# Patient Record
Sex: FEMALE | Race: WHITE | NOT HISPANIC OR LATINO | Employment: OTHER | URBAN - METROPOLITAN AREA
[De-identification: names, ages, dates, MRNs, and addresses within clinical notes are randomized per-mention and may not be internally consistent; named-entity substitution may affect disease eponyms.]

---

## 2017-01-03 ENCOUNTER — TELEPHONE (OUTPATIENT)
Dept: NEUROLOGY | Facility: CLINIC | Age: 72
End: 2017-01-03

## 2017-01-03 NOTE — TELEPHONE ENCOUNTER
----- Message from Ca Mejia sent at 12/30/2016  8:06 AM EST -----  Pt called again about her rx.  She says that another doctor has never prescribed that for her, and that it's always come from Dr. Whitman.    The order from Dr. Cesar is because she is in the hospital.  It's a hospital order not her home rx.    She is upset and wants Dr. Whitman to call her in the hospital [phone].

## 2017-01-19 DIAGNOSIS — G40.909 SEIZURE DISORDER (HCC): ICD-10-CM

## 2017-01-19 RX ORDER — PRAMIPEXOLE DIHYDROCHLORIDE 0.5 MG/1
TABLET ORAL
Qty: 270 TABLET | Refills: 1 | Status: ON HOLD | OUTPATIENT
Start: 2017-01-19 | End: 2017-02-20 | Stop reason: SDUPTHER

## 2017-01-19 RX ORDER — PRAVASTATIN SODIUM 40 MG
TABLET ORAL
Qty: 90 TABLET | Refills: 1 | Status: SHIPPED | OUTPATIENT
Start: 2017-01-19 | End: 2017-02-15

## 2017-01-19 NOTE — TELEPHONE ENCOUNTER
pramipexole (MIRAPEX) 0.5 MG tablet [Pharmacy Med Name: PRAMIPEXOLE 0.5MG TABLETS]  TAKE 3 TABLETS BY MOUTH EVERY DAY   Normal, Disp-270 tablet, R-1     Pharmacy: [facility] [id number]

## 2017-02-09 ENCOUNTER — OFFICE VISIT (OUTPATIENT)
Dept: INFECTIOUS DISEASES | Facility: CLINIC | Age: 72
End: 2017-02-09

## 2017-02-09 VITALS
HEIGHT: 70 IN | RESPIRATION RATE: 14 BRPM | TEMPERATURE: 97.8 F | DIASTOLIC BLOOD PRESSURE: 76 MMHG | SYSTOLIC BLOOD PRESSURE: 138 MMHG

## 2017-02-09 DIAGNOSIS — Z79.2 LONG TERM CURRENT USE OF ANTIBIOTICS: ICD-10-CM

## 2017-02-09 DIAGNOSIS — T84.50XA: Primary | ICD-10-CM

## 2017-02-09 DIAGNOSIS — D70.2 OTHER DRUG-INDUCED NEUTROPENIA (HCC): ICD-10-CM

## 2017-02-09 PROCEDURE — 99214 OFFICE O/P EST MOD 30 MIN: CPT | Performed by: INTERNAL MEDICINE

## 2017-02-09 RX ORDER — FERROUS GLUCONATE 324(37.5)
TABLET ORAL
COMMUNITY
End: 2017-02-15

## 2017-02-09 RX ORDER — POTASSIUM CHLORIDE 750 MG/1
10 CAPSULE, EXTENDED RELEASE ORAL 2 TIMES DAILY
COMMUNITY
End: 2017-02-15

## 2017-02-09 RX ORDER — BACLOFEN 20 MG/1
20 TABLET ORAL 3 TIMES DAILY
COMMUNITY
End: 2017-02-15

## 2017-02-09 NOTE — PROGRESS NOTES
cc: Follow-up (PJI  Left knee)      Per prior notes: This is a very nice 71-year-old lady who initially had left TKA back in 2013. Starting in 11/2016, she underwent surgery for loosening of a pin x2 and then for a liner exchange. Most recently, she had an operation on 12/13/2016 by Dr. Claudio for failed locking pin of the left total knee with reinsertion of a new locking pin. Cultures were obtained at that time and did grow coagulase negative staphylococci. She was placed on Keflex and then developed rash and then was placed on clindamycin. She had persistent drainage and had redo incision and debridement with liner exchange on 12/24 by Dr. Cesar.  Cultures from that operation grew Enterococcus faecalis.  Unfortunately she failed to convalesce and underwent we will hardware with placement of an anabolic spacer on 12/29/16 by Dr. Cesar.  Cultures from that operation were negative.  She was discharged on 6 weeks of vancomycin.    Since that time, she says she has done well.  In terms of her prosthetic joint infection she denies any drainage.  She still has an anabolic spacer and is not moving around very well.  She does have some tenderness around her brace.  In regards her long-term use of antibiotics, she is tolerating the vancomycin well without side effects.  She denies any missed doses.  She denies any problem is with her pack.  In regards to her leukopenia, her white count has been running a touch on the low side although she denies any symptoms of neutropenic sepsis such as fever, chills, night sweats.     PAST MEDICAL/SURGICAL HISTORY:  1. Osteoarthritis, status post left total hip arthroplasty and total knee arthroplasty.  2. Chronic venous insufficiency/lymphedema.  3. Hyperlipidemia.   4. Multinodular goiter, status post partial thyroidectomy.   5. Osteoporosis.   6. Restless leg syndrome.   7. Sleep apnea.  8. Seizure disorder.   9. Appendectomy.  10. Hysterectomy.  11. Bladder repair.    Review of  "Systems: Lower extremity edema.  All other reviewed and negative except as per HPI    Blood pressure 138/76, temperature 97.8 °F (36.6 °C), resp. rate 14, height 70\" (177.8 cm).  GENERAL: Awake and alert, in no acute distress.   Left leg and knee immobilizer.  Visualized portions of the knee show an incision that appears well healed.  There is no significant joint effusion or cellulitic features.      DIAGNOSTICS:    Antibiotic monitoring labs reviewed.  Creatinine is been stable between 0.6 and 0.8.    White count is been running a touch low most recently 3.4 (P 53, L 34 with ANC of 1800).  Hematocrit 31.  Platelets 226    CRP is essentially normal.  In successive weeks It decreased from 90.2 down to 47 down to 15 down to 9 then to 14 and back down to 7 most recently on 2/6    ESR is normal at 9    Vancomycin trough has been variable and she has required some vancomycin dose adjustments.  Most recent vancomycin trough was 17.    12/13/16 wound cx: CoNS  12/24/16 wound cx enterococcus faecalis (s-amp, gent, vanc)  12/29/16 wound cx Neg    Assessment and Plan  1. 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 Cesar  2.  Long-term use of antibiotics, requiring intensive drug monitoring to avoid toxicity  3.  Mild leukopenia suspected secondary to #2    Doing well.  She has normalized her inflammatory markers.  I need to see no biochemical evidence of infection and she doesn't have any signs or symptoms of infection today in clinic.  We will go ahead and discontinue vancomycin and pull her PICC line today in clinic especially on account of her mild leukopenia.  I discussed with her that I generally recommend waiting between 2-8 weeks off of antibiotics to ensure no recrudescence of infection prior to placing new TKA.  We discussed signs and symptoms of recrudescent infection and when to seek medical attention.  She is due to see Dr. Cesar " tomorrow.  I have not scheduled her a follow-up appointment but would be happy to see her if there are infectious concerns.

## 2017-02-10 ENCOUNTER — TELEPHONE (OUTPATIENT)
Dept: ENDOCRINOLOGY | Age: 72
End: 2017-02-10

## 2017-02-10 NOTE — TELEPHONE ENCOUNTER
----- Message from Whitney Rob sent at 2/10/2017  1:41 PM EST -----  Contact: neal rich [phone]  Pt in rehab and going home on Sunday  Evaluation for nursing and pt care is needed  She needs a verbal ok from you    Thank you

## 2017-02-15 ENCOUNTER — APPOINTMENT (OUTPATIENT)
Dept: PREADMISSION TESTING | Facility: HOSPITAL | Age: 72
End: 2017-02-15

## 2017-02-15 ENCOUNTER — HOSPITAL ENCOUNTER (OUTPATIENT)
Dept: GENERAL RADIOLOGY | Facility: HOSPITAL | Age: 72
Discharge: HOME | End: 2017-02-15
Admitting: ORTHOPAEDIC SURGERY

## 2017-02-15 ENCOUNTER — HOSPITAL ENCOUNTER (OUTPATIENT)
Dept: GENERAL RADIOLOGY | Facility: HOSPITAL | Age: 72
Discharge: HOME | End: 2017-02-15

## 2017-02-15 VITALS
SYSTOLIC BLOOD PRESSURE: 148 MMHG | RESPIRATION RATE: 16 BRPM | TEMPERATURE: 97.1 F | BODY MASS INDEX: 32.94 KG/M2 | DIASTOLIC BLOOD PRESSURE: 88 MMHG | OXYGEN SATURATION: 97 % | HEIGHT: 69 IN | HEART RATE: 103 BPM | WEIGHT: 222.4 LBS

## 2017-02-15 LAB
ALBUMIN SERPL-MCNC: 4.1 G/DL (ref 3.5–5.2)
ALBUMIN/GLOB SERPL: 1.2 G/DL
ALP SERPL-CCNC: 145 U/L (ref 39–117)
ALT SERPL W P-5'-P-CCNC: 13 U/L (ref 1–33)
ANION GAP SERPL CALCULATED.3IONS-SCNC: 15.7 MMOL/L
APTT PPP: 31 SECONDS (ref 22.7–35.4)
AST SERPL-CCNC: 17 U/L (ref 1–32)
BACTERIA UR QL AUTO: ABNORMAL /HPF
BASOPHILS # BLD AUTO: 0.06 10*3/MM3 (ref 0–0.2)
BASOPHILS NFR BLD AUTO: 1.4 % (ref 0–1.5)
BILIRUB SERPL-MCNC: 0.4 MG/DL (ref 0.1–1.2)
BILIRUB UR QL STRIP: NEGATIVE
BUN BLD-MCNC: 7 MG/DL (ref 8–23)
BUN/CREAT SERPL: 7.5 (ref 7–25)
CALCIUM SPEC-SCNC: 10 MG/DL (ref 8.6–10.5)
CHLORIDE SERPL-SCNC: 97 MMOL/L (ref 98–107)
CLARITY UR: CLEAR
CO2 SERPL-SCNC: 26.3 MMOL/L (ref 22–29)
COLOR UR: YELLOW
CREAT BLD-MCNC: 0.93 MG/DL (ref 0.57–1)
CRP SERPL-MCNC: 0.99 MG/DL (ref 0–0.5)
DEPRECATED RDW RBC AUTO: 45.4 FL (ref 37–54)
EOSINOPHIL # BLD AUTO: 0 10*3/MM3 (ref 0–0.7)
EOSINOPHIL NFR BLD AUTO: 0 % (ref 0.3–6.2)
ERYTHROCYTE [DISTWIDTH] IN BLOOD BY AUTOMATED COUNT: 14.1 % (ref 11.7–13)
ERYTHROCYTE [SEDIMENTATION RATE] IN BLOOD: 16 MM/HR (ref 0–30)
GFR SERPL CREATININE-BSD FRML MDRD: 60 ML/MIN/1.73
GLOBULIN UR ELPH-MCNC: 3.3 GM/DL
GLUCOSE BLD-MCNC: 110 MG/DL (ref 65–99)
GLUCOSE UR STRIP-MCNC: NEGATIVE MG/DL
HCT VFR BLD AUTO: 36 % (ref 35.6–45.5)
HGB BLD-MCNC: 11.5 G/DL (ref 11.9–15.5)
HGB UR QL STRIP.AUTO: NEGATIVE
HYALINE CASTS UR QL AUTO: ABNORMAL /LPF
IMM GRANULOCYTES # BLD: 0 10*3/MM3 (ref 0–0.03)
IMM GRANULOCYTES NFR BLD: 0 % (ref 0–0.5)
INR PPP: 1.05 (ref 0.9–1.1)
KETONES UR QL STRIP: NEGATIVE
LEUKOCYTE ESTERASE UR QL STRIP.AUTO: ABNORMAL
LYMPHOCYTES # BLD AUTO: 1.39 10*3/MM3 (ref 0.9–4.8)
LYMPHOCYTES NFR BLD AUTO: 31.7 % (ref 19.6–45.3)
MCH RBC QN AUTO: 28.3 PG (ref 26.9–32)
MCHC RBC AUTO-ENTMCNC: 31.9 G/DL (ref 32.4–36.3)
MCV RBC AUTO: 88.7 FL (ref 80.5–98.2)
MONOCYTES # BLD AUTO: 0.53 10*3/MM3 (ref 0.2–1.2)
MONOCYTES NFR BLD AUTO: 12.1 % (ref 5–12)
NEUTROPHILS # BLD AUTO: 2.4 10*3/MM3 (ref 1.9–8.1)
NEUTROPHILS NFR BLD AUTO: 54.8 % (ref 42.7–76)
NITRITE UR QL STRIP: NEGATIVE
PH UR STRIP.AUTO: 7.5 [PH] (ref 5–8)
PLATELET # BLD AUTO: 352 10*3/MM3 (ref 140–500)
PMV BLD AUTO: 10.1 FL (ref 6–12)
POTASSIUM BLD-SCNC: 4.2 MMOL/L (ref 3.5–5.2)
PROT SERPL-MCNC: 7.4 G/DL (ref 6–8.5)
PROT UR QL STRIP: NEGATIVE
PROTHROMBIN TIME: 13.3 SECONDS (ref 11.7–14.2)
RBC # BLD AUTO: 4.06 10*6/MM3 (ref 3.9–5.2)
RBC # UR: ABNORMAL /HPF
REF LAB TEST METHOD: ABNORMAL
SODIUM BLD-SCNC: 139 MMOL/L (ref 136–145)
SP GR UR STRIP: 1.01 (ref 1–1.03)
SQUAMOUS #/AREA URNS HPF: ABNORMAL /HPF
UROBILINOGEN UR QL STRIP: ABNORMAL
WBC NRBC COR # BLD: 4.38 10*3/MM3 (ref 4.5–10.7)
WBC UR QL AUTO: ABNORMAL /HPF

## 2017-02-15 PROCEDURE — 85025 COMPLETE CBC W/AUTO DIFF WBC: CPT | Performed by: ORTHOPAEDIC SURGERY

## 2017-02-15 PROCEDURE — 71020 HC CHEST PA AND LATERAL: CPT

## 2017-02-15 PROCEDURE — 85652 RBC SED RATE AUTOMATED: CPT | Performed by: ORTHOPAEDIC SURGERY

## 2017-02-15 PROCEDURE — 87086 URINE CULTURE/COLONY COUNT: CPT | Performed by: ORTHOPAEDIC SURGERY

## 2017-02-15 PROCEDURE — 81001 URINALYSIS AUTO W/SCOPE: CPT | Performed by: ORTHOPAEDIC SURGERY

## 2017-02-15 PROCEDURE — 87186 SC STD MICRODIL/AGAR DIL: CPT | Performed by: ORTHOPAEDIC SURGERY

## 2017-02-15 PROCEDURE — 36415 COLL VENOUS BLD VENIPUNCTURE: CPT

## 2017-02-15 PROCEDURE — 86140 C-REACTIVE PROTEIN: CPT | Performed by: ORTHOPAEDIC SURGERY

## 2017-02-15 PROCEDURE — 80053 COMPREHEN METABOLIC PANEL: CPT | Performed by: ORTHOPAEDIC SURGERY

## 2017-02-15 PROCEDURE — 85610 PROTHROMBIN TIME: CPT | Performed by: ORTHOPAEDIC SURGERY

## 2017-02-15 PROCEDURE — 73560 X-RAY EXAM OF KNEE 1 OR 2: CPT

## 2017-02-15 PROCEDURE — 85730 THROMBOPLASTIN TIME PARTIAL: CPT | Performed by: ORTHOPAEDIC SURGERY

## 2017-02-15 RX ORDER — PRAVASTATIN SODIUM 40 MG
40 TABLET ORAL DAILY
COMMUNITY
End: 2019-04-15 | Stop reason: SDUPTHER

## 2017-02-15 RX ORDER — CHLORHEXIDINE GLUCONATE 500 MG/1
1 CLOTH TOPICAL TAKE AS DIRECTED
Status: ON HOLD | COMMUNITY
End: 2017-02-20

## 2017-02-15 NOTE — DISCHARGE INSTRUCTIONS
Take the following medications the morning of surgery with a small sip of water.    PHENOBARBITAL AND SYNTHROID      ARRIVE AT 11:00    General Instructions:  • Do not eat or drink after midnight: includes water, mints, or gum. You may brush your teeth.  • Do not smoke, chew tobacco, or drink alcohol.  • Bring medications in original bottles, any inhalers and if applicable your C-PAP/ BI-PAP machine.  • Bring any papers given to you in the doctor’s office.  • Wear clean comfortable clothes and socks.  • Do not wear contact lenses or make-up.  Bring a case for your glasses if applicable.   • Bring crutches or walker if applicable.  • Leave all other valuables and jewelry at home.    If you were given a blood bank ID arm band remember to bring it with you the day of surgery.    Preventing a Surgical Site Infection:  Shower on the morning of surgery using a fresh bar of anti-bacterial soap (such as Dial) and clean washcloth.  Dry with a clean towel and dress in clean clothing.  For 2 to 3 days before surgery, avoid shaving with a razor near where you will have surgery because the razor can irritate skin and make it easier to develop an infection  Ask your surgeon if you will be receiving antibiotics prior to surgery  Make sure you, your family, and all healthcare providers clean their hands with soap and water or an alcohol based hand  before caring for you or your wound  If at all possible, quit smoking as many days before surgery as you can.    Day of surgery:  Upon arrival, a Pre-op nurse and Anesthesiologist will review your health history, obtain vital signs, and answer questions you may have.  The only belongings needed at this time will be your home medications and if applicable your C-PAP/BI-PAP machine.  If you are staying overnight your family can leave the rest of your belongings in the car and bring them to your room later.  A Pre-op nurse will start an IV and you may receive medication in  preparation for surgery, including something to help you relax.  Your family will be able to see you in the Pre-op area.  While you are in surgery your family should notify the waiting room  if they leave the waiting room area and provide a contact phone number.    Please be aware that surgery does come with discomfort.  We want to make every effort to control your discomfort so please discuss any uncontrolled symptoms with your nurse.   Your doctor will most likely have prescribed pain medications.      If you are going home after surgery you will receive individualized written care instructions before being discharged.  A responsible adult must drive you to and from the hospital on the day of your surgery and stay with you for 24 hours.    If you are staying overnight following surgery, you will be transported to your hospital room following the recovery period.  [facility] has all private rooms.    If you have any questions please call Pre-Admission Testing at [phone].  Deductibles and co-payments are collected on the day of service. Please be prepared to pay the required co-pay, deductible or deposit on the day of service as defined by your plan.    2% CHLORAHEXIDINE GLUCONATE* CLOTH  Preparing or “prepping” skin before surgery can reduce the risk of infection at the surgical site. To make the process easier, [facility] has chosen disposable cloths moistened with a rinse-free, 2% Chlorhexidine Gluconate (CHG) antiseptic solution. The steps below outline the prepping process and should be carefully followed.        Use the prep cloth on the area that is circled in the diagram             Directions Night before Surgery  1) Shower using a fresh bar of anti-bacterial soap (such as Dial) and clean washcloth.  Use a clean towel to completely dry your skin.  2) Do not use any lotions, oils or creams on your skin.  3) Open the package and remove 1 cloth, wipe your skin for 30  seconds in a circular motion.  Allow to dry for 3 minutes.  4) Repeat #3 with second cloth.  5) Do not touch your eyes, ears, or mouth with the prep cloth.  6) Allow the wet prep solution to air dry.  7) Discard the prep cloth and wash your hands with soap and water.   8) Dress in clean bed clothes and sleep on fresh clean bed sheets.   9) You may experience some temporary itching after the prep.    Directions Day of Surgery  1) Repeat steps 1,2,3,4,5,6,7, and 9.   2) Dress in clean clothes before coming to the hospital.    BACTROBAN NASAL OINTMENT  There are many germs normally in your nose. Bactroban is an ointment that will help reduce these germs. Please follow these instructions for Bactroban use:    ____Two days before surgery in the evening Date________    ____The day before surgery in the morning  Date________    ____The day before surgery in the evening              Date________    ____The day of surgery in the morning    Date________    **Squirt ½ package of Bactroban Ointment onto a cotton applicator and apply to inside of 1st nostril.  Squirt the remaining Bactroban and apply to the inside of the other nostril.    PERIDEX- ORAL:  Use only if your surgeon has ordered  Use the night before and morning of surgery - Swish, gargle, and spit - do not swallow.

## 2017-02-18 LAB
BACTERIA SPEC AEROBE CULT: ABNORMAL
BACTERIA SPEC AEROBE CULT: ABNORMAL

## 2017-02-20 ENCOUNTER — APPOINTMENT (OUTPATIENT)
Dept: GENERAL RADIOLOGY | Facility: HOSPITAL | Age: 72
End: 2017-02-20
Attending: ORTHOPAEDIC SURGERY

## 2017-02-20 ENCOUNTER — ANESTHESIA EVENT (OUTPATIENT)
Dept: PERIOP | Facility: HOSPITAL | Age: 72
End: 2017-02-20

## 2017-02-20 ENCOUNTER — HOSPITAL ENCOUNTER (INPATIENT)
Facility: HOSPITAL | Age: 72
LOS: 3 days | Discharge: HOME HEALTH | End: 2017-02-23
Attending: ORTHOPAEDIC SURGERY | Admitting: ORTHOPAEDIC SURGERY

## 2017-02-20 ENCOUNTER — ANESTHESIA (OUTPATIENT)
Dept: PERIOP | Facility: HOSPITAL | Age: 72
End: 2017-02-20

## 2017-02-20 DIAGNOSIS — Z89.529 ACQUIRED ABSENCE OF KNEE JOINT FOLLOWING REMOVAL OF JOINT PROSTHESIS WITH PRESENCE OF ANTIBIOTIC-IMPREGNATED CEMENT SPACER: ICD-10-CM

## 2017-02-20 DIAGNOSIS — M17.12 PRIMARY OSTEOARTHRITIS OF LEFT KNEE: ICD-10-CM

## 2017-02-20 DIAGNOSIS — R26.2 DIFFICULTY WALKING: Primary | ICD-10-CM

## 2017-02-20 LAB
BACTERIA UR QL AUTO: ABNORMAL /HPF
BILIRUB UR QL STRIP: NEGATIVE
CLARITY UR: CLEAR
COLOR UR: YELLOW
GLUCOSE UR STRIP-MCNC: NEGATIVE MG/DL
HBV SURFACE AG SERPL QL IA: NORMAL
HCV AB SER DONR QL: NORMAL
HGB UR QL STRIP.AUTO: NEGATIVE
HIV1 P24 AG SER QL: NORMAL
HIV1+2 AB SER QL: NORMAL
HYALINE CASTS UR QL AUTO: ABNORMAL /LPF
KETONES UR QL STRIP: NEGATIVE
LEUKOCYTE ESTERASE UR QL STRIP.AUTO: NEGATIVE
NITRITE UR QL STRIP: NEGATIVE
PH UR STRIP.AUTO: 6.5 [PH] (ref 5–8)
PROT UR QL STRIP: NEGATIVE
RBC # UR: ABNORMAL /HPF
REF LAB TEST METHOD: ABNORMAL
SP GR UR STRIP: 1.01 (ref 1–1.03)
SQUAMOUS #/AREA URNS HPF: ABNORMAL /HPF
UROBILINOGEN UR QL STRIP: NORMAL
WBC UR QL AUTO: ABNORMAL /HPF

## 2017-02-20 PROCEDURE — 87070 CULTURE OTHR SPECIMN AEROBIC: CPT | Performed by: ORTHOPAEDIC SURGERY

## 2017-02-20 PROCEDURE — C1776 JOINT DEVICE (IMPLANTABLE): HCPCS | Performed by: ORTHOPAEDIC SURGERY

## 2017-02-20 PROCEDURE — 25010000002 MIDAZOLAM PER 1 MG: Performed by: ANESTHESIOLOGY

## 2017-02-20 PROCEDURE — 25010000002 FENTANYL CITRATE (PF) 100 MCG/2ML SOLUTION: Performed by: NURSE ANESTHETIST, CERTIFIED REGISTERED

## 2017-02-20 PROCEDURE — 0SUD09Z SUPPLEMENT LEFT KNEE JOINT WITH LINER, OPEN APPROACH: ICD-10-PCS | Performed by: ORTHOPAEDIC SURGERY

## 2017-02-20 PROCEDURE — 25010000002 PROPOFOL 10 MG/ML EMULSION: Performed by: NURSE ANESTHETIST, CERTIFIED REGISTERED

## 2017-02-20 PROCEDURE — 87899 AGENT NOS ASSAY W/OPTIC: CPT | Performed by: ORTHOPAEDIC SURGERY

## 2017-02-20 PROCEDURE — 25010000002 KETOROLAC TROMETHAMINE PER 15 MG: Performed by: ANESTHESIOLOGY

## 2017-02-20 PROCEDURE — 87340 HEPATITIS B SURFACE AG IA: CPT | Performed by: ORTHOPAEDIC SURGERY

## 2017-02-20 PROCEDURE — C1713 ANCHOR/SCREW BN/BN,TIS/BN: HCPCS | Performed by: ORTHOPAEDIC SURGERY

## 2017-02-20 PROCEDURE — 25010000002 VANCOMYCIN: Performed by: ORTHOPAEDIC SURGERY

## 2017-02-20 PROCEDURE — 0SPD08Z REMOVAL OF SPACER FROM LEFT KNEE JOINT, OPEN APPROACH: ICD-10-PCS | Performed by: ORTHOPAEDIC SURGERY

## 2017-02-20 PROCEDURE — 88331 PATH CONSLTJ SURG 1 BLK 1SPC: CPT | Performed by: ORTHOPAEDIC SURGERY

## 2017-02-20 PROCEDURE — 25010000002 MEPERIDINE 25 MG/0.5ML SOLUTION

## 2017-02-20 PROCEDURE — 87205 SMEAR GRAM STAIN: CPT | Performed by: ORTHOPAEDIC SURGERY

## 2017-02-20 PROCEDURE — 86803 HEPATITIS C AB TEST: CPT | Performed by: ORTHOPAEDIC SURGERY

## 2017-02-20 PROCEDURE — 73560 X-RAY EXAM OF KNEE 1 OR 2: CPT

## 2017-02-20 PROCEDURE — 87075 CULTR BACTERIA EXCEPT BLOOD: CPT | Performed by: ORTHOPAEDIC SURGERY

## 2017-02-20 PROCEDURE — 0SRD0J9 REPLACEMENT OF LEFT KNEE JOINT WITH SYNTHETIC SUBSTITUTE, CEMENTED, OPEN APPROACH: ICD-10-PCS | Performed by: ORTHOPAEDIC SURGERY

## 2017-02-20 PROCEDURE — 25010000002 DEXAMETHASONE PER 1 MG: Performed by: NURSE ANESTHETIST, CERTIFIED REGISTERED

## 2017-02-20 PROCEDURE — 81001 URINALYSIS AUTO W/SCOPE: CPT | Performed by: ORTHOPAEDIC SURGERY

## 2017-02-20 PROCEDURE — 25010000002 MEPERIDINE 25 MG/0.5ML SOLUTION: Performed by: ANESTHESIOLOGY

## 2017-02-20 PROCEDURE — 25010000002 HYDROMORPHONE PER 4 MG: Performed by: NURSE ANESTHETIST, CERTIFIED REGISTERED

## 2017-02-20 PROCEDURE — 25010000002 NEOSTIGMINE 10 MG/10ML SOLUTION: Performed by: ANESTHESIOLOGY

## 2017-02-20 PROCEDURE — 25010000002 ONDANSETRON PER 1 MG: Performed by: ANESTHESIOLOGY

## 2017-02-20 PROCEDURE — 25010000002 KETOROLAC TROMETHAMINE PER 15 MG: Performed by: ORTHOPAEDIC SURGERY

## 2017-02-20 PROCEDURE — G0432 EIA HIV-1/HIV-2 SCREEN: HCPCS | Performed by: ORTHOPAEDIC SURGERY

## 2017-02-20 PROCEDURE — 88305 TISSUE EXAM BY PATHOLOGIST: CPT | Performed by: ORTHOPAEDIC SURGERY

## 2017-02-20 DEVICE — IMPLANTABLE DEVICE: Type: IMPLANTABLE DEVICE | Site: KNEE | Status: FUNCTIONAL

## 2017-02-20 DEVICE — IMPLANTABLE DEVICE
Type: IMPLANTABLE DEVICE | Site: KNEE | Status: FUNCTIONAL
Brand: BIOMET 360 KNEE SYSTEM

## 2017-02-20 DEVICE — IMPLANTABLE DEVICE
Type: IMPLANTABLE DEVICE | Site: KNEE | Status: FUNCTIONAL
Brand: VANGUARD 360 REVISION SYSTEM

## 2017-02-20 DEVICE — IMPLANTABLE DEVICE
Type: IMPLANTABLE DEVICE | Site: KNEE | Status: FUNCTIONAL
Brand: VANGUARD® KNEE SYSTEM

## 2017-02-20 RX ORDER — SODIUM CHLORIDE, SODIUM LACTATE, POTASSIUM CHLORIDE, CALCIUM CHLORIDE 600; 310; 30; 20 MG/100ML; MG/100ML; MG/100ML; MG/100ML
100 INJECTION, SOLUTION INTRAVENOUS CONTINUOUS
Status: DISCONTINUED | OUTPATIENT
Start: 2017-02-20 | End: 2017-02-23 | Stop reason: HOSPADM

## 2017-02-20 RX ORDER — FENTANYL CITRATE 50 UG/ML
50 INJECTION, SOLUTION INTRAMUSCULAR; INTRAVENOUS
Status: DISCONTINUED | OUTPATIENT
Start: 2017-02-20 | End: 2017-02-20 | Stop reason: HOSPADM

## 2017-02-20 RX ORDER — LEVOTHYROXINE SODIUM 0.05 MG/1
50 TABLET ORAL
Status: DISCONTINUED | OUTPATIENT
Start: 2017-02-21 | End: 2017-02-23 | Stop reason: HOSPADM

## 2017-02-20 RX ORDER — PROMETHAZINE HYDROCHLORIDE 25 MG/1
25 SUPPOSITORY RECTAL ONCE AS NEEDED
Status: DISCONTINUED | OUTPATIENT
Start: 2017-02-20 | End: 2017-02-20 | Stop reason: HOSPADM

## 2017-02-20 RX ORDER — KETOROLAC TROMETHAMINE 30 MG/ML
INJECTION, SOLUTION INTRAMUSCULAR; INTRAVENOUS AS NEEDED
Status: DISCONTINUED | OUTPATIENT
Start: 2017-02-20 | End: 2017-02-20 | Stop reason: SURG

## 2017-02-20 RX ORDER — OXYCODONE AND ACETAMINOPHEN 7.5; 325 MG/1; MG/1
1 TABLET ORAL ONCE AS NEEDED
Status: DISCONTINUED | OUTPATIENT
Start: 2017-02-20 | End: 2017-02-20 | Stop reason: HOSPADM

## 2017-02-20 RX ORDER — LIDOCAINE HYDROCHLORIDE 20 MG/ML
INJECTION, SOLUTION INFILTRATION; PERINEURAL AS NEEDED
Status: DISCONTINUED | OUTPATIENT
Start: 2017-02-20 | End: 2017-02-20 | Stop reason: SURG

## 2017-02-20 RX ORDER — ROCURONIUM BROMIDE 10 MG/ML
INJECTION, SOLUTION INTRAVENOUS AS NEEDED
Status: DISCONTINUED | OUTPATIENT
Start: 2017-02-20 | End: 2017-02-20 | Stop reason: SURG

## 2017-02-20 RX ORDER — PROMETHAZINE HYDROCHLORIDE 25 MG/ML
12.5 INJECTION, SOLUTION INTRAMUSCULAR; INTRAVENOUS ONCE AS NEEDED
Status: DISCONTINUED | OUTPATIENT
Start: 2017-02-20 | End: 2017-02-20 | Stop reason: HOSPADM

## 2017-02-20 RX ORDER — ONDANSETRON 2 MG/ML
4 INJECTION INTRAMUSCULAR; INTRAVENOUS EVERY 6 HOURS PRN
Status: DISCONTINUED | OUTPATIENT
Start: 2017-02-20 | End: 2017-02-23 | Stop reason: HOSPADM

## 2017-02-20 RX ORDER — ACETAMINOPHEN 325 MG/1
650 TABLET ORAL EVERY 4 HOURS PRN
Status: DISCONTINUED | OUTPATIENT
Start: 2017-02-20 | End: 2017-02-23 | Stop reason: HOSPADM

## 2017-02-20 RX ORDER — SODIUM CHLORIDE 0.9 % (FLUSH) 0.9 %
1-10 SYRINGE (ML) INJECTION AS NEEDED
Status: DISCONTINUED | OUTPATIENT
Start: 2017-02-20 | End: 2017-02-23 | Stop reason: HOSPADM

## 2017-02-20 RX ORDER — FLUMAZENIL 0.1 MG/ML
0.2 INJECTION INTRAVENOUS AS NEEDED
Status: DISCONTINUED | OUTPATIENT
Start: 2017-02-20 | End: 2017-02-20 | Stop reason: HOSPADM

## 2017-02-20 RX ORDER — PROMETHAZINE HYDROCHLORIDE 25 MG/1
25 TABLET ORAL ONCE AS NEEDED
Status: DISCONTINUED | OUTPATIENT
Start: 2017-02-20 | End: 2017-02-20 | Stop reason: HOSPADM

## 2017-02-20 RX ORDER — FENTANYL CITRATE 50 UG/ML
INJECTION, SOLUTION INTRAMUSCULAR; INTRAVENOUS AS NEEDED
Status: DISCONTINUED | OUTPATIENT
Start: 2017-02-20 | End: 2017-02-20 | Stop reason: SURG

## 2017-02-20 RX ORDER — SODIUM CHLORIDE 0.9 % (FLUSH) 0.9 %
1-10 SYRINGE (ML) INJECTION AS NEEDED
Status: DISCONTINUED | OUTPATIENT
Start: 2017-02-20 | End: 2017-02-20 | Stop reason: HOSPADM

## 2017-02-20 RX ORDER — DEXAMETHASONE SODIUM PHOSPHATE 10 MG/ML
INJECTION INTRAMUSCULAR; INTRAVENOUS AS NEEDED
Status: DISCONTINUED | OUTPATIENT
Start: 2017-02-20 | End: 2017-02-20 | Stop reason: SURG

## 2017-02-20 RX ORDER — ASPIRIN 325 MG
325 TABLET, DELAYED RELEASE (ENTERIC COATED) ORAL DAILY
Status: DISCONTINUED | OUTPATIENT
Start: 2017-02-20 | End: 2017-02-23 | Stop reason: HOSPADM

## 2017-02-20 RX ORDER — FAMOTIDINE 10 MG/ML
20 INJECTION, SOLUTION INTRAVENOUS ONCE
Status: COMPLETED | OUTPATIENT
Start: 2017-02-20 | End: 2017-02-20

## 2017-02-20 RX ORDER — HYDROMORPHONE HYDROCHLORIDE 1 MG/ML
0.25 INJECTION, SOLUTION INTRAMUSCULAR; INTRAVENOUS; SUBCUTANEOUS
Status: DISCONTINUED | OUTPATIENT
Start: 2017-02-20 | End: 2017-02-20 | Stop reason: HOSPADM

## 2017-02-20 RX ORDER — NALOXONE HCL 0.4 MG/ML
0.2 VIAL (ML) INJECTION AS NEEDED
Status: DISCONTINUED | OUTPATIENT
Start: 2017-02-20 | End: 2017-02-20 | Stop reason: HOSPADM

## 2017-02-20 RX ORDER — SODIUM CHLORIDE, SODIUM LACTATE, POTASSIUM CHLORIDE, CALCIUM CHLORIDE 600; 310; 30; 20 MG/100ML; MG/100ML; MG/100ML; MG/100ML
9 INJECTION, SOLUTION INTRAVENOUS CONTINUOUS
Status: DISCONTINUED | OUTPATIENT
Start: 2017-02-20 | End: 2017-02-22 | Stop reason: SDUPTHER

## 2017-02-20 RX ORDER — MIDAZOLAM HYDROCHLORIDE 1 MG/ML
1 INJECTION INTRAMUSCULAR; INTRAVENOUS
Status: DISCONTINUED | OUTPATIENT
Start: 2017-02-20 | End: 2017-02-20 | Stop reason: HOSPADM

## 2017-02-20 RX ORDER — TRANEXAMIC ACID 100 MG/ML
INJECTION, SOLUTION INTRAVENOUS AS NEEDED
Status: DISCONTINUED | OUTPATIENT
Start: 2017-02-20 | End: 2017-02-20 | Stop reason: SURG

## 2017-02-20 RX ORDER — OXYCODONE HYDROCHLORIDE AND ACETAMINOPHEN 5; 325 MG/1; MG/1
1 TABLET ORAL EVERY 4 HOURS PRN
Status: DISCONTINUED | OUTPATIENT
Start: 2017-02-20 | End: 2017-02-23 | Stop reason: HOSPADM

## 2017-02-20 RX ORDER — PROMETHAZINE HYDROCHLORIDE 25 MG/1
12.5 TABLET ORAL ONCE AS NEEDED
Status: DISCONTINUED | OUTPATIENT
Start: 2017-02-20 | End: 2017-02-20 | Stop reason: HOSPADM

## 2017-02-20 RX ORDER — BISACODYL 10 MG
10 SUPPOSITORY, RECTAL RECTAL DAILY PRN
Status: DISCONTINUED | OUTPATIENT
Start: 2017-02-20 | End: 2017-02-23 | Stop reason: HOSPADM

## 2017-02-20 RX ORDER — PROPOFOL 10 MG/ML
VIAL (ML) INTRAVENOUS AS NEEDED
Status: DISCONTINUED | OUTPATIENT
Start: 2017-02-20 | End: 2017-02-20 | Stop reason: SURG

## 2017-02-20 RX ORDER — HYDROMORPHONE HYDROCHLORIDE 1 MG/ML
0.5 INJECTION, SOLUTION INTRAMUSCULAR; INTRAVENOUS; SUBCUTANEOUS
Status: DISCONTINUED | OUTPATIENT
Start: 2017-02-20 | End: 2017-02-20 | Stop reason: HOSPADM

## 2017-02-20 RX ORDER — DIPHENHYDRAMINE HYDROCHLORIDE 50 MG/ML
12.5 INJECTION INTRAMUSCULAR; INTRAVENOUS
Status: DISCONTINUED | OUTPATIENT
Start: 2017-02-20 | End: 2017-02-20 | Stop reason: HOSPADM

## 2017-02-20 RX ORDER — NEOSTIGMINE METHYLSULFATE 1 MG/ML
INJECTION, SOLUTION INTRAVENOUS AS NEEDED
Status: DISCONTINUED | OUTPATIENT
Start: 2017-02-20 | End: 2017-02-20 | Stop reason: SURG

## 2017-02-20 RX ORDER — ONDANSETRON 2 MG/ML
4 INJECTION INTRAMUSCULAR; INTRAVENOUS ONCE AS NEEDED
Status: DISCONTINUED | OUTPATIENT
Start: 2017-02-20 | End: 2017-02-20 | Stop reason: HOSPADM

## 2017-02-20 RX ORDER — HYDRALAZINE HYDROCHLORIDE 20 MG/ML
5 INJECTION INTRAMUSCULAR; INTRAVENOUS
Status: DISCONTINUED | OUTPATIENT
Start: 2017-02-20 | End: 2017-02-20 | Stop reason: HOSPADM

## 2017-02-20 RX ORDER — PRAMIPEXOLE DIHYDROCHLORIDE 0.5 MG/1
0.5 TABLET ORAL 3 TIMES DAILY
Status: DISCONTINUED | OUTPATIENT
Start: 2017-02-20 | End: 2017-02-23 | Stop reason: HOSPADM

## 2017-02-20 RX ORDER — OXYCODONE HYDROCHLORIDE AND ACETAMINOPHEN 5; 325 MG/1; MG/1
2 TABLET ORAL EVERY 4 HOURS PRN
Status: DISCONTINUED | OUTPATIENT
Start: 2017-02-20 | End: 2017-02-23 | Stop reason: HOSPADM

## 2017-02-20 RX ORDER — HYDROCODONE BITARTRATE AND ACETAMINOPHEN 7.5; 325 MG/1; MG/1
1 TABLET ORAL ONCE AS NEEDED
Status: DISCONTINUED | OUTPATIENT
Start: 2017-02-20 | End: 2017-02-20 | Stop reason: HOSPADM

## 2017-02-20 RX ORDER — KETOROLAC TROMETHAMINE 15 MG/ML
15 INJECTION, SOLUTION INTRAMUSCULAR; INTRAVENOUS EVERY 6 HOURS
Status: COMPLETED | OUTPATIENT
Start: 2017-02-20 | End: 2017-02-21

## 2017-02-20 RX ORDER — DOCUSATE SODIUM 100 MG/1
100 CAPSULE, LIQUID FILLED ORAL 2 TIMES DAILY PRN
Status: DISCONTINUED | OUTPATIENT
Start: 2017-02-20 | End: 2017-02-23 | Stop reason: HOSPADM

## 2017-02-20 RX ORDER — PHENOBARBITAL 64.8 MG/1
64.8 TABLET ORAL DAILY
Status: DISCONTINUED | OUTPATIENT
Start: 2017-02-21 | End: 2017-02-23 | Stop reason: HOSPADM

## 2017-02-20 RX ORDER — ACETAMINOPHEN 325 MG/1
325 TABLET ORAL EVERY 4 HOURS PRN
Status: DISCONTINUED | OUTPATIENT
Start: 2017-02-20 | End: 2017-02-23 | Stop reason: HOSPADM

## 2017-02-20 RX ORDER — GLYCOPYRROLATE 0.2 MG/ML
INJECTION INTRAMUSCULAR; INTRAVENOUS AS NEEDED
Status: DISCONTINUED | OUTPATIENT
Start: 2017-02-20 | End: 2017-02-20 | Stop reason: SURG

## 2017-02-20 RX ORDER — MIDAZOLAM HYDROCHLORIDE 1 MG/ML
2 INJECTION INTRAMUSCULAR; INTRAVENOUS
Status: DISCONTINUED | OUTPATIENT
Start: 2017-02-20 | End: 2017-02-20 | Stop reason: HOSPADM

## 2017-02-20 RX ORDER — ONDANSETRON 2 MG/ML
INJECTION INTRAMUSCULAR; INTRAVENOUS AS NEEDED
Status: DISCONTINUED | OUTPATIENT
Start: 2017-02-20 | End: 2017-02-20 | Stop reason: SURG

## 2017-02-20 RX ORDER — NALOXONE HCL 0.4 MG/ML
0.1 VIAL (ML) INJECTION
Status: DISCONTINUED | OUTPATIENT
Start: 2017-02-20 | End: 2017-02-23 | Stop reason: HOSPADM

## 2017-02-20 RX ORDER — DIPHENHYDRAMINE HCL 25 MG
25 CAPSULE ORAL EVERY 6 HOURS PRN
Status: DISCONTINUED | OUTPATIENT
Start: 2017-02-20 | End: 2017-02-23 | Stop reason: HOSPADM

## 2017-02-20 RX ORDER — FERROUS SULFATE 325(65) MG
325 TABLET ORAL
Status: DISCONTINUED | OUTPATIENT
Start: 2017-02-21 | End: 2017-02-23 | Stop reason: HOSPADM

## 2017-02-20 RX ORDER — PHENOBARBITAL 64.8 MG/1
129.6 TABLET ORAL NIGHTLY
Status: DISCONTINUED | OUTPATIENT
Start: 2017-02-20 | End: 2017-02-23 | Stop reason: HOSPADM

## 2017-02-20 RX ORDER — ONDANSETRON 4 MG/1
4 TABLET, FILM COATED ORAL EVERY 6 HOURS PRN
Status: DISCONTINUED | OUTPATIENT
Start: 2017-02-20 | End: 2017-02-23 | Stop reason: HOSPADM

## 2017-02-20 RX ORDER — ACETAMINOPHEN 500 MG
1000 TABLET ORAL ONCE
Status: COMPLETED | OUTPATIENT
Start: 2017-02-20 | End: 2017-02-20

## 2017-02-20 RX ORDER — LABETALOL HYDROCHLORIDE 5 MG/ML
5 INJECTION, SOLUTION INTRAVENOUS
Status: DISCONTINUED | OUTPATIENT
Start: 2017-02-20 | End: 2017-02-20 | Stop reason: HOSPADM

## 2017-02-20 RX ORDER — ONDANSETRON 4 MG/1
4 TABLET, ORALLY DISINTEGRATING ORAL EVERY 6 HOURS PRN
Status: DISCONTINUED | OUTPATIENT
Start: 2017-02-20 | End: 2017-02-23 | Stop reason: HOSPADM

## 2017-02-20 RX ADMIN — VANCOMYCIN HYDROCHLORIDE 1500 MG: 1 INJECTION, POWDER, LYOPHILIZED, FOR SOLUTION INTRAVENOUS at 13:39

## 2017-02-20 RX ADMIN — PHENOBARBITAL 129.6 MG: 64.8 TABLET ORAL at 21:27

## 2017-02-20 RX ADMIN — DEXAMETHASONE SODIUM PHOSPHATE 10 MG: 10 INJECTION INTRAMUSCULAR; INTRAVENOUS at 14:52

## 2017-02-20 RX ADMIN — MIDAZOLAM 2 MG: 1 INJECTION INTRAMUSCULAR; INTRAVENOUS at 14:26

## 2017-02-20 RX ADMIN — SODIUM CHLORIDE, POTASSIUM CHLORIDE, SODIUM LACTATE AND CALCIUM CHLORIDE 100 ML/HR: 600; 310; 30; 20 INJECTION, SOLUTION INTRAVENOUS at 20:03

## 2017-02-20 RX ADMIN — SODIUM CHLORIDE, POTASSIUM CHLORIDE, SODIUM LACTATE AND CALCIUM CHLORIDE 9 ML/HR: 600; 310; 30; 20 INJECTION, SOLUTION INTRAVENOUS at 14:27

## 2017-02-20 RX ADMIN — FENTANYL CITRATE 100 MCG: 50 INJECTION, SOLUTION INTRAMUSCULAR; INTRAVENOUS at 15:35

## 2017-02-20 RX ADMIN — HYDROMORPHONE HYDROCHLORIDE 0.5 MG: 1 INJECTION, SOLUTION INTRAMUSCULAR; INTRAVENOUS; SUBCUTANEOUS at 17:59

## 2017-02-20 RX ADMIN — FENTANYL CITRATE 25 MCG: 50 INJECTION, SOLUTION INTRAMUSCULAR; INTRAVENOUS at 16:45

## 2017-02-20 RX ADMIN — ACETAMINOPHEN 1000 MG: 500 TABLET ORAL at 13:04

## 2017-02-20 RX ADMIN — FENTANYL CITRATE 25 MCG: 50 INJECTION, SOLUTION INTRAMUSCULAR; INTRAVENOUS at 17:00

## 2017-02-20 RX ADMIN — ROCURONIUM BROMIDE 10 MG: 10 INJECTION INTRAVENOUS at 15:35

## 2017-02-20 RX ADMIN — ROCURONIUM BROMIDE 40 MG: 10 INJECTION INTRAVENOUS at 14:45

## 2017-02-20 RX ADMIN — MEPERIDINE HYDROCHLORIDE 12.5 MG: 25 INJECTION, SOLUTION INTRAMUSCULAR; INTRAVENOUS; SUBCUTANEOUS at 18:06

## 2017-02-20 RX ADMIN — FENTANYL CITRATE 50 MCG: 50 INJECTION INTRAMUSCULAR; INTRAVENOUS at 17:40

## 2017-02-20 RX ADMIN — KETOROLAC TROMETHAMINE 15 MG: 30 INJECTION, SOLUTION INTRAMUSCULAR at 20:02

## 2017-02-20 RX ADMIN — FENTANYL CITRATE 100 MCG: 50 INJECTION, SOLUTION INTRAMUSCULAR; INTRAVENOUS at 14:42

## 2017-02-20 RX ADMIN — MEPERIDINE HYDROCHLORIDE 12.5 MG: 25 INJECTION, SOLUTION INTRAMUSCULAR; INTRAVENOUS; SUBCUTANEOUS at 18:01

## 2017-02-20 RX ADMIN — FENTANYL CITRATE 50 MCG: 50 INJECTION INTRAMUSCULAR; INTRAVENOUS at 17:27

## 2017-02-20 RX ADMIN — PRAMIPEXOLE DIHYDROCHLORIDE 0.5 MG: 0.5 TABLET ORAL at 21:21

## 2017-02-20 RX ADMIN — HYDROMORPHONE HYDROCHLORIDE 0.5 MG: 1 INJECTION, SOLUTION INTRAMUSCULAR; INTRAVENOUS; SUBCUTANEOUS at 17:47

## 2017-02-20 RX ADMIN — FENTANYL CITRATE 50 MCG: 50 INJECTION INTRAMUSCULAR; INTRAVENOUS at 17:57

## 2017-02-20 RX ADMIN — FAMOTIDINE 20 MG: 10 INJECTION, SOLUTION INTRAVENOUS at 14:26

## 2017-02-20 RX ADMIN — HYDROMORPHONE HYDROCHLORIDE 0.5 MG: 1 INJECTION, SOLUTION INTRAMUSCULAR; INTRAVENOUS; SUBCUTANEOUS at 18:23

## 2017-02-20 RX ADMIN — NEOSTIGMINE METHYLSULFATE 3 MG: 1 INJECTION INTRAVENOUS at 16:42

## 2017-02-20 RX ADMIN — TRANEXAMIC ACID 1000 MG: 100 INJECTION, SOLUTION INTRAVENOUS at 16:10

## 2017-02-20 RX ADMIN — HYDROMORPHONE HYDROCHLORIDE 0.5 MG: 1 INJECTION, SOLUTION INTRAMUSCULAR; INTRAVENOUS; SUBCUTANEOUS at 17:30

## 2017-02-20 RX ADMIN — GLYCOPYRROLATE 0.6 MG: 0.2 INJECTION INTRAMUSCULAR; INTRAVENOUS at 16:42

## 2017-02-20 RX ADMIN — KETOROLAC TROMETHAMINE 30 MG: 30 INJECTION, SOLUTION INTRAMUSCULAR; INTRAVENOUS at 15:30

## 2017-02-20 RX ADMIN — LIDOCAINE HYDROCHLORIDE 100 MG: 20 INJECTION, SOLUTION INFILTRATION; PERINEURAL at 14:45

## 2017-02-20 RX ADMIN — PROPOFOL 200 MG: 10 INJECTION, EMULSION INTRAVENOUS at 14:45

## 2017-02-20 RX ADMIN — ASPIRIN 325 MG: 325 TABLET, DELAYED RELEASE ORAL at 20:02

## 2017-02-20 RX ADMIN — FENTANYL CITRATE 50 MCG: 50 INJECTION INTRAMUSCULAR; INTRAVENOUS at 18:15

## 2017-02-20 RX ADMIN — ONDANSETRON 4 MG: 2 INJECTION INTRAMUSCULAR; INTRAVENOUS at 15:30

## 2017-02-20 RX ADMIN — PROPOFOL 20 MG: 10 INJECTION, EMULSION INTRAVENOUS at 15:35

## 2017-02-20 NOTE — ANESTHESIA PROCEDURE NOTES
Airway  Urgency: elective    Date/Time: 2/20/2017 2:50 PM  Airway not difficult    General Information and Staff    Patient location during procedure: OR  Anesthesiologist: MOON DECKER  CRNA: PATIENCE RIVERA    Indications and Patient Condition  Indications for airway management: airway protection    Preoxygenated: yes  Mask difficulty assessment: 2 - vent by mask + OA or adjuvant +/- NMBA    Final Airway Details  Final airway type: endotracheal airway      Successful airway: ETT  Cuffed: yes   Successful intubation technique: direct laryngoscopy  Facilitating devices/methods: intubating stylet  Endotracheal tube insertion site: oral  Blade: Mejia  Blade size: #2  ETT size: 7.0 mm  Cormack-Lehane Classification: grade I - full view of glottis  Placement verified by: chest auscultation and capnometry   Measured from: teeth  ETT to teeth (cm): 21  Number of attempts at approach: 1    Additional Comments  Atraumatic. No dental damage noted, lips/teeth same as pre-op.

## 2017-02-20 NOTE — ANESTHESIA PREPROCEDURE EVALUATION
Anesthesia Evaluation     Patient summary reviewed and Nursing notes reviewed   NPO Status: > 8 hours   Airway   Mallampati: II  Dental          Pulmonary - normal exam   (+) sleep apnea,   Cardiovascular - normal exam    ECG reviewed    (+) valvular problems/murmurs MVP, PVD,       Neuro/Psych  (+) psychiatric history,    GI/Hepatic/Renal/Endo    (+)  GERD, hypothyroidism,     Musculoskeletal     Abdominal    Substance History      OB/GYN          Other                                    Anesthesia Plan    ASA 3     general     intravenous induction   Anesthetic plan and risks discussed with patient.

## 2017-02-20 NOTE — ANESTHESIA POSTPROCEDURE EVALUATION
Patient: Mariela Ruiz    Procedure Summary     Date Anesthesia Start Anesthesia Stop Room / Location    02/20/17 1435 1719 [facility] OR 22 / [facility]       Procedure Diagnosis Surgeon Provider    LT KNEE REMOVAL ANTIBIOTIC SPACER AND KNEE REVISION (Left Knee) No diagnosis on file. MD Stephen Kidd MD          Anesthesia Type: general  Last vitals  /84 (02/20/17 1735)    Temp      Pulse 87 (02/20/17 1735)   Resp 16 (02/20/17 1735)    SpO2 97 % (02/20/17 1735)      Post Anesthesia Care and Evaluation    Patient location during evaluation: PACU  Patient participation: complete - patient participated  Level of consciousness: awake  Pain score: 2  Pain management: adequate  Airway patency: patent  Anesthetic complications: No anesthetic complications  PONV Status: controlled  Cardiovascular status: acceptable  Respiratory status: acceptable  Hydration status: acceptable

## 2017-02-21 LAB
ANION GAP SERPL CALCULATED.3IONS-SCNC: 11.3 MMOL/L
BUN BLD-MCNC: 12 MG/DL (ref 8–23)
BUN/CREAT SERPL: 13.3 (ref 7–25)
CALCIUM SPEC-SCNC: 9 MG/DL (ref 8.6–10.5)
CHLORIDE SERPL-SCNC: 98 MMOL/L (ref 98–107)
CO2 SERPL-SCNC: 27.7 MMOL/L (ref 22–29)
CREAT BLD-MCNC: 0.9 MG/DL (ref 0.57–1)
GFR SERPL CREATININE-BSD FRML MDRD: 62 ML/MIN/1.73
GLUCOSE BLD-MCNC: 109 MG/DL (ref 65–99)
HCT VFR BLD AUTO: 29.8 % (ref 35.6–45.5)
HGB BLD-MCNC: 9.6 G/DL (ref 11.9–15.5)
POTASSIUM BLD-SCNC: 4.7 MMOL/L (ref 3.5–5.2)
SODIUM BLD-SCNC: 137 MMOL/L (ref 136–145)

## 2017-02-21 PROCEDURE — 80048 BASIC METABOLIC PNL TOTAL CA: CPT | Performed by: ORTHOPAEDIC SURGERY

## 2017-02-21 PROCEDURE — 25010000002 HYDROMORPHONE PER 4 MG: Performed by: ORTHOPAEDIC SURGERY

## 2017-02-21 PROCEDURE — 97150 GROUP THERAPEUTIC PROCEDURES: CPT

## 2017-02-21 PROCEDURE — 25010000002 KETOROLAC TROMETHAMINE PER 15 MG: Performed by: ORTHOPAEDIC SURGERY

## 2017-02-21 PROCEDURE — 85014 HEMATOCRIT: CPT | Performed by: ORTHOPAEDIC SURGERY

## 2017-02-21 PROCEDURE — 25010000002 FONDAPARINUX PER 0.5 MG: Performed by: ORTHOPAEDIC SURGERY

## 2017-02-21 PROCEDURE — 25010000002 VANCOMYCIN: Performed by: ORTHOPAEDIC SURGERY

## 2017-02-21 PROCEDURE — 94799 UNLISTED PULMONARY SVC/PX: CPT

## 2017-02-21 PROCEDURE — 85018 HEMOGLOBIN: CPT | Performed by: ORTHOPAEDIC SURGERY

## 2017-02-21 PROCEDURE — 97110 THERAPEUTIC EXERCISES: CPT

## 2017-02-21 PROCEDURE — 97161 PT EVAL LOW COMPLEX 20 MIN: CPT

## 2017-02-21 RX ORDER — FONDAPARINUX SODIUM 2.5 MG/.5ML
2.5 INJECTION SUBCUTANEOUS ONCE
Status: COMPLETED | OUTPATIENT
Start: 2017-02-21 | End: 2017-02-21

## 2017-02-21 RX ORDER — SODIUM CHLORIDE 9 MG/ML
250 INJECTION, SOLUTION INTRAVENOUS ONCE
Status: COMPLETED | OUTPATIENT
Start: 2017-02-21 | End: 2017-02-21

## 2017-02-21 RX ADMIN — KETOROLAC TROMETHAMINE 15 MG: 30 INJECTION, SOLUTION INTRAMUSCULAR at 16:05

## 2017-02-21 RX ADMIN — PRAMIPEXOLE DIHYDROCHLORIDE 0.5 MG: 0.5 TABLET ORAL at 20:24

## 2017-02-21 RX ADMIN — DOCUSATE SODIUM 100 MG: 100 CAPSULE, LIQUID FILLED ORAL at 08:05

## 2017-02-21 RX ADMIN — FERROUS SULFATE TAB 325 MG (65 MG ELEMENTAL FE) 325 MG: 325 (65 FE) TAB at 08:04

## 2017-02-21 RX ADMIN — OXYCODONE HYDROCHLORIDE AND ACETAMINOPHEN 1 TABLET: 5; 325 TABLET ORAL at 10:06

## 2017-02-21 RX ADMIN — LEVOTHYROXINE SODIUM 50 MCG: 50 TABLET ORAL at 06:00

## 2017-02-21 RX ADMIN — PRAMIPEXOLE DIHYDROCHLORIDE 0.5 MG: 0.5 TABLET ORAL at 16:05

## 2017-02-21 RX ADMIN — OXYCODONE HYDROCHLORIDE AND ACETAMINOPHEN 2 TABLET: 5; 325 TABLET ORAL at 23:51

## 2017-02-21 RX ADMIN — KETOROLAC TROMETHAMINE 15 MG: 30 INJECTION, SOLUTION INTRAMUSCULAR at 08:05

## 2017-02-21 RX ADMIN — VANCOMYCIN HYDROCHLORIDE 1500 MG: 1 INJECTION, POWDER, LYOPHILIZED, FOR SOLUTION INTRAVENOUS at 10:07

## 2017-02-21 RX ADMIN — VANCOMYCIN HYDROCHLORIDE 1500 MG: 1 INJECTION, POWDER, LYOPHILIZED, FOR SOLUTION INTRAVENOUS at 01:29

## 2017-02-21 RX ADMIN — PRAMIPEXOLE DIHYDROCHLORIDE 0.5 MG: 0.5 TABLET ORAL at 08:04

## 2017-02-21 RX ADMIN — OXYCODONE HYDROCHLORIDE AND ACETAMINOPHEN 2 TABLET: 5; 325 TABLET ORAL at 18:27

## 2017-02-21 RX ADMIN — HYDROMORPHONE HYDROCHLORIDE 1 MG: 1 INJECTION, SOLUTION INTRAMUSCULAR; INTRAVENOUS; SUBCUTANEOUS at 16:10

## 2017-02-21 RX ADMIN — ASPIRIN 325 MG: 325 TABLET, DELAYED RELEASE ORAL at 08:04

## 2017-02-21 RX ADMIN — KETOROLAC TROMETHAMINE 15 MG: 30 INJECTION, SOLUTION INTRAMUSCULAR at 01:28

## 2017-02-21 RX ADMIN — SODIUM CHLORIDE 250 ML/HR: 9 INJECTION, SOLUTION INTRAVENOUS at 18:29

## 2017-02-21 RX ADMIN — PHENOBARBITAL 64.8 MG: 64.8 TABLET ORAL at 08:04

## 2017-02-21 RX ADMIN — FONDAPARINUX SODIUM 2.5 MG: 2.5 INJECTION, SOLUTION SUBCUTANEOUS at 10:06

## 2017-02-21 RX ADMIN — PHENOBARBITAL 129.6 MG: 64.8 TABLET ORAL at 20:24

## 2017-02-21 RX ADMIN — VANCOMYCIN HYDROCHLORIDE 1500 MG: 1 INJECTION, POWDER, LYOPHILIZED, FOR SOLUTION INTRAVENOUS at 20:24

## 2017-02-21 RX ADMIN — OXYCODONE HYDROCHLORIDE AND ACETAMINOPHEN 2 TABLET: 5; 325 TABLET ORAL at 14:30

## 2017-02-21 RX ADMIN — OXYCODONE HYDROCHLORIDE AND ACETAMINOPHEN 1 TABLET: 5; 325 TABLET ORAL at 08:05

## 2017-02-22 LAB
ABO GROUP BLD: NORMAL
ANION GAP SERPL CALCULATED.3IONS-SCNC: 10.6 MMOL/L
ANTI-C: NORMAL
ANTI-D: NORMAL
ANTI-E: NORMAL
BASOPHILS # BLD AUTO: 0.02 10*3/MM3 (ref 0–0.2)
BASOPHILS NFR BLD AUTO: 0.4 % (ref 0–1.5)
BLD GP AB SCN SERPL QL: POSITIVE
BUN BLD-MCNC: 16 MG/DL (ref 8–23)
BUN/CREAT SERPL: 15 (ref 7–25)
CALCIUM SPEC-SCNC: 8.4 MG/DL (ref 8.6–10.5)
CHLORIDE SERPL-SCNC: 102 MMOL/L (ref 98–107)
CO2 SERPL-SCNC: 24.4 MMOL/L (ref 22–29)
CREAT BLD-MCNC: 1.07 MG/DL (ref 0.57–1)
CYTO UR: NORMAL
DEPRECATED RDW RBC AUTO: 47.7 FL (ref 37–54)
EOSINOPHIL # BLD AUTO: 0 10*3/MM3 (ref 0–0.7)
EOSINOPHIL NFR BLD AUTO: 0 % (ref 0.3–6.2)
ERYTHROCYTE [DISTWIDTH] IN BLOOD BY AUTOMATED COUNT: 14.5 % (ref 11.7–13)
GFR SERPL CREATININE-BSD FRML MDRD: 51 ML/MIN/1.73
GLUCOSE BLD-MCNC: 118 MG/DL (ref 65–99)
HCT VFR BLD AUTO: 25.1 % (ref 35.6–45.5)
HGB BLD-MCNC: 7.9 G/DL (ref 11.9–15.5)
IMM GRANULOCYTES # BLD: 0 10*3/MM3 (ref 0–0.03)
IMM GRANULOCYTES NFR BLD: 0 % (ref 0–0.5)
LAB AP CASE REPORT: NORMAL
LYMPHOCYTES # BLD AUTO: 0.94 10*3/MM3 (ref 0.9–4.8)
LYMPHOCYTES NFR BLD AUTO: 17.2 % (ref 19.6–45.3)
Lab: NORMAL
Lab: NORMAL
MCH RBC QN AUTO: 28.1 PG (ref 26.9–32)
MCHC RBC AUTO-ENTMCNC: 31.5 G/DL (ref 32.4–36.3)
MCV RBC AUTO: 89.3 FL (ref 80.5–98.2)
MONOCYTES # BLD AUTO: 0.73 10*3/MM3 (ref 0.2–1.2)
MONOCYTES NFR BLD AUTO: 13.3 % (ref 5–12)
NEUTROPHILS # BLD AUTO: 3.79 10*3/MM3 (ref 1.9–8.1)
NEUTROPHILS NFR BLD AUTO: 69.1 % (ref 42.7–76)
PATH REPORT.FINAL DX SPEC: NORMAL
PATH REPORT.GROSS SPEC: NORMAL
PLATELET # BLD AUTO: 206 10*3/MM3 (ref 140–500)
PMV BLD AUTO: 10.8 FL (ref 6–12)
POTASSIUM BLD-SCNC: 4.4 MMOL/L (ref 3.5–5.2)
RBC # BLD AUTO: 2.81 10*6/MM3 (ref 3.9–5.2)
RH BLD: NEGATIVE
SODIUM BLD-SCNC: 137 MMOL/L (ref 136–145)
WBC NRBC COR # BLD: 5.48 10*3/MM3 (ref 4.5–10.7)

## 2017-02-22 PROCEDURE — 97110 THERAPEUTIC EXERCISES: CPT

## 2017-02-22 PROCEDURE — 86901 BLOOD TYPING SEROLOGIC RH(D): CPT

## 2017-02-22 PROCEDURE — 86870 RBC ANTIBODY IDENTIFICATION: CPT

## 2017-02-22 PROCEDURE — 85025 COMPLETE CBC W/AUTO DIFF WBC: CPT | Performed by: ORTHOPAEDIC SURGERY

## 2017-02-22 PROCEDURE — 86920 COMPATIBILITY TEST SPIN: CPT

## 2017-02-22 PROCEDURE — 86902 BLOOD TYPE ANTIGEN DONOR EA: CPT

## 2017-02-22 PROCEDURE — 86900 BLOOD TYPING SEROLOGIC ABO: CPT

## 2017-02-22 PROCEDURE — 86922 COMPATIBILITY TEST ANTIGLOB: CPT

## 2017-02-22 PROCEDURE — 80048 BASIC METABOLIC PNL TOTAL CA: CPT | Performed by: ORTHOPAEDIC SURGERY

## 2017-02-22 PROCEDURE — 86850 RBC ANTIBODY SCREEN: CPT

## 2017-02-22 PROCEDURE — 30233N1 TRANSFUSION OF NONAUTOLOGOUS RED BLOOD CELLS INTO PERIPHERAL VEIN, PERCUTANEOUS APPROACH: ICD-10-PCS | Performed by: ORTHOPAEDIC SURGERY

## 2017-02-22 PROCEDURE — 97150 GROUP THERAPEUTIC PROCEDURES: CPT

## 2017-02-22 PROCEDURE — P9016 RBC LEUKOCYTES REDUCED: HCPCS

## 2017-02-22 PROCEDURE — 36430 TRANSFUSION BLD/BLD COMPNT: CPT

## 2017-02-22 PROCEDURE — 25010000002 VANCOMYCIN: Performed by: ORTHOPAEDIC SURGERY

## 2017-02-22 RX ADMIN — OXYCODONE HYDROCHLORIDE AND ACETAMINOPHEN 2 TABLET: 5; 325 TABLET ORAL at 08:35

## 2017-02-22 RX ADMIN — OXYCODONE HYDROCHLORIDE AND ACETAMINOPHEN 2 TABLET: 5; 325 TABLET ORAL at 17:42

## 2017-02-22 RX ADMIN — FERROUS SULFATE TAB 325 MG (65 MG ELEMENTAL FE) 325 MG: 325 (65 FE) TAB at 08:35

## 2017-02-22 RX ADMIN — PRAMIPEXOLE DIHYDROCHLORIDE 0.5 MG: 0.5 TABLET ORAL at 18:04

## 2017-02-22 RX ADMIN — ASPIRIN 325 MG: 325 TABLET, DELAYED RELEASE ORAL at 08:35

## 2017-02-22 RX ADMIN — OXYCODONE HYDROCHLORIDE AND ACETAMINOPHEN 2 TABLET: 5; 325 TABLET ORAL at 12:59

## 2017-02-22 RX ADMIN — PHENOBARBITAL 129.6 MG: 64.8 TABLET ORAL at 21:48

## 2017-02-22 RX ADMIN — DOCUSATE SODIUM 100 MG: 100 CAPSULE, LIQUID FILLED ORAL at 08:35

## 2017-02-22 RX ADMIN — PHENOBARBITAL 64.8 MG: 64.8 TABLET ORAL at 08:35

## 2017-02-22 RX ADMIN — PRAMIPEXOLE DIHYDROCHLORIDE 0.5 MG: 0.5 TABLET ORAL at 08:35

## 2017-02-22 RX ADMIN — VANCOMYCIN HYDROCHLORIDE 1500 MG: 1 INJECTION, POWDER, LYOPHILIZED, FOR SOLUTION INTRAVENOUS at 20:11

## 2017-02-22 RX ADMIN — LEVOTHYROXINE SODIUM 50 MCG: 50 TABLET ORAL at 05:34

## 2017-02-22 RX ADMIN — PRAMIPEXOLE DIHYDROCHLORIDE 0.5 MG: 0.5 TABLET ORAL at 20:08

## 2017-02-22 RX ADMIN — OXYCODONE HYDROCHLORIDE AND ACETAMINOPHEN 2 TABLET: 5; 325 TABLET ORAL at 21:49

## 2017-02-22 RX ADMIN — OXYCODONE HYDROCHLORIDE AND ACETAMINOPHEN 2 TABLET: 5; 325 TABLET ORAL at 04:02

## 2017-02-22 RX ADMIN — DOCUSATE SODIUM 100 MG: 100 CAPSULE, LIQUID FILLED ORAL at 20:08

## 2017-02-22 RX ADMIN — VANCOMYCIN HYDROCHLORIDE 1500 MG: 1 INJECTION, POWDER, LYOPHILIZED, FOR SOLUTION INTRAVENOUS at 10:49

## 2017-02-23 VITALS
SYSTOLIC BLOOD PRESSURE: 129 MMHG | OXYGEN SATURATION: 95 % | DIASTOLIC BLOOD PRESSURE: 67 MMHG | WEIGHT: 222 LBS | TEMPERATURE: 97 F | HEIGHT: 69 IN | RESPIRATION RATE: 16 BRPM | HEART RATE: 95 BPM | BODY MASS INDEX: 32.88 KG/M2

## 2017-02-23 LAB
BACTERIA SPEC AEROBE CULT: NORMAL
BASOPHILS # BLD AUTO: 0.03 10*3/MM3 (ref 0–0.2)
BASOPHILS NFR BLD AUTO: 0.7 % (ref 0–1.5)
DEPRECATED RDW RBC AUTO: 48.3 FL (ref 37–54)
EOSINOPHIL # BLD AUTO: 0 10*3/MM3 (ref 0–0.7)
EOSINOPHIL NFR BLD AUTO: 0 % (ref 0.3–6.2)
ERYTHROCYTE [DISTWIDTH] IN BLOOD BY AUTOMATED COUNT: 15 % (ref 11.7–13)
GRAM STN SPEC: NORMAL
GRAM STN SPEC: NORMAL
HCT VFR BLD AUTO: 26 % (ref 35.6–45.5)
HGB BLD-MCNC: 8.5 G/DL (ref 11.9–15.5)
IMM GRANULOCYTES # BLD: 0 10*3/MM3 (ref 0–0.03)
IMM GRANULOCYTES NFR BLD: 0 % (ref 0–0.5)
LYMPHOCYTES # BLD AUTO: 0.76 10*3/MM3 (ref 0.9–4.8)
LYMPHOCYTES NFR BLD AUTO: 17.7 % (ref 19.6–45.3)
MCH RBC QN AUTO: 28.4 PG (ref 26.9–32)
MCHC RBC AUTO-ENTMCNC: 32.7 G/DL (ref 32.4–36.3)
MCV RBC AUTO: 87 FL (ref 80.5–98.2)
MONOCYTES # BLD AUTO: 0.64 10*3/MM3 (ref 0.2–1.2)
MONOCYTES NFR BLD AUTO: 14.9 % (ref 5–12)
NEUTROPHILS # BLD AUTO: 2.87 10*3/MM3 (ref 1.9–8.1)
NEUTROPHILS NFR BLD AUTO: 66.7 % (ref 42.7–76)
PLATELET # BLD AUTO: 192 10*3/MM3 (ref 140–500)
PMV BLD AUTO: 10.8 FL (ref 6–12)
RBC # BLD AUTO: 2.99 10*6/MM3 (ref 3.9–5.2)
VANCOMYCIN TROUGH SERPL-MCNC: 31 MCG/ML (ref 5–20)
WBC NRBC COR # BLD: 4.3 10*3/MM3 (ref 4.5–10.7)

## 2017-02-23 PROCEDURE — 97150 GROUP THERAPEUTIC PROCEDURES: CPT

## 2017-02-23 PROCEDURE — 97110 THERAPEUTIC EXERCISES: CPT

## 2017-02-23 PROCEDURE — 80202 ASSAY OF VANCOMYCIN: CPT | Performed by: ORTHOPAEDIC SURGERY

## 2017-02-23 PROCEDURE — 85025 COMPLETE CBC W/AUTO DIFF WBC: CPT | Performed by: PHYSICIAN ASSISTANT

## 2017-02-23 RX ADMIN — ASPIRIN 325 MG: 325 TABLET, DELAYED RELEASE ORAL at 10:14

## 2017-02-23 RX ADMIN — OXYCODONE HYDROCHLORIDE AND ACETAMINOPHEN 2 TABLET: 5; 325 TABLET ORAL at 06:11

## 2017-02-23 RX ADMIN — PRAMIPEXOLE DIHYDROCHLORIDE 0.5 MG: 0.5 TABLET ORAL at 10:14

## 2017-02-23 RX ADMIN — FERROUS SULFATE TAB 325 MG (65 MG ELEMENTAL FE) 325 MG: 325 (65 FE) TAB at 10:14

## 2017-02-23 RX ADMIN — OXYCODONE HYDROCHLORIDE AND ACETAMINOPHEN 2 TABLET: 5; 325 TABLET ORAL at 12:03

## 2017-02-23 RX ADMIN — OXYCODONE HYDROCHLORIDE AND ACETAMINOPHEN 2 TABLET: 5; 325 TABLET ORAL at 02:24

## 2017-02-23 RX ADMIN — LEVOTHYROXINE SODIUM 50 MCG: 50 TABLET ORAL at 06:10

## 2017-02-24 LAB
ABO + RH BLD: NORMAL
ABO + RH BLD: NORMAL
BH BB BLOOD EXPIRATION DATE: NORMAL
BH BB BLOOD EXPIRATION DATE: NORMAL
BH BB BLOOD TYPE BARCODE: 600
BH BB BLOOD TYPE BARCODE: 600
BH BB DISPENSE STATUS: NORMAL
BH BB DISPENSE STATUS: NORMAL
BH BB PRODUCT CODE: NORMAL
BH BB PRODUCT CODE: NORMAL
BH BB UNIT NUMBER: NORMAL
BH BB UNIT NUMBER: NORMAL
CROSSMATCH INTERPRETATION: NORMAL
CROSSMATCH INTERPRETATION: NORMAL
UNIT  ABO: NORMAL
UNIT  ABO: NORMAL
UNIT  RH: NORMAL
UNIT  RH: NORMAL

## 2017-02-25 LAB — BACTERIA SPEC ANAEROBE CULT: NORMAL

## 2017-04-05 RX ORDER — PHENOBARBITAL 64.8 MG/1
TABLET ORAL
Qty: 270 TABLET | Refills: 0 | Status: SHIPPED | OUTPATIENT
Start: 2017-04-05 | End: 2017-07-12 | Stop reason: SDUPTHER

## 2017-04-11 ENCOUNTER — TELEPHONE (OUTPATIENT)
Dept: ENDOCRINOLOGY | Age: 72
End: 2017-04-11

## 2017-04-11 NOTE — TELEPHONE ENCOUNTER
----- Message from Pam Sutton sent at 4/11/2017  8:35 AM EDT -----  Contact: PATIENT  MARSHA IS VERY DISPLEASED THAT SHE WAS UNABLE TO GET AN APPOINTMENT UNTIL AUGUST. I EXPLAINED THAT HE WAS BOOKED UP AND I WOULD PUT THEM ON A WAIT LIST BUT SHE SAYS THAT HER  WAS UNABLE TO DRIVE BECAUSE HIS RETINAS KEEP DETACHING AND SHE HAS BEEN IN THE HOSPITAL. SHE IS INSISTING ON BEING WORKED IN SOONER. I TOLD HER I WOULD LET YOU KNOW. MARSHA AND HER  JULIO C ARE SCHEDULED FOR 8/22/17.

## 2017-04-18 ENCOUNTER — LAB (OUTPATIENT)
Dept: LAB | Facility: HOSPITAL | Age: 72
End: 2017-04-18
Attending: ORTHOPAEDIC SURGERY

## 2017-04-18 ENCOUNTER — TRANSCRIBE ORDERS (OUTPATIENT)
Dept: ADMINISTRATIVE | Facility: HOSPITAL | Age: 72
End: 2017-04-18

## 2017-04-18 DIAGNOSIS — M00.9 INFECTION OF RIGHT KNEE (HCC): Primary | ICD-10-CM

## 2017-04-18 DIAGNOSIS — M00.9 INFECTION OF RIGHT KNEE (HCC): ICD-10-CM

## 2017-04-18 LAB
BASOPHILS # BLD AUTO: 0.02 10*3/MM3 (ref 0–0.2)
BASOPHILS NFR BLD AUTO: 0.4 % (ref 0–1.5)
CRP SERPL-MCNC: 0.22 MG/DL (ref 0–0.5)
DEPRECATED RDW RBC AUTO: 48.4 FL (ref 37–54)
EOSINOPHIL # BLD AUTO: 0 10*3/MM3 (ref 0–0.7)
EOSINOPHIL NFR BLD AUTO: 0 % (ref 0.3–6.2)
ERYTHROCYTE [DISTWIDTH] IN BLOOD BY AUTOMATED COUNT: 15 % (ref 11.7–13)
ERYTHROCYTE [SEDIMENTATION RATE] IN BLOOD: 4 MM/HR (ref 0–30)
HCT VFR BLD AUTO: 37.1 % (ref 35.6–45.5)
HGB BLD-MCNC: 12.2 G/DL (ref 11.9–15.5)
IMM GRANULOCYTES # BLD: 0 10*3/MM3 (ref 0–0.03)
IMM GRANULOCYTES NFR BLD: 0 % (ref 0–0.5)
LYMPHOCYTES # BLD AUTO: 1.5 10*3/MM3 (ref 0.9–4.8)
LYMPHOCYTES NFR BLD AUTO: 31.4 % (ref 19.6–45.3)
MCH RBC QN AUTO: 29 PG (ref 26.9–32)
MCHC RBC AUTO-ENTMCNC: 32.9 G/DL (ref 32.4–36.3)
MCV RBC AUTO: 88.1 FL (ref 80.5–98.2)
MONOCYTES # BLD AUTO: 0.41 10*3/MM3 (ref 0.2–1.2)
MONOCYTES NFR BLD AUTO: 8.6 % (ref 5–12)
NEUTROPHILS # BLD AUTO: 2.85 10*3/MM3 (ref 1.9–8.1)
NEUTROPHILS NFR BLD AUTO: 59.6 % (ref 42.7–76)
PLATELET # BLD AUTO: 265 10*3/MM3 (ref 140–500)
PMV BLD AUTO: 10.7 FL (ref 6–12)
RBC # BLD AUTO: 4.21 10*6/MM3 (ref 3.9–5.2)
WBC NRBC COR # BLD: 4.78 10*3/MM3 (ref 4.5–10.7)

## 2017-04-18 PROCEDURE — 36415 COLL VENOUS BLD VENIPUNCTURE: CPT

## 2017-04-18 PROCEDURE — 86140 C-REACTIVE PROTEIN: CPT

## 2017-04-18 PROCEDURE — 85652 RBC SED RATE AUTOMATED: CPT

## 2017-04-18 PROCEDURE — 85025 COMPLETE CBC W/AUTO DIFF WBC: CPT

## 2017-05-11 ENCOUNTER — OFFICE VISIT (OUTPATIENT)
Dept: ENDOCRINOLOGY | Age: 72
End: 2017-05-11

## 2017-05-11 VITALS
HEART RATE: 108 BPM | HEIGHT: 69 IN | DIASTOLIC BLOOD PRESSURE: 84 MMHG | OXYGEN SATURATION: 97 % | WEIGHT: 201.6 LBS | SYSTOLIC BLOOD PRESSURE: 126 MMHG | BODY MASS INDEX: 29.86 KG/M2

## 2017-05-11 DIAGNOSIS — G47.30 SLEEP APNEA, UNSPECIFIED TYPE: ICD-10-CM

## 2017-05-11 DIAGNOSIS — G40.909 SEIZURE DISORDER (HCC): ICD-10-CM

## 2017-05-11 DIAGNOSIS — M81.0 OSTEOPOROSIS: ICD-10-CM

## 2017-05-11 DIAGNOSIS — E89.0 HISTORY OF PARTIAL THYROIDECTOMY: ICD-10-CM

## 2017-05-11 DIAGNOSIS — E89.0 POSTSURGICAL HYPOTHYROIDISM: Primary | ICD-10-CM

## 2017-05-11 DIAGNOSIS — I87.2 CHRONIC VENOUS INSUFFICIENCY: ICD-10-CM

## 2017-05-11 DIAGNOSIS — E78.5 HYPERLIPIDEMIA, UNSPECIFIED HYPERLIPIDEMIA TYPE: ICD-10-CM

## 2017-05-11 DIAGNOSIS — M17.0 PRIMARY OSTEOARTHRITIS OF BOTH KNEES: ICD-10-CM

## 2017-05-11 DIAGNOSIS — R82.90 ABNORMAL FINDING IN URINE: ICD-10-CM

## 2017-05-11 DIAGNOSIS — E55.9 VITAMIN D INSUFFICIENCY: ICD-10-CM

## 2017-05-11 DIAGNOSIS — Z87.440 HISTORY OF UTI: ICD-10-CM

## 2017-05-11 PROCEDURE — 99214 OFFICE O/P EST MOD 30 MIN: CPT | Performed by: INTERNAL MEDICINE

## 2017-05-11 RX ORDER — DOXYCYCLINE HYCLATE 100 MG
100 TABLET ORAL 2 TIMES DAILY
COMMUNITY
Start: 2017-03-13 | End: 2017-05-11

## 2017-05-11 RX ORDER — CIPROFLOXACIN 500 MG/1
500 TABLET, FILM COATED ORAL 2 TIMES DAILY
Qty: 20 TABLET | Refills: 0 | Status: SHIPPED | OUTPATIENT
Start: 2017-05-11 | End: 2017-05-21

## 2017-05-11 RX ORDER — OXYCODONE HYDROCHLORIDE AND ACETAMINOPHEN 5; 325 MG/1; MG/1
TABLET ORAL
Refills: 0 | COMMUNITY
Start: 2017-03-02 | End: 2017-05-11

## 2017-05-11 RX ORDER — FUROSEMIDE 20 MG/1
TABLET ORAL
Qty: 30 TABLET | Refills: 3 | Status: SHIPPED | OUTPATIENT
Start: 2017-05-11 | End: 2018-04-05

## 2017-05-12 LAB — 25(OH)D3+25(OH)D2 SERPL-MCNC: 41.8 NG/ML (ref 30–100)

## 2017-05-14 LAB
ALBUMIN SERPL-MCNC: 3.8 G/DL (ref 3.5–5.2)
ALBUMIN/GLOB SERPL: 1.3 G/DL
ALP SERPL-CCNC: 147 U/L (ref 39–117)
ALT SERPL-CCNC: 14 U/L (ref 1–33)
APPEARANCE UR: (no result)
AST SERPL-CCNC: 20 U/L (ref 1–32)
BACTERIA #/AREA URNS HPF: ABNORMAL /HPF
BACTERIA UR CULT: ABNORMAL
BACTERIA UR CULT: ABNORMAL
BASOPHILS # BLD AUTO: 0.03 10*3/MM3 (ref 0–0.2)
BASOPHILS NFR BLD AUTO: 0.6 % (ref 0–1.5)
BILIRUB SERPL-MCNC: 0.3 MG/DL (ref 0.1–1.2)
BILIRUB UR QL STRIP: NEGATIVE
BUN SERPL-MCNC: 12 MG/DL (ref 8–23)
BUN/CREAT SERPL: 11.8 (ref 7–25)
CALCIUM SERPL-MCNC: 9.6 MG/DL (ref 8.6–10.5)
CHLORIDE SERPL-SCNC: 99 MMOL/L (ref 98–107)
CHOLEST SERPL-MCNC: 200 MG/DL (ref 0–200)
CO2 SERPL-SCNC: 28.9 MMOL/L (ref 22–29)
COLOR UR: (no result)
CREAT SERPL-MCNC: 1.02 MG/DL (ref 0.57–1)
CRYSTALS URNS MICRO: ABNORMAL
EOSINOPHIL # BLD AUTO: 0 10*3/MM3 (ref 0–0.7)
EOSINOPHIL NFR BLD AUTO: 0 % (ref 0.3–6.2)
EPI CELLS #/AREA URNS HPF: ABNORMAL /HPF
ERYTHROCYTE [DISTWIDTH] IN BLOOD BY AUTOMATED COUNT: 14.8 % (ref 11.7–13)
GLOBULIN SER CALC-MCNC: 3 GM/DL
GLUCOSE SERPL-MCNC: 99 MG/DL (ref 65–99)
GLUCOSE UR QL: NEGATIVE
HCT VFR BLD AUTO: 35.9 % (ref 35.6–45.5)
HDLC SERPL-MCNC: 89 MG/DL (ref 40–60)
HGB BLD-MCNC: 11.7 G/DL (ref 11.9–15.5)
HGB UR QL STRIP: (no result)
IMM GRANULOCYTES # BLD: 0 10*3/MM3 (ref 0–0.03)
IMM GRANULOCYTES NFR BLD: 0 % (ref 0–0.5)
KETONES UR QL STRIP: NEGATIVE
LDLC SERPL CALC-MCNC: 94 MG/DL (ref 0–100)
LEUKOCYTE ESTERASE UR QL STRIP: (no result)
LYMPHOCYTES # BLD AUTO: 1.83 10*3/MM3 (ref 0.9–4.8)
LYMPHOCYTES NFR BLD AUTO: 38.9 % (ref 19.6–45.3)
MCH RBC QN AUTO: 29.5 PG (ref 26.9–32)
MCHC RBC AUTO-ENTMCNC: 32.6 G/DL (ref 32.4–36.3)
MCV RBC AUTO: 90.4 FL (ref 80.5–98.2)
MONOCYTES # BLD AUTO: 0.47 10*3/MM3 (ref 0.2–1.2)
MONOCYTES NFR BLD AUTO: 10 % (ref 5–12)
NEUTROPHILS # BLD AUTO: 2.38 10*3/MM3 (ref 1.9–8.1)
NEUTROPHILS NFR BLD AUTO: 50.5 % (ref 42.7–76)
NITRITE UR QL STRIP: POSITIVE
OTHER ANTIBIOTIC SUSC ISLT: ABNORMAL
PH UR STRIP: 5.5 [PH] (ref 5–8)
PLATELET # BLD AUTO: 322 10*3/MM3 (ref 140–500)
POTASSIUM SERPL-SCNC: 4 MMOL/L (ref 3.5–5.2)
PROT SERPL-MCNC: 6.8 G/DL (ref 6–8.5)
PROT UR QL STRIP: (no result)
RBC # BLD AUTO: 3.97 10*6/MM3 (ref 3.9–5.2)
RBC #/AREA URNS HPF: ABNORMAL /HPF
SODIUM SERPL-SCNC: 138 MMOL/L (ref 136–145)
SP GR UR: 1.02 (ref 1–1.03)
T4 FREE SERPL-MCNC: 1.03 NG/DL (ref 0.93–1.7)
TRIGL SERPL-MCNC: 85 MG/DL (ref 0–150)
TSH SERPL DL<=0.005 MIU/L-ACNC: 1.02 MIU/ML (ref 0.27–4.2)
UROBILINOGEN UR STRIP-MCNC: (no result) MG/DL
VLDLC SERPL CALC-MCNC: 17 MG/DL (ref 5–40)
WBC # BLD AUTO: 4.71 10*3/MM3 (ref 4.5–10.7)
WBC #/AREA URNS HPF: ABNORMAL /HPF

## 2017-05-15 RX ORDER — MELATONIN
1000 DAILY
Start: 2017-05-15 | End: 2017-06-02 | Stop reason: SDDI

## 2017-05-15 RX ORDER — LEVOFLOXACIN 500 MG/1
500 TABLET, FILM COATED ORAL DAILY
Qty: 10 TABLET | Refills: 0 | Status: SHIPPED | OUTPATIENT
Start: 2017-05-15 | End: 2017-06-02

## 2017-05-16 ENCOUNTER — TELEPHONE (OUTPATIENT)
Dept: INFECTIOUS DISEASES | Facility: CLINIC | Age: 72
End: 2017-05-16

## 2017-06-02 ENCOUNTER — OFFICE VISIT (OUTPATIENT)
Dept: INFECTIOUS DISEASES | Facility: CLINIC | Age: 72
End: 2017-06-02

## 2017-06-02 VITALS
WEIGHT: 195.4 LBS | DIASTOLIC BLOOD PRESSURE: 82 MMHG | TEMPERATURE: 97.5 F | SYSTOLIC BLOOD PRESSURE: 134 MMHG | HEART RATE: 104 BPM | RESPIRATION RATE: 14 BRPM | BODY MASS INDEX: 28.94 KG/M2 | HEIGHT: 69 IN

## 2017-06-02 DIAGNOSIS — Z78.9 ANTIBIOTIC DRUG INTOLERANCE: ICD-10-CM

## 2017-06-02 DIAGNOSIS — T84.50XA: Primary | ICD-10-CM

## 2017-06-02 PROCEDURE — 99213 OFFICE O/P EST LOW 20 MIN: CPT | Performed by: INTERNAL MEDICINE

## 2017-06-02 NOTE — PROGRESS NOTES
cc: f/u PJI    Per prior notes: This is a very nice 71-year-old lady who initially had left TKA back in 2013. Starting in 11/2016, she underwent surgery for loosening of a pin x2 and then for a liner exchange. Most recently, she had an operation on 12/13/2016 by Dr. Claudio for failed locking pin of the left total knee with reinsertion of a new locking pin. Cultures were obtained at that time and did grow coagulase negative staphylococci. She was placed on Keflex and then developed rash and then was placed on clindamycin. She had persistent drainage and had redo incision and debridement with liner exchange on 12/24 by Dr. Cesar. Cultures from that operation grew Enterococcus faecalis. Unfortunately she failed to convalesce and underwent we will hardware with placement of an anabolic spacer on 12/29/16 by Dr. Cesar. Cultures from that operation were negative. She was discharged on 6 weeks of vancomycin.    I last saw her in February 2017, and discontinue vancomycin at that time.  Per review the past medical records, looks like she underwent revision left total knee arthroplasty by Dr. Cesar on 2/20/17.  I reviewed his operative note.  Frozen section showed no inflammatory cells and there is no gross intraoperative evidence of infection.  Operative cultures were negative.  She was maintained on parenteral antibiotics immediately after surgery but was discharged on oral doxycycline.  Since that time, she says she developed intense nausea and GI upset with doxycycline and discontinued it.  She says that her knee pain is been stable.  She talked to Dr. Cesar about this and he checked inflammatory markers which were normal as below.  She said it was okay from his end to monitor her off antibiotics.  Again other than some intermittent tightness she has no local signs of infection such as erythema, drainage, swelling.  She has no systemic signs of infection such as fever, chills, night sweats.    Review of Systems:  "Constipation.  Lower extremity edema in the ankles.  All other reviewed and negative except as per HPI    Blood pressure 134/82, pulse 104, temperature 97.5 °F (36.4 °C), resp. rate 14, height 69\" (175.3 cm), weight 195 lb 6.4 oz (88.6 kg).  GENERAL: Awake and alert, in no acute distress.   Left knee joint without cellulitic features.  Minimal to any effusion.  Surgical incision well-healed.  PSYCHIATRIC: Appropriate mood, affect, insight, and judgment.       DIAGNOSTICS:    4/18/17 ESR 4; CRP 0.22    Assessment and Plan  1. 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 Cesar followed by 6 weeks of vancomycin and reimplantation on 2/20/17, no evidence of infection  2.  Intolerance to antibiotic drug (doxycycline)    Doing very well after surgery.  She did not tolerate her doxycycline very well; however I agree with Dr. Cesar that we will just monitor off antibiotics.  Her inflammatory markers are normal and there is no intraoperative evidence of infection on histology, gross inspection or culture.  I discussed with her signs and symptoms of recrudescent infection and when to seek medical attention.  I have not scheduled her follow-up appointment, but would be happy to see her any time she or her physicians need.  "

## 2017-07-08 DIAGNOSIS — G40.909 SEIZURE DISORDER (HCC): ICD-10-CM

## 2017-07-10 RX ORDER — PRAMIPEXOLE DIHYDROCHLORIDE 0.5 MG/1
TABLET ORAL
Qty: 270 TABLET | Refills: 0 | Status: SHIPPED | OUTPATIENT
Start: 2017-07-10 | End: 2017-07-12 | Stop reason: SDUPTHER

## 2017-07-12 DIAGNOSIS — G40.909 SEIZURE DISORDER (HCC): ICD-10-CM

## 2017-07-12 RX ORDER — PRAMIPEXOLE DIHYDROCHLORIDE 0.5 MG/1
TABLET ORAL
Qty: 270 TABLET | Refills: 3 | Status: SHIPPED | OUTPATIENT
Start: 2017-07-12 | End: 2017-10-04 | Stop reason: SDUPTHER

## 2017-07-12 RX ORDER — PHENOBARBITAL 64.8 MG/1
TABLET ORAL
Qty: 270 TABLET | Refills: 3 | Status: SHIPPED | OUTPATIENT
Start: 2017-07-12 | End: 2017-11-07 | Stop reason: SDUPTHER

## 2017-07-12 NOTE — TELEPHONE ENCOUNTER
----- Message from Yennifer Rosenberg sent at 7/10/2017 10:57 AM EDT -----  Contact: [phone]  Patient need refill on medication and also requesting it to be refill for 3x she don't want to have to keep calling to get it filled.    pramipexole (MIRAPEX) 0.5 MG tablet  PHENobarbital (LUMINAL) 64.8 MG tablet

## 2017-07-24 RX ORDER — PRAVASTATIN SODIUM 40 MG
TABLET ORAL
Qty: 90 TABLET | Refills: 0 | Status: SHIPPED | OUTPATIENT
Start: 2017-07-24 | End: 2017-08-24 | Stop reason: SDUPTHER

## 2017-08-24 ENCOUNTER — OFFICE VISIT (OUTPATIENT)
Dept: ENDOCRINOLOGY | Age: 72
End: 2017-08-24

## 2017-08-24 VITALS
HEART RATE: 78 BPM | SYSTOLIC BLOOD PRESSURE: 124 MMHG | BODY MASS INDEX: 28.68 KG/M2 | HEIGHT: 69 IN | DIASTOLIC BLOOD PRESSURE: 82 MMHG | WEIGHT: 193.6 LBS | OXYGEN SATURATION: 98 %

## 2017-08-24 DIAGNOSIS — E55.9 VITAMIN D INSUFFICIENCY: ICD-10-CM

## 2017-08-24 DIAGNOSIS — E89.0 POSTSURGICAL HYPOTHYROIDISM: Primary | ICD-10-CM

## 2017-08-24 DIAGNOSIS — G47.30 SLEEP APNEA, UNSPECIFIED TYPE: ICD-10-CM

## 2017-08-24 DIAGNOSIS — I87.2 CHRONIC VENOUS INSUFFICIENCY: ICD-10-CM

## 2017-08-24 DIAGNOSIS — J30.2 SEASONAL ALLERGIC RHINITIS, UNSPECIFIED ALLERGIC RHINITIS TRIGGER: ICD-10-CM

## 2017-08-24 DIAGNOSIS — E89.0 HISTORY OF PARTIAL THYROIDECTOMY: ICD-10-CM

## 2017-08-24 DIAGNOSIS — E04.2 MULTINODULAR GOITER: ICD-10-CM

## 2017-08-24 DIAGNOSIS — E78.5 HYPERLIPIDEMIA, UNSPECIFIED HYPERLIPIDEMIA TYPE: ICD-10-CM

## 2017-08-24 PROCEDURE — 99214 OFFICE O/P EST MOD 30 MIN: CPT | Performed by: INTERNAL MEDICINE

## 2017-08-24 RX ORDER — FEXOFENADINE HCL 180 MG/1
180 TABLET ORAL DAILY
Qty: 30 TABLET | Refills: 2 | Status: SHIPPED | OUTPATIENT
Start: 2017-08-24 | End: 2017-10-18

## 2017-08-24 RX ORDER — BIOTIN 10000 MCG
CAPSULE ORAL
COMMUNITY
End: 2018-04-05

## 2017-08-24 RX ORDER — MOMETASONE FUROATE 50 UG/1
2 SPRAY, METERED NASAL DAILY
Qty: 1 EACH | Refills: 2 | Status: SHIPPED | OUTPATIENT
Start: 2017-08-24 | End: 2017-08-28 | Stop reason: CLARIF

## 2017-08-24 NOTE — PROGRESS NOTES
Subjective   Mariela Ruiz is a 72 y.o. female.     HPI Comments: F/u for hypothyroidism , hyperlipidemia, vitamin d def, sleep apnea     Hyperthyroidism   Associated symptoms include joint swelling ( legs ).   Hyperlipidemia     Sleep Apnea   Associated symptoms include joint swelling ( legs ).      Patient is a 72-year-old female came in for followup. She has hypothyroidism and has been taking Synthroid 50 mcg once a day.  By Dr. Brandt in August 2017 and had a thyroid ultrasound which showed no right lobe mass measuring 4-6 cm with hypervascular flow.  She had a fine needle biopsy done and report is not available.      She has hyperlipidemia and has been on Pravachol 40 mg once a day. She denies any myalgia. Her last meal was 6 AM.      She has history of seizure disorder and has been on phenobarbital. She has not had any seizures. She follows with Dr. Whitman. She has restless leg syndrome which has been controlled with Mirapex 3 mg at bedtime. She denies increased sedation.      She has vitamin D insufficiency. She had osteoporosis on bone density done at her gynecologist's office last 11/15. She is on vitamin D 25551 units once a day 3-4 times a week.   She has stopped taking calcium tabs.      She has lymphedema on left lower ext which improved with compression hoses. She has not seen Dr. Tracy for a Dupuytren's contracture in her left hand.     She had a repeat left knee replacement in 2017 which was complicated by infection.  The prosthesis was removed and a spacer was placed.  She was seen by Dr. Zaidi in December 2016.  She had a new knee replacement in February 2017.       She is complaining of increased nasal discharge for 3 days without associated fever, itchy eyes, or wheezing.  She has been taking Benadryl as needed     She is sleep apnea and is unable to tolerate CPAP.    The following portions of the patient's history were reviewed and updated as appropriate: allergies, current medications,  "past family history, past medical history, past social history, past surgical history and problem list.    Review of Systems   Constitutional: Negative.    HENT: Negative.    Eyes: Positive for visual disturbance.   Respiratory: Negative.    Cardiovascular: Positive for leg swelling.   Gastrointestinal: Positive for constipation.   Endocrine: Negative.    Genitourinary: Negative.    Musculoskeletal: Positive for back pain and joint swelling ( legs ).   Skin: Negative.    Allergic/Immunologic: Negative.    Neurological: Negative.    Hematological: Negative.    Psychiatric/Behavioral: Negative.        Objective      Vitals:    08/24/17 1333   BP: 124/82   BP Location: Right arm   Patient Position: Sitting   Cuff Size: Large Adult   Pulse: 78   SpO2: 98%   Weight: 193 lb 9.6 oz (87.8 kg)   Height: 69\" (175.3 cm)     Physical Exam   Constitutional: She is oriented to person, place, and time. She appears well-developed and well-nourished. No distress.   HENT:   Head: Normocephalic.   Nose: Nose normal.   Mouth/Throat: No oropharyngeal exudate.   Eyes: Conjunctivae and EOM are normal. Right eye exhibits no discharge. Left eye exhibits no discharge. No scleral icterus.   Neck: Neck supple. No JVD present. No tracheal deviation present. No thyromegaly present.   Cardiovascular: Normal rate, regular rhythm, normal heart sounds and intact distal pulses.  Exam reveals no gallop and no friction rub.    No murmur heard.  Pulmonary/Chest: Effort normal and breath sounds normal. No respiratory distress. She has no wheezes. She has no rales.   Abdominal: Soft. Bowel sounds are normal. She exhibits no distension and no mass. There is no tenderness.   Musculoskeletal: Normal range of motion. She exhibits no edema, tenderness or deformity.   Lymphadenopathy:     She has no cervical adenopathy.   Neurological: She is alert and oriented to person, place, and time. She has normal reflexes. She displays normal reflexes. No cranial nerve " deficit. Coordination normal.   Skin: No rash noted. No erythema. No pallor.   Psychiatric: She has a normal mood and affect. Her behavior is normal.     Office Visit on 05/11/2017   Component Date Value Ref Range Status   • Total Cholesterol 05/11/2017 200  0 - 200 mg/dL Final   • Triglycerides 05/11/2017 85  0 - 150 mg/dL Final   • HDL Cholesterol 05/11/2017 89* 40 - 60 mg/dL Final   • VLDL Cholesterol 05/11/2017 17  5 - 40 mg/dL Final   • LDL Cholesterol  05/11/2017 94  0 - 100 mg/dL Final   • Glucose 05/11/2017 99  65 - 99 mg/dL Final   • BUN 05/11/2017 12  8 - 23 mg/dL Final   • Creatinine 05/11/2017 1.02* 0.57 - 1.00 mg/dL Final   • eGFR Non African Am 05/11/2017 54* >60 mL/min/1.73 Final   • eGFR African Am 05/11/2017 65  >60 mL/min/1.73 Final   • BUN/Creatinine Ratio 05/11/2017 11.8  7.0 - 25.0 Final   • Sodium 05/11/2017 138  136 - 145 mmol/L Final   • Potassium 05/11/2017 4.0  3.5 - 5.2 mmol/L Final   • Chloride 05/11/2017 99  98 - 107 mmol/L Final   • Total CO2 05/11/2017 28.9  22.0 - 29.0 mmol/L Final   • Calcium 05/11/2017 9.6  8.6 - 10.5 mg/dL Final   • Total Protein 05/11/2017 6.8  6.0 - 8.5 g/dL Final   • Albumin 05/11/2017 3.80  3.50 - 5.20 g/dL Final   • Globulin 05/11/2017 3.0  gm/dL Final   • A/G Ratio 05/11/2017 1.3  g/dL Final   • Total Bilirubin 05/11/2017 0.3  0.1 - 1.2 mg/dL Final   • Alkaline Phosphatase 05/11/2017 147* 39 - 117 U/L Final   • AST (SGOT) 05/11/2017 20  1 - 32 U/L Final   • ALT (SGPT) 05/11/2017 14  1 - 33 U/L Final   • TSH 05/11/2017 1.020  0.270 - 4.200 mIU/mL Final   • Free T4 05/11/2017 1.03  0.93 - 1.70 ng/dL Final   • WBC 05/11/2017 4.71  4.50 - 10.70 10*3/mm3 Final   • RBC 05/11/2017 3.97  3.90 - 5.20 10*6/mm3 Final   • Hemoglobin 05/11/2017 11.7* 11.9 - 15.5 g/dL Final   • Hematocrit 05/11/2017 35.9  35.6 - 45.5 % Final   • MCV 05/11/2017 90.4  80.5 - 98.2 fL Final   • MCH 05/11/2017 29.5  26.9 - 32.0 pg Final   • MCHC 05/11/2017 32.6  32.4 - 36.3 g/dL Final   • RDW  05/11/2017 14.8* 11.7 - 13.0 % Final   • Platelets 05/11/2017 322  140 - 500 10*3/mm3 Final   • Neutrophil Rel % 05/11/2017 50.5  42.7 - 76.0 % Final   • Lymphocyte Rel % 05/11/2017 38.9  19.6 - 45.3 % Final   • Monocyte Rel % 05/11/2017 10.0  5.0 - 12.0 % Final   • Eosinophil Rel % 05/11/2017 0.0* 0.3 - 6.2 % Final   • Basophil Rel % 05/11/2017 0.6  0.0 - 1.5 % Final   • Neutrophils Absolute 05/11/2017 2.38  1.90 - 8.10 10*3/mm3 Final   • Lymphocytes Absolute 05/11/2017 1.83  0.90 - 4.80 10*3/mm3 Final   • Monocytes Absolute 05/11/2017 0.47  0.20 - 1.20 10*3/mm3 Final   • Eosinophils Absolute 05/11/2017 0.00  0.00 - 0.70 10*3/mm3 Final   • Basophils Absolute 05/11/2017 0.03  0.00 - 0.20 10*3/mm3 Final   • Immature Granulocyte Rel % 05/11/2017 0.0  0.0 - 0.5 % Final   • Immature Grans Absolute 05/11/2017 0.00  0.00 - 0.03 10*3/mm3 Final   • 25 Hydroxy, Vitamin D 05/11/2017 41.8  30.0 - 100.0 ng/mL Final    Comment: Vitamin D deficiency has been defined by the [place] of  Medicine and an Endocrine Society practice guideline as a  level of serum 25-OH vitamin D less than 20 ng/mL (1,2).  The Endocrine Society went on to further define vitamin D  insufficiency as a level between 21 and 29 ng/mL (2).  1. IOM ([place] of Medicine). 2010. Dietary reference     intakes for calcium and D. [place]: The     National Academies Press.  2. Elise MF, [place], Pradeep ROWE, et al.     Evaluation, treatment, and prevention of vitamin D     deficiency: an Endocrine Society clinical practice     guideline. JCEM. 2011 Jul; 96(7):1911-30.     • Specific Gravity, UA 05/11/2017 1.025  1.005 - 1.030 Final   • pH, UA 05/11/2017 5.5  5.0 - 8.0 Final   • Color,  05/11/2017 See below:*  Final    Comment: Dark Yellow  REFERENCE RANGE: Yellow, Straw     • Appearance,  05/11/2017 Cloudy* Clear Final   • Leukocytes,  05/11/2017 See below:* Negative Final    Moderate (2+)   • Protein 05/11/2017 Trace* Negative Final   •  Glucose, UA 05/11/2017 Negative  Negative Final   • Ketones 05/11/2017 Negative  Negative Final   • Blood, UA 05/11/2017 Trace* Negative Final   • Bilirubin, UA 05/11/2017 Negative  Negative Final   • Urobilinogen, UA 05/11/2017 Comment   Final    Comment: 0.2 E.U./dL  REFERENCE RANGE: 0.2 - 1.0 E.U./dL     • Nitrite, UA 05/11/2017 Positive* Negative Final   • WBC, UA 05/11/2017 13-20* /hpf Final    REFERENCE RANGE: None Seen, 0-2   • RBC, UA 05/11/2017 0-2  /hpf Final    REFERENCE RANGE: None Seen, 0-2   • Epithelial Cells (non renal) 05/11/2017 3-6* /hpf Final    REFERENCE RANGE: None Seen, 0-2   • Crystal Type 05/11/2017 Comment   Final    CALCIUM OXALATE CRYSTALS     Moderate/2+      /HPF       None Seen  N   • Bacteria, UA 05/11/2017 1+* None Seen /hpf Final   • Urine Culture 05/11/2017 Final report*  Final   • Result 1 05/11/2017 Klebsiella oxytoca*  Final    Greater than 100,000 colony forming units per mL   • Susceptibility Testing 05/11/2017 Comment   Final    Comment:       ** S = Susceptible; I = Intermediate; R = Resistant **                     P = Positive; N = Negative              MICS are expressed in micrograms per mL     Antibiotic                 RSLT#1    RSLT#2    RSLT#3    RSLT#4  Amoxicillin/Clavulanic Acid    S  Ampicillin                     R  Cefazolin                      R  Cefepime                       S  Ceftriaxone                    S  Cefuroxime                     S  Cephalothin                    I  Ciprofloxacin                  S  Gentamicin                     S  Imipenem                       S  Levofloxacin                   S  Nitrofurantoin                 S  Piperacillin                   S  Tetracycline                   S  Tobramycin                     S  Trimethoprim/Sulfa             S       Assessment/Plan   Mariela was seen today for hyperthyroidism, hyperlipidemia, vitamin d deficiency and sleep apnea.    Diagnoses and all orders for this visit:    Postsurgical  hypothyroidism  -     TSH  -     T4, Free    Hyperlipidemia, unspecified hyperlipidemia type  -     Comprehensive Metabolic Panel  -     Lipid Panel    Chronic venous insufficiency    Vitamin D insufficiency  -     Vitamin D 25 Hydroxy    History of partial thyroidectomy    Multinodular goiter    Sleep apnea, unspecified type  -     TSH  -     T4, Free    Seasonal allergic rhinitis, unspecified allergic rhinitis trigger  -     mometasone (NASONEX) 50 MCG/ACT nasal spray; 2 sprays into each nostril Daily.  -     fexofenadine (ALLEGRA ALLERGY) 180 MG tablet; Take 1 tablet by mouth Daily.      Continue Synthroid 50 µg per day.  Check thyroid function tests.  Continue Pravachol 40 mg once a day.  Check lipid profile.  Check vitamin D levels.  May use Tums 500 mg twice a day  Continue phenobarbital per Dr. Whitman.  Start Nasonex 2 sprays in each nostril once a day and Allegra 180 mg once a day.  Call if not better.  Flu vaccine this fall.    RTC 4 mos.

## 2017-08-25 LAB
25(OH)D3+25(OH)D2 SERPL-MCNC: 46.3 NG/ML (ref 30–100)
ALBUMIN SERPL-MCNC: 4.4 G/DL (ref 3.5–5.2)
ALBUMIN/GLOB SERPL: 1.5 G/DL
ALP SERPL-CCNC: 139 U/L (ref 39–117)
ALT SERPL-CCNC: 14 U/L (ref 1–33)
AST SERPL-CCNC: 18 U/L (ref 1–32)
BILIRUB SERPL-MCNC: 0.2 MG/DL (ref 0.1–1.2)
BUN SERPL-MCNC: 9 MG/DL (ref 8–23)
BUN/CREAT SERPL: 10.1 (ref 7–25)
CALCIUM SERPL-MCNC: 9.6 MG/DL (ref 8.6–10.5)
CHLORIDE SERPL-SCNC: 99 MMOL/L (ref 98–107)
CHOLEST SERPL-MCNC: 184 MG/DL (ref 0–200)
CO2 SERPL-SCNC: 27.5 MMOL/L (ref 22–29)
CREAT SERPL-MCNC: 0.89 MG/DL (ref 0.57–1)
GLOBULIN SER CALC-MCNC: 3 GM/DL
GLUCOSE SERPL-MCNC: 92 MG/DL (ref 65–99)
HDLC SERPL-MCNC: 87 MG/DL (ref 40–60)
LDLC SERPL CALC-MCNC: 86 MG/DL (ref 0–100)
POTASSIUM SERPL-SCNC: 4.3 MMOL/L (ref 3.5–5.2)
PROT SERPL-MCNC: 7.4 G/DL (ref 6–8.5)
SODIUM SERPL-SCNC: 140 MMOL/L (ref 136–145)
T4 FREE SERPL-MCNC: 0.98 NG/DL (ref 0.93–1.7)
TRIGL SERPL-MCNC: 56 MG/DL (ref 0–150)
TSH SERPL DL<=0.005 MIU/L-ACNC: 1.09 MIU/ML (ref 0.27–4.2)
VLDLC SERPL CALC-MCNC: 11.2 MG/DL (ref 5–40)

## 2017-08-28 RX ORDER — FLUTICASONE PROPIONATE 50 MCG
2 SPRAY, SUSPENSION (ML) NASAL DAILY
Qty: 1 BOTTLE | Refills: 1 | Status: SHIPPED | OUTPATIENT
Start: 2017-08-28 | End: 2017-09-27

## 2017-09-01 RX ORDER — LEVOTHYROXINE SODIUM 50 MCG
50 TABLET ORAL
Qty: 90 TABLET | Refills: 1 | Status: SHIPPED | OUTPATIENT
Start: 2017-09-01 | End: 2018-04-05 | Stop reason: SDUPTHER

## 2017-10-03 DIAGNOSIS — G40.909 SEIZURE DISORDER (HCC): ICD-10-CM

## 2017-10-03 NOTE — TELEPHONE ENCOUNTER
----- Message from Yennifer Rosenberg sent at 10/3/2017  1:44 PM EDT -----  Contact: [phone]  Patient need a refilled:  pramipexole (MIRAPEX) 0.5 MG tablet- 3 times a day/ 270 tablets     Send to [phone] [facility]

## 2017-10-04 RX ORDER — PRAMIPEXOLE DIHYDROCHLORIDE 0.5 MG/1
TABLET ORAL
Qty: 270 TABLET | Refills: 0 | Status: SHIPPED | OUTPATIENT
Start: 2017-10-04 | End: 2017-10-04 | Stop reason: SDUPTHER

## 2017-10-04 RX ORDER — PRAMIPEXOLE DIHYDROCHLORIDE 0.5 MG/1
TABLET ORAL
Qty: 270 TABLET | Refills: 3 | Status: SHIPPED | OUTPATIENT
Start: 2017-10-04 | End: 2018-01-03 | Stop reason: SDUPTHER

## 2017-10-18 ENCOUNTER — OFFICE VISIT (OUTPATIENT)
Dept: ENDOCRINOLOGY | Age: 72
End: 2017-10-18

## 2017-10-18 VITALS
BODY MASS INDEX: 29.27 KG/M2 | WEIGHT: 197.6 LBS | DIASTOLIC BLOOD PRESSURE: 86 MMHG | OXYGEN SATURATION: 99 % | HEART RATE: 88 BPM | HEIGHT: 69 IN | SYSTOLIC BLOOD PRESSURE: 144 MMHG

## 2017-10-18 DIAGNOSIS — G47.30 SLEEP APNEA, UNSPECIFIED TYPE: ICD-10-CM

## 2017-10-18 DIAGNOSIS — L28.2 PRURITIC RASH: ICD-10-CM

## 2017-10-18 DIAGNOSIS — R82.998 OTHER ABNORMAL FINDINGS IN URINE: ICD-10-CM

## 2017-10-18 DIAGNOSIS — E89.0 HISTORY OF PARTIAL THYROIDECTOMY: ICD-10-CM

## 2017-10-18 DIAGNOSIS — M81.0 OSTEOPOROSIS, UNSPECIFIED OSTEOPOROSIS TYPE, UNSPECIFIED PATHOLOGICAL FRACTURE PRESENCE: ICD-10-CM

## 2017-10-18 DIAGNOSIS — E89.0 POSTSURGICAL HYPOTHYROIDISM: ICD-10-CM

## 2017-10-18 DIAGNOSIS — E78.5 HYPERLIPIDEMIA, UNSPECIFIED HYPERLIPIDEMIA TYPE: ICD-10-CM

## 2017-10-18 DIAGNOSIS — E55.9 VITAMIN D INSUFFICIENCY: ICD-10-CM

## 2017-10-18 DIAGNOSIS — E04.2 MULTINODULAR GOITER: Primary | ICD-10-CM

## 2017-10-18 PROCEDURE — 99214 OFFICE O/P EST MOD 30 MIN: CPT | Performed by: INTERNAL MEDICINE

## 2017-10-18 RX ORDER — HYDROXYZINE HYDROCHLORIDE 10 MG/1
10 TABLET, FILM COATED ORAL EVERY 6 HOURS PRN
Qty: 30 TABLET | Refills: 1 | Status: SHIPPED | OUTPATIENT
Start: 2017-10-18 | End: 2018-01-02

## 2017-10-18 RX ORDER — BETAMETHASONE DIPROPIONATE 0.5 MG/G
CREAM TOPICAL 2 TIMES DAILY
Qty: 45 G | Refills: 0 | Status: SHIPPED | OUTPATIENT
Start: 2017-10-18 | End: 2018-01-02

## 2017-10-18 RX ORDER — DOXYCYCLINE HYCLATE 100 MG
TABLET ORAL
COMMUNITY
Start: 2017-10-16 | End: 2017-10-18

## 2017-10-18 NOTE — PROGRESS NOTES
Subjective   Mariela Ruiz is a 72 y.o. female.     HPI Comments: Work in for rash    Hypothyroidism   Associated symptoms include joint swelling.   Hyperlipidemia   Exacerbating diseases include hypothyroidism.   Sleep Apnea   Associated symptoms include joint swelling.      Patient is a 72-year-old female who came in for a pruritic rash which started several weeks ago.  Rash is erythematous, non-migratory.  There is no associated fever.  She has not used any new detergents or skin lotions.  She has not used any medication except for 2 doses of doxycycline and 1 dose of Diflucan.  She has not been using Allegra regularly.  She has no pets at home.    She complains of increased odor in her urine but no dysuria or vaginal discharge.    She has hypothyroidism and has been taking Synthroid 50 µg per day.  She had a fine needle biopsy right thyroid nodule done in by Dr. Brandt in August 2017 which was read as follicular lesion of undetermined significance.  She has not followed up with Dr. Brandt since.  She has no previous history of head or neck radiation therapy.    She has hyperlipidemia and has been on pravastatin 40 mg once a day.  She has occasional leg cramps.      She has osteoporosis on bone density done in her gynecologist in November 2015.  She has vitamin D insufficiency and is on vitamin D 10,000 units twice a week.    She has sleep apnea but is unable to tolerate a CPAP.    The following portions of the patient's history were reviewed and updated as appropriate: allergies, current medications, past family history, past medical history, past social history, past surgical history and problem list.    Review of Systems   Constitutional: Negative.    HENT: Negative.    Eyes: Negative.    Respiratory: Negative.    Cardiovascular: Positive for leg swelling.   Gastrointestinal: Negative.    Endocrine: Negative.    Genitourinary: Positive for difficulty urinating ( strong odor ) and urgency.   Musculoskeletal:  "Positive for joint swelling.   Skin: Negative.    Allergic/Immunologic: Negative.    Neurological: Negative.    Hematological: Negative.    Psychiatric/Behavioral: The patient is nervous/anxious.        Objective      Vitals:    10/18/17 1221   BP: 144/86   BP Location: Right arm   Patient Position: Sitting   Cuff Size: Large Adult   Pulse: 88   SpO2: 99%   Weight: 197 lb 9.6 oz (89.6 kg)   Height: 69\" (175.3 cm)     Physical Exam   Constitutional: She is oriented to person, place, and time. She appears well-developed and well-nourished. No distress.   HENT:   Head: Normocephalic.   Nose: Nose normal.   Mouth/Throat: No oropharyngeal exudate.   Eyes: Conjunctivae and EOM are normal. Right eye exhibits no discharge. Left eye exhibits no discharge. No scleral icterus.   Neck: Neck supple. No JVD present. No tracheal deviation present. No thyromegaly present.   Cardiovascular: Normal rate, regular rhythm and normal heart sounds.  Exam reveals no gallop and no friction rub.    No murmur heard.  Pulmonary/Chest: Effort normal and breath sounds normal. No respiratory distress. She has no wheezes. She has no rales. She exhibits no tenderness.   Abdominal: Soft. Bowel sounds are normal. She exhibits no distension and no mass. There is no tenderness.   Musculoskeletal: Normal range of motion. She exhibits edema (++leg edema). She exhibits no tenderness or deformity.   Lymphadenopathy:     She has no cervical adenopathy.   Neurological: She is alert and oriented to person, place, and time. She has normal reflexes. Coordination normal.   Skin: Rash (erythematous rash on trunk and face, non-blanching) noted. No erythema. No pallor.   Psychiatric: She has a normal mood and affect. Her behavior is normal.     Office Visit on 08/24/2017   Component Date Value Ref Range Status   • Glucose 08/24/2017 92  65 - 99 mg/dL Final   • BUN 08/24/2017 9  8 - 23 mg/dL Final   • Creatinine 08/24/2017 0.89  0.57 - 1.00 mg/dL Final   • eGFR Non "  Am 08/24/2017 63  >60 mL/min/1.73 Final   • eGFR African Am 08/24/2017 76  >60 mL/min/1.73 Final   • BUN/Creatinine Ratio 08/24/2017 10.1  7.0 - 25.0 Final   • Sodium 08/24/2017 140  136 - 145 mmol/L Final   • Potassium 08/24/2017 4.3  3.5 - 5.2 mmol/L Final   • Chloride 08/24/2017 99  98 - 107 mmol/L Final   • Total CO2 08/24/2017 27.5  22.0 - 29.0 mmol/L Final   • Calcium 08/24/2017 9.6  8.6 - 10.5 mg/dL Final   • Total Protein 08/24/2017 7.4  6.0 - 8.5 g/dL Final   • Albumin 08/24/2017 4.40  3.50 - 5.20 g/dL Final   • Globulin 08/24/2017 3.0  gm/dL Final   • A/G Ratio 08/24/2017 1.5  g/dL Final   • Total Bilirubin 08/24/2017 0.2  0.1 - 1.2 mg/dL Final   • Alkaline Phosphatase 08/24/2017 139* 39 - 117 U/L Final   • AST (SGOT) 08/24/2017 18  1 - 32 U/L Final   • ALT (SGPT) 08/24/2017 14  1 - 33 U/L Final   • TSH 08/24/2017 1.090  0.270 - 4.200 mIU/mL Final   • Free T4 08/24/2017 0.98  0.93 - 1.70 ng/dL Final   • Total Cholesterol 08/24/2017 184  0 - 200 mg/dL Final   • Triglycerides 08/24/2017 56  0 - 150 mg/dL Final   • HDL Cholesterol 08/24/2017 87* 40 - 60 mg/dL Final   • VLDL Cholesterol 08/24/2017 11.2  5 - 40 mg/dL Final   • LDL Cholesterol  08/24/2017 86  0 - 100 mg/dL Final   • 25 Hydroxy, Vitamin D 08/24/2017 46.3  30.0 - 100.0 ng/mL Final    Comment: Reference Range for Total Vitamin D 25(OH)  Deficiency    <20.0 ng/mL  Insufficiency 21-29 ng/mL  Sufficiency    ng/mL  Toxicity      >100 ng/ml          Assessment/Plan   Mariela was seen today for hypothyroidism, hyperlipidemia, sleep apnea and vitamin d deficiency.    Diagnoses and all orders for this visit:    Multinodular goiter  -     TSH  -     T4, Free    History of partial thyroidectomy    Postsurgical hypothyroidism  -     Lipid Panel    Hyperlipidemia, unspecified hyperlipidemia type  -     Comprehensive Metabolic Panel  -     Aldolase  -     CK    Vitamin D insufficiency  -     Vitamin D 25 Hydroxy    Osteoporosis, unspecified  osteoporosis type, unspecified pathological fracture presence    Sleep apnea, unspecified type    Pruritic rash  -     hydrOXYzine (ATARAX) 10 MG tablet; Take 1 tablet by mouth Every 6 (Six) Hours As Needed for Itching.  -     betamethasone dipropionate (DIPROLENE) 0.05 % cream; Apply  topically 2 (Two) Times a Day.  -     CBC & Differential  -     Urinalysis With / Microscopic If Indicated - Urine, Clean Catch  -     Urine Culture - Urine, Urine, Clean Catch    Other abnormal findings in urine   -     Urine Culture - Urine, Urine, Clean Catch      [id number] mg every 6 are as as needed for itching  Diprolene 0.05% cream twice a day to rash.  Dermatology consultation if not improved  Continue Synthroid 50 µg per day.  Patient advised to follow-up with Dr. Brandt with regards to abnormal fine needle biopsy.  Hold pravastatin and see if leg cramps improves  Check vitamin D levels.  Suggest follow-up bone density at her gynecologist.  Check urinalysis and urine culture.    Send copy of my notes and labs to Dr. Brandt.    RTC 4 mos.

## 2017-10-19 LAB
25(OH)D3+25(OH)D2 SERPL-MCNC: 40 NG/ML (ref 30–100)
ALBUMIN SERPL-MCNC: 4.5 G/DL (ref 3.5–5.2)
ALBUMIN/GLOB SERPL: 1.7 G/DL
ALDOLASE SERPL-CCNC: 3.5 U/L (ref 3.3–10.3)
ALP SERPL-CCNC: 145 U/L (ref 39–117)
ALT SERPL-CCNC: 14 U/L (ref 1–33)
APPEARANCE UR: CLEAR
AST SERPL-CCNC: 21 U/L (ref 1–32)
BASOPHILS # BLD AUTO: 0.04 10*3/MM3 (ref 0–0.2)
BASOPHILS NFR BLD AUTO: 1 % (ref 0–1.5)
BILIRUB SERPL-MCNC: 0.2 MG/DL (ref 0.1–1.2)
BILIRUB UR QL STRIP: NEGATIVE
BUN SERPL-MCNC: 14 MG/DL (ref 8–23)
BUN/CREAT SERPL: 15.7 (ref 7–25)
CALCIUM SERPL-MCNC: 9.8 MG/DL (ref 8.6–10.5)
CHLORIDE SERPL-SCNC: 100 MMOL/L (ref 98–107)
CHOLEST SERPL-MCNC: 180 MG/DL (ref 0–200)
CK SERPL-CCNC: 55 U/L (ref 20–180)
CO2 SERPL-SCNC: 26.6 MMOL/L (ref 22–29)
COLOR UR: YELLOW
CREAT SERPL-MCNC: 0.89 MG/DL (ref 0.57–1)
EOSINOPHIL # BLD AUTO: 0 10*3/MM3 (ref 0–0.7)
EOSINOPHIL NFR BLD AUTO: 0 % (ref 0.3–6.2)
ERYTHROCYTE [DISTWIDTH] IN BLOOD BY AUTOMATED COUNT: 13.7 % (ref 11.7–13)
GFR SERPLBLD CREATININE-BSD FMLA CKD-EPI: 63 ML/MIN/1.73
GFR SERPLBLD CREATININE-BSD FMLA CKD-EPI: 76 ML/MIN/1.73
GLOBULIN SER CALC-MCNC: 2.7 GM/DL
GLUCOSE SERPL-MCNC: 100 MG/DL (ref 65–99)
GLUCOSE UR QL: NEGATIVE
HCT VFR BLD AUTO: 37.3 % (ref 35.6–45.5)
HDLC SERPL-MCNC: 96 MG/DL (ref 40–60)
HGB BLD-MCNC: 11.8 G/DL (ref 11.9–15.5)
HGB UR QL STRIP: NEGATIVE
IMM GRANULOCYTES # BLD: 0 10*3/MM3 (ref 0–0.03)
IMM GRANULOCYTES NFR BLD: 0 % (ref 0–0.5)
KETONES UR QL STRIP: NEGATIVE
LDLC SERPL CALC-MCNC: 74 MG/DL (ref 0–100)
LEUKOCYTE ESTERASE UR QL STRIP: NEGATIVE
LYMPHOCYTES # BLD AUTO: 1.32 10*3/MM3 (ref 0.9–4.8)
LYMPHOCYTES NFR BLD AUTO: 33 % (ref 19.6–45.3)
MCH RBC QN AUTO: 29.6 PG (ref 26.9–32)
MCHC RBC AUTO-ENTMCNC: 31.6 G/DL (ref 32.4–36.3)
MCV RBC AUTO: 93.7 FL (ref 80.5–98.2)
MONOCYTES # BLD AUTO: 0.43 10*3/MM3 (ref 0.2–1.2)
MONOCYTES NFR BLD AUTO: 10.8 % (ref 5–12)
NEUTROPHILS # BLD AUTO: 2.21 10*3/MM3 (ref 1.9–8.1)
NEUTROPHILS NFR BLD AUTO: 55.2 % (ref 42.7–76)
NITRITE UR QL STRIP: NEGATIVE
PH UR STRIP: 7 [PH] (ref 5–8)
PLATELET # BLD AUTO: 239 10*3/MM3 (ref 140–500)
POTASSIUM SERPL-SCNC: 4.6 MMOL/L (ref 3.5–5.2)
PROT SERPL-MCNC: 7.2 G/DL (ref 6–8.5)
PROT UR QL STRIP: NEGATIVE
RBC # BLD AUTO: 3.98 10*6/MM3 (ref 3.9–5.2)
SODIUM SERPL-SCNC: 140 MMOL/L (ref 136–145)
SP GR UR: 1.01 (ref 1–1.03)
T4 FREE SERPL-MCNC: 1.06 NG/DL (ref 0.93–1.7)
TRIGL SERPL-MCNC: 52 MG/DL (ref 0–150)
TSH SERPL DL<=0.005 MIU/L-ACNC: 1.27 MIU/ML (ref 0.27–4.2)
UROBILINOGEN UR STRIP-MCNC: NORMAL MG/DL
VLDLC SERPL CALC-MCNC: 10.4 MG/DL (ref 5–40)
WBC # BLD AUTO: 4 10*3/MM3 (ref 4.5–10.7)

## 2017-11-04 ENCOUNTER — HOSPITAL ENCOUNTER (EMERGENCY)
Facility: HOSPITAL | Age: 72
Discharge: HOME | End: 2017-11-04
Attending: EMERGENCY MEDICINE | Admitting: EMERGENCY MEDICINE

## 2017-11-04 ENCOUNTER — APPOINTMENT (OUTPATIENT)
Dept: GENERAL RADIOLOGY | Facility: HOSPITAL | Age: 72
End: 2017-11-04

## 2017-11-04 ENCOUNTER — APPOINTMENT (OUTPATIENT)
Dept: CT IMAGING | Facility: HOSPITAL | Age: 72
End: 2017-11-04

## 2017-11-04 VITALS
OXYGEN SATURATION: 96 % | HEIGHT: 69 IN | RESPIRATION RATE: 18 BRPM | TEMPERATURE: 97.8 F | DIASTOLIC BLOOD PRESSURE: 73 MMHG | SYSTOLIC BLOOD PRESSURE: 124 MMHG | WEIGHT: 192 LBS | BODY MASS INDEX: 28.44 KG/M2 | HEART RATE: 72 BPM

## 2017-11-04 DIAGNOSIS — M25.562 ACUTE PAIN OF LEFT KNEE: ICD-10-CM

## 2017-11-04 DIAGNOSIS — M25.552 PAIN OF LEFT HIP JOINT: ICD-10-CM

## 2017-11-04 DIAGNOSIS — S80.01XA CONTUSION OF RIGHT KNEE, INITIAL ENCOUNTER: Primary | ICD-10-CM

## 2017-11-04 PROCEDURE — 72192 CT PELVIS W/O DYE: CPT

## 2017-11-04 PROCEDURE — 73560 X-RAY EXAM OF KNEE 1 OR 2: CPT

## 2017-11-04 PROCEDURE — 73502 X-RAY EXAM HIP UNI 2-3 VIEWS: CPT

## 2017-11-04 PROCEDURE — 99284 EMERGENCY DEPT VISIT MOD MDM: CPT

## 2017-11-04 RX ORDER — OXYCODONE HYDROCHLORIDE AND ACETAMINOPHEN 5; 325 MG/1; MG/1
1 TABLET ORAL ONCE
Status: COMPLETED | OUTPATIENT
Start: 2017-11-04 | End: 2017-11-04

## 2017-11-04 RX ORDER — ONDANSETRON 4 MG/1
4 TABLET, ORALLY DISINTEGRATING ORAL ONCE
Status: COMPLETED | OUTPATIENT
Start: 2017-11-04 | End: 2017-11-04

## 2017-11-04 RX ORDER — HYDROCODONE BITARTRATE AND ACETAMINOPHEN 5; 325 MG/1; MG/1
1 TABLET ORAL EVERY 6 HOURS PRN
Qty: 10 TABLET | Refills: 0 | Status: SHIPPED | OUTPATIENT
Start: 2017-11-04 | End: 2018-01-02

## 2017-11-04 RX ADMIN — ONDANSETRON 4 MG: 4 TABLET, ORALLY DISINTEGRATING ORAL at 19:45

## 2017-11-04 RX ADMIN — OXYCODONE HYDROCHLORIDE AND ACETAMINOPHEN 1 TABLET: 5; 325 TABLET ORAL at 19:46

## 2017-11-04 NOTE — ED NOTES
Pt assisted to bathroom. Stood with assistance, states increased pain when bears weight     Patti Romero RN  11/04/17 0199

## 2017-11-04 NOTE — ED PROVIDER NOTES
" EMERGENCY DEPARTMENT ENCOUNTER    CHIEF COMPLAINT  Chief Complaint: L hip pain  History given by: Pt  History limited by: Nothing  Room Number: 37/37  PMD: Josh Luther MD      HPI:  Pt is a 72 y.o. female who presents complaining of L hip pain secondary to \"stepping wrong\" and falling onset PTA. Pt reports she has been feeling \"off-balance\" recently. Pt denies LOC or head injury secondary to her fall. She reports she experienced a fall about two weeks ago and experienced R knee pain and swelling. Pt denies hx of DVTs. She reports chronic R knee and R leg swelling secondary to her previous 6 L knee surgeries.    Duration:  PTA  Onset: sudden  Timing: constant  Location: L hip  Radiation: none  Quality: \"pain\"  Intensity/Severity: moderate  Progression: unchanged  Associated Symptoms: bilateral knee pain and swelling  Aggravating Factors: movement  Alleviating Factors: none  Previous Episodes: none  Treatment before arrival: Pt received no treatment PTA.    PAST MEDICAL HISTORY  Active Ambulatory Problems     Diagnosis Date Noted   • Seizure disorder    • Hyperlipidemia    • Vitamin D insufficiency    • Osteoporosis    • Sleep apnea    • Chronic venous insufficiency    • Postsurgical hypothyroidism    • Chronic fatigue syndrome 11/07/2016   • Gastroesophageal reflux disease 11/07/2016   • Dupuytren's contracture 11/07/2016   • History of biliary T-tube placement 11/07/2016   • History of partial thyroidectomy 10/04/2012   • Mitral valve prolapse 11/07/2016   • Multinodular goiter 11/07/2016   • Right fascicular block 11/07/2016   • Restless legs syndrome 11/07/2016   • History of cardiac catheterization 11/07/2016   • Urge incontinence of urine 11/07/2016   • Left knee pain 11/15/2016   • Ligamentous laxity of knee 12/02/2016   • DJD (degenerative joint disease) of knee 12/24/2016   • Pruritic rash 10/18/2017     Resolved Ambulatory Problems     Diagnosis Date Noted   • No Resolved Ambulatory Problems     Past " Medical History:   Diagnosis Date   • Chronic venous insufficiency    • Dupuytren's contracture    • History of staph infection    • History of transfusion    • Hyperlipidemia    • Nontoxic multinodular goiter    • Osteoporosis    • Postsurgical hypothyroidism    • Restless leg syndrome    • Seizure disorder    • Sleep apnea    • Vitamin D insufficiency        PAST SURGICAL HISTORY  Past Surgical History:   Procedure Laterality Date   • APPENDECTOMY     • CARDIAC CATHETERIZATION      NORMAL    • KNEE MINI REVISION Left 11/15/2016    Procedure: LEFT KNEE POLY CHANGE WITH HI POST STABILIZER;  Surgeon: Pablito Claudio MD;  Location: [facility] OR;  Service:    • KNEE MINI REVISION Left 12/13/2016    Procedure: LT KNEE REMOVAL/REPLACEMENT LOCKING PIN ;  Surgeon: Pablito Claudio MD;  Location: [facility] OR;  Service:    • MAMMO FINE NEEDLE ASPIRATION UNILATERAL      RIGHT THYROID NODULE, 2009 BENIGN    • CT REVISE KNEE JOINT REPLACE,1 PART Left 2/20/2017    Procedure: LT KNEE REMOVAL ANTIBIOTIC SPACER AND KNEE REVISION;  Surgeon: Christiano Cesar MD;  Location: [facility] OR;  Service: Orthopedics   • CT TOTAL KNEE ARTHROPLASTY Left 12/24/2016    Procedure: LEFT KNEE WASHOUT WITH POLY CHANGE;  Surgeon: Christiano Cesar MD;  Location: [facility] OR;  Service: Orthopedics   • REPLACEMENT TOTAL KNEE Left    • THYROIDECTOMY, PARTIAL      1979 LEFT LOBECTOMY AND ISTHMECTOMY    • TOTAL ABDOMINAL HYSTERECTOMY WITH SALPINGO OOPHORECTOMY     • TOTAL HIP ARTHROPLASTY Left    • TOTAL KNEE  PROSTHESIS REMOVAL W/ SPACER INSERTION Left 12/29/2016    Procedure: LT KNEE IMPLANT REMOVAL WITH INSERTION OF SPACER ;  Surgeon: Christiano Cesar MD;  Location: [facility];  Service:        FAMILY HISTORY  Family History   Problem Relation Age of Onset   • Heart disease Father    • Diabetes Sister    • Hypertension Sister    • Hyperlipidemia Sister    • Obesity Sister        SOCIAL HISTORY  Social History     Social History   •  Marital status:      Spouse name: N/A   • Number of children: N/A   • Years of education: N/A     Occupational History   • Not on file.     Social History Main Topics   • Smoking status: Never Smoker   • Smokeless tobacco: Never Used   • Alcohol use No   • Drug use: No   • Sexual activity: Defer     Other Topics Concern   • Not on file     Social History Narrative       ALLERGIES  Clindamycin/lincomycin; Sulfa antibiotics; Duricef [cefadroxil]; and Keflex [cephalexin]    REVIEW OF SYSTEMS  Review of Systems   Constitutional: Negative for fever.   HENT: Negative for sore throat.    Eyes: Negative.    Respiratory: Negative for cough and shortness of breath.    Cardiovascular: Negative for chest pain.   Gastrointestinal: Negative for abdominal pain, diarrhea and vomiting.   Genitourinary: Negative for dysuria.   Musculoskeletal: Positive for arthralgias (L hip, bilteral knees). Negative for neck pain.   Skin: Negative for rash.   Allergic/Immunologic: Negative.    Neurological: Negative for weakness, numbness and headaches.   Hematological: Negative.    Psychiatric/Behavioral: Negative.    All other systems reviewed and are negative.      PHYSICAL EXAM  ED Triage Vitals   Temp Heart Rate Resp BP SpO2   11/04/17 1422 11/04/17 1422 11/04/17 1422 11/04/17 1422 11/04/17 1422   97.4 °F (36.3 °C) 106 16 146/74 98 %      Temp src Heart Rate Source Patient Position BP Location FiO2 (%)   11/04/17 1422 11/04/17 1422 -- 11/04/17 1422 --   Tympanic Monitor  Right arm        Physical Exam   Constitutional: She is oriented to person, place, and time and well-developed, well-nourished, and in no distress. No distress.   HENT:   Head: Normocephalic and atraumatic.   Eyes: EOM are normal. Pupils are equal, round, and reactive to light.   Neck: Normal range of motion. Neck supple.   Cardiovascular: Normal rate, regular rhythm and normal heart sounds.    Pulmonary/Chest: Effort normal and breath sounds normal. No respiratory  distress.   Abdominal: Soft. There is no tenderness. There is no rebound and no guarding.   Musculoskeletal: She exhibits edema (2+ edema of lower L extremity which is baseline since lymphadema knee surgery).        Left hip: She exhibits decreased range of motion (secondary to pain) and tenderness (no shortening or external rotation; tenderness over greater trochanter).        Right knee: She exhibits swelling (Bursal sac with fluid inside bursa) and ecchymosis. Tenderness found. Medial joint line tenderness noted.   Neurological: She is alert and oriented to person, place, and time. She has normal sensation and normal strength.   Skin: Skin is warm and dry. No rash noted.   Psychiatric: Mood and affect normal.   Nursing note and vitals reviewed.      LAB RESULTS  Lab Results (last 24 hours)     ** No results found for the last 24 hours. **          I ordered the above labs and reviewed the results    RADIOLOGY  CT Pelvis Without Contrast   Preliminary Result   No acute findings of the left hip.           XR Hip With or Without Pelvis 2 - 3 View Left   Final Result    Narrative:       PELVIS AND LEFT HIP X-RAYS AND BILATERAL KNEE X-RAYS     CLINICAL HISTORY: Left hip pain after fall. Bilateral knee pain.     Possible left hip:     An AP view the pelvis and AP and frog-leg lateral views of the left hip  demonstrate an intact left hip arthroplasty. There is no evidence of  loosening. There is no evidence of fracture or subluxation. The right  hip joint is unremarkable.     Bilateral knee x-rays.     AP and lateral views of both knees were obtained. There is a left total  knee arthroplasty that appears in satisfactory position. There is no  evidence of loosening. There is moderate narrowing of all 3 compartments  of the knee joint is most prominent within the medial compartment where  there is marginal bony spurring. Mild osteopenia is also noted. There is  no evidence of fracture or subluxation.     This report was  finalized on 11/4/2017 7:02 PM by Dr. Cristopher Cowan MD.         XR Knee 1 or 2 View Bilateral   Final Result           I ordered the above noted radiological studies. Interpreted by radiologist. Reviewed by me in PACS.       PROCEDURES  Procedures      PROGRESS AND CONSULTS  ED Course     1746 Ordered XR Bilateral Knees and XR L Pelvis for further evaluation    1822 Ordered Percocet for treatment of pain    1913 Ordered CT Pelvis for further evaluation    1914 Rechecked pt who is resting and in no acute distress. Informed pt of negative imaging results. Pt reports pain with ambulation and inability to weight bear on her L leg. Will order CT Pelvis for further evaluation. Pt understands and agrees with the plan. All questions answered.    2011 Discussed pt with Dr. Nieto who agrees with treatment plan    2016 Will discharge. Pt understands and agrees with the plan. All questions answered.    MEDICAL DECISION MAKING  Results were reviewed/discussed with the patient and they were also made aware of online access. Pt also made aware that some labs, such as cultures, will not be resulted during ER visit and follow up with PMD is necessary.     MDM  Number of Diagnoses or Management Options     Amount and/or Complexity of Data Reviewed  Tests in the radiology section of CPT®: ordered and reviewed (XR Bilateral Knees and CT Pelvis shows NAD.)  Decide to obtain previous medical records or to obtain history from someone other than the patient: yes  Discuss the patient with other providers: yes (Dr. Nieto)  Independent visualization of images, tracings, or specimens: yes           DIAGNOSIS  Final diagnoses:   None       DISPOSITION  DISCHARGE    Patient discharged in stable condition.    Reviewed implications of results, diagnosis, meds, responsibility to follow up, warning signs and symptoms of possible worsening, potential complications and reasons to return to ER, including new or worsening symptoms.    Patient/Family  voiced understanding of above instructions.    Discussed plan for discharge, as there is no emergent indication for admission.  Pt/family is agreeable and understands need for follow up and repeat testing.  Pt is aware that discharge does not mean that nothing is wrong but it indicates no emergency is present that requires admission and they must continue care with follow-up as given below or physician of their choice.     FOLLOW-UP  No follow-up provider specified.       Medication List      Notice     No changes were made to your prescriptions during this visit.            Latest Documented Vital Signs:  As of 8:10 PM  BP- 132/69 HR- 98 Temp- 97.4 °F (36.3 °C) (Tympanic) O2 sat- 96%    --  Documentation assistance provided by francisco javier Torres for Kody ALBERTO.  Information recorded by the scribe was done at my direction and has been verified and validated by me.     Monty Torres  11/04/17 2018       REMY Irving  11/04/17 2018

## 2017-11-04 NOTE — ED NOTES
Patient in yard raking leaves and fell. Patient having left hip pain at that time. She crawled to her house and is now having right knee pain.      Suzan Solis RN  11/04/17 0129

## 2017-11-05 NOTE — ED PROVIDER NOTES
Pt presents to the ED complaining of L hip and bilateral knee pain s/p fall at 1400 today. Pt states that she has not been able to ambulate due to her left hip pain. Pt's CT Pelvis, L hip XR and bilateral knee XRs show nothing acute. On exam, the pt has a hematoma on the right anterior knee that appears old, lymphedema to the LLE without shortening or rotation of her LLE. There is a large healed scar over left knee and there is soft tissue tenderness over the lateral left hip. I agree with the plan to discharge the pt home with instructions to use a walker to ambulate.    I supervised care provided by the midlevel provider.    We have discussed this patient's history, physical exam, and treatment plan.   I have reviewed the note and personally saw and examined the patient and agree with the plan of care.    Documentation assistance provided by francisco javier Mohr for Dr. Nieto.  Information recorded by the scribe was done at my direction and has been verified and validated by me.       Yvette Mohr  11/04/17 2015       Justo Nieto MD  11/04/17 2133

## 2017-11-07 ENCOUNTER — OFFICE VISIT (OUTPATIENT)
Dept: NEUROLOGY | Facility: CLINIC | Age: 72
End: 2017-11-07

## 2017-11-07 VITALS
WEIGHT: 192.8 LBS | BODY MASS INDEX: 27.6 KG/M2 | SYSTOLIC BLOOD PRESSURE: 128 MMHG | DIASTOLIC BLOOD PRESSURE: 68 MMHG | HEIGHT: 70 IN

## 2017-11-07 DIAGNOSIS — G40.909 SEIZURE DISORDER (HCC): ICD-10-CM

## 2017-11-07 DIAGNOSIS — T50.905D ADVERSE DRUG EFFECT, SUBSEQUENT ENCOUNTER: ICD-10-CM

## 2017-11-07 DIAGNOSIS — M81.0 OSTEOPOROSIS, UNSPECIFIED OSTEOPOROSIS TYPE, UNSPECIFIED PATHOLOGICAL FRACTURE PRESENCE: Primary | ICD-10-CM

## 2017-11-07 DIAGNOSIS — G25.81 RESTLESS LEGS SYNDROME: ICD-10-CM

## 2017-11-07 PROCEDURE — 99214 OFFICE O/P EST MOD 30 MIN: CPT | Performed by: PSYCHIATRY & NEUROLOGY

## 2017-11-07 RX ORDER — PHENOBARBITAL 64.8 MG/1
TABLET ORAL
Qty: 270 TABLET | Refills: 3 | Status: SHIPPED | OUTPATIENT
Start: 2017-11-07 | End: 2017-12-28 | Stop reason: SDUPTHER

## 2017-11-07 NOTE — PROGRESS NOTES
Subjective:     Patient ID: Mariela Ruiz is a 72 y.o. female.    History of Present Illness  The following portions of the patient's history were reviewed and updated as appropriate: allergies, current medications, past family history, past medical history, past social history, past surgical history and problem list.    Onset of seizures was at age 40. The event evidence were described as head turning to the left followed by a grunting sound left facial contortion hands clenched and slumping. Occasionally there will be focal shaking of the left arm. She has been stable on phenobarbital for many years and I followed her here since 1987. This is the main modifying factor for her medication. She takes phenobarbital 64.8 mg a dose of 3 per day. Her EEGs have confirmed left temporal lobe spike and wave activity and the brain MRI was normal in 1999.  She does not recall if there were any auras    AE:  She also has new dx. Of osteoporosis on a DEXA scan and is stable on vitamin D. This is followed by her PCP. Fosomax was not covered, so now on caltritrate. No fracture.s    Med hx: My office note from 2001 reveals a chronic use of phenobarbital at 180 per day and did not want to try Topamax after initial samples at that time.  Later notes show that she originally tried Depakene, then Dr. Prasad switched to Dilantin.  Seizures recurred and is another physician began phenobarbital.    Surgery x 6 in last year on L knee incl sta[ph infection. Still w bilateral  swelling R>L,     She also has restless leg syndrome and is stable on Mirapex 0.5 taken 3 times daily.     Driving: she is the only the , so afraid of stopping PBarb.   Review of Systems   Constitutional: Negative for chills and fever.   HENT: Negative for sinus pain, sneezing and sore throat.    Respiratory: Negative for cough, shortness of breath and wheezing.    Cardiovascular: Positive for leg swelling. Negative for chest pain and palpitations.    Gastrointestinal: Negative for abdominal pain, diarrhea, nausea and vomiting.   Genitourinary: Negative for difficulty urinating and hematuria.   Musculoskeletal: Positive for gait problem and joint swelling. Negative for back pain, neck pain and neck stiffness.   Neurological: Negative for dizziness, tremors, seizures, syncope, speech difficulty, weakness, light-headedness, numbness and headaches.   Psychiatric/Behavioral: Negative for behavioral problems, confusion, decreased concentration, dysphoric mood, hallucinations, self-injury and sleep disturbance. The patient is not nervous/anxious and is not hyperactive.         Objective:    Neurologic Exam     Mental Status   Oriented to person, place, and time.   Attention: normal. Concentration: normal.   Speech: speech is normal   Level of consciousness: alert  Knowledge: good. Able to perform simple calculations.   Able to name object. Able to read. Able to repeat. Able to write. Normal comprehension.     Cranial Nerves   Cranial nerves II through XII intact.     CN III, IV, VI   Pupils are equal, round, and reactive to light.    Motor Exam   Muscle bulk: normal  Overall muscle tone: normal    Sensory Exam   Light touch normal.   Vibration normal.     Gait, Coordination, and Reflexes     Coordination   Finger to nose coordination: normal    Tremor   Resting tremor: absent  Intention tremor: absent  Action tremor: absent    Reflexes   Right brachioradialis: 1+  Left brachioradialis: 1+  Right biceps: 2+  Left biceps: 2+       Abnormal gait d/t ortho problems (knees)       Physical Exam   Constitutional: She is oriented to person, place, and time. She appears well-developed and well-nourished.   HENT:   Head: Normocephalic.   Eyes: Pupils are equal, round, and reactive to light.   Fundi ok   Neck: Normal range of motion.   Cardiovascular: Normal rate.    Pulmonary/Chest: Effort normal.   Musculoskeletal:   Modest peripheral edema bilaterally a little worse on the  left the ankle and foot but not severe   Neurological: She is oriented to person, place, and time. She has a normal Finger-Nose-Finger Test.   Reflex Scores:       Bicep reflexes are 2+ on the right side and 2+ on the left side.       Brachioradialis reflexes are 1+ on the right side and 1+ on the left side.  Psychiatric: She has a normal mood and affect. Her speech is normal and behavior is normal. Judgment and thought content normal.   Nursing note and vitals reviewed.      Assessment/Plan:     There are no diagnoses linked to this encounter.         Review:   D/t osteoporosis will do the following       Begin high dos vit D at 5000 IU/d       Wait a month then check blood level of pbarb + vit D.       Next year- DEXA scan to help determine if Keppra should be addedmilad reduction of pbarb.        Risk of osteoporosis fractures, pretty. All reviewed.

## 2017-11-07 NOTE — PATIENT INSTRUCTIONS
Levetiracetam tablets  What is this medicine?  LEVETIRACETAM (eleazar lau RA se power) is an antiepileptic drug. It is used with other medicines to treat certain types of seizures.  This medicine may be used for other purposes; ask your health care provider or pharmacist if you have questions.  COMMON BRAND NAME(S): Sayra Barrett  What should I tell my health care provider before I take this medicine?  They need to know if you have any of these conditions:  -kidney disease  -suicidal thoughts, plans, or attempt; a previous suicide attempt by you or a family member  -an unusual or allergic reaction to levetiracetam, other medicines, foods, dyes, or preservatives  -pregnant or trying to get pregnant  -breast-feeding  How should I use this medicine?  Take this medicine by mouth with a glass of water. Follow the directions on the prescription label. Swallow the tablets whole. Do not crush or chew this medicine. You may take this medicine with or without food. Take your doses at regular intervals. Do not take your medicine more often than directed. Do not stop taking this medicine or any of your seizure medicines unless instructed by your doctor or health care professional. Stopping your medicine suddenly can increase your seizures or their severity.  A special MedGuide will be given to you by the pharmacist with each prescription and refill. Be sure to read this information carefully each time.  Contact your pediatrician or health care professional regarding the use of this medication in children. While this drug may be prescribed for children as young as 4 years of age for selected conditions, precautions do apply.  Overdosage: If you think you have taken too much of this medicine contact a poison control center or emergency room at once.  NOTE: This medicine is only for you. Do not share this medicine with others.  What if I miss a dose?  If you miss a dose, take it as soon as you can. If it is almost time for your  next dose, take only that dose. Do not take double or extra doses.  What may interact with this medicine?  This medicine may interact with the following medications:  -carbamazepine  -colesevelam  -probenecid  -sevelamer  This list may not describe all possible interactions. Give your health care provider a list of all the medicines, herbs, non-prescription drugs, or dietary supplements you use. Also tell them if you smoke, drink alcohol, or use illegal drugs. Some items may interact with your medicine.  What should I watch for while using this medicine?  Visit your doctor or health care professional for a regular check on your progress. Wear a medical identification bracelet or chain to say you have epilepsy, and carry a card that lists all your medications.  It is important to take this medicine exactly as instructed by your health care professional. When first starting treatment, your dose may need to be adjusted. It may take weeks or months before your dose is stable. You should contact your doctor or health care professional if your seizures get worse or if you have any new types of seizures.  You may get drowsy or dizzy. Do not drive, use machinery, or do anything that needs mental alertness until you know how this medicine affects you. Do not stand or sit up quickly, especially if you are an older patient. This reduces the risk of dizzy or fainting spells. Alcohol may interfere with the effect of this medicine. Avoid alcoholic drinks.  The use of this medicine may increase the chance of suicidal thoughts or actions. Pay special attention to how you are responding while on this medicine. Any worsening of mood, or thoughts of suicide or dying should be reported to your health care professional right away.  Women who become pregnant while using this medicine may enroll in the [place] Antiepileptic Drug Pregnancy Registry by calling 1-454.959.6986. This registry collects information about the safety of  antiepileptic drug use during pregnancy.  What side effects may I notice from receiving this medicine?  Side effects that you should report to your doctor or health care professional as soon as possible:  -allergic reactions like skin rash, itching or hives, swelling of the face, lips, or tongue  -breathing problems  -dark urine  -general ill feeling or flu-like symptoms  -problems with balance, talking, walking  -unusually weak or tired  -worsening of mood, thoughts or actions of suicide or dying  -yellowing of the eyes or skin  Side effects that usually do not require medical attention (report to your doctor or health care professional if they continue or are bothersome):  -diarrhea  -dizzy, drowsy  -headache  -loss of appetite  This list may not describe all possible side effects. Call your doctor for medical advice about side effects. You may report side effects to FDA at 1-127-FDA-0900.  Where should I keep my medicine?  Keep out of reach of children.  Store at room temperature between 15 and 30 degrees C (59 and 86 degrees F). Throw away any unused medicine after the expiration date.  NOTE: This sheet is a summary. It may not cover all possible information. If you have questions about this medicine, talk to your doctor, pharmacist, or health care provider.     © 2017, [employer]. (2017-01-19 09:43:54)

## 2017-11-16 ENCOUNTER — TRANSCRIBE ORDERS (OUTPATIENT)
Dept: ADMINISTRATIVE | Facility: HOSPITAL | Age: 72
End: 2017-11-16

## 2017-11-16 DIAGNOSIS — M25.452 HIP JOINT EFFUSION, LEFT: Primary | ICD-10-CM

## 2017-11-16 DIAGNOSIS — T56.91XA METALLOSIS, ACCIDENTAL OR UNINTENTIONAL, INITIAL ENCOUNTER: ICD-10-CM

## 2017-11-20 ENCOUNTER — TRANSCRIBE ORDERS (OUTPATIENT)
Dept: ADMINISTRATIVE | Facility: HOSPITAL | Age: 72
End: 2017-11-20

## 2017-11-20 ENCOUNTER — HOSPITAL ENCOUNTER (OUTPATIENT)
Dept: CT IMAGING | Facility: HOSPITAL | Age: 72
Discharge: HOME | End: 2017-11-20
Attending: ORTHOPAEDIC SURGERY | Admitting: ORTHOPAEDIC SURGERY

## 2017-11-20 ENCOUNTER — LAB (OUTPATIENT)
Dept: LAB | Facility: HOSPITAL | Age: 72
End: 2017-11-20
Attending: ORTHOPAEDIC SURGERY

## 2017-11-20 DIAGNOSIS — S40.851A: ICD-10-CM

## 2017-11-20 DIAGNOSIS — M25.452 HIP JOINT EFFUSION, LEFT: ICD-10-CM

## 2017-11-20 DIAGNOSIS — T56.91XA METALLOSIS, ACCIDENTAL OR UNINTENTIONAL, INITIAL ENCOUNTER: ICD-10-CM

## 2017-11-20 DIAGNOSIS — S40.851A: Primary | ICD-10-CM

## 2017-11-20 PROCEDURE — 82495 ASSAY OF CHROMIUM: CPT

## 2017-11-20 PROCEDURE — 82175 ASSAY OF ARSENIC: CPT

## 2017-11-20 PROCEDURE — 73700 CT LOWER EXTREMITY W/O DYE: CPT

## 2017-11-20 PROCEDURE — 83825 ASSAY OF MERCURY: CPT

## 2017-11-20 PROCEDURE — 83018 HEAVY METAL QUAN EACH NES: CPT

## 2017-11-20 PROCEDURE — 83655 ASSAY OF LEAD: CPT

## 2017-11-20 PROCEDURE — 36415 COLL VENOUS BLD VENIPUNCTURE: CPT

## 2017-11-21 LAB — COBALT SERPL-MCNC: 7.3 UG/L (ref 0–0.9)

## 2017-11-22 LAB
ARSENIC BLD-MCNC: 9 UG/L (ref 2–23)
CR SERPL-MCNC: 4.3 UG/L (ref 0.1–2.1)
LEAD BLD-MCNC: 1 UG/DL (ref 0–19)
MERCURY BLD-MCNC: NORMAL UG/L (ref 0–14.9)

## 2017-12-06 ENCOUNTER — APPOINTMENT (OUTPATIENT)
Dept: GENERAL RADIOLOGY | Facility: HOSPITAL | Age: 72
End: 2017-12-06

## 2017-12-06 ENCOUNTER — HOSPITAL ENCOUNTER (EMERGENCY)
Facility: HOSPITAL | Age: 72
Discharge: HOME | End: 2017-12-06
Attending: EMERGENCY MEDICINE | Admitting: EMERGENCY MEDICINE

## 2017-12-06 VITALS
OXYGEN SATURATION: 99 % | BODY MASS INDEX: 27.63 KG/M2 | TEMPERATURE: 98.9 F | SYSTOLIC BLOOD PRESSURE: 142 MMHG | HEIGHT: 70 IN | WEIGHT: 193 LBS | DIASTOLIC BLOOD PRESSURE: 66 MMHG | HEART RATE: 95 BPM | RESPIRATION RATE: 18 BRPM

## 2017-12-06 DIAGNOSIS — S61.412A LACERATION OF LEFT PALM WITHOUT COMPLICATION, INITIAL ENCOUNTER: Primary | ICD-10-CM
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
## 2017-12-06 PROCEDURE — 25010000002 TDAP 5-2.5-18.5 LF-MCG/0.5 SUSPENSION: Performed by: NURSE PRACTITIONER

## 2017-12-06 PROCEDURE — 90471 IMMUNIZATION ADMIN: CPT | Performed by: NURSE PRACTITIONER

## 2017-12-06 PROCEDURE — 99283 EMERGENCY DEPT VISIT LOW MDM: CPT

## 2017-12-06 PROCEDURE — 73110 X-RAY EXAM OF WRIST: CPT

## 2017-12-06 PROCEDURE — 90715 TDAP VACCINE 7 YRS/> IM: CPT | Performed by: NURSE PRACTITIONER

## 2017-12-06 RX ORDER — LIDOCAINE HYDROCHLORIDE AND EPINEPHRINE 10; 10 MG/ML; UG/ML
10 INJECTION, SOLUTION INFILTRATION; PERINEURAL ONCE
Status: COMPLETED | OUTPATIENT
Start: 2017-12-06 | End: 2017-12-06

## 2017-12-06 RX ADMIN — TETANUS TOXOID, REDUCED DIPHTHERIA TOXOID AND ACELLULAR PERTUSSIS VACCINE, ADSORBED 0.5 ML: 5; 2.5; 8; 8; 2.5 SUSPENSION INTRAMUSCULAR at 19:04

## 2017-12-06 RX ADMIN — LIDOCAINE HYDROCHLORIDE AND EPINEPHRINE 10 ML: 10; 10 INJECTION, SOLUTION INFILTRATION; PERINEURAL at 18:29

## 2017-12-06 NOTE — ED NOTES
"Pt reports fall with a bottle of wine in her hand. When pt fell, bottle of wine burst on the floor and pt fell into the glass. Pt has a .5 cm laceration to her hand. When unwrapped, bleeding is noted to be controlled. Pt denies any pain except the \"surface pain\" from her laceration. Pt also smells of wine, but reports she never drinks alcohol- pt is noted to have wet clothing.     Laura Zaidi RN  12/06/17 6897    "

## 2017-12-06 NOTE — ED PROVIDER NOTES
EMERGENCY DEPARTMENT ENCOUNTER    Room Number:  35/35  Date seen:  12/6/2017  Time seen: 5:39 PM  PCP: Josh Luther MD    HPI:  Chief complaint: laceration  Context:Mariela Ruiz is a 72 y.o. female who presents to the ED with c/o a laceration to the palmar surface of her L hand sustained when she fell while holding a wine bottle earlier today. Pt states there was a large amount of bleeding when the injury initially occurred and she came to the ED to make sure there was no glass left in the wound. Pt denies any other injuries from the fall and has no other complaints at this time. Pt states her tetanus is not up to date.       Timing: constant  Location: palmar aspect of L hand  Intensity/Severity: mild  Associated Symptoms: none  Previous Episodes: none  Treatment before arrival: pressure dressing    MEDICAL RECORD REVIEW    ALLERGIES  Clindamycin/lincomycin; Sulfa antibiotics; Duricef [cefadroxil]; and Keflex [cephalexin]    PAST MEDICAL HISTORY  Active Ambulatory Problems     Diagnosis Date Noted   • Seizure disorder    • Hyperlipidemia    • Vitamin D insufficiency    • Osteoporosis    • Sleep apnea    • Chronic venous insufficiency    • Postsurgical hypothyroidism    • Chronic fatigue syndrome 11/07/2016   • Gastroesophageal reflux disease 11/07/2016   • Dupuytren's contracture 11/07/2016   • History of biliary T-tube placement 11/07/2016   • History of partial thyroidectomy 10/04/2012   • Mitral valve prolapse 11/07/2016   • Multinodular goiter 11/07/2016   • Right fascicular block 11/07/2016   • Restless legs syndrome 11/07/2016   • History of cardiac catheterization 11/07/2016   • Urge incontinence of urine 11/07/2016   • Left knee pain 11/15/2016   • Ligamentous laxity of knee 12/02/2016   • DJD (degenerative joint disease) of knee 12/24/2016   • Pruritic rash 10/18/2017   • Adverse drug effect, subsequent encounter 11/07/2017     Resolved Ambulatory Problems     Diagnosis Date Noted   • No Resolved  Ambulatory Problems     Past Medical History:   Diagnosis Date   • Chronic venous insufficiency    • Dupuytren's contracture    • History of staph infection    • History of transfusion    • Hyperlipidemia    • Nontoxic multinodular goiter    • Osteoporosis    • Postsurgical hypothyroidism    • Restless leg syndrome    • Seizure disorder    • Sleep apnea    • Vitamin D insufficiency        PAST SURGICAL HISTORY  Past Surgical History:   Procedure Laterality Date   • APPENDECTOMY     • CARDIAC CATHETERIZATION      NORMAL    • KNEE MINI REVISION Left 11/15/2016    Procedure: LEFT KNEE POLY CHANGE WITH HI POST STABILIZER;  Surgeon: Pablito Claudio MD;  Location: [facility] OR;  Service:    • KNEE MINI REVISION Left 12/13/2016    Procedure: LT KNEE REMOVAL/REPLACEMENT LOCKING PIN ;  Surgeon: Pablito Claudio MD;  Location: [facility] OR;  Service:    • MAMMO FINE NEEDLE ASPIRATION UNILATERAL      RIGHT THYROID NODULE, 2009 BENIGN    • ND REVISE KNEE JOINT REPLACE,1 PART Left 2/20/2017    Procedure: LT KNEE REMOVAL ANTIBIOTIC SPACER AND KNEE REVISION;  Surgeon: Christiano Cesar MD;  Location: [facility] OR;  Service: Orthopedics   • ND TOTAL KNEE ARTHROPLASTY Left 12/24/2016    Procedure: LEFT KNEE WASHOUT WITH POLY CHANGE;  Surgeon: Christiano Cesar MD;  Location: [facility] OR;  Service: Orthopedics   • REPLACEMENT TOTAL KNEE Left    • THYROIDECTOMY, PARTIAL      1979 LEFT LOBECTOMY AND ISTHMECTOMY    • TOTAL ABDOMINAL HYSTERECTOMY WITH SALPINGO OOPHORECTOMY     • TOTAL HIP ARTHROPLASTY Left    • TOTAL KNEE  PROSTHESIS REMOVAL W/ SPACER INSERTION Left 12/29/2016    Procedure: LT KNEE IMPLANT REMOVAL WITH INSERTION OF SPACER ;  Surgeon: Christiano Cesar MD;  Location: [facility] OR;  Service:        FAMILY HISTORY  Family History   Problem Relation Age of Onset   • Heart disease Father    • Diabetes Sister    • Hypertension Sister    • Hyperlipidemia Sister    • Obesity Sister        SOCIAL HISTORY  Social History      Social History   • Marital status:      Spouse name: N/A   • Number of children: N/A   • Years of education: N/A     Occupational History   • Not on file.     Social History Main Topics   • Smoking status: Never Smoker   • Smokeless tobacco: Never Used   • Alcohol use No   • Drug use: No   • Sexual activity: Defer     Other Topics Concern   • Not on file     Social History Narrative       REVIEW OF SYSTEMS  Review of Systems   Constitutional: Negative for activity change, appetite change, diaphoresis and fever.   HENT: Negative for trouble swallowing.    Eyes: Negative for visual disturbance.   Respiratory: Negative for cough, chest tightness, shortness of breath and wheezing.    Cardiovascular: Negative for chest pain, palpitations and leg swelling.   Gastrointestinal: Negative for abdominal pain, diarrhea, nausea and vomiting.   Genitourinary: Negative for dysuria.   Musculoskeletal: Negative for back pain.   Skin: Positive for wound (laceration to palmar aspect of L hand). Negative for rash.   Neurological: Negative for dizziness, speech difficulty and light-headedness.       PHYSICAL EXAM  ED Triage Vitals   Temp Heart Rate Resp BP SpO2   12/06/17 1415 12/06/17 1415 12/06/17 1415 12/06/17 1435 12/06/17 1415   98.9 °F (37.2 °C) 100 18 149/91 99 %      Temp src Heart Rate Source Patient Position BP Location FiO2 (%)   12/06/17 1415 -- 12/06/17 1435 12/06/17 1435 --   Tympanic  Sitting Right arm      Physical Exam   Constitutional: She is oriented to person, place, and time and well-developed, well-nourished, and in no distress. No distress.   HENT:   Head: Normocephalic and atraumatic.   Mouth/Throat: Uvula is midline and mucous membranes are normal.   Neck: Normal range of motion. Neck supple.   Cardiovascular: S1 normal, S2 normal and normal heart sounds.  Exam reveals no gallop and no friction rub.    No murmur heard.  Pulmonary/Chest: Effort normal and breath sounds normal. She has no decreased  breath sounds. She has no wheezes. She has no rhonchi. She has no rales.   Abdominal: Soft. Normal appearance. There is no rebound and no guarding.   Musculoskeletal: Normal range of motion.   Neurological: She is alert and oriented to person, place, and time.   Skin: Skin is warm, dry and intact.   Linear superficial laceration to the palmar aspect of the L  to ulna, without active bleeding   Psychiatric: Affect and judgment normal.   Nursing note and vitals reviewed.      RADIOLOGY  XR Wrist 3+ View Left   Preliminary Result   1. No fractures or foreign bodies.   2. Degenerative arthritis of the 1st carpal metacarpal joint.              I ordered the above noted radiological studies and reviewed the images on the PACS system.      MEDICATIONS GIVEN IN ER  Medications   lidocaine-EPINEPHrine (XYLOCAINE W/EPI) 1 %-1:752850 injection 10 mL (10 mL Injection Given by Other 12/6/17 1829)   Tdap (BOOSTRIX) injection 0.5 mL (0.5 mL Intramuscular Given 12/6/17 1904)     PROCEDURES  Laceration Repair  Date/Time: 12/6/2017 6:45 PM  Performed by: TATIANA BAIG  Authorized by: JUSTINO MIR     Consent:     Consent obtained:  Verbal    Consent given by:  Patient    Alternatives discussed:  No treatment  Anesthesia (see MAR for exact dosages):     Anesthesia method:  Local infiltration    Local anesthetic:  Lidocaine 1% WITH epi (1.5 cc)  Laceration details:     Location:  Hand    Hand location:  L palm    Length (cm):  1  Repair type:     Repair type:  Simple  Pre-procedure details:     Preparation:  Imaging obtained to evaluate for foreign bodies  Exploration:     Hemostasis achieved with:  Direct pressure    Wound exploration: entire depth of wound probed and visualized      Wound extent: no foreign bodies/material noted    Treatment:     Wound cleansed with: alcohol prep pad.  Skin repair:     Repair method:  Steri-Strips and tissue adhesive  Approximation:     Approximation:  Close    Vermilion border:  "well-aligned    Post-procedure details:     Dressing:  Sterile dressing    Patient tolerance of procedure:  Tolerated well, no immediate complications          COURSE & MEDICAL DECISION MAKING  Pertinent Labs and Imaging studies that were ordered and reviewed are noted above.  Results were reviewed/discussed with the patient and they were also made aware of online assess.  Pt also made aware that some labs, such as cultures, will not be resulted during ER visit and follow up with PMD is necessary.     PROGRESS AND CONSULTS    Progress Notes:    ED Course     18:52  Laceration repaired without difficulty. Advised Pt to keep laceration clean and dry and avoid standing water. Advised Pt to hold pressure for 5 minutes if bleeding resumes. Pt will be discharged. All questions and concerns addressed at this time.     19:07  Reviewed pt's history and workup with Dr. Bullock.  After a bedside evaluation; Dr Bullock agrees with the plan of care    The patient's history, physical exam, and lab findings were discussed with the physician, who also performed a face to face history and physical exam.  I discussed all results and noted any abnormalities with patient.  Discussed absoute need to recheck abnormalities with their family physician.  I answered any of the patient's questions.  Discussed plan for discharge, as there is no emergent indication for admission.  Pt is agreeable and understands need for follow up and repeat testing.  Pt is aware that discharge does not mean that nothing is wrong but it indicates no emergency is present and they must continue care with their family physician.  Pt is discharged with instructions to follow up with primary care doctor to have their blood pressure rechecked.       Disposition vitals:  /66 (BP Location: Left arm, Patient Position: Sitting)  Pulse 95  Temp 98.9 °F (37.2 °C) (Tympanic)   Resp 18  Ht 177.8 cm (70\")  Wt 87.5 kg (193 lb)  SpO2 99%  BMI 27.69 " kg/m2      DIAGNOSIS  Final diagnoses:   Laceration of left palm without complication, initial encounter   Fall, initial encounter       FOLLOW UP   Josh Luther MD  [address]  [address]  [place] [id number]  [phone]    Schedule an appointment as soon as possible for a visit in 3 days        RX     Medication List      Changed          furosemide 20 MG tablet   Commonly known as:  LASIX   One tablet once a day as needed for leg edema   What changed:    - when to take this  - reasons to take this  - additional instructions             Documentation assistance provided by francisco javier Wen for [facility] Wagner.  Information recorded by the francisco javier was done at my direction and has been verified and validated by me.  Electronically signed by Kay Wen on 12/6/2017 at time 7:10 PM             Kay Wen  12/06/17 1936       REMY Morales  12/06/17 1945

## 2017-12-06 NOTE — ED NOTES
Pt states that around 1300 she tripped over a rug and a wine bottle broke in her hand and cut her upper left wrist/lower hand.   Patient states that she called EMS and they came and wrapped her hand for her.           Alisha Elizondo RN  12/06/17 0322

## 2017-12-07 NOTE — DISCHARGE INSTRUCTIONS
If bleeding recurs, apply direct pressure to area of superficial laceration for 5 minutes without letting up.    If Steri strips will not stay on, can apply very thin film of over the counter antibiotic ointment and bandaid    Return to ER for redness, swelling, red streaks or foul drainage coming from wound    Do not get wet for 24 hours    Return Precautions    Although you are being discharged from the ED today, I encourage you to return for worsening symptoms.  Things can, and do, change such that treatment at home with medication may not be adequate.      Specifically, return for any of the following:    Chest pain, shortness of breath, pain or nausea and vomiting not controlled by medications provided.    Please make a follow up with your Primary Care Provider for a blood pressure recheck.

## 2017-12-07 NOTE — ED PROVIDER NOTES
Patient presents to the ED with a laceration on her left palm sustained after she fell and landed on a broken wine bottle earlier tonight. On exam, there is a 1cm laceration on the left hand that is properly repaired and dressed; left hand is NV intact.     XR shows no foreign bodies. Patient is stable for d/c.   I supervised care provided by the midlevel provider.    We have discussed this patient's history, physical exam, and treatment plan.   I have reviewed the note and personally saw and examined the patient and agree with the plan of care.    Documentation assistance provided by francisco javier Marcial for .  Information recorded by the francisco javier was done at my direction and has been verified and validated by me.         Angelique Marcial  12/06/17 1937       Ralph Bullock MD  12/06/17 2027

## 2017-12-08 RX ORDER — PRAVASTATIN SODIUM 40 MG
TABLET ORAL
Qty: 90 TABLET | Refills: 0 | Status: SHIPPED | OUTPATIENT
Start: 2017-12-08 | End: 2018-01-02 | Stop reason: SDUPTHER

## 2017-12-28 RX ORDER — PHENOBARBITAL 64.8 MG/1
TABLET ORAL
Qty: 270 TABLET | Refills: 3 | Status: SHIPPED | OUTPATIENT
Start: 2017-12-28 | End: 2018-06-21 | Stop reason: SDUPTHER

## 2017-12-31 DIAGNOSIS — G40.909 SEIZURE DISORDER (HCC): ICD-10-CM

## 2018-01-02 ENCOUNTER — OFFICE VISIT (OUTPATIENT)
Dept: ENDOCRINOLOGY | Age: 73
End: 2018-01-02

## 2018-01-02 VITALS
SYSTOLIC BLOOD PRESSURE: 142 MMHG | HEART RATE: 84 BPM | OXYGEN SATURATION: 98 % | HEIGHT: 70 IN | DIASTOLIC BLOOD PRESSURE: 80 MMHG

## 2018-01-02 DIAGNOSIS — Z96.659 HISTORY OF KNEE REPLACEMENT, UNSPECIFIED LATERALITY: ICD-10-CM

## 2018-01-02 DIAGNOSIS — M81.0 OSTEOPOROSIS, UNSPECIFIED OSTEOPOROSIS TYPE, UNSPECIFIED PATHOLOGICAL FRACTURE PRESENCE: ICD-10-CM

## 2018-01-02 DIAGNOSIS — E89.0 POSTSURGICAL HYPOTHYROIDISM: Primary | ICD-10-CM

## 2018-01-02 DIAGNOSIS — E55.9 VITAMIN D INSUFFICIENCY: ICD-10-CM

## 2018-01-02 DIAGNOSIS — E04.2 MULTINODULAR GOITER: ICD-10-CM

## 2018-01-02 DIAGNOSIS — E89.0 HISTORY OF PARTIAL THYROIDECTOMY: ICD-10-CM

## 2018-01-02 DIAGNOSIS — E78.5 HYPERLIPIDEMIA, UNSPECIFIED HYPERLIPIDEMIA TYPE: ICD-10-CM

## 2018-01-02 PROBLEM — L28.2 PRURITIC RASH: Status: RESOLVED | Noted: 2017-10-18 | Resolved: 2018-01-02

## 2018-01-02 PROCEDURE — 99214 OFFICE O/P EST MOD 30 MIN: CPT | Performed by: INTERNAL MEDICINE

## 2018-01-02 RX ORDER — RISEDRONATE SODIUM 35 MG/1
35 TABLET, DELAYED RELEASE ORAL WEEKLY
Qty: 12 TABLET | Refills: 3 | Status: SHIPPED | OUTPATIENT
Start: 2018-01-02 | End: 2018-01-04 | Stop reason: SDUPTHER

## 2018-01-02 NOTE — PROGRESS NOTES
Subjective   Mariela Ruiz is a 72 y.o. female.     HPI Comments: F/u for hypothyroidism,hyperlipidemia ,sleep apnea, vitamin d def     Hypothyroidism   Associated symptoms include joint swelling ( legs ).   Hyperlipidemia   Exacerbating diseases include hypothyroidism.   Sleep Apnea   Associated symptoms include joint swelling ( legs ).         She has hypothyroidism and has been taking Synthroid 50 µg per day.   She had a previous left thyroid lobectomy in the 1970s for benign lesion She had a fine needle biopsy right thyroid nodule done in by Dr. Brandt in August 2017 which was read as follicular lesion of undetermined significance.  She had ultrasound-guided needle biopsy done at [place] on November 16, 2017 and was told that the biopsy is benign.    She has no previous history of head or neck radiation therapy.     She has hyperlipidemia and has been on pravastatin 40 mg once a day.  She denies leg cramps.       She has osteoporosis on bone density done in her gynecologist in November 2015.   she had a follow-up bone density done at [place] in November 2017 which showed osteoporosis with a 9.7% decrease on the right hip.  T score of the right hip was -2.8.  She was never on biphosphonate.  She has vitamin D insufficiency and is on vitamin D 10,000 units twice a week.  She denies heartburn.     She had a previous left hip replacement and the joint is worn.  Serum cobalt and chromium are elevated.  Dr. Claudio has retired and she has not seen a new orthopedic surgeon..    She has sleep apnea but is unable to tolerate a CPAP.      The following portions of the patient's history were reviewed and updated as appropriate: allergies, current medications, past family history, past medical history, past social history, past surgical history and problem list.    Review of Systems   Constitutional: Negative.    HENT: Negative.    Eyes: Negative.    Respiratory: Negative.    Cardiovascular: Positive for leg swelling.  "  Gastrointestinal: Negative.    Endocrine: Negative.    Genitourinary: Positive for frequency and urgency.   Musculoskeletal: Positive for joint swelling ( legs ).   Skin: Negative.    Allergic/Immunologic: Negative.    Neurological: Negative.    Hematological: Negative.    Psychiatric/Behavioral: Negative.        Objective      Vitals:    01/02/18 1021   BP: 142/80   BP Location: Right arm   Patient Position: Sitting   Cuff Size: Large Adult   Pulse: 84   SpO2: 98%   Height: 177.8 cm (70\")     Physical Exam   Constitutional: She is oriented to person, place, and time. She appears well-developed and well-nourished. No distress.   HENT:   Head: Normocephalic.   Nose: Nose normal.   Mouth/Throat: No oropharyngeal exudate.   Eyes: Conjunctivae and EOM are normal. Right eye exhibits no discharge. Left eye exhibits no discharge. No scleral icterus.   Neck: Neck supple. No JVD present. No tracheal deviation present. No thyromegaly present.   Cardiovascular: Normal rate, regular rhythm, normal heart sounds and intact distal pulses.  Exam reveals no gallop and no friction rub.    No murmur heard.  Pulmonary/Chest: Effort normal and breath sounds normal. No respiratory distress. She has no wheezes. She has no rales.   Abdominal: Soft. Bowel sounds are normal. She exhibits no distension and no mass. There is no tenderness.   Musculoskeletal: Normal range of motion. She exhibits edema (chronic left leg edema.  no calf tenderness). She exhibits no tenderness.   Lymphadenopathy:     She has no cervical adenopathy.   Neurological: She is alert and oriented to person, place, and time. She displays normal reflexes.   Skin: Skin is warm and dry. No erythema.   Psychiatric: She has a normal mood and affect. Her behavior is normal.     Lab on 11/20/2017   Component Date Value Ref Range Status   • Cobalt 11/20/2017 7.3* 0.0 - 0.9 ug/L Final                           Occupational Exposure: DENIA 1.0                                  " Detection Limit = 1.0   • Chromium, Plasma 11/20/2017 4.3* 0.1 - 2.1 ug/L Final                                    Detection Limit = 0.1   • Lead 11/20/2017 1  0 - 19 ug/dL Final                              Environmental Exposure:                             WHO Recommendation    <20                            Occupational Exposure:                             OSHA Lead Std          40                             DENIA                    30                                  Detection Limit =  1   • Arsenic 11/20/2017 9  2 - 23 ug/L Final                                    Detection Limit = 1   • Mercury 11/20/2017 None Detected  0.0 - 14.9 ug/L Final                            Environmental Exposure:  <15.0                          Occupational Exposure:                           DENIA - Inorganic Mercury: 15.0                                  Detection Limit =  1.0     Assessment/Plan   Mariela was seen today for hypothyroidism, hyperlipidemia, sleep apnea and vitamin d deficiency.    Diagnoses and all orders for this visit:    Postsurgical hypothyroidism  -     TSH  -     T4, Free    Multinodular goiter    Hyperlipidemia, unspecified hyperlipidemia type  -     Comprehensive Metabolic Panel  -     Lipid Panel    History of partial thyroidectomy    Osteoporosis, unspecified osteoporosis type, unspecified pathological fracture presence  -     Comprehensive Metabolic Panel  -     Vitamin D 25 Hydroxy  -     Risedronate Sodium 35 MG tablet delayed-release; Take 35 mg by mouth 1 (One) Time Per Week.    Vitamin D insufficiency  -     Comprehensive Metabolic Panel  -     Vitamin D 25 Hydroxy    History of knee replacement, unspecified laterality  -     Ambulatory Referral to Physical Therapy Evaluate and treat      Continue Synthroid 50 µg per day.  Check thyroid function tests.  Try to obtain thyroid biopsy results from [facility].  Continue pravastatin 40 mg once a day.  Check lipid profile.  Continue vitamin D 10,000 units twice  a week.  Check vitamin D levels.  Risedronate 35 mg once a week.  Side effects and precautions discussed.  Printed information on osteoporosis given to patient.  Patient advised to see a new orthopedic surgeon for evaluation of elevated chromium and cobalt due to prosthetic hardware.  Appointment for physical therapy.    Send copy of my notes and labs to Dr. Brandt.    RTC 4 mos.

## 2018-01-03 DIAGNOSIS — G40.909 SEIZURE DISORDER (HCC): ICD-10-CM

## 2018-01-03 LAB
25(OH)D3+25(OH)D2 SERPL-MCNC: 35.9 NG/ML (ref 30–100)
ALBUMIN SERPL-MCNC: 4.5 G/DL (ref 3.5–5.2)
ALBUMIN/GLOB SERPL: 1.5 G/DL
ALP SERPL-CCNC: 154 U/L (ref 39–117)
ALT SERPL-CCNC: 20 U/L (ref 1–33)
AST SERPL-CCNC: 23 U/L (ref 1–32)
BILIRUB SERPL-MCNC: 0.2 MG/DL (ref 0.1–1.2)
BUN SERPL-MCNC: 13 MG/DL (ref 8–23)
BUN/CREAT SERPL: 15.1 (ref 7–25)
CALCIUM SERPL-MCNC: 9.9 MG/DL (ref 8.6–10.5)
CHLORIDE SERPL-SCNC: 98 MMOL/L (ref 98–107)
CHOLEST SERPL-MCNC: 181 MG/DL (ref 0–200)
CO2 SERPL-SCNC: 29.1 MMOL/L (ref 22–29)
CREAT SERPL-MCNC: 0.86 MG/DL (ref 0.57–1)
GLOBULIN SER CALC-MCNC: 3.1 GM/DL
GLUCOSE SERPL-MCNC: 91 MG/DL (ref 65–99)
HDLC SERPL-MCNC: 95 MG/DL (ref 40–60)
INTERPRETATION: NORMAL
LDLC SERPL CALC-MCNC: 72 MG/DL (ref 0–100)
POTASSIUM SERPL-SCNC: 4.1 MMOL/L (ref 3.5–5.2)
PROT SERPL-MCNC: 7.6 G/DL (ref 6–8.5)
SODIUM SERPL-SCNC: 139 MMOL/L (ref 136–145)
T4 FREE SERPL-MCNC: 1.12 NG/DL (ref 0.93–1.7)
TRIGL SERPL-MCNC: 70 MG/DL (ref 0–150)
TSH SERPL DL<=0.005 MIU/L-ACNC: 0.76 MIU/ML (ref 0.27–4.2)
VLDLC SERPL CALC-MCNC: 14 MG/DL (ref 5–40)

## 2018-01-03 RX ORDER — PRAMIPEXOLE DIHYDROCHLORIDE 0.5 MG/1
TABLET ORAL
Qty: 270 TABLET | Refills: 3 | Status: SHIPPED | OUTPATIENT
Start: 2018-01-03 | End: 2018-04-05 | Stop reason: SDUPTHER

## 2018-01-04 DIAGNOSIS — M81.0 OSTEOPOROSIS, UNSPECIFIED OSTEOPOROSIS TYPE, UNSPECIFIED PATHOLOGICAL FRACTURE PRESENCE: ICD-10-CM

## 2018-01-04 RX ORDER — PRAMIPEXOLE DIHYDROCHLORIDE 0.5 MG/1
TABLET ORAL
Qty: 270 TABLET | Refills: 0 | Status: SHIPPED | OUTPATIENT
Start: 2018-01-04 | End: 2018-10-09 | Stop reason: SDUPTHER

## 2018-01-04 RX ORDER — RISEDRONATE SODIUM 35 MG/1
35 TABLET, DELAYED RELEASE ORAL WEEKLY
Qty: 12 TABLET | Refills: 3 | Status: SHIPPED | OUTPATIENT
Start: 2018-01-04 | End: 2018-04-05

## 2018-01-25 ENCOUNTER — HOSPITAL ENCOUNTER (OUTPATIENT)
Dept: PHYSICAL THERAPY | Facility: HOSPITAL | Age: 73
Setting detail: THERAPIES SERIES
Discharge: HOME | End: 2018-01-25
Attending: INTERNAL MEDICINE

## 2018-01-25 DIAGNOSIS — M25.562 LEFT KNEE PAIN, UNSPECIFIED CHRONICITY: Primary | ICD-10-CM

## 2018-01-25 PROCEDURE — 97161 PT EVAL LOW COMPLEX 20 MIN: CPT | Performed by: PHYSICAL THERAPIST

## 2018-01-25 PROCEDURE — G8979 MOBILITY GOAL STATUS: HCPCS | Performed by: PHYSICAL THERAPIST

## 2018-01-25 PROCEDURE — 97110 THERAPEUTIC EXERCISES: CPT | Performed by: PHYSICAL THERAPIST

## 2018-01-25 PROCEDURE — G8978 MOBILITY CURRENT STATUS: HCPCS | Performed by: PHYSICAL THERAPIST

## 2018-01-25 NOTE — THERAPY EVALUATION
Outpatient Physical Therapy Ortho Initial Evaluation  [facility]     Patient Name: Mariela Ruiz  : 1945  MRN: [id number]  Today's Date: 2018      Visit Date: 2018    Patient Active Problem List   Diagnosis   • Seizure disorder   • Hyperlipidemia   • Vitamin D insufficiency   • Osteoporosis   • Sleep apnea   • Chronic venous insufficiency   • Postsurgical hypothyroidism   • Chronic fatigue syndrome   • Gastroesophageal reflux disease   • Dupuytren's contracture   • History of biliary T-tube placement   • History of partial thyroidectomy   • Mitral valve prolapse   • Multinodular goiter   • Right fascicular block   • Restless legs syndrome   • History of cardiac catheterization   • Urge incontinence of urine   • Left knee pain   • Ligamentous laxity of knee   • DJD (degenerative joint disease) of knee   • Adverse drug effect, subsequent encounter        Past Medical History:   Diagnosis Date   • Chronic venous insufficiency    • Dupuytren's contracture    • History of staph infection     S/P KNEE DEC 2016   • History of transfusion    • Hyperlipidemia    • Nontoxic multinodular goiter    • Osteoporosis     Dr. Cabrera   • Postsurgical hypothyroidism    • Restless leg syndrome    • Seizure disorder     Dr. Whitman,   LAST SEIZURE   • Sleep apnea     DOES NOT HAVE MACHINE   • Vitamin D insufficiency         Past Surgical History:   Procedure Laterality Date   • APPENDECTOMY     • CARDIAC CATHETERIZATION      NORMAL    • KNEE MINI REVISION Left 11/15/2016    Procedure: LEFT KNEE POLY CHANGE WITH HI POST STABILIZER;  Surgeon: Pablito Claudio MD;  Location: [facility];  Service:    • KNEE MINI REVISION Left 2016    Procedure: LT KNEE REMOVAL/REPLACEMENT LOCKING PIN ;  Surgeon: Pablito Claudio MD;  Location: [facility];  Service:    • MAMMO FINE NEEDLE ASPIRATION UNILATERAL      RIGHT THYROID NODULE, 2009 BENIGN    • NC REVISE KNEE JOINT REPLACE,1 PART Left 2017     Procedure: LT KNEE REMOVAL ANTIBIOTIC SPACER AND KNEE REVISION;  Surgeon: Christiano Cesar MD;  Location: [facility];  Service: Orthopedics   • VA TOTAL KNEE ARTHROPLASTY Left 12/24/2016    Procedure: LEFT KNEE WASHOUT WITH POLY CHANGE;  Surgeon: Christiano Cesar MD;  Location: [facility] OR;  Service: Orthopedics   • REPLACEMENT TOTAL KNEE Left    • THYROIDECTOMY, PARTIAL      1979 LEFT LOBECTOMY AND ISTHMECTOMY    • TOTAL ABDOMINAL HYSTERECTOMY WITH SALPINGO OOPHORECTOMY     • TOTAL HIP ARTHROPLASTY Left    • TOTAL KNEE  PROSTHESIS REMOVAL W/ SPACER INSERTION Left 12/29/2016    Procedure: LT KNEE IMPLANT REMOVAL WITH INSERTION OF SPACER ;  Surgeon: Christiano Cesar MD;  Location: [facility];  Service:        Visit Dx:     ICD-10-CM ICD-9-CM   1. Left knee pain, unspecified chronicity M25.562 719.46             Patient History       01/25/18 1200          History    Chief Complaint Difficulty Walking;Difficulty with daily activities;Joint stiffness;Joint swelling;Muscle tenderness;Muscle weakness;Pain  -LC      Type of Pain Hip pain;Knee pain   LEFT  -      Date Current Problem(s) Began 11/04/17  -      Brief Description of Current Complaint Pt has long history of multiple LLE surgeries.She repots up to 6 surgeries in LLE including hip and knee replacements and revisions. Her MD's notes indicates that she suffered fall on 11/4/17 and reported to MD due to prolonged pain and soreness in L hip and B knees. MD notes indicates L TKA revision in Feb 2017 due to septic knee. She had L MAXIMO in Feb 2006. Pt is unsure when all her falls were, but reports poor L toe clearance with ambulation and LLE weak. She has had 3 falls in past year due to weakness and tripping. She indicates that she doesn't have increased pain just general weakness in LLE. She reports an injury from lymphedema brace on R knee that caused constant feeling of bruising. unsure of when this happened or how long it has been affecting her. In  general patient has gross weakness of bilateral lower extremities and poor dynamic balance. Pt presents today with SC with wide base. She reports she uses due to fear of falling and decreased balance during ambulation.  -LC      Previous treatment for THIS PROBLEM Surgery  -LC      Onset Date- PT 1/25/17  -LC      Surgery Date: 02/15/17  -LC      Patient/Caregiver Goals Relieve pain;Return to prior level of function;Improve mobility;Improve strength;Know what to do to help the symptoms  -LC      Patient's Rating of General Health Fair  -LC      Occupation/sports/leisure activities walking, exercise  -LC      How has patient tried to help current problem? rehab  -LC      Pain     Pain Location Hip;Knee   B knee, L hip  -LC      Pain at Present 3  -LC      Pain at Best 3  -LC      Pain at Worst 6  -LC      Pain Frequency Intermittent  -LC      Pain Description Aching;Sore  -LC      Difficulties with ADL's? walking, standing, climbing stairs all due to weakness and fatigue with BLE use.  -LC      Fall Risk Assessment    Any falls in the past year: Yes  -LC      Number of falls reported in the last 12 months 3  -LC      Factors that contributed to the fall: Tripped;Lost balance  -LC      Does patient have a fear of falling Yes (comment)  -      Services    Prior Rehab/Home Health Experiences Yes  -LC      When was the prior experience with Rehab/Home Health reports multiple over past years  -      Where was the prior experience with Rehab/Home Health home  -      Are you currently receiving Home Health services No  -LC      Daily Activities    Primary Language English  -      Pt Participated in POC and Goals Yes  -LC      Safety    Are you being hurt, hit, or frightened by anyone at home or in your life? No  -LC      Are you being neglected by a caregiver No  -LC        User Key  (r) = Recorded By, (t) = Taken By, (c) = Cosigned By    Initials Name Provider Type    QUINTIN Alexis, PT DPT Physical Therapist                 PT Ortho       01/25/18 1300    Left Knee    Extension/Flexion ROM Details 0 to 120  -LC    Right Knee    Extension/Flexion ROM Details 0 to 115  -LC    Left Hip    Hip Flexion Gross Movement (3-/5) fair minus  -LC    Hip ABduction Gross Movement (3-/5) fair minus  -LC    Hip ADduction Gross Movement (3-/5) fair minus  -LC    Right Hip    Hip Flexion Gross Movement (3/5) fair  -LC    Hip ABduction Gross Movement (3/5) fair  -LC    Hip ADduction Gross Movement (3/5) fair  -LC    Left Knee    Knee Extension Gross Movement (3-/5) fair minus  -LC    Knee Flexion Gross Movement (3-/5) fair minus  -LC    Right Knee    Knee Extension Gross Movement (3/5) fair  -LC    Knee Flexion Gross Movement (3/5) fair  -LC    Balance Skills Training    Standing-Level of Assistance Close supervision  -    Static Standing Balance Support assistive device  -    Standing-Balance Activities Weight Shift A-P;Weight Shift R-L  -    Standing Balance # of Minutes 2  -    Gait Balance-Level of Assistance Close supervision  -    Gait Balance Support assistive device  -      User Key  (r) = Recorded By, (t) = Taken By, (c) = Cosigned By    Initials Name Provider Type    QUINTIN Alexis, PT DPT Physical Therapist                      Therapy Education  Given: HEP, Symptoms/condition management, Pain management  Program: New  How Provided: Verbal, Demonstration, Written  Provided to: Patient  Level of Understanding: Teach back education performed, Verbalized, Demonstrated           PT OP Goals       01/25/18 1300       PT Short Term Goals    STG 1 Pt will be independent with HEP to strengthen BLE and improve dynamice standing and walking balance to reduce risk of falls during ADL's.  -     STG 1 Progress New  -     STG 2 Pt will demonstrate BLE MMT grossly 4/5  -     STG 2 Progress New  -     STG 3 Pt will perform tandem or SLS for 5 seconds with no UE support  -     STG 3 Progress New  -     Long Term Goals     "LTG 1 Pt will ambulate with no A.D.  -     LTG 1 Progress New  -     LTG 2 Pt will be able to clear 2\" object on ground with reciprocal gait.  -     LTG 2 Progress New  -LC     LTG 3 Pt will report 10% improvement on KOS.  -LC     LTG 3 Progress New  -LC     Time Calculation    PT Goal Re-Cert Due Date 02/26/18  -       User Key  (r) = Recorded By, (t) = Taken By, (c) = Cosigned By    Initials Name Provider Type    QUINTIN Alexis, PT DPT Physical Therapist                PT Assessment/Plan       01/25/18 1320       PT Assessment    Functional Limitations Impaired gait;Limitations in functional capacity and performance;Performance in leisure activities;Limitations in community activities  -     Impairments Balance;Gait;Muscle strength;Range of motion;Pain  -     Assessment Comments Pt is a 72 y.o. female who presents to PT with B knee and L hip weakness and intermittent pain. Pt has long history of multiple LLE surgeries.She repots up to 6 surgeries in LLE including hip and knee replacements and revisions. Her MD's notes indicates that she suffered fall on 11/4/17 and reported to MD due to prolonged pain and soreness in L hip and B knees. MD notes indicates L TKA revision in Feb 2017 due to septic knee. She had L MAXIMO in Feb 2006. Pt is unsure when all her falls were, but reports poor L toe clearance with ambulation and LLE weak. She has had 3 falls in past year due to weakness and tripping. She indicates that she doesn't have increased pain just general weakness in LLE. She reports an injury from lymphedema brace on R knee that caused constant feeling of bruising. unsure of when this happened or how long it has been affecting her. In general patient has gross weakness of bilateral lower extremities and poor dynamic balance. Pt presents today with SC with wide base. She reports she uses due to fear of falling and decreased balance during ambulation. Pt has LLE MMT grossly 3-/5 and RLE MMT grossly 3/5. " She has R knee AROM 0-115 and L knee AROM 0 - 120. She requires close supervision in standing and walking due to decreased L toe clearance during swingthrough phase. Pt was educated on general BLE strengthening therex and issued written copy of HEP. Pt requires skilled intervention to improve dynamic balance and reduce her risk of falls.   -     Please refer to paper survey for additional self-reported information Yes  -LC     Rehab Potential Good  -     Patient/caregiver participated in establishment of treatment plan and goals Yes  -     Patient would benefit from skilled therapy intervention Yes  -LC     PT Plan    PT Frequency 2x/week  -     Predicted Duration of Therapy Intervention (days/wks) 4 weeks  -     Planned CPT's? PT EVAL LOW COMPLEXITY: 71793;PT RE-EVAL: 36868;PT NEUROMUSC RE-EDUCATION EA 15 MIN: 64682;PT MANUAL THERAPY EA 15 MIN: 39636;PT THER ACT EA 15 MIN: 79835;PT THER PROC EA 15 MIN: 18779;PT GAIT TRAINING EA 15 MIN: 23182  -     Physical Therapy Interventions (Optional Details) balance training;home exercise program;patient/family education;modalities;manual therapy techniques;strengthening;stretching;ROM (Range of Motion)  -     PT Plan Comments Pt requires skilled therapy to improve BLE strength, dynamic standing and gait balance, functional acitivty tolerance to reduce risk of falls and allow her to ambulate safely with no A.D.  -       User Key  (r) = Recorded By, (t) = Taken By, (c) = Cosigned By    Initials Name Provider Type    QUINTIN Alexis, PT DPT Physical Therapist                  Exercises       01/25/18 1300          Exercise 1    Exercise Name 1 SLR  -LC      Sets 1 2  -LC      Reps 1 8  -LC      Exercise 2    Exercise Name 2 bridge supine  -LC      Sets 2 2  -LC      Reps 2 8  -LC      Exercise 3    Exercise Name 3 supine hip ADD  -LC      Sets 3 2  -LC      Reps 3 8  -LC      Exercise 4    Exercise Name 4 supine hip ABD  -LC      Sets 4 2  -LC      Reps 4 8   -LC      Exercise 5    Exercise Name 5 seated LAQ  -LC      Sets 5 2  -LC      Reps 5 8  -LC        User Key  (r) = Recorded By, (t) = Taken By, (c) = Cosigned By    Initials Name Provider Type    LC Torrey Alexis, PT DPT Physical Therapist                        Outcome Measure Options: Knee Outcome Score- ADL  Knee Outcome Score  Knee Outcome Score Comments: 40%      Time Calculation:   Start Time: 1245  Stop Time: 1325  Time Calculation (min): 40 min  Total Timed Code Minutes- PT: 40 minute(s)     Therapy Charges for Today     Code Description Service Date Service Provider Modifiers Qty    25059168924 HC PT MOBILITY CURRENT 1/25/2018 Torrey Alexis, PT DPT GP, CL 1    49385931623 HC PT MOBILITY PROJECTED 1/25/2018 Torrey Alexis, PT DPT GP, CK 1    90623473609 HC PT EVAL LOW COMPLEXITY 1 1/25/2018 Torrey Alexis, PT DPT GP 1    00597099460 HC PT THER PROC EA 15 MIN 1/25/2018 Torrey Alexis, PT DPT GP 2          PT G-Codes  PT Professional Judgement Used?: Yes  Outcome Measure Options: Knee Outcome Score- ADL  Score: 40%  Functional Limitation: Mobility: Walking and moving around  Mobility: Walking and Moving Around Current Status (): At least 60 percent but less than 80 percent impaired, limited or restricted  Mobility: Walking and Moving Around Goal Status (): At least 40 percent but less than 60 percent impaired, limited or restricted         Torrey Alexis PT DPT  1/25/2018

## 2018-01-30 ENCOUNTER — HOSPITAL ENCOUNTER (OUTPATIENT)
Dept: PHYSICAL THERAPY | Facility: HOSPITAL | Age: 73
Setting detail: THERAPIES SERIES
Discharge: HOME | End: 2018-01-30

## 2018-01-30 DIAGNOSIS — M25.562 LEFT KNEE PAIN, UNSPECIFIED CHRONICITY: Primary | ICD-10-CM

## 2018-01-30 PROCEDURE — 97140 MANUAL THERAPY 1/> REGIONS: CPT | Performed by: PHYSICAL THERAPIST

## 2018-01-30 PROCEDURE — 97110 THERAPEUTIC EXERCISES: CPT | Performed by: PHYSICAL THERAPIST

## 2018-01-30 NOTE — THERAPY TREATMENT NOTE
Outpatient Physical Therapy Ortho Treatment Note  [facility]     Patient Name: Mariela Ruiz  : 1945  MRN: [id number]  Today's Date: 2018      Visit Date: 2018    Visit Dx:    ICD-10-CM ICD-9-CM   1. Left knee pain, unspecified chronicity M25.562 719.46       Patient Active Problem List   Diagnosis   • Seizure disorder   • Hyperlipidemia   • Vitamin D insufficiency   • Osteoporosis   • Sleep apnea   • Chronic venous insufficiency   • Postsurgical hypothyroidism   • Chronic fatigue syndrome   • Gastroesophageal reflux disease   • Dupuytren's contracture   • History of biliary T-tube placement   • History of partial thyroidectomy   • Mitral valve prolapse   • Multinodular goiter   • Right fascicular block   • Restless legs syndrome   • History of cardiac catheterization   • Urge incontinence of urine   • Left knee pain   • Ligamentous laxity of knee   • DJD (degenerative joint disease) of knee   • Adverse drug effect, subsequent encounter        Past Medical History:   Diagnosis Date   • Chronic venous insufficiency    • Dupuytren's contracture    • History of staph infection     S/P KNEE DEC 2016   • History of transfusion    • Hyperlipidemia    • Nontoxic multinodular goiter    • Osteoporosis     Dr. Cabrera   • Postsurgical hypothyroidism    • Restless leg syndrome    • Seizure disorder     Dr. Whitman, HX  LAST SEIZURE   • Sleep apnea     DOES NOT HAVE MACHINE   • Vitamin D insufficiency         Past Surgical History:   Procedure Laterality Date   • APPENDECTOMY     • CARDIAC CATHETERIZATION      NORMAL    • KNEE MINI REVISION Left 11/15/2016    Procedure: LEFT KNEE POLY CHANGE WITH HI POST STABILIZER;  Surgeon: Pablito Claudio MD;  Location: [facility];  Service:    • KNEE MINI REVISION Left 2016    Procedure: LT KNEE REMOVAL/REPLACEMENT LOCKING PIN ;  Surgeon: Pablito Claudio MD;  Location: [facility];  Service:    • MAMMO FINE NEEDLE ASPIRATION UNILATERAL      RIGHT  THYROID NODULE, 2009 BENIGN    • PA REVISE KNEE JOINT REPLACE,1 PART Left 2/20/2017    Procedure: LT KNEE REMOVAL ANTIBIOTIC SPACER AND KNEE REVISION;  Surgeon: Christiano Cesar MD;  Location: [facility];  Service: Orthopedics   • PA TOTAL KNEE ARTHROPLASTY Left 12/24/2016    Procedure: LEFT KNEE WASHOUT WITH POLY CHANGE;  Surgeon: Christiano Csear MD;  Location: [facility] OR;  Service: Orthopedics   • REPLACEMENT TOTAL KNEE Left    • THYROIDECTOMY, PARTIAL      1979 LEFT LOBECTOMY AND ISTHMECTOMY    • TOTAL ABDOMINAL HYSTERECTOMY WITH SALPINGO OOPHORECTOMY     • TOTAL HIP ARTHROPLASTY Left    • TOTAL KNEE  PROSTHESIS REMOVAL W/ SPACER INSERTION Left 12/29/2016    Procedure: LT KNEE IMPLANT REMOVAL WITH INSERTION OF SPACER ;  Surgeon: Chrsitiano Cesar MD;  Location: [facility];  Service:              PT Ortho       01/30/18 1000    Subjective Comments    Subjective Comments Pt reports that her exercises are going well. She enjoys the stretches. She reports that her knees feel stiff, but attributes this to increased muscle soreness from new exercises. She also indicates that her lymphedema in her LLE is increased as well.  -LC    Left Hip    Hip Flexion Gross Movement (3-/5) fair minus  -LC    Hip ABduction Gross Movement (3/5) fair  -LC    Hip ADduction Gross Movement (3/5) fair  -LC    Right Hip    Hip Flexion Gross Movement (3/5) fair  -LC    Hip ABduction Gross Movement (3/5) fair  -LC    Hip ADduction Gross Movement (3/5) fair  -LC    Left Knee    Knee Extension Gross Movement (3/5) fair  -LC    Knee Flexion Gross Movement (3/5) fair  -LC    Right Knee    Knee Extension Gross Movement (3/5) fair  -LC    Knee Flexion Gross Movement (3/5) fair  -LC      User Key  (r) = Recorded By, (t) = Taken By, (c) = Cosigned By    Initials Name Provider Type    QUINTIN Alexis, PT DPT Physical Therapist                            PT Assessment/Plan       01/30/18 1122       PT Assessment    Functional Limitations  "Impaired gait;Limitations in functional capacity and performance;Performance in leisure activities;Limitations in community activities  -LC     Impairments Balance;Gait;Muscle strength;Range of motion;Pain  -LC     Assessment Comments Pt reports decreased pain in L knee. She has general stiffness and soreness from new exercises. She tolerated all therex well today. Continues to have L hip flex MMT grossly 2+/5 and required assist to complete SLR. Pt will continue to be challenged to increase BLE  strength.  -LC     PT Plan    PT Plan Comments Pt requires continued skilled intervention to include therex, manual, strengthening, stretching, and ROM.  -LC       User Key  (r) = Recorded By, (t) = Taken By, (c) = Cosigned By    Initials Name Provider Type    QUINTIN Alexis, PT DPT Physical Therapist                    Exercises       01/30/18 1000          Subjective Comments    Subjective Comments Pt reports that her exercises are going well. She enjoys the stretches. She reports that her knees feel stiff, but attributes this to increased muscle soreness from new exercises. She also indicates that her lymphedema in her LLE is increased as well.  -LC      Exercise 1    Exercise Name 1 nu step  -LC      Time (Minutes) 1 5  -LC      Exercise 2    Exercise Name 2 step up on foam 6\"  -LC      Sets 2 3  -LC      Reps 2 8  -LC      Exercise 3    Exercise Name 3 standing march 2#s  -LC      Sets 3 3  -LC      Reps 3 10  -LC      Exercise 4    Exercise Name 4 standing hip ABD 2#s  -LC      Sets 4 3  -LC      Reps 4 8  -LC      Exercise 5    Exercise Name 5 seated LAQ 2#s  -LC      Sets 5 3  -LC      Reps 5 8  -LC      Exercise 6    Exercise Name 6 seated HS curl GTB  -LC      Sets 6 3  -LC      Reps 6 8  -LC      Exercise 7    Exercise Name 7 bridge on ball  -LC      Sets 7 3  -LC      Reps 7 8  -LC      Exercise 8    Exercise Name 8 HS curl on ball  -LC      Sets 8 3  -LC      Reps 8 8  -LC      Exercise 9    Exercise Name 9 " hip add supine  -LC      Sets 9 3  -LC      Reps 9 8  -LC      Exercise 10    Exercise Name 10 SLR 1#s  -LC      Sets 10 3  -LC      Reps 10 5  -LC        User Key  (r) = Recorded By, (t) = Taken By, (c) = Cosigned By    Initials Name Provider Type    QUINTIN Alexis, PT DPT Physical Therapist                        Manual Rx (last 36 hours)      Manual Treatments       01/30/18 1100          Manual Rx 1    Manual Rx 1 Location R knee  -LC      Manual Rx 1 Type hamstring, calf stretch, hamstring release  -LC      Manual Rx 1 Duration 15 min  -LC        User Key  (r) = Recorded By, (t) = Taken By, (c) = Cosigned By    Initials Name Provider Type    QUINTIN Alexis, PT DPT Physical Therapist              Therapy Education  Given: HEP, Symptoms/condition management, Pain management  Program: Reinforced  How Provided: Verbal, Demonstration  Provided to: Patient  Level of Understanding: Teach back education performed, Verbalized, Demonstrated              Time Calculation:   Start Time: 1021  Stop Time: 1100  Time Calculation (min): 39 min  Total Timed Code Minutes- PT: 39 minute(s)    Therapy Charges for Today     Code Description Service Date Service Provider Modifiers Qty    84227462198  PT THER PROC EA 15 MIN 1/30/2018 Torrey Alexis PT DPT GP 2    66189626392 HC PT MANUAL THERAPY EA 15 MIN 1/30/2018 Torrey Alexis PT DPT GP 1                    Torrey Alexis PT DPT  1/30/2018

## 2018-02-02 ENCOUNTER — APPOINTMENT (OUTPATIENT)
Dept: PHYSICAL THERAPY | Facility: HOSPITAL | Age: 73
End: 2018-02-02

## 2018-02-06 ENCOUNTER — HOSPITAL ENCOUNTER (OUTPATIENT)
Dept: PHYSICAL THERAPY | Facility: HOSPITAL | Age: 73
Setting detail: THERAPIES SERIES
Discharge: HOME | End: 2018-02-06

## 2018-02-06 DIAGNOSIS — M25.562 LEFT KNEE PAIN, UNSPECIFIED CHRONICITY: Primary | ICD-10-CM

## 2018-02-06 PROCEDURE — 97110 THERAPEUTIC EXERCISES: CPT

## 2018-02-06 NOTE — THERAPY TREATMENT NOTE
Outpatient Physical Therapy Ortho Treatment Note  [facility]     Patient Name: Mariela Ruiz  : 1945  MRN: [id number]  Today's Date: 2018      Visit Date: 2018    Visit Dx:    ICD-10-CM ICD-9-CM   1. Left knee pain, unspecified chronicity M25.562 719.46       Patient Active Problem List   Diagnosis   • Seizure disorder   • Hyperlipidemia   • Vitamin D insufficiency   • Osteoporosis   • Sleep apnea   • Chronic venous insufficiency   • Postsurgical hypothyroidism   • Chronic fatigue syndrome   • Gastroesophageal reflux disease   • Dupuytren's contracture   • History of biliary T-tube placement   • History of partial thyroidectomy   • Mitral valve prolapse   • Multinodular goiter   • Right fascicular block   • Restless legs syndrome   • History of cardiac catheterization   • Urge incontinence of urine   • Left knee pain   • Ligamentous laxity of knee   • DJD (degenerative joint disease) of knee   • Adverse drug effect, subsequent encounter        Past Medical History:   Diagnosis Date   • Chronic venous insufficiency    • Dupuytren's contracture    • History of staph infection     S/P KNEE DEC 2016   • History of transfusion    • Hyperlipidemia    • Nontoxic multinodular goiter    • Osteoporosis     Dr. Cabrera   • Postsurgical hypothyroidism    • Restless leg syndrome    • Seizure disorder     Dr. Whitman, HX  LAST SEIZURE   • Sleep apnea     DOES NOT HAVE MACHINE   • Vitamin D insufficiency         Past Surgical History:   Procedure Laterality Date   • APPENDECTOMY     • CARDIAC CATHETERIZATION      NORMAL    • KNEE MINI REVISION Left 11/15/2016    Procedure: LEFT KNEE POLY CHANGE WITH HI POST STABILIZER;  Surgeon: Pablito Claudio MD;  Location: [facility];  Service:    • KNEE MINI REVISION Left 2016    Procedure: LT KNEE REMOVAL/REPLACEMENT LOCKING PIN ;  Surgeon: Pablito Claudio MD;  Location: [facility];  Service:    • MAMMO FINE NEEDLE ASPIRATION UNILATERAL      RIGHT  "THYROID NODULE, 2009 BENIGN    • VT REVISE KNEE JOINT REPLACE,1 PART Left 2/20/2017    Procedure: LT KNEE REMOVAL ANTIBIOTIC SPACER AND KNEE REVISION;  Surgeon: Christiano Cesar MD;  Location: [facility];  Service: Orthopedics   • VT TOTAL KNEE ARTHROPLASTY Left 12/24/2016    Procedure: LEFT KNEE WASHOUT WITH POLY CHANGE;  Surgeon: Christiano Cesar MD;  Location: [facility] OR;  Service: Orthopedics   • REPLACEMENT TOTAL KNEE Left    • THYROIDECTOMY, PARTIAL      1979 LEFT LOBECTOMY AND ISTHMECTOMY    • TOTAL ABDOMINAL HYSTERECTOMY WITH SALPINGO OOPHORECTOMY     • TOTAL HIP ARTHROPLASTY Left    • TOTAL KNEE  PROSTHESIS REMOVAL W/ SPACER INSERTION Left 12/29/2016    Procedure: LT KNEE IMPLANT REMOVAL WITH INSERTION OF SPACER ;  Surgeon: Christiano Cesar MD;  Location: [facility];  Service:              PT Ortho       02/06/18 1000    Subjective Comments    Subjective Comments Pt speaks of her multiple left knee surgeries and infection with spacer in for period of time.  -SI    Subjective Pain    Able to rate subjective pain? yes  -SI    Pre-Treatment Pain Level 1  -SI    Post-Treatment Pain Level 1  -SI    Subjective Pain Comment no pain more of a soreness  -SI    Gait Assessment/Treatment    Gait, Comment mildly altered gait with or without cane  -SI      User Key  (r) = Recorded By, (t) = Taken By, (c) = Cosigned By    Initials Name Provider Type    YANCI Corona PTA Physical Therapy Assistant                            PT Assessment/Plan       02/06/18 1104       PT Assessment    Assessment Comments ROM is good.  Needs Core and LE strengthening.  Pt also caring for ill  at home.  \"Tired\"  -SI       User Key  (r) = Recorded By, (t) = Taken By, (c) = Cosigned By    Initials Name Provider Type    YANCI Corona PTA Physical Therapy Assistant                    Exercises       02/06/18 1000          Subjective Comments    Subjective Comments Pt speaks of her multiple left knee surgeries and " infection with spacer in for period of time.  -SI      Subjective Pain    Able to rate subjective pain? yes  -SI      Pre-Treatment Pain Level 1  -SI      Post-Treatment Pain Level 1  -SI      Subjective Pain Comment no pain more of a soreness  -SI      Exercise 1    Exercise Name 1 nu step  -SI      Sets 1 2  -SI      Reps 1 8  -SI      Time (Minutes) 1 5  -SI      Exercise 3    Exercise Name 3 standing march 2#s  -SI      Sets 3 3  -SI      Reps 3 10  -SI      Additional Comments 2lbs  -SI      Exercise 4    Exercise Name 4 standing hip ABD 2#s  -SI      Sets 4 3  -SI      Reps 4 8  -SI      Exercise 5    Exercise Name 5 seated LAQ 2#s  -SI      Sets 5 3  -SI      Reps 5 8  -SI      Exercise 6    Exercise Name 6 seated HS curl GTB  -SI      Sets 6 3  -SI      Reps 6 8  -SI      Additional Comments green  -SI      Exercise 7    Exercise Name 7 hip bridge  -SI      Sets 7 1  -SI      Reps 7 10  -SI      Exercise 9    Exercise Name 9 hip add supine  -SI      Sets 9 1  -SI      Reps 9 10  -SI      Additional Comments with hip bridge  -SI      Exercise 10    Exercise Name 10 SLR  -SI      Sets 10 1  -SI      Reps 10 10  -SI        User Key  (r) = Recorded By, (t) = Taken By, (c) = Cosigned By    Initials Name Provider Type    YANCI Corona PTA Physical Therapy Assistant                             Therapy Education  Given: HEP, Symptoms/condition management  Program: Reinforced  How Provided: Verbal, Demonstration, Written  Provided to: Patient  Level of Understanding: Teach back education performed              Time Calculation:   Start Time: 1020  Stop Time: 1104  Time Calculation (min): 44 min    Therapy Charges for Today     Code Description Service Date Service Provider Modifiers Qty    43718780074 HC PT THER PROC EA 15 MIN 2/6/2018 Daniella Corona PTA GP 3                    Daniella Corona PTA  2/6/2018

## 2018-02-08 ENCOUNTER — HOSPITAL ENCOUNTER (OUTPATIENT)
Dept: PHYSICAL THERAPY | Facility: HOSPITAL | Age: 73
Setting detail: THERAPIES SERIES
Discharge: HOME | End: 2018-02-08

## 2018-02-08 DIAGNOSIS — M25.562 LEFT KNEE PAIN, UNSPECIFIED CHRONICITY: Primary | ICD-10-CM

## 2018-02-08 PROCEDURE — 97110 THERAPEUTIC EXERCISES: CPT

## 2018-02-08 NOTE — THERAPY TREATMENT NOTE
Outpatient Physical Therapy Ortho Treatment Note  [facility]     Patient Name: Mariela Ruiz  : 1945  MRN: [id number]  Today's Date: 2018      Visit Date: 2018    Visit Dx:    ICD-10-CM ICD-9-CM   1. Left knee pain, unspecified chronicity M25.562 719.46       Patient Active Problem List   Diagnosis   • Seizure disorder   • Hyperlipidemia   • Vitamin D insufficiency   • Osteoporosis   • Sleep apnea   • Chronic venous insufficiency   • Postsurgical hypothyroidism   • Chronic fatigue syndrome   • Gastroesophageal reflux disease   • Dupuytren's contracture   • History of biliary T-tube placement   • History of partial thyroidectomy   • Mitral valve prolapse   • Multinodular goiter   • Right fascicular block   • Restless legs syndrome   • History of cardiac catheterization   • Urge incontinence of urine   • Left knee pain   • Ligamentous laxity of knee   • DJD (degenerative joint disease) of knee   • Adverse drug effect, subsequent encounter        Past Medical History:   Diagnosis Date   • Chronic venous insufficiency    • Dupuytren's contracture    • History of staph infection     S/P KNEE DEC 2016   • History of transfusion    • Hyperlipidemia    • Nontoxic multinodular goiter    • Osteoporosis     Dr. Cabrera   • Postsurgical hypothyroidism    • Restless leg syndrome    • Seizure disorder     Dr. Whitman, HX  LAST SEIZURE   • Sleep apnea     DOES NOT HAVE MACHINE   • Vitamin D insufficiency         Past Surgical History:   Procedure Laterality Date   • APPENDECTOMY     • CARDIAC CATHETERIZATION      NORMAL    • KNEE MINI REVISION Left 11/15/2016    Procedure: LEFT KNEE POLY CHANGE WITH HI POST STABILIZER;  Surgeon: Pablito Claudio MD;  Location: [facility];  Service:    • KNEE MINI REVISION Left 2016    Procedure: LT KNEE REMOVAL/REPLACEMENT LOCKING PIN ;  Surgeon: Pablito Claudio MD;  Location: [facility];  Service:    • MAMMO FINE NEEDLE ASPIRATION UNILATERAL      RIGHT  "THYROID NODULE, 2009 BENIGN    • ME REVISE KNEE JOINT REPLACE,1 PART Left 2/20/2017    Procedure: LT KNEE REMOVAL ANTIBIOTIC SPACER AND KNEE REVISION;  Surgeon: Christiano Cesar MD;  Location: [facility];  Service: Orthopedics   • ME TOTAL KNEE ARTHROPLASTY Left 12/24/2016    Procedure: LEFT KNEE WASHOUT WITH POLY CHANGE;  Surgeon: Christiano Cesar MD;  Location: [facility];  Service: Orthopedics   • REPLACEMENT TOTAL KNEE Left    • THYROIDECTOMY, PARTIAL      1979 LEFT LOBECTOMY AND ISTHMECTOMY    • TOTAL ABDOMINAL HYSTERECTOMY WITH SALPINGO OOPHORECTOMY     • TOTAL HIP ARTHROPLASTY Left    • TOTAL KNEE  PROSTHESIS REMOVAL W/ SPACER INSERTION Left 12/29/2016    Procedure: LT KNEE IMPLANT REMOVAL WITH INSERTION OF SPACER ;  Surgeon: Christiano Cesar MD;  Location: [facility];  Service:              PT Ortho       02/08/18 1300    Subjective Comments    Subjective Comments Pt reports no pain.    Admits to more weakness than pain    -SI    Subjective Pain    Able to rate subjective pain? yes  -SI    Pre-Treatment Pain Level 0  -SI    Post-Treatment Pain Level 0  -SI    Subjective Pain Comment \"stiffness vs pain\"  -SI    Left Knee    Extension/Flexion ROM Details 0 to 120  -SI    Left Hip    Hip Flexion Gross Movement (3+/5) fair plus  -SI    Hip ABduction Gross Movement (3+/5) fair plus  -SI    Hip ADduction Gross Movement (3+/5) fair plus  -SI    Right Hip    Hip Flexion Gross Movement (3+/5) fair plus  -SI    Hip ABduction Gross Movement (3+/5) fair plus  -SI    Hip ADduction Gross Movement (3+/5) fair plus  -SI    Left Knee    Knee Extension Gross Movement (3+/5) fair plus  -SI    Knee Flexion Gross Movement (3+/5) fair plus  -SI    Right Knee    Knee Extension Gross Movement (3+/5) fair plus  -SI    Knee Flexion Gross Movement (3+/5) fair plus  -SI      02/06/18 1000    Subjective Comments    Subjective Comments Pt speaks of her multiple left knee surgeries and infection with spacer in for period of " "time.  -SI    Subjective Pain    Able to rate subjective pain? yes  -SI    Pre-Treatment Pain Level 1  -SI    Post-Treatment Pain Level 1  -SI    Subjective Pain Comment no pain more of a soreness  -SI    Gait Assessment/Treatment    Gait, Comment mildly altered gait with or without cane  -SI      User Key  (r) = Recorded By, (t) = Taken By, (c) = Cosigned By    Initials Name Provider Type    YANCI Joyila ZAIRA Corona PTA Physical Therapy Assistant                            PT Assessment/Plan       02/08/18 1330       PT Assessment    Assessment Comments Sounds like she is doing HEP about 5 days out of 7.  She wants only 1 x week for sessions and if ind will DC after 1-2 more sessions  -SI       User Key  (r) = Recorded By, (t) = Taken By, (c) = Cosigned By    Initials Name Provider Type    YANCI MENESES HERMILO Corona Physical Therapy Assistant                    Exercises       02/08/18 1300          Subjective Comments    Subjective Comments Pt reports no pain.    Admits to more weakness than pain    -SI      Subjective Pain    Able to rate subjective pain? yes  -SI      Pre-Treatment Pain Level 0  -SI      Post-Treatment Pain Level 0  -SI      Subjective Pain Comment \"stiffness vs pain\"  -SI      Exercise 1    Exercise Name 1 nu step  -SI      Sets 1 2  -SI      Reps 1 8  -SI      Time (Minutes) 1 5  -SI      Exercise 2    Exercise Name 2 step up on foam 6\"  -SI      Exercise 3    Exercise Name 3 standing march 2#s  -SI      Sets 3 1  -SI      Additional Comments 2lbs and laternate  -SI      Exercise 4    Exercise Name 4 standing hip ABD 2#s  -SI      Sets 4 3  -SI      Reps 4 8  -SI      Exercise 5    Exercise Name 5 seated LAQ 2#s  -SI      Sets 5 3  -SI      Reps 5 8  -SI      Exercise 6    Exercise Name 6 seated HS curl GTB  -SI      Sets 6 3  -SI      Reps 6 8  -SI      Exercise 7    Exercise Name 7 hip bridge  -SI      Sets 7 1  -SI      Reps 7 10  -SI      Exercise 8    Exercise Name 8 HS curl on ball  -SI      Sets " 8 3  -SI      Reps 8 8  -SI      Exercise 9    Exercise Name 9 hip add supine  -SI      Sets 9 1  -SI      Reps 9 10  -SI      Exercise 10    Exercise Name 10 SLR  -SI      Sets 10 1  -SI      Reps 10 10  -SI        User Key  (r) = Recorded By, (t) = Taken By, (c) = Cosigned By    Initials Name Provider Type    YANCI Corona PTA Physical Therapy Assistant                               PT OP Goals       02/08/18 1300       PT Short Term Goals    STG 1 Pt will be independent with HEP to strengthen BLE and improve dynamice standing and walking balance to reduce risk of falls during ADL's.  -SI     STG 1 Progress Ongoing  -SI     STG 2 Pt will demonstrate BLE MMT grossly 4/5  -SI     STG 2 Progress Progressing  -SI     Long Term Goals    LTG 1 Pt will ambulate with no A.D.  -SI     LTG 1 Progress Progressing  -SI       User Key  (r) = Recorded By, (t) = Taken By, (c) = Cosigned By    Initials Name Provider Type    YANCI Corona PTA Physical Therapy Assistant          Therapy Education  Given: HEP, Symptoms/condition management  Program: Progressed  How Provided: Verbal, Demonstration, Written  Provided to: Patient  Level of Understanding: Teach back education performed              Time Calculation:   Start Time: 1015  Stop Time: 1100  Time Calculation (min): 45 min    Therapy Charges for Today     Code Description Service Date Service Provider Modifiers Qty    03738560732 HC PT THER PROC EA 15 MIN 2/8/2018 Daniella Corona PTA GP 3                    Daniella Corona PTA  2/8/2018

## 2018-02-13 ENCOUNTER — HOSPITAL ENCOUNTER (OUTPATIENT)
Dept: PHYSICAL THERAPY | Facility: HOSPITAL | Age: 73
Setting detail: THERAPIES SERIES
Discharge: HOME | End: 2018-02-13

## 2018-02-13 DIAGNOSIS — M25.562 LEFT KNEE PAIN, UNSPECIFIED CHRONICITY: Primary | ICD-10-CM

## 2018-02-13 PROCEDURE — 97110 THERAPEUTIC EXERCISES: CPT

## 2018-02-13 NOTE — THERAPY TREATMENT NOTE
Outpatient Physical Therapy Ortho Treatment Note  [facility]     Patient Name: Mariela Ruiz  : 1945  MRN: [id number]  Today's Date: 2018      Visit Date: 2018    Visit Dx:    ICD-10-CM ICD-9-CM   1. Left knee pain, unspecified chronicity M25.562 719.46       Patient Active Problem List   Diagnosis   • Seizure disorder   • Hyperlipidemia   • Vitamin D insufficiency   • Osteoporosis   • Sleep apnea   • Chronic venous insufficiency   • Postsurgical hypothyroidism   • Chronic fatigue syndrome   • Gastroesophageal reflux disease   • Dupuytren's contracture   • History of biliary T-tube placement   • History of partial thyroidectomy   • Mitral valve prolapse   • Multinodular goiter   • Right fascicular block   • Restless legs syndrome   • History of cardiac catheterization   • Urge incontinence of urine   • Left knee pain   • Ligamentous laxity of knee   • DJD (degenerative joint disease) of knee   • Adverse drug effect, subsequent encounter        Past Medical History:   Diagnosis Date   • Chronic venous insufficiency    • Dupuytren's contracture    • History of staph infection     S/P KNEE DEC 2016   • History of transfusion    • Hyperlipidemia    • Nontoxic multinodular goiter    • Osteoporosis     Dr. Cabrera   • Postsurgical hypothyroidism    • Restless leg syndrome    • Seizure disorder     Dr. Whitman, HX  LAST SEIZURE   • Sleep apnea     DOES NOT HAVE MACHINE   • Vitamin D insufficiency         Past Surgical History:   Procedure Laterality Date   • APPENDECTOMY     • CARDIAC CATHETERIZATION      NORMAL    • KNEE MINI REVISION Left 11/15/2016    Procedure: LEFT KNEE POLY CHANGE WITH HI POST STABILIZER;  Surgeon: Pablito Claudio MD;  Location: [facility];  Service:    • KNEE MINI REVISION Left 2016    Procedure: LT KNEE REMOVAL/REPLACEMENT LOCKING PIN ;  Surgeon: Pablito Claudio MD;  Location: [facility];  Service:    • MAMMO FINE NEEDLE ASPIRATION UNILATERAL      RIGHT  THYROID NODULE, 2009 BENIGN    • MN REVISE KNEE JOINT REPLACE,1 PART Left 2/20/2017    Procedure: LT KNEE REMOVAL ANTIBIOTIC SPACER AND KNEE REVISION;  Surgeon: Christiano Cesar MD;  Location: [facility] OR;  Service: Orthopedics   • MN TOTAL KNEE ARTHROPLASTY Left 12/24/2016    Procedure: LEFT KNEE WASHOUT WITH POLY CHANGE;  Surgeon: Christiano Cesar MD;  Location: [facility] OR;  Service: Orthopedics   • REPLACEMENT TOTAL KNEE Left    • THYROIDECTOMY, PARTIAL      1979 LEFT LOBECTOMY AND ISTHMECTOMY    • TOTAL ABDOMINAL HYSTERECTOMY WITH SALPINGO OOPHORECTOMY     • TOTAL HIP ARTHROPLASTY Left    • TOTAL KNEE  PROSTHESIS REMOVAL W/ SPACER INSERTION Left 12/29/2016    Procedure: LT KNEE IMPLANT REMOVAL WITH INSERTION OF SPACER ;  Surgeon: Christiano Cesar MD;  Location: [facility];  Service:              PT Ortho       02/13/18 1300    Subjective Comments    Subjective Comments pt c/o left groin pain with SLR ex.  -SI    Subjective Pain    Able to rate subjective pain? yes  -SI    Pre-Treatment Pain Level 0  -SI    Post-Treatment Pain Level 0  -SI    Balance Skills Training    SLS 3-5 seconds on either LE  -SI    Transfers    Transfers, Sit-Stand-Sit, Assist Device --   no assist device from high chair no use of arms x 3  -SI      User Key  (r) = Recorded By, (t) = Taken By, (c) = Cosigned By    Initials Name Provider Type    YANCI Corona PTA Physical Therapy Assistant                            PT Assessment/Plan       02/13/18 1358       PT Assessment    Assessment Comments Pt reports compliance with HEP.  She needs strengthening.  pain minimal and ROM good.  -SI       User Key  (r) = Recorded By, (t) = Taken By, (c) = Cosigned By    Initials Name Provider Type    YANCI Corona PTA Physical Therapy Assistant                    Exercises       02/13/18 1300          Subjective Comments    Subjective Comments pt c/o left groin pain with SLR ex.  -SI      Subjective Pain    Able to rate subjective  pain? yes  -SI      Pre-Treatment Pain Level 0  -SI      Post-Treatment Pain Level 0  -SI      Exercise 1    Exercise Name 1 nu step  -SI      Sets 1 2  -SI      Reps 1 8  -SI      Time (Minutes) 1 5  -SI      Exercise 2    Exercise Name 2 Hip abd on side   -SI      Sets 2 2  -SI      Reps 2 10  -SI      Exercise 3    Exercise Name 3 standing march 2#s  -SI      Sets 3 1  -SI      Reps 3 10  -SI      Exercise 5    Exercise Name 5 SAQ/ LAQ 2#s  -SI      Sets 5 2  -SI      Reps 5 10  -SI      Exercise 6    Exercise Name 6 seated HS curl GTB  -SI      Sets 6 3  -SI      Reps 6 8  -SI      Additional Comments green  -SI      Exercise 7    Exercise Name 7 hip bridge  -SI      Sets 7 1  -SI      Reps 7 10  -SI        User Key  (r) = Recorded By, (t) = Taken By, (c) = Cosigned By    Initials Name Provider Type    YANCI Corona PTA Physical Therapy Assistant                             Therapy Education  Given: Symptoms/condition management, HEP  Program: Reinforced  How Provided: Verbal, Demonstration, Written  Provided to: Patient  Level of Understanding: Teach back education performed              Time Calculation:   Start Time: 1015  Stop Time: 1100  Time Calculation (min): 45 min    Therapy Charges for Today     Code Description Service Date Service Provider Modifiers Qty    62512506993 HC PT THER PROC EA 15 MIN 2/13/2018 Daniella Corona PTA GP 3                    Daniella Corona PTA  2/13/2018

## 2018-02-15 ENCOUNTER — HOSPITAL ENCOUNTER (OUTPATIENT)
Dept: PHYSICAL THERAPY | Facility: HOSPITAL | Age: 73
Setting detail: THERAPIES SERIES
Discharge: HOME | End: 2018-02-15

## 2018-02-15 DIAGNOSIS — M25.562 LEFT KNEE PAIN, UNSPECIFIED CHRONICITY: Primary | ICD-10-CM

## 2018-02-15 PROCEDURE — 97110 THERAPEUTIC EXERCISES: CPT

## 2018-02-15 NOTE — THERAPY TREATMENT NOTE
Outpatient Physical Therapy Ortho Treatment Note  [facility]     Patient Name: Mariela Ruiz  : 1945  MRN: [id number]  Today's Date: 2/15/2018      Visit Date: 02/15/2018    Visit Dx:    ICD-10-CM ICD-9-CM   1. Left knee pain, unspecified chronicity M25.562 719.46       Patient Active Problem List   Diagnosis   • Seizure disorder   • Hyperlipidemia   • Vitamin D insufficiency   • Osteoporosis   • Sleep apnea   • Chronic venous insufficiency   • Postsurgical hypothyroidism   • Chronic fatigue syndrome   • Gastroesophageal reflux disease   • Dupuytren's contracture   • History of biliary T-tube placement   • History of partial thyroidectomy   • Mitral valve prolapse   • Multinodular goiter   • Right fascicular block   • Restless legs syndrome   • History of cardiac catheterization   • Urge incontinence of urine   • Left knee pain   • Ligamentous laxity of knee   • DJD (degenerative joint disease) of knee   • Adverse drug effect, subsequent encounter        Past Medical History:   Diagnosis Date   • Chronic venous insufficiency    • Dupuytren's contracture    • History of staph infection     S/P KNEE DEC 2016   • History of transfusion    • Hyperlipidemia    • Nontoxic multinodular goiter    • Osteoporosis     Dr. Cabrera   • Postsurgical hypothyroidism    • Restless leg syndrome    • Seizure disorder     Dr. Whitman, HX  LAST SEIZURE   • Sleep apnea     DOES NOT HAVE MACHINE   • Vitamin D insufficiency         Past Surgical History:   Procedure Laterality Date   • APPENDECTOMY     • CARDIAC CATHETERIZATION      NORMAL    • KNEE MINI REVISION Left 11/15/2016    Procedure: LEFT KNEE POLY CHANGE WITH HI POST STABILIZER;  Surgeon: Pablito Claudio MD;  Location: [facility];  Service:    • KNEE MINI REVISION Left 2016    Procedure: LT KNEE REMOVAL/REPLACEMENT LOCKING PIN ;  Surgeon: Pablito Claudio MD;  Location: [facility];  Service:    • MAMMO FINE NEEDLE ASPIRATION UNILATERAL      RIGHT  THYROID NODULE, 2009 BENIGN    • [place] REVISE KNEE JOINT REPLACE,1 PART Left 2/20/2017    Procedure: LT KNEE REMOVAL ANTIBIOTIC SPACER AND KNEE REVISION;  Surgeon: Christiano Cesar MD;  Location: [facility] OR;  Service: Orthopedics   • [place] TOTAL KNEE ARTHROPLASTY Left 12/24/2016    Procedure: LEFT KNEE WASHOUT WITH POLY CHANGE;  Surgeon: Christiano Cesar MD;  Location: [facility] OR;  Service: Orthopedics   • REPLACEMENT TOTAL KNEE Left    • THYROIDECTOMY, PARTIAL      1979 LEFT LOBECTOMY AND ISTHMECTOMY    • TOTAL ABDOMINAL HYSTERECTOMY WITH SALPINGO OOPHORECTOMY     • TOTAL HIP ARTHROPLASTY Left    • TOTAL KNEE  PROSTHESIS REMOVAL W/ SPACER INSERTION Left 12/29/2016    Procedure: LT KNEE IMPLANT REMOVAL WITH INSERTION OF SPACER ;  Surgeon: Christiano Cesar MD;  Location: [facility];  Service:              PT Ortho       02/15/18 1000    Subjective Comments    Subjective Comments Knees are sore today  -SI    Subjective Pain    Able to rate subjective pain? yes  -SI    Pre-Treatment Pain Level 5  -SI    Post-Treatment Pain Level 5  -SI    Balance Skills Training    SLS 10 to 12 seconds  -SI    Gait Assessment/Treatment    Gait, Comment mildly altered  -SI    Stairs Assessment/Treatment    Number of Stairs 12  -SI    Stairs, Handrail Location left side (ascending)  -SI    Stairs, Comment able to do a few reciprocally but an effort due to weakness vs lack of ROM or pain.  -SI      02/13/18 1300    Subjective Comments    Subjective Comments pt c/o left groin pain with SLR ex.  -SI    Subjective Pain    Able to rate subjective pain? yes  -SI    Pre-Treatment Pain Level 0  -SI    Post-Treatment Pain Level 0  -SI    Balance Skills Training    SLS 3-5 seconds on either LE  -SI    Transfers    Transfers, Sit-Stand-Sit, Assist Device --   no assist device from high chair no use of arms x 3  -SI      User Key  (r) = Recorded By, (t) = Taken By, (c) = Cosigned By    Initials Name Provider Type    YANCI Corona, PTA  Physical Therapy Assistant                            PT Assessment/Plan       02/15/18 1050       PT Assessment    Assessment Comments To get ex weights in mail soon.  A little sire today but overall doing great.  Strength will take time.  -SI       User Key  (r) = Recorded By, (t) = Taken By, (c) = Cosigned By    Initials Name Provider Type    YANCI Corona PTA Physical Therapy Assistant                    Exercises       02/15/18 1000          Subjective Comments    Subjective Comments Knees are sore today  -SI      Subjective Pain    Able to rate subjective pain? yes  -SI      Pre-Treatment Pain Level 5  -SI      Post-Treatment Pain Level 5  -SI      Exercise 1    Exercise Name 1 nu step  -SI      Sets 1 2  -SI      Reps 1 8  -SI      Time (Minutes) 1 5  -SI      Exercise 2    Exercise Name 2 Hip abd on side   -SI      Sets 2 2  -SI      Reps 2 10  -SI      Additional Comments active an effort  -SI      Exercise 3    Exercise Name 3 standing march 2#s  -SI      Sets 3 1  -SI      Reps 3 10  -SI      Additional Comments 2lbs  -SI      Exercise 5    Exercise Name 5 SAQ/ LAQ 2#s  -SI      Sets 5 2  -SI      Reps 5 10  -SI      Additional Comments 2 lbs  -SI      Exercise 6    Exercise Name 6 seated HS curl GTB  -SI      Sets 6 3  -SI      Reps 6 8  -SI      Exercise 7    Exercise Name 7 hip bridge  -SI      Sets 7 1  -SI      Reps 7 10  -SI      Exercise 9    Exercise Name 9 hip add supine  -SI      Sets 9 1  -SI      Reps 9 10  -SI        User Key  (r) = Recorded By, (t) = Taken By, (c) = Cosigned By    Initials Name Provider Type    YANCI Corona PTA Physical Therapy Assistant                             Therapy Education  Given: HEP, Symptoms/condition management  Program: Progressed  How Provided: Verbal, Demonstration, Written  Provided to: Patient  Level of Understanding: Teach back education performed              Time Calculation:   Start Time: 1015  Stop Time: 1100  Time Calculation (min): 45  min    Therapy Charges for Today     Code Description Service Date Service Provider Modifiers Qty    36154355199 HC PT THER PROC EA 15 MIN 2/15/2018 Daniella Corona, PTA GP 3                    Daniella Corona, HERMILO  2/15/2018

## 2018-02-20 ENCOUNTER — HOSPITAL ENCOUNTER (OUTPATIENT)
Dept: PHYSICAL THERAPY | Facility: HOSPITAL | Age: 73
Setting detail: THERAPIES SERIES
Discharge: HOME | End: 2018-02-20

## 2018-02-20 DIAGNOSIS — M25.562 LEFT KNEE PAIN, UNSPECIFIED CHRONICITY: Primary | ICD-10-CM

## 2018-02-20 PROCEDURE — 97110 THERAPEUTIC EXERCISES: CPT

## 2018-02-20 NOTE — THERAPY TREATMENT NOTE
Outpatient Physical Therapy Ortho Treatment Note  [facility]     Patient Name: Mariela Ruiz  : 1945  MRN: [id number]  Today's Date: 2018      Visit Date: 2018    Visit Dx:    ICD-10-CM ICD-9-CM   1. Left knee pain, unspecified chronicity M25.562 719.46       Patient Active Problem List   Diagnosis   • Seizure disorder   • Hyperlipidemia   • Vitamin D insufficiency   • Osteoporosis   • Sleep apnea   • Chronic venous insufficiency   • Postsurgical hypothyroidism   • Chronic fatigue syndrome   • Gastroesophageal reflux disease   • Dupuytren's contracture   • History of biliary T-tube placement   • History of partial thyroidectomy   • Mitral valve prolapse   • Multinodular goiter   • Right fascicular block   • Restless legs syndrome   • History of cardiac catheterization   • Urge incontinence of urine   • Left knee pain   • Ligamentous laxity of knee   • DJD (degenerative joint disease) of knee   • Adverse drug effect, subsequent encounter        Past Medical History:   Diagnosis Date   • Chronic venous insufficiency    • Dupuytren's contracture    • History of staph infection     S/P KNEE DEC 2016   • History of transfusion    • Hyperlipidemia    • Nontoxic multinodular goiter    • Osteoporosis     Dr. Cabrera   • Postsurgical hypothyroidism    • Restless leg syndrome    • Seizure disorder     Dr. Whitman, HX  LAST SEIZURE   • Sleep apnea     DOES NOT HAVE MACHINE   • Vitamin D insufficiency         Past Surgical History:   Procedure Laterality Date   • APPENDECTOMY     • CARDIAC CATHETERIZATION      NORMAL    • KNEE MINI REVISION Left 11/15/2016    Procedure: LEFT KNEE POLY CHANGE WITH HI POST STABILIZER;  Surgeon: Pablito Claudio MD;  Location: [facility];  Service:    • KNEE MINI REVISION Left 2016    Procedure: LT KNEE REMOVAL/REPLACEMENT LOCKING PIN ;  Surgeon: Pablito Claudio MD;  Location: [facility];  Service:    • MAMMO FINE NEEDLE ASPIRATION UNILATERAL      RIGHT  THYROID NODULE, 2009 BENIGN    • [place] REVISE KNEE JOINT REPLACE,1 PART Left 2/20/2017    Procedure: LT KNEE REMOVAL ANTIBIOTIC SPACER AND KNEE REVISION;  Surgeon: Christiano Cesar MD;  Location: [facility];  Service: Orthopedics   • [place] TOTAL KNEE ARTHROPLASTY Left 12/24/2016    Procedure: LEFT KNEE WASHOUT WITH POLY CHANGE;  Surgeon: Christiano Cesar MD;  Location: [facility] OR;  Service: Orthopedics   • REPLACEMENT TOTAL KNEE Left    • THYROIDECTOMY, PARTIAL      1979 LEFT LOBECTOMY AND ISTHMECTOMY    • TOTAL ABDOMINAL HYSTERECTOMY WITH SALPINGO OOPHORECTOMY     • TOTAL HIP ARTHROPLASTY Left    • TOTAL KNEE  PROSTHESIS REMOVAL W/ SPACER INSERTION Left 12/29/2016    Procedure: LT KNEE IMPLANT REMOVAL WITH INSERTION OF SPACER ;  Surgeon: Christiano Cesar MD;  Location: [facility];  Service:              PT Ortho       02/20/18 1700    Subjective Comments    Subjective Comments Tired most days and feels she gets out of breath easily..  -SI    Subjective Pain    Able to rate subjective pain? yes  -SI    Pre-Treatment Pain Level 1  -SI    Post-Treatment Pain Level 1  -SI    Subjective Pain Comment min to no c/o pain in PT  -SI      User Key  (r) = Recorded By, (t) = Taken By, (c) = Cosigned By    Initials Name Provider Type    YANCI Corona PTA Physical Therapy Assistant                            PT Assessment/Plan       02/20/18 1517       PT Assessment    Assessment Comments Pt doing well with given ex.  She is going to MD soon about her breathing?? Pne mor PT session and plan to DC on HEP.  -SI       User Key  (r) = Recorded By, (t) = Taken By, (c) = Cosigned By    Initials Name Provider Type    YANCI Corona PTA Physical Therapy Assistant                    Exercises       02/20/18 1700          Subjective Comments    Subjective Comments Tired most days and feels she gets out of breath easily..  -SI      Subjective Pain    Able to rate subjective pain? yes  -SI      Pre-Treatment Pain Level 1  -SI       Post-Treatment Pain Level 1  -SI      Subjective Pain Comment min to no c/o pain in PT  -SI      Exercise 1    Exercise Name 1 nu step  -SI      Sets 1 2  -SI      Reps 1 8  -SI      Time (Minutes) 1 5  -SI      Exercise 2    Exercise Name 2 Hip abd on side   -SI      Sets 2 2  -SI      Reps 2 10  -SI      Exercise 3    Exercise Name 3 standing march 2#s  -SI      Sets 3 1  -SI      Reps 3 10  -SI      Exercise 5    Exercise Name 5 SAQ/ LAQ 2#s  -SI      Sets 5 2  -SI      Reps 5 10  -SI      Additional Comments 2.5 lbs  -SI      Exercise 6    Exercise Name 6 seated HS curl GTB  -SI      Sets 6 3  -SI      Reps 6 8  -SI      Additional Comments green  -SI      Exercise 7    Exercise Name 7 hip bridge  -SI      Sets 7 1  -SI      Reps 7 10  -SI      Exercise 8    Exercise Name 8 HS curl on ball  -SI      Sets 8 3  -SI      Reps 8 8  -SI        User Key  (r) = Recorded By, (t) = Taken By, (c) = Cosigned By    Initials Name Provider Type    YANCI Croona PTA Physical Therapy Assistant                             Therapy Education  Given: HEP, Symptoms/condition management  Program: Progressed  How Provided: Verbal, Demonstration, Written  Provided to: Patient  Level of Understanding: Teach back education performed              Time Calculation:   Start Time: 1015  Stop Time: 1100  Time Calculation (min): 45 min    Therapy Charges for Today     Code Description Service Date Service Provider Modifiers Qty    22815338593  PT THER PROC EA 15 MIN 2/20/2018 Daniella Corona PTA GP 3                    Daniella Corona PTA  2/20/2018

## 2018-02-22 ENCOUNTER — HOSPITAL ENCOUNTER (OUTPATIENT)
Dept: PHYSICAL THERAPY | Facility: HOSPITAL | Age: 73
Setting detail: THERAPIES SERIES
Discharge: HOME | End: 2018-02-22

## 2018-02-22 DIAGNOSIS — M25.562 LEFT KNEE PAIN, UNSPECIFIED CHRONICITY: Primary | ICD-10-CM

## 2018-02-22 PROCEDURE — 97110 THERAPEUTIC EXERCISES: CPT

## 2018-02-22 NOTE — THERAPY DISCHARGE NOTE
Outpatient Physical Therapy Ortho Treatment Note/Discharge Summary  [facility]     Patient Name: Mariela Ruiz  : 1945  MRN: [id number]  Today's Date: 2018      Visit Date: 2018    Visit Dx:    ICD-10-CM ICD-9-CM   1. Left knee pain, unspecified chronicity M25.562 719.46       Patient Active Problem List   Diagnosis   • Seizure disorder   • Hyperlipidemia   • Vitamin D insufficiency   • Osteoporosis   • Sleep apnea   • Chronic venous insufficiency   • Postsurgical hypothyroidism   • Chronic fatigue syndrome   • Gastroesophageal reflux disease   • Dupuytren's contracture   • History of biliary T-tube placement   • History of partial thyroidectomy   • Mitral valve prolapse   • Multinodular goiter   • Right fascicular block   • Restless legs syndrome   • History of cardiac catheterization   • Urge incontinence of urine   • Left knee pain   • Ligamentous laxity of knee   • DJD (degenerative joint disease) of knee   • Adverse drug effect, subsequent encounter        Past Medical History:   Diagnosis Date   • Chronic venous insufficiency    • Dupuytren's contracture    • History of staph infection     S/P KNEE DEC 2016   • History of transfusion    • Hyperlipidemia    • Nontoxic multinodular goiter    • Osteoporosis     Dr. Cabrera   • Postsurgical hypothyroidism    • Restless leg syndrome    • Seizure disorder     Dr. Whitman, HX  LAST SEIZURE   • Sleep apnea     DOES NOT HAVE MACHINE   • Vitamin D insufficiency         Past Surgical History:   Procedure Laterality Date   • APPENDECTOMY     • CARDIAC CATHETERIZATION      NORMAL    • KNEE MINI REVISION Left 11/15/2016    Procedure: LEFT KNEE POLY CHANGE WITH HI POST STABILIZER;  Surgeon: Pablito Claudio MD;  Location: [facility] OR;  Service:    • KNEE MINI REVISION Left 2016    Procedure: LT KNEE REMOVAL/REPLACEMENT LOCKING PIN ;  Surgeon: Pablito Claudio MD;  Location: [facility] OR;  Service:    • MAMMO FINE NEEDLE ASPIRATION  UNILATERAL      RIGHT THYROID NODULE, 2009 BENIGN    • [place] REVISE KNEE JOINT REPLACE,1 PART Left 2/20/2017    Procedure: LT KNEE REMOVAL ANTIBIOTIC SPACER AND KNEE REVISION;  Surgeon: Christiano Cesar MD;  Location: [facility];  Service: Orthopedics   • [place] TOTAL KNEE ARTHROPLASTY Left 12/24/2016    Procedure: LEFT KNEE WASHOUT WITH POLY CHANGE;  Surgeon: Christiano Cesar MD;  Location: [facility];  Service: Orthopedics   • REPLACEMENT TOTAL KNEE Left    • THYROIDECTOMY, PARTIAL      1979 LEFT LOBECTOMY AND ISTHMECTOMY    • TOTAL ABDOMINAL HYSTERECTOMY WITH SALPINGO OOPHORECTOMY     • TOTAL HIP ARTHROPLASTY Left    • TOTAL KNEE  PROSTHESIS REMOVAL W/ SPACER INSERTION Left 12/29/2016    Procedure: LT KNEE IMPLANT REMOVAL WITH INSERTION OF SPACER ;  Surgeon: Christiano Cesar MD;  Location: [facility];  Service:              PT Ortho       02/22/18 1000    Subjective Comments    Subjective Comments No knee pain, stiffness some days more than others.  Pt speaks of her hx of lymphadema also  -SI    Subjective Pain    Able to rate subjective pain? yes  -SI    Pre-Treatment Pain Level 0  -SI    Post-Treatment Pain Level 0  -SI    Left Knee    Extension/Flexion ROM Details 0 to 120  -SI    Right Knee    Extension/Flexion ROM Details 0 to 115  -SI    Left Hip    Hip Flexion Gross Movement (4+/5) good plus  -SI    Hip ABduction Gross Movement (4+/5) good plus  -SI    Hip ADduction Gross Movement (4+/5) good plus  -SI    Right Hip    Hip Flexion Gross Movement (4+/5) good plus  -SI    Hip ABduction Gross Movement (4+/5) good plus  -SI    Hip ADduction Gross Movement (4+/5) good plus  -SI    Left Knee    Knee Extension Gross Movement (4+/5) good plus  -SI    Knee Flexion Gross Movement (4+/5) good plus  -SI    Right Knee    Knee Extension Gross Movement (4+/5) good plus  -SI    Knee Flexion Gross Movement (4+/5) good plus  -SI    Balance Skills Training    SLS 12 seconds  -SI    Gait Assessment/Treatment    Gait,  Comment normal level surfaces  -SI    Stairs Assessment/Treatment    Number of Stairs 12  -SI    Stairs, Handrail Location left side (ascending)  -SI    Stairs, Comment able to do reciprocally but she feels safer descending one at a time  -SI      02/20/18 1700    Subjective Comments    Subjective Comments Tired most days and feels she gets out of breath easily..  -SI    Subjective Pain    Able to rate subjective pain? yes  -SI    Pre-Treatment Pain Level 1  -SI    Post-Treatment Pain Level 1  -SI    Subjective Pain Comment min to no c/o pain in PT  -SI      User Key  (r) = Recorded By, (t) = Taken By, (c) = Cosigned By    Initials Name Provider Type    YANCI Corona PTA Physical Therapy Assistant                            PT Assessment/Plan       02/22/18 1331       PT Assessment    Assessment Comments Pt with improved strength and no longer using cane.  She feels ind with HEP and ready for DC.  -SI       User Key  (r) = Recorded By, (t) = Taken By, (c) = Cosigned By    Initials Name Provider Type    YANCI Corona PTA Physical Therapy Assistant                Modalities       02/22/18 1300          Ice    Patient reports will apply ice at home to involved area Yes  -SI        User Key  (r) = Recorded By, (t) = Taken By, (c) = Cosigned By    Initials Name Provider Type    YANCI Corona PTA Physical Therapy Assistant                Exercises       02/22/18 1000          Subjective Comments    Subjective Comments No knee pain, stiffness some days more than others.  Pt speaks of her hx of lymphadema also  -SI      Subjective Pain    Able to rate subjective pain? yes  -SI      Pre-Treatment Pain Level 0  -SI      Post-Treatment Pain Level 0  -SI      Exercise 1    Exercise Name 1 nu step  -SI      Sets 1 2  -SI      Reps 1 8  -SI      Time (Minutes) 1 5  -SI      Exercise 2    Exercise Name 2 Hip abd on side   -SI      Sets 2 2  -SI      Reps 2 10  -SI      Additional Comments active  -SI      Exercise 3  "   Exercise Name 3 standing march 21/2#s  -SI      Sets 3 1  -SI      Reps 3 10  -SI      Exercise 5    Exercise Name 5 SAQ/ LAQ 21/2#s  -SI      Sets 5 2  -SI      Reps 5 10  -SI      Exercise 6    Exercise Name 6 seated HS curl GTB  -SI      Sets 6 3  -SI      Reps 6 8  -SI      Additional Comments green  -SI      Exercise 7    Exercise Name 7 hip bridge  -SI      Sets 7 1  -SI      Reps 7 10  -SI      Exercise 9    Exercise Name 9 hip add supine  -SI      Sets 9 1  -SI      Reps 9 10  -SI        User Key  (r) = Recorded By, (t) = Taken By, (c) = Cosigned By    Initials Name Provider Type    YANCI Corona PTA Physical Therapy Assistant                               PT OP Goals       02/22/18 1300       PT Short Term Goals    STG 1 Pt will be independent with HEP to strengthen BLE and improve dynamice standing and walking balance to reduce risk of falls during ADL's.  -SI     STG 1 Progress Met  -SI     STG 2 Pt will demonstrate BLE MMT grossly 4/5  -SI     STG 2 Progress Met  -SI     STG 3 Pt will perform tandem or SLS for 5 seconds with no UE support  -SI     STG 3 Progress Met  -SI     Long Term Goals    LTG 1 Pt will ambulate with no A.D.  -SI     LTG 1 Progress Met  -SI     LTG 2 Pt will be able to clear 2\" object on ground with reciprocal gait.  -SI     LTG 3 Pt will report 10% improvement on KOS.  -SI     LTG 3 Progress Met  -SI       User Key  (r) = Recorded By, (t) = Taken By, (c) = Cosigned By    Initials Name Provider Type    YANCI Corona PTA Physical Therapy Assistant          Therapy Education  Given: HEP, Symptoms/condition management, Edema management  Program: Reinforced  How Provided: Verbal, Demonstration, Written  Provided to: Patient  Level of Understanding: Teach back education performed    Outcome Measure Options: Knee Outcome Score- ADL  Knee Outcome Score  Knee Outcome Score Comments: 75%      Time Calculation:   Start Time: 1015  Stop Time: 1100  Time Calculation (min): 45 " min    Therapy Charges for Today     Code Description Service Date Service Provider Modifiers Qty    11739553245 HC PT THER PROC EA 15 MIN 2/22/2018 Daniella Corona PTA GP 3          PT G-Codes  Outcome Measure Options: Knee Outcome Score- ADL     OP PT Discharge Summary  Reason for Discharge: All goals achieved  Outcomes Achieved: Able to achieve all goals within established timeline  Discharge Destination: Home with home program  Discharge Instructions: HEP 3-5 days a week.  Elevate LLE daily      Daniella Corona PTA  2/22/2018

## 2018-03-09 RX ORDER — PRAVASTATIN SODIUM 40 MG
TABLET ORAL
Qty: 90 TABLET | Refills: 1 | Status: SHIPPED | OUTPATIENT
Start: 2018-03-09 | End: 2018-04-05 | Stop reason: SDUPTHER

## 2018-04-05 ENCOUNTER — OFFICE VISIT (OUTPATIENT)
Dept: ENDOCRINOLOGY | Age: 73
End: 2018-04-05

## 2018-04-05 VITALS
BODY MASS INDEX: 28.06 KG/M2 | WEIGHT: 196 LBS | HEIGHT: 70 IN | HEART RATE: 75 BPM | OXYGEN SATURATION: 97 % | SYSTOLIC BLOOD PRESSURE: 138 MMHG | DIASTOLIC BLOOD PRESSURE: 76 MMHG

## 2018-04-05 DIAGNOSIS — E04.2 MULTINODULAR GOITER: Primary | ICD-10-CM

## 2018-04-05 DIAGNOSIS — E89.0 POSTSURGICAL HYPOTHYROIDISM: ICD-10-CM

## 2018-04-05 DIAGNOSIS — E89.0 HISTORY OF PARTIAL THYROIDECTOMY: ICD-10-CM

## 2018-04-05 DIAGNOSIS — R79.0 ABNORMAL BLOOD LEVEL OF CHROMIUM: ICD-10-CM

## 2018-04-05 DIAGNOSIS — E78.5 HYPERLIPIDEMIA, UNSPECIFIED HYPERLIPIDEMIA TYPE: ICD-10-CM

## 2018-04-05 DIAGNOSIS — M81.0 OSTEOPOROSIS, UNSPECIFIED OSTEOPOROSIS TYPE, UNSPECIFIED PATHOLOGICAL FRACTURE PRESENCE: ICD-10-CM

## 2018-04-05 DIAGNOSIS — R79.0 ABNORMAL BLOOD LEVEL OF COBALT: ICD-10-CM

## 2018-04-05 DIAGNOSIS — E55.9 VITAMIN D INSUFFICIENCY: ICD-10-CM

## 2018-04-05 DIAGNOSIS — M72.0 DUPUYTREN CONTRACTURE: ICD-10-CM

## 2018-04-05 PROCEDURE — 99214 OFFICE O/P EST MOD 30 MIN: CPT | Performed by: INTERNAL MEDICINE

## 2018-04-05 RX ORDER — SILICONE ADHESIVE 1.5" X 3"
1 SHEET (EA) TOPICAL
COMMUNITY
End: 2018-09-13

## 2018-04-05 RX ORDER — LEVOTHYROXINE SODIUM 50 MCG
50 TABLET ORAL
Qty: 90 TABLET | Refills: 1 | Status: SHIPPED | OUTPATIENT
Start: 2018-04-05 | End: 2018-11-07 | Stop reason: SDUPTHER

## 2018-04-05 NOTE — PROGRESS NOTES
Subjective   Mariela Ruiz is a 73 y.o. female.     F/u for hypothyroidism, hyperlipidemia, sleep apnea , vitamin d def       Hypothyroidism   Associated symptoms include abdominal pain and joint swelling (legs ).   Hyperlipidemia   Exacerbating diseases include hypothyroidism.   Sleep Apnea   Associated symptoms include abdominal pain and joint swelling (legs ).      She has hypothyroidism and has been taking Synthroid 50 µg per day.   She had a previous left thyroid lobectomy in the 1970s for benign lesion She had a fine needle biopsy right thyroid nodule done in by Dr. Brandt in August 2017 which was read as follicular lesion of undetermined significance.  She had ultrasound-guided needle biopsy done at [place] on November 16, 2017 and pathology was read as benign follicular cells.  She denies any changes in her thyroid.    She has no previous history of head or neck radiation therapy.     She has hyperlipidemia and has been on pravastatin 40 mg once a day.  She denies leg cramps.  She has gained 3 pounds since December 2017.     She has osteoporosis on bone density done in her gynecologist in November 2015.   she had a follow-up bone density done at [place] in November 2017 which showed osteoporosis with a 9.7% decrease on the right hip.  T score of the right hip was -2.8.  She was never on biphosphonate.  She has vitamin D insufficiency and has not been taking vit D.  She did not get Actonel filled because of high cost.  She denies heartburn.     She had a previous left hip replacement and the joint is worn.  Serum cobalt and chromium are elevated.  Dr. Claudio has retired and she is now seeing Dr. Mota.  She has Dupuytren's contracture but has not mentioned to Dr. Mota about the elevated serum cobalt and chromium.     She has gone to physical therapy and has stiffness on knees.     She has sleep apnea but is unable to tolerate a CPAP.    The following portions of the patient's history were reviewed and  "updated as appropriate: allergies, current medications, past family history, past medical history, past social history, past surgical history and problem list.    Review of Systems   Constitutional: Negative.    HENT: Negative.    Eyes: Negative.    Respiratory: Negative.    Cardiovascular: Positive for leg swelling.   Gastrointestinal: Positive for abdominal distention and abdominal pain.   Endocrine: Negative.    Genitourinary: Positive for frequency.   Musculoskeletal: Positive for joint swelling (legs ).   Skin: Negative.    Neurological: Negative.    Hematological: Negative.    Psychiatric/Behavioral: Positive for sleep disturbance ( sleep apnea unable to tolerate CPAP).       Objective      Vitals:    04/05/18 0906   BP: 138/76   BP Location: Right arm   Patient Position: Sitting   Cuff Size: Large Adult   Pulse: 75   SpO2: 97%   Weight: 88.9 kg (196 lb)   Height: 177.8 cm (70\")     Physical Exam   Constitutional: She is oriented to person, place, and time. She appears well-developed and well-nourished. No distress.   HENT:   Head: Normocephalic.   Nose: Nose normal.   Mouth/Throat: No oropharyngeal exudate.   Eyes: Conjunctivae and EOM are normal. Right eye exhibits no discharge. Left eye exhibits no discharge. No scleral icterus.   Neck: Neck supple. No JVD present. No tracheal deviation present. No thyromegaly present.   Cardiovascular: Normal rate, regular rhythm, normal heart sounds and intact distal pulses.  Exam reveals no gallop and no friction rub.    No murmur heard.  Pulmonary/Chest: Effort normal and breath sounds normal. No respiratory distress. She has no wheezes. She has no rales. She exhibits no tenderness.   Abdominal: Soft. Bowel sounds are normal. She exhibits no distension. There is no tenderness. There is no guarding.   Musculoskeletal: She exhibits no edema, tenderness or deformity.   Dupuytren contracture on left hand   Lymphadenopathy:     She has no cervical adenopathy.   Neurological: " She is alert and oriented to person, place, and time. She has normal reflexes.   Skin: No erythema. No pallor.   Psychiatric: She has a normal mood and affect. Her behavior is normal.     Office Visit on 01/02/2018   Component Date Value Ref Range Status   • Glucose 01/03/2018 91  65 - 99 mg/dL Final   • BUN 01/03/2018 13  8 - 23 mg/dL Final   • Creatinine 01/03/2018 0.86  0.57 - 1.00 mg/dL Final   • eGFR Non  Am 01/03/2018 65  >60 mL/min/1.73 Final   • eGFR African Am 01/03/2018 79  >60 mL/min/1.73 Final   • BUN/Creatinine Ratio 01/03/2018 15.1  7.0 - 25.0 Final   • Sodium 01/03/2018 139  136 - 145 mmol/L Final   • Potassium 01/03/2018 4.1  3.5 - 5.2 mmol/L Final   • Chloride 01/03/2018 98  98 - 107 mmol/L Final   • Total CO2 01/03/2018 29.1* 22.0 - 29.0 mmol/L Final   • Calcium 01/03/2018 9.9  8.6 - 10.5 mg/dL Final   • Total Protein 01/03/2018 7.6  6.0 - 8.5 g/dL Final   • Albumin 01/03/2018 4.50  3.50 - 5.20 g/dL Final   • Globulin 01/03/2018 3.1  gm/dL Final   • A/G Ratio 01/03/2018 1.5  g/dL Final   • Total Bilirubin 01/03/2018 0.2  0.1 - 1.2 mg/dL Final   • Alkaline Phosphatase 01/03/2018 154* 39 - 117 U/L Final   • AST (SGOT) 01/03/2018 23  1 - 32 U/L Final   • ALT (SGPT) 01/03/2018 20  1 - 33 U/L Final   • Total Cholesterol 01/03/2018 181  0 - 200 mg/dL Final   • Triglycerides 01/03/2018 70  0 - 150 mg/dL Final   • HDL Cholesterol 01/03/2018 95* 40 - 60 mg/dL Final   • VLDL Cholesterol 01/03/2018 14  5 - 40 mg/dL Final   • LDL Cholesterol  01/03/2018 72  0 - 100 mg/dL Final   • TSH 01/03/2018 0.760  0.270 - 4.200 mIU/mL Final   • Free T4 01/03/2018 1.12  0.93 - 1.70 ng/dL Final   • 25 Hydroxy, Vitamin D 01/03/2018 35.9  30.0 - 100.0 ng/mL Final    Comment: Reference Range for Total Vitamin D 25(OH)  Deficiency    <20.0 ng/mL  Insufficiency 21-29 ng/mL  Sufficiency    ng/mL  Toxicity      >100 ng/ml        • Interpretation 01/03/2018 Note   Final     Assessment/Plan   Mariela was seen today for  hypothyroidism, hyperlipidemia, sleep apnea and vitamin d deficiency.    Diagnoses and all orders for this visit:    Multinodular goiter  -     TSH  -     T4, Free    History of partial thyroidectomy  -     TSH  -     T4, Free    Postsurgical hypothyroidism  -     TSH  -     T4, Free  -     SYNTHROID 50 MCG tablet; Take 1 tablet by mouth Every Morning Before Breakfast.    Hyperlipidemia, unspecified hyperlipidemia type  -     Comprehensive Metabolic Panel  -     Lipid Panel    Vitamin D insufficiency  -     Comprehensive Metabolic Panel    Osteoporosis, unspecified osteoporosis type, unspecified pathological fracture presence  -     Comprehensive Metabolic Panel  -     Vitamin D 25 Hydroxy    Abnormal blood level of chromium  -     Chromium Level    Abnormal blood level of cobalt  -     Cobalt      Continue Synthroid 50 µg per day.  Check vitamin D levels.  Discussed about using Fosamax for osteoporosis.  Patient has elevated serum cobalt been chromium most likely due to her hip prosthesis.  She has been advised to see another orthopedic surgeon for evaluation.  Continue pravastatin 40 mg once a day.    RTC 4 mos

## 2018-04-10 LAB
25(OH)D3+25(OH)D2 SERPL-MCNC: 33.8 NG/ML (ref 30–100)
ALBUMIN SERPL-MCNC: 4.9 G/DL (ref 3.5–5.2)
ALBUMIN/GLOB SERPL: 1.8 G/DL
ALP SERPL-CCNC: 141 U/L (ref 39–117)
ALT SERPL-CCNC: 15 U/L (ref 1–33)
AST SERPL-CCNC: 20 U/L (ref 1–32)
BILIRUB SERPL-MCNC: 0.3 MG/DL (ref 0.1–1.2)
BUN SERPL-MCNC: 12 MG/DL (ref 8–23)
BUN/CREAT SERPL: 13.5 (ref 7–25)
CALCIUM SERPL-MCNC: 10.2 MG/DL (ref 8.6–10.5)
CHLORIDE SERPL-SCNC: 98 MMOL/L (ref 98–107)
CHOLEST SERPL-MCNC: 203 MG/DL (ref 0–200)
CO2 SERPL-SCNC: 28.2 MMOL/L (ref 22–29)
COBALT SERPL-MCNC: 8.2 UG/L (ref 0–0.9)
CR SERPL-MCNC: 6.8 UG/L (ref 0.1–2.1)
CREAT SERPL-MCNC: 0.89 MG/DL (ref 0.57–1)
GFR SERPLBLD CREATININE-BSD FMLA CKD-EPI: 63 ML/MIN/1.73
GFR SERPLBLD CREATININE-BSD FMLA CKD-EPI: 76 ML/MIN/1.73
GLOBULIN SER CALC-MCNC: 2.8 GM/DL
GLUCOSE SERPL-MCNC: 98 MG/DL (ref 65–99)
HDLC SERPL-MCNC: 98 MG/DL (ref 40–60)
INTERPRETATION: NORMAL
LDLC SERPL CALC-MCNC: 92 MG/DL (ref 0–100)
POTASSIUM SERPL-SCNC: 5 MMOL/L (ref 3.5–5.2)
PROT SERPL-MCNC: 7.7 G/DL (ref 6–8.5)
SODIUM SERPL-SCNC: 139 MMOL/L (ref 136–145)
T4 FREE SERPL-MCNC: 1.13 NG/DL (ref 0.93–1.7)
TRIGL SERPL-MCNC: 65 MG/DL (ref 0–150)
TSH SERPL DL<=0.005 MIU/L-ACNC: 1.16 MIU/ML (ref 0.27–4.2)
VLDLC SERPL CALC-MCNC: 13 MG/DL (ref 5–40)

## 2018-04-16 ENCOUNTER — TELEPHONE (OUTPATIENT)
Dept: ENDOCRINOLOGY | Age: 73
End: 2018-04-16

## 2018-04-16 RX ORDER — MELATONIN
1000 DAILY
Start: 2018-04-16

## 2018-04-16 NOTE — TELEPHONE ENCOUNTER
----- Message from Juanita Brice sent at 4/16/2018  1:08 PM EDT -----  Contact: DOMINGO / LOGAN MONTALVO REQUEST TO REFIL / ISUESS ON  MEDICATION:     MEDICATION: SYNTHROID 50 MCG tablet     MESSAGE:  MRS FORD FROM [facility] HAS CALLED IN REGARDS TO GETTING THE ABOVE MEDICATION IN GENERIC BRAND . BEFORE THEY ARE STATING THAT WE HAVE PUT THAT IS IT OKAY FOR THE PATIENT TO GET THE GENERIC BRAND NAME , HOWEVER, IT WAS NOT ON THE SCRIPT   THIS TIME.   PHARMACY IS ASKING FOR A CALL BACK      PHONE NUMBER:  [phone]

## 2018-05-31 ENCOUNTER — OFFICE VISIT (OUTPATIENT)
Dept: ORTHOPEDIC SURGERY | Facility: CLINIC | Age: 73
End: 2018-05-31

## 2018-05-31 VITALS — HEIGHT: 70 IN | BODY MASS INDEX: 28.2 KG/M2 | TEMPERATURE: 98.7 F | WEIGHT: 197 LBS

## 2018-05-31 DIAGNOSIS — M17.11 PRIMARY OSTEOARTHRITIS OF RIGHT KNEE: ICD-10-CM

## 2018-05-31 DIAGNOSIS — G89.29 BILATERAL CHRONIC KNEE PAIN: Primary | ICD-10-CM

## 2018-05-31 DIAGNOSIS — M25.561 BILATERAL CHRONIC KNEE PAIN: Primary | ICD-10-CM

## 2018-05-31 DIAGNOSIS — M25.562 BILATERAL CHRONIC KNEE PAIN: Primary | ICD-10-CM

## 2018-05-31 DIAGNOSIS — Z96.641 HISTORY OF TOTAL RIGHT HIP REPLACEMENT: ICD-10-CM

## 2018-05-31 PROCEDURE — 73562 X-RAY EXAM OF KNEE 3: CPT | Performed by: ORTHOPAEDIC SURGERY

## 2018-05-31 PROCEDURE — 99204 OFFICE O/P NEW MOD 45 MIN: CPT | Performed by: ORTHOPAEDIC SURGERY

## 2018-05-31 NOTE — PROGRESS NOTES
Patient: Mariela Ruiz  YOB: 1945 73 y.o. female  Medical Record Number: [id number]    Chief Complaints:   Chief Complaint   Patient presents with   • Left Knee - Pain, Establish Care   • Right Knee - Pain, Establish Care   • Left Hip - Establish Care       History of Present Illness:Mariela Ruiz is a 73 y.o. female who presents with Multiple issues.  She had a left knee replacement done in 2013 by Dr. Harris.  She ended up having multiple revisions for infection including a spacer and later reimplantation by Dr. Cesar which was done about a year and half ago.  She has some tightness within the knee but overall she is doing fairly well especially given the circumstances.  She has severe medial right knee pain which she describes as a stabbing aching pain it has worsened over the last year.  She notes the knee does not straighten.  It limits her basic activities.  Additionally, she had a right hip replacement performed by Dr. Harris.  It is a [place] ASR acetabular component.  She has had recent cobalt and chromium levels.  She has no symptoms within the hip.    Allergies:   Allergies   Allergen Reactions   • Clindamycin/Lincomycin Itching   • Sulfa Antibiotics      RASH   • Duricef [Cefadroxil] Hives and Rash   • Keflex [Cephalexin] Rash       Medications:   Current Outpatient Prescriptions   Medication Sig Dispense Refill   • cholecalciferol (VITAMIN D3) 1000 units tablet Take 1 tablet by mouth Daily.     • Coenzyme Q10 (CO Q-10) 200 MG capsule Take 100 mg by mouth Daily. HOLD PRIOR TO SURGERY     • Magnesium Oxide (MAG-200 PO) Take 1 tablet/day by mouth.     • PHENobarbital (LUMINAL) 64.8 MG tablet Take 3 Tablets by mouth every day 270 tablet 3   • pramipexole (MIRAPEX) 0.5 MG tablet TAKE 3 TABLETS BY MOUTH EVERY  tablet 0   • pravastatin (PRAVACHOL) 40 MG tablet Take 40 mg by mouth Daily.     • S-Adenosylmethionine (STEFFEN-E) 400 MG tablet Take 1 tablet/day by mouth.     • SYNTHROID 50 MCG  "tablet Take 1 tablet by mouth Every Morning Before Breakfast. 90 tablet 1     No current facility-administered medications for this visit.          The following portions of the patient's history were reviewed and updated as appropriate: allergies, current medications, past family history, past medical history, past social history, past surgical history and problem list.    Review of Systems:   A 14 point review of systems was performed. All systems negative except pertinent positives/negative listed in HPI above    Physical Exam:   Vitals:    05/31/18 0829   Temp: 98.7 °F (37.1 °C)   Weight: 89.4 kg (197 lb)   Height: 177.8 cm (70\")   PainSc:   5       General: A and O x 3, ASA, NAD    SCLERA:    Normal    DENTITION:   Normal  Knee:  right    ALIGNMENT:     Varus  ,   Patella  tracks  midline    GAIT:    Antalgic    SKIN:    No abnormality    RANGE OF MOTION:   7  -  100   DEG    STRENGTH:   4  / 5    LIGAMENTS:    No varus / valgus instability.   Negative  Lachman.    MENISCUS:     Negative   Surjit       DISTAL PULSES:    Paplable    DISTAL SENSATION :   Intact    LYMPHATICS:     No   lymphadenopathy    OTHER:          - Positive   effusion      - Crepitance with ROM    Knee:  left    ALIGNMENT:     Neutral  ,   Patella  tracks  Midline without crepitance    GAIT:    Nonantalgic    SKIN:    Well healed midline incision, no erythema or fluctuance    RANGE OF MOTION:   0  -  120   DEG    STRENGTH:   5  / 5    LIGAMENTS:    No varus / valgus instability.   No  Flexion   instability.       DISTAL PULSES:    Paplable    DISTAL SENSATION :   Intact    LYMPHATICS:     No   lymphadenopathy    OTHER:     No   Effusion      Calf soft / nontender ,   Negative Lani's sign    Hip:  right    LEG ALIGNMENT:     Neutral   ,    equal leg lengths    GAIT:     Nonantalgic    SKIN:     No abnormality    RANGE OF MOTION:      Full without joint irritability    STRENGTH:     5 / 5    hip flexion and abduction    DISTAL PULSES:    " Paplable    DISTAL SENSATION :   Intact    LYMPHATICS:     No   lymphadenopathy    OTHER:          - Negative Stinchfeld test      - Negative log roll      - No Tenderness to palpation trochanteric bursa      - Neg FADIR      - Neg KYM      - No SI tenderness                   Radiology:  Xrays 3views both knees (ap,lateral, sunrise) were ordered and reviewed for evaluation of knee pain demonstrating advanced right knee  varus osteoarthritis with bone on bone articulation, subchondral cysts, and periarticular osteophytes and a well positioned revision left  knee replacement without evidence of wear, loosening or osteolysis.  There are no previous films for comparison    X-rays of the right hip taken in November visualized on Epic show a [place] stem and ASR acetabular component.  There is no evidence of loosening they appear appropriately aligned    Labs: Recent cobalt and chromium levels (4/18) are still in single digits.  Chromium levels 6.8, cobalt level 8.2      Assessment/Plan: Multiply revised left knee.  Overall functioning well no treatment recommended    Right knee advanced end-stage osteoarthritis.  She will go on to require knee replacement.  I've recommended 10 pound weight loss, we'll send her to physical therapy.  I will see her back in 2 months we'll likely discuss knee replacement at that time.  She has had previous cortisone injections by Ellen at Dr. Cesar's office without any significant relief.    Right hip [place] ASR with elevated cobalt and chromium levels.  She is asymptomatic and there still single digit so we'll continue to follow.  I have given her a prescription for cobalt and chromium levels to be performed in November and she will call for results.      Ed Chun MD  5/31/2018

## 2018-06-01 ENCOUNTER — TELEPHONE (OUTPATIENT)
Dept: ORTHOPEDIC SURGERY | Facility: CLINIC | Age: 73
End: 2018-06-01

## 2018-06-04 ENCOUNTER — HOSPITAL ENCOUNTER (OUTPATIENT)
Dept: PHYSICAL THERAPY | Facility: HOSPITAL | Age: 73
Discharge: HOME | End: 2018-06-04
Attending: ORTHOPAEDIC SURGERY | Admitting: ORTHOPAEDIC SURGERY

## 2018-06-04 DIAGNOSIS — M25.661 KNEE STIFFNESS, RIGHT: ICD-10-CM

## 2018-06-04 DIAGNOSIS — M62.81 MUSCLE WEAKNESS OF LOWER EXTREMITY: ICD-10-CM

## 2018-06-04 DIAGNOSIS — M17.11 OSTEOARTHRITIS OF RIGHT KNEE, UNSPECIFIED OSTEOARTHRITIS TYPE: Primary | ICD-10-CM

## 2018-06-04 PROCEDURE — G8978 MOBILITY CURRENT STATUS: HCPCS

## 2018-06-04 PROCEDURE — G8979 MOBILITY GOAL STATUS: HCPCS

## 2018-06-04 PROCEDURE — 97140 MANUAL THERAPY 1/> REGIONS: CPT

## 2018-06-04 PROCEDURE — 97161 PT EVAL LOW COMPLEX 20 MIN: CPT

## 2018-06-04 NOTE — THERAPY TREATMENT NOTE
Outpatient Physical Therapy Ortho Initial Evaluation  [facility]     Patient Name: Mariela Ruiz  : 1945  MRN: [id number]  Today's Date: 2018      Visit Date: 2018    Patient Active Problem List   Diagnosis   • Seizure disorder   • Hyperlipidemia   • Vitamin D insufficiency   • Osteoporosis   • Sleep apnea   • Chronic venous insufficiency   • Postsurgical hypothyroidism   • Chronic fatigue syndrome   • Gastroesophageal reflux disease   • Dupuytren's contracture   • History of biliary T-tube placement   • History of partial thyroidectomy   • Mitral valve prolapse   • Multinodular goiter   • Right fascicular block   • Restless legs syndrome   • History of cardiac catheterization   • Urge incontinence of urine   • Left knee pain   • Ligamentous laxity of knee   • DJD (degenerative joint disease) of knee   • Adverse drug effect, subsequent encounter   • Abnormal blood level of chromium   • Abnormal blood level of cobalt        Past Medical History:   Diagnosis Date   • Chronic venous insufficiency    • Dupuytren's contracture    • History of staph infection     S/P KNEE DEC 2016   • History of transfusion    • Hyperlipidemia    • Nontoxic multinodular goiter    • Osteoporosis     Dr. Cabrera   • Postsurgical hypothyroidism    • Restless leg syndrome    • Seizure disorder     Dr. Whitman, HX  LAST SEIZURE   • Sleep apnea     DOES NOT HAVE MACHINE   • Vitamin D insufficiency         Past Surgical History:   Procedure Laterality Date   • APPENDECTOMY     • CARDIAC CATHETERIZATION      NORMAL    • KNEE MINI REVISION Left 11/15/2016    Procedure: LEFT KNEE POLY CHANGE WITH HI POST STABILIZER;  Surgeon: Pablito Claudio MD;  Location: [facility];  Service:    • KNEE MINI REVISION Left 2016    Procedure: LT KNEE REMOVAL/REPLACEMENT LOCKING PIN ;  Surgeon: Pablito Claudio MD;  Location: [facility];  Service:    • MAMMO FINE NEEDLE ASPIRATION UNILATERAL      RIGHT THYROID NODULE,   BENIGN    • MD REVISE KNEE JOINT REPLACE,1 PART Left 2/20/2017    Procedure: LT KNEE REMOVAL ANTIBIOTIC SPACER AND KNEE REVISION;  Surgeon: Christiano Cesar MD;  Location: [facility] OR;  Service: Orthopedics   • MD TOTAL KNEE ARTHROPLASTY Left 12/24/2016    Procedure: LEFT KNEE WASHOUT WITH POLY CHANGE;  Surgeon: Christiano Cesar MD;  Location: [facility] OR;  Service: Orthopedics   • REPLACEMENT TOTAL KNEE Left    • THYROIDECTOMY, PARTIAL      1979 LEFT LOBECTOMY AND ISTHMECTOMY    • TOTAL ABDOMINAL HYSTERECTOMY WITH SALPINGO OOPHORECTOMY     • TOTAL HIP ARTHROPLASTY Left    • TOTAL KNEE  PROSTHESIS REMOVAL W/ SPACER INSERTION Left 12/29/2016    Procedure: LT KNEE IMPLANT REMOVAL WITH INSERTION OF SPACER ;  Surgeon: Christiano Cesar MD;  Location: [facility] OR;  Service:        Visit Dx:     ICD-10-CM ICD-9-CM   1. Osteoarthritis of right knee, unspecified osteoarthritis type M17.11 715.96   2. Knee stiffness, right M25.661 719.56   3. Muscle weakness of lower extremity M62.81 728.87             Patient History     Row Name 06/04/18 1300 06/04/18 0900          History    Chief Complaint Pain;Swelling;Tightness  -LP  --     Type of Pain Knee pain  -LP  --     Date Current Problem(s) Began 05/04/18  -LP  --     Brief Description of Current Complaint Patient has extensive history of L knee(TKA with revisions in 2017)  She was seen in Feb for the L knee.  She is now havin gincreasing pain in R. knee due to O-A also.  No specific injury.  Pt also has a history of possible lymphadema issues in LE's.  She also states that her toes have been numb since 2017.  -LP  --        Pain     Pain at Present  -- 0  -LP     Pain at Best  -- 0  -LP     Pain at Worst  -- 5  -LP     What Performance Factors Make the Current Problem(s) WORSE?  -- gardening, walking to long, steps   doesnt have to do steps very much  -LP     What Performance Factors Make the Current Problem(s) BETTER?  -- nothing  -LP        Fall Risk Assessment     Any falls in the past year:  -- No   has stumbled the last few days  -LP       User Key  (r) = Recorded By, (t) = Taken By, (c) = Cosigned By    Initials Name Provider Type    LP Deja Adams PT Physical Therapist                PT Ortho     Row Name 06/04/18 1300       Subjective Pain    Able to rate subjective pain? yes  -LP    Pre-Treatment Pain Level 0  -LP    Post-Treatment Pain Level 0  -LP    Subjective Pain Comment sometimes pain is a 5, but right now it is not hurting.  -LP       Posture/Observations    Posture/Observations Comments forward head and shoulders. pt leans forward.  Valgus noted in bilateral L'E's.   swelling noted in both LE's L>R   Lymphadema??  -LP       DTR- Lower Quarter Clearing    Patellar tendon (L2-4) Bilateral:;2- Normal response  -LP    Achilles tendon (S1-2) Bilateral:;1- Minimal response  -LP       Myotomal Screen- Lower Quarter Clearing    Hip flexion (L2) Bilateral:;3+ (Fair +)  -LP    Knee extension (L3) Bilateral:;3+ (Fair +)  -LP    Ankle DF (L4) Bilateral:;3 (Fair)  -LP    Great toe extension (L5) Bilateral:;3 (Fair)  -LP    Ankle PF (S1) Bilateral:;3+ (Fair +)  -LP    Knee flexion (S2) Bilateral:;3+ (Fair +)  -LP       Right Lower Ext    Rt Hip ABduction AROM 42  -LP    Rt Hip Flexion AROM 101  -LP    Rt Knee Extension/Flexion AROM -  -LP       Left Lower Ext    Lt Hip ABduction AROM 45  -LP    Lt Hip Flexion AROM 107  -LP    Lt Knee Extension/Flexion AROM -4-120  -LP       Sensation    Additional Comments slightly decreased at feet.  -LP       Lower Extremity Flexibility    Hamstrings Bilateral:;Mildly limited  -LP       Balance Skills Training    SLS R=5-7 sec(wobbly) L=7-10 sec (stable)  -LP       Gait/Stairs Assessment/Training    Stairs, Safety Issues --   [place]  -LP    Comment (Gait/Stairs) Decreased weight shift to R.  No loss of balance noted today, but she states she has been stumbling a bit.  Avoids heel strike on the R.  -LP       User Key  (r) = Recorded By, (t) = Taken By, (c) = Cosigned By    Initials Name Provider Type    LP Deja Adams, FARHANA Physical Therapist                      Therapy Education  Given: HEP, Symptoms/condition management, Posture/body mechanics, Fall prevention and home safety  Program: New  How Provided: Verbal, Demonstration, Written  Provided to: Patient  Level of Understanding: Teach back education performed           PT OP Goals     Row Name 06/04/18 1300          PT Short Term Goals    STG Date to Achieve 06/18/18  -LP     STG 1 Pt will be independent with HEP.   -LP     STG 1 Progress New  -LP     STG 2 Increase R knee ext to -10, and flexion to 120.  -LP     STG 2 Progress New  -LP     STG 3 Pt able to complete higher level balance activities: tandem walk, backward walk.  -LP     STG 3 Progress New  -LP     STG 4 Increase SLS stance time R=10 sec or more.  -LP     STG 4 Progress New  -LP        Long Term Goals    LTG Date to Achieve 07/09/18  -LP     LTG 1 Decreased pain in R knee to 3/10 or less after trip to store.  -LP     LTG 1 Progress New  -LP     LTG 2 Improved Knee survey score by 3 points.  -LP     LTG 2 Progress New  -LP     LTG 3 increase R knee ext to -6.  -LP     LTG 3 Progress New  -LP        Time Calculation    PT Goal Re-Cert Due Date 09/04/18  -LP       User Key  (r) = Recorded By, (t) = Taken By, (c) = Cosigned By    Initials Name Provider Type    CHARLIE Adams PT Physical Therapist                PT Assessment/Plan     Row Name 06/04/18 1350          PT Assessment    Functional Limitations Impaired gait;Performance in leisure activities;Limitations in community activities;Performance in work activities;Limitation in home management  -LP     Impairments Gait;Endurance;Impaired muscle length;Impaired muscle endurance;Impaired muscle power;Joint mobility;Pain;Muscle strength;Range of motion;Poor body mechanics  -LP     Assessment Comments Mrs. Ruiz comes today with c/o  increasing pain in R knee for the last few months.  She has had a total knee replacment on the L, with a revision due to staff infection.  Pt also has noted swelling in both LE's.  R knee flexion/ext is limited (-).  Muscle weakness noted for both LE's; hips and knees,and she describes overall lowered endurance with household and community activities.  -LP     Please refer to paper survey for additional self-reported information Yes  -LP     Rehab Potential Excellent  -LP     Patient/caregiver participated in establishment of treatment plan and goals Yes  -LP     Patient would benefit from skilled therapy intervention Yes  -LP        PT Plan    PT Frequency 2x/week  -LP     Predicted Duration of Therapy Intervention (OT Eval) 4-6 weeks  -LP     Planned CPT's? PT EVAL LOW COMPLEXITY: 33319;PT MANUAL THERAPY EA 15 MIN: 24980;PT THER PROC EA 15 MIN: 51177;PT NEUROMUSC RE-EDUCATION EA 15 MIN: 14248;PT HOT OR COLD PACK TREAT MCARE;PT ELECTRICAL STIM UNATTEND:   -LP     Physical Therapy Interventions (Optional Details) balance training;modalities;manual therapy techniques;home exercise program;joint mobilization;ROM (Range of Motion);stair training;strengthening;stretching  -LP     PT Plan Comments progress strengthening and ROM exercises for right knee.  -LP       User Key  (r) = Recorded By, (t) = Taken By, (c) = Cosigned By    Initials Name Provider Type    LP Deja Adams, PT Physical Therapist                  Exercises     Row Name 06/04/18 1300             Subjective Pain    Able to rate subjective pain? yes  -LP      Pre-Treatment Pain Level 0  -LP      Post-Treatment Pain Level 0  -LP      Subjective Pain Comment sometimes pain is a 5, but right now it is not hurting.  -LP         Exercise 1    Exercise Name 1 QS  -LP         Exercise 2    Exercise Name 2 SAQ  -LP         Exercise 3    Exercise Name 3 SLR  -LP         Exercise 4    Exercise Name 4 hamstr stretch 90/90  -LP        User Key  (r) =  Recorded By, (t) = Taken By, (c) = Cosigned By    Initials Name Provider Type    LP Deja Adams, PT Physical Therapist                        Outcome Measure Options: Knee Outcome Score- ADL  5 Times Sit to Stand  5 Times Sit to Stand (seconds): 15.3 seconds (uses hands)  5 Times Sit to Stand Comments: can sit to stand off plinth with no hands ( bed raised higher than avg chair height  Knee Outcome Score  Knee Outcome Score Comments: 54%      Time Calculation:   Start Time: 0900  Stop Time: 0945  Time Calculation (min): 45 min     Therapy Charges for Today     Code Description Service Date Service Provider Modifiers Qty    00229982719 HC PT MOBILITY CURRENT 6/4/2018 Deja Adams, PT GP, CK 1    03169618743 HC PT MOBILITY PROJECTED 6/4/2018 Deja Adams, PT GP, CJ 1    44567856029 HC PT EVAL LOW COMPLEXITY 2 6/4/2018 Deja Adams, PT GP 1    47487560913 HC PT MANUAL THERAPY EA 15 MIN 6/4/2018 Deja Adams, PT GP 1          PT G-Codes  PT Professional Judgement Used?: Yes  Outcome Measure Options: Knee Outcome Score- ADL  Score: 54%  Functional Limitation: Mobility: Walking and moving around  Mobility: Walking and Moving Around Current Status (): At least 40 percent but less than 60 percent impaired, limited or restricted  Mobility: Walking and Moving Around Goal Status (): At least 20 percent but less than 40 percent impaired, limited or restricted         Deja Adams PT  6/4/2018

## 2018-06-08 ENCOUNTER — HOSPITAL ENCOUNTER (OUTPATIENT)
Dept: PHYSICAL THERAPY | Facility: HOSPITAL | Age: 73
Setting detail: THERAPIES SERIES
Discharge: HOME | End: 2018-06-08

## 2018-06-08 DIAGNOSIS — M17.11 OSTEOARTHRITIS OF RIGHT KNEE, UNSPECIFIED OSTEOARTHRITIS TYPE: Primary | ICD-10-CM

## 2018-06-08 DIAGNOSIS — M25.661 KNEE STIFFNESS, RIGHT: ICD-10-CM

## 2018-06-08 DIAGNOSIS — M62.81 MUSCLE WEAKNESS OF LOWER EXTREMITY: ICD-10-CM

## 2018-06-08 DIAGNOSIS — M25.562 LEFT KNEE PAIN, UNSPECIFIED CHRONICITY: ICD-10-CM

## 2018-06-08 PROCEDURE — 97110 THERAPEUTIC EXERCISES: CPT | Performed by: PHYSICAL THERAPIST

## 2018-06-08 PROCEDURE — 97116 GAIT TRAINING THERAPY: CPT | Performed by: PHYSICAL THERAPIST

## 2018-06-08 NOTE — THERAPY TREATMENT NOTE
Outpatient Physical Therapy Ortho Treatment Note  [facility]     Patient Name: Mariela Ruiz  : 1945  MRN: [id number]  Today's Date: 2018      Visit Date: 2018    Visit Dx:    ICD-10-CM ICD-9-CM   1. Osteoarthritis of right knee, unspecified osteoarthritis type M17.11 715.96   2. Knee stiffness, right M25.661 719.56   3. Muscle weakness of lower extremity M62.81 728.87   4. Left knee pain, unspecified chronicity M25.562 719.46       Patient Active Problem List   Diagnosis   • Seizure disorder   • Hyperlipidemia   • Vitamin D insufficiency   • Osteoporosis   • Sleep apnea   • Chronic venous insufficiency   • Postsurgical hypothyroidism   • Chronic fatigue syndrome   • Gastroesophageal reflux disease   • Dupuytren's contracture   • History of biliary T-tube placement   • History of partial thyroidectomy   • Mitral valve prolapse   • Multinodular goiter   • Right fascicular block   • Restless legs syndrome   • History of cardiac catheterization   • Urge incontinence of urine   • Left knee pain   • Ligamentous laxity of knee   • DJD (degenerative joint disease) of knee   • Adverse drug effect, subsequent encounter   • Abnormal blood level of chromium   • Abnormal blood level of cobalt        Past Medical History:   Diagnosis Date   • Chronic venous insufficiency    • Dupuytren's contracture    • History of staph infection     S/P KNEE DEC 2016   • History of transfusion    • Hyperlipidemia    • Nontoxic multinodular goiter    • Osteoporosis     Dr. Cabrera   • Postsurgical hypothyroidism    • Restless leg syndrome    • Seizure disorder     Dr. Whitman, HX  LAST SEIZURE   • Sleep apnea     DOES NOT HAVE MACHINE   • Vitamin D insufficiency         Past Surgical History:   Procedure Laterality Date   • APPENDECTOMY     • CARDIAC CATHETERIZATION      NORMAL    • KNEE MINI REVISION Left 11/15/2016    Procedure: LEFT KNEE POLY CHANGE WITH HI POST STABILIZER;  Surgeon: Pablito Claudio MD;   Location: [facility] OR;  Service:    • KNEE MINI REVISION Left 12/13/2016    Procedure: LT KNEE REMOVAL/REPLACEMENT LOCKING PIN ;  Surgeon: Pablito Claudio MD;  Location: [facility] OR;  Service:    • MAMMO FINE NEEDLE ASPIRATION UNILATERAL      RIGHT THYROID NODULE, 2009 BENIGN    • NC REVISE KNEE JOINT REPLACE,1 PART Left 2/20/2017    Procedure: LT KNEE REMOVAL ANTIBIOTIC SPACER AND KNEE REVISION;  Surgeon: Christiano Cesar MD;  Location: [facility] OR;  Service: Orthopedics   • NC TOTAL KNEE ARTHROPLASTY Left 12/24/2016    Procedure: LEFT KNEE WASHOUT WITH POLY CHANGE;  Surgeon: Christiano Cesar MD;  Location: [facility] OR;  Service: Orthopedics   • REPLACEMENT TOTAL KNEE Left    • THYROIDECTOMY, PARTIAL      1979 LEFT LOBECTOMY AND ISTHMECTOMY    • TOTAL ABDOMINAL HYSTERECTOMY WITH SALPINGO OOPHORECTOMY     • TOTAL HIP ARTHROPLASTY Left    • TOTAL KNEE  PROSTHESIS REMOVAL W/ SPACER INSERTION Left 12/29/2016    Procedure: LT KNEE IMPLANT REMOVAL WITH INSERTION OF SPACER ;  Surgeon: Christiano Cesar MD;  Location: [facility];  Service:                              PT Assessment/Plan     Row Name 06/08/18 0902          PT Assessment    Assessment Comments Worked a little on gait training today (10 min). Mrs. Ruiz prefers to not use SC however she presents with significant R Le antagia during gait. Recommend use of SC to maintain more level pelvis in order to avoid future problems with LBP and hip pain. Also worked on equal step length and heel strike at initial contact. SLs performed with intermittent UE support and tandem walking performed with min A from PT.   -        PT Plan    PT Plan Comments Progress strengthening and ROM exercises for right knee and work on balance/gait  -       User Key  (r) = Recorded By, (t) = Taken By, (c) = Cosigned By    Initials Name Provider Type    VIKI Middleton PT Physical Therapist                    Exercises     Row Name 06/08/18 0900             Subjective  "Comments    Subjective Comments I just can't imagine going through another knee surgery  -KH         Subjective Pain    Able to rate subjective pain? yes  -KH      Pre-Treatment Pain Level 1  -KH      Post-Treatment Pain Level 1  -KH      Subjective Pain Comment just sore right now  -KH         Exercise 1    Exercise Name 1 QS  -KH      Sets 1 2  -KH      Reps 1 10  -KH         Exercise 2    Exercise Name 2 SAQ/LAQ  -KH      Sets 2 2  -KH      Reps 2 10  -KH      Additional Comments 3#  -KH         Exercise 3    Exercise Name 3 SLR  -KH      Sets 3 2  -KH      Reps 3 10  -KH         Exercise 4    Exercise Name 4 hamstr stretch 90/90  -KH      Reps 4 2  -KH      Time 4 30 sec  -KH         Exercise 5    Exercise Name 5 NuStep L4  -KH      Time 5 5 min  -KH         Exercise 6    Exercise Name 6 hamstring curls  -KH      Sets 6 2  -KH      Reps 6 10  -KH      Additional Comments GTB  -KH         Exercise 7    Exercise Name 7 Toe taps alternating 4\" step  -KH      Reps 7 20  -KH         Exercise 8    Exercise Name 8 tandem walk with min A  -KH      Reps 8 4  -KH      Additional Comments 20 feet  -KH         Exercise 9    Exercise Name 9 SLS with intermittent UE support  -KH      Reps 9 2  -KH      Time 9 30 sec  -KH         Exercise 10    Exercise Name 10 Gait training  -KH      Time 10 10 min  -KH      Additional Comments empahsis on using cane for R Le WB, walking with even step and a step through pattern, achieving right foot heel strike at initial contact, and maintaining even pelvis  -KH        User Key  (r) = Recorded By, (t) = Taken By, (c) = Cosigned By    Initials Name Provider Type     Valery Middleton PT Physical Therapist                  Time Calculation:   Start Time: 0857  Stop Time: 0940  Time Calculation (min): 43 min  Total Timed Code Minutes- PT: 43 minute(s)    Therapy Charges for Today     Code Description Service Date Service Provider Modifiers Qty    88181519484  PT THER PROC EA 15 MIN 6/8/2018 " Valery Middleton, PT GP 2    49616359343 HC GAIT TRAINING EA 15 MIN 6/8/2018 Valery Middleton, PT GP 1                    Valery Middleton, PT  6/8/2018

## 2018-06-12 ENCOUNTER — HOSPITAL ENCOUNTER (OUTPATIENT)
Dept: PHYSICAL THERAPY | Facility: HOSPITAL | Age: 73
Setting detail: THERAPIES SERIES
Discharge: HOME | End: 2018-06-12

## 2018-06-12 DIAGNOSIS — M25.661 KNEE STIFFNESS, RIGHT: ICD-10-CM

## 2018-06-12 DIAGNOSIS — M17.11 OSTEOARTHRITIS OF RIGHT KNEE, UNSPECIFIED OSTEOARTHRITIS TYPE: Primary | ICD-10-CM

## 2018-06-12 DIAGNOSIS — M62.81 MUSCLE WEAKNESS OF LOWER EXTREMITY: ICD-10-CM

## 2018-06-12 PROCEDURE — 97110 THERAPEUTIC EXERCISES: CPT

## 2018-06-12 NOTE — THERAPY TREATMENT NOTE
Outpatient Physical Therapy Ortho Treatment Note  [facility]     Patient Name: Mariela Ruiz  : 1945  MRN: [id number]  Today's Date: 2018      Visit Date: 2018    Visit Dx:    ICD-10-CM ICD-9-CM   1. Osteoarthritis of right knee, unspecified osteoarthritis type M17.11 715.96   2. Knee stiffness, right M25.661 719.56   3. Muscle weakness of lower extremity M62.81 728.87       Patient Active Problem List   Diagnosis   • Seizure disorder   • Hyperlipidemia   • Vitamin D insufficiency   • Osteoporosis   • Sleep apnea   • Chronic venous insufficiency   • Postsurgical hypothyroidism   • Chronic fatigue syndrome   • Gastroesophageal reflux disease   • Dupuytren's contracture   • History of biliary T-tube placement   • History of partial thyroidectomy   • Mitral valve prolapse   • Multinodular goiter   • Right fascicular block   • Restless legs syndrome   • History of cardiac catheterization   • Urge incontinence of urine   • Left knee pain   • Ligamentous laxity of knee   • DJD (degenerative joint disease) of knee   • Adverse drug effect, subsequent encounter   • Abnormal blood level of chromium   • Abnormal blood level of cobalt        Past Medical History:   Diagnosis Date   • Chronic venous insufficiency    • Dupuytren's contracture    • History of staph infection     S/P KNEE DEC 2016   • History of transfusion    • Hyperlipidemia    • Nontoxic multinodular goiter    • Osteoporosis     Dr. Cabrera   • Postsurgical hypothyroidism    • Restless leg syndrome    • Seizure disorder     Dr. Whitman, HX  LAST SEIZURE   • Sleep apnea     DOES NOT HAVE MACHINE   • Vitamin D insufficiency         Past Surgical History:   Procedure Laterality Date   • APPENDECTOMY     • CARDIAC CATHETERIZATION      NORMAL    • KNEE MINI REVISION Left 11/15/2016    Procedure: LEFT KNEE POLY CHANGE WITH HI POST STABILIZER;  Surgeon: Pablito Claudio MD;  Location: [facility];  Service:    • KNEE MINI REVISION Left  12/13/2016    Procedure: LT KNEE REMOVAL/REPLACEMENT LOCKING PIN ;  Surgeon: Pablito Claudio MD;  Location: [facility] OR;  Service:    • MAMMO FINE NEEDLE ASPIRATION UNILATERAL      RIGHT THYROID NODULE, 2009 BENIGN    • ID REVISE KNEE JOINT REPLACE,1 PART Left 2/20/2017    Procedure: LT KNEE REMOVAL ANTIBIOTIC SPACER AND KNEE REVISION;  Surgeon: Christiano Cesar MD;  Location: [facility] OR;  Service: Orthopedics   • ID TOTAL KNEE ARTHROPLASTY Left 12/24/2016    Procedure: LEFT KNEE WASHOUT WITH POLY CHANGE;  Surgeon: Christiano Cesar MD;  Location: [facility] OR;  Service: Orthopedics   • REPLACEMENT TOTAL KNEE Left    • THYROIDECTOMY, PARTIAL      1979 LEFT LOBECTOMY AND ISTHMECTOMY    • TOTAL ABDOMINAL HYSTERECTOMY WITH SALPINGO OOPHORECTOMY     • TOTAL HIP ARTHROPLASTY Left    • TOTAL KNEE  PROSTHESIS REMOVAL W/ SPACER INSERTION Left 12/29/2016    Procedure: LT KNEE IMPLANT REMOVAL WITH INSERTION OF SPACER ;  Surgeon: Christiano Cesar MD;  Location: [facility];  Service:              PT Ortho     Row Name 06/12/18 0800       Subjective Comments    Subjective Comments Sore after last session and didn't do ex couple of days.  -SI       Subjective Pain    Able to rate subjective pain? yes  -SI    Pre-Treatment Pain Level 0  -SI    Post-Treatment Pain Level 0  -SI    Subjective Pain Comment Little sore whild doing ex but OK today  -SI      User Key  (r) = Recorded By, (t) = Taken By, (c) = Cosigned By    Initials Name Provider Type    YANCI Corona PTA Physical Therapy Assistant                            PT Assessment/Plan     Row Name 06/12/18 0840          PT Assessment    Assessment Comments Ex modified and progressed.  To elevate BID with edema BLE's, ice right knee as needed.  Will change HS stretch next session  -SI       User Key  (r) = Recorded By, (t) = Taken By, (c) = Cosigned By    Initials Name Provider Type    YANCI Corona PTA Physical Therapy Assistant                Modalities   "   Row Name 06/12/18 0800             Ice    Patient reports will apply ice at home to involved area Yes  -SI        User Key  (r) = Recorded By, (t) = Taken By, (c) = Cosigned By    Initials Name Provider Type    YANCI Corona PTA Physical Therapy Assistant                Exercises     Row Name 06/12/18 0800             Subjective Comments    Subjective Comments Sore after last session and didn't do ex couple of days.  -SI         Subjective Pain    Able to rate subjective pain? yes  -SI      Pre-Treatment Pain Level 0  -SI      Post-Treatment Pain Level 0  -SI      Subjective Pain Comment Little sore whild doing ex but OK today  -SI         Exercise 1    Exercise Name 1 QS  -SI      Sets 1 2  -SI      Reps 1 10  -SI         Exercise 2    Exercise Name 2 SAQ/LAQ  -SI      Sets 2 2  -SI      Reps 2 10  -SI      Additional Comments 2lbs  -SI         Exercise 3    Exercise Name 3 SLR  -SI      Sets 3 1  -SI      Reps 3 10  -SI      Additional Comments right leg only (MAXIMO on L)  -SI         Exercise 4    Exercise Name 4 hamstr stretch 90/90  -SI      Reps 4 2  -SI      Time 4 30 sec  -SI         Exercise 5    Exercise Name 5 NuStep L4  -SI      Time 5 5 min  -SI         Exercise 6    Exercise Name 6 hamstring curls  -SI      Sets 6 2  -SI      Reps 6 10  -SI      Additional Comments -----------------------  -SI         Exercise 7    Exercise Name 7 Toe taps alternating 4\" step  -SI      Reps 7 20  -SI      Additional Comments ---------------------  -SI         Exercise 8    Exercise Name 8 tandem walk with min A  -SI      Reps 8 4  -SI      Additional Comments -----------------------  -SI         Exercise 9    Exercise Name 9 SLS with intermittent UE support  -SI      Reps 9 2  -SI      Time 9 30 sec  -SI      Additional Comments -----------------------  -SI         Exercise 10    Exercise Name 10 Gait training  -SI      Time 10 10 min  -SI      Additional Comments ---------------------  -SI         Exercise 11    " Exercise Name 11 Gastroc stretch with towel.  -SI      Reps 11 3  -SI      Time 11 20  -SI         Exercise 12    Exercise Name 12 hip abd side  -SI      Reps 12 2  -SI      Time 12 10  -SI      Additional Comments 2 sets on R, work up to 2 sets on L.  -SI         Exercise 13    Exercise Name 13 sit, heel prop for ext.  -SI      Time 13 3-5 min on right  -SI        User Key  (r) = Recorded By, (t) = Taken By, (c) = Cosigned By    Initials Name Provider Type    SI Daniella Corona PTA Physical Therapy Assistant                             Therapy Education  Given: HEP, Symptoms/condition management  Program: Modified  How Provided: Verbal, Demonstration, Written  Provided to: Patient  Level of Understanding: Teach back education performed              Time Calculation:   Start Time: 0800  Stop Time: 0841  Time Calculation (min): 41 min  Total Timed Code Minutes- PT: 41 minute(s)  Therapy Suggested Charges     Code   Minutes Charges    None           Therapy Charges for Today     Code Description Service Date Service Provider Modifiers Qty    94811167851 HC PT THER PROC EA 15 MIN 6/12/2018 Daniella Corona PTA GP 3                    Daniella Corona PTA  6/12/2018

## 2018-06-14 ENCOUNTER — HOSPITAL ENCOUNTER (OUTPATIENT)
Dept: PHYSICAL THERAPY | Facility: HOSPITAL | Age: 73
Setting detail: THERAPIES SERIES
Discharge: HOME | End: 2018-06-14

## 2018-06-14 DIAGNOSIS — M25.562 LEFT KNEE PAIN, UNSPECIFIED CHRONICITY: ICD-10-CM

## 2018-06-14 DIAGNOSIS — M25.661 KNEE STIFFNESS, RIGHT: ICD-10-CM

## 2018-06-14 DIAGNOSIS — M17.11 OSTEOARTHRITIS OF RIGHT KNEE, UNSPECIFIED OSTEOARTHRITIS TYPE: Primary | ICD-10-CM

## 2018-06-14 DIAGNOSIS — M62.81 MUSCLE WEAKNESS OF LOWER EXTREMITY: ICD-10-CM

## 2018-06-14 PROCEDURE — 97110 THERAPEUTIC EXERCISES: CPT

## 2018-06-14 NOTE — THERAPY TREATMENT NOTE
Outpatient Physical Therapy Ortho Treatment Note  [facility]     Patient Name: Mariela Ruiz  : 1945  MRN: [id number]  Today's Date: 2018      Visit Date: 2018    Visit Dx:    ICD-10-CM ICD-9-CM   1. Osteoarthritis of right knee, unspecified osteoarthritis type M17.11 715.96   2. Knee stiffness, right M25.661 719.56   3. Muscle weakness of lower extremity M62.81 728.87   4. Left knee pain, unspecified chronicity M25.562 719.46       Patient Active Problem List   Diagnosis   • Seizure disorder   • Hyperlipidemia   • Vitamin D insufficiency   • Osteoporosis   • Sleep apnea   • Chronic venous insufficiency   • Postsurgical hypothyroidism   • Chronic fatigue syndrome   • Gastroesophageal reflux disease   • Dupuytren's contracture   • History of biliary T-tube placement   • History of partial thyroidectomy   • Mitral valve prolapse   • Multinodular goiter   • Right fascicular block   • Restless legs syndrome   • History of cardiac catheterization   • Urge incontinence of urine   • Left knee pain   • Ligamentous laxity of knee   • DJD (degenerative joint disease) of knee   • Adverse drug effect, subsequent encounter   • Abnormal blood level of chromium   • Abnormal blood level of cobalt        Past Medical History:   Diagnosis Date   • Chronic venous insufficiency    • Dupuytren's contracture    • History of staph infection     S/P KNEE DEC 2016   • History of transfusion    • Hyperlipidemia    • Nontoxic multinodular goiter    • Osteoporosis     Dr. Cabrera   • Postsurgical hypothyroidism    • Restless leg syndrome    • Seizure disorder     Dr. Whitman, HX  LAST SEIZURE   • Sleep apnea     DOES NOT HAVE MACHINE   • Vitamin D insufficiency         Past Surgical History:   Procedure Laterality Date   • APPENDECTOMY     • CARDIAC CATHETERIZATION      NORMAL    • KNEE MINI REVISION Left 11/15/2016    Procedure: LEFT KNEE POLY CHANGE WITH HI POST STABILIZER;  Surgeon: Pablito Claudio MD;   "Location: [facility];  Service:    • KNEE MINI REVISION Left 12/13/2016    Procedure: LT KNEE REMOVAL/REPLACEMENT LOCKING PIN ;  Surgeon: Pablito Claudio MD;  Location: [facility];  Service:    • MAMMO FINE NEEDLE ASPIRATION UNILATERAL      RIGHT THYROID NODULE, 2009 BENIGN    • [place] REVISE KNEE JOINT REPLACE,1 PART Left 2/20/2017    Procedure: LT KNEE REMOVAL ANTIBIOTIC SPACER AND KNEE REVISION;  Surgeon: Christiano Cesar MD;  Location: [facility] OR;  Service: Orthopedics   • AZ TOTAL KNEE ARTHROPLASTY Left 12/24/2016    Procedure: LEFT KNEE WASHOUT WITH POLY CHANGE;  Surgeon: Christiano Cesar MD;  Location: [facility] OR;  Service: Orthopedics   • REPLACEMENT TOTAL KNEE Left    • THYROIDECTOMY, PARTIAL      1979 LEFT LOBECTOMY AND ISTHMECTOMY    • TOTAL ABDOMINAL HYSTERECTOMY WITH SALPINGO OOPHORECTOMY     • TOTAL HIP ARTHROPLASTY Left    • TOTAL KNEE  PROSTHESIS REMOVAL W/ SPACER INSERTION Left 12/29/2016    Procedure: LT KNEE IMPLANT REMOVAL WITH INSERTION OF SPACER ;  Surgeon: Christiano Cesar MD;  Location: [facility];  Service:              PT Ortho     Row Name 06/14/18 0900       Subjective Comments    Subjective Comments Right greater tahn left knee pain.  Increase swelling in LLE that have receivec Lymphadema PT for in past and have compression sleeve...  -SI       Subjective Pain    Able to rate subjective pain? yes  -SI    Pre-Treatment Pain Level 0  -SI    Post-Treatment Pain Level 0  -SI    Subjective Pain Comment \"fine today\"  -SI       Posture/Observations    Observations Edema   chronic left greater than right  -SI       Right Lower Ext    Rt Knee Extension/Flexion AROM -11 to 120  -SI       Left Lower Ext    Lt Knee Extension/Flexion AROM 0 to 126  -SI    Row Name 06/12/18 0800       Subjective Comments    Subjective Comments Sore after last session and didn't do ex couple of days.  -SI       Subjective Pain    Able to rate subjective pain? yes  -SI    Pre-Treatment Pain Level 0  -SI " "   Post-Treatment Pain Level 0  -SI    Subjective Pain Comment Little sore whild doing ex but OK today  -SI      User Key  (r) = Recorded By, (t) = Taken By, (c) = Cosigned By    Initials Name Provider Type    YANCI Daniella MENESES HERMILO Corona Physical Therapy Assistant                            PT Assessment/Plan     Row Name 06/14/18 0924          PT Assessment    Assessment Comments pt feels increase edema LLE, and pain in left knee since starting ex.  She is slow to get them correct and not ready for increase.  Left hip abd is quite weak...Right knee ext measurement shows improvement  -SI       User Key  (r) = Recorded By, (t) = Taken By, (c) = Cosigned By    Initials Name Provider Type    YANCI Corona PTA Physical Therapy Assistant                    Exercises     Row Name 06/14/18 0900             Subjective Comments    Subjective Comments Right greater tahn left knee pain.  Increase swelling in LLE that have receivec Lymphadema PT for in past and have compression sleeve...  -SI         Subjective Pain    Able to rate subjective pain? yes  -SI      Pre-Treatment Pain Level 0  -SI      Post-Treatment Pain Level 0  -SI      Subjective Pain Comment \"fine today\"  -SI         Exercise 1    Exercise Name 1 QS  -SI      Sets 1 2  -SI      Reps 1 10  -SI         Exercise 2    Exercise Name 2 SAQ/LAQ  -SI      Sets 2 2  -SI      Reps 2 10  -SI      Additional Comments 2lbs  -SI         Exercise 3    Exercise Name 3 SLR  -SI      Sets 3 2  -SI      Reps 3 10  -SI      Additional Comments right only  -SI         Exercise 4    Exercise Name 4 hamstr stretch 90/90  -SI      Reps 4 2  -SI      Time 4 30 sec  -SI         Exercise 5    Exercise Name 5 NuStep L4  -SI      Time 5 5 min  -SI         Exercise 6    Exercise Name 6 hamstring curls  -SI      Sets 6 2  -SI      Reps 6 10  -SI      Additional Comments --------------  -SI         Exercise 7    Exercise Name 7 hip abd side,   -SI      Sets 7 2  -SI      Reps 7 10  -SI      " Additional Comments 1 set 10 on left   -SI        User Key  (r) = Recorded By, (t) = Taken By, (c) = Cosigned By    Initials Name Provider Type    SI Daniella Corona PTA Physical Therapy Assistant                             Therapy Education  Given: HEP, Symptoms/condition management  Program: Reinforced  How Provided: Verbal, Demonstration, Written  Provided to: Patient  Level of Understanding: Teach back education performed              Time Calculation:   Start Time: 0845  Stop Time: 0926  Time Calculation (min): 41 min  Total Timed Code Minutes- PT: 41 minute(s)  Therapy Suggested Charges     Code   Minutes Charges    None           Therapy Charges for Today     Code Description Service Date Service Provider Modifiers Qty    81980396784 HC PT THER PROC EA 15 MIN 6/14/2018 Daniella Corona PTA GP 3                    Daniella Corona PTA  6/14/2018

## 2018-06-19 ENCOUNTER — HOSPITAL ENCOUNTER (OUTPATIENT)
Dept: PHYSICAL THERAPY | Facility: HOSPITAL | Age: 73
Setting detail: THERAPIES SERIES
Discharge: HOME | End: 2018-06-19

## 2018-06-19 DIAGNOSIS — M62.81 MUSCLE WEAKNESS OF LOWER EXTREMITY: ICD-10-CM

## 2018-06-19 DIAGNOSIS — M25.661 KNEE STIFFNESS, RIGHT: ICD-10-CM

## 2018-06-19 DIAGNOSIS — M17.11 OSTEOARTHRITIS OF RIGHT KNEE, UNSPECIFIED OSTEOARTHRITIS TYPE: Primary | ICD-10-CM

## 2018-06-19 PROCEDURE — 97110 THERAPEUTIC EXERCISES: CPT

## 2018-06-19 NOTE — THERAPY TREATMENT NOTE
Outpatient Physical Therapy Ortho Treatment Note  [facility]     Patient Name: Mariela Ruiz  : 1945  MRN: [id number]  Today's Date: 2018      Visit Date: 2018    Visit Dx:    ICD-10-CM ICD-9-CM   1. Osteoarthritis of right knee, unspecified osteoarthritis type M17.11 715.96   2. Knee stiffness, right M25.661 719.56   3. Muscle weakness of lower extremity M62.81 728.87       Patient Active Problem List   Diagnosis   • Seizure disorder   • Hyperlipidemia   • Vitamin D insufficiency   • Osteoporosis   • Sleep apnea   • Chronic venous insufficiency   • Postsurgical hypothyroidism   • Chronic fatigue syndrome   • Gastroesophageal reflux disease   • Dupuytren's contracture   • History of biliary T-tube placement   • History of partial thyroidectomy   • Mitral valve prolapse   • Multinodular goiter   • Right fascicular block   • Restless legs syndrome   • History of cardiac catheterization   • Urge incontinence of urine   • Left knee pain   • Ligamentous laxity of knee   • DJD (degenerative joint disease) of knee   • Adverse drug effect, subsequent encounter   • Abnormal blood level of chromium   • Abnormal blood level of cobalt        Past Medical History:   Diagnosis Date   • Chronic venous insufficiency    • Dupuytren's contracture    • History of staph infection     S/P KNEE DEC 2016   • History of transfusion    • Hyperlipidemia    • Nontoxic multinodular goiter    • Osteoporosis     Dr. Cabrera   • Postsurgical hypothyroidism    • Restless leg syndrome    • Seizure disorder     Dr. Whitman, HX  LAST SEIZURE   • Sleep apnea     DOES NOT HAVE MACHINE   • Vitamin D insufficiency         Past Surgical History:   Procedure Laterality Date   • APPENDECTOMY     • CARDIAC CATHETERIZATION      NORMAL    • KNEE MINI REVISION Left 11/15/2016    Procedure: LEFT KNEE POLY CHANGE WITH HI POST STABILIZER;  Surgeon: Pablito Claudio MD;  Location: [facility];  Service:    • KNEE MINI REVISION Left  "12/13/2016    Procedure: LT KNEE REMOVAL/REPLACEMENT LOCKING PIN ;  Surgeon: Pablito Claudio MD;  Location: [facility] OR;  Service:    • MAMMO FINE NEEDLE ASPIRATION UNILATERAL      RIGHT THYROID NODULE, 2009 BENIGN    • [place] REVISE KNEE JOINT REPLACE,1 PART Left 2/20/2017    Procedure: LT KNEE REMOVAL ANTIBIOTIC SPACER AND KNEE REVISION;  Surgeon: Christiano Cesar MD;  Location: [facility] OR;  Service: Orthopedics   • RI TOTAL KNEE ARTHROPLASTY Left 12/24/2016    Procedure: LEFT KNEE WASHOUT WITH POLY CHANGE;  Surgeon: Christiano Cesar MD;  Location: [facility] OR;  Service: Orthopedics   • REPLACEMENT TOTAL KNEE Left    • THYROIDECTOMY, PARTIAL      1979 LEFT LOBECTOMY AND ISTHMECTOMY    • TOTAL ABDOMINAL HYSTERECTOMY WITH SALPINGO OOPHORECTOMY     • TOTAL HIP ARTHROPLASTY Left    • TOTAL KNEE  PROSTHESIS REMOVAL W/ SPACER INSERTION Left 12/29/2016    Procedure: LT KNEE IMPLANT REMOVAL WITH INSERTION OF SPACER ;  Surgeon: Christiano Cesar MD;  Location: [facility];  Service:              PT Ortho     Row Name 06/19/18 0800       Subjective Comments    Subjective Comments Pt states wearing lymphadema \"sleeve\" on LLE and elevating as instructed.  No left knee or leg pain.  Fair compliance with HEP.  -SI       Subjective Pain    Able to rate subjective pain? yes  -SI    Subjective Pain Comment Pain right inferior medial right knee, 6 this AM.  -SI       Posture/Observations    Observations Edema   chronic left greater than right  -SI       Right Lower Ext    Rt Knee Extension/Flexion AROM -10  -SI       MMT (Manual Muscle Testing)    Additional Documentation --   left hip abd 3/5, right 3+/5  -SI       Gait/Stairs Assessment/Training    Handrail Location (Stairs) left side (ascending)  -SI    Number of Steps (Stairs) 8  -SI    Ascending Technique (Stairs) step-to-step  -SI    Descending Technique (Stairs) step-to-step  -SI    Comment (Gait/Stairs) Pt uses RLE as dompinant one on stairs.  -SI      User Key  " "(r) = Recorded By, (t) = Taken By, (c) = Cosigned By    Initials Name Provider Type    YANCI Joyila ZAIRA Corona PTA Physical Therapy Assistant                            PT Assessment/Plan     Row Name 06/19/18 0832          PT Assessment    Assessment Comments Improved compliance with managing her chronic edema, fair compliance with HEP.  She is learning the ex and once ind with DC on HEP  -SI       User Key  (r) = Recorded By, (t) = Taken By, (c) = Cosigned By    Initials Name Provider Type    YANCI Corona PTA Physical Therapy Assistant                    Exercises     Row Name 06/19/18 0800             Subjective Comments    Subjective Comments Pt states wearing lymphadema \"sleeve\" on LLE and elevating as instructed.  No left knee or leg pain.  Fair compliance with HEP.  -SI         Subjective Pain    Able to rate subjective pain? yes  -SI      Subjective Pain Comment Pain right inferior medial right knee, 6 this AM.  -SI         Exercise 1    Exercise Name 1 QS  -SI      Sets 1 2  -SI      Reps 1 10  -SI         Exercise 2    Exercise Name 2 SAQ/LAQ  -SI      Sets 2 2  -SI      Reps 2 10  -SI      Additional Comments 2lbs  -SI         Exercise 3    Exercise Name 3 SLR  -SI      Sets 3 2  -SI      Reps 3 10  -SI      Additional Comments right only  -SI         Exercise 4    Exercise Name 4 hamstr stretch 90/90  -SI      Reps 4 2  -SI      Time 4 30 sec  -SI         Exercise 5    Exercise Name 5 NuStep L4  -SI      Time 5 5 min  -SI         Exercise 6    Exercise Name 6 hamstring curls  -SI      Sets 6 2  -SI      Reps 6 10  -SI         Exercise 7    Exercise Name 7 hip abd side,   -SI      Sets 7 2  -SI      Reps 7 10  -SI      Additional Comments 2x10 on both today  -SI        User Key  (r) = Recorded By, (t) = Taken By, (c) = Cosigned By    Initials Name Provider Type    YANCI Daniellareed Corona PTA Physical Therapy Assistant                               PT OP Goals     Row Name 06/19/18 0800          PT Short Term " Goals    STG 1 Pt will be independent with HEP.   -SI     STG 1 Progress Progressing  -SI     STG 2 Increase R knee ext to -10, and flexion to 120.  -SI     STG 2 Progress Partially Met  -SI        Long Term Goals    LTG 1 Decreased pain in R knee to 3/10 or less after trip to store.  -SI       User Key  (r) = Recorded By, (t) = Taken By, (c) = Cosigned By    Initials Name Provider Type    YANCI Corona PTA Physical Therapy Assistant          Therapy Education  Given: HEP, Symptoms/condition management, Edema management  Program: Reinforced  How Provided: Verbal, Demonstration, Written  Provided to: Patient  Level of Understanding: Teach back education performed              Time Calculation:   Start Time: 0800  Stop Time: 0845  Time Calculation (min): 45 min  Therapy Suggested Charges     Code   Minutes Charges    None           Therapy Charges for Today     Code Description Service Date Service Provider Modifiers Qty    54123618232 HC PT THER PROC EA 15 MIN 6/19/2018 Daniella Corona PTA GP 3                    Daniella Corona PTA  6/19/2018

## 2018-06-21 ENCOUNTER — HOSPITAL ENCOUNTER (OUTPATIENT)
Dept: PHYSICAL THERAPY | Facility: HOSPITAL | Age: 73
Setting detail: THERAPIES SERIES
Discharge: HOME | End: 2018-06-21

## 2018-06-21 DIAGNOSIS — M17.11 OSTEOARTHRITIS OF RIGHT KNEE, UNSPECIFIED OSTEOARTHRITIS TYPE: Primary | ICD-10-CM

## 2018-06-21 DIAGNOSIS — M25.661 KNEE STIFFNESS, RIGHT: ICD-10-CM

## 2018-06-21 DIAGNOSIS — M62.81 MUSCLE WEAKNESS OF LOWER EXTREMITY: ICD-10-CM

## 2018-06-21 DIAGNOSIS — M25.562 LEFT KNEE PAIN, UNSPECIFIED CHRONICITY: ICD-10-CM

## 2018-06-21 PROCEDURE — 97110 THERAPEUTIC EXERCISES: CPT

## 2018-06-21 NOTE — TELEPHONE ENCOUNTER
----- Message from Funmilayo Liu sent at 6/21/2018  3:47 PM EDT -----  Contact: [phone]  Patient would like a refill on Phenobarbital, sent to [facility] 90 day supply

## 2018-06-21 NOTE — THERAPY TREATMENT NOTE
Outpatient Physical Therapy Ortho Treatment Note  [facility]     Patient Name: Mariela Ruiz  : 1945  MRN: [id number]  Today's Date: 2018      Visit Date: 2018    Visit Dx:    ICD-10-CM ICD-9-CM   1. Osteoarthritis of right knee, unspecified osteoarthritis type M17.11 715.96   2. Knee stiffness, right M25.661 719.56   3. Muscle weakness of lower extremity M62.81 728.87   4. Left knee pain, unspecified chronicity M25.562 719.46       Patient Active Problem List   Diagnosis   • Seizure disorder   • Hyperlipidemia   • Vitamin D insufficiency   • Osteoporosis   • Sleep apnea   • Chronic venous insufficiency   • Postsurgical hypothyroidism   • Chronic fatigue syndrome   • Gastroesophageal reflux disease   • Dupuytren's contracture   • History of biliary T-tube placement   • History of partial thyroidectomy   • Mitral valve prolapse   • Multinodular goiter   • Right fascicular block   • Restless legs syndrome   • History of cardiac catheterization   • Urge incontinence of urine   • Left knee pain   • Ligamentous laxity of knee   • DJD (degenerative joint disease) of knee   • Adverse drug effect, subsequent encounter   • Abnormal blood level of chromium   • Abnormal blood level of cobalt        Past Medical History:   Diagnosis Date   • Chronic venous insufficiency    • Dupuytren's contracture    • History of staph infection     S/P KNEE DEC 2016   • History of transfusion    • Hyperlipidemia    • Nontoxic multinodular goiter    • Osteoporosis     Dr. Cabrera   • Postsurgical hypothyroidism    • Restless leg syndrome    • Seizure disorder     Dr. Whitman, HX  LAST SEIZURE   • Sleep apnea     DOES NOT HAVE MACHINE   • Vitamin D insufficiency         Past Surgical History:   Procedure Laterality Date   • APPENDECTOMY     • CARDIAC CATHETERIZATION      NORMAL    • KNEE MINI REVISION Left 11/15/2016    Procedure: LEFT KNEE POLY CHANGE WITH HI POST STABILIZER;  Surgeon: Pablito Claudio MD;   "Location: [facility] OR;  Service:    • KNEE MINI REVISION Left 12/13/2016    Procedure: LT KNEE REMOVAL/REPLACEMENT LOCKING PIN ;  Surgeon: Pablito Claudio MD;  Location: [facility] OR;  Service:    • MAMMO FINE NEEDLE ASPIRATION UNILATERAL      RIGHT THYROID NODULE, 2009 BENIGN    • OK REVISE KNEE JOINT REPLACE,1 PART Left 2/20/2017    Procedure: LT KNEE REMOVAL ANTIBIOTIC SPACER AND KNEE REVISION;  Surgeon: Christiano Cesar MD;  Location: [facility] OR;  Service: Orthopedics   • OK TOTAL KNEE ARTHROPLASTY Left 12/24/2016    Procedure: LEFT KNEE WASHOUT WITH POLY CHANGE;  Surgeon: Christiano Cesar MD;  Location: [facility] OR;  Service: Orthopedics   • REPLACEMENT TOTAL KNEE Left    • THYROIDECTOMY, PARTIAL      1979 LEFT LOBECTOMY AND ISTHMECTOMY    • TOTAL ABDOMINAL HYSTERECTOMY WITH SALPINGO OOPHORECTOMY     • TOTAL HIP ARTHROPLASTY Left    • TOTAL KNEE  PROSTHESIS REMOVAL W/ SPACER INSERTION Left 12/29/2016    Procedure: LT KNEE IMPLANT REMOVAL WITH INSERTION OF SPACER ;  Surgeon: Christiano Cesar MD;  Location: [facility];  Service:              PT Ortho     Row Name 06/21/18 0800       Subjective Comments    Subjective Comments Increase right medial knee pain because kinga used her garbage can for yard waste and overfilled and they wouldn't take it all and she had to empty..  -SI       Subjective Pain    Able to rate subjective pain? yes  -SI    Pre-Treatment Pain Level 8  -SI    Post-Treatment Pain Level 8  -SI    Subjective Pain Comment right medial knee  -SI       Posture/Observations    Observations Edema   better in LLE with regular elevating and lymph support  -SI       Gait/Stairs Assessment/Training    Comment (Gait/Stairs) mild antalgia today  -SI    Row Name 06/19/18 0800       Subjective Comments    Subjective Comments Pt states wearing lymphadema \"sleeve\" on LLE and elevating as instructed.  No left knee or leg pain.  Fair compliance with HEP.  -SI       Subjective Pain    Able to rate " subjective pain? yes  -SI    Subjective Pain Comment Pain right inferior medial right knee, 6 this AM.  -SI       Posture/Observations    Observations Edema   chronic left greater than right  -SI       Right Lower Ext    Rt Knee Extension/Flexion AROM -10  -SI       MMT (Manual Muscle Testing)    Additional Documentation --   left hip abd 3/5, right 3+/5  -SI       Gait/Stairs Assessment/Training    Handrail Location (Stairs) left side (ascending)  -SI    Number of Steps (Stairs) 8  -SI    Ascending Technique (Stairs) step-to-step  -SI    Descending Technique (Stairs) step-to-step  -SI    Comment (Gait/Stairs) Pt uses RLE as dompinant one on stairs.  -SI      User Key  (r) = Recorded By, (t) = Taken By, (c) = Cosigned By    Initials Name Provider Type    YANCI Corona PTA Physical Therapy Assistant                            PT Assessment/Plan     Row Name 06/21/18 0826          PT Assessment    Assessment Comments pt with increase right medial knee pain today after struggling with yard wast (tree limbs) yesterday attempting to help her neighbor......Modified ex in PT and ice today....  -SI       User Key  (r) = Recorded By, (t) = Taken By, (c) = Cosigned By    Initials Name Provider Type    YANCI Corona PTA Physical Therapy Assistant                Modalities     Row Name 06/21/18 0800             Ice    Ice Applied Yes  -SI      Location right medial knee while elevated  -SI      Rx Minutes 15 mins  -SI      Ice S/P Rx Yes  -SI        User Key  (r) = Recorded By, (t) = Taken By, (c) = Cosigned By    Initials Name Provider Type    YANCI Corona PTA Physical Therapy Assistant                Exercises     Row Name 06/21/18 0800             Subjective Comments    Subjective Comments Increase right medial knee pain because kinga used her garbage can for yard waste and overfilled and they wouldn't take it all and she had to empty..  -SI         Subjective Pain    Able to rate subjective pain? yes  -SI       Pre-Treatment Pain Level 8  -SI      Post-Treatment Pain Level 8  -SI      Subjective Pain Comment right medial knee  -SI         Exercise 1    Exercise Name 1 QS  -SI      Sets 1 2  -SI      Reps 1 10  -SI         Exercise 2    Exercise Name 2 SAQ/LAQ  -SI      Sets 2 2  -SI      Reps 2 10  -SI      Additional Comments 2lbs, SAQ only today  -SI         Exercise 3    Exercise Name 3 SLR  -SI      Sets 3 2  -SI      Reps 3 10  -SI      Additional Comments right only  -SI         Exercise 4    Exercise Name 4 hamstr stretch 90/90  -SI      Reps 4 2  -SI      Time 4 30 sec  -SI         Exercise 5    Exercise Name 5 NuStep L4  -SI      Time 5 5 min  -SI         Exercise 6    Exercise Name 6 hamstring curls  -SI      Sets 6 2  -SI      Reps 6 10  -SI      Additional Comments ---------------------  -SI         Exercise 7    Exercise Name 7 hip abd side,   -SI      Sets 7 2  -SI      Reps 7 10  -SI      Additional Comments 2 x 10 right, 1x 10 left  -SI        User Key  (r) = Recorded By, (t) = Taken By, (c) = Cosigned By    Initials Name Provider Type    YANCI Corona PTA Physical Therapy Assistant                             Therapy Education  Given: HEP, Symptoms/condition management, Pain management  Program: Reinforced  How Provided: Verbal, Demonstration, Written  Provided to: Patient  Level of Understanding: Teach back education performed              Time Calculation:   Start Time: 0800  Stop Time: 0845  Time Calculation (min): 45 min  Total Timed Code Minutes- PT: 30 minute(s)  Therapy Suggested Charges     Code   Minutes Charges    None           Therapy Charges for Today     Code Description Service Date Service Provider Modifiers Qty    05630976274 HC PT THER PROC EA 15 MIN 6/21/2018 Daniella Corona PTA GP 2    60929231132 HC PT HOT OR COLD PACK TREAT MCARE 6/21/2018 Daniella Corona PTA GP 1                    Daniella Corona PTA  6/21/2018

## 2018-06-22 RX ORDER — PHENOBARBITAL 64.8 MG/1
TABLET ORAL
Qty: 270 TABLET | Refills: 0 | Status: SHIPPED | OUTPATIENT
Start: 2018-06-22 | End: 2018-09-24 | Stop reason: SDUPTHER

## 2018-06-26 ENCOUNTER — HOSPITAL ENCOUNTER (OUTPATIENT)
Dept: PHYSICAL THERAPY | Facility: HOSPITAL | Age: 73
Setting detail: THERAPIES SERIES
Discharge: HOME | End: 2018-06-26

## 2018-06-26 DIAGNOSIS — M17.11 OSTEOARTHRITIS OF RIGHT KNEE, UNSPECIFIED OSTEOARTHRITIS TYPE: Primary | ICD-10-CM

## 2018-06-26 DIAGNOSIS — M25.562 LEFT KNEE PAIN, UNSPECIFIED CHRONICITY: ICD-10-CM

## 2018-06-26 DIAGNOSIS — M25.661 KNEE STIFFNESS, RIGHT: ICD-10-CM

## 2018-06-26 DIAGNOSIS — M62.81 MUSCLE WEAKNESS OF LOWER EXTREMITY: ICD-10-CM

## 2018-06-26 PROCEDURE — 97110 THERAPEUTIC EXERCISES: CPT

## 2018-06-26 NOTE — THERAPY TREATMENT NOTE
Outpatient Physical Therapy Ortho Treatment Note  [facility]     Patient Name: Mariela Ruiz  : 1945  MRN: [id number]  Today's Date: 2018      Visit Date: 2018    Visit Dx:    ICD-10-CM ICD-9-CM   1. Osteoarthritis of right knee, unspecified osteoarthritis type M17.11 715.96   2. Knee stiffness, right M25.661 719.56   3. Muscle weakness of lower extremity M62.81 728.87   4. Left knee pain, unspecified chronicity M25.562 719.46       Patient Active Problem List   Diagnosis   • Seizure disorder   • Hyperlipidemia   • Vitamin D insufficiency   • Osteoporosis   • Sleep apnea   • Chronic venous insufficiency   • Postsurgical hypothyroidism   • Chronic fatigue syndrome   • Gastroesophageal reflux disease   • Dupuytren's contracture   • History of biliary T-tube placement   • History of partial thyroidectomy   • Mitral valve prolapse   • Multinodular goiter   • Right fascicular block   • Restless legs syndrome   • History of cardiac catheterization   • Urge incontinence of urine   • Left knee pain   • Ligamentous laxity of knee   • DJD (degenerative joint disease) of knee   • Adverse drug effect, subsequent encounter   • Abnormal blood level of chromium   • Abnormal blood level of cobalt        Past Medical History:   Diagnosis Date   • Chronic venous insufficiency    • Dupuytren's contracture    • History of staph infection     S/P KNEE DEC 2016   • History of transfusion    • Hyperlipidemia    • Nontoxic multinodular goiter    • Osteoporosis     Dr. Cabrera   • Postsurgical hypothyroidism    • Restless leg syndrome    • Seizure disorder     Dr. Whitman, HX  LAST SEIZURE   • Sleep apnea     DOES NOT HAVE MACHINE   • Vitamin D insufficiency         Past Surgical History:   Procedure Laterality Date   • APPENDECTOMY     • CARDIAC CATHETERIZATION      NORMAL    • KNEE MINI REVISION Left 11/15/2016    Procedure: LEFT KNEE POLY CHANGE WITH HI POST STABILIZER;  Surgeon: Pablito Claudio MD;   Location: [facility] OR;  Service:    • KNEE MINI REVISION Left 12/13/2016    Procedure: LT KNEE REMOVAL/REPLACEMENT LOCKING PIN ;  Surgeon: Pablito Claudio MD;  Location: [facility] OR;  Service:    • MAMMO FINE NEEDLE ASPIRATION UNILATERAL      RIGHT THYROID NODULE, 2009 BENIGN    • SC REVISE KNEE JOINT REPLACE,1 PART Left 2/20/2017    Procedure: LT KNEE REMOVAL ANTIBIOTIC SPACER AND KNEE REVISION;  Surgeon: Christiano Cesar MD;  Location: [facility] OR;  Service: Orthopedics   • SC TOTAL KNEE ARTHROPLASTY Left 12/24/2016    Procedure: LEFT KNEE WASHOUT WITH POLY CHANGE;  Surgeon: Christiano Cesar MD;  Location: [facility] OR;  Service: Orthopedics   • REPLACEMENT TOTAL KNEE Left    • THYROIDECTOMY, PARTIAL      1979 LEFT LOBECTOMY AND ISTHMECTOMY    • TOTAL ABDOMINAL HYSTERECTOMY WITH SALPINGO OOPHORECTOMY     • TOTAL HIP ARTHROPLASTY Left    • TOTAL KNEE  PROSTHESIS REMOVAL W/ SPACER INSERTION Left 12/29/2016    Procedure: LT KNEE IMPLANT REMOVAL WITH INSERTION OF SPACER ;  Surgeon: Christiano Cesar MD;  Location: [facility];  Service:              PT Ortho     Row Name 06/26/18 0800       Right Lower Ext    Rt Knee Extension/Flexion AROM -6 to 118  -SI       Left Lower Ext    Lt Knee Extension/Flexion AROM 0 to 126  -SI       MMT (Manual Muscle Testing)    Additional Documentation --   left hip abd 3+/5, right 4-/5, both knees 4/5  -SI    Row Name 06/26/18 0700       Subjective Comments    Subjective Comments pt continues to have increase right medial knee pain since dealing with neighbor's yard waste a week ago.  She also feels overwhelmed with all has to do since her  has such back pain....  -SI       Subjective Pain    Able to rate subjective pain? yes  -SI    Pre-Treatment Pain Level 4  -SI    Post-Treatment Pain Level 4  -SI      User Key  (r) = Recorded By, (t) = Taken By, (c) = Cosigned By    Initials Name Provider Type    YANCI Corona PTA Physical Therapy Assistant                             PT Assessment/Plan     Row Name 06/26/18 0825          PT Assessment    Assessment Comments Right medial knee pain at joint line, increases with activity.  Pt with chronic edema left greater than right legs.  Left hip weaker than right (hx MAXIMO).  Knee strength same left and right despite mumerous knee surgeries on left knee.  -SI       User Key  (r) = Recorded By, (t) = Taken By, (c) = Cosigned By    Initials Name Provider Type    YANCI Corona PTA Physical Therapy Assistant                Modalities     Row Name 06/26/18 0700             Ice    Ice Applied Yes  -SI      Location right medial knee while elevated  -SI      Rx Minutes 10 mins  -SI      Ice S/P Rx Yes  -SI      Patient reports will apply ice at home to involved area Yes  -SI        User Key  (r) = Recorded By, (t) = Taken By, (c) = Cosigned By    Initials Name Provider Type    YANCI MENESES HERMILO Corona Physical Therapy Assistant                Exercises     Row Name 06/26/18 0800 06/26/18 0700          Subjective Comments    Subjective Comments  -- pt continues to have increase right medial knee pain since dealing with neighbor's yard waste a week ago.  She also feels overwhelmed with all has to do since her  has such back pain....  -SI        Subjective Pain    Able to rate subjective pain?  -- yes  -SI     Pre-Treatment Pain Level  -- 4  -SI     Post-Treatment Pain Level  -- 4  -SI        Total Minutes    36579 - PT Therapeutic Exercise Minutes  -- 40  -SI        Exercise 1    Exercise Name 1 QS  -SI  --     Sets 1 2  -SI  --     Reps 1 10  -SI  --        Exercise 2    Exercise Name 2 SAQ/LAQ  -SI  --     Sets 2 2  -SI  --     Reps 2 10  -SI  --     Additional Comments 2lbs  -SI  --        Exercise 3    Exercise Name 3 SLR  -SI  --     Sets 3 2  -SI  --     Reps 3 10  -SI  --     Additional Comments right only  -SI  --        Exercise 4    Exercise Name 4 hamstr stretch 90/90  -SI  --     Reps 4 2  -SI  --     Time 4  30 sec  -SI  --        Exercise 5    Exercise Name 5 NuStep L4  -SI  --     Time 5 5 min  -SI  --        Exercise 6    Exercise Name 6 hamstring curls  -SI  --     Sets 6 2  -SI  --     Reps 6 10  -SI  --     Additional Comments green  -SI  --        Exercise 7    Exercise Name 7 hip abd side,   -SI  --     Sets 7 2  -SI  --     Reps 7 10  -SI  --     Additional Comments 2x10 right, 1x10, 1x5 left  -SI  --       User Key  (r) = Recorded By, (t) = Taken By, (c) = Cosigned By    Initials Name Provider Type    YANCI Corona PTA Physical Therapy Assistant                               PT OP Goals     Row Name 06/26/18 0800          PT Short Term Goals    STG 1 Pt will be independent with HEP.   -SI     STG 1 Progress Partially Met   as long as she has her written instructions  -SI     STG 2 Increase R knee ext to -10, and flexion to 120.  -SI     STG 2 Progress Met  -SI     STG 3 Pt able to complete higher level balance activities: tandem walk, backward walk.  -SI     STG 3 Progress Partially Met   able but right medial knee pain increase  -SI        Long Term Goals    LTG 3 increase R knee ext to -6.  -SI     LTG 3 Progress Met  -SI       User Key  (r) = Recorded By, (t) = Taken By, (c) = Cosigned By    Initials Name Provider Type    YANCI Corona PTA Physical Therapy Assistant          Therapy Education  Given: HEP, Symptoms/condition management, Edema management, Pain management  Program: Reinforced  How Provided: Verbal, Demonstration, Written  Provided to: Patient  Level of Understanding: Teach back education performed              Time Calculation:   Start Time: 0740  Stop Time: 0830  Time Calculation (min): 50 min  Therapy Suggested Charges     Code   Minutes Charges    70599 (CPT®) Hc Pt Neuromusc Re Education Ea 15 Min      48756 (CPT®) Hc Pt Ther Proc Ea 15 Min 40 3    03234 (CPT®) Hc Gait Training Ea 15 Min      83744 (CPT®) Hc Pt Therapeutic Act Ea 15 Min      91290 (CPT®) Hc Pt Manual Therapy Ea  15 Min      67011 (CPT®) Hc Pt Ther Massage- Per 15 Min      62491 (CPT®) Hc Pt Iontophoresis Ea 15 Min      95325 (CPT®) Hc Pt Elec Stim Ea-Per 15 Min      39945 (CPT®) Hc Pt Ultrasound Ea 15 Min      74679 (CPT®) Hc Pt Self Care/Mgmt/Train Ea 15 Min      Total  40 3        Therapy Charges for Today     Code Description Service Date Service Provider Modifiers Qty    61004679927 HC PT THER PROC EA 15 MIN 6/26/2018 Daniella Corona, PTA GP 3                    Daniella Corona, PTA  6/26/2018

## 2018-06-28 ENCOUNTER — HOSPITAL ENCOUNTER (OUTPATIENT)
Dept: PHYSICAL THERAPY | Facility: HOSPITAL | Age: 73
Setting detail: THERAPIES SERIES
Discharge: HOME | End: 2018-06-28

## 2018-06-28 DIAGNOSIS — M25.562 LEFT KNEE PAIN, UNSPECIFIED CHRONICITY: ICD-10-CM

## 2018-06-28 DIAGNOSIS — M17.11 OSTEOARTHRITIS OF RIGHT KNEE, UNSPECIFIED OSTEOARTHRITIS TYPE: Primary | ICD-10-CM

## 2018-06-28 DIAGNOSIS — M25.661 KNEE STIFFNESS, RIGHT: ICD-10-CM

## 2018-06-28 DIAGNOSIS — M62.81 MUSCLE WEAKNESS OF LOWER EXTREMITY: ICD-10-CM

## 2018-06-28 PROCEDURE — G8979 MOBILITY GOAL STATUS: HCPCS | Performed by: PHYSICAL THERAPIST

## 2018-06-28 PROCEDURE — 97110 THERAPEUTIC EXERCISES: CPT

## 2018-06-28 PROCEDURE — G8980 MOBILITY D/C STATUS: HCPCS | Performed by: PHYSICAL THERAPIST

## 2018-06-28 NOTE — THERAPY DISCHARGE NOTE
Outpatient Physical Therapy Ortho Treatment Note/Discharge Summary  [facility]     Patient Name: Mariela Ruiz  : 1945  MRN: [id number]  Today's Date: 2018      Visit Date: 2018    Visit Dx:    ICD-10-CM ICD-9-CM   1. Osteoarthritis of right knee, unspecified osteoarthritis type M17.11 715.96   2. Knee stiffness, right M25.661 719.56   3. Muscle weakness of lower extremity M62.81 728.87   4. Left knee pain, unspecified chronicity M25.562 719.46       Patient Active Problem List   Diagnosis   • Seizure disorder   • Hyperlipidemia   • Vitamin D insufficiency   • Osteoporosis   • Sleep apnea   • Chronic venous insufficiency   • Postsurgical hypothyroidism   • Chronic fatigue syndrome   • Gastroesophageal reflux disease   • Dupuytren's contracture   • History of biliary T-tube placement   • History of partial thyroidectomy   • Mitral valve prolapse   • Multinodular goiter   • Right fascicular block   • Restless legs syndrome   • History of cardiac catheterization   • Urge incontinence of urine   • Left knee pain   • Ligamentous laxity of knee   • DJD (degenerative joint disease) of knee   • Adverse drug effect, subsequent encounter   • Abnormal blood level of chromium   • Abnormal blood level of cobalt        Past Medical History:   Diagnosis Date   • Chronic venous insufficiency    • Dupuytren's contracture    • History of staph infection     S/P KNEE DEC 2016   • History of transfusion    • Hyperlipidemia    • Nontoxic multinodular goiter    • Osteoporosis     Dr. Cabrera   • Postsurgical hypothyroidism    • Restless leg syndrome    • Seizure disorder     Dr. Whitman, HX  LAST SEIZURE   • Sleep apnea     DOES NOT HAVE MACHINE   • Vitamin D insufficiency         Past Surgical History:   Procedure Laterality Date   • APPENDECTOMY     • CARDIAC CATHETERIZATION      NORMAL    • KNEE MINI REVISION Left 11/15/2016    Procedure: LEFT KNEE POLY CHANGE WITH HI POST STABILIZER;  Surgeon: Pablito DELA CRUZ  MD Shanon;  Location: [facility];  Service:    • KNEE MINI REVISION Left 12/13/2016    Procedure: LT KNEE REMOVAL/REPLACEMENT LOCKING PIN ;  Surgeon: Pablito Claudio MD;  Location: [facility];  Service:    • MAMMO FINE NEEDLE ASPIRATION UNILATERAL      RIGHT THYROID NODULE, 2009 BENIGN    • HI REVISE KNEE JOINT REPLACE,1 PART Left 2/20/2017    Procedure: LT KNEE REMOVAL ANTIBIOTIC SPACER AND KNEE REVISION;  Surgeon: Christiano Cesar MD;  Location: [facility] OR;  Service: Orthopedics   • HI TOTAL KNEE ARTHROPLASTY Left 12/24/2016    Procedure: LEFT KNEE WASHOUT WITH POLY CHANGE;  Surgeon: Christiano Cesar MD;  Location: [facility] OR;  Service: Orthopedics   • REPLACEMENT TOTAL KNEE Left    • THYROIDECTOMY, PARTIAL      1979 LEFT LOBECTOMY AND ISTHMECTOMY    • TOTAL ABDOMINAL HYSTERECTOMY WITH SALPINGO OOPHORECTOMY     • TOTAL HIP ARTHROPLASTY Left    • TOTAL KNEE  PROSTHESIS REMOVAL W/ SPACER INSERTION Left 12/29/2016    Procedure: LT KNEE IMPLANT REMOVAL WITH INSERTION OF SPACER ;  Surgeon: Christiano Cesar MD;  Location: [facility];  Service:              PT Ortho     Row Name 06/28/18 0800       Subjective Comments    Subjective Comments Pt states the flare up of medial right knee pain has resolved.  No pain at time of PT this AM.  She does state that she feels more knowledgeable on keeping chronic edema under control and use of ice right knee for pain..  -SI       Subjective Pain    Able to rate subjective pain? yes  -SI    Pre-Treatment Pain Level 0  -SI    Post-Treatment Pain Level 0  -SI    Subjective Pain Comment When has pain is at medial right knee  -SI       Posture/Observations    Observations Edema   Chronic left leg greater than right  -SI       Right Lower Ext    Rt Knee Extension/Flexion AROM -6 to 118  -SI       Left Lower Ext    Lt Knee Extension/Flexion AROM 0 to 126  -SI       MMT (Manual Muscle Testing)    Additional Documentation --   left hip abd 3+/5, right4-/4, left knee  4+/5, right knee 4/5  -SI       Gait/Stairs Assessment/Training    Comment (Gait/Stairs) meldly altered gait level surfaces  -SI    Row Name 06/26/18 0800       Right Lower Ext    Rt Knee Extension/Flexion AROM -6 to 118  -SI       Left Lower Ext    Lt Knee Extension/Flexion AROM 0 to 126  -SI       MMT (Manual Muscle Testing)    Additional Documentation --   left hip abd 3+/5, right 4-/5, both knees 4/5  -SI    Row Name 06/26/18 0700       Subjective Comments    Subjective Comments pt continues to have increase right medial knee pain since dealing with neighbor's yard waste a week ago.  She also feels overwhelmed with all has to do since her  has such back pain....  -SI       Subjective Pain    Able to rate subjective pain? yes  -SI    Pre-Treatment Pain Level 4  -SI    Post-Treatment Pain Level 4  -SI      User Key  (r) = Recorded By, (t) = Taken By, (c) = Cosigned By    Initials Name Provider Type    YANCI Corona PTA Physical Therapy Assistant                            PT Assessment/Plan     Row Name 06/28/18 0826          PT Assessment    Assessment Comments Right medial knee pain much better now.  Pt ind with HEP and ready to be DC'ed.  She realizes she needs to elevate legs regularly with chronic edema..  -SI       User Key  (r) = Recorded By, (t) = Taken By, (c) = Cosigned By    Initials Name Provider Type    YANCI Corona PTA Physical Therapy Assistant                Modalities     Row Name 06/28/18 0800             Ice    Patient reports will apply ice at home to involved area Yes  -SI        User Key  (r) = Recorded By, (t) = Taken By, (c) = Cosigned By    Initials Name Provider Type    YANCI Corona PTA Physical Therapy Assistant                Exercises     Row Name 06/28/18 0800             Subjective Comments    Subjective Comments Pt states the flare up of medial right knee pain has resolved.  No pain at time of PT this AM.  She does state that she feels more knowledgeable on  keeping chronic edema under control and use of ice right knee for pain..  -SI         Subjective Pain    Able to rate subjective pain? yes  -SI      Pre-Treatment Pain Level 0  -SI      Post-Treatment Pain Level 0  -SI      Subjective Pain Comment When has pain is at medial right knee  -SI         Total Minutes    19308 - PT Therapeutic Exercise Minutes 45  -SI         Exercise 1    Exercise Name 1 QS  -SI      Sets 1 2  -SI      Reps 1 10  -SI         Exercise 2    Exercise Name 2 SAQ/LAQ  -SI      Sets 2 2  -SI      Reps 2 10  -SI      Additional Comments 2lbs  -SI         Exercise 3    Exercise Name 3 SLR  -SI      Sets 3 2  -SI      Reps 3 10  -SI      Additional Comments right only  -SI         Exercise 4    Exercise Name 4 hamstr stretch 90/90  -SI      Reps 4 2  -SI      Time 4 30 sec  -SI         Exercise 5    Exercise Name 5 NuStep L4  -SI      Time 5 5 min  -SI         Exercise 6    Exercise Name 6 hamstring curls  -SI      Sets 6 2  -SI      Reps 6 10  -SI         Exercise 7    Exercise Name 7 hip abd side,   -SI      Sets 7 2  -SI      Reps 7 10  -SI      Additional Comments 2 x 10 on both  -SI        User Key  (r) = Recorded By, (t) = Taken By, (c) = Cosigned By    Initials Name Provider Type    YANCI Corona, PTA Physical Therapy Assistant                               PT OP Goals     Row Name 06/28/18 0800          PT Short Term Goals    STG 1 Pt will be independent with HEP.   -SI     STG 1 Progress Met  -SI     STG 2 Increase R knee ext to -10, and flexion to 120.  -SI     STG 2 Progress Met  -SI     STG 3 Pt able to complete higher level balance activities: tandem walk, backward walk.  -SI     STG 3 Progress Met      -SI     STG 4 Progress Not Met   7 seconds on either LE  -SI        Long Term Goals    LTG 1 Decreased pain in R knee to 3/10 or less after trip to store.  -SI     LTG 1 Progress Met  -SI     LTG 2 Improved Knee survey score by 3 points.  -SI     LTG 2 Progress Met  -SI     LTG 3  increase R knee ext to -6.  -SI     LTG 3 Progress Met  -SI       User Key  (r) = Recorded By, (t) = Taken By, (c) = Cosigned By    Initials Name Provider Type    YANCI Corona PTA Physical Therapy Assistant          Therapy Education  Given: HEP, Symptoms/condition management, Pain management, Edema management  Program: Reinforced  How Provided: Verbal, Demonstration, Written  Provided to: Patient  Level of Understanding: Teach back education performed (demonstrates ex on HEP ind as long as has written copy)    Outcome Measure Options: Knee Outcome Score- ADL  Knee Outcome Score  Knee Outcome Score Comments: 75%      Time Calculation:   Start Time: 0730  Stop Time: 0825  Time Calculation (min): 55 min  Total Timed Code Minutes- PT: 45 minute(s)  Therapy Suggested Charges     Code   Minutes Charges    91020 (CPT®) Hc Pt Neuromusc Re Education Ea 15 Min      65824 (CPT®) Hc Pt Ther Proc Ea 15 Min 45 3    31308 (CPT®) Hc Gait Training Ea 15 Min      49684 (CPT®) Hc Pt Therapeutic Act Ea 15 Min      12396 (CPT®) Hc Pt Manual Therapy Ea 15 Min      48002 (CPT®) Hc Pt Ther Massage- Per 15 Min      23203 (CPT®) Hc Pt Iontophoresis Ea 15 Min      40819 (CPT®) Hc Pt Elec Stim Ea-Per 15 Min      68709 (CPT®) Hc Pt Ultrasound Ea 15 Min      97807 (CPT®) Hc Pt Self Care/Mgmt/Train Ea 15 Min      Total  45 3        Therapy Charges for Today     Code Description Service Date Service Provider Modifiers Qty    09500694639 HC PT THER PROC EA 15 MIN 6/28/2018 Daniella Corona PTA GP 3          PT G-Codes  Outcome Measure Options: Knee Outcome Score- ADL     OP PT Discharge Summary  Date of Discharge: 06/28/18  Reason for Discharge: Independent  Discharge Instructions/Additional Comments: HEP daily to 3 times a week BLE's      Daniella Corona PTA  6/28/2018

## 2018-08-02 ENCOUNTER — OFFICE VISIT (OUTPATIENT)
Dept: ORTHOPEDIC SURGERY | Facility: CLINIC | Age: 73
End: 2018-08-02

## 2018-08-02 VITALS — HEIGHT: 70 IN | BODY MASS INDEX: 27.92 KG/M2 | WEIGHT: 195 LBS | TEMPERATURE: 98.9 F

## 2018-08-02 DIAGNOSIS — M17.11 PRIMARY OSTEOARTHRITIS OF RIGHT KNEE: Primary | ICD-10-CM

## 2018-08-02 PROCEDURE — 20610 DRAIN/INJ JOINT/BURSA W/O US: CPT | Performed by: NURSE PRACTITIONER

## 2018-08-02 RX ORDER — LIDOCAINE HYDROCHLORIDE 10 MG/ML
2 INJECTION, SOLUTION EPIDURAL; INFILTRATION; INTRACAUDAL; PERINEURAL
Status: COMPLETED | OUTPATIENT
Start: 2018-08-02 | End: 2018-08-02

## 2018-08-02 RX ADMIN — LIDOCAINE HYDROCHLORIDE 2 ML: 10 INJECTION, SOLUTION EPIDURAL; INFILTRATION; INTRACAUDAL; PERINEURAL at 09:52

## 2018-09-10 RX ORDER — PRAVASTATIN SODIUM 40 MG
TABLET ORAL
Qty: 90 TABLET | Refills: 0 | Status: SHIPPED | OUTPATIENT
Start: 2018-09-10 | End: 2018-09-13 | Stop reason: SDUPTHER

## 2018-09-13 ENCOUNTER — OFFICE VISIT (OUTPATIENT)
Dept: ENDOCRINOLOGY | Age: 73
End: 2018-09-13

## 2018-09-13 VITALS
HEART RATE: 82 BPM | BODY MASS INDEX: 27.66 KG/M2 | OXYGEN SATURATION: 98 % | WEIGHT: 193.2 LBS | SYSTOLIC BLOOD PRESSURE: 128 MMHG | HEIGHT: 70 IN | DIASTOLIC BLOOD PRESSURE: 70 MMHG

## 2018-09-13 DIAGNOSIS — E04.2 MULTINODULAR GOITER: Primary | ICD-10-CM

## 2018-09-13 DIAGNOSIS — E78.5 HYPERLIPIDEMIA, UNSPECIFIED HYPERLIPIDEMIA TYPE: ICD-10-CM

## 2018-09-13 DIAGNOSIS — R79.9 ABNORMAL FINDING OF BLOOD CHEMISTRY: ICD-10-CM

## 2018-09-13 DIAGNOSIS — E89.0 POSTSURGICAL HYPOTHYROIDISM: ICD-10-CM

## 2018-09-13 DIAGNOSIS — Z83.3 FAMILY HISTORY OF DIABETES MELLITUS: ICD-10-CM

## 2018-09-13 DIAGNOSIS — M81.0 OSTEOPOROSIS, UNSPECIFIED OSTEOPOROSIS TYPE, UNSPECIFIED PATHOLOGICAL FRACTURE PRESENCE: ICD-10-CM

## 2018-09-13 DIAGNOSIS — R79.0 ABNORMAL BLOOD LEVEL OF CHROMIUM: ICD-10-CM

## 2018-09-13 DIAGNOSIS — R79.0 ABNORMAL BLOOD LEVEL OF COBALT: ICD-10-CM

## 2018-09-13 DIAGNOSIS — E55.9 VITAMIN D INSUFFICIENCY: ICD-10-CM

## 2018-09-13 PROCEDURE — 99214 OFFICE O/P EST MOD 30 MIN: CPT | Performed by: INTERNAL MEDICINE

## 2018-09-13 RX ORDER — CETIRIZINE HYDROCHLORIDE 10 MG/1
10 TABLET ORAL
COMMUNITY
Start: 2018-07-09 | End: 2018-09-13

## 2018-09-13 NOTE — PROGRESS NOTES
Subjective   Mariela Ruiz is a 73 y.o. female.     F/u for hypothyroidism, hyperlipidemia ,sleep apnea, vitamin d def       Hypothyroidism   Associated symptoms include abdominal pain, fatigue and numbness (toes ).   Hyperlipidemia   Exacerbating diseases include hypothyroidism.   Sleep Apnea   Associated symptoms include abdominal pain, fatigue and numbness (toes ).      She has hypothyroidism and has been taking Synthroid 50 µg per day.   She had a previous left thyroid lobectomy in the 1970s for benign lesion She had a fine needle biopsy right thyroid nodule done in by Dr. Brandt in August 2017 which was read as follicular lesion of undetermined significance.  She had ultrasound-guided needle biopsy done at [facility] on November 16, 2017 and pathology was read as benign follicular cells.  She denies any changes in her thyroid.    She has no previous history of head or neck radiation therapy.  She denies pressure symptoms     She has hyperlipidemia and has been on pravastatin 40 mg once a day.  She denies leg cramps.  She has lost 3 pounds since 4/18..     She has osteoporosis on bone density done in her gynecologist in November 2015.   she had a follow-up bone density done at [facility] in November 2017 which showed osteoporosis with a 9.7% decrease on the right hip.  T score of the right hip was -2.8.  She was never on biphosphonate.  She has vitamin D insufficiency and has been taking vit D regularly.  She did not get Actonel filled because of high cost.  She does not want to use Fosamax.  She denies heartburn.     She had a previous left hip replacement and the joint is worn.  Serum cobalt and chromium are elevated.  Dr. lCaudio has retired and she is now seeing Dr. Ed Chun.  She has Dupuytren's contracture and was seen by Dr. Mota who advised surgery.     She has sleep apnea but is unable to tolerate a CPAP.  She complains of chronic fatigue.    Her sister, nieces and nephews have diabetes    The following  "portions of the patient's history were reviewed and updated as appropriate: allergies, current medications, past family history, past medical history, past social history, past surgical history and problem list.    Review of Systems   Constitutional: Positive for fatigue.   HENT: Negative.    Eyes: Negative.    Respiratory: Negative.    Cardiovascular: Positive for leg swelling.   Gastrointestinal: Positive for abdominal pain.   Endocrine: Negative.    Genitourinary: Positive for urgency.   Musculoskeletal: Negative.    Skin: Negative.    Allergic/Immunologic: Negative.    Neurological: Positive for numbness (toes ).   Hematological: Negative.    Psychiatric/Behavioral: Positive for sleep disturbance (sleep apnea unable to tolerate CPAP machine ).       Objective      Vitals:    09/13/18 0952   BP: 128/70   BP Location: Right arm   Patient Position: Sitting   Cuff Size: Large Adult   Pulse: 82   SpO2: 98%   Weight: 87.6 kg (193 lb 3.2 oz)   Height: 177.8 cm (70\")     Physical Exam   Constitutional: She is oriented to person, place, and time. She appears well-developed. No distress.   HENT:   Head: Normocephalic.   Nose: Nose normal.   Mouth/Throat: No oropharyngeal exudate.   Eyes: Conjunctivae and EOM are normal. Right eye exhibits no discharge. Left eye exhibits no discharge. No scleral icterus.   Neck: Neck supple. No JVD present. No tracheal deviation present. No thyromegaly present.   Cardiovascular: Normal rate, regular rhythm, normal heart sounds and intact distal pulses.  Exam reveals no gallop and no friction rub.    No murmur heard.  Pulmonary/Chest: Effort normal and breath sounds normal. No respiratory distress. She has no wheezes. She has no rales. She exhibits no tenderness.   Abdominal: Soft. Bowel sounds are normal. She exhibits no distension and no mass. There is no tenderness. There is no rebound and no guarding.   Musculoskeletal: Normal range of motion. She exhibits edema (left leg edema.  No " calf tenderness).   Lymphadenopathy:     She has no cervical adenopathy.   Neurological: She is alert and oriented to person, place, and time. She displays normal reflexes.   Skin: Skin is warm and dry. She is not diaphoretic. No erythema. No pallor.   Psychiatric: She has a normal mood and affect. Her behavior is normal.     Office Visit on 04/05/2018   Component Date Value Ref Range Status   • Cobalt 04/05/2018 8.2* 0.0 - 0.9 ug/L Final    Comment:                        Occupational Exposure: DENIA 1.0                                  Detection Limit = 1.0     • Chromium, Plasma 04/05/2018 6.8* 0.1 - 2.1 ug/L Final    Comment: **Verified by repeat analysis**                                  Detection Limit = 0.1     • Glucose 04/05/2018 98  65 - 99 mg/dL Final   • BUN 04/05/2018 12  8 - 23 mg/dL Final   • Creatinine 04/05/2018 0.89  0.57 - 1.00 mg/dL Final   • eGFR Non  Am 04/05/2018 63  >60 mL/min/1.73 Final    Comment: The MDRD GFR formula is only valid for adults with stable  renal function between ages 18 and 70.     • eGFR  Am 04/05/2018 76  >60 mL/min/1.73 Final   • BUN/Creatinine Ratio 04/05/2018 13.5  7.0 - 25.0 Final   • Sodium 04/05/2018 139  136 - 145 mmol/L Final   • Potassium 04/05/2018 5.0  3.5 - 5.2 mmol/L Final   • Chloride 04/05/2018 98  98 - 107 mmol/L Final   • Total CO2 04/05/2018 28.2  22.0 - 29.0 mmol/L Final   • Calcium 04/05/2018 10.2  8.6 - 10.5 mg/dL Final   • Total Protein 04/05/2018 7.7  6.0 - 8.5 g/dL Final   • Albumin 04/05/2018 4.90  3.50 - 5.20 g/dL Final   • Globulin 04/05/2018 2.8  gm/dL Final   • A/G Ratio 04/05/2018 1.8  g/dL Final   • Total Bilirubin 04/05/2018 0.3  0.1 - 1.2 mg/dL Final   • Alkaline Phosphatase 04/05/2018 141* 39 - 117 U/L Final   • AST (SGOT) 04/05/2018 20  1 - 32 U/L Final   • ALT (SGPT) 04/05/2018 15  1 - 33 U/L Final   • Total Cholesterol 04/05/2018 203* 0 - 200 mg/dL Final   • Triglycerides 04/05/2018 65  0 - 150 mg/dL Final   • HDL  Cholesterol 04/05/2018 98* 40 - 60 mg/dL Final   • VLDL Cholesterol 04/05/2018 13  5 - 40 mg/dL Final   • LDL Cholesterol  04/05/2018 92  0 - 100 mg/dL Final   • 25 Hydroxy, Vitamin D 04/05/2018 33.8  30.0 - 100.0 ng/ml Final    Comment: Reference Range for Total Vitamin D 25(OH)  Deficiency    <20.0 ng/mL  Insufficiency 21-29 ng/mL  Sufficiency    ng/mL  Toxicity      >100 ng/ml        • TSH 04/05/2018 1.160  0.270 - 4.200 mIU/mL Final   • Free T4 04/05/2018 1.13  0.93 - 1.70 ng/dL Final   • Interpretation 04/05/2018 Note   Final    Supplemental report is available.     Assessment/Plan   Mariela was seen today for hypothyroidism, hyperlipidemia, sleep apnea and vitamin d deficiency.    Diagnoses and all orders for this visit:    Multinodular goiter  -     TSH  -     T4, Free    Postsurgical hypothyroidism  -     TSH  -     T4, Free    Hyperlipidemia, unspecified hyperlipidemia type  -     Comprehensive Metabolic Panel  -     Lipid Panel  -     Hemoglobin A1c    Osteoporosis, unspecified osteoporosis type, unspecified pathological fracture presence  -     Comprehensive Metabolic Panel  -     Vitamin D 25 Hydroxy    Vitamin D insufficiency  -     Comprehensive Metabolic Panel  -     Vitamin D 25 Hydroxy    Abnormal blood level of chromium  -     Chromium Level  -     CBC & Differential  -     Comprehensive Metabolic Panel  -     Urinalysis With Microscopic If Indicated (No Culture) - Urine, Clean Catch    Abnormal blood level of cobalt  -     Cobalt  -     CBC & Differential  -     Comprehensive Metabolic Panel  -     Urinalysis With Microscopic If Indicated (No Culture) - Urine, Clean Catch    Abnormal finding of blood chemistry   -     Hemoglobin A1c    Family history of diabetes mellitus      Continue Synthroid 50 µg per day.  Check thyroid function tests.  Continue pravastatin 40 mg once a day.  Check lipid profile.  Continue vitamin D supplements.  Check vitamin D levels.  Discussed about Fosamax, Reclast,  and Prolia.  She will think about it.  Check chromium and cobalt levels.  Patient was given phone number for Dr. Steven Arnold for opinion with regards to elevated chromium and cobalt levels.  Advised to see Dr. Latrell Chun with regards to elevated chromium and cobalt levels.  Patient may need to have the hip prosthesis removed.  Patient is aware that I can not advise her with regards to low term effect of elevated chromium and cobalt.  Advised to see a sleep specialist.  She will think about it.    Send copy of my note to Dr. Ed Chun and Dr. Mota    RT 4 mos

## 2018-09-17 LAB
25(OH)D3+25(OH)D2 SERPL-MCNC: 34.8 NG/ML (ref 30–100)
ALBUMIN SERPL-MCNC: 4.4 G/DL (ref 3.5–5.2)
ALBUMIN/GLOB SERPL: 1.5 G/DL
ALP SERPL-CCNC: 140 U/L (ref 39–117)
ALT SERPL-CCNC: 12 U/L (ref 1–33)
APPEARANCE UR: CLEAR
AST SERPL-CCNC: 16 U/L (ref 1–32)
BASOPHILS # BLD AUTO: 0.04 10*3/MM3 (ref 0–0.2)
BASOPHILS NFR BLD AUTO: 1 % (ref 0–1.5)
BILIRUB SERPL-MCNC: 0.3 MG/DL (ref 0.1–1.2)
BILIRUB UR QL STRIP: NEGATIVE
BUN SERPL-MCNC: 11 MG/DL (ref 8–23)
BUN/CREAT SERPL: 13.9 (ref 7–25)
CALCIUM SERPL-MCNC: 9.7 MG/DL (ref 8.6–10.5)
CHLORIDE SERPL-SCNC: 103 MMOL/L (ref 98–107)
CHOLEST SERPL-MCNC: 163 MG/DL (ref 0–200)
CO2 SERPL-SCNC: 28.3 MMOL/L (ref 22–29)
COBALT SERPL-MCNC: 11.9 UG/L (ref 0–0.9)
COLOR UR: YELLOW
CR SERPL-MCNC: 8.9 UG/L (ref 0.1–2.1)
CREAT SERPL-MCNC: 0.79 MG/DL (ref 0.57–1)
EOSINOPHIL # BLD AUTO: 0 10*3/MM3 (ref 0–0.7)
EOSINOPHIL NFR BLD AUTO: 0 % (ref 0.3–6.2)
ERYTHROCYTE [DISTWIDTH] IN BLOOD BY AUTOMATED COUNT: 13.2 % (ref 11.7–13)
GLOBULIN SER CALC-MCNC: 2.9 GM/DL
GLUCOSE SERPL-MCNC: 89 MG/DL (ref 65–99)
GLUCOSE UR QL: NEGATIVE
HBA1C MFR BLD: 5.26 % (ref 4.8–5.6)
HCT VFR BLD AUTO: 38 % (ref 35.6–45.5)
HDLC SERPL-MCNC: 87 MG/DL (ref 40–60)
HGB BLD-MCNC: 12.5 G/DL (ref 11.9–15.5)
HGB UR QL STRIP: NEGATIVE
IMM GRANULOCYTES # BLD: 0.01 10*3/MM3 (ref 0–0.03)
IMM GRANULOCYTES NFR BLD: 0.2 % (ref 0–0.5)
INTERPRETATION: NORMAL
KETONES UR QL STRIP: NEGATIVE
LDLC SERPL CALC-MCNC: 64 MG/DL (ref 0–100)
LEUKOCYTE ESTERASE UR QL STRIP: NEGATIVE
LYMPHOCYTES # BLD AUTO: 1.6 10*3/MM3 (ref 0.9–4.8)
LYMPHOCYTES NFR BLD AUTO: 39 % (ref 19.6–45.3)
Lab: NORMAL
MCH RBC QN AUTO: 30.3 PG (ref 26.9–32)
MCHC RBC AUTO-ENTMCNC: 32.9 G/DL (ref 32.4–36.3)
MCV RBC AUTO: 92 FL (ref 80.5–98.2)
MONOCYTES # BLD AUTO: 0.35 10*3/MM3 (ref 0.2–1.2)
MONOCYTES NFR BLD AUTO: 8.5 % (ref 5–12)
NEUTROPHILS # BLD AUTO: 2.11 10*3/MM3 (ref 1.9–8.1)
NEUTROPHILS NFR BLD AUTO: 51.5 % (ref 42.7–76)
NITRITE UR QL STRIP: NEGATIVE
PH UR STRIP: 6 [PH] (ref 5–8)
PLATELET # BLD AUTO: 215 10*3/MM3 (ref 140–500)
POTASSIUM SERPL-SCNC: 4.4 MMOL/L (ref 3.5–5.2)
PROT SERPL-MCNC: 7.3 G/DL (ref 6–8.5)
PROT UR QL STRIP: NEGATIVE
RBC # BLD AUTO: 4.13 10*6/MM3 (ref 3.9–5.2)
SODIUM SERPL-SCNC: 142 MMOL/L (ref 136–145)
SP GR UR: 1.02 (ref 1–1.03)
T4 FREE SERPL-MCNC: 1.02 NG/DL (ref 0.93–1.7)
TRIGL SERPL-MCNC: 61 MG/DL (ref 0–150)
TSH SERPL DL<=0.005 MIU/L-ACNC: 1.35 MIU/ML (ref 0.27–4.2)
UROBILINOGEN UR STRIP-MCNC: NORMAL MG/DL
VLDLC SERPL CALC-MCNC: 12.2 MG/DL (ref 5–40)
WBC # BLD AUTO: 4.1 10*3/MM3 (ref 4.5–10.7)

## 2018-09-24 RX ORDER — PHENOBARBITAL 64.8 MG/1
194.4 TABLET ORAL DAILY
Qty: 270 TABLET | Refills: 3 | Status: SHIPPED | OUTPATIENT
Start: 2018-09-24 | End: 2018-09-24 | Stop reason: SDUPTHER

## 2018-09-24 RX ORDER — PHENOBARBITAL 64.8 MG/1
194.4 TABLET ORAL DAILY
Qty: 270 TABLET | Refills: 3 | Status: SHIPPED | OUTPATIENT
Start: 2018-09-24 | End: 2019-06-04 | Stop reason: SDUPTHER

## 2018-09-24 NOTE — TELEPHONE ENCOUNTER
----- Message from Funmilayo Liu sent at 9/24/2018  1:03 PM EDT -----  Contact: [phone]  Patients Phenobarbital was sent to [facility], her correct pharmacy  is in the her chart. It should be [facility].

## 2018-10-09 ENCOUNTER — OFFICE VISIT (OUTPATIENT)
Dept: NEUROLOGY | Facility: CLINIC | Age: 73
End: 2018-10-09

## 2018-10-09 VITALS
DIASTOLIC BLOOD PRESSURE: 80 MMHG | WEIGHT: 194.4 LBS | SYSTOLIC BLOOD PRESSURE: 150 MMHG | HEIGHT: 70 IN | BODY MASS INDEX: 27.83 KG/M2

## 2018-10-09 DIAGNOSIS — M72.0 DUPUYTREN'S CONTRACTURE: ICD-10-CM

## 2018-10-09 DIAGNOSIS — G25.81 RESTLESS LEGS SYNDROME: ICD-10-CM

## 2018-10-09 DIAGNOSIS — G40.909 SEIZURE DISORDER (HCC): Primary | ICD-10-CM

## 2018-10-09 DIAGNOSIS — M81.8 OTHER OSTEOPOROSIS WITHOUT CURRENT PATHOLOGICAL FRACTURE: ICD-10-CM

## 2018-10-09 PROCEDURE — 99214 OFFICE O/P EST MOD 30 MIN: CPT | Performed by: PSYCHIATRY & NEUROLOGY

## 2018-10-09 RX ORDER — PRAMIPEXOLE DIHYDROCHLORIDE 0.5 MG/1
TABLET ORAL
Qty: 360 TABLET | Refills: 3 | Status: SHIPPED | OUTPATIENT
Start: 2018-10-09 | End: 2019-04-16 | Stop reason: SDUPTHER

## 2018-10-09 RX ORDER — HEPATITIS A VACCINE 1440 [IU]/ML
INJECTION, SUSPENSION INTRAMUSCULAR
COMMUNITY
Start: 2018-09-28 | End: 2019-03-07

## 2018-10-09 NOTE — PROGRESS NOTES
Subjective:     Patient ID: Mariela Ruiz is a 73 y.o. female.    History of Present Illness  The following portions of the patient's history were reviewed and updated as appropriate: allergies, current medications, past family history, past medical history, past social history, past surgical history and problem list.    Onset of seizures was at age 40. The event evidence were described as head turning to the left followed by a grunting sound left facial contortion hands clenched and slumping. Occasionally there will be focal shaking of the left arm. She has been stable on phenobarbital for many years and I followed her here since 1987. This is the main modifying factor for her medication. She takes phenobarbital 64.8 mg a dose of 3 per day. Her EEGs have confirmed left temporal lobe spike and wave activity and the brain MRI was normal in 1999.  She does not recall if there were any auras     AE:  She also has new dx. Of osteoporosis on a DEXA scan and is stable on vitamin D. This is followed by her PCP. Fosomax was not covered, so now on caltritrate. No fracture.s     Med hx: My office note from 2001 reveals a chronic use of phenobarbital at 180 per day and did not want to try Topamax after initial samples at that time.  Later notes show that she originally tried Depakene, then Dr. Prasad switched to Dilantin.  Seizures recurred and is another physician began phenobarbital.    She also has restless leg syndrome and is stable on Mirapex 0.5 taken 3 times daily. No SSRI's She wakes a lot, but sleeps easily, so will try a higher dose of 4x/d.      Driving: she is the only the , so afraid of stopping PBarb.     Osteoporosis: no fx except the hip.   Discussed Reclast.    The hip she has on 'recall' b/c of chromium in her bloodstream.  SHe has been frightened about another surgery, so has not made any decisions about re-op.  Review of Systems   Constitutional: Negative for activity change, appetite change and  fatigue.   HENT: Negative for ear pain, facial swelling and tinnitus.    Eyes: Negative for photophobia, pain and visual disturbance.   Respiratory: Negative for choking, chest tightness and shortness of breath.    Cardiovascular: Positive for leg swelling. Negative for chest pain and palpitations.   Gastrointestinal: Positive for constipation. Negative for abdominal pain and nausea.   Endocrine: Negative for polydipsia, polyphagia and polyuria.   Genitourinary: Negative for difficulty urinating, frequency and urgency.   Musculoskeletal: Negative for back pain, gait problem and neck pain.   Skin: Negative for color change, rash and wound.   Allergic/Immunologic: Negative for environmental allergies, food allergies and immunocompromised state.   Neurological: Negative for dizziness, tremors, seizures, syncope, facial asymmetry, speech difficulty, weakness, light-headedness, numbness and headaches.   Hematological: Negative for adenopathy. Does not bruise/bleed easily.   Psychiatric/Behavioral: Negative for agitation, behavioral problems, confusion, decreased concentration, dysphoric mood, hallucinations, self-injury, sleep disturbance and suicidal ideas. The patient is not nervous/anxious and is not hyperactive.         Objective:    Neurologic Exam   Mental Status   Oriented to person, place, and time.   Attention: normal. Concentration: normal.   Speech: speech is normal   Level of consciousness: alert  Knowledge: good. Able to perform simple calculations.   Able to name object. Able to read. Able to repeat. Able to write. Normal comprehension.      Cranial Nerves   Cranial nerves II through XII intact.      CN III, IV, VI   Pupils are equal, round, and reactive to light.     Motor Exam   Muscle bulk: normal  Overall muscle tone: normal     Sensory Exam   Light touch normal.   Vibration normal.      Gait, Coordination, and Reflexes      Coordination   Finger to nose coordination: normal     Tremor   Resting tremor:  absent  Intention tremor: absent  Action tremor: absent     Reflexes   Right brachioradialis: 1+  Left brachioradialis: 1+  Right biceps: 2+  Left biceps: 2+       Abnormal gait d/t ortho problems (knees)   Physical Exam   Constitutional: She appears well-developed and well-nourished.   Neck: Normal range of motion. Neck supple.   Cardiovascular: Normal rate.    Pulmonary/Chest: Effort normal.   Musculoskeletal:   Left knee- healed  Infection. No pain    dupytrems-left sabrina   Nursing note and vitals reviewed.      Assessment/Plan:       Problems Addressed this Visit        Unprioritized    Seizure disorder (CMS/HCC) - Primary    Relevant Medications    pramipexole (MIRAPEX) 0.5 MG tablet    Osteoporosis    Dupuytren's contracture    Restless legs syndrome           EPilepsy- excellevt control    Dupytrems- stable  osteopoeosis- as discussed   RLS- INCREASE mirapex to 4 tabs/d of mirapex 0.5

## 2018-10-22 ENCOUNTER — TELEPHONE (OUTPATIENT)
Dept: ENDOCRINOLOGY | Age: 73
End: 2018-10-22

## 2018-10-22 NOTE — TELEPHONE ENCOUNTER
----- Message from Marquis Hameed sent at 10/22/2018  8:14 AM EDT -----  Contact: Patient  Patient has a red all around the pimple like rash all over her waist (really bad), above the breast, shoulders, back, thigh, chin. Patient was going to come in early this morning to have you look at her but would like a call back. Patient has taking some Benadryl but it isn't working and the patient is itching really bad. Patient is wanting to know if the dr can prescribe something for her.    Best [phone]

## 2018-11-07 ENCOUNTER — TELEPHONE (OUTPATIENT)
Dept: ENDOCRINOLOGY | Age: 73
End: 2018-11-07

## 2018-11-07 DIAGNOSIS — E89.0 POSTSURGICAL HYPOTHYROIDISM: ICD-10-CM

## 2018-11-07 RX ORDER — LEVOTHYROXINE SODIUM 50 MCG
50 TABLET ORAL
Qty: 90 TABLET | Refills: 1 | Status: SHIPPED | OUTPATIENT
Start: 2018-11-07 | End: 2019-04-23 | Stop reason: SDUPTHER

## 2018-11-07 NOTE — TELEPHONE ENCOUNTER
----- Message from Marquis Vieira sent at 11/7/2018 11:41 AM EST -----  Contact: patient   Patient has ran out of medication SYNTHROID 50 MCG tablet. Patient gets a 90 day supply with 3 refills and please send to     [facility] [address], [place] - [address] [facility] - [phone]  - [phone] (Phone)  [phone] (Fax)    Patient uses the Brand name

## 2018-11-27 ENCOUNTER — TELEPHONE (OUTPATIENT)
Dept: ORTHOPEDIC SURGERY | Facility: CLINIC | Age: 73
End: 2018-11-27

## 2018-11-27 NOTE — TELEPHONE ENCOUNTER
Per Dr. Reis's notes, patient most likely is going to need a knee replacement.  Would recommend that she schedule an appointment with him to discuss

## 2018-12-07 RX ORDER — PRAVASTATIN SODIUM 40 MG
TABLET ORAL
Qty: 90 TABLET | Refills: 0 | Status: SHIPPED | OUTPATIENT
Start: 2018-12-07 | End: 2019-03-07 | Stop reason: SDUPTHER

## 2019-01-10 DIAGNOSIS — Z83.3 FAMILY HISTORY OF DIABETES MELLITUS: ICD-10-CM

## 2019-01-10 DIAGNOSIS — R79.0 ABNORMAL BLOOD LEVEL OF COBALT: ICD-10-CM

## 2019-01-10 DIAGNOSIS — R79.9 ABNORMAL BLOOD CHEMISTRY: ICD-10-CM

## 2019-01-10 DIAGNOSIS — E04.2 MULTINODULAR GOITER: ICD-10-CM

## 2019-01-10 DIAGNOSIS — E89.0 POSTSURGICAL HYPOTHYROIDISM: ICD-10-CM

## 2019-01-10 DIAGNOSIS — R79.0 ABNORMAL BLOOD LEVEL OF CHROMIUM: ICD-10-CM

## 2019-01-10 DIAGNOSIS — E55.9 VITAMIN D INSUFFICIENCY: ICD-10-CM

## 2019-01-10 DIAGNOSIS — E78.49 OTHER HYPERLIPIDEMIA: Primary | ICD-10-CM

## 2019-01-18 ENCOUNTER — RESULTS ENCOUNTER (OUTPATIENT)
Dept: ENDOCRINOLOGY | Age: 74
End: 2019-01-18

## 2019-01-18 DIAGNOSIS — E04.2 MULTINODULAR GOITER: ICD-10-CM

## 2019-01-18 DIAGNOSIS — R79.0 ABNORMAL BLOOD LEVEL OF CHROMIUM: ICD-10-CM

## 2019-01-18 DIAGNOSIS — R79.0 ABNORMAL BLOOD LEVEL OF COBALT: ICD-10-CM

## 2019-01-18 DIAGNOSIS — E55.9 VITAMIN D INSUFFICIENCY: ICD-10-CM

## 2019-01-18 DIAGNOSIS — R79.9 ABNORMAL BLOOD CHEMISTRY: ICD-10-CM

## 2019-01-18 DIAGNOSIS — E78.49 OTHER HYPERLIPIDEMIA: ICD-10-CM

## 2019-01-18 DIAGNOSIS — Z83.3 FAMILY HISTORY OF DIABETES MELLITUS: ICD-10-CM

## 2019-01-18 DIAGNOSIS — E89.0 POSTSURGICAL HYPOTHYROIDISM: ICD-10-CM

## 2019-01-21 LAB
25(OH)D3+25(OH)D2 SERPL-MCNC: 35.2 NG/ML (ref 30–100)
ALBUMIN SERPL-MCNC: 4.3 G/DL (ref 3.5–5.2)
ALBUMIN/CREAT UR: <4 MG/G CREAT (ref 0–30)
ALBUMIN/GLOB SERPL: 1.4 G/DL
ALP SERPL-CCNC: 154 U/L (ref 39–117)
ALT SERPL-CCNC: 17 U/L (ref 1–33)
AST SERPL-CCNC: 20 U/L (ref 1–32)
BASOPHILS # BLD AUTO: 0.04 10*3/MM3 (ref 0–0.2)
BASOPHILS NFR BLD AUTO: 0.8 % (ref 0–1.5)
BILIRUB SERPL-MCNC: 0.3 MG/DL (ref 0.1–1.2)
BUN SERPL-MCNC: 14 MG/DL (ref 8–23)
BUN/CREAT SERPL: 16.3 (ref 7–25)
CALCIUM SERPL-MCNC: 10.2 MG/DL (ref 8.6–10.5)
CHLORIDE SERPL-SCNC: 101 MMOL/L (ref 98–107)
CHOLEST SERPL-MCNC: 181 MG/DL (ref 0–200)
CO2 SERPL-SCNC: 27.2 MMOL/L (ref 22–29)
COBALT SERPL-MCNC: 13.9 UG/L (ref 0–0.9)
CR SERPL-MCNC: 9.1 UG/L (ref 0.1–2.1)
CREAT SERPL-MCNC: 0.86 MG/DL (ref 0.57–1)
CREAT UR-MCNC: 74.5 MG/DL
EOSINOPHIL # BLD AUTO: 0 10*3/MM3 (ref 0–0.7)
EOSINOPHIL NFR BLD AUTO: 0 % (ref 0.3–6.2)
ERYTHROCYTE [DISTWIDTH] IN BLOOD BY AUTOMATED COUNT: 13.1 % (ref 11.7–13)
GLOBULIN SER CALC-MCNC: 3.1 GM/DL
GLUCOSE SERPL-MCNC: 103 MG/DL (ref 65–99)
HBA1C MFR BLD: 5 % (ref 4.8–5.6)
HCT VFR BLD AUTO: 41.9 % (ref 35.6–45.5)
HDLC SERPL-MCNC: 95 MG/DL (ref 40–60)
HGB BLD-MCNC: 13.1 G/DL (ref 11.9–15.5)
IMM GRANULOCYTES # BLD AUTO: 0 10*3/MM3 (ref 0–0.03)
IMM GRANULOCYTES NFR BLD AUTO: 0 % (ref 0–0.5)
INTERPRETATION: NORMAL
LDLC SERPL CALC-MCNC: 75 MG/DL (ref 0–100)
LYMPHOCYTES # BLD AUTO: 1.76 10*3/MM3 (ref 0.9–4.8)
LYMPHOCYTES NFR BLD AUTO: 34.6 % (ref 19.6–45.3)
Lab: NORMAL
MCH RBC QN AUTO: 29.9 PG (ref 26.9–32)
MCHC RBC AUTO-ENTMCNC: 31.3 G/DL (ref 32.4–36.3)
MCV RBC AUTO: 95.7 FL (ref 80.5–98.2)
MICROALBUMIN UR-MCNC: <3 UG/ML
MONOCYTES # BLD AUTO: 0.46 10*3/MM3 (ref 0.2–1.2)
MONOCYTES NFR BLD AUTO: 9 % (ref 5–12)
NEUTROPHILS # BLD AUTO: 2.83 10*3/MM3 (ref 1.9–8.1)
NEUTROPHILS NFR BLD AUTO: 55.6 % (ref 42.7–76)
PLATELET # BLD AUTO: 233 10*3/MM3 (ref 140–500)
POTASSIUM SERPL-SCNC: 4.6 MMOL/L (ref 3.5–5.2)
PROT SERPL-MCNC: 7.4 G/DL (ref 6–8.5)
RBC # BLD AUTO: 4.38 10*6/MM3 (ref 3.9–5.2)
SODIUM SERPL-SCNC: 140 MMOL/L (ref 136–145)
T4 FREE SERPL-MCNC: 1.12 NG/DL (ref 0.93–1.7)
TRIGL SERPL-MCNC: 55 MG/DL (ref 0–150)
TSH SERPL DL<=0.005 MIU/L-ACNC: 1.53 MIU/ML (ref 0.27–4.2)
VLDLC SERPL CALC-MCNC: 11 MG/DL (ref 5–40)
WBC # BLD AUTO: 5.09 10*3/MM3 (ref 4.5–10.7)

## 2019-02-14 ENCOUNTER — TELEPHONE (OUTPATIENT)
Dept: INFECTIOUS DISEASES | Facility: CLINIC | Age: 74
End: 2019-02-14

## 2019-02-14 NOTE — TELEPHONE ENCOUNTER
I do not have any expertise in that area and recommend she talk to her orthopedist since it is well outside my scope of practice

## 2019-02-14 NOTE — TELEPHONE ENCOUNTER
"Pt states that she is continually having elevated cobalt and chromium levels.  Pt is wondering if Dr. Zaidi could recommend some way for her \"detox\" these levels or recommend someone who could help with this issue as she is afraid that these elevated levels will interfere with her joint replacement.  "

## 2019-02-18 NOTE — TELEPHONE ENCOUNTER
I spoke w/patient and informed her that Dr. Zaidi recommends that she speak with orthopedist in regards to this situation. Pt voiced understanding.

## 2019-03-07 ENCOUNTER — CONSULT (OUTPATIENT)
Dept: ORTHOPEDIC SURGERY | Facility: CLINIC | Age: 74
End: 2019-03-07

## 2019-03-07 VITALS — HEIGHT: 70 IN | TEMPERATURE: 98.1 F | WEIGHT: 200 LBS | BODY MASS INDEX: 28.63 KG/M2

## 2019-03-07 DIAGNOSIS — M70.22 OLECRANON BURSITIS OF LEFT ELBOW: ICD-10-CM

## 2019-03-07 DIAGNOSIS — M25.522 LEFT ELBOW PAIN: Primary | ICD-10-CM

## 2019-03-07 PROCEDURE — 99213 OFFICE O/P EST LOW 20 MIN: CPT | Performed by: ORTHOPAEDIC SURGERY

## 2019-03-07 PROCEDURE — 73070 X-RAY EXAM OF ELBOW: CPT | Performed by: ORTHOPAEDIC SURGERY

## 2019-03-07 RX ORDER — TURMERIC 400 MG
1 CAPSULE ORAL DAILY
COMMUNITY
End: 2019-12-03

## 2019-03-07 RX ORDER — PRAVASTATIN SODIUM 40 MG
TABLET ORAL
Qty: 90 TABLET | Refills: 1 | Status: SHIPPED | OUTPATIENT
Start: 2019-03-07 | End: 2019-03-07

## 2019-03-07 NOTE — PROGRESS NOTES
New left Elbow      Patient: Mariela Ruiz        YOB: 1945        Chief Complaints:left Elbow pain  Chief Complaint   Patient presents with   • Left Elbow - Pain, Establish Care         History of Present Illness:  This is a  74 y.o. female who is right-hand-dominant presents with left elbow swelling this been ongoing for a couple months no history injury change in activity she is noticed some swelling.  She does not have a remote history many years ago for left elbow fracture.  Her current symptoms are mild intermittent worse with pressure she is retired past medical history smart for thyroid disease seizures and osteoporosis  Chief Complaint   Patient presents with   • Left Elbow - Pain, Establish Care             Allergies:   Allergies   Allergen Reactions   • Clindamycin/Lincomycin Itching   • Sulfa Antibiotics      RASH   • Duricef [Cefadroxil] Hives and Rash   • Keflex [Cephalexin] Rash       Medications:   Home Medications:  Current Outpatient Medications on File Prior to Visit   Medication Sig   • cholecalciferol (VITAMIN D3) 1000 units tablet Take 1 tablet by mouth Daily.   • Coenzyme Q10 (CO Q-10) 200 MG capsule Take 100 mg by mouth Daily. HOLD PRIOR TO SURGERY   • Magnesium Oxide (MAG-200 PO) Take 1 tablet/day by mouth.   • PHENobarbital (LUMINAL) 64.8 MG tablet Take 3 tablets by mouth Daily.   • pramipexole (MIRAPEX) 0.5 MG tablet Take 4 tablets daily   • pravastatin (PRAVACHOL) 40 MG tablet Take 40 mg by mouth Daily.   • SYNTHROID 50 MCG tablet Take 1 tablet by mouth Every Morning Before Breakfast.   • [DISCONTINUED] HAVRIX 1440 EL U/ML vaccine    • [DISCONTINUED] pravastatin (PRAVACHOL) 40 MG tablet TAKE 1 TABLET BY MOUTH EVERY DAY     No current facility-administered medications on file prior to visit.      Current Medications:  Scheduled Meds:  Continuous Infusions:  No current facility-administered medications for this visit.   PRN Meds:.    Past Medical History:   Diagnosis Date    • Chronic venous insufficiency    • Dupuytren's contracture    • History of staph infection     S/P KNEE DEC 2016   • History of transfusion    • Hyperlipidemia    • Nontoxic multinodular goiter    • Osteoporosis     Dr. Cabrera   • Postsurgical hypothyroidism    • Restless leg syndrome    • Seizure disorder (CMS/HCC)     Dr. Whitman, HX 1980 LAST SEIZURE   • Sleep apnea     DOES NOT HAVE MACHINE   • Vitamin D insufficiency         Past Surgical History:   Procedure Laterality Date   • APPENDECTOMY     • CARDIAC CATHETERIZATION      NORMAL    • KNEE MINI REVISION Left 11/15/2016    Procedure: LEFT KNEE POLY CHANGE WITH HI POST STABILIZER;  Surgeon: Pablito Claudio MD;  Location: [facility];  Service:    • KNEE MINI REVISION Left 12/13/2016    Procedure: LT KNEE REMOVAL/REPLACEMENT LOCKING PIN ;  Surgeon: Pablito Claudio MD;  Location: [facility];  Service:    • MAMMO FINE NEEDLE ASPIRATION UNILATERAL      RIGHT THYROID NODULE, 2009 BENIGN    • [place] REVISE KNEE JOINT REPLACE,1 PART Left 2/20/2017    Procedure: LT KNEE REMOVAL ANTIBIOTIC SPACER AND KNEE REVISION;  Surgeon: Christiano Cesar MD;  Location: [facility] OR;  Service: Orthopedics   • [place] TOTAL KNEE ARTHROPLASTY Left 12/24/2016    Procedure: LEFT KNEE WASHOUT WITH POLY CHANGE;  Surgeon: Christiano Cesar MD;  Location: [facility] OR;  Service: Orthopedics   • REPLACEMENT TOTAL KNEE Left    • THYROIDECTOMY, PARTIAL      1979 LEFT LOBECTOMY AND ISTHMECTOMY    • TOTAL ABDOMINAL HYSTERECTOMY WITH SALPINGO OOPHORECTOMY     • TOTAL HIP ARTHROPLASTY Left    • TOTAL KNEE  PROSTHESIS REMOVAL W/ SPACER INSERTION Left 12/29/2016    Procedure: LT KNEE IMPLANT REMOVAL WITH INSERTION OF SPACER ;  Surgeon: Christiano Cesar MD;  Location: [facility];  Service:         Social History     Occupational History   • Not on file   Tobacco Use   • Smoking status: Never Smoker   • Smokeless tobacco: Never Used   Substance and Sexual Activity   • Alcohol use: No   • Drug  "use: No   • Sexual activity: Defer    Social History     Social History Narrative   • Not on file        Family History   Problem Relation Age of Onset   • Heart disease Father    • Diabetes Sister    • Hypertension Sister    • Hyperlipidemia Sister    • Obesity Sister              Review of Systems: 14 point review of systems are remarkable for the pertinent part positives listed in the chart by the patient the remainder are negative  Review of Systems      Physical Exam: 74 y.o. female  General Appearance:    Alert, cooperative, in no acute distress                 Vitals:    03/07/19 0907   Temp: 98.1 °F (36.7 °C)   Weight: 90.7 kg (200 lb)   Height: 177.8 cm (70\")      Patient is alert and read ×3 no acute distress appears her above-listed at height weight and age.  Affect is normal respiratory rate is normal unlabored. Heart rate regular rate rhythm, sclera, dentition and hearing are normal for the purpose of this exam.        Ortho Exam       This exam of her left elbow she has mild loss of extension full flexion full pronation supination she is a little bit of swelling in her olecranon bursa no redness very mild palpable tenderness she is neurologically intact    Radiology:   AP, Lateral of the left elbow were ordered/reviewed to evaluate pain.  Have no compared to films she does have evidence of prior injury with degenerative changes at the radiocapitellar joint and osteophytes      Assessment/Plan:  Left elbow swelling which is olecranon bursitis it is not infected I assured her this would go away if she got her pressure off of it I do not think there is enough fluid in there to drain I did give her some samples of some Pennsaid cream.  Her insurance would not cover this hopefully just 3-4 days of this and decrease in the pressure will resolve this if it does not or if the swelling gets worse she will get back in here                                  "

## 2019-04-15 RX ORDER — PRAVASTATIN SODIUM 40 MG
40 TABLET ORAL DAILY
Qty: 90 TABLET | Refills: 0 | Status: SHIPPED | OUTPATIENT
Start: 2019-04-15 | End: 2019-04-22 | Stop reason: SDUPTHER

## 2019-04-16 DIAGNOSIS — G40.909 SEIZURE DISORDER (HCC): ICD-10-CM

## 2019-04-16 RX ORDER — PRAMIPEXOLE DIHYDROCHLORIDE 0.5 MG/1
TABLET ORAL
Qty: 360 TABLET | Refills: 3 | Status: SHIPPED | OUTPATIENT
Start: 2019-04-16 | End: 2019-08-16

## 2019-04-22 ENCOUNTER — APPOINTMENT (OUTPATIENT)
Dept: CT IMAGING | Facility: HOSPITAL | Age: 74
End: 2019-04-22

## 2019-04-22 ENCOUNTER — APPOINTMENT (OUTPATIENT)
Dept: GENERAL RADIOLOGY | Facility: HOSPITAL | Age: 74
End: 2019-04-22

## 2019-04-22 ENCOUNTER — HOSPITAL ENCOUNTER (EMERGENCY)
Facility: HOSPITAL | Age: 74
Discharge: HOME | End: 2019-04-22
Attending: EMERGENCY MEDICINE | Admitting: EMERGENCY MEDICINE

## 2019-04-22 VITALS
HEART RATE: 87 BPM | OXYGEN SATURATION: 98 % | SYSTOLIC BLOOD PRESSURE: 149 MMHG | TEMPERATURE: 97.6 F | WEIGHT: 200 LBS | DIASTOLIC BLOOD PRESSURE: 87 MMHG | RESPIRATION RATE: 18 BRPM | BODY MASS INDEX: 30.31 KG/M2 | HEIGHT: 68 IN

## 2019-04-22 DIAGNOSIS — S09.90XA INJURY OF HEAD, INITIAL ENCOUNTER: ICD-10-CM

## 2019-04-22 DIAGNOSIS — S00.83XA CONTUSION OF FACE, INITIAL ENCOUNTER: ICD-10-CM

## 2019-04-22 DIAGNOSIS — S02.2XXA CLOSED FRACTURE OF NASAL BONE, INITIAL ENCOUNTER: ICD-10-CM
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
## 2019-04-22 PROCEDURE — 70450 CT HEAD/BRAIN W/O DYE: CPT

## 2019-04-22 PROCEDURE — 73562 X-RAY EXAM OF KNEE 3: CPT

## 2019-04-22 PROCEDURE — 99283 EMERGENCY DEPT VISIT LOW MDM: CPT

## 2019-04-22 PROCEDURE — 70486 CT MAXILLOFACIAL W/O DYE: CPT

## 2019-04-22 RX ORDER — IBUPROFEN 600 MG/1
600 TABLET ORAL EVERY 6 HOURS PRN
Qty: 24 TABLET | Refills: 0 | Status: SHIPPED | OUTPATIENT
Start: 2019-04-22 | End: 2019-09-19 | Stop reason: HOSPADM

## 2019-04-22 RX ORDER — PRAVASTATIN SODIUM 40 MG
40 TABLET ORAL NIGHTLY
Qty: 90 TABLET | Refills: 0 | Status: SHIPPED | OUTPATIENT
Start: 2019-04-22 | End: 2019-09-03 | Stop reason: SDUPTHER

## 2019-04-23 DIAGNOSIS — E89.0 POSTSURGICAL HYPOTHYROIDISM: ICD-10-CM

## 2019-04-23 RX ORDER — LEVOTHYROXINE SODIUM 50 MCG
50 TABLET ORAL
Qty: 90 TABLET | Refills: 1 | Status: SHIPPED | OUTPATIENT
Start: 2019-04-23 | End: 2019-04-29 | Stop reason: SDUPTHER

## 2019-04-23 NOTE — PROGRESS NOTES
Subjective   Mariela Ruiz is a 74 y.o. female.     Hospital f/u after a fall on 4/22pt hit face and nose on concrete /was  seen in the ER        She has hypothyroidism and has been taking Synthroid 50 µg per day.   She had a previous left thyroid lobectomy in the 1970s for benign lesion She had a fine needle biopsy right thyroid nodule done in by Dr. Brandt in August 2017 which was read as follicular lesion of undetermined significance.  She had ultrasound-guided needle biopsy done at [facility] on November 16, 2017 and pathology was read as benign follicular cells.  She denies any changes in her thyroid.    She has no previous history of head or neck radiation therapy.  She denies pressure symptoms     She has hyperlipidemia and has been on pravastatin 40 mg once a day.  She denies leg cramps.  She has lost 3 pounds since 4/18.     She has osteoporosis on bone density done in her gynecologist in November 2015.   She had a follow-up bone density done at [facility] in November 2017 which showed osteoporosis with a 9.7% decrease on the right hip.  T score of the right hip was -2.8.  She was never on biphosphonate.  She has vitamin D insufficiency and has been taking vit D regularly.  She did not get Actonel filled because of high cost.  She does not want to use Fosamax.  She denies heartburn.     She had a previous left hip replacement and the joint is worn.  Serum cobalt and chromium are elevated.  Dr. Claudio has retired and she is now seeing Dr. Ed Chun.  She has Dupuytren's contracture and was seen by Dr. Mota who advised surgery.    Patient is aware that I do not treat cobalt and chromium toxicity.  She is aware that cobalt and chromium toxicity can cause neuropathy.  She is reluctant to have her left hip replaced because of infection after a left knee replacement.     She has sleep apnea but is unable to tolerate a CPAP.  She complains of chronic fatigue.    She lost her balance and fell while she was turning.   "She landed on her face without loss of consciousness.  She was seen in the emergency room CT scan of the head showed right nasal bone fracture.  There was no skull fracture or intracranial hemorrhage.  There is minimal small vessel disease of the white matter.      The following portions of the patient's history were reviewed and updated as appropriate: allergies, current medications, past family history, past medical history, past social history, past surgical history and problem list.    Review of Systems   Eyes: Negative.    Respiratory: Negative.    Cardiovascular: Positive for leg swelling.   Gastrointestinal: Negative.    Endocrine: Negative.    Genitourinary: Positive for frequency.   Musculoskeletal: Negative.    Skin: Negative.    Allergic/Immunologic: Negative.    Neurological: Positive for weakness and headaches (fell on 4/22 on concrete ). Negative for numbness.   Hematological: Negative.  Does not bruise/bleed easily.       Objective      Vitals:    04/24/19 1454   BP: 132/80   BP Location: Right arm   Patient Position: Sitting   Cuff Size: Large Adult   Pulse: 86   SpO2: 97%   Weight: 91.4 kg (201 lb 6.4 oz)   Height: 171.5 cm (67.52\")     Physical Exam   Constitutional: She is oriented to person, place, and time. She appears well-developed and well-nourished. No distress.   HENT:   Head: Normocephalic.   Nose: Nose normal.   Mouth/Throat: No oropharyngeal exudate.   Eyes: Conjunctivae and EOM are normal. Right eye exhibits no discharge. Left eye exhibits no discharge. No scleral icterus.   Multiple facial contusions mostly on the right periorbital area.  Pupils are equal and briskly reactive to light.   Neck: Neck supple. No JVD present. No tracheal deviation present. No thyromegaly present.   Cardiovascular: Normal rate, regular rhythm and normal heart sounds. Exam reveals no gallop and no friction rub.   No murmur heard.  Pulmonary/Chest: Effort normal and breath sounds normal. No stridor. She has " no wheezes. She has no rales.   Abdominal: Soft. Bowel sounds are normal. She exhibits no distension and no mass. There is no tenderness. There is no guarding.   Musculoskeletal: Normal range of motion. She exhibits no edema, tenderness or deformity.   Lymphadenopathy:     She has no cervical adenopathy.   Neurological: She is alert and oriented to person, place, and time.   Intact light touch in lower extremities   Skin: Skin is warm and dry. No erythema. No pallor.   Psychiatric: She has a normal mood and affect. Her behavior is normal.     Results Encounter on 01/18/2019   Component Date Value Ref Range Status   • Glucose 01/18/2019 103* 65 - 99 mg/dL Final   • BUN 01/18/2019 14  8 - 23 mg/dL Final   • Creatinine 01/18/2019 0.86  0.57 - 1.00 mg/dL Final   • eGFR Non  Am 01/18/2019 65  >60 mL/min/1.73 Final    Comment: The MDRD GFR formula is only valid for adults with stable  renal function between ages 18 and 70.     • eGFR  Am 01/18/2019 79  >60 mL/min/1.73 Final   • BUN/Creatinine Ratio 01/18/2019 16.3  7.0 - 25.0 Final   • Sodium 01/18/2019 140  136 - 145 mmol/L Final   • Potassium 01/18/2019 4.6  3.5 - 5.2 mmol/L Final   • Chloride 01/18/2019 101  98 - 107 mmol/L Final   • Total CO2 01/18/2019 27.2  22.0 - 29.0 mmol/L Final   • Calcium 01/18/2019 10.2  8.6 - 10.5 mg/dL Final   • Total Protein 01/18/2019 7.4  6.0 - 8.5 g/dL Final   • Albumin 01/18/2019 4.30  3.50 - 5.20 g/dL Final   • Globulin 01/18/2019 3.1  gm/dL Final   • A/G Ratio 01/18/2019 1.4  g/dL Final   • Total Bilirubin 01/18/2019 0.3  0.1 - 1.2 mg/dL Final   • Alkaline Phosphatase 01/18/2019 154* 39 - 117 U/L Final   • AST (SGOT) 01/18/2019 20  1 - 32 U/L Final   • ALT (SGPT) 01/18/2019 17  1 - 33 U/L Final   • Total Cholesterol 01/18/2019 181  0 - 200 mg/dL Final   • Triglycerides 01/18/2019 55  0 - 150 mg/dL Final   • HDL Cholesterol 01/18/2019 95* 40 - 60 mg/dL Final   • VLDL Cholesterol 01/18/2019 11  5 - 40 mg/dL Final   • LDL  Cholesterol  01/18/2019 75  0 - 100 mg/dL Final   • Hemoglobin A1C 01/18/2019 5.00  4.80 - 5.60 % Final    Comment: Hemoglobin A1C Ranges:  Increased Risk for Diabetes  5.7% to 6.4%  Diabetes                     >= 6.5%  Diabetic Goal                < 7.0%     • Free T4 01/18/2019 1.12  0.93 - 1.70 ng/dL Final   • TSH 01/18/2019 1.530  0.270 - 4.200 mIU/mL Final   • [place] 01/18/2019 13.9* 0.0 - 0.9 ug/L Final    Comment:                        Occupational Exposure: DENIA 1.0                                  Detection Limit = 1.0     • Chromium, Plasma 01/18/2019 9.1* 0.1 - 2.1 ug/L Final    Comment: **Verified by repeat analysis**                                  Detection Limit = 0.1     • WBC 01/18/2019 5.09  4.50 - 10.70 10*3/mm3 Final   • RBC 01/18/2019 4.38  3.90 - 5.20 10*6/mm3 Final   • Hemoglobin 01/18/2019 13.1  11.9 - 15.5 g/dL Final   • Hematocrit 01/18/2019 41.9  35.6 - 45.5 % Final   • MCV 01/18/2019 95.7  80.5 - 98.2 fL Final   • MCH 01/18/2019 29.9  26.9 - 32.0 pg Final   • MCHC 01/18/2019 31.3* 32.4 - 36.3 g/dL Final   • RDW 01/18/2019 13.1* 11.7 - 13.0 % Final   • Platelets 01/18/2019 233  140 - 500 10*3/mm3 Final   • Neutrophil Rel % 01/18/2019 55.6  42.7 - 76.0 % Final   • Lymphocyte Rel % 01/18/2019 34.6  19.6 - 45.3 % Final   • Monocyte Rel % 01/18/2019 9.0  5.0 - 12.0 % Final   • Eosinophil Rel % 01/18/2019 0.0* 0.3 - 6.2 % Final   • Basophil Rel % 01/18/2019 0.8  0.0 - 1.5 % Final   • Neutrophils Absolute 01/18/2019 2.83  1.90 - 8.10 10*3/mm3 Final   • Lymphocytes Absolute 01/18/2019 1.76  0.90 - 4.80 10*3/mm3 Final   • Monocytes Absolute 01/18/2019 0.46  0.20 - 1.20 10*3/mm3 Final   • Eosinophils Absolute 01/18/2019 0.00  0.00 - 0.70 10*3/mm3 Final   • Basophils Absolute 01/18/2019 0.04  0.00 - 0.20 10*3/mm3 Final   • Immature Granulocyte Rel % 01/18/2019 0.0  0.0 - 0.5 % Final   • Immature Grans Absolute 01/18/2019 0.00  0.00 - 0.03 10*3/mm3 Final   • 25 Hydroxy, Vitamin D 01/18/2019 35.2   30.0 - 100.0 ng/ml Final    Comment: Reference Range for Total Vitamin D 25(OH)  Deficiency <20.0 ng/mL  Insufficiency 21-29 ng/mL  Sufficiency  ng/mL  Toxicity >100 ng/ml     • Creatinine, Urine 01/18/2019 74.5  Not Estab. mg/dL Final   • Microalbumin, Urine 01/18/2019 <3.0  Not Estab. ug/mL Final   • Microalbumin/Creatinine Ratio 01/18/2019 <4.0  0.0 - 30.0 mg/g creat Final    Comment:                      Normal:                0.0 -  30.0                       Albuminuria:          31.0 - 300.0                       Clinical albuminuria:       >300.0     • Interpretation 01/18/2019 Note   Final    Supplemental report is available.   • PDF Image 01/18/2019 Not applicable   Final     Assessment/Plan   Diagnoses and all orders for this visit:    History of partial thyroidectomy  -     Comprehensive Metabolic Panel  -     TSH    Postsurgical hypothyroidism  -     TSH    Other hyperlipidemia  -     Comprehensive Metabolic Panel  -     Lipid Panel    Vitamin D insufficiency  -     Comprehensive Metabolic Panel  -     Vitamin D 25 Hydroxy    Osteoporosis, unspecified osteoporosis type, unspecified pathological fracture presence  -     Comprehensive Metabolic Panel  -     Vitamin D 25 Hydroxy    Abnormal blood level of chromium  -     Chromium Level    Contusion of face, initial encounter    Closed fracture of nasal bone, initial encounter    Abnormal blood level of cobalt  -     Cobalt      Continue Synthroid 50 mcg/day.  Continue pravastatin 40 mg/day.  Continue vitamin D 1000 units/day.  Discussed about using Prolia or Reclast.  Advised to follow-up with Dr. Ed Chun and Dr. Whitman with regards to elevated chromium and cobalt.    Send copy of my note to Dr. Ed Chun and Dr. Whitman    RTC 4 mos.

## 2019-04-24 ENCOUNTER — OFFICE VISIT (OUTPATIENT)
Dept: ENDOCRINOLOGY | Age: 74
End: 2019-04-24

## 2019-04-24 VITALS
OXYGEN SATURATION: 97 % | HEIGHT: 68 IN | DIASTOLIC BLOOD PRESSURE: 80 MMHG | WEIGHT: 201.4 LBS | SYSTOLIC BLOOD PRESSURE: 132 MMHG | HEART RATE: 86 BPM | BODY MASS INDEX: 30.52 KG/M2

## 2019-04-24 DIAGNOSIS — E89.0 HISTORY OF PARTIAL THYROIDECTOMY: Primary | ICD-10-CM

## 2019-04-24 DIAGNOSIS — R79.0 ABNORMAL BLOOD LEVEL OF COBALT: ICD-10-CM

## 2019-04-24 DIAGNOSIS — R79.0 ABNORMAL BLOOD LEVEL OF CHROMIUM: ICD-10-CM

## 2019-04-24 DIAGNOSIS — E55.9 VITAMIN D INSUFFICIENCY: ICD-10-CM

## 2019-04-24 DIAGNOSIS — S02.2XXA CLOSED FRACTURE OF NASAL BONE, INITIAL ENCOUNTER: ICD-10-CM

## 2019-04-24 DIAGNOSIS — S00.83XA CONTUSION OF FACE, INITIAL ENCOUNTER: ICD-10-CM

## 2019-04-24 DIAGNOSIS — M81.0 OSTEOPOROSIS, UNSPECIFIED OSTEOPOROSIS TYPE, UNSPECIFIED PATHOLOGICAL FRACTURE PRESENCE: ICD-10-CM

## 2019-04-24 DIAGNOSIS — E78.49 OTHER HYPERLIPIDEMIA: ICD-10-CM

## 2019-04-24 DIAGNOSIS — E89.0 POSTSURGICAL HYPOTHYROIDISM: ICD-10-CM

## 2019-04-24 PROCEDURE — 99214 OFFICE O/P EST MOD 30 MIN: CPT | Performed by: INTERNAL MEDICINE

## 2019-04-24 RX ORDER — GINSENG 100 MG
CAPSULE ORAL 2 TIMES DAILY
COMMUNITY
End: 2019-08-16

## 2019-04-26 LAB
25(OH)D3+25(OH)D2 SERPL-MCNC: 34.9 NG/ML (ref 30–100)
ALBUMIN SERPL-MCNC: 4.6 G/DL (ref 3.5–5.2)
ALBUMIN/GLOB SERPL: 1.7 G/DL
ALP SERPL-CCNC: 135 U/L (ref 39–117)
ALT SERPL-CCNC: 14 U/L (ref 1–33)
AST SERPL-CCNC: 16 U/L (ref 1–32)
BILIRUB SERPL-MCNC: 0.3 MG/DL (ref 0.2–1.2)
BUN SERPL-MCNC: 12 MG/DL (ref 8–23)
BUN/CREAT SERPL: 14.5 (ref 7–25)
CALCIUM SERPL-MCNC: 10 MG/DL (ref 8.6–10.5)
CHLORIDE SERPL-SCNC: 101 MMOL/L (ref 98–107)
CHOLEST SERPL-MCNC: 167 MG/DL (ref 0–200)
CO2 SERPL-SCNC: 28.5 MMOL/L (ref 22–29)
COBALT SERPL-MCNC: 13.6 UG/L (ref 0–0.9)
CR SERPL-MCNC: 7.9 UG/L (ref 0.1–2.1)
CREAT SERPL-MCNC: 0.83 MG/DL (ref 0.57–1)
GLOBULIN SER CALC-MCNC: 2.7 GM/DL
GLUCOSE SERPL-MCNC: 88 MG/DL (ref 65–99)
HDLC SERPL-MCNC: 85 MG/DL (ref 40–60)
INTERPRETATION: NORMAL
LDLC SERPL CALC-MCNC: 73 MG/DL (ref 0–100)
POTASSIUM SERPL-SCNC: 4.5 MMOL/L (ref 3.5–5.2)
PROT SERPL-MCNC: 7.3 G/DL (ref 6–8.5)
SODIUM SERPL-SCNC: 140 MMOL/L (ref 136–145)
TRIGL SERPL-MCNC: 47 MG/DL (ref 0–150)
TSH SERPL DL<=0.005 MIU/L-ACNC: 1.29 MIU/ML (ref 0.27–4.2)
VLDLC SERPL CALC-MCNC: 9.4 MG/DL

## 2019-04-29 DIAGNOSIS — E89.0 POSTSURGICAL HYPOTHYROIDISM: ICD-10-CM

## 2019-04-29 RX ORDER — LEVOTHYROXINE SODIUM 50 MCG
50 TABLET ORAL
Qty: 90 TABLET | Refills: 1 | Status: SHIPPED | OUTPATIENT
Start: 2019-04-29 | End: 2020-04-13

## 2019-06-04 RX ORDER — PHENOBARBITAL 64.8 MG/1
194.4 TABLET ORAL DAILY
Qty: 270 TABLET | Refills: 1 | Status: SHIPPED | OUTPATIENT
Start: 2019-06-04 | End: 2019-08-16

## 2019-07-25 ENCOUNTER — HOSPITAL ENCOUNTER (EMERGENCY)
Facility: HOSPITAL | Age: 74
Discharge: HOME | End: 2019-07-25
Attending: EMERGENCY MEDICINE | Admitting: EMERGENCY MEDICINE

## 2019-07-25 VITALS
TEMPERATURE: 97.7 F | RESPIRATION RATE: 18 BRPM | DIASTOLIC BLOOD PRESSURE: 71 MMHG | SYSTOLIC BLOOD PRESSURE: 128 MMHG | BODY MASS INDEX: 29.55 KG/M2 | OXYGEN SATURATION: 98 % | HEART RATE: 80 BPM | HEIGHT: 68 IN | WEIGHT: 195 LBS

## 2019-07-25 DIAGNOSIS — S61.209A AVULSION OF SKIN OF FINGER, INITIAL ENCOUNTER: Primary | ICD-10-CM

## 2019-07-25 PROCEDURE — 99283 EMERGENCY DEPT VISIT LOW MDM: CPT

## 2019-08-16 ENCOUNTER — APPOINTMENT (OUTPATIENT)
Dept: GENERAL RADIOLOGY | Facility: HOSPITAL | Age: 74
End: 2019-08-16

## 2019-08-16 ENCOUNTER — HOSPITAL ENCOUNTER (INPATIENT)
Facility: HOSPITAL | Age: 74
LOS: 1 days | Discharge: HOME | End: 2019-08-17
Attending: EMERGENCY MEDICINE | Admitting: HOSPITALIST

## 2019-08-16 DIAGNOSIS — T63.461A WASP STING, ACCIDENTAL OR UNINTENTIONAL, INITIAL ENCOUNTER: Primary | ICD-10-CM

## 2019-08-16 DIAGNOSIS — L03.113 CELLULITIS OF RIGHT UPPER EXTREMITY: ICD-10-CM

## 2019-08-16 DIAGNOSIS — A41.9 SEPSIS, DUE TO UNSPECIFIED ORGANISM: ICD-10-CM

## 2019-08-16 LAB
ANION GAP SERPL CALCULATED.3IONS-SCNC: 13.6 MMOL/L (ref 5–15)
BASOPHILS # BLD AUTO: 0.01 10*3/MM3 (ref 0–0.2)
BASOPHILS NFR BLD AUTO: 0.1 % (ref 0–1.5)
BUN BLD-MCNC: 14 MG/DL (ref 8–23)
BUN/CREAT SERPL: 13.9 (ref 7–25)
CALCIUM SPEC-SCNC: 9.2 MG/DL (ref 8.6–10.5)
CHLORIDE SERPL-SCNC: 103 MMOL/L (ref 98–107)
CK SERPL-CCNC: 45 U/L (ref 20–180)
CO2 SERPL-SCNC: 24.4 MMOL/L (ref 22–29)
CREAT BLD-MCNC: 1.01 MG/DL (ref 0.57–1)
D-LACTATE SERPL-SCNC: 1 MMOL/L (ref 0.5–2)
DEPRECATED RDW RBC AUTO: 43.2 FL (ref 37–54)
EOSINOPHIL # BLD AUTO: 0 10*3/MM3 (ref 0–0.4)
EOSINOPHIL NFR BLD AUTO: 0 % (ref 0.3–6.2)
ERYTHROCYTE [DISTWIDTH] IN BLOOD BY AUTOMATED COUNT: 12.7 % (ref 12.3–15.4)
GFR SERPL CREATININE-BSD FRML MDRD: 54 ML/MIN/1.73
GLUCOSE BLD-MCNC: 104 MG/DL (ref 65–99)
HCT VFR BLD AUTO: 40.6 % (ref 34–46.6)
HGB BLD-MCNC: 12.9 G/DL (ref 12–15.9)
HOLD SPECIMEN: NORMAL
HOLD SPECIMEN: NORMAL
IMM GRANULOCYTES # BLD AUTO: 0.03 10*3/MM3 (ref 0–0.05)
IMM GRANULOCYTES NFR BLD AUTO: 0.4 % (ref 0–0.5)
INR PPP: 1.02 (ref 0.9–1.1)
LYMPHOCYTES # BLD AUTO: 0.93 10*3/MM3 (ref 0.7–3.1)
LYMPHOCYTES NFR BLD AUTO: 13.3 % (ref 19.6–45.3)
MCH RBC QN AUTO: 29.3 PG (ref 26.6–33)
MCHC RBC AUTO-ENTMCNC: 31.8 G/DL (ref 31.5–35.7)
MCV RBC AUTO: 92.1 FL (ref 79–97)
MONOCYTES # BLD AUTO: 0.46 10*3/MM3 (ref 0.1–0.9)
MONOCYTES NFR BLD AUTO: 6.6 % (ref 5–12)
NEUTROPHILS # BLD AUTO: 5.57 10*3/MM3 (ref 1.7–7)
NEUTROPHILS NFR BLD AUTO: 79.6 % (ref 42.7–76)
NRBC BLD AUTO-RTO: 0 /100 WBC (ref 0–0.2)
PLATELET # BLD AUTO: 215 10*3/MM3 (ref 140–450)
PMV BLD AUTO: 11 FL (ref 6–12)
POTASSIUM BLD-SCNC: 4.2 MMOL/L (ref 3.5–5.2)
PROTHROMBIN TIME: 13.1 SECONDS (ref 11.7–14.2)
RBC # BLD AUTO: 4.41 10*6/MM3 (ref 3.77–5.28)
SODIUM BLD-SCNC: 141 MMOL/L (ref 136–145)
WBC NRBC COR # BLD: 7 10*3/MM3 (ref 3.4–10.8)
WHOLE BLOOD HOLD SPECIMEN: NORMAL
WHOLE BLOOD HOLD SPECIMEN: NORMAL

## 2019-08-16 PROCEDURE — 85610 PROTHROMBIN TIME: CPT | Performed by: EMERGENCY MEDICINE

## 2019-08-16 PROCEDURE — 99284 EMERGENCY DEPT VISIT MOD MDM: CPT

## 2019-08-16 PROCEDURE — 87040 BLOOD CULTURE FOR BACTERIA: CPT | Performed by: EMERGENCY MEDICINE

## 2019-08-16 PROCEDURE — 80048 BASIC METABOLIC PNL TOTAL CA: CPT | Performed by: EMERGENCY MEDICINE

## 2019-08-16 PROCEDURE — 25010000002 CEFTRIAXONE PER 250 MG: Performed by: EMERGENCY MEDICINE

## 2019-08-16 PROCEDURE — 83605 ASSAY OF LACTIC ACID: CPT | Performed by: EMERGENCY MEDICINE

## 2019-08-16 PROCEDURE — 36415 COLL VENOUS BLD VENIPUNCTURE: CPT

## 2019-08-16 PROCEDURE — 82550 ASSAY OF CK (CPK): CPT | Performed by: EMERGENCY MEDICINE

## 2019-08-16 PROCEDURE — 73090 X-RAY EXAM OF FOREARM: CPT

## 2019-08-16 PROCEDURE — 85025 COMPLETE CBC W/AUTO DIFF WBC: CPT | Performed by: EMERGENCY MEDICINE

## 2019-08-16 PROCEDURE — 25010000002 ENOXAPARIN PER 10 MG: Performed by: HOSPITALIST

## 2019-08-16 PROCEDURE — 25010000002 METHYLPREDNISOLONE PER 125 MG: Performed by: EMERGENCY MEDICINE

## 2019-08-16 PROCEDURE — 25010000002 DIPHENHYDRAMINE PER 50 MG: Performed by: EMERGENCY MEDICINE

## 2019-08-16 RX ORDER — ACETAMINOPHEN 650 MG/1
650 SUPPOSITORY RECTAL EVERY 4 HOURS PRN
Status: DISCONTINUED | OUTPATIENT
Start: 2019-08-16 | End: 2019-08-16

## 2019-08-16 RX ORDER — PRAMIPEXOLE DIHYDROCHLORIDE 1 MG/1
2 TABLET ORAL NIGHTLY
Status: DISCONTINUED | OUTPATIENT
Start: 2019-08-16 | End: 2019-08-17 | Stop reason: HOSPADM

## 2019-08-16 RX ORDER — LEVOTHYROXINE SODIUM 0.05 MG/1
50 TABLET ORAL
Status: DISCONTINUED | OUTPATIENT
Start: 2019-08-17 | End: 2019-08-17 | Stop reason: HOSPADM

## 2019-08-16 RX ORDER — CETIRIZINE HYDROCHLORIDE 10 MG/1
10 TABLET ORAL DAILY
COMMUNITY
End: 2019-09-03

## 2019-08-16 RX ORDER — PHENOBARBITAL 64.8 MG/1
64.8 TABLET ORAL EVERY MORNING
Status: DISCONTINUED | OUTPATIENT
Start: 2019-08-17 | End: 2019-08-17 | Stop reason: HOSPADM

## 2019-08-16 RX ORDER — METHYLPREDNISOLONE SODIUM SUCCINATE 125 MG/2ML
125 INJECTION, POWDER, LYOPHILIZED, FOR SOLUTION INTRAMUSCULAR; INTRAVENOUS ONCE
Status: COMPLETED | OUTPATIENT
Start: 2019-08-16 | End: 2019-08-16

## 2019-08-16 RX ORDER — SODIUM CHLORIDE 0.9 % (FLUSH) 0.9 %
10 SYRINGE (ML) INJECTION AS NEEDED
Status: DISCONTINUED | OUTPATIENT
Start: 2019-08-16 | End: 2019-08-16

## 2019-08-16 RX ORDER — ACETAMINOPHEN 160 MG/5ML
650 SOLUTION ORAL EVERY 4 HOURS PRN
Status: DISCONTINUED | OUTPATIENT
Start: 2019-08-16 | End: 2019-08-16

## 2019-08-16 RX ORDER — CETIRIZINE HYDROCHLORIDE 10 MG/1
10 TABLET ORAL DAILY
Status: DISCONTINUED | OUTPATIENT
Start: 2019-08-17 | End: 2019-08-17 | Stop reason: HOSPADM

## 2019-08-16 RX ORDER — PRAVASTATIN SODIUM 40 MG
40 TABLET ORAL NIGHTLY
Status: DISCONTINUED | OUTPATIENT
Start: 2019-08-16 | End: 2019-08-17 | Stop reason: HOSPADM

## 2019-08-16 RX ORDER — PRAMIPEXOLE DIHYDROCHLORIDE 0.5 MG/1
2 TABLET ORAL NIGHTLY
COMMUNITY
End: 2019-09-25 | Stop reason: SDUPTHER

## 2019-08-16 RX ORDER — PHENOBARBITAL 64.8 MG/1
129.6 TABLET ORAL NIGHTLY
COMMUNITY
End: 2019-10-28

## 2019-08-16 RX ORDER — ACETAMINOPHEN 325 MG/1
650 TABLET ORAL EVERY 4 HOURS PRN
Status: DISCONTINUED | OUTPATIENT
Start: 2019-08-16 | End: 2019-08-17 | Stop reason: HOSPADM

## 2019-08-16 RX ORDER — CHOLECALCIFEROL (VITAMIN D3) 125 MCG
5 CAPSULE ORAL NIGHTLY PRN
Status: DISCONTINUED | OUTPATIENT
Start: 2019-08-16 | End: 2019-08-17 | Stop reason: HOSPADM

## 2019-08-16 RX ORDER — CEFTRIAXONE SODIUM 1 G/50ML
1 INJECTION, SOLUTION INTRAVENOUS ONCE
Status: COMPLETED | OUTPATIENT
Start: 2019-08-16 | End: 2019-08-16

## 2019-08-16 RX ORDER — PHENOBARBITAL 64.8 MG/1
129.6 TABLET ORAL NIGHTLY
Status: DISCONTINUED | OUTPATIENT
Start: 2019-08-16 | End: 2019-08-17 | Stop reason: HOSPADM

## 2019-08-16 RX ORDER — PHENOBARBITAL 64.8 MG/1
64.8 TABLET ORAL EVERY MORNING
COMMUNITY
End: 2019-10-28

## 2019-08-16 RX ORDER — DIPHENHYDRAMINE HYDROCHLORIDE 50 MG/ML
25 INJECTION INTRAMUSCULAR; INTRAVENOUS ONCE
Status: COMPLETED | OUTPATIENT
Start: 2019-08-16 | End: 2019-08-16

## 2019-08-16 RX ORDER — METHYLPREDNISOLONE 4 MG/1
TABLET ORAL SEE ADMIN INSTRUCTIONS
COMMUNITY
End: 2019-09-03

## 2019-08-16 RX ORDER — UBIDECARENONE 100 MG
100 CAPSULE ORAL DAILY
COMMUNITY

## 2019-08-16 RX ADMIN — DIPHENHYDRAMINE HYDROCHLORIDE 25 MG: 50 INJECTION, SOLUTION INTRAMUSCULAR; INTRAVENOUS at 19:33

## 2019-08-16 RX ADMIN — METHYLPREDNISOLONE SODIUM SUCCINATE 125 MG: 125 INJECTION, POWDER, FOR SOLUTION INTRAMUSCULAR; INTRAVENOUS at 19:36

## 2019-08-16 RX ADMIN — PRAMIPEXOLE DIHYDROCHLORIDE 2 MG: 1 TABLET ORAL at 23:35

## 2019-08-16 RX ADMIN — SODIUM CHLORIDE, POTASSIUM CHLORIDE, SODIUM LACTATE AND CALCIUM CHLORIDE 1000 ML: 600; 310; 30; 20 INJECTION, SOLUTION INTRAVENOUS at 20:46

## 2019-08-16 RX ADMIN — PRAVASTATIN SODIUM 40 MG: 40 TABLET ORAL at 23:35

## 2019-08-16 RX ADMIN — ENOXAPARIN SODIUM 40 MG: 40 INJECTION SUBCUTANEOUS at 23:35

## 2019-08-16 RX ADMIN — CEFTRIAXONE SODIUM 1 G: 1 INJECTION, SOLUTION INTRAVENOUS at 20:00

## 2019-08-16 RX ADMIN — PHENOBARBITAL 129.6 MG: 64.8 TABLET ORAL at 23:35

## 2019-08-16 RX ADMIN — SODIUM CHLORIDE, PRESERVATIVE FREE 10 ML: 5 INJECTION INTRAVENOUS at 20:31

## 2019-08-16 NOTE — ED PROVIDER NOTES
EMERGENCY DEPARTMENT ENCOUNTER    Room Number:  05/05  Date seen:  8/16/2019  Time seen: 6:39 PM  PCP: Duglas Rock MD  Historian: self      HPI:  Chief Complaint: R forearm edema  Context: Mariela Ruiz is a 74 y.o. female who presents to the ED c/o worsening pruritic R forearm edema that started yesterday at 1800. Pt also c/o fever (Tmax 101). Pt denies R arm pain and trouble swallowing. Pt states she was stung by a wasp yesterday afternoon on her R hand. This morning, she noticed her R forearm was pruritic and swollen. After seeing her orthopedist for prior sx's, the area was marked and she was advised to come to the ED if swelling is noticed to go beyond border. She has tried putting hydrocortisone cream on the area with no relief. The swelling has since progressed past her elbow so she decided to come to the ED for further evaluation. No other complaints were made at this time. Pt is R hand dominant.     Pain Location: R forearm  Radiation: none stated  Quality: pruritic, edema  Intensity/Severity: moderate  Duration: yesterday at 1800  Onset quality: sudden  Timing: constant  Progression: worsening  Aggravating Factors: none stated  Alleviating Factors: none stated  Previous Episodes: none stated  Treatment before arrival: She has tried putting hydrocortisone cream on the area with no relief.  Associated Symptoms: none stated    PAST MEDICAL HISTORY  Active Ambulatory Problems     Diagnosis Date Noted   • Seizure disorder (CMS/HCC)    • Hyperlipidemia    • Vitamin D insufficiency    • Osteoporosis    • Sleep apnea    • Chronic venous insufficiency    • Postsurgical hypothyroidism    • Chronic fatigue syndrome 11/07/2016   • Gastroesophageal reflux disease 11/07/2016   • Dupuytren's contracture 11/07/2016   • History of biliary T-tube placement 11/07/2016   • History of partial thyroidectomy 10/04/2012   • Mitral valve prolapse 11/07/2016   • Multinodular goiter 11/07/2016   • Right fascicular block  11/07/2016   • Restless legs syndrome 11/07/2016   • History of cardiac catheterization 11/07/2016   • Urge incontinence of urine 11/07/2016   • Left knee pain 11/15/2016   • Ligamentous laxity of knee 12/02/2016   • DJD (degenerative joint disease) of knee 12/24/2016   • Adverse drug effect, subsequent encounter 11/07/2017   • Abnormal blood level of chromium 04/05/2018   • Abnormal blood level of cobalt 04/05/2018   • Family history of diabetes mellitus 09/13/2018     Resolved Ambulatory Problems     Diagnosis Date Noted   • Pruritic rash 10/18/2017     Past Medical History:   Diagnosis Date   • Chronic venous insufficiency    • Dupuytren's contracture    • History of staph infection    • History of transfusion    • Hyperlipidemia    • Nontoxic multinodular goiter    • Osteoporosis    • Postsurgical hypothyroidism    • Restless leg syndrome    • Seizure disorder (CMS/HCC)    • Sleep apnea    • Vitamin D insufficiency          PAST SURGICAL HISTORY  Past Surgical History:   Procedure Laterality Date   • APPENDECTOMY     • CARDIAC CATHETERIZATION      NORMAL    • KNEE MINI REVISION Left 11/15/2016    Procedure: LEFT KNEE POLY CHANGE WITH HI POST STABILIZER;  Surgeon: Pablito Claudio MD;  Location: [facility];  Service:    • KNEE MINI REVISION Left 12/13/2016    Procedure: LT KNEE REMOVAL/REPLACEMENT LOCKING PIN ;  Surgeon: Pablito Claudio MD;  Location: [facility] OR;  Service:    • MAMMO FINE NEEDLE ASPIRATION UNILATERAL      RIGHT THYROID NODULE, 2009 BENIGN    • MS REVISE KNEE JOINT REPLACE,1 PART Left 2/20/2017    Procedure: LT KNEE REMOVAL ANTIBIOTIC SPACER AND KNEE REVISION;  Surgeon: Christiano Cesar MD;  Location: [facility] OR;  Service: Orthopedics   • MS TOTAL KNEE ARTHROPLASTY Left 12/24/2016    Procedure: LEFT KNEE WASHOUT WITH POLY CHANGE;  Surgeon: Christiano Cesar MD;  Location: [facility] OR;  Service: Orthopedics   • REPLACEMENT TOTAL KNEE Left    • THYROIDECTOMY, PARTIAL      1979 LEFT  LOBECTOMY AND ISTHMECTOMY    • TOTAL ABDOMINAL HYSTERECTOMY WITH SALPINGO OOPHORECTOMY     • TOTAL HIP ARTHROPLASTY Left    • TOTAL KNEE  PROSTHESIS REMOVAL W/ SPACER INSERTION Left 12/29/2016    Procedure: LT KNEE IMPLANT REMOVAL WITH INSERTION OF SPACER ;  Surgeon: Christiano Cesar MD;  Location: [facility] OR;  Service:          FAMILY HISTORY  Family History   Problem Relation Age of Onset   • Heart disease Father    • Diabetes Sister    • Hypertension Sister    • Hyperlipidemia Sister    • Obesity Sister          SOCIAL HISTORY  Social History     Socioeconomic History   • Marital status:      Spouse name: Not on file   • Number of children: Not on file   • Years of education: Not on file   • Highest education level: Not on file   Tobacco Use   • Smoking status: Never Smoker   • Smokeless tobacco: Never Used   Substance and Sexual Activity   • Alcohol use: No   • Drug use: No   • Sexual activity: Defer         ALLERGIES  Clindamycin/lincomycin; Sulfa antibiotics; Duricef [cefadroxil]; and Keflex [cephalexin]        REVIEW OF SYSTEMS  Review of Systems   Constitutional: Positive for fever (Tmax 101).   HENT: Negative for sore throat and trouble swallowing.    Respiratory: Negative for shortness of breath.    Cardiovascular: Negative for chest pain.   Gastrointestinal: Negative for abdominal pain.   Endocrine: Negative for polyuria.   Genitourinary: Negative for dysuria.   Musculoskeletal: Negative for neck pain.        (+) pruritic R arm edema   Skin: Negative for rash.   Neurological: Negative for headaches.   All other systems reviewed and are negative.           PHYSICAL EXAM  ED Triage Vitals   Temp Heart Rate Resp BP SpO2   08/16/19 1814 08/16/19 1814 08/16/19 1814 08/16/19 1837 08/16/19 1814   100.3 °F (37.9 °C) 116 20 141/94 97 %      Temp src Heart Rate Source Patient Position BP Location FiO2 (%)   -- -- -- 08/16/19 1837 --      Left arm          GENERAL: not distressed  HENT: nares  patent  EYES: no scleral icterus  CV: regular rhythm, regular rate, 2+ radial pulses of the R arm  RESPIRATORY: normal effort  ABDOMEN: soft, nontender  MUSCULOSKELETAL: marked R forearm edema with surrounding erythema, soft R forearm compartment, good capillary refill distally in the R fingers, no crepitus.   NEURO: alert, moves all extremities, follows commands  SKIN: mildly warm R forearm, dry    Vital signs and nursing notes reviewed.        LAB RESULTS  Recent Results (from the past 24 hour(s))   Light Blue Top    Collection Time: 08/16/19  6:40 PM   Result Value Ref Range    Extra Tube hold for add-on    Green Top (Gel)    Collection Time: 08/16/19  6:40 PM   Result Value Ref Range    Extra Tube Hold for add-ons.    Lavender Top    Collection Time: 08/16/19  6:40 PM   Result Value Ref Range    Extra Tube hold for add-on    Gold Top - SST    Collection Time: 08/16/19  6:40 PM   Result Value Ref Range    Extra Tube Hold for add-ons.    Basic Metabolic Panel    Collection Time: 08/16/19  6:40 PM   Result Value Ref Range    Glucose 104 (H) 65 - 99 mg/dL    BUN 14 8 - 23 mg/dL    Creatinine 1.01 (H) 0.57 - 1.00 mg/dL    Sodium 141 136 - 145 mmol/L    Potassium 4.2 3.5 - 5.2 mmol/L    Chloride 103 98 - 107 mmol/L    CO2 24.4 22.0 - 29.0 mmol/L    Calcium 9.2 8.6 - 10.5 mg/dL    eGFR Non African Amer 54 (L) >60 mL/min/1.73    BUN/Creatinine Ratio 13.9 7.0 - 25.0    Anion Gap 13.6 5.0 - 15.0 mmol/L   CK    Collection Time: 08/16/19  6:40 PM   Result Value Ref Range    Creatine Kinase 45 20 - 180 U/L   Protime-INR    Collection Time: 08/16/19  6:40 PM   Result Value Ref Range    Protime 13.1 11.7 - 14.2 Seconds    INR 1.02 0.90 - 1.10   CBC Auto Differential    Collection Time: 08/16/19  6:40 PM   Result Value Ref Range    WBC 7.00 3.40 - 10.80 10*3/mm3    RBC 4.41 3.77 - 5.28 10*6/mm3    Hemoglobin 12.9 12.0 - 15.9 g/dL    Hematocrit 40.6 34.0 - 46.6 %    MCV 92.1 79.0 - 97.0 fL    MCH 29.3 26.6 - 33.0 pg    MCHC 31.8  31.5 - 35.7 g/dL    RDW 12.7 12.3 - 15.4 %    RDW-SD 43.2 37.0 - 54.0 fl    MPV 11.0 6.0 - 12.0 fL    Platelets 215 140 - 450 10*3/mm3    Neutrophil % 79.6 (H) 42.7 - 76.0 %    Lymphocyte % 13.3 (L) 19.6 - 45.3 %    Monocyte % 6.6 5.0 - 12.0 %    Eosinophil % 0.0 (L) 0.3 - 6.2 %    Basophil % 0.1 0.0 - 1.5 %    Immature Grans % 0.4 0.0 - 0.5 %    Neutrophils, Absolute 5.57 1.70 - 7.00 10*3/mm3    Lymphocytes, Absolute 0.93 0.70 - 3.10 10*3/mm3    Monocytes, Absolute 0.46 0.10 - 0.90 10*3/mm3    Eosinophils, Absolute 0.00 0.00 - 0.40 10*3/mm3    Basophils, Absolute 0.01 0.00 - 0.20 10*3/mm3    Immature Grans, Absolute 0.03 0.00 - 0.05 10*3/mm3    nRBC 0.0 0.0 - 0.2 /100 WBC   Lactic Acid, Plasma    Collection Time: 08/16/19  7:30 PM   Result Value Ref Range    Lactate 1.0 0.5 - 2.0 mmol/L       Ordered the above labs and reviewed the results.        RADIOLOGY  XR Forearm 2 View Right   Final Result       Soft tissue swelling. No acute fracture or erosion is identified.       This report was finalized on 8/16/2019 7:56 PM by Dr. Steven Zelaya M.D.                 MEDICATIONS GIVEN IN ER  Medications   sodium chloride 0.9 % flush 10 mL (not administered)   sodium chloride 0.9 % flush 10 mL (10 mL Intravenous Given 8/16/19 2031)   lactated ringers bolus 1,000 mL (1,000 mL Intravenous New Bag 8/16/19 2046)   cefTRIAXone (ROCEPHIN) IVPB 1 g (0 g Intravenous Stopped 8/16/19 2030)   methylPREDNISolone sodium succinate (SOLU-Medrol) injection 125 mg (125 mg Intravenous Given 8/16/19 1936)   diphenhydrAMINE (BENADRYL) injection 25 mg (25 mg Intravenous Given 8/16/19 1933)           PROGRESS AND CONSULTS  ED Course as of Aug 16 2107   Fri Aug 16, 2019   1919 Presents after a wasp sting yesterday.  Appears to be consistent with a large local reaction.  However, she does have a temperature of 100.3.  This makes a concern about the possibility of cellulitis.  Furthermore, the redness and swelling have progressed since 10  "AM.  I have marked the skin to document its change.  I believe the patient needs admission for IV antibiotics and close monitoring to make sure it is not worsened.  [TD]   1920 According to guidelines, there is no indication for repeat tetanus booster as this is considered a \"clean\" sting.  [TD]      ED Course User Index  [TD] Nehemiah Lazo II, MD     1913 Discussed w/pt that due to the marked swelling to the R forearm along with fever, plan is to admit for further observation. Pt understands and agrees with the plan. All questions were answered.     1916 Blood culture ordered. INR ordered CK ordered. Lactic Acid ordered.    1917 Rocephin ordered.     1918 XR Forearm ordered.     1921 Benadryl ordered. Solu-Medrol ordered.     2006 Call made to [facility]    2016 Discussed pt w/Dr. Shoemaker ([facility]) who agrees to admit pt to telemetry.     MEDICAL DECISION MAKING      MDM  Number of Diagnoses or Management Options  Cellulitis of right upper extremity:   Large local reaction to wasp sting, accidental or unintentional, initial encounter:   Sepsis, due to unspecified organism (CMS/formerly [facility]):   Diagnosis management comments: Patient's reaction appears because of a large local reaction due to hymenoptera sting.  However, given her associated temperature of 100.3, I believe that antibiotics are currently clinically indicated.  Her swelling has progressively gotten worse and therefore I am going to admit her for IV antibiotics and for close observation.       Amount and/or Complexity of Data Reviewed  Clinical lab tests: ordered and reviewed (No leukocytosis)  Tests in the radiology section of CPT®: ordered and reviewed (XR Forearm: Soft tissue swelling. No acute fracture or erosion is identified.)  Decide to obtain previous medical records or to obtain history from someone other than the patient: yes  Discuss the patient with other providers: yes (Dr. Shoemaker ([facility]))  Independent visualization of images, tracings, or specimens: yes       "       DIAGNOSIS  Final diagnoses:   Large local reaction to wasp sting, accidental or unintentional, initial encounter   Cellulitis of right upper extremity   Sepsis, due to unspecified organism (CMS/HCC)         DISPOSITION  ADMISSION    Discussed treatment plan and reason for admission with pt/family and admitting physician.  Pt/family voiced understanding of the plan for admission for further testing/treatment as needed.       Latest Documented Vital Signs:  As of 9:07 PM  BP- 143/71 HR- 87 Temp- 98.6 °F (37 °C) (Tympanic) O2 sat- 97%        --  Documentation assistance provided by francisco javier Guzman for Dr. Clifton MD.  Information recorded by the scrmodesta was done at my direction and has been verified and validated by me.       Thomas Moses  08/16/19 [id number]       Nehemiah Lazo II, MD  08/16/19 [id number]

## 2019-08-16 NOTE — ED NOTES
Patient reports being stung by a wasp yesterday at 6pm and has right arm itching swelling and redness. Was already given a steroid pill     Maggy Fam  08/16/19 1814

## 2019-08-16 NOTE — ED NOTES
Pt reports she was stung by a wasp yesterday. Pt complains of pain and swelling     Candace Pichardo RN  08/16/19 3721

## 2019-08-17 VITALS
HEART RATE: 74 BPM | OXYGEN SATURATION: 97 % | RESPIRATION RATE: 16 BRPM | DIASTOLIC BLOOD PRESSURE: 62 MMHG | TEMPERATURE: 97.5 F | HEIGHT: 69 IN | SYSTOLIC BLOOD PRESSURE: 114 MMHG | BODY MASS INDEX: 28.58 KG/M2 | WEIGHT: 193 LBS

## 2019-08-17 PROBLEM — L03.113 CELLULITIS OF RIGHT UPPER EXTREMITY: Status: ACTIVE | Noted: 2019-08-17

## 2019-08-17 LAB
ANION GAP SERPL CALCULATED.3IONS-SCNC: 11.8 MMOL/L (ref 5–15)
BUN BLD-MCNC: 13 MG/DL (ref 8–23)
BUN/CREAT SERPL: 18.1 (ref 7–25)
CALCIUM SPEC-SCNC: 9 MG/DL (ref 8.6–10.5)
CHLORIDE SERPL-SCNC: 102 MMOL/L (ref 98–107)
CO2 SERPL-SCNC: 23.2 MMOL/L (ref 22–29)
CREAT BLD-MCNC: 0.72 MG/DL (ref 0.57–1)
CRP SERPL-MCNC: 3.4 MG/DL (ref 0–0.5)
DEPRECATED RDW RBC AUTO: 42.6 FL (ref 37–54)
ERYTHROCYTE [DISTWIDTH] IN BLOOD BY AUTOMATED COUNT: 12.3 % (ref 12.3–15.4)
ERYTHROCYTE [SEDIMENTATION RATE] IN BLOOD: 8 MM/HR (ref 0–30)
GFR SERPL CREATININE-BSD FRML MDRD: 80 ML/MIN/1.73
GLUCOSE BLD-MCNC: 128 MG/DL (ref 65–99)
HCT VFR BLD AUTO: 36.6 % (ref 34–46.6)
HGB BLD-MCNC: 11.9 G/DL (ref 12–15.9)
MCH RBC QN AUTO: 30.5 PG (ref 26.6–33)
MCHC RBC AUTO-ENTMCNC: 32.5 G/DL (ref 31.5–35.7)
MCV RBC AUTO: 93.8 FL (ref 79–97)
PLATELET # BLD AUTO: 183 10*3/MM3 (ref 140–450)
PMV BLD AUTO: 11.1 FL (ref 6–12)
POTASSIUM BLD-SCNC: 4.1 MMOL/L (ref 3.5–5.2)
PROCALCITONIN SERPL-MCNC: 0.1 NG/ML (ref 0.1–0.25)
RBC # BLD AUTO: 3.9 10*6/MM3 (ref 3.77–5.28)
SODIUM BLD-SCNC: 137 MMOL/L (ref 136–145)
WBC NRBC COR # BLD: 6.81 10*3/MM3 (ref 3.4–10.8)

## 2019-08-17 PROCEDURE — 86140 C-REACTIVE PROTEIN: CPT | Performed by: HOSPITALIST

## 2019-08-17 PROCEDURE — 84145 PROCALCITONIN (PCT): CPT | Performed by: HOSPITALIST

## 2019-08-17 PROCEDURE — 85652 RBC SED RATE AUTOMATED: CPT | Performed by: HOSPITALIST

## 2019-08-17 PROCEDURE — 63710000001 DIPHENHYDRAMINE PER 50 MG: Performed by: HOSPITALIST

## 2019-08-17 PROCEDURE — 85027 COMPLETE CBC AUTOMATED: CPT | Performed by: HOSPITALIST

## 2019-08-17 PROCEDURE — 80048 BASIC METABOLIC PNL TOTAL CA: CPT | Performed by: HOSPITALIST

## 2019-08-17 RX ORDER — CEPHALEXIN 500 MG/1
500 CAPSULE ORAL EVERY 6 HOURS SCHEDULED
Qty: 24 CAPSULE | Refills: 0 | Status: SHIPPED | OUTPATIENT
Start: 2019-08-17 | End: 2019-08-17 | Stop reason: HOSPADM

## 2019-08-17 RX ORDER — CLOBETASOL PROPIONATE 0.5 MG/G
CREAM TOPICAL EVERY 12 HOURS SCHEDULED
Start: 2019-08-17 | End: 2021-03-02

## 2019-08-17 RX ORDER — CLOBETASOL PROPIONATE 0.5 MG/G
CREAM TOPICAL EVERY 12 HOURS SCHEDULED
Status: DISCONTINUED | OUTPATIENT
Start: 2019-08-17 | End: 2019-08-17 | Stop reason: HOSPADM

## 2019-08-17 RX ORDER — AMOXICILLIN AND CLAVULANATE POTASSIUM 875; 125 MG/1; MG/1
1 TABLET, FILM COATED ORAL EVERY 12 HOURS SCHEDULED
Status: DISCONTINUED | OUTPATIENT
Start: 2019-08-17 | End: 2019-08-17 | Stop reason: HOSPADM

## 2019-08-17 RX ORDER — DIPHENHYDRAMINE HCL 25 MG
50 CAPSULE ORAL EVERY 6 HOURS PRN
Status: DISCONTINUED | OUTPATIENT
Start: 2019-08-17 | End: 2019-08-17 | Stop reason: HOSPADM

## 2019-08-17 RX ORDER — CEPHALEXIN 500 MG/1
500 CAPSULE ORAL EVERY 6 HOURS SCHEDULED
Status: DISCONTINUED | OUTPATIENT
Start: 2019-08-17 | End: 2019-08-17

## 2019-08-17 RX ORDER — AMOXICILLIN AND CLAVULANATE POTASSIUM 875; 125 MG/1; MG/1
1 TABLET, FILM COATED ORAL EVERY 12 HOURS SCHEDULED
Qty: 12 TABLET | Refills: 0 | Status: SHIPPED | OUTPATIENT
Start: 2019-08-17 | End: 2019-08-23

## 2019-08-17 RX ORDER — CEFTRIAXONE SODIUM 1 G/50ML
1 INJECTION, SOLUTION INTRAVENOUS EVERY 24 HOURS
Status: DISCONTINUED | OUTPATIENT
Start: 2019-08-17 | End: 2019-08-17

## 2019-08-17 RX ADMIN — DIPHENHYDRAMINE HYDROCHLORIDE 50 MG: 25 CAPSULE ORAL at 08:33

## 2019-08-17 RX ADMIN — LEVOTHYROXINE SODIUM 50 MCG: 50 TABLET ORAL at 06:32

## 2019-08-17 RX ADMIN — CETIRIZINE HYDROCHLORIDE 10 MG: 10 TABLET, FILM COATED ORAL at 08:23

## 2019-08-17 RX ADMIN — PHENOBARBITAL 64.8 MG: 64.8 TABLET ORAL at 06:32

## 2019-08-17 RX ADMIN — CLOBETASOL PROPIONATE: 0.5 CREAM TOPICAL at 06:32

## 2019-08-17 NOTE — PLAN OF CARE
Problem: Patient Care Overview  Goal: Plan of Care Review  Outcome: Ongoing (interventions implemented as appropriate)   08/17/19 7241   Coping/Psychosocial   Plan of Care Reviewed With patient   Plan of Care Review   Progress improving   OTHER   Outcome Summary VSS, no complaints of pain at this time. Pending am labs, pt has slept all shift. RUE elevated, will continue to monitor.     Goal: Individualization and Mutuality  Outcome: Ongoing (interventions implemented as appropriate)    Goal: Discharge Needs Assessment  Outcome: Outcome(s) achieved Date Met: 08/17/19    Goal: Interprofessional Rounds/Family Conf  Outcome: Ongoing (interventions implemented as appropriate)      Problem: Pain, Chronic (Adult)  Goal: Identify Related Risk Factors and Signs and Symptoms  Outcome: Outcome(s) achieved Date Met: 08/17/19    Goal: Acceptable Pain/Comfort Level and Functional Ability  Outcome: Ongoing (interventions implemented as appropriate)      Problem: Skin Injury Risk (Adult)  Goal: Identify Related Risk Factors and Signs and Symptoms  Outcome: Ongoing (interventions implemented as appropriate)      Problem: Fall Risk (Adult)  Goal: Identify Related Risk Factors and Signs and Symptoms  Outcome: Outcome(s) achieved Date Met: 08/17/19    Goal: Absence of Fall  Outcome: Outcome(s) achieved Date Met: 08/17/19      Problem: Infection, Risk/Actual (Adult)  Goal: Identify Related Risk Factors and Signs and Symptoms  Outcome: Outcome(s) achieved Date Met: 08/17/19    Goal: Infection Prevention/Resolution  Outcome: Outcome(s) achieved Date Met: 08/17/19

## 2019-08-17 NOTE — PROGRESS NOTES
Clinical Pharmacy Services: Medication History    Mariela Ruiz is a 74 y.o. female presenting to [facility] for   Chief Complaint   Patient presents with   • Insect Bite       She  has a past medical history of Chronic venous insufficiency, Dupuytren's contracture, History of staph infection, History of transfusion, Hyperlipidemia, Nontoxic multinodular goiter, Osteoporosis, Postsurgical hypothyroidism, Restless leg syndrome, Seizure disorder (CMS/HCC), Sleep apnea, and Vitamin D insufficiency.    Allergies as of 08/16/2019 - Reviewed 08/16/2019   Allergen Reaction Noted   • Clindamycin/lincomycin Itching 12/29/2016   • Sulfa antibiotics  03/18/2016   • Duricef [cefadroxil] Hives and Rash 03/18/2016   • Keflex [cephalexin] Rash 12/29/2016       Medication information was obtained from: Patient  Pharmacy and Phone Number: [facility] [phone]    Prior to Admission Medications     Prescriptions Last Dose Informant Patient Reported? Taking?    cetirizine (zyrTEC) 10 MG tablet 8/16/2019 Self Yes Yes    Take 10 mg by mouth Daily.    cholecalciferol (VITAMIN D3) 1000 units tablet 8/16/2019 Self No Yes    Take 1 tablet by mouth Daily.    coenzyme Q10 100 MG capsule 8/16/2019 Self Yes Yes    Take 100 mg by mouth Daily.    ibuprofen (ADVIL,MOTRIN) 600 MG tablet Past Week Self No Yes    Take 1 tablet by mouth Every 6 (Six) Hours As Needed for Mild Pain  or Moderate Pain  (take with food).    methylPREDNISolone (MEDROL, AARON,) 4 MG tablet 8/16/2019 Self Yes Yes    Take  by mouth See Admin Instructions. Take as directed on package instructions.     PHENobarbital (LUMINAL) 64.8 MG tablet 8/16/2019 Self Yes Yes    Take 64.8 mg by mouth Every Morning.    PHENobarbital (LUMINAL) 64.8 MG tablet 8/15/2019 Self Yes Yes    Take 129.6 mg by mouth Every Night.    pramipexole (MIRAPEX) 0.5 MG tablet 8/15/2019 Self Yes Yes    Take 2 mg by mouth Every Night.    pravastatin (PRAVACHOL) 40 MG tablet 8/15/2019 Self No Yes     Take 1 tablet by mouth Every Night.    SYNTHROID 50 MCG tablet 8/16/2019 Self No Yes    Take 1 tablet by mouth Every Morning Before Breakfast.    Turmeric 400 MG capsule 8/16/2019 Self Yes Yes    Take 1 capsule by mouth Daily.            Medication notes: Magnesium removed per patient, no longer taking.    This medication list is complete to the best of my knowledge as of 8/16/2019    Please call if questions.    Joyce Orozco, Medication History Technician  8/16/2019 8:18 PM

## 2019-08-17 NOTE — DISCHARGE SUMMARY
[facility]    Date of Discharge:  8/17/2019    PCP: Duglas Rock MD    Discharge Diagnosis:   Active Hospital Problems    Diagnosis  POA   • **Wasp sting [T63.461A]  Yes   • Cellulitis of right upper extremity [L03.113]  Yes   • Mitral valve prolapse [I34.1]  Yes   • Chronic venous insufficiency [I87.2]  Yes      Resolved Hospital Problems   No resolved problems to display.     Hospital Course  Please see history and physical for details. Patient is a 74 y.o. female presented with right upper extremity swelling and aggressive redness and discomfort after a wasp sting Thursday evening.  Other than pruritus in her arm she feels fine and she was admitted and started on ceftriaxone.  Today her erythema is improved and she is requesting to go home.  We will send her on a short course of antibiotics p.o.  Keflex is listed as an allergy and the patient initially denied and so plans were made to try keflex in the hospital and send her home on keflex if she tolerated so a prescription was sent to her pharmacy. She then recalled having hives and I called to cancel it. Will attempt augmentin here prior to discharge to make sure she tolerates it and send her on a short course.    I discussed the patient's findings and my recommendations with patient and nursing staff.    Condition on Discharge: Improved.     Temp:  [97.3 °F (36.3 °C)-100.3 °F (37.9 °C)] 97.5 °F (36.4 °C)  Heart Rate:  [] 74  Resp:  [16-20] 16  BP: (114-143)/(61-94) 114/62  Body mass index is 28.5 kg/m².    Physical Exam   Constitutional: She is oriented to person, place, and time. No distress.   Cardiovascular: Normal rate and regular rhythm.   Pulmonary/Chest: Effort normal and breath sounds normal. No respiratory distress.   Abdominal: Soft. Bowel sounds are normal. There is no tenderness. There is no rebound and no guarding.   Musculoskeletal: She exhibits edema (RUE moderate).    Neurological: She is alert and oriented to person, place, and time.   Skin: Skin is warm and dry. There is erythema (mild with warmth RUE).   Psychiatric: She has a normal mood and affect. Her behavior is normal.        Discharge Medications      New Medications      Instructions Start Date   amoxicillin-clavulanate 875-125 MG per tablet  Commonly known as:  AUGMENTIN   1 tablet, Oral, Every 12 Hours Scheduled      clobetasol 0.05 % cream  Commonly known as:  TEMOVATE   Topical, Every 12 Hours Scheduled, take home and use         Continue These Medications      Instructions Start Date   cetirizine 10 MG tablet  Commonly known as:  zyrTEC   10 mg, Oral, Daily      cholecalciferol 1000 units tablet  Commonly known as:  VITAMIN D3   1,000 Units, Oral, Daily      coenzyme Q10 100 MG capsule   100 mg, Oral, Daily      ibuprofen 600 MG tablet  Commonly known as:  ADVIL,MOTRIN   600 mg, Oral, Every 6 Hours PRN      methylPREDNISolone 4 MG tablet  Commonly known as:  MEDROL (AARON)   Oral, See Admin Instructions, Take as directed on package instructions.      PHENobarbital 64.8 MG tablet  Commonly known as:  LUMINAL   64.8 mg, Oral, Every Morning      PHENobarbital 64.8 MG tablet  Commonly known as:  LUMINAL   129.6 mg, Oral, Nightly      pramipexole 0.5 MG tablet  Commonly known as:  MIRAPEX   2 mg, Oral, Nightly      pravastatin 40 MG tablet  Commonly known as:  PRAVACHOL   40 mg, Oral, Nightly      SYNTHROID 50 MCG tablet  Generic drug:  levothyroxine   50 mcg, Oral, Every Morning Before Breakfast      Turmeric 400 MG capsule   1 capsule, Oral, Daily            Diet Instructions     Diet: Regular      Discharge Diet:  Regular         Activity Instructions     Activity as Tolerated           Additional Instructions for the Follow-ups that You Need to Schedule     Call MD for problems / concerns.   As directed        Follow-up Information     Duglas Rock MD Follow up in 2 day(s).    Specialty:   Endocrinology  Contact information:  [address] [place] [id number] [phone]                 Test Results Pending at Discharge   Order Current Status    Blood Culture - Blood, Arm, Left In process    Blood Culture - Blood, Arm, Right In process         Augustin Abdalla MD  08/17/19  1:04 PM    Discharge time spent greater than 30 minutes.

## 2019-08-17 NOTE — H&P
Patient Name:  Mariela Ruiz  YOB: 1945  MRN:  [id number]  Admit Date:  8/16/2019  Patient Care Team:  Duglas Rock MD as PCP - General (Endocrinology)  Duglas Rock MD as PCP - Claims Attributed  Rob Whitman MD as Consulting Physician (Neurology)  Anuj Brandt MD as Consulting Physician (General Surgery)  Josh Luther MD as Consulting Physician (Cardiology)  Ed Chun MD as Surgeon (Orthopedic Surgery)      Subjective   History Present Illness     Chief Complaint   Patient presents with   • Insect Bite       Ms. Ruiz is a 74 y.o. non-smoker with a history of GARRY, seizures and HLD that presents to [facility] complaining of discomfort, redness and swelling at site of a wasp sting Thursday evening.  Symptoms seem to get worse.  She had an appointment with orthopedic doctor earlier today who noted the significant erythema of her arm.  They marked it with a marker and instructed her if erythema continue to progress that she should present to the emergency department.  She denies any pain in that right arm.  She does report increased swelling and severe pruritus.  She denies any systemic symptoms such as nausea, vomiting, shortness of breath or urticaria.  Other than a itchy swollen arm she feels fine.         History of Present Illness  Review of Systems   Constitutional: Negative.    HENT: Negative.    Eyes: Negative.    Respiratory: Negative.    Cardiovascular: Negative.    Gastrointestinal: Negative.    Endocrine: Negative.    Genitourinary: Negative.    Musculoskeletal: Negative.    Skin: Negative.    Neurological: Negative.    Hematological: Negative.    Psychiatric/Behavioral: Negative.         Personal History     Past Medical History:   Diagnosis Date   • Chronic venous insufficiency    • Dupuytren's contracture    • History of staph infection     S/P KNEE DEC 2016   • History of transfusion    • Hyperlipidemia    • Nontoxic multinodular  goiter    • Osteoporosis     Dr. Cabrera   • Postsurgical hypothyroidism    • Restless leg syndrome    • Seizure disorder (CMS/HCC)     Dr. Whitman, HX 1980 LAST SEIZURE   • Sleep apnea     DOES NOT HAVE MACHINE   • Vitamin D insufficiency      Past Surgical History:   Procedure Laterality Date   • APPENDECTOMY     • CARDIAC CATHETERIZATION      NORMAL    • KNEE MINI REVISION Left 11/15/2016    Procedure: LEFT KNEE POLY CHANGE WITH HI POST STABILIZER;  Surgeon: Pablito Claudio MD;  Location: [facility] OR;  Service:    • KNEE MINI REVISION Left 12/13/2016    Procedure: LT KNEE REMOVAL/REPLACEMENT LOCKING PIN ;  Surgeon: Pablito Claudio MD;  Location: [facility] OR;  Service:    • MAMMO FINE NEEDLE ASPIRATION UNILATERAL      RIGHT THYROID NODULE, 2009 BENIGN    • ID REVISE KNEE JOINT REPLACE,1 PART Left 2/20/2017    Procedure: LT KNEE REMOVAL ANTIBIOTIC SPACER AND KNEE REVISION;  Surgeon: Christiano Cesar MD;  Location: [facility] OR;  Service: Orthopedics   • ID TOTAL KNEE ARTHROPLASTY Left 12/24/2016    Procedure: LEFT KNEE WASHOUT WITH POLY CHANGE;  Surgeon: Christiano Cesar MD;  Location: [facility] OR;  Service: Orthopedics   • REPLACEMENT TOTAL KNEE Left    • THYROIDECTOMY, PARTIAL      1979 LEFT LOBECTOMY AND ISTHMECTOMY    • TOTAL ABDOMINAL HYSTERECTOMY WITH SALPINGO OOPHORECTOMY     • TOTAL HIP ARTHROPLASTY Left    • TOTAL KNEE  PROSTHESIS REMOVAL W/ SPACER INSERTION Left 12/29/2016    Procedure: LT KNEE IMPLANT REMOVAL WITH INSERTION OF SPACER ;  Surgeon: Christiano Cesar MD;  Location: [facility] OR;  Service:      Family History   Problem Relation Age of Onset   • Heart disease Father    • Diabetes Sister    • Hypertension Sister    • Hyperlipidemia Sister    • Obesity Sister      Social History     Tobacco Use   • Smoking status: Never Smoker   • Smokeless tobacco: Never Used   Substance Use Topics   • Alcohol use: No   • Drug use: No     Medications Prior to Admission   Medication Sig Dispense  Refill Last Dose   • cetirizine (zyrTEC) 10 MG tablet Take 10 mg by mouth Daily.   8/16/2019 at Unknown time   • cholecalciferol (VITAMIN D3) 1000 units tablet Take 1 tablet by mouth Daily.   8/16/2019 at Unknown time   • coenzyme Q10 100 MG capsule Take 100 mg by mouth Daily.   8/16/2019 at Unknown time   • ibuprofen (ADVIL,MOTRIN) 600 MG tablet Take 1 tablet by mouth Every 6 (Six) Hours As Needed for Mild Pain  or Moderate Pain  (take with food). 24 tablet 0 Past Week at Unknown time   • methylPREDNISolone (MEDROL, AARON,) 4 MG tablet Take  by mouth See Admin Instructions. Take as directed on package instructions.    8/16/2019 at am   • PHENobarbital (LUMINAL) 64.8 MG tablet Take 64.8 mg by mouth Every Morning.   8/16/2019 at Unknown time   • PHENobarbital (LUMINAL) 64.8 MG tablet Take 129.6 mg by mouth Every Night.   8/15/2019 at Unknown time   • pramipexole (MIRAPEX) 0.5 MG tablet Take 2 mg by mouth Every Night.   8/15/2019 at Unknown time   • pravastatin (PRAVACHOL) 40 MG tablet Take 1 tablet by mouth Every Night. 90 tablet 0 8/15/2019 at Unknown time   • SYNTHROID 50 MCG tablet Take 1 tablet by mouth Every Morning Before Breakfast. 90 tablet 1 8/16/2019 at Unknown time   • Turmeric 400 MG capsule Take 1 capsule by mouth Daily.   8/16/2019 at Unknown time     Allergies:    Allergies   Allergen Reactions   • Clindamycin/Lincomycin Itching   • Sulfa Antibiotics      RASH   • Duricef [Cefadroxil] Hives and Rash   • Keflex [Cephalexin] Rash       Objective    Objective     Vital Signs  Temp:  [97.3 °F (36.3 °C)-100.3 °F (37.9 °C)] 97.5 °F (36.4 °C)  Heart Rate:  [] 90  Resp:  [16-20] 16  BP: (132-143)/(61-94) 132/67  SpO2:  [96 %-97 %] 97 %  on   ;   Device (Oxygen Therapy): room air  Body mass index is 28.5 kg/m².    Physical Exam   Constitutional: She is oriented to person, place, and time. She appears well-developed and well-nourished.  Non-toxic appearance. She does not have a sickly appearance. She does  not appear ill. No distress.   HENT:   Head: Normocephalic and atraumatic.   Eyes: Conjunctivae and EOM are normal. No scleral icterus.   Neck: Normal range of motion. No JVD present.   Cardiovascular: Normal rate and regular rhythm.   Pulmonary/Chest: Effort normal and breath sounds normal.   Abdominal: Soft. Bowel sounds are normal. She exhibits no distension. There is no tenderness.   Musculoskeletal: Normal range of motion. She exhibits edema.   Right hand swollen and mildly erythematous extending up to just above elbow.  No areas of fluctuance or drainage.  Minimally warm to touch.  Nontender.  No wound.   Neurological: She is alert and oriented to person, place, and time.   Skin: Skin is warm and dry.   Psychiatric: She has a normal mood and affect. Her behavior is normal.   Nursing note and vitals reviewed.      Results Review:  I reviewed the patient's new clinical results.  I reviewed the patient's new imaging results and agree with the interpretation.  I reviewed the patient's other test results and agree with the interpretation  I personally viewed and interpreted the patient's EKG/Telemetry data  Discussed with ED provider.    Lab Results (last 24 hours)     Procedure Component Value Units Date/Time    CBC & Differential [[id number]] Collected:  08/16/19 1840    Specimen:  Blood Updated:  08/16/19 1952    Narrative:       The following orders were created for panel order CBC & Differential.  Procedure                               Abnormality         Status                     ---------                               -----------         ------                     CBC Auto Differential[[id number]]        Abnormal            Final result                 Please view results for these tests on the individual orders.    Basic Metabolic Panel [[id number]]  (Abnormal) Collected:  08/16/19 1840    Specimen:  Blood Updated:  08/16/19 1942     Glucose 104 mg/dL      BUN 14 mg/dL      Creatinine 1.01 mg/dL      Sodium  141 mmol/L      Potassium 4.2 mmol/L      Chloride 103 mmol/L      CO2 24.4 mmol/L      Calcium 9.2 mg/dL      eGFR Non African Amer 54 mL/min/1.73      BUN/Creatinine Ratio 13.9     Anion Gap 13.6 mmol/L     Narrative:       GFR Normal >60  Chronic Kidney Disease <60  Kidney Failure <15    CK [[id number]]  (Normal) Collected:  08/16/19 1840    Specimen:  Blood Updated:  08/16/19 1942     Creatine Kinase 45 U/L     Protime-INR [[id number]]  (Normal) Collected:  08/16/19 1840    Specimen:  Blood Updated:  08/16/19 1952     Protime 13.1 Seconds      INR 1.02    CBC Auto Differential [[id number]]  (Abnormal) Collected:  08/16/19 1840    Specimen:  Blood Updated:  08/16/19 1952     WBC 7.00 10*3/mm3      RBC 4.41 10*6/mm3      Hemoglobin 12.9 g/dL      Hematocrit 40.6 %      MCV 92.1 fL      MCH 29.3 pg      MCHC 31.8 g/dL      RDW 12.7 %      RDW-SD 43.2 fl      MPV 11.0 fL      Platelets 215 10*3/mm3      Neutrophil % 79.6 %      Lymphocyte % 13.3 %      Monocyte % 6.6 %      Eosinophil % 0.0 %      Basophil % 0.1 %      Immature Grans % 0.4 %      Neutrophils, Absolute 5.57 10*3/mm3      Lymphocytes, Absolute 0.93 10*3/mm3      Monocytes, Absolute 0.46 10*3/mm3      Eosinophils, Absolute 0.00 10*3/mm3      Basophils, Absolute 0.01 10*3/mm3      Immature Grans, Absolute 0.03 10*3/mm3      nRBC 0.0 /100 WBC     Blood Culture - Blood, Arm, Right [[id number]] Collected:  08/16/19 1930    Specimen:  Blood from Arm, Right Updated:  08/16/19 1933    Lactic Acid, Plasma [[id number]]  (Normal) Collected:  08/16/19 1930    Specimen:  Blood from Arm, Right Updated:  08/16/19 1951     Lactate 1.0 mmol/L     Blood Culture - Blood, Arm, Left [[id number]] Collected:  08/16/19 2000    Specimen:  Blood from Arm, Left Updated:  08/16/19 2002          Imaging Results (last 24 hours)     Procedure Component Value Units Date/Time    XR Forearm 2 View Right [[id number]] Collected:  08/16/19 1955     Updated:  08/16/19 1959    Narrative:       XR  FOREARM 2 VW RIGHT-     INDICATIONS: Swelling of the right forearm after insect bite     TECHNIQUE: 2 views of the right forearm     COMPARISON: None available     FINDINGS:     No acute fracture or erosion is identified. The bones are diffusely  osteopenic. Degenerative changes are seen laterally at the wrist.  Diffuse soft tissue swelling of the forearm is apparent, could be  evidence of inflammation, infection, injury. No radiopaque foreign  bodies are noted.       Impression:          Soft tissue swelling. No acute fracture or erosion is identified.     This report was finalized on 8/16/2019 7:56 PM by Dr. Steven Zelaya M.D.                  No orders to display        Assessment/Plan     Active Hospital Problems    Diagnosis POA   • **Wasp sting [T63.461A] Yes   • Cellulitis of right upper extremity [L03.113] Yes   • Mitral valve prolapse [I34.1] Yes   • Chronic venous insufficiency [I87.2] Yes       74 y.o. female admitted with Wasp sting.    · Suspect she is only having a large local reaction to the wasp sting.  Difficult to say for sure whether she has a secondary bacterial infection but given the progressive erythema and low-grade fever upon arrival will continue antibiotics initiated in ED.  She is unsure of her previous reaction to Keflex.  · For the wasp sting will continue Zyrtec and Benadryl.  Will keep her arm elevated and apply cold compresses.  Will prescribe clobetasol cream as well.  She was prescribed Medrol Dosepak prior to admission which she can continue.  · Recheck WBC in a.m. along with PCT, ESR and CRP.  If all normal consider a very short course of oral antibiotic.  · Lovenox 40 mg SC daily.  · Full code.  · Discussed with patient.    Patient was seen prior to midnight despite note being completed later due to patient care related issues.      Juan Pablo Shoemaker MD  [facility]  08/17/19  12:51 AM

## 2019-08-21 LAB
BACTERIA SPEC AEROBE CULT: NORMAL
BACTERIA SPEC AEROBE CULT: NORMAL

## 2019-09-03 ENCOUNTER — OFFICE VISIT (OUTPATIENT)
Dept: ENDOCRINOLOGY | Age: 74
End: 2019-09-03

## 2019-09-03 VITALS
DIASTOLIC BLOOD PRESSURE: 62 MMHG | WEIGHT: 191 LBS | BODY MASS INDEX: 28.29 KG/M2 | OXYGEN SATURATION: 99 % | HEART RATE: 84 BPM | HEIGHT: 69 IN | SYSTOLIC BLOOD PRESSURE: 134 MMHG

## 2019-09-03 DIAGNOSIS — T63.461A WASP STING, ACCIDENTAL OR UNINTENTIONAL, INITIAL ENCOUNTER: ICD-10-CM

## 2019-09-03 DIAGNOSIS — E55.9 VITAMIN D INSUFFICIENCY: ICD-10-CM

## 2019-09-03 DIAGNOSIS — R79.0 ABNORMAL BLOOD LEVEL OF COBALT: ICD-10-CM

## 2019-09-03 DIAGNOSIS — M72.0 DUPUYTREN'S CONTRACTURE: ICD-10-CM

## 2019-09-03 DIAGNOSIS — E89.0 POSTSURGICAL HYPOTHYROIDISM: Primary | ICD-10-CM

## 2019-09-03 DIAGNOSIS — R79.0 ABNORMAL BLOOD LEVEL OF CHROMIUM: ICD-10-CM

## 2019-09-03 DIAGNOSIS — G40.909 SEIZURE DISORDER (HCC): ICD-10-CM

## 2019-09-03 DIAGNOSIS — G47.33 OBSTRUCTIVE SLEEP APNEA SYNDROME: ICD-10-CM

## 2019-09-03 DIAGNOSIS — E89.0 HISTORY OF PARTIAL THYROIDECTOMY: ICD-10-CM

## 2019-09-03 DIAGNOSIS — E78.5 HYPERLIPIDEMIA, UNSPECIFIED HYPERLIPIDEMIA TYPE: ICD-10-CM

## 2019-09-03 PROCEDURE — 99214 OFFICE O/P EST MOD 30 MIN: CPT | Performed by: INTERNAL MEDICINE

## 2019-09-03 RX ORDER — PRAVASTATIN SODIUM 40 MG
TABLET ORAL
Qty: 90 TABLET | Refills: 0 | Status: SHIPPED | OUTPATIENT
Start: 2019-09-03 | End: 2019-09-13 | Stop reason: SDUPTHER

## 2019-09-03 RX ORDER — PRAVASTATIN SODIUM 40 MG
40 TABLET ORAL NIGHTLY
Qty: 90 TABLET | Refills: 2 | Status: SHIPPED | OUTPATIENT
Start: 2019-09-03 | End: 2019-12-02 | Stop reason: SDUPTHER

## 2019-09-03 RX ORDER — CETIRIZINE HYDROCHLORIDE 10 MG/1
10 TABLET ORAL DAILY
Qty: 30 TABLET | Refills: 0 | Status: SHIPPED | OUTPATIENT
Start: 2019-09-03 | End: 2020-09-02

## 2019-09-04 LAB
25(OH)D3+25(OH)D2 SERPL-MCNC: 35.1 NG/ML (ref 30–100)
ALBUMIN SERPL-MCNC: 4.8 G/DL (ref 3.5–5.2)
ALBUMIN/GLOB SERPL: 1.9 G/DL
ALP SERPL-CCNC: 129 U/L (ref 39–117)
ALT SERPL-CCNC: 12 U/L (ref 1–33)
AST SERPL-CCNC: 17 U/L (ref 1–32)
BILIRUB SERPL-MCNC: 0.3 MG/DL (ref 0.2–1.2)
BUN SERPL-MCNC: 12 MG/DL (ref 8–23)
BUN/CREAT SERPL: 15.8 (ref 7–25)
CALCIUM SERPL-MCNC: 10.1 MG/DL (ref 8.6–10.5)
CHLORIDE SERPL-SCNC: 100 MMOL/L (ref 98–107)
CHOLEST SERPL-MCNC: 196 MG/DL (ref 0–200)
CO2 SERPL-SCNC: 28.8 MMOL/L (ref 22–29)
COBALT SERPL-MCNC: 16 UG/L (ref 0–0.9)
CR SERPL-MCNC: 9.7 UG/L (ref 0.1–2.1)
CREAT SERPL-MCNC: 0.76 MG/DL (ref 0.57–1)
GLOBULIN SER CALC-MCNC: 2.5 GM/DL
GLUCOSE SERPL-MCNC: 103 MG/DL (ref 65–99)
HDLC SERPL-MCNC: 97 MG/DL (ref 40–60)
INTERPRETATION: NORMAL
LDLC SERPL CALC-MCNC: 88 MG/DL (ref 0–100)
POTASSIUM SERPL-SCNC: 4.9 MMOL/L (ref 3.5–5.2)
PROT SERPL-MCNC: 7.3 G/DL (ref 6–8.5)
SODIUM SERPL-SCNC: 139 MMOL/L (ref 136–145)
TRIGL SERPL-MCNC: 55 MG/DL (ref 0–150)
TSH SERPL DL<=0.005 MIU/L-ACNC: 1.47 UIU/ML (ref 0.27–4.2)
VLDLC SERPL CALC-MCNC: 11 MG/DL

## 2019-09-13 ENCOUNTER — APPOINTMENT (OUTPATIENT)
Dept: GENERAL RADIOLOGY | Facility: HOSPITAL | Age: 74
End: 2019-09-13

## 2019-09-13 ENCOUNTER — HOSPITAL ENCOUNTER (INPATIENT)
Facility: HOSPITAL | Age: 74
LOS: 6 days | Discharge: NURSING, REHAB OR LONG-TERM CARE | End: 2019-09-19
Attending: EMERGENCY MEDICINE | Admitting: HOSPITALIST

## 2019-09-13 DIAGNOSIS — S82.831A CLOSED FRACTURE OF DISTAL END OF RIGHT FIBULA, UNSPECIFIED FRACTURE MORPHOLOGY, INITIAL ENCOUNTER: ICD-10-CM

## 2019-09-13 DIAGNOSIS — S72.001A CLOSED FRACTURE OF NECK OF RIGHT FEMUR, INITIAL ENCOUNTER (HCC): Primary | ICD-10-CM

## 2019-09-13 LAB
ABO GROUP BLD: NORMAL
ALBUMIN SERPL-MCNC: 4.3 G/DL (ref 3.5–5.2)
ALBUMIN/GLOB SERPL: 1.5 G/DL
ALP SERPL-CCNC: 110 U/L (ref 39–117)
ALT SERPL W P-5'-P-CCNC: 14 U/L (ref 1–33)
ANION GAP SERPL CALCULATED.3IONS-SCNC: 11.3 MMOL/L (ref 5–15)
ANTI-C: NORMAL
ANTI-D: NORMAL
ANTI-E: NORMAL
AST SERPL-CCNC: 22 U/L (ref 1–32)
BASOPHILS # BLD AUTO: 0.03 10*3/MM3 (ref 0–0.2)
BASOPHILS NFR BLD AUTO: 0.3 % (ref 0–1.5)
BILIRUB SERPL-MCNC: 0.3 MG/DL (ref 0.2–1.2)
BLD GP AB SCN SERPL QL: POSITIVE
BUN BLD-MCNC: 8 MG/DL (ref 8–23)
BUN/CREAT SERPL: 11.9 (ref 7–25)
CALCIUM SPEC-SCNC: 9.5 MG/DL (ref 8.6–10.5)
CHLORIDE SERPL-SCNC: 102 MMOL/L (ref 98–107)
CO2 SERPL-SCNC: 23.7 MMOL/L (ref 22–29)
CREAT BLD-MCNC: 0.67 MG/DL (ref 0.57–1)
DEPRECATED RDW RBC AUTO: 43.9 FL (ref 37–54)
EOSINOPHIL # BLD AUTO: 0 10*3/MM3 (ref 0–0.4)
EOSINOPHIL NFR BLD AUTO: 0 % (ref 0.3–6.2)
ERYTHROCYTE [DISTWIDTH] IN BLOOD BY AUTOMATED COUNT: 12.8 % (ref 12.3–15.4)
GFR SERPL CREATININE-BSD FRML MDRD: 87 ML/MIN/1.73
GLOBULIN UR ELPH-MCNC: 2.9 GM/DL
GLUCOSE BLD-MCNC: 124 MG/DL (ref 65–99)
HCT VFR BLD AUTO: 40.2 % (ref 34–46.6)
HGB BLD-MCNC: 12.9 G/DL (ref 12–15.9)
IMM GRANULOCYTES # BLD AUTO: 0.04 10*3/MM3 (ref 0–0.05)
IMM GRANULOCYTES NFR BLD AUTO: 0.4 % (ref 0–0.5)
LYMPHOCYTES # BLD AUTO: 2 10*3/MM3 (ref 0.7–3.1)
LYMPHOCYTES NFR BLD AUTO: 20.7 % (ref 19.6–45.3)
MCH RBC QN AUTO: 29.9 PG (ref 26.6–33)
MCHC RBC AUTO-ENTMCNC: 32.1 G/DL (ref 31.5–35.7)
MCV RBC AUTO: 93.1 FL (ref 79–97)
MONOCYTES # BLD AUTO: 0.63 10*3/MM3 (ref 0.1–0.9)
MONOCYTES NFR BLD AUTO: 6.5 % (ref 5–12)
NEUTROPHILS # BLD AUTO: 6.97 10*3/MM3 (ref 1.7–7)
NEUTROPHILS NFR BLD AUTO: 72.1 % (ref 42.7–76)
NRBC BLD AUTO-RTO: 0 /100 WBC (ref 0–0.2)
PLATELET # BLD AUTO: 181 10*3/MM3 (ref 140–450)
PMV BLD AUTO: 11 FL (ref 6–12)
POTASSIUM BLD-SCNC: 4.1 MMOL/L (ref 3.5–5.2)
PROT SERPL-MCNC: 7.2 G/DL (ref 6–8.5)
RBC # BLD AUTO: 4.32 10*6/MM3 (ref 3.77–5.28)
RH BLD: NEGATIVE
SODIUM BLD-SCNC: 137 MMOL/L (ref 136–145)
T&S EXPIRATION DATE: NORMAL
WBC NRBC COR # BLD: 9.67 10*3/MM3 (ref 3.4–10.8)

## 2019-09-13 PROCEDURE — 25010000002 ONDANSETRON PER 1 MG: Performed by: EMERGENCY MEDICINE

## 2019-09-13 PROCEDURE — 86901 BLOOD TYPING SEROLOGIC RH(D): CPT | Performed by: EMERGENCY MEDICINE

## 2019-09-13 PROCEDURE — 86850 RBC ANTIBODY SCREEN: CPT | Performed by: EMERGENCY MEDICINE

## 2019-09-13 PROCEDURE — 73552 X-RAY EXAM OF FEMUR 2/>: CPT

## 2019-09-13 PROCEDURE — 73610 X-RAY EXAM OF ANKLE: CPT

## 2019-09-13 PROCEDURE — 86870 RBC ANTIBODY IDENTIFICATION: CPT | Performed by: EMERGENCY MEDICINE

## 2019-09-13 PROCEDURE — 85025 COMPLETE CBC W/AUTO DIFF WBC: CPT | Performed by: EMERGENCY MEDICINE

## 2019-09-13 PROCEDURE — 86922 COMPATIBILITY TEST ANTIGLOB: CPT

## 2019-09-13 PROCEDURE — 71045 X-RAY EXAM CHEST 1 VIEW: CPT

## 2019-09-13 PROCEDURE — 86920 COMPATIBILITY TEST SPIN: CPT

## 2019-09-13 PROCEDURE — 93005 ELECTROCARDIOGRAM TRACING: CPT | Performed by: EMERGENCY MEDICINE

## 2019-09-13 PROCEDURE — 93010 ELECTROCARDIOGRAM REPORT: CPT | Performed by: INTERNAL MEDICINE

## 2019-09-13 PROCEDURE — 73502 X-RAY EXAM HIP UNI 2-3 VIEWS: CPT

## 2019-09-13 PROCEDURE — 80053 COMPREHEN METABOLIC PANEL: CPT | Performed by: EMERGENCY MEDICINE

## 2019-09-13 PROCEDURE — 99285 EMERGENCY DEPT VISIT HI MDM: CPT

## 2019-09-13 PROCEDURE — 86900 BLOOD TYPING SEROLOGIC ABO: CPT | Performed by: EMERGENCY MEDICINE

## 2019-09-13 PROCEDURE — 25010000002 HYDROMORPHONE PER 4 MG: Performed by: EMERGENCY MEDICINE

## 2019-09-13 PROCEDURE — 86902 BLOOD TYPE ANTIGEN DONOR EA: CPT

## 2019-09-13 PROCEDURE — 25010000002 HYDROMORPHONE 1 MG/ML SOLUTION: Performed by: INTERNAL MEDICINE

## 2019-09-13 PROCEDURE — 73590 X-RAY EXAM OF LOWER LEG: CPT

## 2019-09-13 PROCEDURE — 25010000002 HYDROMORPHONE 1 MG/ML SOLUTION: Performed by: EMERGENCY MEDICINE

## 2019-09-13 RX ORDER — ACETAMINOPHEN 650 MG/1
650 SUPPOSITORY RECTAL EVERY 4 HOURS PRN
Status: DISCONTINUED | OUTPATIENT
Start: 2019-09-13 | End: 2019-09-19 | Stop reason: HOSPADM

## 2019-09-13 RX ORDER — HYDROMORPHONE HYDROCHLORIDE 1 MG/ML
0.5 INJECTION, SOLUTION INTRAMUSCULAR; INTRAVENOUS; SUBCUTANEOUS ONCE
Status: COMPLETED | OUTPATIENT
Start: 2019-09-13 | End: 2019-09-13

## 2019-09-13 RX ORDER — LEVOTHYROXINE SODIUM 0.05 MG/1
50 TABLET ORAL
Status: DISCONTINUED | OUTPATIENT
Start: 2019-09-14 | End: 2019-09-19 | Stop reason: HOSPADM

## 2019-09-13 RX ORDER — SENNA AND DOCUSATE SODIUM 50; 8.6 MG/1; MG/1
2 TABLET, FILM COATED ORAL 2 TIMES DAILY PRN
Status: DISCONTINUED | OUTPATIENT
Start: 2019-09-13 | End: 2019-09-19 | Stop reason: HOSPADM

## 2019-09-13 RX ORDER — PHENOBARBITAL 64.8 MG/1
64.8 TABLET ORAL EVERY MORNING
Status: DISCONTINUED | OUTPATIENT
Start: 2019-09-14 | End: 2019-09-19 | Stop reason: HOSPADM

## 2019-09-13 RX ORDER — ONDANSETRON 2 MG/ML
4 INJECTION INTRAMUSCULAR; INTRAVENOUS ONCE
Status: COMPLETED | OUTPATIENT
Start: 2019-09-13 | End: 2019-09-13

## 2019-09-13 RX ORDER — CETIRIZINE HYDROCHLORIDE 10 MG/1
5 TABLET ORAL DAILY
Status: DISCONTINUED | OUTPATIENT
Start: 2019-09-14 | End: 2019-09-19 | Stop reason: HOSPADM

## 2019-09-13 RX ORDER — SODIUM CHLORIDE 9 MG/ML
75 INJECTION, SOLUTION INTRAVENOUS CONTINUOUS
Status: DISCONTINUED | OUTPATIENT
Start: 2019-09-14 | End: 2019-09-15

## 2019-09-13 RX ORDER — SODIUM CHLORIDE 0.9 % (FLUSH) 0.9 %
10 SYRINGE (ML) INJECTION AS NEEDED
Status: DISCONTINUED | OUTPATIENT
Start: 2019-09-13 | End: 2019-09-19 | Stop reason: HOSPADM

## 2019-09-13 RX ORDER — SODIUM CHLORIDE 0.9 % (FLUSH) 0.9 %
10 SYRINGE (ML) INJECTION EVERY 12 HOURS SCHEDULED
Status: DISCONTINUED | OUTPATIENT
Start: 2019-09-13 | End: 2019-09-19 | Stop reason: HOSPADM

## 2019-09-13 RX ORDER — PHENOBARBITAL 32.4 MG/1
129.6 TABLET ORAL NIGHTLY
Status: DISCONTINUED | OUTPATIENT
Start: 2019-09-14 | End: 2019-09-19 | Stop reason: HOSPADM

## 2019-09-13 RX ORDER — ACETAMINOPHEN 160 MG/5ML
650 SOLUTION ORAL EVERY 4 HOURS PRN
Status: DISCONTINUED | OUTPATIENT
Start: 2019-09-13 | End: 2019-09-19 | Stop reason: HOSPADM

## 2019-09-13 RX ORDER — ACETAMINOPHEN 325 MG/1
650 TABLET ORAL EVERY 4 HOURS PRN
Status: DISCONTINUED | OUTPATIENT
Start: 2019-09-13 | End: 2019-09-19 | Stop reason: HOSPADM

## 2019-09-13 RX ORDER — NALOXONE HCL 0.4 MG/ML
0.4 VIAL (ML) INJECTION
Status: DISCONTINUED | OUTPATIENT
Start: 2019-09-13 | End: 2019-09-19 | Stop reason: HOSPADM

## 2019-09-13 RX ORDER — HYDROCODONE BITARTRATE AND ACETAMINOPHEN 5; 325 MG/1; MG/1
1 TABLET ORAL EVERY 4 HOURS PRN
Status: DISCONTINUED | OUTPATIENT
Start: 2019-09-13 | End: 2019-09-19 | Stop reason: HOSPADM

## 2019-09-13 RX ORDER — ONDANSETRON 2 MG/ML
4 INJECTION INTRAMUSCULAR; INTRAVENOUS EVERY 6 HOURS PRN
Status: DISCONTINUED | OUTPATIENT
Start: 2019-09-13 | End: 2019-09-19 | Stop reason: HOSPADM

## 2019-09-13 RX ORDER — ONDANSETRON 4 MG/1
4 TABLET, FILM COATED ORAL EVERY 6 HOURS PRN
Status: DISCONTINUED | OUTPATIENT
Start: 2019-09-13 | End: 2019-09-19 | Stop reason: HOSPADM

## 2019-09-13 RX ORDER — PRAVASTATIN SODIUM 40 MG
40 TABLET ORAL NIGHTLY
Status: DISCONTINUED | OUTPATIENT
Start: 2019-09-14 | End: 2019-09-19 | Stop reason: HOSPADM

## 2019-09-13 RX ADMIN — HYDROMORPHONE HYDROCHLORIDE 1 MG: 1 INJECTION, SOLUTION INTRAMUSCULAR; INTRAVENOUS; SUBCUTANEOUS at 22:46

## 2019-09-13 RX ADMIN — HYDROMORPHONE HYDROCHLORIDE 0.5 MG: 1 INJECTION, SOLUTION INTRAMUSCULAR; INTRAVENOUS; SUBCUTANEOUS at 18:56

## 2019-09-13 RX ADMIN — HYDROCODONE BITARTATE AND ACETAMINOPHEN 1 TABLET: 5; 325 TABLET ORAL at 23:48

## 2019-09-13 RX ADMIN — HYDROMORPHONE HYDROCHLORIDE 1 MG: 1 INJECTION, SOLUTION INTRAMUSCULAR; INTRAVENOUS; SUBCUTANEOUS at 20:44

## 2019-09-13 RX ADMIN — ONDANSETRON 4 MG: 2 INJECTION INTRAMUSCULAR; INTRAVENOUS at 18:42

## 2019-09-13 RX ADMIN — HYDROMORPHONE HYDROCHLORIDE 0.5 MG: 1 INJECTION, SOLUTION INTRAMUSCULAR; INTRAVENOUS; SUBCUTANEOUS at 18:42

## 2019-09-14 ENCOUNTER — APPOINTMENT (OUTPATIENT)
Dept: GENERAL RADIOLOGY | Facility: HOSPITAL | Age: 74
End: 2019-09-14

## 2019-09-14 ENCOUNTER — ANESTHESIA (OUTPATIENT)
Dept: PERIOP | Facility: HOSPITAL | Age: 74
End: 2019-09-14

## 2019-09-14 ENCOUNTER — ANESTHESIA EVENT (OUTPATIENT)
Dept: PERIOP | Facility: HOSPITAL | Age: 74
End: 2019-09-14

## 2019-09-14 LAB
ANION GAP SERPL CALCULATED.3IONS-SCNC: 12.9 MMOL/L (ref 5–15)
BASOPHILS # BLD AUTO: 0.02 10*3/MM3 (ref 0–0.2)
BASOPHILS NFR BLD AUTO: 0.3 % (ref 0–1.5)
BUN BLD-MCNC: 8 MG/DL (ref 8–23)
BUN/CREAT SERPL: 11 (ref 7–25)
CALCIUM SPEC-SCNC: 8.8 MG/DL (ref 8.6–10.5)
CHLORIDE SERPL-SCNC: 103 MMOL/L (ref 98–107)
CO2 SERPL-SCNC: 24.1 MMOL/L (ref 22–29)
CREAT BLD-MCNC: 0.73 MG/DL (ref 0.57–1)
DEPRECATED RDW RBC AUTO: 45 FL (ref 37–54)
EOSINOPHIL # BLD AUTO: 0 10*3/MM3 (ref 0–0.4)
EOSINOPHIL NFR BLD AUTO: 0 % (ref 0.3–6.2)
ERYTHROCYTE [DISTWIDTH] IN BLOOD BY AUTOMATED COUNT: 12.7 % (ref 12.3–15.4)
GFR SERPL CREATININE-BSD FRML MDRD: 78 ML/MIN/1.73
GLUCOSE BLD-MCNC: 114 MG/DL (ref 65–99)
HCT VFR BLD AUTO: 41.5 % (ref 34–46.6)
HGB BLD-MCNC: 12.7 G/DL (ref 12–15.9)
IMM GRANULOCYTES # BLD AUTO: 0.02 10*3/MM3 (ref 0–0.05)
IMM GRANULOCYTES NFR BLD AUTO: 0.3 % (ref 0–0.5)
INR PPP: 1.02 (ref 0.9–1.1)
LYMPHOCYTES # BLD AUTO: 0.96 10*3/MM3 (ref 0.7–3.1)
LYMPHOCYTES NFR BLD AUTO: 15 % (ref 19.6–45.3)
MCH RBC QN AUTO: 29.2 PG (ref 26.6–33)
MCHC RBC AUTO-ENTMCNC: 30.6 G/DL (ref 31.5–35.7)
MCV RBC AUTO: 95.4 FL (ref 79–97)
MONOCYTES # BLD AUTO: 0.54 10*3/MM3 (ref 0.1–0.9)
MONOCYTES NFR BLD AUTO: 8.4 % (ref 5–12)
NEUTROPHILS # BLD AUTO: 4.88 10*3/MM3 (ref 1.7–7)
NEUTROPHILS NFR BLD AUTO: 76 % (ref 42.7–76)
NRBC BLD AUTO-RTO: 0 /100 WBC (ref 0–0.2)
PLATELET # BLD AUTO: 171 10*3/MM3 (ref 140–450)
PMV BLD AUTO: 10.8 FL (ref 6–12)
POTASSIUM BLD-SCNC: 4.6 MMOL/L (ref 3.5–5.2)
PROTHROMBIN TIME: 13.1 SECONDS (ref 11.7–14.2)
RBC # BLD AUTO: 4.35 10*6/MM3 (ref 3.77–5.28)
SODIUM BLD-SCNC: 140 MMOL/L (ref 136–145)
WBC NRBC COR # BLD: 6.42 10*3/MM3 (ref 3.4–10.8)

## 2019-09-14 PROCEDURE — 25010000002 PHENYLEPHRINE PER 1 ML: Performed by: NURSE ANESTHETIST, CERTIFIED REGISTERED

## 2019-09-14 PROCEDURE — 80048 BASIC METABOLIC PNL TOTAL CA: CPT | Performed by: INTERNAL MEDICINE

## 2019-09-14 PROCEDURE — 73502 X-RAY EXAM HIP UNI 2-3 VIEWS: CPT

## 2019-09-14 PROCEDURE — C1776 JOINT DEVICE (IMPLANTABLE): HCPCS | Performed by: ORTHOPAEDIC SURGERY

## 2019-09-14 PROCEDURE — C1713 ANCHOR/SCREW BN/BN,TIS/BN: HCPCS | Performed by: ORTHOPAEDIC SURGERY

## 2019-09-14 PROCEDURE — 25010000002 DEXAMETHASONE PER 1 MG: Performed by: NURSE ANESTHETIST, CERTIFIED REGISTERED

## 2019-09-14 PROCEDURE — 25010000002 PROPOFOL 10 MG/ML EMULSION: Performed by: NURSE ANESTHETIST, CERTIFIED REGISTERED

## 2019-09-14 PROCEDURE — 76000 FLUOROSCOPY <1 HR PHYS/QHP: CPT

## 2019-09-14 PROCEDURE — 25010000002 VANCOMYCIN PER 500 MG: Performed by: ORTHOPAEDIC SURGERY

## 2019-09-14 PROCEDURE — 25010000002 FENTANYL CITRATE (PF) 100 MCG/2ML SOLUTION: Performed by: ANESTHESIOLOGY

## 2019-09-14 PROCEDURE — 0SR90JZ REPLACEMENT OF RIGHT HIP JOINT WITH SYNTHETIC SUBSTITUTE, OPEN APPROACH: ICD-10-PCS | Performed by: ORTHOPAEDIC SURGERY

## 2019-09-14 PROCEDURE — 25010000002 ONDANSETRON PER 1 MG: Performed by: INTERNAL MEDICINE

## 2019-09-14 PROCEDURE — 25010000002 FENTANYL CITRATE (PF) 100 MCG/2ML SOLUTION: Performed by: NURSE ANESTHETIST, CERTIFIED REGISTERED

## 2019-09-14 PROCEDURE — 85025 COMPLETE CBC W/AUTO DIFF WBC: CPT | Performed by: INTERNAL MEDICINE

## 2019-09-14 PROCEDURE — 25010000002 NEOSTIGMINE PER 0.5 MG: Performed by: NURSE ANESTHETIST, CERTIFIED REGISTERED

## 2019-09-14 PROCEDURE — 25010000002 HYDROMORPHONE 1 MG/ML SOLUTION: Performed by: INTERNAL MEDICINE

## 2019-09-14 PROCEDURE — 73501 X-RAY EXAM HIP UNI 1 VIEW: CPT

## 2019-09-14 PROCEDURE — 25010000003 BUPIVACAINE LIPOSOME 1.3 % SUSPENSION 20 ML VIAL: Performed by: ORTHOPAEDIC SURGERY

## 2019-09-14 PROCEDURE — C9290 INJ, BUPIVACAINE LIPOSOME: HCPCS | Performed by: ORTHOPAEDIC SURGERY

## 2019-09-14 PROCEDURE — 85610 PROTHROMBIN TIME: CPT | Performed by: INTERNAL MEDICINE

## 2019-09-14 DEVICE — R3 3 HOLE ACETABULAR SHELL 52MM
Type: IMPLANTABLE DEVICE | Site: HIP | Status: FUNCTIONAL
Brand: R3 ACETABULAR

## 2019-09-14 DEVICE — REFLECTION SPHERICAL HEAD SCREW 25MM
Type: IMPLANTABLE DEVICE | Site: HIP | Status: FUNCTIONAL
Brand: REFLECTION

## 2019-09-14 DEVICE — REFLECTION SPHERICAL HEAD SCREW 40MM
Type: IMPLANTABLE DEVICE | Site: HIP | Status: FUNCTIONAL
Brand: REFLECTION

## 2019-09-14 DEVICE — OXINIUM FEMORAL HEAD 12/14 TAPER                                    36 MM +0
Type: IMPLANTABLE DEVICE | Site: HIP | Status: FUNCTIONAL
Brand: OXINIUM

## 2019-09-14 DEVICE — IMPLANTABLE DEVICE: Type: IMPLANTABLE DEVICE | Site: HIP | Status: FUNCTIONAL

## 2019-09-14 DEVICE — ACCORD 2.0MM COBALT CHROME CABLE                                    WITH CLAMP
Type: IMPLANTABLE DEVICE | Site: HIP | Status: FUNCTIONAL
Brand: ACCORD

## 2019-09-14 DEVICE — REFLECTION SPHERICAL HEAD SCREW 35MM
Type: IMPLANTABLE DEVICE | Site: HIP | Status: FUNCTIONAL
Brand: REFLECTION

## 2019-09-14 DEVICE — R3 0 DEGREE XLPE ACETABULAR LINER                                    36MM INNER DIAMETER X OUTER DIAMETER 52MM
Type: IMPLANTABLE DEVICE | Site: HIP | Status: FUNCTIONAL
Brand: R3

## 2019-09-14 DEVICE — POLARSTEM STANDARD NON-CEMENTED                                    WITH TI/HA 3
Type: IMPLANTABLE DEVICE | Site: HIP | Status: FUNCTIONAL
Brand: POLARSTEM

## 2019-09-14 RX ORDER — SODIUM CHLORIDE, SODIUM LACTATE, POTASSIUM CHLORIDE, CALCIUM CHLORIDE 600; 310; 30; 20 MG/100ML; MG/100ML; MG/100ML; MG/100ML
100 INJECTION, SOLUTION INTRAVENOUS CONTINUOUS
Status: ACTIVE | OUTPATIENT
Start: 2019-09-14 | End: 2019-09-15

## 2019-09-14 RX ORDER — SODIUM CHLORIDE 0.9 % (FLUSH) 0.9 %
3-10 SYRINGE (ML) INJECTION AS NEEDED
Status: DISCONTINUED | OUTPATIENT
Start: 2019-09-14 | End: 2019-09-14 | Stop reason: HOSPADM

## 2019-09-14 RX ORDER — PROMETHAZINE HYDROCHLORIDE 25 MG/ML
12.5 INJECTION, SOLUTION INTRAMUSCULAR; INTRAVENOUS ONCE AS NEEDED
Status: DISCONTINUED | OUTPATIENT
Start: 2019-09-14 | End: 2019-09-14 | Stop reason: HOSPADM

## 2019-09-14 RX ORDER — HYDROCODONE BITARTRATE AND ACETAMINOPHEN 7.5; 325 MG/1; MG/1
1 TABLET ORAL ONCE AS NEEDED
Status: DISCONTINUED | OUTPATIENT
Start: 2019-09-14 | End: 2019-09-14 | Stop reason: HOSPADM

## 2019-09-14 RX ORDER — HYDROMORPHONE HYDROCHLORIDE 1 MG/ML
0.5 INJECTION, SOLUTION INTRAMUSCULAR; INTRAVENOUS; SUBCUTANEOUS
Status: DISCONTINUED | OUTPATIENT
Start: 2019-09-14 | End: 2019-09-14 | Stop reason: HOSPADM

## 2019-09-14 RX ORDER — ACETAMINOPHEN 325 MG/1
650 TABLET ORAL ONCE AS NEEDED
Status: DISCONTINUED | OUTPATIENT
Start: 2019-09-14 | End: 2019-09-14 | Stop reason: HOSPADM

## 2019-09-14 RX ORDER — PROMETHAZINE HYDROCHLORIDE 25 MG/1
25 TABLET ORAL ONCE AS NEEDED
Status: DISCONTINUED | OUTPATIENT
Start: 2019-09-14 | End: 2019-09-14 | Stop reason: HOSPADM

## 2019-09-14 RX ORDER — SODIUM CHLORIDE 0.9 % (FLUSH) 0.9 %
3 SYRINGE (ML) INJECTION EVERY 12 HOURS SCHEDULED
Status: DISCONTINUED | OUTPATIENT
Start: 2019-09-14 | End: 2019-09-14 | Stop reason: HOSPADM

## 2019-09-14 RX ORDER — FENTANYL CITRATE 50 UG/ML
INJECTION, SOLUTION INTRAMUSCULAR; INTRAVENOUS
Status: COMPLETED
Start: 2019-09-14 | End: 2019-09-14

## 2019-09-14 RX ORDER — OXYCODONE AND ACETAMINOPHEN 7.5; 325 MG/1; MG/1
1 TABLET ORAL ONCE AS NEEDED
Status: DISCONTINUED | OUTPATIENT
Start: 2019-09-14 | End: 2019-09-14 | Stop reason: HOSPADM

## 2019-09-14 RX ORDER — DEXAMETHASONE SODIUM PHOSPHATE 10 MG/ML
INJECTION INTRAMUSCULAR; INTRAVENOUS AS NEEDED
Status: DISCONTINUED | OUTPATIENT
Start: 2019-09-14 | End: 2019-09-14 | Stop reason: SURG

## 2019-09-14 RX ORDER — HYDRALAZINE HYDROCHLORIDE 20 MG/ML
5 INJECTION INTRAMUSCULAR; INTRAVENOUS
Status: DISCONTINUED | OUTPATIENT
Start: 2019-09-14 | End: 2019-09-14 | Stop reason: HOSPADM

## 2019-09-14 RX ORDER — DIPHENHYDRAMINE HYDROCHLORIDE 50 MG/ML
12.5 INJECTION INTRAMUSCULAR; INTRAVENOUS
Status: DISCONTINUED | OUTPATIENT
Start: 2019-09-14 | End: 2019-09-14 | Stop reason: HOSPADM

## 2019-09-14 RX ORDER — PROMETHAZINE HYDROCHLORIDE 25 MG/ML
6.25 INJECTION, SOLUTION INTRAMUSCULAR; INTRAVENOUS
Status: DISCONTINUED | OUTPATIENT
Start: 2019-09-14 | End: 2019-09-14 | Stop reason: HOSPADM

## 2019-09-14 RX ORDER — GLYCOPYRROLATE 0.2 MG/ML
INJECTION INTRAMUSCULAR; INTRAVENOUS AS NEEDED
Status: DISCONTINUED | OUTPATIENT
Start: 2019-09-14 | End: 2019-09-14 | Stop reason: SURG

## 2019-09-14 RX ORDER — DIPHENHYDRAMINE HCL 25 MG
25 CAPSULE ORAL
Status: DISCONTINUED | OUTPATIENT
Start: 2019-09-14 | End: 2019-09-14 | Stop reason: HOSPADM

## 2019-09-14 RX ORDER — FENTANYL CITRATE 50 UG/ML
50 INJECTION, SOLUTION INTRAMUSCULAR; INTRAVENOUS
Status: DISCONTINUED | OUTPATIENT
Start: 2019-09-14 | End: 2019-09-14 | Stop reason: HOSPADM

## 2019-09-14 RX ORDER — TRANEXAMIC ACID 100 MG/ML
INJECTION, SOLUTION INTRAVENOUS AS NEEDED
Status: DISCONTINUED | OUTPATIENT
Start: 2019-09-14 | End: 2019-09-14 | Stop reason: SURG

## 2019-09-14 RX ORDER — EPHEDRINE SULFATE 50 MG/ML
5 INJECTION, SOLUTION INTRAVENOUS ONCE AS NEEDED
Status: DISCONTINUED | OUTPATIENT
Start: 2019-09-14 | End: 2019-09-14 | Stop reason: HOSPADM

## 2019-09-14 RX ORDER — PROMETHAZINE HYDROCHLORIDE 25 MG/1
25 SUPPOSITORY RECTAL ONCE AS NEEDED
Status: DISCONTINUED | OUTPATIENT
Start: 2019-09-14 | End: 2019-09-14 | Stop reason: HOSPADM

## 2019-09-14 RX ORDER — LIDOCAINE HYDROCHLORIDE 20 MG/ML
INJECTION, SOLUTION INFILTRATION; PERINEURAL AS NEEDED
Status: DISCONTINUED | OUTPATIENT
Start: 2019-09-14 | End: 2019-09-14 | Stop reason: SURG

## 2019-09-14 RX ORDER — ROCURONIUM BROMIDE 10 MG/ML
INJECTION, SOLUTION INTRAVENOUS AS NEEDED
Status: DISCONTINUED | OUTPATIENT
Start: 2019-09-14 | End: 2019-09-14 | Stop reason: SURG

## 2019-09-14 RX ORDER — LIDOCAINE HYDROCHLORIDE 10 MG/ML
0.5 INJECTION, SOLUTION EPIDURAL; INFILTRATION; INTRACAUDAL; PERINEURAL ONCE AS NEEDED
Status: DISCONTINUED | OUTPATIENT
Start: 2019-09-14 | End: 2019-09-14 | Stop reason: HOSPADM

## 2019-09-14 RX ORDER — SODIUM CHLORIDE, SODIUM LACTATE, POTASSIUM CHLORIDE, CALCIUM CHLORIDE 600; 310; 30; 20 MG/100ML; MG/100ML; MG/100ML; MG/100ML
9 INJECTION, SOLUTION INTRAVENOUS CONTINUOUS
Status: DISCONTINUED | OUTPATIENT
Start: 2019-09-14 | End: 2019-09-15

## 2019-09-14 RX ORDER — FLUMAZENIL 0.1 MG/ML
0.2 INJECTION INTRAVENOUS AS NEEDED
Status: DISCONTINUED | OUTPATIENT
Start: 2019-09-14 | End: 2019-09-14 | Stop reason: HOSPADM

## 2019-09-14 RX ORDER — PROPOFOL 10 MG/ML
VIAL (ML) INTRAVENOUS AS NEEDED
Status: DISCONTINUED | OUTPATIENT
Start: 2019-09-14 | End: 2019-09-14 | Stop reason: SURG

## 2019-09-14 RX ORDER — ONDANSETRON 2 MG/ML
4 INJECTION INTRAMUSCULAR; INTRAVENOUS ONCE AS NEEDED
Status: DISCONTINUED | OUTPATIENT
Start: 2019-09-14 | End: 2019-09-14 | Stop reason: HOSPADM

## 2019-09-14 RX ORDER — HYDROCODONE BITARTRATE AND ACETAMINOPHEN 5; 325 MG/1; MG/1
1 TABLET ORAL EVERY 6 HOURS PRN
Status: DISCONTINUED | OUTPATIENT
Start: 2019-09-14 | End: 2019-09-19 | Stop reason: HOSPADM

## 2019-09-14 RX ORDER — MEPERIDINE HYDROCHLORIDE 25 MG/ML
12.5 INJECTION INTRAMUSCULAR; INTRAVENOUS; SUBCUTANEOUS
Status: DISCONTINUED | OUTPATIENT
Start: 2019-09-14 | End: 2019-09-14 | Stop reason: HOSPADM

## 2019-09-14 RX ORDER — MAGNESIUM HYDROXIDE 1200 MG/15ML
LIQUID ORAL AS NEEDED
Status: DISCONTINUED | OUTPATIENT
Start: 2019-09-14 | End: 2019-09-14 | Stop reason: HOSPADM

## 2019-09-14 RX ORDER — NALOXONE HCL 0.4 MG/ML
0.2 VIAL (ML) INJECTION AS NEEDED
Status: DISCONTINUED | OUTPATIENT
Start: 2019-09-14 | End: 2019-09-14 | Stop reason: HOSPADM

## 2019-09-14 RX ORDER — FAMOTIDINE 10 MG/ML
20 INJECTION, SOLUTION INTRAVENOUS ONCE
Status: COMPLETED | OUTPATIENT
Start: 2019-09-14 | End: 2019-09-14

## 2019-09-14 RX ORDER — VANCOMYCIN HYDROCHLORIDE 1 G/200ML
1000 INJECTION, SOLUTION INTRAVENOUS ONCE
Status: COMPLETED | OUTPATIENT
Start: 2019-09-14 | End: 2019-09-14

## 2019-09-14 RX ORDER — VANCOMYCIN HYDROCHLORIDE 1 G/200ML
1000 INJECTION, SOLUTION INTRAVENOUS EVERY 12 HOURS
Status: COMPLETED | OUTPATIENT
Start: 2019-09-14 | End: 2019-09-15

## 2019-09-14 RX ADMIN — SODIUM CHLORIDE 75 ML/HR: 9 INJECTION, SOLUTION INTRAVENOUS at 01:08

## 2019-09-14 RX ADMIN — SODIUM CHLORIDE, POTASSIUM CHLORIDE, SODIUM LACTATE AND CALCIUM CHLORIDE 100 ML/HR: 600; 310; 30; 20 INJECTION, SOLUTION INTRAVENOUS at 20:17

## 2019-09-14 RX ADMIN — FENTANYL CITRATE 50 MCG: 50 INJECTION INTRAMUSCULAR; INTRAVENOUS at 12:51

## 2019-09-14 RX ADMIN — VANCOMYCIN HYDROCHLORIDE 1000 MG: 1 INJECTION, SOLUTION INTRAVENOUS at 12:16

## 2019-09-14 RX ADMIN — PHENYLEPHRINE HYDROCHLORIDE 200 MCG: 10 INJECTION INTRAVENOUS at 14:16

## 2019-09-14 RX ADMIN — PHENOBARBITAL 129.6 MG: 32.4 TABLET ORAL at 20:18

## 2019-09-14 RX ADMIN — GLYCOPYRROLATE 0.4 MG: 0.2 INJECTION INTRAMUSCULAR; INTRAVENOUS at 14:24

## 2019-09-14 RX ADMIN — PRAMIPEXOLE DIHYDROCHLORIDE 0.75 MG: 0.5 TABLET ORAL at 20:18

## 2019-09-14 RX ADMIN — SENNOSIDES AND DOCUSATE SODIUM 2 TABLET: 8.6; 5 TABLET ORAL at 20:18

## 2019-09-14 RX ADMIN — ONDANSETRON 4 MG: 2 INJECTION INTRAMUSCULAR; INTRAVENOUS at 13:15

## 2019-09-14 RX ADMIN — LEVOTHYROXINE SODIUM 50 MCG: 50 TABLET ORAL at 06:14

## 2019-09-14 RX ADMIN — FAMOTIDINE 20 MG: 10 INJECTION, SOLUTION INTRAVENOUS at 12:17

## 2019-09-14 RX ADMIN — PHENYLEPHRINE HYDROCHLORIDE 200 MCG: 10 INJECTION INTRAVENOUS at 13:08

## 2019-09-14 RX ADMIN — SODIUM CHLORIDE 75 ML/HR: 9 INJECTION, SOLUTION INTRAVENOUS at 15:41

## 2019-09-14 RX ADMIN — PRAMIPEXOLE DIHYDROCHLORIDE 0.75 MG: 0.5 TABLET ORAL at 01:08

## 2019-09-14 RX ADMIN — ROCURONIUM BROMIDE 50 MG: 10 INJECTION INTRAVENOUS at 12:51

## 2019-09-14 RX ADMIN — PHENYLEPHRINE HYDROCHLORIDE 200 MCG: 10 INJECTION INTRAVENOUS at 13:48

## 2019-09-14 RX ADMIN — TRANEXAMIC ACID 1000 MG: 100 INJECTION, SOLUTION INTRAVENOUS at 13:03

## 2019-09-14 RX ADMIN — HYDROMORPHONE HYDROCHLORIDE 1 MG: 1 INJECTION, SOLUTION INTRAMUSCULAR; INTRAVENOUS; SUBCUTANEOUS at 11:46

## 2019-09-14 RX ADMIN — SODIUM CHLORIDE, POTASSIUM CHLORIDE, SODIUM LACTATE AND CALCIUM CHLORIDE 1000 ML: 600; 310; 30; 20 INJECTION, SOLUTION INTRAVENOUS at 18:52

## 2019-09-14 RX ADMIN — HYDROMORPHONE HYDROCHLORIDE 1 MG: 1 INJECTION, SOLUTION INTRAMUSCULAR; INTRAVENOUS; SUBCUTANEOUS at 06:14

## 2019-09-14 RX ADMIN — DEXAMETHASONE SODIUM PHOSPHATE 6 MG: 10 INJECTION INTRAMUSCULAR; INTRAVENOUS at 13:15

## 2019-09-14 RX ADMIN — PRAVASTATIN SODIUM 40 MG: 40 TABLET ORAL at 20:18

## 2019-09-14 RX ADMIN — PHENYLEPHRINE HYDROCHLORIDE 200 MCG: 10 INJECTION INTRAVENOUS at 13:30

## 2019-09-14 RX ADMIN — PROPOFOL 140 MG: 10 INJECTION, EMULSION INTRAVENOUS at 12:51

## 2019-09-14 RX ADMIN — LIDOCAINE HYDROCHLORIDE 60 MG: 20 INJECTION, SOLUTION INFILTRATION; PERINEURAL at 12:51

## 2019-09-14 RX ADMIN — PHENOBARBITAL 64.8 MG: 64.8 TABLET ORAL at 06:14

## 2019-09-14 RX ADMIN — FENTANYL CITRATE 50 MCG: 50 INJECTION INTRAMUSCULAR; INTRAVENOUS at 14:45

## 2019-09-14 RX ADMIN — HYDROMORPHONE HYDROCHLORIDE 1 MG: 1 INJECTION, SOLUTION INTRAMUSCULAR; INTRAVENOUS; SUBCUTANEOUS at 01:08

## 2019-09-14 RX ADMIN — NEOSTIGMINE METHYLSULFATE 3 MG: 1 INJECTION INTRAMUSCULAR; INTRAVENOUS; SUBCUTANEOUS at 14:24

## 2019-09-14 RX ADMIN — VANCOMYCIN HYDROCHLORIDE 1000 MG: 1 INJECTION, SOLUTION INTRAVENOUS at 22:34

## 2019-09-14 RX ADMIN — SODIUM CHLORIDE, POTASSIUM CHLORIDE, SODIUM LACTATE AND CALCIUM CHLORIDE: 600; 310; 30; 20 INJECTION, SOLUTION INTRAVENOUS at 14:12

## 2019-09-14 RX ADMIN — PHENOBARBITAL 129.6 MG: 32.4 TABLET ORAL at 01:08

## 2019-09-14 RX ADMIN — SODIUM CHLORIDE, POTASSIUM CHLORIDE, SODIUM LACTATE AND CALCIUM CHLORIDE 9 ML/HR: 600; 310; 30; 20 INJECTION, SOLUTION INTRAVENOUS at 12:17

## 2019-09-14 NOTE — ANESTHESIA POSTPROCEDURE EVALUATION
"Patient: Mariela Ruiz    Procedure Summary     Date:  09/14/19 Room / Location:  [facility] OR [address] [place] [address] MAIN OR    Anesthesia Start:  1247 Anesthesia Stop:  1438    Procedure:  TOTAL HIP ARTHROPLASTY ANTERIOR WITH HANA TABLE (Right Hip) Diagnosis:      Surgeon:  Christiano Cesar II, MD Provider:  Stephania Hong MD    Anesthesia Type:  general ASA Status:  3          Anesthesia Type: general  Last vitals  BP   99/56 (09/14/19 1500)   Temp   36.8 °C (98.2 °F) (09/14/19 1435)   Pulse   83 (09/14/19 1500)   Resp   16 (09/14/19 1500)     SpO2   100 % (09/14/19 1500)     Post Anesthesia Care and Evaluation    Patient location during evaluation: PACU  Patient participation: complete - patient participated  Level of consciousness: awake  Pain management: adequate  Airway patency: patent  Anesthetic complications: No anesthetic complications    Cardiovascular status: acceptable  Respiratory status: acceptable  Hydration status: acceptable    Comments: BP 99/56 (BP Location: Left arm, Patient Position: Lying)   Pulse 83   Temp 36.8 °C (98.2 °F) (Oral)   Resp 16   Ht 172.7 cm (68\")   Wt 86.2 kg (190 lb)   SpO2 100%   BMI 28.89 kg/m²       "

## 2019-09-14 NOTE — ANESTHESIA PREPROCEDURE EVALUATION
Anesthesia Evaluation     Patient summary reviewed and Nursing notes reviewed   NPO Solid Status: > 8 hours  NPO Liquid Status: > 2 hours           Airway   Mallampati: II  Dental - normal exam         Pulmonary - normal exam   (+) sleep apnea,   Cardiovascular - normal exam    ECG reviewed    (+) valvular problems/murmurs TI, hyperlipidemia,       Neuro/Psych  (+) seizures,     GI/Hepatic/Renal/Endo    (+)  GERD,  hypothyroidism,     Musculoskeletal     Abdominal    Substance History      OB/GYN          Other   (+) arthritis                   Anesthesia Plan    ASA 3     general

## 2019-09-14 NOTE — ANESTHESIA PROCEDURE NOTES
Airway  Urgency: elective    Date/Time: 9/14/2019 12:56 PM  Airway not difficult    General Information and Staff    Patient location during procedure: OR  CRNA: Mary Quiros CRNA    Indications and Patient Condition  Indications for airway management: airway protection    Preoxygenated: yes  Mask difficulty assessment: 1 - vent by mask    Final Airway Details  Final airway type: endotracheal airway      Successful airway: ETT  Cuffed: yes   Successful intubation technique: direct laryngoscopy  Endotracheal tube insertion site: oral  Blade: [place]  Blade size: 3  ETT size (mm): 7.0  Cormack-Lehane Classification: grade I - full view of glottis  Placement verified by: chest auscultation and capnometry   Cuff volume (mL): 6  Measured from: lips  ETT/EBT  to lips (cm): 20  Number of attempts at approach: 1    Additional Comments  Smooth IV induction. Trachea intubated. Cuff up. Ett secured. BEBS. Dentition intact without injury.

## 2019-09-15 LAB
ALBUMIN SERPL-MCNC: 2.7 G/DL (ref 3.5–5.2)
ANION GAP SERPL CALCULATED.3IONS-SCNC: 13.5 MMOL/L (ref 5–15)
B PARAPERT DNA SPEC QL NAA+PROBE: NOT DETECTED
B PERT DNA SPEC QL NAA+PROBE: NOT DETECTED
BUN BLD-MCNC: 9 MG/DL (ref 8–23)
BUN/CREAT SERPL: 15 (ref 7–25)
C PNEUM DNA NPH QL NAA+NON-PROBE: NOT DETECTED
CALCIUM SPEC-SCNC: 8 MG/DL (ref 8.6–10.5)
CHLORIDE SERPL-SCNC: 102 MMOL/L (ref 98–107)
CO2 SERPL-SCNC: 19.5 MMOL/L (ref 22–29)
CREAT BLD-MCNC: 0.6 MG/DL (ref 0.57–1)
DEPRECATED RDW RBC AUTO: 43.1 FL (ref 37–54)
ERYTHROCYTE [DISTWIDTH] IN BLOOD BY AUTOMATED COUNT: 12.8 % (ref 12.3–15.4)
FLUAV H1 2009 PAND RNA NPH QL NAA+PROBE: NOT DETECTED
FLUAV H1 HA GENE NPH QL NAA+PROBE: NOT DETECTED
FLUAV H3 RNA NPH QL NAA+PROBE: NOT DETECTED
FLUAV SUBTYP SPEC NAA+PROBE: NOT DETECTED
FLUBV RNA ISLT QL NAA+PROBE: NOT DETECTED
GFR SERPL CREATININE-BSD FRML MDRD: 98 ML/MIN/1.73
GLUCOSE BLD-MCNC: 119 MG/DL (ref 65–99)
HADV DNA SPEC NAA+PROBE: NOT DETECTED
HCOV 229E RNA SPEC QL NAA+PROBE: NOT DETECTED
HCOV HKU1 RNA SPEC QL NAA+PROBE: NOT DETECTED
HCOV NL63 RNA SPEC QL NAA+PROBE: NOT DETECTED
HCOV OC43 RNA SPEC QL NAA+PROBE: NOT DETECTED
HCT VFR BLD AUTO: 29.6 % (ref 34–46.6)
HGB BLD-MCNC: 9.7 G/DL (ref 12–15.9)
HMPV RNA NPH QL NAA+NON-PROBE: NOT DETECTED
HPIV1 RNA SPEC QL NAA+PROBE: NOT DETECTED
HPIV2 RNA SPEC QL NAA+PROBE: NOT DETECTED
HPIV3 RNA NPH QL NAA+PROBE: NOT DETECTED
HPIV4 P GENE NPH QL NAA+PROBE: NOT DETECTED
M PNEUMO IGG SER IA-ACNC: NOT DETECTED
MAGNESIUM SERPL-MCNC: 1.7 MG/DL (ref 1.6–2.4)
MCH RBC QN AUTO: 30.1 PG (ref 26.6–33)
MCHC RBC AUTO-ENTMCNC: 32.8 G/DL (ref 31.5–35.7)
MCV RBC AUTO: 91.9 FL (ref 79–97)
PHOSPHATE SERPL-MCNC: 2.1 MG/DL (ref 2.5–4.5)
PLATELET # BLD AUTO: 145 10*3/MM3 (ref 140–450)
PMV BLD AUTO: 11.4 FL (ref 6–12)
POTASSIUM BLD-SCNC: 4.8 MMOL/L (ref 3.5–5.2)
RBC # BLD AUTO: 3.22 10*6/MM3 (ref 3.77–5.28)
RHINOVIRUS RNA SPEC NAA+PROBE: NOT DETECTED
RSV RNA NPH QL NAA+NON-PROBE: NOT DETECTED
SODIUM BLD-SCNC: 135 MMOL/L (ref 136–145)
WBC NRBC COR # BLD: 7.7 10*3/MM3 (ref 3.4–10.8)

## 2019-09-15 PROCEDURE — 0100U HC BIOFIRE FILMARRAY RESP PANEL 2: CPT | Performed by: HOSPITALIST

## 2019-09-15 PROCEDURE — 83735 ASSAY OF MAGNESIUM: CPT | Performed by: HOSPITALIST

## 2019-09-15 PROCEDURE — 25010000002 VANCOMYCIN PER 500 MG: Performed by: ORTHOPAEDIC SURGERY

## 2019-09-15 PROCEDURE — 85027 COMPLETE CBC AUTOMATED: CPT | Performed by: HOSPITALIST

## 2019-09-15 PROCEDURE — 80069 RENAL FUNCTION PANEL: CPT | Performed by: HOSPITALIST

## 2019-09-15 RX ORDER — SODIUM CHLORIDE, SODIUM LACTATE, POTASSIUM CHLORIDE, CALCIUM CHLORIDE 600; 310; 30; 20 MG/100ML; MG/100ML; MG/100ML; MG/100ML
75 INJECTION, SOLUTION INTRAVENOUS CONTINUOUS
Status: DISCONTINUED | OUTPATIENT
Start: 2019-09-15 | End: 2019-09-17

## 2019-09-15 RX ADMIN — CETIRIZINE HYDROCHLORIDE 5 MG: 10 TABLET, FILM COATED ORAL at 08:14

## 2019-09-15 RX ADMIN — PHENOBARBITAL 64.8 MG: 64.8 TABLET ORAL at 06:21

## 2019-09-15 RX ADMIN — HYDROCODONE BITARTATE AND ACETAMINOPHEN 1 TABLET: 5; 325 TABLET ORAL at 02:33

## 2019-09-15 RX ADMIN — APIXABAN 2.5 MG: 2.5 TABLET, FILM COATED ORAL at 21:01

## 2019-09-15 RX ADMIN — SODIUM CHLORIDE, PRESERVATIVE FREE 10 ML: 5 INJECTION INTRAVENOUS at 11:33

## 2019-09-15 RX ADMIN — PHENOBARBITAL 129.6 MG: 32.4 TABLET ORAL at 21:01

## 2019-09-15 RX ADMIN — HYDROCODONE BITARTATE AND ACETAMINOPHEN 1 TABLET: 5; 325 TABLET ORAL at 21:07

## 2019-09-15 RX ADMIN — SODIUM CHLORIDE, POTASSIUM CHLORIDE, SODIUM LACTATE AND CALCIUM CHLORIDE 75 ML/HR: 600; 310; 30; 20 INJECTION, SOLUTION INTRAVENOUS at 15:26

## 2019-09-15 RX ADMIN — PRAMIPEXOLE DIHYDROCHLORIDE 0.75 MG: 0.5 TABLET ORAL at 21:01

## 2019-09-15 RX ADMIN — SODIUM CHLORIDE, PRESERVATIVE FREE 10 ML: 5 INJECTION INTRAVENOUS at 21:02

## 2019-09-15 RX ADMIN — LEVOTHYROXINE SODIUM 50 MCG: 50 TABLET ORAL at 06:20

## 2019-09-15 RX ADMIN — PRAVASTATIN SODIUM 40 MG: 40 TABLET ORAL at 21:01

## 2019-09-15 RX ADMIN — SODIUM PHOSPHATE, MONOBASIC, MONOHYDRATE 30 MMOL: 276; 142 INJECTION, SOLUTION INTRAVENOUS at 18:45

## 2019-09-15 RX ADMIN — SODIUM CHLORIDE, POTASSIUM CHLORIDE, SODIUM LACTATE AND CALCIUM CHLORIDE 500 ML: 600; 310; 30; 20 INJECTION, SOLUTION INTRAVENOUS at 07:23

## 2019-09-15 RX ADMIN — APIXABAN 2.5 MG: 2.5 TABLET, FILM COATED ORAL at 08:14

## 2019-09-15 RX ADMIN — SENNOSIDES AND DOCUSATE SODIUM 2 TABLET: 8.6; 5 TABLET ORAL at 08:14

## 2019-09-15 RX ADMIN — VANCOMYCIN HYDROCHLORIDE 1000 MG: 1 INJECTION, SOLUTION INTRAVENOUS at 11:33

## 2019-09-15 RX ADMIN — SODIUM CHLORIDE, POTASSIUM CHLORIDE, SODIUM LACTATE AND CALCIUM CHLORIDE 100 ML/HR: 600; 310; 30; 20 INJECTION, SOLUTION INTRAVENOUS at 08:15

## 2019-09-15 RX ADMIN — ACETAMINOPHEN 650 MG: 325 TABLET, FILM COATED ORAL at 15:26

## 2019-09-15 RX ADMIN — HYDROCODONE BITARTATE AND ACETAMINOPHEN 1 TABLET: 5; 325 TABLET ORAL at 06:21

## 2019-09-15 RX ADMIN — SODIUM CHLORIDE 75 ML/HR: 9 INJECTION, SOLUTION INTRAVENOUS at 15:05

## 2019-09-16 ENCOUNTER — APPOINTMENT (OUTPATIENT)
Dept: GENERAL RADIOLOGY | Facility: HOSPITAL | Age: 74
End: 2019-09-16

## 2019-09-16 PROBLEM — D69.6 THROMBOCYTOPENIA (HCC): Status: ACTIVE | Noted: 2019-09-16

## 2019-09-16 PROBLEM — R33.9 URINE RETENTION: Status: ACTIVE | Noted: 2019-09-16

## 2019-09-16 PROBLEM — R50.9 FEVER: Status: ACTIVE | Noted: 2019-09-16

## 2019-09-16 PROBLEM — D62 ACUTE POSTHEMORRHAGIC ANEMIA: Status: ACTIVE | Noted: 2019-09-16

## 2019-09-16 LAB
ALBUMIN SERPL-MCNC: 2.6 G/DL (ref 3.5–5.2)
ANION GAP SERPL CALCULATED.3IONS-SCNC: 10.1 MMOL/L (ref 5–15)
BACTERIA UR QL AUTO: ABNORMAL /HPF
BILIRUB UR QL STRIP: NEGATIVE
BUN BLD-MCNC: 11 MG/DL (ref 8–23)
BUN/CREAT SERPL: 16.7 (ref 7–25)
CALCIUM SPEC-SCNC: 7.8 MG/DL (ref 8.6–10.5)
CHLORIDE SERPL-SCNC: 97 MMOL/L (ref 98–107)
CLARITY UR: ABNORMAL
CO2 SERPL-SCNC: 23.9 MMOL/L (ref 22–29)
COLOR UR: YELLOW
CREAT BLD-MCNC: 0.66 MG/DL (ref 0.57–1)
DEPRECATED RDW RBC AUTO: 44 FL (ref 37–54)
ERYTHROCYTE [DISTWIDTH] IN BLOOD BY AUTOMATED COUNT: 12.8 % (ref 12.3–15.4)
GFR SERPL CREATININE-BSD FRML MDRD: 88 ML/MIN/1.73
GLUCOSE BLD-MCNC: 120 MG/DL (ref 65–99)
GLUCOSE UR STRIP-MCNC: NEGATIVE MG/DL
GRAN CASTS URNS QL MICRO: ABNORMAL /LPF
HCT VFR BLD AUTO: 27.9 % (ref 34–46.6)
HGB BLD-MCNC: 9.1 G/DL (ref 12–15.9)
HGB UR QL STRIP.AUTO: NEGATIVE
HYALINE CASTS UR QL AUTO: ABNORMAL /LPF
KETONES UR QL STRIP: NEGATIVE
LEUKOCYTE ESTERASE UR QL STRIP.AUTO: ABNORMAL
MAGNESIUM SERPL-MCNC: 1.6 MG/DL (ref 1.6–2.4)
MCH RBC QN AUTO: 30.4 PG (ref 26.6–33)
MCHC RBC AUTO-ENTMCNC: 32.6 G/DL (ref 31.5–35.7)
MCV RBC AUTO: 93.3 FL (ref 79–97)
NITRITE UR QL STRIP: NEGATIVE
PH UR STRIP.AUTO: 5.5 [PH] (ref 5–8)
PHOSPHATE SERPL-MCNC: 2.9 MG/DL (ref 2.5–4.5)
PLATELET # BLD AUTO: 102 10*3/MM3 (ref 140–450)
PMV BLD AUTO: 11.6 FL (ref 6–12)
POTASSIUM BLD-SCNC: 4 MMOL/L (ref 3.5–5.2)
PROT UR QL STRIP: ABNORMAL
RBC # BLD AUTO: 2.99 10*6/MM3 (ref 3.77–5.28)
RBC # UR: ABNORMAL /HPF
REF LAB TEST METHOD: ABNORMAL
SODIUM BLD-SCNC: 131 MMOL/L (ref 136–145)
SP GR UR STRIP: 1.02 (ref 1–1.03)
SQUAMOUS #/AREA URNS HPF: ABNORMAL /HPF
UROBILINOGEN UR QL STRIP: ABNORMAL
WBC NRBC COR # BLD: 6.43 10*3/MM3 (ref 3.4–10.8)
WBC UR QL AUTO: ABNORMAL /HPF

## 2019-09-16 PROCEDURE — 71045 X-RAY EXAM CHEST 1 VIEW: CPT

## 2019-09-16 PROCEDURE — 85027 COMPLETE CBC AUTOMATED: CPT | Performed by: HOSPITALIST

## 2019-09-16 PROCEDURE — 81001 URINALYSIS AUTO W/SCOPE: CPT | Performed by: HOSPITALIST

## 2019-09-16 PROCEDURE — 87086 URINE CULTURE/COLONY COUNT: CPT | Performed by: HOSPITALIST

## 2019-09-16 PROCEDURE — 83735 ASSAY OF MAGNESIUM: CPT | Performed by: HOSPITALIST

## 2019-09-16 PROCEDURE — 97162 PT EVAL MOD COMPLEX 30 MIN: CPT

## 2019-09-16 PROCEDURE — 80069 RENAL FUNCTION PANEL: CPT | Performed by: HOSPITALIST

## 2019-09-16 PROCEDURE — 97110 THERAPEUTIC EXERCISES: CPT

## 2019-09-16 PROCEDURE — 87077 CULTURE AEROBIC IDENTIFY: CPT | Performed by: HOSPITALIST

## 2019-09-16 PROCEDURE — 87186 SC STD MICRODIL/AGAR DIL: CPT | Performed by: HOSPITALIST

## 2019-09-16 RX ADMIN — PRAVASTATIN SODIUM 40 MG: 40 TABLET ORAL at 21:00

## 2019-09-16 RX ADMIN — PRAMIPEXOLE DIHYDROCHLORIDE 0.75 MG: 0.5 TABLET ORAL at 21:00

## 2019-09-16 RX ADMIN — SENNOSIDES AND DOCUSATE SODIUM 2 TABLET: 8.6; 5 TABLET ORAL at 21:00

## 2019-09-16 RX ADMIN — HYDROCODONE BITARTATE AND ACETAMINOPHEN 1 TABLET: 5; 325 TABLET ORAL at 19:29

## 2019-09-16 RX ADMIN — PHENOBARBITAL 64.8 MG: 64.8 TABLET ORAL at 06:16

## 2019-09-16 RX ADMIN — APIXABAN 2.5 MG: 2.5 TABLET, FILM COATED ORAL at 21:01

## 2019-09-16 RX ADMIN — CETIRIZINE HYDROCHLORIDE 5 MG: 10 TABLET, FILM COATED ORAL at 08:06

## 2019-09-16 RX ADMIN — ACETAMINOPHEN 650 MG: 325 TABLET, FILM COATED ORAL at 04:05

## 2019-09-16 RX ADMIN — HYDROCODONE BITARTATE AND ACETAMINOPHEN 1 TABLET: 5; 325 TABLET ORAL at 08:06

## 2019-09-16 RX ADMIN — LEVOTHYROXINE SODIUM 50 MCG: 50 TABLET ORAL at 06:17

## 2019-09-16 RX ADMIN — PHENOBARBITAL 129.6 MG: 32.4 TABLET ORAL at 21:00

## 2019-09-16 RX ADMIN — HYDROCODONE BITARTATE AND ACETAMINOPHEN 1 TABLET: 5; 325 TABLET ORAL at 13:52

## 2019-09-16 RX ADMIN — APIXABAN 2.5 MG: 2.5 TABLET, FILM COATED ORAL at 08:06

## 2019-09-17 LAB
ABO + RH BLD: NORMAL
ABO + RH BLD: NORMAL
BACTERIA SPEC AEROBE CULT: ABNORMAL
BH BB BLOOD EXPIRATION DATE: NORMAL
BH BB BLOOD EXPIRATION DATE: NORMAL
BH BB BLOOD TYPE BARCODE: 9500
BH BB BLOOD TYPE BARCODE: 9500
BH BB DISPENSE STATUS: NORMAL
BH BB DISPENSE STATUS: NORMAL
BH BB PRODUCT CODE: NORMAL
BH BB PRODUCT CODE: NORMAL
BH BB UNIT NUMBER: NORMAL
BH BB UNIT NUMBER: NORMAL
CROSSMATCH INTERPRETATION: NORMAL
CROSSMATCH INTERPRETATION: NORMAL
DEPRECATED RDW RBC AUTO: 44.3 FL (ref 37–54)
ERYTHROCYTE [DISTWIDTH] IN BLOOD BY AUTOMATED COUNT: 12.8 % (ref 12.3–15.4)
HCT VFR BLD AUTO: 26.8 % (ref 34–46.6)
HGB BLD-MCNC: 8.6 G/DL (ref 12–15.9)
MCH RBC QN AUTO: 30.5 PG (ref 26.6–33)
MCHC RBC AUTO-ENTMCNC: 32.1 G/DL (ref 31.5–35.7)
MCV RBC AUTO: 95 FL (ref 79–97)
PLATELET # BLD AUTO: 128 10*3/MM3 (ref 140–450)
PMV BLD AUTO: 11.7 FL (ref 6–12)
RBC # BLD AUTO: 2.82 10*6/MM3 (ref 3.77–5.28)
UNIT  ABO: NORMAL
UNIT  ABO: NORMAL
UNIT  RH: NORMAL
UNIT  RH: NORMAL
WBC NRBC COR # BLD: 6.44 10*3/MM3 (ref 3.4–10.8)

## 2019-09-17 PROCEDURE — 85027 COMPLETE CBC AUTOMATED: CPT | Performed by: HOSPITALIST

## 2019-09-17 PROCEDURE — 97110 THERAPEUTIC EXERCISES: CPT

## 2019-09-17 RX ADMIN — PRAVASTATIN SODIUM 40 MG: 40 TABLET ORAL at 20:39

## 2019-09-17 RX ADMIN — PHENOBARBITAL 129.6 MG: 32.4 TABLET ORAL at 20:38

## 2019-09-17 RX ADMIN — HYDROCODONE BITARTATE AND ACETAMINOPHEN 1 TABLET: 5; 325 TABLET ORAL at 03:25

## 2019-09-17 RX ADMIN — LEVOTHYROXINE SODIUM 50 MCG: 50 TABLET ORAL at 06:46

## 2019-09-17 RX ADMIN — SENNOSIDES AND DOCUSATE SODIUM 2 TABLET: 8.6; 5 TABLET ORAL at 08:30

## 2019-09-17 RX ADMIN — POLYETHYLENE GLYCOL 3350 17 G: 17 POWDER, FOR SOLUTION ORAL at 12:03

## 2019-09-17 RX ADMIN — HYDROCODONE BITARTATE AND ACETAMINOPHEN 1 TABLET: 5; 325 TABLET ORAL at 08:26

## 2019-09-17 RX ADMIN — APIXABAN 2.5 MG: 2.5 TABLET, FILM COATED ORAL at 20:39

## 2019-09-17 RX ADMIN — CETIRIZINE HYDROCHLORIDE 5 MG: 10 TABLET, FILM COATED ORAL at 08:26

## 2019-09-17 RX ADMIN — PRAMIPEXOLE DIHYDROCHLORIDE 0.75 MG: 0.5 TABLET ORAL at 20:39

## 2019-09-17 RX ADMIN — POLYETHYLENE GLYCOL 3350 17 G: 17 POWDER, FOR SOLUTION ORAL at 20:39

## 2019-09-17 RX ADMIN — HYDROCODONE BITARTATE AND ACETAMINOPHEN 1 TABLET: 5; 325 TABLET ORAL at 13:55

## 2019-09-17 RX ADMIN — SODIUM CHLORIDE, POTASSIUM CHLORIDE, SODIUM LACTATE AND CALCIUM CHLORIDE 75 ML/HR: 600; 310; 30; 20 INJECTION, SOLUTION INTRAVENOUS at 10:24

## 2019-09-17 RX ADMIN — PHENOBARBITAL 64.8 MG: 64.8 TABLET ORAL at 06:46

## 2019-09-17 RX ADMIN — HYDROCODONE BITARTATE AND ACETAMINOPHEN 1 TABLET: 5; 325 TABLET ORAL at 20:46

## 2019-09-17 RX ADMIN — SODIUM CHLORIDE, PRESERVATIVE FREE 10 ML: 5 INJECTION INTRAVENOUS at 08:30

## 2019-09-17 RX ADMIN — APIXABAN 2.5 MG: 2.5 TABLET, FILM COATED ORAL at 08:26

## 2019-09-18 ENCOUNTER — APPOINTMENT (OUTPATIENT)
Dept: GENERAL RADIOLOGY | Facility: HOSPITAL | Age: 74
End: 2019-09-18

## 2019-09-18 LAB
ABO GROUP BLD: NORMAL
ANION GAP SERPL CALCULATED.3IONS-SCNC: 7.2 MMOL/L (ref 5–15)
ANTI-C: NORMAL
ANTI-D: NORMAL
ANTI-E: NORMAL
BLD GP AB SCN SERPL QL: POSITIVE
BUN BLD-MCNC: 12 MG/DL (ref 8–23)
BUN/CREAT SERPL: 19.4 (ref 7–25)
CALCIUM SPEC-SCNC: 8.2 MG/DL (ref 8.6–10.5)
CHLORIDE SERPL-SCNC: 99 MMOL/L (ref 98–107)
CO2 SERPL-SCNC: 24.8 MMOL/L (ref 22–29)
CREAT BLD-MCNC: 0.62 MG/DL (ref 0.57–1)
DEPRECATED RDW RBC AUTO: 38.2 FL (ref 37–54)
ERYTHROCYTE [DISTWIDTH] IN BLOOD BY AUTOMATED COUNT: 11.8 % (ref 12.3–15.4)
GFR SERPL CREATININE-BSD FRML MDRD: 95 ML/MIN/1.73
GLUCOSE BLD-MCNC: 107 MG/DL (ref 65–99)
HCT VFR BLD AUTO: 22.4 % (ref 34–46.6)
HGB BLD-MCNC: 7.5 G/DL (ref 12–15.9)
MCH RBC QN AUTO: 29.9 PG (ref 26.6–33)
MCHC RBC AUTO-ENTMCNC: 33.5 G/DL (ref 31.5–35.7)
MCV RBC AUTO: 89.2 FL (ref 79–97)
PLATELET # BLD AUTO: 163 10*3/MM3 (ref 140–450)
PMV BLD AUTO: 11.6 FL (ref 6–12)
POTASSIUM BLD-SCNC: 4.2 MMOL/L (ref 3.5–5.2)
RBC # BLD AUTO: 2.51 10*6/MM3 (ref 3.77–5.28)
RH BLD: NEGATIVE
SODIUM BLD-SCNC: 131 MMOL/L (ref 136–145)
T&S EXPIRATION DATE: NORMAL
WBC NRBC COR # BLD: 4.57 10*3/MM3 (ref 3.4–10.8)

## 2019-09-18 PROCEDURE — 86900 BLOOD TYPING SEROLOGIC ABO: CPT | Performed by: HOSPITALIST

## 2019-09-18 PROCEDURE — 86901 BLOOD TYPING SEROLOGIC RH(D): CPT | Performed by: HOSPITALIST

## 2019-09-18 PROCEDURE — 85027 COMPLETE CBC AUTOMATED: CPT | Performed by: HOSPITALIST

## 2019-09-18 PROCEDURE — P9016 RBC LEUKOCYTES REDUCED: HCPCS

## 2019-09-18 PROCEDURE — 86900 BLOOD TYPING SEROLOGIC ABO: CPT

## 2019-09-18 PROCEDURE — 86850 RBC ANTIBODY SCREEN: CPT | Performed by: HOSPITALIST

## 2019-09-18 PROCEDURE — 73502 X-RAY EXAM HIP UNI 2-3 VIEWS: CPT

## 2019-09-18 PROCEDURE — 97110 THERAPEUTIC EXERCISES: CPT

## 2019-09-18 PROCEDURE — 36430 TRANSFUSION BLD/BLD COMPNT: CPT

## 2019-09-18 PROCEDURE — 80048 BASIC METABOLIC PNL TOTAL CA: CPT | Performed by: HOSPITALIST

## 2019-09-18 PROCEDURE — 86870 RBC ANTIBODY IDENTIFICATION: CPT | Performed by: HOSPITALIST

## 2019-09-18 PROCEDURE — 86920 COMPATIBILITY TEST SPIN: CPT

## 2019-09-18 PROCEDURE — 86922 COMPATIBILITY TEST ANTIGLOB: CPT

## 2019-09-18 RX ADMIN — HYDROCODONE BITARTATE AND ACETAMINOPHEN 1 TABLET: 5; 325 TABLET ORAL at 16:42

## 2019-09-18 RX ADMIN — HYDROCODONE BITARTATE AND ACETAMINOPHEN 1 TABLET: 5; 325 TABLET ORAL at 00:50

## 2019-09-18 RX ADMIN — APIXABAN 2.5 MG: 2.5 TABLET, FILM COATED ORAL at 20:46

## 2019-09-18 RX ADMIN — HYDROCODONE BITARTATE AND ACETAMINOPHEN 1 TABLET: 5; 325 TABLET ORAL at 06:58

## 2019-09-18 RX ADMIN — HYDROCODONE BITARTATE AND ACETAMINOPHEN 1 TABLET: 5; 325 TABLET ORAL at 11:40

## 2019-09-18 RX ADMIN — PHENOBARBITAL 129.6 MG: 32.4 TABLET ORAL at 20:46

## 2019-09-18 RX ADMIN — LEVOTHYROXINE SODIUM 50 MCG: 50 TABLET ORAL at 06:58

## 2019-09-18 RX ADMIN — PRAMIPEXOLE DIHYDROCHLORIDE 0.75 MG: 0.5 TABLET ORAL at 20:46

## 2019-09-18 RX ADMIN — POLYETHYLENE GLYCOL 3350 17 G: 17 POWDER, FOR SOLUTION ORAL at 08:56

## 2019-09-18 RX ADMIN — SENNOSIDES AND DOCUSATE SODIUM 2 TABLET: 8.6; 5 TABLET ORAL at 08:56

## 2019-09-18 RX ADMIN — PHENOBARBITAL 64.8 MG: 64.8 TABLET ORAL at 06:58

## 2019-09-18 RX ADMIN — SODIUM CHLORIDE, PRESERVATIVE FREE 10 ML: 5 INJECTION INTRAVENOUS at 21:15

## 2019-09-18 RX ADMIN — APIXABAN 2.5 MG: 2.5 TABLET, FILM COATED ORAL at 08:56

## 2019-09-18 RX ADMIN — CETIRIZINE HYDROCHLORIDE 5 MG: 10 TABLET, FILM COATED ORAL at 12:12

## 2019-09-18 RX ADMIN — HYDROCODONE BITARTATE AND ACETAMINOPHEN 1 TABLET: 5; 325 TABLET ORAL at 20:46

## 2019-09-18 RX ADMIN — PRAVASTATIN SODIUM 40 MG: 40 TABLET ORAL at 20:46

## 2019-09-18 RX ADMIN — POLYETHYLENE GLYCOL 3350 17 G: 17 POWDER, FOR SOLUTION ORAL at 20:46

## 2019-09-19 VITALS
OXYGEN SATURATION: 94 % | TEMPERATURE: 97.7 F | HEART RATE: 98 BPM | RESPIRATION RATE: 16 BRPM | HEIGHT: 68 IN | DIASTOLIC BLOOD PRESSURE: 55 MMHG | BODY MASS INDEX: 28.79 KG/M2 | WEIGHT: 190 LBS | SYSTOLIC BLOOD PRESSURE: 106 MMHG

## 2019-09-19 LAB
ABO + RH BLD: NORMAL
ALBUMIN SERPL-MCNC: 2.7 G/DL (ref 3.5–5.2)
ANION GAP SERPL CALCULATED.3IONS-SCNC: 8.6 MMOL/L (ref 5–15)
BH BB BLOOD EXPIRATION DATE: NORMAL
BH BB BLOOD TYPE BARCODE: 600
BH BB DISPENSE STATUS: NORMAL
BH BB PRODUCT CODE: NORMAL
BH BB UNIT NUMBER: NORMAL
BUN BLD-MCNC: 11 MG/DL (ref 8–23)
BUN/CREAT SERPL: 16.2 (ref 7–25)
CALCIUM SPEC-SCNC: 8.7 MG/DL (ref 8.6–10.5)
CHLORIDE SERPL-SCNC: 98 MMOL/L (ref 98–107)
CO2 SERPL-SCNC: 26.4 MMOL/L (ref 22–29)
CREAT BLD-MCNC: 0.68 MG/DL (ref 0.57–1)
CROSSMATCH INTERPRETATION: NORMAL
DEPRECATED RDW RBC AUTO: 42.4 FL (ref 37–54)
ERYTHROCYTE [DISTWIDTH] IN BLOOD BY AUTOMATED COUNT: 12.7 % (ref 12.3–15.4)
GFR SERPL CREATININE-BSD FRML MDRD: 85 ML/MIN/1.73
GLUCOSE BLD-MCNC: 106 MG/DL (ref 65–99)
HCT VFR BLD AUTO: 27.2 % (ref 34–46.6)
HGB BLD-MCNC: 9 G/DL (ref 12–15.9)
MCH RBC QN AUTO: 30.1 PG (ref 26.6–33)
MCHC RBC AUTO-ENTMCNC: 33.1 G/DL (ref 31.5–35.7)
MCV RBC AUTO: 91 FL (ref 79–97)
PHOSPHATE SERPL-MCNC: 2.9 MG/DL (ref 2.5–4.5)
PLATELET # BLD AUTO: 200 10*3/MM3 (ref 140–450)
PMV BLD AUTO: 11.1 FL (ref 6–12)
POTASSIUM BLD-SCNC: 4.1 MMOL/L (ref 3.5–5.2)
RBC # BLD AUTO: 2.99 10*6/MM3 (ref 3.77–5.28)
SODIUM BLD-SCNC: 133 MMOL/L (ref 136–145)
UNIT  ABO: NORMAL
UNIT  RH: NORMAL
WBC NRBC COR # BLD: 3.96 10*3/MM3 (ref 3.4–10.8)

## 2019-09-19 PROCEDURE — 80069 RENAL FUNCTION PANEL: CPT | Performed by: HOSPITALIST

## 2019-09-19 PROCEDURE — 97110 THERAPEUTIC EXERCISES: CPT

## 2019-09-19 PROCEDURE — 85027 COMPLETE CBC AUTOMATED: CPT | Performed by: HOSPITALIST

## 2019-09-19 RX ORDER — SENNA AND DOCUSATE SODIUM 50; 8.6 MG/1; MG/1
2 TABLET, FILM COATED ORAL 2 TIMES DAILY PRN
Start: 2019-09-19 | End: 2021-03-02

## 2019-09-19 RX ORDER — HYDROCODONE BITARTRATE AND ACETAMINOPHEN 5; 325 MG/1; MG/1
1-2 TABLET ORAL EVERY 4 HOURS PRN
Qty: 24 TABLET | Refills: 0 | Status: SHIPPED | OUTPATIENT
Start: 2019-09-19 | End: 2019-09-23

## 2019-09-19 RX ADMIN — HYDROCODONE BITARTATE AND ACETAMINOPHEN 1 TABLET: 5; 325 TABLET ORAL at 03:02

## 2019-09-19 RX ADMIN — PHENOBARBITAL 64.8 MG: 64.8 TABLET ORAL at 06:17

## 2019-09-19 RX ADMIN — CETIRIZINE HYDROCHLORIDE 5 MG: 10 TABLET, FILM COATED ORAL at 08:36

## 2019-09-19 RX ADMIN — SODIUM CHLORIDE, PRESERVATIVE FREE 10 ML: 5 INJECTION INTRAVENOUS at 08:39

## 2019-09-19 RX ADMIN — APIXABAN 2.5 MG: 2.5 TABLET, FILM COATED ORAL at 08:36

## 2019-09-19 RX ADMIN — HYDROCODONE BITARTATE AND ACETAMINOPHEN 1 TABLET: 5; 325 TABLET ORAL at 13:44

## 2019-09-19 RX ADMIN — HYDROCODONE BITARTATE AND ACETAMINOPHEN 1 TABLET: 5; 325 TABLET ORAL at 08:36

## 2019-09-19 RX ADMIN — LEVOTHYROXINE SODIUM 50 MCG: 50 TABLET ORAL at 06:17

## 2019-09-20 ENCOUNTER — READMISSION MANAGEMENT (OUTPATIENT)
Dept: CALL CENTER | Facility: HOSPITAL | Age: 74
End: 2019-09-20

## 2019-09-20 NOTE — OUTREACH NOTE
Prep Survey      Responses   Facility patient discharged from?  [facility]   Is patient eligible?  No   What are the reasons patient is not eligible?  [facility]   Does the patient have one of the following disease processes/diagnoses(primary or secondary)?  General Surgery   Prep survey completed?  Yes          Gayle Rosado RN

## 2019-09-24 DIAGNOSIS — G40.909 SEIZURE DISORDER (HCC): ICD-10-CM

## 2019-09-25 RX ORDER — PRAMIPEXOLE DIHYDROCHLORIDE 0.5 MG/1
TABLET ORAL
Qty: 360 TABLET | Refills: 3 | Status: SHIPPED | OUTPATIENT
Start: 2019-09-25 | End: 2020-05-15 | Stop reason: SDUPTHER

## 2019-10-01 ENCOUNTER — TELEPHONE (OUTPATIENT)
Dept: ENDOCRINOLOGY | Age: 74
End: 2019-10-01

## 2019-10-01 NOTE — TELEPHONE ENCOUNTER
Danielle,  with [facility] [phone] said patient is in their rehab. She said patient had a right total  hip replacement due to a fracture and scheduled to leave [place] tomorrow.  Danielle  said they usually follow up with pcp in 2 weeks and asked when Dr. Rock wants to see patient.  Patient has an appt with Dr. Rock scheduled on 12-3-19, 11:30am.

## 2019-10-11 ENCOUNTER — TELEPHONE (OUTPATIENT)
Dept: ENDOCRINOLOGY | Age: 74
End: 2019-10-11

## 2019-10-11 ENCOUNTER — TELEPHONE (OUTPATIENT)
Dept: NEUROLOGY | Facility: CLINIC | Age: 74
End: 2019-10-11

## 2019-10-11 NOTE — TELEPHONE ENCOUNTER
Iris with [facility] ph. [phone] asked if they can extend patients physical therapy to 2 week 3 effective 10-19-19 for strengthening, transitional training and ambulation.

## 2019-10-11 NOTE — TELEPHONE ENCOUNTER
PT CALLED STATED LUCY TALKED WITH YOU ON YESTERDAY AND SHE NOW SHE NEED TO UPDATE YOU ON SOME OTHER THINGS. SHE ASK THAT YOU WILL PLEASE CALL HER.

## 2019-10-11 NOTE — TELEPHONE ENCOUNTER
----- Message from Marquis Fraser sent at 10/10/2019  4:12 PM EDT -----  Contact: MARSHAJACKIE Sierra wanted Dr. Whitman to know that she had to cancel her and her husbands appt as she and he both have been in the hospital. She broke her ankle and hip and he has been sickly. Marsha stated that [facility] (Rehab [facility]) has told her that she can see their doctors and they will regulate her meds and such while she is in there and then they can also be there for her once she goes home. Marsha said she told them no, she loves all of her doctors and wanted us to be aware that she didn't want [facility] calling and trying to change anything medication wise or appointment wise and if they called us she would like us to call her first or her . Nicolás then got on the phone and stated he needed his Gabapentin called in. He takes the 800mg GREY pill, do not call in the white pill as it hurts his stomach, its very important to remember that. He stated that he is currently out of the medication. I told them that I would pass the message along to you and it would probably be tomorrow before you were able to get back to them as Dr. Whitman had already left for the day. Please advise. Thank you. Patients can be reached at number above in chart.   Nicolás's : [phone]. Thank you.

## 2019-10-14 ENCOUNTER — TELEPHONE (OUTPATIENT)
Dept: NEUROLOGY | Facility: CLINIC | Age: 74
End: 2019-10-14

## 2019-10-14 NOTE — TELEPHONE ENCOUNTER
I called her:    She has returned home after hip fracture.    No seizures.  I told her I can continue to refill her medicine if needed this year.  She will be assigned to Dr. Liu for next year

## 2019-10-15 ENCOUNTER — TELEPHONE (OUTPATIENT)
Dept: ENDOCRINOLOGY | Age: 74
End: 2019-10-15

## 2019-10-15 NOTE — TELEPHONE ENCOUNTER
antionette for physical therapy  Clarification that pt can go from walking boot to a shoe  And straight cane to a walker also  She doesn't have any orders for that     Please call [phone]

## 2019-10-27 ENCOUNTER — TELEPHONE (OUTPATIENT)
Dept: NEUROLOGY | Facility: CLINIC | Age: 74
End: 2019-10-27

## 2019-10-27 DIAGNOSIS — G40.909 SEIZURE DISORDER (HCC): Primary | ICD-10-CM

## 2019-10-28 RX ORDER — PHENOBARBITAL 64.8 MG/1
TABLET ORAL
Qty: 270 TABLET | Refills: 2 | Status: SHIPPED | OUTPATIENT
Start: 2019-10-28 | End: 2019-12-12 | Stop reason: SDUPTHER

## 2019-10-28 NOTE — TELEPHONE ENCOUNTER
Patient was last seen on, 10-9-2018    Patient does not have a follow up appointment. I have called patient to try and schedule an appointment.    Please advise.  Thanks!

## 2019-10-31 RX ORDER — APIXABAN 2.5 MG/1
TABLET, FILM COATED ORAL
Qty: 60 TABLET | Refills: 0 | OUTPATIENT
Start: 2019-10-31

## 2019-12-02 RX ORDER — PRAVASTATIN SODIUM 40 MG
TABLET ORAL
Qty: 90 TABLET | Refills: 0 | Status: SHIPPED | OUTPATIENT
Start: 2019-12-02 | End: 2020-03-02

## 2019-12-02 NOTE — PROGRESS NOTES
Subjective   Mariela Ruiz is a 74 y.o. female.     F/u for hypothyrodiism, hyperlipidemia, sleep apnea / flu vaccine @ [facility]        She has hypothyroidism and has been taking Synthroid 50 µg per day.   She had a previous left thyroid lobectomy in the 1970s for benign lesion She had a fine needle biopsy right thyroid nodule done in by Dr. Brandt in August 2017 which was read as follicular lesion of undetermined significance.  She had ultrasound-guided needle biopsy done at [facility] on November 16, 2017 and pathology was read as benign follicular cells.  She denies any changes in her thyroid.    She has no previous history of head or neck radiation therapy.  She denies pressure symptoms     She has hyperlipidemia and has been on pravastatin 40 mg once a day.  She denies myalgia.   She has lost 10 pounds since 9/19.  Her last meal was last night.     She has osteoporosis on bone density done in her gynecologist in November 2015.   She had a follow-up bone density done at [facility] in November 2017 which showed osteoporosis with a 9.7% decrease on the right hip.  T score of the right hip was -2.8.  She was never on biphosphonate.  She wants to think some more about Prolia or Reclast.  She has vitamin D insufficiency and has been taking vit D regularly.  She did not get Actonel filled because of high cost.  She does not want to use Fosamax.  She denies heartburn.     She had a previous left hip replacement and the joint is worn.  Serum cobalt and chromium are elevated.  Dr. Claudio has retired and she is now seeing Dr. Ed Chun.  She has Dupuytren's contracture and was seen by Dr. Mota who advised surgery.     Patient is aware that I do not treat cobalt and chromium toxicity.  She is aware that cobalt and chromium toxicity can cause neuropathy.  She is reluctant to have her left hip replaced because of infection after a left knee replacement.    She fell in September 2019 and fractured the right femoral neck.  She  "had a total hip arthroplasty done by Dr. Christiano Cesar.  She is going through physical therapy.     She has sleep apnea but is unable to tolerate a CPAP.  She complains of chronic fatigue.      She has received at least 2 pneumococcal vaccination from [facility].     The following portions of the patient's history were reviewed and updated as appropriate: allergies, current medications, past family history, past medical history, past social history, past surgical history and problem list.    Review of Systems   Constitutional: Negative.    HENT: Negative.    Eyes: Negative.    Respiratory: Negative.    Cardiovascular: Negative.    Gastrointestinal: Positive for constipation. Negative for anal bleeding and blood in stool.   Endocrine: Negative.    Genitourinary: Negative.    Psychiatric/Behavioral: Positive for sleep disturbance (sleep apnea unable to use CPAP machine ).     Objective      Vitals:    12/03/19 1116   BP: 110/72   BP Location: Left arm   Patient Position: Sitting   Cuff Size: Large Adult   Pulse: 87   SpO2: 98%   Weight: 86.8 kg (191 lb 6.4 oz)   Height: 172.7 cm (67.99\")     Physical Exam   Constitutional: She is oriented to person, place, and time. She appears well-developed and well-nourished. No distress.   HENT:   Head: Normocephalic.   Right Ear: External ear normal.   Left Ear: External ear normal.   Nose: Nose normal.   Mouth/Throat: Oropharynx is clear and moist. No oropharyngeal exudate.   Eyes: Conjunctivae and EOM are normal. Right eye exhibits no discharge. Left eye exhibits no discharge. No scleral icterus.   Neck: Normal range of motion. Neck supple. No JVD present. No tracheal deviation present. No thyromegaly present.   Cardiovascular: Normal rate, regular rhythm, normal heart sounds and intact distal pulses. Exam reveals no gallop and no friction rub.   No murmur heard.  Pulmonary/Chest: Effort normal and breath sounds normal. No stridor. No respiratory distress. She has no wheezes. She " has no rales. She exhibits no tenderness.   Abdominal: Soft. Bowel sounds are normal. She exhibits no distension and no mass. There is no tenderness. There is no rebound and no guarding. No hernia.   Musculoskeletal: Normal range of motion. She exhibits deformity (Dupuytren contracture in both hands, worse on left). She exhibits no edema or tenderness.   Lymphadenopathy:     She has no cervical adenopathy.   Neurological: She is alert and oriented to person, place, and time. She displays normal reflexes.   Skin: Skin is warm and dry. No rash noted. No erythema. No pallor.   Psychiatric: She has a normal mood and affect. Her behavior is normal.     Admission on 09/13/2019, Discharged on 09/19/2019   No results displayed because visit has over 200 results.        Assessment/Plan   Mariela was seen today for hyperlipidemia, hypothyroidism and sleep apnea.    Diagnoses and all orders for this visit:    Hyperlipidemia, unspecified hyperlipidemia type  -     Comprehensive Metabolic Panel  -     Lipid Panel    Vitamin D insufficiency  -     Comprehensive Metabolic Panel  -     Vitamin D 25 Hydroxy    Gastroesophageal reflux disease, esophagitis presence not specified  -     Comprehensive Metabolic Panel    Postsurgical hypothyroidism  -     Comprehensive Metabolic Panel  -     TSH    Age-related osteoporosis without current pathological fracture  -     Comprehensive Metabolic Panel  -     Vitamin D 25 Hydroxy  -     DEXA Bone Density Axial    Urge incontinence of urine  -     Urinalysis With Culture If Indicated -    History of partial thyroidectomy  -     TSH    Abnormal blood level of chromium  -     Chromium Level    Abnormal blood level of cobalt  -     Cobalt    Vitamin B12 deficiency  -     Iron Profile  -     Ferritin  -     Vitamin B12  -     Folate  -     CBC & Differential  -     Intrinsic Factor Ab    Family history of diabetes mellitus (DM)  -     Comprehensive Metabolic Panel  -     Hemoglobin A1c    Iron  deficiency anemia, unspecified iron deficiency anemia type  -     Iron Profile  -     Ferritin  -     CBC & Differential      Continue Synthroid 50 mcg/day.  Continue pravastatin 40 mg/day.  Schedule bone density at [facility].  Patient will consider using Prolia.  Continue vitamin D3 1000 units/day.  Follow-up Dr. Ed Chun with regards to increased serum cobalt and chromium.    Copy of my note sent to Dr. Ed Chun, and Dr. Mota    RTC 4 mos.

## 2019-12-03 ENCOUNTER — OFFICE VISIT (OUTPATIENT)
Dept: ENDOCRINOLOGY | Age: 74
End: 2019-12-03

## 2019-12-03 VITALS
BODY MASS INDEX: 29.01 KG/M2 | WEIGHT: 191.4 LBS | HEART RATE: 87 BPM | SYSTOLIC BLOOD PRESSURE: 110 MMHG | DIASTOLIC BLOOD PRESSURE: 72 MMHG | OXYGEN SATURATION: 98 % | HEIGHT: 68 IN

## 2019-12-03 DIAGNOSIS — R79.0 ABNORMAL BLOOD LEVEL OF CHROMIUM: ICD-10-CM

## 2019-12-03 DIAGNOSIS — N39.41 URGE INCONTINENCE OF URINE: ICD-10-CM

## 2019-12-03 DIAGNOSIS — K21.9 GASTROESOPHAGEAL REFLUX DISEASE, ESOPHAGITIS PRESENCE NOT SPECIFIED: ICD-10-CM

## 2019-12-03 DIAGNOSIS — E78.5 HYPERLIPIDEMIA, UNSPECIFIED HYPERLIPIDEMIA TYPE: Primary | ICD-10-CM

## 2019-12-03 DIAGNOSIS — E89.0 HISTORY OF PARTIAL THYROIDECTOMY: ICD-10-CM

## 2019-12-03 DIAGNOSIS — Z83.3 FAMILY HISTORY OF DIABETES MELLITUS (DM): ICD-10-CM

## 2019-12-03 DIAGNOSIS — E53.8 VITAMIN B12 DEFICIENCY: ICD-10-CM

## 2019-12-03 DIAGNOSIS — R79.0 ABNORMAL BLOOD LEVEL OF COBALT: ICD-10-CM

## 2019-12-03 DIAGNOSIS — M81.0 AGE-RELATED OSTEOPOROSIS WITHOUT CURRENT PATHOLOGICAL FRACTURE: ICD-10-CM

## 2019-12-03 DIAGNOSIS — E55.9 VITAMIN D INSUFFICIENCY: ICD-10-CM

## 2019-12-03 DIAGNOSIS — D50.9 IRON DEFICIENCY ANEMIA, UNSPECIFIED IRON DEFICIENCY ANEMIA TYPE: ICD-10-CM

## 2019-12-03 DIAGNOSIS — E89.0 POSTSURGICAL HYPOTHYROIDISM: ICD-10-CM

## 2019-12-03 PROBLEM — R33.9 URINE RETENTION: Status: RESOLVED | Noted: 2019-09-16 | Resolved: 2019-12-03

## 2019-12-03 PROCEDURE — 99214 OFFICE O/P EST MOD 30 MIN: CPT | Performed by: INTERNAL MEDICINE

## 2019-12-05 LAB
25(OH)D3+25(OH)D2 SERPL-MCNC: 32.2 NG/ML (ref 30–100)
ALBUMIN SERPL-MCNC: 4.6 G/DL (ref 3.5–5.2)
ALBUMIN/GLOB SERPL: 1.6 G/DL
ALP SERPL-CCNC: 173 U/L (ref 39–117)
ALT SERPL-CCNC: 9 U/L (ref 1–33)
APPEARANCE UR: CLEAR
AST SERPL-CCNC: 18 U/L (ref 1–32)
BACTERIA #/AREA URNS HPF: ABNORMAL /HPF
BASOPHILS # BLD AUTO: 0.03 10*3/MM3 (ref 0–0.2)
BASOPHILS NFR BLD AUTO: 0.7 % (ref 0–1.5)
BILIRUB SERPL-MCNC: 0.3 MG/DL (ref 0.2–1.2)
BILIRUB UR QL STRIP: NEGATIVE
BUN SERPL-MCNC: 9 MG/DL (ref 8–23)
BUN/CREAT SERPL: 11.8 (ref 7–25)
CALCIUM SERPL-MCNC: 10.1 MG/DL (ref 8.6–10.5)
CASTS URNS MICRO: ABNORMAL
CASTS URNS QL MICRO: PRESENT /LPF
CHLORIDE SERPL-SCNC: 99 MMOL/L (ref 98–107)
CHOLEST SERPL-MCNC: 189 MG/DL (ref 0–200)
CO2 SERPL-SCNC: 30.3 MMOL/L (ref 22–29)
COBALT SERPL-MCNC: 9.9 UG/L (ref 0–0.9)
COLOR UR: YELLOW
CR SERPL-MCNC: 6.1 UG/L (ref 0.1–2.1)
CREAT SERPL-MCNC: 0.76 MG/DL (ref 0.57–1)
EOSINOPHIL # BLD AUTO: 0 10*3/MM3 (ref 0–0.4)
EOSINOPHIL NFR BLD AUTO: 0 % (ref 0.3–6.2)
EPI CELLS #/AREA URNS HPF: ABNORMAL /HPF
ERYTHROCYTE [DISTWIDTH] IN BLOOD BY AUTOMATED COUNT: 12.6 % (ref 12.3–15.4)
FERRITIN SERPL-MCNC: 32.4 NG/ML (ref 13–150)
FOLATE SERPL-MCNC: 13.4 NG/ML (ref 4.78–24.2)
GLOBULIN SER CALC-MCNC: 2.9 GM/DL
GLUCOSE SERPL-MCNC: 87 MG/DL (ref 65–99)
GLUCOSE UR QL: NEGATIVE
HBA1C MFR BLD: 5.3 % (ref 4.8–5.6)
HCT VFR BLD AUTO: 37.1 % (ref 34–46.6)
HDLC SERPL-MCNC: 99 MG/DL (ref 40–60)
HGB BLD-MCNC: 12 G/DL (ref 12–15.9)
HGB UR QL STRIP: NEGATIVE
IF BLOCK AB SER-ACNC: 189 AU/ML (ref 0–1.1)
IMM GRANULOCYTES # BLD AUTO: 0.01 10*3/MM3 (ref 0–0.05)
IMM GRANULOCYTES NFR BLD AUTO: 0.2 % (ref 0–0.5)
INTERPRETATION: NORMAL
IRON SATN MFR SERPL: 19 % (ref 20–50)
IRON SERPL-MCNC: 82 MCG/DL (ref 37–145)
KETONES UR QL STRIP: NEGATIVE
LDLC SERPL CALC-MCNC: 79 MG/DL (ref 0–100)
LEUKOCYTE ESTERASE UR QL STRIP: NEGATIVE
LYMPHOCYTES # BLD AUTO: 1.51 10*3/MM3 (ref 0.7–3.1)
LYMPHOCYTES NFR BLD AUTO: 36.9 % (ref 19.6–45.3)
Lab: NORMAL
MCH RBC QN AUTO: 28.4 PG (ref 26.6–33)
MCHC RBC AUTO-ENTMCNC: 32.3 G/DL (ref 31.5–35.7)
MCV RBC AUTO: 87.9 FL (ref 79–97)
MICRO URNS: NORMAL
MICRO URNS: NORMAL
MONOCYTES # BLD AUTO: 0.41 10*3/MM3 (ref 0.1–0.9)
MONOCYTES NFR BLD AUTO: 10 % (ref 5–12)
MUCOUS THREADS URNS QL MICRO: PRESENT /HPF
NEUTROPHILS # BLD AUTO: 2.13 10*3/MM3 (ref 1.7–7)
NEUTROPHILS NFR BLD AUTO: 52.2 % (ref 42.7–76)
NITRITE UR QL STRIP: NEGATIVE
NRBC BLD AUTO-RTO: 0 /100 WBC (ref 0–0.2)
PH UR STRIP: 7.5 [PH] (ref 5–7.5)
PLATELET # BLD AUTO: 249 10*3/MM3 (ref 140–450)
POTASSIUM SERPL-SCNC: 4.6 MMOL/L (ref 3.5–5.2)
PROT SERPL-MCNC: 7.5 G/DL (ref 6–8.5)
PROT UR QL STRIP: NEGATIVE
RBC # BLD AUTO: 4.22 10*6/MM3 (ref 3.77–5.28)
RBC #/AREA URNS HPF: ABNORMAL /HPF
SODIUM SERPL-SCNC: 139 MMOL/L (ref 136–145)
SP GR UR: 1.01 (ref 1–1.03)
TIBC SERPL-MCNC: 441 MCG/DL
TRIGL SERPL-MCNC: 55 MG/DL (ref 0–150)
TSH SERPL DL<=0.005 MIU/L-ACNC: 0.78 UIU/ML (ref 0.27–4.2)
UIBC SERPL-MCNC: 359 MCG/DL (ref 112–346)
URINALYSIS REFLEX: NORMAL
UROBILINOGEN UR STRIP-MCNC: 0.2 MG/DL (ref 0.2–1)
VIT B12 SERPL-MCNC: 378 PG/ML (ref 211–946)
VLDLC SERPL CALC-MCNC: 11 MG/DL
WBC # BLD AUTO: 4.09 10*3/MM3 (ref 3.4–10.8)
WBC #/AREA URNS HPF: ABNORMAL /HPF

## 2019-12-05 RX ORDER — CIPROFLOXACIN 500 MG/1
500 TABLET, FILM COATED ORAL 2 TIMES DAILY
Qty: 10 TABLET | Refills: 0 | Status: SHIPPED | OUTPATIENT
Start: 2019-12-05 | End: 2021-03-02

## 2019-12-12 DIAGNOSIS — G40.909 SEIZURE DISORDER (HCC): ICD-10-CM

## 2019-12-12 NOTE — TELEPHONE ENCOUNTER
----- Message from Marquis Aviles sent at 12/12/2019 11:48 AM EST -----  Contact: [phone]  PT CALLING TO GET REFILLS ON PHENobarbital SENT TO [facility] ON [address]. CALL PT [phone]

## 2019-12-13 RX ORDER — PHENOBARBITAL 64.8 MG/1
194.4 TABLET ORAL DAILY
Qty: 270 TABLET | Refills: 2 | Status: SHIPPED | OUTPATIENT
Start: 2019-12-13 | End: 2020-03-12 | Stop reason: SDUPTHER

## 2020-01-30 ENCOUNTER — TELEPHONE (OUTPATIENT)
Dept: ENDOCRINOLOGY | Age: 75
End: 2020-01-30

## 2020-01-30 RX ORDER — LEVOFLOXACIN 500 MG/1
500 TABLET, FILM COATED ORAL DAILY
Qty: 7 TABLET | Refills: 0 | Status: SHIPPED | OUTPATIENT
Start: 2020-01-30 | End: 2020-01-30 | Stop reason: SDUPTHER

## 2020-01-30 RX ORDER — LEVOFLOXACIN 500 MG/1
500 TABLET, FILM COATED ORAL DAILY
Qty: 7 TABLET | Refills: 0 | Status: SHIPPED | OUTPATIENT
Start: 2020-01-30 | End: 2020-02-06

## 2020-01-30 NOTE — TELEPHONE ENCOUNTER
Patient called and pharmacy doesn't have her prescription    She doesn't know the name of the antibiotic    [facility] [place]

## 2020-02-04 ENCOUNTER — OFFICE VISIT (OUTPATIENT)
Dept: ORTHOPEDIC SURGERY | Facility: CLINIC | Age: 75
End: 2020-02-04

## 2020-02-04 VITALS — HEIGHT: 67 IN | BODY MASS INDEX: 30.61 KG/M2 | TEMPERATURE: 98.4 F | WEIGHT: 195 LBS

## 2020-02-04 DIAGNOSIS — M25.552 LEFT HIP PAIN: Primary | ICD-10-CM

## 2020-02-04 DIAGNOSIS — M17.11 PRIMARY OSTEOARTHRITIS OF RIGHT KNEE: ICD-10-CM

## 2020-02-04 PROCEDURE — 73502 X-RAY EXAM HIP UNI 2-3 VIEWS: CPT | Performed by: NURSE PRACTITIONER

## 2020-02-04 PROCEDURE — 99213 OFFICE O/P EST LOW 20 MIN: CPT | Performed by: NURSE PRACTITIONER

## 2020-02-04 PROCEDURE — 20610 DRAIN/INJ JOINT/BURSA W/O US: CPT | Performed by: NURSE PRACTITIONER

## 2020-02-04 RX ORDER — METHYLPREDNISOLONE ACETATE 80 MG/ML
80 INJECTION, SUSPENSION INTRA-ARTICULAR; INTRALESIONAL; INTRAMUSCULAR; SOFT TISSUE
Status: COMPLETED | OUTPATIENT
Start: 2020-02-04 | End: 2020-02-04

## 2020-02-04 RX ADMIN — METHYLPREDNISOLONE ACETATE 80 MG: 80 INJECTION, SUSPENSION INTRA-ARTICULAR; INTRALESIONAL; INTRAMUSCULAR; SOFT TISSUE at 13:35

## 2020-02-04 NOTE — PROGRESS NOTES
"Patient: Mariela Ruiz  YOB: 1945 74 y.o. female  Medical Record Number: [id number]    Chief Complaints:   Chief Complaint   Patient presents with   • Left Hip - Establish Care, Pain       History of Present Illness:Mariela Ruiz is a 74 y.o. female who presents with complaints of occasional left hip pain and right knee pain.  The patient had previous left hip fracture years ago with a left hip bipolar hemiarthroplasty.  She recently had a right hip fracture and had subsequent right total hip replacement by Dr. Cesar about 3 months ago.  She has chronic low back issues and\" does not walk even\" she also has right knee pain but thinks that is related to her abnormal gait.  She went through several months of physical therapy but has stopped.    Allergies:   Allergies   Allergen Reactions   • Clindamycin/Lincomycin Itching   • Sulfa Antibiotics      RASH   • Duricef [Cefadroxil] Hives and Rash   • Keflex [Cephalexin] Rash       Medications:   Current Outpatient Medications   Medication Sig Dispense Refill   • apixaban (ELIQUIS) 2.5 MG tablet tablet Take 1 tablet by mouth Every 12 (Twelve) Hours. 60 tablet    • cetirizine (zyrTEC) 10 MG tablet Take 1 tablet by mouth Daily. 30 tablet 0   • cholecalciferol (VITAMIN D3) 1000 units tablet Take 1 tablet by mouth Daily.     • ciprofloxacin (CIPRO) 500 MG tablet Take 1 tablet by mouth 2 (Two) Times a Day. 10 tablet 0   • clobetasol (TEMOVATE) 0.05 % cream Apply  topically to the appropriate area as directed Every 12 (Twelve) Hours. take home and use     • coenzyme Q10 100 MG capsule Take 100 mg by mouth Daily.     • levoFLOXacin (LEVAQUIN) 500 MG tablet Take 1 tablet by mouth Daily for 7 days. 7 tablet 0   • PHENobarbital (LUMINAL) 64.8 MG tablet Take 3 tablets by mouth Daily. 270 tablet 2   • pramipexole (MIRAPEX) 0.5 MG tablet TAKE 4 TABLETS BY MOUTH ONCE DAILY 360 tablet 3   • pravastatin (PRAVACHOL) 40 MG tablet TAKE 1 TABLET BY MOUTH EVERY DAY 90 tablet " "0   • sennosides-docusate sodium (SENOKOT-S) 8.6-50 MG tablet Take 2 tablets by mouth 2 (Two) Times a Day As Needed for Constipation.     • SYNTHROID 50 MCG tablet Take 1 tablet by mouth Every Morning Before Breakfast. 90 tablet 1     No current facility-administered medications for this visit.          The following portions of the patient's history were reviewed and updated as appropriate: allergies, current medications, past family history, past medical history, past social history, past surgical history and problem list.    Review of Systems:   A 14 point review of systems was performed. All systems negative except pertinent positives/negative listed in HPI above    Physical Exam:   Vitals:    02/04/20 1303   Temp: 98.4 °F (36.9 °C)   Weight: 88.5 kg (195 lb)   Height: 170.2 cm (67\")       General: A and O x 3, ASA, NAD    SCLERA:    Normal    DENTITION:   Normal  Skin clear no unusual lesions noted  Left hip patient is nontender palpation she has good range of motion with no instability calf is soft and nontender  Right knee no appreciable effusion 115 degrees flexion neutral and extension with a positive Surjit negative Lockman calf soft and nontender    Radiology:  Xrays 2 views of left hip were ordered and reviewed today secondary to pain show well-placed well-positioned left hip bipolar hemiarthroplasty.  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  She does appear to have quite a bit of offset noted about her hips but appears to be originating from her lower back.  In addition previous x-rays of the right knee from 2018 were reviewed and show significant arthritic changes.    Assessment/Plan:  Status post left hip bipolar hemiarthroplasty stable  Right knee pain    Patient I discussed the importance of her to follow-up with Dr. Cesar regarding her right hip since she just had surgery.  Patient verbalized " understanding.  With regards to her left hip if it becomes more painful would recommend that she go back to physical therapy.  Patient declines at this point.  With regards to her right knee patient would like a cortisone injection today.  Prior to injection risks were discussed including pain.  Patient verbalized understanding would like to proceed and we will otherwise see her back as needed    Large Joint Arthrocentesis: R knee  Date/Time: 2/4/2020 1:35 PM  Consent given by: patient  Site marked: site marked  Timeout: Immediately prior to procedure a time out was called to verify the correct patient, procedure, equipment, support staff and site/side marked as required   Supporting Documentation  Indications: pain and joint swelling   Procedure Details  Location: knee - R knee  Preparation: Patient was prepped and draped in the usual sterile fashion  Needle size: 22 G  Approach: anterolateral  Medications administered: 80 mg methylPREDNISolone acetate 80 MG/ML; 2 mL lidocaine (cardiac)  Patient tolerance: patient tolerated the procedure well with no immediate complications

## 2020-03-02 RX ORDER — PRAVASTATIN SODIUM 40 MG
TABLET ORAL
Qty: 90 TABLET | Refills: 1 | Status: SHIPPED | OUTPATIENT
Start: 2020-03-02 | End: 2020-08-27

## 2020-03-10 ENCOUNTER — TELEPHONE (OUTPATIENT)
Dept: NEUROLOGY | Facility: CLINIC | Age: 75
End: 2020-03-10

## 2020-03-10 DIAGNOSIS — G40.909 SEIZURE DISORDER (HCC): ICD-10-CM

## 2020-03-10 NOTE — TELEPHONE ENCOUNTER
I had the opportunity to review the med list and/or validate the medication prescribed by the provider: DR. LEISA YA    Patient: MARSHA MARES  Phone: [phone]    Reason for call: PT WAS A PT OF DR. SHUKLA'S AND PT WAS SCHEDULED WITH DR. SHUKLA ON 10-07-19 AND HAD TO CANCELED DUE TO SHE FELL AND BROKE HIP. SHE THEN HAD APPT WITH DR. YA ON 2/7/2020 CANCELED BECAUSE OF SNOW. PT IS NEEDING A PRESCRIPTION [facility] HER    [facility] [phone]       Medication:  PHENobarbital  Dose: 64.8MG    How does the patient take the medication? BY MOUTH    How often does the patient take the medication? Take 3 tablets by mouth Daily    Pharmacy Type (local, mail order, specialty): LOCAL    Pharmacy Name: [facility]  Pharmacy Phone number or location (if available): [phone]  Supply Amount (e.g, 30 or 90 days): 90 DAYS        PLEASE CALL PT WHEN THE PRESCRIPTION GETS CALLED INTO THE PHARMACY [phone].

## 2020-03-11 NOTE — TELEPHONE ENCOUNTER
PATIENT CALLED BACK WANTING TO KNOW THE STATUS ON HER RX. I REACHED OUT TO THE OFFICE AND SPOKE WITH TATIANA, AND SHE STATED THAT DR. YA IS REVIEWING HER RX.   I LET THE PATIENT KNOW THAT IT IS STILL UNDER REVIEW, AND THEY WILL CONTACT HER ONCE THE RX HAS BEEN CALLED INTO HER PHARMACY..      THANK YOU, DENI    PATIENT: MARSHA MARES   : 1945  GOOD CONTACT #[phone]

## 2020-03-12 DIAGNOSIS — G40.909 SEIZURE DISORDER (HCC): ICD-10-CM

## 2020-03-12 RX ORDER — PHENOBARBITAL 64.8 MG/1
194.4 TABLET ORAL DAILY
Qty: 270 TABLET | Refills: 2 | Status: SHIPPED | OUTPATIENT
Start: 2020-03-12 | End: 2020-05-15 | Stop reason: SDUPTHER

## 2020-03-12 RX ORDER — PHENOBARBITAL 64.8 MG/1
194.4 TABLET ORAL DAILY
Qty: 270 TABLET | Refills: 1 | Status: CANCELLED | OUTPATIENT
Start: 2020-03-12

## 2020-03-12 NOTE — TELEPHONE ENCOUNTER
Pt would like to know if she is going to be getting the 90 day refill. She does not want to have to keep going out to the store with the COVID19 going around. Pt stated that Dr Whitman used to fill a 90 day prescription and would like to continue to do things the way he used to do it.

## 2020-04-13 DIAGNOSIS — E89.0 POSTSURGICAL HYPOTHYROIDISM: ICD-10-CM

## 2020-04-13 RX ORDER — LEVOTHYROXINE SODIUM 50 MCG
TABLET ORAL
Qty: 90 TABLET | Refills: 0 | Status: SHIPPED | OUTPATIENT
Start: 2020-04-13 | End: 2020-07-07

## 2020-05-15 ENCOUNTER — OFFICE VISIT (OUTPATIENT)
Dept: NEUROLOGY | Facility: CLINIC | Age: 75
End: 2020-05-15

## 2020-05-15 DIAGNOSIS — G40.909 SEIZURE DISORDER (HCC): Primary | ICD-10-CM

## 2020-05-15 DIAGNOSIS — G25.81 RESTLESS LEGS SYNDROME: ICD-10-CM

## 2020-05-15 PROCEDURE — 99441 PR PHYS/QHP TELEPHONE EVALUATION 5-10 MIN: CPT | Performed by: PSYCHIATRY & NEUROLOGY

## 2020-05-15 RX ORDER — PRAMIPEXOLE DIHYDROCHLORIDE 0.5 MG/1
TABLET ORAL
Qty: 360 TABLET | Refills: 3 | Status: SHIPPED | OUTPATIENT
Start: 2020-05-15 | End: 2021-05-11

## 2020-05-15 RX ORDER — PHENOBARBITAL 64.8 MG/1
194.4 TABLET ORAL DAILY
Qty: 270 TABLET | Refills: 2 | Status: SHIPPED | OUTPATIENT
Start: 2020-05-15 | End: 2020-08-13

## 2020-05-15 NOTE — PROGRESS NOTES
"Chief Complaint   Patient presents with   • Seizures       Patient ID: Mariela Ruiz is a 75 y.o. female.    HPI:You have chosen to receive care through a telephone visit. Do you consent to use a telephone visit for your medical care today? \"yes\" I have had the pleasure of speaking with Mariela via telephone visit today.  She is at home and I am here in the neurology clinic.  She did consent to a telehealth visit via phone.  She is a 75-year-old female who has been seen by my former associate here at [facility] neurology for history of seizures and restless leg syndrome.  She is a new patient to me therefore a thorough chart review was performed to better understand the previous diagnoses and treatment.  She has been doing well.  She has not had any seizures in many years.  She is currently taking phenobarbital 64.8 mg 1 tablet in the morning and 2 at night.  She has generalized tonic-clonic seizures.  She also has restless leg syndrome.  She has been doing quite well from that standpoint.  She will occasionally experience breakthrough symptoms during the day however currently the medication is covering her symptoms.  She does take pramipexole 0.5 mg at night.  No new symptoms neurologically.      The following portions of the patient's history were reviewed and updated as appropriate: allergies, current medications, past family history, past medical history, past social history, past surgical history and problem list.    Review of Systems   Constitutional: Negative for activity change, appetite change and fatigue.   HENT: Negative for facial swelling, hearing loss and trouble swallowing.    Eyes: Negative for photophobia, redness and visual disturbance.   Respiratory: Negative for chest tightness, shortness of breath and wheezing.    Cardiovascular: Negative for chest pain, palpitations and leg swelling.   Gastrointestinal: Negative for abdominal pain, nausea and vomiting.   Endocrine: Negative for cold " intolerance, heat intolerance and polydipsia.   Musculoskeletal: Positive for gait problem (balance issues. pt does use a cane if she goes out. ). Negative for back pain and myalgias.   Skin: Negative for color change, rash and wound.   Neurological: Negative for dizziness, tremors, seizures, syncope, facial asymmetry, speech difficulty, weakness, light-headedness, numbness and headaches.   Hematological: Negative for adenopathy. Does not bruise/bleed easily.   Psychiatric/Behavioral: Negative for behavioral problems, confusion, decreased concentration, dysphoric mood, hallucinations, self-injury, sleep disturbance and suicidal ideas. The patient is not nervous/anxious and is not hyperactive.       I have reviewed the review of systems above performed by my medical assistant.      There were no vitals filed for this visit.    Neurologic Exam no aphasia.  Patient is alert and oriented x3.    Physical Exam    Procedures    Assessment/Plan: I would like to continue the Requip and phenobarbital.  We will see her here again in 1 year.  A total of 10 minutes was spent discussing signs and symptoms of restless leg syndrome, seizures, patient education, plan of care and prognosis.       Mariela was seen today for seizures.    Diagnoses and all orders for this visit:    Seizure disorder (CMS/[facility])  -     PHENobarbital (LUMINAL) 64.8 MG tablet; Take 3 tablets by mouth Daily for 90 days.    Restless legs syndrome  -     pramipexole (MIRAPEX) 0.5 MG tablet; TAKE 4 TABLETS BY MOUTH ONCE DAILY           Steven Liu II, MD

## 2020-07-07 DIAGNOSIS — E89.0 POSTSURGICAL HYPOTHYROIDISM: ICD-10-CM

## 2020-07-07 RX ORDER — LEVOTHYROXINE SODIUM 50 MCG
TABLET ORAL
Qty: 90 TABLET | Refills: 0 | Status: SHIPPED | OUTPATIENT
Start: 2020-07-07 | End: 2020-10-12

## 2020-08-27 RX ORDER — PRAVASTATIN SODIUM 40 MG
TABLET ORAL
Qty: 90 TABLET | Refills: 0 | Status: SHIPPED | OUTPATIENT
Start: 2020-08-27 | End: 2020-11-23

## 2020-09-11 ENCOUNTER — APPOINTMENT (OUTPATIENT)
Dept: CT IMAGING | Facility: HOSPITAL | Age: 75
End: 2020-09-11

## 2020-09-11 ENCOUNTER — HOSPITAL ENCOUNTER (EMERGENCY)
Facility: HOSPITAL | Age: 75
Discharge: HOME | End: 2020-09-11
Attending: EMERGENCY MEDICINE | Admitting: EMERGENCY MEDICINE

## 2020-09-11 ENCOUNTER — APPOINTMENT (OUTPATIENT)
Dept: GENERAL RADIOLOGY | Facility: HOSPITAL | Age: 75
End: 2020-09-11

## 2020-09-11 VITALS
HEART RATE: 86 BPM | RESPIRATION RATE: 18 BRPM | TEMPERATURE: 97.8 F | WEIGHT: 195 LBS | HEIGHT: 67 IN | OXYGEN SATURATION: 100 % | BODY MASS INDEX: 30.61 KG/M2 | SYSTOLIC BLOOD PRESSURE: 133 MMHG | DIASTOLIC BLOOD PRESSURE: 69 MMHG

## 2020-09-11 DIAGNOSIS — S09.90XA CLOSED HEAD INJURY, INITIAL ENCOUNTER: ICD-10-CM

## 2020-09-11 DIAGNOSIS — S76.012A HIP STRAIN, LEFT, INITIAL ENCOUNTER: ICD-10-CM
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
## 2020-09-11 DIAGNOSIS — M25.562 ACUTE PAIN OF LEFT KNEE: ICD-10-CM

## 2020-09-11 PROCEDURE — 72125 CT NECK SPINE W/O DYE: CPT

## 2020-09-11 PROCEDURE — 70450 CT HEAD/BRAIN W/O DYE: CPT

## 2020-09-11 PROCEDURE — 99283 EMERGENCY DEPT VISIT LOW MDM: CPT

## 2020-09-11 PROCEDURE — 73502 X-RAY EXAM HIP UNI 2-3 VIEWS: CPT

## 2020-09-11 PROCEDURE — 73562 X-RAY EXAM OF KNEE 3: CPT

## 2020-09-11 RX ORDER — ASPIRIN 81 MG/1
81 TABLET, CHEWABLE ORAL DAILY
COMMUNITY
End: 2021-03-02

## 2020-09-11 RX ORDER — LIDOCAINE 50 MG/G
1 PATCH TOPICAL EVERY 24 HOURS
Qty: 10 EACH | Refills: 0 | Status: SHIPPED | OUTPATIENT
Start: 2020-09-11 | End: 2021-03-02

## 2020-09-11 RX ORDER — PHENOBARBITAL 64.8 MG/1
64.8 TABLET ORAL 2 TIMES DAILY
COMMUNITY
End: 2020-11-30

## 2020-09-11 RX ORDER — TRAMADOL HYDROCHLORIDE 50 MG/1
50 TABLET ORAL EVERY 8 HOURS PRN
Qty: 9 TABLET | Refills: 0 | Status: SHIPPED | OUTPATIENT
Start: 2020-09-11 | End: 2021-03-02

## 2020-09-11 NOTE — ED PROVIDER NOTES
EMERGENCY DEPARTMENT ENCOUNTER    Room Number:  07/07  Date of encounter:  9/11/2020  PCP: Duglas Rock MD  Historian: patient   Full history not obtainable due to: none    HPI:  Chief Complaint: Fall, headache     Context: Mariela Ruiz is a 75 y.o. female who presents to the ED c/o fall onset this am just pta. Reports she was standing making her breakfast when her legs gave out. She hit her head. No LOC. Complaining now of posterior headache, constant, mild in nature. Achy in nature. Non radiating. Associated left hip pain post fall. EMS was called and transported pt to hospital. She has not tried to stand since fall.'    Not anticoagulated. Has gait issues and uses a cane. Falls frequently and wants to see a new orthopedic doctor. She had multiple surgeries on the left hip and left knee and also a R THR. She was not happy with her last surgery by Dr Cesar.           PAST MEDICAL HISTORY    Active Ambulatory Problems     Diagnosis Date Noted   • Seizure disorder (CMS/HCC)    • Hyperlipidemia    • Vitamin D insufficiency    • Age-related osteoporosis without current pathological fracture    • Sleep apnea    • Chronic venous insufficiency    • Postsurgical hypothyroidism    • Chronic fatigue syndrome 11/07/2016   • Gastroesophageal reflux disease 11/07/2016   • Dupuytren's contracture 11/07/2016   • History of biliary T-tube placement 11/07/2016   • History of partial thyroidectomy 10/04/2012   • Mitral valve prolapse 11/07/2016   • Multinodular goiter 11/07/2016   • Right fascicular block 11/07/2016   • Restless legs syndrome 11/07/2016   • History of cardiac catheterization 11/07/2016   • Urge incontinence of urine 11/07/2016   • Left knee pain 11/15/2016   • Ligamentous laxity of knee 12/02/2016   • DJD (degenerative joint disease) of knee 12/24/2016   • Adverse drug effect, subsequent encounter 11/07/2017   • Abnormal blood level of chromium 04/05/2018   • Abnormal blood level of cobalt 04/05/2018   •  Family history of diabetes mellitus 09/13/2018   • Wasp sting 08/16/2019   • Cellulitis of right upper extremity 08/17/2019   • Closed fracture of neck of right femur (CMS/[facility]) 09/13/2019   • Thrombocytopenia (CMS/[facility]) 09/16/2019   • . 09/16/2019   • Fever 09/16/2019     Resolved Ambulatory Problems     Diagnosis Date Noted   • Pruritic rash 10/18/2017   • Urine retention 09/16/2019     Past Medical History:   Diagnosis Date   • History of staph infection    • History of transfusion    • Nontoxic multinodular goiter    • Osteoporosis    • Restless leg syndrome          PAST SURGICAL HISTORY  Past Surgical History:   Procedure Laterality Date   • APPENDECTOMY     • CARDIAC CATHETERIZATION      NORMAL    • COLONOSCOPY  08/17/2017    Normal except for anal fissure.  Dr. Brandt.  [facility].   • KNEE MINI REVISION Left 11/15/2016    Procedure: LEFT KNEE POLY CHANGE WITH HI POST STABILIZER;  Surgeon: Pablito Claudio MD;  Location: [facility];  Service:    • KNEE MINI REVISION Left 12/13/2016    Procedure: LT KNEE REMOVAL/REPLACEMENT LOCKING PIN ;  Surgeon: Pablito Claudio MD;  Location: [facility] OR;  Service:    • MAMMO FINE NEEDLE ASPIRATION UNILATERAL      RIGHT THYROID NODULE, 2009 BENIGN    • MT REVISE KNEE JOINT REPLACE,1 PART Left 2/20/2017    Procedure: LT KNEE REMOVAL ANTIBIOTIC SPACER AND KNEE REVISION;  Surgeon: Christiano Cesar MD;  Location: [facility] OR;  Service: Orthopedics   • MT TOTAL KNEE ARTHROPLASTY Left 12/24/2016    Procedure: LEFT KNEE WASHOUT WITH POLY CHANGE;  Surgeon: Christiano Cesar MD;  Location: [facility] OR;  Service: Orthopedics   • REPLACEMENT TOTAL KNEE Left    • THYROIDECTOMY, PARTIAL      1979 LEFT LOBECTOMY AND ISTHMECTOMY    • TOTAL ABDOMINAL HYSTERECTOMY WITH SALPINGO OOPHORECTOMY     • TOTAL HIP ARTHROPLASTY Left    • TOTAL HIP ARTHROPLASTY Right 9/14/2019    Procedure: TOTAL HIP ARTHROPLASTY ANTERIOR WITH HANA TABLE;  Surgeon: Christiano Cesar II, MD;   Location: [facility] OR;  Service: Orthopedics   • TOTAL KNEE  PROSTHESIS REMOVAL W/ SPACER INSERTION Left 12/29/2016    Procedure: LT KNEE IMPLANT REMOVAL WITH INSERTION OF SPACER ;  Surgeon: Christiano Cesar MD;  Location: [facility] OR;  Service:          FAMILY HISTORY  Family History   Problem Relation Age of Onset   • Heart disease Father    • Diabetes Sister    • Hypertension Sister    • Hyperlipidemia Sister    • Obesity Sister          SOCIAL HISTORY  Social History     Socioeconomic History   • Marital status:      Spouse name: Not on file   • Number of children: Not on file   • Years of education: Not on file   • Highest education level: Not on file   Tobacco Use   • Smoking status: Never Smoker   • Smokeless tobacco: Never Used   Substance and Sexual Activity   • Alcohol use: No   • Drug use: No   • Sexual activity: Defer         ALLERGIES  Clindamycin/lincomycin; Sulfa antibiotics; Duricef [cefadroxil]; and Keflex [cephalexin]        REVIEW OF SYSTEMS  Review of Systems   All systems reviewed and marked as negative except as listed in HPI       PHYSICAL EXAM    I have reviewed the triage vital signs and nursing notes.    ED Triage Vitals [09/11/20 0810]   Temp Heart Rate Resp BP SpO2   97.8 °F (36.6 °C) 86 18 140/79 99 %      Temp src Heart Rate Source Patient Position BP Location FiO2 (%)   Tympanic Monitor Lying -- --       GENERAL: alert well developed, well nourished in no distress  HENT: NCAT, neck supple, trachea midline  EYES: no scleral icterus, PERRL, normal conjunctiva  CV: regular rhythm, regular rate, no murmur  RESPIRATORY: unlabored effort, CTAB  ABDOMEN: soft, non-tender, non-distended, bowel sounds present  MUSCULOSKELETAL: no gross deformity. Tenderness of the left inguinal region. No tenderness of the bony pelvis or hip. Left knee linear scarring secondary to TKR, no focal tenderness. Pedal pulses palpable. Vertebral spine non tender.   NEURO: alert,  sensory and motor  function of extremities grossly intact, speech clear, mental status normal/baseline  SKIN: warm, dry, no rash  PSYCH:  Appropriate mood and affect    Vital signs and nursing notes reviewed.      RADIOLOGY  Xr Knee 3 View Left    Result Date: 9/11/2020  EXAMINATIONS: THREE VIEWS OF LEFT KNEE WITH PELVIS AND LEFT HIP RADIOGRAPHS  HISTORY: 75-year-old female with a history of left knee pain and left hip pain after recent fall  FINDINGS: AP, patellar sunrise and lateral radiographs of the left knee were obtained and demonstrate evidence of prior total left knee arthroplasty with replacement of the femoral condylar and tibial plateau components. There is no evidence for a fracture. A joint effusion is not appreciated. Limited full extension of the knee is appreciated on the lateral radiograph but otherwise the alignment is within normal limits.  AP pelvis with AP and lateral left hip radiographs were obtained and demonstrate normal alignment of the arthroplastic bilateral femoral heads. Osteopenia is noted. There is no evidence for fracture or malalignment.      There is no evidence for an acute abnormality of the left knee, pelvis or left hip.      Ct Head Without Contrast    Result Date: 9/11/2020  CT HEAD AND CERVICAL SPINE WITHOUT CONTRAST  CLINICAL HISTORY: Closed head injury. Headache and neck pain.  TECHNIQUE: CT scan of the head was obtained with 2 mm axial bone algorithm and 3 mm axial soft tissue algorithm images. No intravenous contrast was administered.  FINDINGS:  There is no evidence for a calvarial fracture. There is no evidence for an acute extra-axial hemorrhage. The ventricles, sulci, and cisterns are age appropriate. The basal ganglia and thalami are unremarkable in appearance. There are mild-to-moderate changes of chronic small vessel ischemic phenomena. The posterior fossa structures are within normal limits.       No evidence for acute traumatic intracranial pathology.  TECHNIQUE: CT scan of the  cervical spine was obtained with 1 mm axial images. Sagittal and coronal reconstructed images were obtained.  FINDINGS:  There is no evidence for acute fracture or bony malalignment involving the cervical spine.  At C2-C3, there is no significant degree of canal or foraminal narrowing.  At C3-C4, there is a disc bulge which results in a mild degree of canal narrowing.  At C4-C5, disc osteophyte complex results in a mild degree of canal stenosis.  At C5-C6, a disc osteophyte results in a mild-to-moderate degree of canal stenosis. Additionally, there is an area of ossification along the posterior aspect of the cervical spinal canal at the C5-C6 level measuring up to approximately 5 mm in greatest diameter that is compatible with some ligamentous calcification. This calcification also contributes to the canal narrowing.  At C6-C7 and C7-T1, there is no significant degree of bony canal or foraminal stenosis.  There is a large indeterminate hypodense nodule within the right lobe of the thyroid which measures up to approximately 2.4 x 3.4 cm in greatest axial dimensions. Further evaluation could be performed with a thyroid ultrasound along with potential tissue sampling. The left lobe of the thyroid is either atrophic or surgically absent.  IMPRESSION:  Multilevel degenerative phenomena are noted within the cervical spine as discussed in detail above. However, there is no evidence for acute fracture or bony malalignment.  Incidental note is made of a large hypodense nodule within the right lobe of the thyroid which measures up to approximately 3.4 x 2.4 cm in greatest axial dimensions that is indeterminate in nature. Further evaluation could be performed with thyroid ultrasound along with potential tissue sampling. The left lobe of the thyroid is markedly atrophic or surgically resected.  These findings and recommendations were discussed with Danielle Elizondo on 09/11/2020 at approximately 10:09 PM.  Radiation dose  reduction techniques were utilized, including automated exposure control and exposure modulation based on body size.  This report was finalized on 9/11/2020 12:28 PM by Dr. Americo Hernandez M.D.      Ct Cervical Spine Without Contrast    Result Date: 9/11/2020  CT HEAD AND CERVICAL SPINE WITHOUT CONTRAST  CLINICAL HISTORY: Closed head injury. Headache and neck pain.  TECHNIQUE: CT scan of the head was obtained with 2 mm axial bone algorithm and 3 mm axial soft tissue algorithm images. No intravenous contrast was administered.  FINDINGS:  There is no evidence for a calvarial fracture. There is no evidence for an acute extra-axial hemorrhage. The ventricles, sulci, and cisterns are age appropriate. The basal ganglia and thalami are unremarkable in appearance. There are mild-to-moderate changes of chronic small vessel ischemic phenomena. The posterior fossa structures are within normal limits.       No evidence for acute traumatic intracranial pathology.  TECHNIQUE: CT scan of the cervical spine was obtained with 1 mm axial images. Sagittal and coronal reconstructed images were obtained.  FINDINGS:  There is no evidence for acute fracture or bony malalignment involving the cervical spine.  At C2-C3, there is no significant degree of canal or foraminal narrowing.  At C3-C4, there is a disc bulge which results in a mild degree of canal narrowing.  At C4-C5, disc osteophyte complex results in a mild degree of canal stenosis.  At C5-C6, a disc osteophyte results in a mild-to-moderate degree of canal stenosis. Additionally, there is an area of ossification along the posterior aspect of the cervical spinal canal at the C5-C6 level measuring up to approximately 5 mm in greatest diameter that is compatible with some ligamentous calcification. This calcification also contributes to the canal narrowing.  At C6-C7 and C7-T1, there is no significant degree of bony canal or foraminal stenosis.  There is a large indeterminate hypodense  nodule within the right lobe of the thyroid which measures up to approximately 2.4 x 3.4 cm in greatest axial dimensions. Further evaluation could be performed with a thyroid ultrasound along with potential tissue sampling. The left lobe of the thyroid is either atrophic or surgically absent.  IMPRESSION:  Multilevel degenerative phenomena are noted within the cervical spine as discussed in detail above. However, there is no evidence for acute fracture or bony malalignment.  Incidental note is made of a large hypodense nodule within the right lobe of the thyroid which measures up to approximately 3.4 x 2.4 cm in greatest axial dimensions that is indeterminate in nature. Further evaluation could be performed with thyroid ultrasound along with potential tissue sampling. The left lobe of the thyroid is markedly atrophic or surgically resected.  These findings and recommendations were discussed with Danielle Elizondo on 09/11/2020 at approximately 10:09 PM.  Radiation dose reduction techniques were utilized, including automated exposure control and exposure modulation based on body size.  This report was finalized on 9/11/2020 12:28 PM by Dr. Americo Hernandez M.D.      Xr Hip With Or Without Pelvis 2 - 3 View Left    Result Date: 9/11/2020  EXAMINATIONS: THREE VIEWS OF LEFT KNEE WITH PELVIS AND LEFT HIP RADIOGRAPHS  HISTORY: 75-year-old female with a history of left knee pain and left hip pain after recent fall  FINDINGS: AP, patellar sunrise and lateral radiographs of the left knee were obtained and demonstrate evidence of prior total left knee arthroplasty with replacement of the femoral condylar and tibial plateau components. There is no evidence for a fracture. A joint effusion is not appreciated. Limited full extension of the knee is appreciated on the lateral radiograph but otherwise the alignment is within normal limits.  AP pelvis with AP and lateral left hip radiographs were obtained and demonstrate normal alignment  of the arthroplastic bilateral femoral heads. Osteopenia is noted. There is no evidence for fracture or malalignment.      There is no evidence for an acute abnormality of the left knee, pelvis or left hip.        I ordered the above noted radiological studies. Independently reviewed by me and discussed with radiologist.  See dictation above for official radiology interpretation.      PROCEDURES    Procedures        MEDICATIONS GIVEN IN ER    Medications - No data to display      PROGRESS, DATA ANALYSIS, CONSULTS, AND MEDICAL DECISION MAKING    All labs have been independently reviewed by me.  All radiology studies have been reviewed by me.   EKG's independently reviewed by me.  Discussion below represents my analysis of pertinent findings related to patient's condition, differential diagnosis, treatment plan and final disposition.      ED Course as of Sep 11 1503   Fri Sep 11, 2020   1012 I discussed the patient with Dr. Lin, radiologist, CT head and cervical spine are negative acute.  She does have a large nodule on the thyroid which will require additional evaluation.    [JS]   1015 I viewed the x-ray on pack system.  My findings are status post total knee replacement and internal fixation of the tibia which appears intact.    [JS]   1106 I discussed patient's work-up and imaging results with her and her daughter.  Plan to ambulate patient with walker, which she does have access to at home although she typically uses a cane.     [JS]   1112 The patient ambulated with ED tech and walker with minimal assistance.  She was then able to independently ambulate without staff assistance and only use a walker.  We discussed pain medication and use sparingly given increased risk for falls.  I discussed lidocaine patches which may also aid in her pain control.  She is to follow-up with orthopedics.  Return for worsening symptoms or new concerns.  She is understanding the plan.  Her daughter is also agreeable to plan.     [JS]   Harsh Lara queried and reviewed.#[id number].  Patient has a prescription intermittently for phenobarbital but no additional encounters.    [JS]      ED Course User Index  [JS] Danielle Elizondo APRN       AS OF 15:03 VITALS:        BP - 133/69  HR - 86  TEMP - 97.8 °F (36.6 °C) (Tympanic)  02 SATS - 100%          DIAGNOSIS  Final diagnoses:   Fall from standing, initial encounter   Hip strain, left, initial encounter   Closed head injury, initial encounter   Acute pain of left knee         DISPOSITION  Discharge     Pt masked in first look. I wore a surgical mask and protective eye wear throughout my encounters with the pt. I performed hand hygiene on entry into the pt room and upon exit.     Dictated utilizing Dragon dictation:  Much of this encounter note is an electronic transcription/translation of spoken language to printed text. The electronic translation of spoken language may permit erroneous, or at times, nonsensical words or phrases to be inadvertently transcribed; Although I have reviewed the note for such errors, some may still exist.       Danielle Elizondo, REMY  09/11/20 [id number]

## 2020-09-11 NOTE — ED PROVIDER NOTES
Pt presents to the ED c/o  fall when her legs gave out on her while making breakfast today.  Struck her head but no LOC.  Some left hip pain as well.     On exam,   General: Awake, alert, no acute distress  HEENT: EOMI  Pulm: Symmetric chest rise, nonlabored breathing  CV: Regular rate and rhythm  GI: Non-distended  MSK: No deformity  Skin: Warm, dry  Neuro: Alert and oriented x 3, moving all extremities, no focal deficits, ambulating with walker without significant difficulty  Psych: Calm, cooperative    Vital signs and nursing notes reviewed.       Surgical mask, protective eye goggles, and gloves used during this encounter. Patient in surgical mask.      Plan:   ED Course as of Sep 11 1338   Fri Sep 11, 2020   1012 I discussed the patient with Dr. Lin, radiologist, CT head and cervical spine are negative acute.  She does have a large nodule on the thyroid which will require additional evaluation.    [JS]   1015 I viewed the x-ray on pack system.  My findings are status post total knee replacement and internal fixation of the tibia which appears intact.    [JS]   1106 I discussed patient's work-up and imaging results with her and her daughter.  Plan to ambulate patient with walker, which she does have access to at home although she typically uses a cane.     [JS]   1112 The patient ambulated with ED tech and walker with minimal assistance.  She was then able to independently ambulate without staff assistance and only use a walker.  We discussed pain medication and use sparingly given increased risk for falls.  I discussed lidocaine patches which may also aid in her pain control.  She is to follow-up with orthopedics.  Return for worsening symptoms or new concerns.  She is understanding the plan.  Her daughter is also agreeable to plan.    [JS]   1113 Marco queried and reviewed.#[id number].  Patient has a prescription intermittently for phenobarbital but no additional encounters.    [JS]      ED Course User  Index  [JS] Danielle Elizondo APRN     Plan for discharge, has ambulated well with walker.  No acute emergent abnormalities.  PCP follow-up as needed.  ED return for worsening symptoms as needed.     Attestation:  The TORSTEN and I have discussed this patient's history, physical exam, and treatment plan.  I have reviewed the documentation and personally had a face to face interaction with the patient. I affirm the documentation and agree with the treatment and plan.  The attached note describes my personal findings.          Amador Alva MD  09/11/20 [id number]

## 2020-09-11 NOTE — ED TRIAGE NOTES
"Pt was cooking when \"legs gave out\" and she landed on her L hip and hit her head on floor with no LOC. Lives at home with   "

## 2020-09-11 NOTE — ED NOTES
Patient in mask. This RN in appropriate PPE - including mask, goggles, and gloves during all of patient care.        Ca Song, RN  09/11/20 0885

## 2020-09-14 ENCOUNTER — TRANSCRIBE ORDERS (OUTPATIENT)
Dept: ADMINISTRATIVE | Facility: HOSPITAL | Age: 75
End: 2020-09-14

## 2020-09-14 DIAGNOSIS — R10.32 GROIN PAIN, LEFT: ICD-10-CM

## 2020-09-14 DIAGNOSIS — M25.552 PELVIC JOINT PAIN, LEFT: ICD-10-CM

## 2020-09-17 ENCOUNTER — HOSPITAL ENCOUNTER (OUTPATIENT)
Dept: CT IMAGING | Facility: HOSPITAL | Age: 75
Discharge: HOME | End: 2020-09-17
Admitting: ORTHOPAEDIC SURGERY

## 2020-09-17 DIAGNOSIS — M25.552 PELVIC JOINT PAIN, LEFT: ICD-10-CM

## 2020-09-17 DIAGNOSIS — R10.32 GROIN PAIN, LEFT: ICD-10-CM

## 2020-09-17 PROCEDURE — 72192 CT PELVIS W/O DYE: CPT

## 2020-09-18 ENCOUNTER — APPOINTMENT (OUTPATIENT)
Dept: CT IMAGING | Facility: HOSPITAL | Age: 75
End: 2020-09-18

## 2020-10-12 DIAGNOSIS — E89.0 POSTSURGICAL HYPOTHYROIDISM: ICD-10-CM

## 2020-10-12 RX ORDER — LEVOTHYROXINE SODIUM 50 MCG
TABLET ORAL
Qty: 90 TABLET | Refills: 2 | Status: SHIPPED | OUTPATIENT
Start: 2020-10-12 | End: 2021-07-16

## 2020-11-23 RX ORDER — PRAVASTATIN SODIUM 40 MG
TABLET ORAL
Qty: 90 TABLET | Refills: 1 | Status: SHIPPED | OUTPATIENT
Start: 2020-11-23 | End: 2021-05-23

## 2020-11-30 DIAGNOSIS — G40.909 SEIZURE DISORDER (HCC): Primary | ICD-10-CM

## 2020-12-01 RX ORDER — PHENOBARBITAL 64.8 MG/1
TABLET ORAL
Qty: 270 TABLET | Refills: 0 | Status: SHIPPED | OUTPATIENT
Start: 2020-12-01 | End: 2021-02-25

## 2021-01-21 ENCOUNTER — APPOINTMENT (OUTPATIENT)
Dept: GENERAL RADIOLOGY | Facility: HOSPITAL | Age: 76
End: 2021-01-21

## 2021-01-21 ENCOUNTER — HOSPITAL ENCOUNTER (EMERGENCY)
Facility: HOSPITAL | Age: 76
Discharge: HOME | End: 2021-01-21
Attending: EMERGENCY MEDICINE | Admitting: EMERGENCY MEDICINE

## 2021-01-21 VITALS
RESPIRATION RATE: 18 BRPM | HEART RATE: 90 BPM | WEIGHT: 195 LBS | OXYGEN SATURATION: 98 % | DIASTOLIC BLOOD PRESSURE: 75 MMHG | SYSTOLIC BLOOD PRESSURE: 115 MMHG | TEMPERATURE: 98.9 F | HEIGHT: 70 IN | BODY MASS INDEX: 27.92 KG/M2

## 2021-01-21 DIAGNOSIS — M25.552 LEFT HIP PAIN: ICD-10-CM
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
## 2021-01-21 PROCEDURE — 73502 X-RAY EXAM HIP UNI 2-3 VIEWS: CPT

## 2021-01-21 PROCEDURE — 73560 X-RAY EXAM OF KNEE 1 OR 2: CPT

## 2021-01-21 PROCEDURE — 73552 X-RAY EXAM OF FEMUR 2/>: CPT

## 2021-01-21 PROCEDURE — 99283 EMERGENCY DEPT VISIT LOW MDM: CPT

## 2021-01-21 NOTE — ED PROVIDER NOTES
Pt presents to the ED c/o  left leg pain after falling yesterday.     On exam,   Awake and alert, no acute distress.  No scalp hematoma.  Pupils 3 mm reactive bilaterally.     Plan: Plain films reviewed and reassuring.  Patient is able to ambulate independently therefore will be discharged home with pain control and orthopedic follow-up.      I wore a surgical mask, gloves, and eye protection during this patient encounter.  Patient also wearing a surgical mask.  Hand hygeine performed before and after seeing the patient.     Attestation:  The TORSTEN and I have discussed this patient's history, physical exam, and treatment plan.  I have reviewed the documentation and personally had a face to face interaction with the patient. I affirm the documentation and agree with the treatment and plan.  The attached note describes my personal findings.            Garrison Woo MD  01/21/21 1533

## 2021-01-21 NOTE — ED PROVIDER NOTES
EMERGENCY DEPARTMENT ENCOUNTER    Room Number:  05/05  Date of encounter:  1/21/2021  PCP: Duglas Rock MD  Historian: patient   Full history not obtainable due to: none     HPI:  Chief Complaint: Fall, left leg pain     Context: Mariela Ruiz is a 75 y.o. female who presents to the ED c/o left leg pain onset after falling yesterday.  Patient states she was putting her groceries in her car when her left leg gave out on her.  She states she had multiple surgeries on her bilateral hips and left leg and her left leg tends to give out on her from time to time.  It has caused her to fall in the past.  She denies hitting her head or passing out.  She reports intermittent left proximal leg and hip pain since the fall.  Bearing weight worsens the pain.  The pain can be severe at times with weightbearing.  Not described as radiating.  She denies any neck or back pain.  She states she typically uses a cane to ambulate but is had to use a walker and has not walks very well over the last 24 hours.  She is followed by Dr. Kenyon Cesar with orthopedics.  She is not anticoagulated.      PAST MEDICAL HISTORY    Active Ambulatory Problems     Diagnosis Date Noted   • Seizure disorder (CMS/HCC)    • Hyperlipidemia    • Vitamin D insufficiency    • Age-related osteoporosis without current pathological fracture    • Sleep apnea    • Chronic venous insufficiency    • Postsurgical hypothyroidism    • Chronic fatigue syndrome 11/07/2016   • Gastroesophageal reflux disease 11/07/2016   • Dupuytren's contracture 11/07/2016   • History of biliary T-tube placement 11/07/2016   • History of partial thyroidectomy 10/04/2012   • Mitral valve prolapse 11/07/2016   • Multinodular goiter 11/07/2016   • Right fascicular block 11/07/2016   • Restless legs syndrome 11/07/2016   • History of cardiac catheterization 11/07/2016   • Urge incontinence of urine 11/07/2016   • Left knee pain 11/15/2016   • Ligamentous laxity of knee 12/02/2016   • DJD  (degenerative joint disease) of knee 12/24/2016   • Adverse drug effect, subsequent encounter 11/07/2017   • Abnormal blood level of chromium 04/05/2018   • Abnormal blood level of cobalt 04/05/2018   • Family history of diabetes mellitus 09/13/2018   • Wasp sting 08/16/2019   • Cellulitis of right upper extremity 08/17/2019   • Closed fracture of neck of right femur (CMS/[facility]) 09/13/2019   • Thrombocytopenia (CMS/[facility]) 09/16/2019   • . 09/16/2019   • Fever 09/16/2019     Resolved Ambulatory Problems     Diagnosis Date Noted   • Pruritic rash 10/18/2017   • Urine retention 09/16/2019     Past Medical History:   Diagnosis Date   • History of staph infection    • History of transfusion    • Nontoxic multinodular goiter    • Osteoporosis    • Restless leg syndrome          PAST SURGICAL HISTORY  Past Surgical History:   Procedure Laterality Date   • APPENDECTOMY     • CARDIAC CATHETERIZATION      NORMAL    • COLONOSCOPY  08/17/2017    Normal except for anal fissure.  Dr. Brandt.  [facility].   • KNEE MINI REVISION Left 11/15/2016    Procedure: LEFT KNEE POLY CHANGE WITH HI POST STABILIZER;  Surgeon: Pablito Claudio MD;  Location: [facility];  Service:    • KNEE MINI REVISION Left 12/13/2016    Procedure: LT KNEE REMOVAL/REPLACEMENT LOCKING PIN ;  Surgeon: Pablito Caludio MD;  Location: [facility] OR;  Service:    • MAMMO FINE NEEDLE ASPIRATION UNILATERAL      RIGHT THYROID NODULE, 2009 BENIGN    • IA REVISE KNEE JOINT REPLACE,1 PART Left 2/20/2017    Procedure: LT KNEE REMOVAL ANTIBIOTIC SPACER AND KNEE REVISION;  Surgeon: Christiano Cesar MD;  Location: [facility] OR;  Service: Orthopedics   • IA TOTAL KNEE ARTHROPLASTY Left 12/24/2016    Procedure: LEFT KNEE WASHOUT WITH POLY CHANGE;  Surgeon: Christiano Cesar MD;  Location: [facility] OR;  Service: Orthopedics   • REPLACEMENT TOTAL KNEE Left    • THYROIDECTOMY, PARTIAL      1979 LEFT LOBECTOMY AND ISTHMECTOMY    • TOTAL ABDOMINAL HYSTERECTOMY WITH  SALPINGO OOPHORECTOMY     • TOTAL HIP ARTHROPLASTY Left    • TOTAL HIP ARTHROPLASTY Right 9/14/2019    Procedure: TOTAL HIP ARTHROPLASTY ANTERIOR WITH HANA TABLE;  Surgeon: Christiano Cesar II, MD;  Location: [facility] OR;  Service: Orthopedics   • TOTAL KNEE  PROSTHESIS REMOVAL W/ SPACER INSERTION Left 12/29/2016    Procedure: LT KNEE IMPLANT REMOVAL WITH INSERTION OF SPACER ;  Surgeon: Christiano Cesar MD;  Location: [facility] OR;  Service:          FAMILY HISTORY  Family History   Problem Relation Age of Onset   • Heart disease Father    • Diabetes Sister    • Hypertension Sister    • Hyperlipidemia Sister    • Obesity Sister          SOCIAL HISTORY  Social History     Socioeconomic History   • Marital status:      Spouse name: Not on file   • Number of children: Not on file   • Years of education: Not on file   • Highest education level: Not on file   Tobacco Use   • Smoking status: Never Smoker   • Smokeless tobacco: Never Used   Substance and Sexual Activity   • Alcohol use: No   • Drug use: No   • Sexual activity: Defer         ALLERGIES  Clindamycin/lincomycin, Sulfa antibiotics, Duricef [cefadroxil], and Keflex [cephalexin]        REVIEW OF SYSTEMS  Review of Systems   All systems reviewed and marked as negative except as listed in HPI       PHYSICAL EXAM    I have reviewed the triage vital signs and nursing notes.    ED Triage Vitals [01/21/21 1030]   Temp Heart Rate Resp BP SpO2   98.7 °F (37.1 °C) 100 18 119/79 95 %      Temp src Heart Rate Source Patient Position BP Location FiO2 (%)   Tympanic -- -- -- --       GENERAL: Alert well developed, well nourished in no distress  HENT: NCAT, neck supple, trachea midline  EYES: no scleral icterus, PERRL, normal conjunctivae  CV: regular rhythm, regular rate, no murmur  RESPIRATORY: unlabored effort, CTAB  ABDOMEN: soft, nontender, nondistended, bowel sounds present  MUSCULOSKELETAL: no gross deformity.  There is tenderness over the left  greater trochanter without step-off.  Range of motion of the left lower extremity is preserved at the hip and knee.  There is mild tenderness of the proximal thigh and left knee.  No tenderness of the CT or L-spine.  NEURO: alert,  sensory and motor function of extremities grossly intact, speech clear, mental status normal/baseline  SKIN: warm, dry, no rash  PSYCH:  Appropriate mood and affect    Vital signs and nursing notes reviewed.          RADIOLOGY  Xr Femur 2 View Left    Result Date: 1/21/2021  TWO-VIEW LEFT HIP AND 1-VIEW AP PELVIS, 2-VIEW LEFT FEMUR, 2-VIEW LEFT KNEE, 2-VIEW RIGHT KNEE  HISTORY: Fell. Pain in all these regions.  FINDINGS: There are bilateral total hip replacements. No acute fracture is seen involving the left hip or left femur. There is a total knee replacement involving the left knee. There may be a small joint effusion but no fracture is seen.  Views of the right knee demonstrate very severe degenerative change particularly involving the medial compartment with joint space narrowing and some articular irregularity. There is no fracture or joint effusion.        Xr Knee 1 Or 2 View Bilateral    Result Date: 1/21/2021  TWO-VIEW LEFT HIP AND 1-VIEW AP PELVIS, 2-VIEW LEFT FEMUR, 2-VIEW LEFT KNEE, 2-VIEW RIGHT KNEE  HISTORY: Fell. Pain in all these regions.  FINDINGS: There are bilateral total hip replacements. No acute fracture is seen involving the left hip or left femur. There is a total knee replacement involving the left knee. There may be a small joint effusion but no fracture is seen.  Views of the right knee demonstrate very severe degenerative change particularly involving the medial compartment with joint space narrowing and some articular irregularity. There is no fracture or joint effusion.        Xr Hip With Or Without Pelvis 2 - 3 View Left    Result Date: 1/21/2021  TWO-VIEW LEFT HIP AND 1-VIEW AP PELVIS, 2-VIEW LEFT FEMUR, 2-VIEW LEFT KNEE, 2-VIEW RIGHT KNEE  HISTORY: Fell.  Pain in all these regions.  FINDINGS: There are bilateral total hip replacements. No acute fracture is seen involving the left hip or left femur. There is a total knee replacement involving the left knee. There may be a small joint effusion but no fracture is seen.  Views of the right knee demonstrate very severe degenerative change particularly involving the medial compartment with joint space narrowing and some articular irregularity. There is no fracture or joint effusion.          I ordered the above noted radiological studies. Independently reviewed by me and discussed with radiologist.  See dictation above for official radiology interpretation.      PROCEDURES    Procedures        MEDICATIONS GIVEN IN ER    Medications - No data to display      PROGRESS, DATA ANALYSIS, CONSULTS, AND MEDICAL DECISION MAKING    All labs have been independently reviewed by me.  All radiology studies have been reviewed by me.   EKG's independently reviewed by me.  Discussion below represents my analysis of pertinent findings related to patient's condition, differential diagnosis, treatment plan and final disposition.      ED Course as of Jan 21 1342   Thu Jan 21, 2021   1130 I viewed x-ray of the left hip on PACS system.  My findings are status post total hip replacement.  No acute fracture.    [JS]   1140 I viewed x-ray of the left femur on PACS system.  My findings are no fracture.  No apparent loosening of the hardware.    [JS]   1200 Initially the patient said that she wanted to go home but could not walk at all.  States she cannot lift her left leg.  She insists on showing me with a walker.    [JS]   1220 The patient ambulated with a walker unassisted beautifully in the hallway.  Although she did not objectively appear in pain she did say it was painful to bear weight on the right leg.  I did not observe any difficulty with lifting the left leg in between steps.    [JS]   1240 I discussed with the patient plan for discharge.   "Initially when the patient reported she was immobile we discussed the possibility of admission.  As she does appear to walk well with a walker and has had chronic problems with that left leg and hip and the imaging is negative, I feel it is appropriate to discharge her home.  She is agreeable and states \"I want to go home and want to go home do not call Dr. Cesar.\"  She will need close follow-up with Dr. Cesar for repeat evaluation and potentially additional imaging.  She states understanding the plan    [JS]      ED Course User Index  [JS] Danielle Elizondo APRN       AS OF 13:42 EST VITALS:        BP - 119/79  HR - 100  TEMP - 98.7 °F (37.1 °C) (Tympanic)  O2 SATS - 95%         Medication List      No changes were made to your prescriptions during this visit.           DIAGNOSIS  Final diagnoses:   Fall from standing, initial encounter   Left hip pain         DISPOSITION  Discharge    Pt masked in first look. I wore a surgical mask and protective eye wear throughout my encounters with the pt. I performed hand hygiene on entry into the pt room and upon exit.     Dictated utilizing Dragon dictation:  Much of this encounter note is an electronic transcription/translation of spoken language to printed text. The electronic translation of spoken language may permit erroneous, or at times, nonsensical words or phrases to be inadvertently transcribed; Although I have reviewed the note for such errors, some may still exist.     Danielle Elizondo, REMY  01/21/21 9014    "

## 2021-01-21 NOTE — DISCHARGE INSTRUCTIONS
Use walker for ambulation at all times  Follow up with Dr Cesar   Home to rest  Drink plenty of fluids  Return if worse or new concerns   Continue care with your primary care physician and have your blood pressure regularly checked and managed. Normal blood pressure is 120/80.

## 2021-01-21 NOTE — ED NOTES
"Patient to er from home with c/o she fell in garage yesterday resulting in injury to left knee/ thigh.  Reported no blood thinners at this time. Reported patient \" just fell\" reported no dizziness that caused the fall and she denied slipping on the floor. Patient reported she has just be falling a lot.\" patient alert x 4 at baseline. Patient reported she called EMS today because of increasing in pain in leg.\"      Jamaal Bernal RN  01/21/21 1030       Jamaal Bernal RN  01/21/21 1032    "

## 2021-01-21 NOTE — ED NOTES
Pt reports hx of L hip replacement and L knee replacement. C/o pain to L hip, L upper leg, and L knee. Swelling to L upper leg noted. Neurovascular intact. Pt denies hitting head. Pt wearing mask. This RN wearing mask and safety glasses. Reassurance given; call light in reach. Pts breathing even and unlabored. Pt appears in NAD at this time.        Sheyla Garcia, RN  01/21/21 4604

## 2021-01-25 ENCOUNTER — TRANSCRIBE ORDERS (OUTPATIENT)
Dept: ADMINISTRATIVE | Facility: HOSPITAL | Age: 76
End: 2021-01-25

## 2021-01-25 DIAGNOSIS — Z96.652 ARTIFICIAL KNEE JOINT PRESENT, LEFT: ICD-10-CM

## 2021-01-25 DIAGNOSIS — M25.552 PAIN IN LEFT HIP: Primary | ICD-10-CM

## 2021-01-25 DIAGNOSIS — Z96.651 ARTIFICIAL KNEE JOINT PRESENT, RIGHT: ICD-10-CM

## 2021-02-03 ENCOUNTER — HOSPITAL ENCOUNTER (OUTPATIENT)
Dept: NUCLEAR MEDICINE | Facility: HOSPITAL | Age: 76
Discharge: HOME | End: 2021-02-03

## 2021-02-03 ENCOUNTER — TELEPHONE (OUTPATIENT)
Dept: ENDOCRINOLOGY | Age: 76
End: 2021-02-03

## 2021-02-03 DIAGNOSIS — M25.552 PAIN IN LEFT HIP: ICD-10-CM

## 2021-02-03 DIAGNOSIS — Z96.652 ARTIFICIAL KNEE JOINT PRESENT, LEFT: ICD-10-CM

## 2021-02-03 PROCEDURE — 78306 BONE IMAGING WHOLE BODY: CPT

## 2021-02-03 PROCEDURE — 0 TECHNETIUM MEDRONATE KIT: Performed by: ORTHOPAEDIC SURGERY

## 2021-02-03 PROCEDURE — A9503 TC99M MEDRONATE: HCPCS | Performed by: ORTHOPAEDIC SURGERY

## 2021-02-03 RX ORDER — TC 99M MEDRONATE 20 MG/10ML
21.5 INJECTION, POWDER, LYOPHILIZED, FOR SOLUTION INTRAVENOUS
Status: COMPLETED | OUTPATIENT
Start: 2021-02-03 | End: 2021-02-03

## 2021-02-03 RX ADMIN — Medication 21.5 MILLICURIE: at 08:14

## 2021-02-11 ENCOUNTER — TRANSCRIBE ORDERS (OUTPATIENT)
Dept: PREADMISSION TESTING | Facility: HOSPITAL | Age: 76
End: 2021-02-11

## 2021-02-11 DIAGNOSIS — Z01.818 OTHER SPECIFIED PRE-OPERATIVE EXAMINATION: Primary | ICD-10-CM

## 2021-02-17 ENCOUNTER — APPOINTMENT (OUTPATIENT)
Dept: PREADMISSION TESTING | Facility: HOSPITAL | Age: 76
End: 2021-02-17

## 2021-02-19 ENCOUNTER — APPOINTMENT (OUTPATIENT)
Dept: PREADMISSION TESTING | Facility: HOSPITAL | Age: 76
End: 2021-02-19

## 2021-02-20 ENCOUNTER — APPOINTMENT (OUTPATIENT)
Dept: LAB | Facility: HOSPITAL | Age: 76
End: 2021-02-20

## 2021-02-24 DIAGNOSIS — G40.909 SEIZURE DISORDER (HCC): ICD-10-CM

## 2021-02-25 ENCOUNTER — TRANSCRIBE ORDERS (OUTPATIENT)
Dept: PREADMISSION TESTING | Facility: HOSPITAL | Age: 76
End: 2021-02-25

## 2021-02-25 DIAGNOSIS — Z01.818 OTHER SPECIFIED PRE-OPERATIVE EXAMINATION: Primary | ICD-10-CM

## 2021-02-25 RX ORDER — PHENOBARBITAL 64.8 MG/1
TABLET ORAL
Qty: 270 TABLET | Refills: 1 | Status: SHIPPED | OUTPATIENT
Start: 2021-02-25 | End: 2021-08-23

## 2021-03-02 ENCOUNTER — APPOINTMENT (OUTPATIENT)
Dept: PREADMISSION TESTING | Facility: HOSPITAL | Age: 76
End: 2021-03-02

## 2021-03-02 ENCOUNTER — HOSPITAL ENCOUNTER (OUTPATIENT)
Dept: GENERAL RADIOLOGY | Facility: HOSPITAL | Age: 76
Discharge: HOME | End: 2021-03-02

## 2021-03-02 VITALS
OXYGEN SATURATION: 97 % | RESPIRATION RATE: 20 BRPM | BODY MASS INDEX: 29.47 KG/M2 | SYSTOLIC BLOOD PRESSURE: 158 MMHG | HEART RATE: 96 BPM | WEIGHT: 199 LBS | HEIGHT: 69 IN | TEMPERATURE: 98.7 F | DIASTOLIC BLOOD PRESSURE: 84 MMHG

## 2021-03-02 LAB
ALBUMIN SERPL-MCNC: 4.1 G/DL (ref 3.5–5.2)
ALBUMIN/GLOB SERPL: 1.4 G/DL
ALP SERPL-CCNC: 174 U/L (ref 39–117)
ALT SERPL W P-5'-P-CCNC: 12 U/L (ref 1–33)
ANION GAP SERPL CALCULATED.3IONS-SCNC: 9.1 MMOL/L (ref 5–15)
APTT PPP: 29.7 SECONDS (ref 22.7–35.4)
AST SERPL-CCNC: 18 U/L (ref 1–32)
BASOPHILS # BLD AUTO: 0.03 10*3/MM3 (ref 0–0.2)
BASOPHILS NFR BLD AUTO: 0.7 % (ref 0–1.5)
BILIRUB SERPL-MCNC: 0.3 MG/DL (ref 0–1.2)
BILIRUB UR QL STRIP: NEGATIVE
BUN SERPL-MCNC: 10 MG/DL (ref 8–23)
BUN/CREAT SERPL: 13.7 (ref 7–25)
CALCIUM SPEC-SCNC: 9.7 MG/DL (ref 8.6–10.5)
CHLORIDE SERPL-SCNC: 102 MMOL/L (ref 98–107)
CLARITY UR: CLEAR
CO2 SERPL-SCNC: 25.9 MMOL/L (ref 22–29)
COLOR UR: YELLOW
CREAT SERPL-MCNC: 0.73 MG/DL (ref 0.57–1)
DEPRECATED RDW RBC AUTO: 41.4 FL (ref 37–54)
EOSINOPHIL # BLD AUTO: 0 10*3/MM3 (ref 0–0.4)
EOSINOPHIL NFR BLD AUTO: 0 % (ref 0.3–6.2)
ERYTHROCYTE [DISTWIDTH] IN BLOOD BY AUTOMATED COUNT: 12.1 % (ref 12.3–15.4)
GFR SERPL CREATININE-BSD FRML MDRD: 78 ML/MIN/1.73
GLOBULIN UR ELPH-MCNC: 2.9 GM/DL
GLUCOSE SERPL-MCNC: 97 MG/DL (ref 65–99)
GLUCOSE UR STRIP-MCNC: NEGATIVE MG/DL
HCT VFR BLD AUTO: 39.3 % (ref 34–46.6)
HGB BLD-MCNC: 12.8 G/DL (ref 12–15.9)
HGB UR QL STRIP.AUTO: NEGATIVE
IMM GRANULOCYTES # BLD AUTO: 0.01 10*3/MM3 (ref 0–0.05)
IMM GRANULOCYTES NFR BLD AUTO: 0.2 % (ref 0–0.5)
INR PPP: 0.99 (ref 0.9–1.1)
KETONES UR QL STRIP: NEGATIVE
LEUKOCYTE ESTERASE UR QL STRIP.AUTO: NEGATIVE
LYMPHOCYTES # BLD AUTO: 1.3 10*3/MM3 (ref 0.7–3.1)
LYMPHOCYTES NFR BLD AUTO: 31.1 % (ref 19.6–45.3)
MCH RBC QN AUTO: 30.1 PG (ref 26.6–33)
MCHC RBC AUTO-ENTMCNC: 32.6 G/DL (ref 31.5–35.7)
MCV RBC AUTO: 92.5 FL (ref 79–97)
MONOCYTES # BLD AUTO: 0.35 10*3/MM3 (ref 0.1–0.9)
MONOCYTES NFR BLD AUTO: 8.4 % (ref 5–12)
NEUTROPHILS NFR BLD AUTO: 2.49 10*3/MM3 (ref 1.7–7)
NEUTROPHILS NFR BLD AUTO: 59.6 % (ref 42.7–76)
NITRITE UR QL STRIP: NEGATIVE
NRBC BLD AUTO-RTO: 0 /100 WBC (ref 0–0.2)
PH UR STRIP.AUTO: 6 [PH] (ref 5–8)
PLATELET # BLD AUTO: 218 10*3/MM3 (ref 140–450)
PMV BLD AUTO: 10.4 FL (ref 6–12)
POTASSIUM SERPL-SCNC: 4 MMOL/L (ref 3.5–5.2)
PROT SERPL-MCNC: 7 G/DL (ref 6–8.5)
PROT UR QL STRIP: NEGATIVE
PROTHROMBIN TIME: 12.9 SECONDS (ref 11.7–14.2)
QT INTERVAL: 381 MS
RBC # BLD AUTO: 4.25 10*6/MM3 (ref 3.77–5.28)
SODIUM SERPL-SCNC: 137 MMOL/L (ref 136–145)
SP GR UR STRIP: 1.01 (ref 1–1.03)
UROBILINOGEN UR QL STRIP: NORMAL
WBC # BLD AUTO: 4.18 10*3/MM3 (ref 3.4–10.8)

## 2021-03-02 PROCEDURE — 71046 X-RAY EXAM CHEST 2 VIEWS: CPT

## 2021-03-02 PROCEDURE — 73502 X-RAY EXAM HIP UNI 2-3 VIEWS: CPT

## 2021-03-02 PROCEDURE — 93005 ELECTROCARDIOGRAM TRACING: CPT

## 2021-03-02 PROCEDURE — 85730 THROMBOPLASTIN TIME PARTIAL: CPT

## 2021-03-02 PROCEDURE — 85025 COMPLETE CBC W/AUTO DIFF WBC: CPT

## 2021-03-02 PROCEDURE — 80053 COMPREHEN METABOLIC PANEL: CPT

## 2021-03-02 PROCEDURE — 81003 URINALYSIS AUTO W/O SCOPE: CPT

## 2021-03-02 PROCEDURE — 93010 ELECTROCARDIOGRAM REPORT: CPT | Performed by: INTERNAL MEDICINE

## 2021-03-02 PROCEDURE — 85610 PROTHROMBIN TIME: CPT

## 2021-03-02 PROCEDURE — 36415 COLL VENOUS BLD VENIPUNCTURE: CPT

## 2021-03-02 NOTE — DISCHARGE INSTRUCTIONS
Take the following medications the morning of surgery:  PHENOBARBITOL AND SYNTHROID    If you are on prescription narcotic pain medication to control your pain you may also take that medication the morning of surgery.    General Instructions: CLEAR LIQUIDS UNTIL 4:15 AM MORNING OF SURGERY  • Do not eat solid food after midnight the night before surgery.  • You may drink clear liquids day of surgery but must stop at least one hour before your hospital arrival time.  • It is beneficial for you to have a clear drink that contains carbohydrates the day of surgery.  We suggest a 12 to 20 ounce bottle of Gatorade or Powerade for non-diabetic patients or a 12 to 20 ounce bottle of G2 or Powerade Zero for diabetic patients. (Pediatric patients, are not advised to drink a 12 to 20 ounce carbohydrate drink)    Clear liquids are liquids you can see through.  Nothing red in color.     Plain water                               Sports drinks  Sodas                                   Gelatin (Jell-O)  Fruit juices without pulp such as white grape juice and apple juice  Popsicles that contain no fruit or yogurt  Tea or coffee (no cream or milk added)  Gatorade / Powerade  G2 / Powerade Zero    • Infants may have breast milk up to four hours before surgery.  • Infants drinking formula may drink formula up to six hours before surgery.   • Patients who avoid smoking, chewing tobacco and alcohol for 4 weeks prior to surgery have a reduced risk of post-operative complications.  Quit smoking as many days before surgery as you can.  • Do not smoke, use chewing tobacco or drink alcohol the day of surgery.   • If applicable bring your C-PAP/ BI-PAP machine.  • Bring any papers given to you in the doctor’s office.  • Wear clean comfortable clothes.  • Do not wear contact lenses, false eyelashes or make-up.  Bring a case for your glasses.   • Bring crutches or walker if applicable.  • Remove all piercings.  Leave jewelry and any other  valuables at home.  • Hair extensions with metal clips must be removed prior to surgery.  • The Pre-Admission Testing nurse will instruct you to bring medications if unable to obtain an accurate list in Pre-Admission Testing.        If you were given a blood bank ID arm band remember to bring it with you the day of surgery.    Preventing a Surgical Site Infection:  • For 2 to 3 days before surgery, avoid shaving with a razor because the razor can irritate skin and make it easier to develop an infection.    • Any areas of open skin can increase the risk of a post-operative wound infection by allowing bacteria to enter and travel throughout the body.  Notify your surgeon if you have any skin wounds / rashes even if it is not near the expected surgical site.  The area will need assessed to determine if surgery should be delayed until it is healed.  • The night prior to surgery shower using a fresh bar of anti-bacterial soap (such as Dial) and clean washcloth.  Sleep in a clean bed with clean clothing.  Do not allow pets to sleep with you.  • Shower on the morning of surgery using a fresh bar of anti-bacterial soap (such as Dial) and clean washcloth.  Dry with a clean towel and dress in clean clothing.  • Ask your surgeon if you will be receiving antibiotics prior to surgery.  • Make sure you, your family, and all healthcare providers clean their hands with soap and water or an alcohol based hand  before caring for you or your wound.    Day of surgery: 3/9/2021 ARRIVAL TIME 5:15 AM  Your arrival time is approximately two hours before your scheduled surgery time.  Upon arrival, a Pre-op nurse and Anesthesiologist will review your health history, obtain vital signs, and answer questions you may have.  The only belongings needed at this time will be a list of your home medications and if applicable your C-PAP/BI-PAP machine.  A Pre-op nurse will start an IV and you may receive medication in preparation for  surgery, including something to help you relax.     Please be aware that surgery does come with discomfort.  We want to make every effort to control your discomfort so please discuss any uncontrolled symptoms with your nurse.   Your doctor will most likely have prescribed pain medications.      If you are going home after surgery you will receive individualized written care instructions before being discharged.  A responsible adult must drive you to and from the hospital on the day of your surgery and stay with you for 24 hours.  Discharge prescriptions can be filled by the hospital pharmacy during regular pharmacy hours.  If you are having surgery late in the day/evening your prescription may be e-prescribed to your pharmacy.  Please verify your pharmacy hours or chose a 24 hour pharmacy to avoid not having access to your prescription because your pharmacy has closed for the day.    If you are staying overnight following surgery, you will be transported to your hospital room following the recovery period.  [facility] has all private rooms.    If you have any questions please call Pre-Admission Testing at [phone].  Deductibles and co-payments are collected on the day of service. Please be prepared to pay the required co-pay, deductible or deposit on the day of service as defined by your plan.    Patient Education for Self-Quarantine Process    Following your COVID testing, we strongly recommend that you do not leave your home after you have been tested for COVID except to get medical care. This includes not going to work, school or to public areas.  If this is not possible for you to do please limit your activities to only required outings.  Be sure to wear a mask when you are with other people, practice social distancing and wash your hands frequently.      The following items provide additional details to keep you safe.  • Wash your hands with soap and water frequently for at least 20 seconds.    • Avoid touching your eyes, nose and mouth with unwashed hands.  • Do not share anything - utensils, towels, food from the same bowl.   • Have your own utensils, drinking glass, dishes, towels and bedding.   • Do not have visitors.   • Do use FaceTime to stay in touch with family and friends.  • You should stay in a specific room away from others if possible.   • Stay at least 6 feet away from others in the home if you cannot have a dedicated room to yourself.   • Do not snuggle with your pet. While the CDC says there is no evidence that pets can spread COVID-19 or be infected from humans, it is probably best to avoid “petting, snuggling, being kissed or licked and sharing food (during self-quarantine)”, according to the CDC.   • Sanitize household surfaces daily. Include all high touch areas (door handles, light switches, phones, countertops, etc.)  • Do not share a bathroom with others, if possible.   • Wear a mask around others in your home if you are unable to stay in a separate room or 6 feet apart. If  you are unable to wear a mask, have your family member wear a mask if they must be within 6 feet of you.   Call your surgeon immediately if you experience any of the following symptoms:  • Sore Throat  • Shortness of Breath or difficulty breathing  • Cough  • Chills  • Body soreness or muscle pain  • Headache  • Fever  • New loss of taste or smell  • Do not arrive for your surgery ill.  Your procedure will need to be rescheduled to another time.  You will need to call your physician before the day of surgery to avoid any unnecessary exposure to hospital staff as well as other patients.    CHLORHEXIDINE CLOTH INSTRUCTIONS  The morning of surgery follow these instructions using the Chlorhexidine cloths you've been given.  These steps reduce bacteria on the body.  Do not use the cloths near your eyes, ears mouth, genitalia or on open wounds.  Throw the cloths away after use but do not try to flush them down a  toilet.      • Open and remove one cloth at a time from the package.    • Leave the cloth unfolded and begin the bathing.  • Massage the skin with the cloths using gentle pressure to remove bacteria.  Do not scrub harshly.   • Follow the steps below with one 2% CHG cloth per area (6 total cloths).  • One cloth for neck, shoulders and chest.  • One cloth for both arms, hands, fingers and underarms (do underarms last).  • One cloth for the abdomen followed by groin.  • One cloth for right leg and foot including between the toes.  • One cloth for left leg and foot including between the toes.  • The last cloth is to be used for the back of the neck, back and buttocks.    Allow the CHG to air dry 3 minutes on the skin which will give it time to work and decrease the chance of irritation.  The skin may feel sticky until it is dry.  Do not rinse with water or any other liquid or you will lose the beneficial effects of the CHG.  If mild skin irritation occurs, do rinse the skin to remove the CHG.  Report this to the nurse at time of admission.  Do not apply lotions, creams, ointments, deodorants or perfumes after using the clothes. Dress in clean clothes before coming to the hospital.    BACTROBAN NASAL OINTMENT  There are many germs normally in your nose. Bactroban is an ointment that will help reduce these germs. Please follow these instructions for Bactroban use:      ____The day before surgery in the morning  Date________    ____The day before surgery in the evening              Date________    ____The day of surgery in the morning    Date________    **Squirt ½ package of Bactroban Ointment onto a cotton applicator and apply to inside of 1st nostril.  Squirt the remaining Bactroban and apply to the inside of the other nostril.

## 2021-03-03 DIAGNOSIS — Z23 IMMUNIZATION DUE: ICD-10-CM

## 2021-03-06 ENCOUNTER — LAB (OUTPATIENT)
Dept: LAB | Facility: HOSPITAL | Age: 76
End: 2021-03-06

## 2021-03-06 DIAGNOSIS — Z01.818 OTHER SPECIFIED PRE-OPERATIVE EXAMINATION: ICD-10-CM

## 2021-03-06 PROCEDURE — U0005 INFEC AGEN DETEC AMPLI PROBE: HCPCS

## 2021-03-06 PROCEDURE — C9803 HOPD COVID-19 SPEC COLLECT: HCPCS

## 2021-03-06 PROCEDURE — U0004 COV-19 TEST NON-CDC HGH THRU: HCPCS

## 2021-03-08 LAB — SARS-COV-2 RNA RESP QL NAA+PROBE: NOT DETECTED

## 2021-03-09 ENCOUNTER — ANESTHESIA EVENT (OUTPATIENT)
Dept: PERIOP | Facility: HOSPITAL | Age: 76
End: 2021-03-09

## 2021-03-09 ENCOUNTER — ANESTHESIA (OUTPATIENT)
Dept: PERIOP | Facility: HOSPITAL | Age: 76
End: 2021-03-09

## 2021-03-09 ENCOUNTER — APPOINTMENT (OUTPATIENT)
Dept: GENERAL RADIOLOGY | Facility: HOSPITAL | Age: 76
End: 2021-03-09

## 2021-03-09 ENCOUNTER — HOSPITAL ENCOUNTER (OUTPATIENT)
Facility: HOSPITAL | Age: 76
Discharge: HOME | End: 2021-03-11
Attending: ORTHOPAEDIC SURGERY | Admitting: ORTHOPAEDIC SURGERY

## 2021-03-09 PROBLEM — Z96.649 S/P REVISION OF TOTAL HIP: Status: ACTIVE | Noted: 2021-03-09

## 2021-03-09 LAB
HCT VFR BLD AUTO: 37.7 % (ref 34–46.6)
HGB BLD-MCNC: 12.4 G/DL (ref 12–15.9)

## 2021-03-09 PROCEDURE — A9270 NON-COVERED ITEM OR SERVICE: HCPCS | Performed by: ORTHOPAEDIC SURGERY

## 2021-03-09 PROCEDURE — C1889 IMPLANT/INSERT DEVICE, NOC: HCPCS | Performed by: ORTHOPAEDIC SURGERY

## 2021-03-09 PROCEDURE — 25010000002 HYDROMORPHONE PER 4 MG: Performed by: ANESTHESIOLOGY

## 2021-03-09 PROCEDURE — C1776 JOINT DEVICE (IMPLANTABLE): HCPCS | Performed by: ORTHOPAEDIC SURGERY

## 2021-03-09 PROCEDURE — 25010000002 NEOSTIGMINE PER 0.5 MG: Performed by: ANESTHESIOLOGY

## 2021-03-09 PROCEDURE — 25010000002 FENTANYL CITRATE (PF) 100 MCG/2ML SOLUTION: Performed by: ANESTHESIOLOGY

## 2021-03-09 PROCEDURE — 25010000003 BUPIVACAINE LIPOSOME 1.3 % SUSPENSION 20 ML VIAL: Performed by: ORTHOPAEDIC SURGERY

## 2021-03-09 PROCEDURE — 73501 X-RAY EXAM HIP UNI 1 VIEW: CPT

## 2021-03-09 PROCEDURE — 97161 PT EVAL LOW COMPLEX 20 MIN: CPT

## 2021-03-09 PROCEDURE — G0378 HOSPITAL OBSERVATION PER HR: HCPCS

## 2021-03-09 PROCEDURE — 25010000002 ONDANSETRON PER 1 MG: Performed by: ANESTHESIOLOGY

## 2021-03-09 PROCEDURE — 85014 HEMATOCRIT: CPT | Performed by: ORTHOPAEDIC SURGERY

## 2021-03-09 PROCEDURE — C9290 INJ, BUPIVACAINE LIPOSOME: HCPCS | Performed by: ORTHOPAEDIC SURGERY

## 2021-03-09 PROCEDURE — 25010000002 PROPOFOL 10 MG/ML EMULSION: Performed by: ANESTHESIOLOGY

## 2021-03-09 PROCEDURE — 25010000002 VANCOMYCIN 10 G RECONSTITUTED SOLUTION: Performed by: ORTHOPAEDIC SURGERY

## 2021-03-09 PROCEDURE — 63710000001 MELOXICAM 15 MG TABLET: Performed by: ORTHOPAEDIC SURGERY

## 2021-03-09 PROCEDURE — 63710000001 HYDROCODONE-ACETAMINOPHEN 5-325 MG TABLET: Performed by: ORTHOPAEDIC SURGERY

## 2021-03-09 PROCEDURE — 97110 THERAPEUTIC EXERCISES: CPT

## 2021-03-09 PROCEDURE — 85018 HEMOGLOBIN: CPT | Performed by: ORTHOPAEDIC SURGERY

## 2021-03-09 PROCEDURE — 25010000002 MIDAZOLAM PER 1 MG: Performed by: ANESTHESIOLOGY

## 2021-03-09 PROCEDURE — 25010000002 DEXAMETHASONE PER 1 MG: Performed by: ANESTHESIOLOGY

## 2021-03-09 PROCEDURE — 63710000001 PRAVASTATIN 40 MG TABLET: Performed by: ORTHOPAEDIC SURGERY

## 2021-03-09 PROCEDURE — 63710000001 PHENOBARBITAL 32.4 MG TABLET: Performed by: ORTHOPAEDIC SURGERY

## 2021-03-09 DEVICE — KNOTLESS TISSUE CONTROL DEVICE, VIOLET UNIDIRECTIONAL (ANTIBACTERIAL) SYNTHETIC ABSORBABLE DEVICE
Type: IMPLANTABLE DEVICE | Site: HIP | Status: FUNCTIONAL
Brand: STRATAFIX

## 2021-03-09 DEVICE — VIOLET ANTIBACTERIAL POLYDIOXANONE, KNOTLESS TISSUE CONTROL DEVICE
Type: IMPLANTABLE DEVICE | Site: HIP | Status: FUNCTIONAL
Brand: STRATAFIX

## 2021-03-09 DEVICE — BIOLOX DELTA TS CERAMIC FEMORAL HEAD 12/14 TAPER REVISION DIAMETER 28MM +5
Type: IMPLANTABLE DEVICE | Site: HIP | Status: FUNCTIONAL
Brand: BIOLOX DELTA

## 2021-03-09 DEVICE — OR3O DUAL MOBILITY XLPE INSERT 28/46
Type: IMPLANTABLE DEVICE | Site: HIP | Status: FUNCTIONAL
Brand: OR3O DUAL MOBILITY

## 2021-03-09 RX ORDER — FENTANYL CITRATE 50 UG/ML
50 INJECTION, SOLUTION INTRAMUSCULAR; INTRAVENOUS
Status: DISCONTINUED | OUTPATIENT
Start: 2021-03-09 | End: 2021-03-09

## 2021-03-09 RX ORDER — CELECOXIB 200 MG/1
200 CAPSULE ORAL ONCE
Status: DISCONTINUED | OUTPATIENT
Start: 2021-03-09 | End: 2021-03-09

## 2021-03-09 RX ORDER — HYDROMORPHONE HYDROCHLORIDE 1 MG/ML
0.5 INJECTION, SOLUTION INTRAMUSCULAR; INTRAVENOUS; SUBCUTANEOUS
Status: DISCONTINUED | OUTPATIENT
Start: 2021-03-09 | End: 2021-03-09

## 2021-03-09 RX ORDER — SODIUM CHLORIDE 0.9 % (FLUSH) 0.9 %
3 SYRINGE (ML) INJECTION EVERY 12 HOURS SCHEDULED
Status: DISCONTINUED | OUTPATIENT
Start: 2021-03-09 | End: 2021-03-09 | Stop reason: HOSPADM

## 2021-03-09 RX ORDER — NALOXONE HCL 0.4 MG/ML
0.1 VIAL (ML) INJECTION
Status: DISCONTINUED | OUTPATIENT
Start: 2021-03-09 | End: 2021-03-11 | Stop reason: HOSPADM

## 2021-03-09 RX ORDER — SODIUM CHLORIDE, SODIUM LACTATE, POTASSIUM CHLORIDE, CALCIUM CHLORIDE 600; 310; 30; 20 MG/100ML; MG/100ML; MG/100ML; MG/100ML
9 INJECTION, SOLUTION INTRAVENOUS CONTINUOUS
Status: DISCONTINUED | OUTPATIENT
Start: 2021-03-09 | End: 2021-03-11 | Stop reason: HOSPADM

## 2021-03-09 RX ORDER — PRAVASTATIN SODIUM 40 MG
40 TABLET ORAL NIGHTLY
Status: DISCONTINUED | OUTPATIENT
Start: 2021-03-09 | End: 2021-03-11 | Stop reason: HOSPADM

## 2021-03-09 RX ORDER — PHENOBARBITAL 32.4 MG/1
129.6 TABLET ORAL NIGHTLY
Status: DISCONTINUED | OUTPATIENT
Start: 2021-03-09 | End: 2021-03-11 | Stop reason: HOSPADM

## 2021-03-09 RX ORDER — PHENOBARBITAL 32.4 MG/1
64.8 TABLET ORAL DAILY
Status: DISCONTINUED | OUTPATIENT
Start: 2021-03-10 | End: 2021-03-11 | Stop reason: HOSPADM

## 2021-03-09 RX ORDER — OXYCODONE HYDROCHLORIDE 5 MG/1
5 TABLET ORAL ONCE
Status: COMPLETED | OUTPATIENT
Start: 2021-03-09 | End: 2021-03-09

## 2021-03-09 RX ORDER — FAMOTIDINE 10 MG/ML
20 INJECTION, SOLUTION INTRAVENOUS ONCE
Status: COMPLETED | OUTPATIENT
Start: 2021-03-09 | End: 2021-03-09

## 2021-03-09 RX ORDER — ROCURONIUM BROMIDE 10 MG/ML
INJECTION, SOLUTION INTRAVENOUS AS NEEDED
Status: DISCONTINUED | OUTPATIENT
Start: 2021-03-09 | End: 2021-03-09 | Stop reason: SURG

## 2021-03-09 RX ORDER — FLUMAZENIL 0.1 MG/ML
0.2 INJECTION INTRAVENOUS AS NEEDED
Status: DISCONTINUED | OUTPATIENT
Start: 2021-03-09 | End: 2021-03-09

## 2021-03-09 RX ORDER — GLYCOPYRROLATE 0.2 MG/ML
INJECTION INTRAMUSCULAR; INTRAVENOUS AS NEEDED
Status: DISCONTINUED | OUTPATIENT
Start: 2021-03-09 | End: 2021-03-09 | Stop reason: SURG

## 2021-03-09 RX ORDER — LIDOCAINE HYDROCHLORIDE 10 MG/ML
0.5 INJECTION, SOLUTION EPIDURAL; INFILTRATION; INTRACAUDAL; PERINEURAL ONCE AS NEEDED
Status: DISCONTINUED | OUTPATIENT
Start: 2021-03-09 | End: 2021-03-09 | Stop reason: HOSPADM

## 2021-03-09 RX ORDER — HYDROCODONE BITARTRATE AND ACETAMINOPHEN 5; 325 MG/1; MG/1
2 TABLET ORAL EVERY 4 HOURS PRN
Status: DISCONTINUED | OUTPATIENT
Start: 2021-03-09 | End: 2021-03-11 | Stop reason: HOSPADM

## 2021-03-09 RX ORDER — ASPIRIN 325 MG
325 TABLET, DELAYED RELEASE (ENTERIC COATED) ORAL DAILY
Status: DISCONTINUED | OUTPATIENT
Start: 2021-03-10 | End: 2021-03-11 | Stop reason: HOSPADM

## 2021-03-09 RX ORDER — EPHEDRINE SULFATE 50 MG/ML
5 INJECTION, SOLUTION INTRAVENOUS ONCE AS NEEDED
Status: DISCONTINUED | OUTPATIENT
Start: 2021-03-09 | End: 2021-03-09

## 2021-03-09 RX ORDER — ONDANSETRON 2 MG/ML
4 INJECTION INTRAMUSCULAR; INTRAVENOUS EVERY 6 HOURS PRN
Status: DISCONTINUED | OUTPATIENT
Start: 2021-03-09 | End: 2021-03-11 | Stop reason: HOSPADM

## 2021-03-09 RX ORDER — MIDAZOLAM HYDROCHLORIDE 1 MG/ML
0.5 INJECTION INTRAMUSCULAR; INTRAVENOUS
Status: DISCONTINUED | OUTPATIENT
Start: 2021-03-09 | End: 2021-03-09 | Stop reason: HOSPADM

## 2021-03-09 RX ORDER — MELOXICAM 15 MG/1
15 TABLET ORAL DAILY
Status: DISCONTINUED | OUTPATIENT
Start: 2021-03-09 | End: 2021-03-11 | Stop reason: HOSPADM

## 2021-03-09 RX ORDER — FENTANYL CITRATE 50 UG/ML
50 INJECTION, SOLUTION INTRAMUSCULAR; INTRAVENOUS
Status: DISCONTINUED | OUTPATIENT
Start: 2021-03-09 | End: 2021-03-09 | Stop reason: HOSPADM

## 2021-03-09 RX ORDER — LEVOTHYROXINE SODIUM 0.05 MG/1
50 TABLET ORAL
Status: DISCONTINUED | OUTPATIENT
Start: 2021-03-10 | End: 2021-03-11 | Stop reason: HOSPADM

## 2021-03-09 RX ORDER — ONDANSETRON 2 MG/ML
4 INJECTION INTRAMUSCULAR; INTRAVENOUS ONCE AS NEEDED
Status: DISCONTINUED | OUTPATIENT
Start: 2021-03-09 | End: 2021-03-09

## 2021-03-09 RX ORDER — ONDANSETRON 2 MG/ML
INJECTION INTRAMUSCULAR; INTRAVENOUS AS NEEDED
Status: DISCONTINUED | OUTPATIENT
Start: 2021-03-09 | End: 2021-03-09 | Stop reason: SURG

## 2021-03-09 RX ORDER — ONDANSETRON 4 MG/1
4 TABLET, FILM COATED ORAL EVERY 6 HOURS PRN
Status: DISCONTINUED | OUTPATIENT
Start: 2021-03-09 | End: 2021-03-11 | Stop reason: HOSPADM

## 2021-03-09 RX ORDER — OXYCODONE AND ACETAMINOPHEN 7.5; 325 MG/1; MG/1
1 TABLET ORAL ONCE AS NEEDED
Status: DISCONTINUED | OUTPATIENT
Start: 2021-03-09 | End: 2021-03-09

## 2021-03-09 RX ORDER — HYDROCODONE BITARTRATE AND ACETAMINOPHEN 7.5; 325 MG/1; MG/1
1 TABLET ORAL ONCE AS NEEDED
Status: DISCONTINUED | OUTPATIENT
Start: 2021-03-09 | End: 2021-03-09

## 2021-03-09 RX ORDER — LIDOCAINE HYDROCHLORIDE 20 MG/ML
INJECTION, SOLUTION INFILTRATION; PERINEURAL AS NEEDED
Status: DISCONTINUED | OUTPATIENT
Start: 2021-03-09 | End: 2021-03-09 | Stop reason: SURG

## 2021-03-09 RX ORDER — PHENOBARBITAL 97.2 MG/1
194.4 TABLET ORAL DAILY
Status: DISCONTINUED | OUTPATIENT
Start: 2021-03-09 | End: 2021-03-09

## 2021-03-09 RX ORDER — HYDROCODONE BITARTRATE AND ACETAMINOPHEN 5; 325 MG/1; MG/1
1 TABLET ORAL EVERY 4 HOURS PRN
Status: DISCONTINUED | OUTPATIENT
Start: 2021-03-09 | End: 2021-03-11 | Stop reason: HOSPADM

## 2021-03-09 RX ORDER — FAMOTIDINE 20 MG/1
40 TABLET, FILM COATED ORAL DAILY
Status: DISCONTINUED | OUTPATIENT
Start: 2021-03-09 | End: 2021-03-11 | Stop reason: HOSPADM

## 2021-03-09 RX ORDER — SODIUM CHLORIDE 9 MG/ML
100 INJECTION, SOLUTION INTRAVENOUS CONTINUOUS
Status: DISCONTINUED | OUTPATIENT
Start: 2021-03-09 | End: 2021-03-11 | Stop reason: HOSPADM

## 2021-03-09 RX ORDER — CEFAZOLIN SODIUM 2 G/100ML
2 INJECTION, SOLUTION INTRAVENOUS ONCE
Status: DISCONTINUED | OUTPATIENT
Start: 2021-03-09 | End: 2021-03-09

## 2021-03-09 RX ORDER — PROPOFOL 10 MG/ML
VIAL (ML) INTRAVENOUS AS NEEDED
Status: DISCONTINUED | OUTPATIENT
Start: 2021-03-09 | End: 2021-03-09 | Stop reason: SURG

## 2021-03-09 RX ORDER — ACETAMINOPHEN 500 MG
1000 TABLET ORAL ONCE
Status: COMPLETED | OUTPATIENT
Start: 2021-03-09 | End: 2021-03-09

## 2021-03-09 RX ORDER — MIDAZOLAM HYDROCHLORIDE 1 MG/ML
1 INJECTION INTRAMUSCULAR; INTRAVENOUS
Status: DISCONTINUED | OUTPATIENT
Start: 2021-03-09 | End: 2021-03-09 | Stop reason: HOSPADM

## 2021-03-09 RX ORDER — DEXAMETHASONE SODIUM PHOSPHATE 10 MG/ML
INJECTION INTRAMUSCULAR; INTRAVENOUS AS NEEDED
Status: DISCONTINUED | OUTPATIENT
Start: 2021-03-09 | End: 2021-03-09 | Stop reason: SURG

## 2021-03-09 RX ORDER — SODIUM CHLORIDE 0.9 % (FLUSH) 0.9 %
3-10 SYRINGE (ML) INJECTION AS NEEDED
Status: DISCONTINUED | OUTPATIENT
Start: 2021-03-09 | End: 2021-03-09 | Stop reason: HOSPADM

## 2021-03-09 RX ADMIN — FENTANYL CITRATE 50 MCG: 50 INJECTION, SOLUTION INTRAMUSCULAR; INTRAVENOUS at 11:45

## 2021-03-09 RX ADMIN — VANCOMYCIN HYDROCHLORIDE 1500 MG: 10 INJECTION, POWDER, LYOPHILIZED, FOR SOLUTION INTRAVENOUS at 09:17

## 2021-03-09 RX ADMIN — PHENOBARBITAL 129.6 MG: 32.4 TABLET ORAL at 20:30

## 2021-03-09 RX ADMIN — PROPOFOL 160 MG: 10 INJECTION, EMULSION INTRAVENOUS at 10:17

## 2021-03-09 RX ADMIN — NEOSTIGMINE METHYLSULFATE 3 MG: 1 INJECTION INTRAMUSCULAR; INTRAVENOUS; SUBCUTANEOUS at 11:11

## 2021-03-09 RX ADMIN — MIDAZOLAM 0.5 MG: 1 INJECTION INTRAMUSCULAR; INTRAVENOUS at 09:19

## 2021-03-09 RX ADMIN — HYDROMORPHONE HYDROCHLORIDE 0.5 MG: 1 INJECTION, SOLUTION INTRAMUSCULAR; INTRAVENOUS; SUBCUTANEOUS at 12:41

## 2021-03-09 RX ADMIN — SODIUM CHLORIDE, POTASSIUM CHLORIDE, SODIUM LACTATE AND CALCIUM CHLORIDE 9 ML/HR: 600; 310; 30; 20 INJECTION, SOLUTION INTRAVENOUS at 08:49

## 2021-03-09 RX ADMIN — FENTANYL CITRATE 50 MCG: 50 INJECTION INTRAMUSCULAR; INTRAVENOUS at 11:05

## 2021-03-09 RX ADMIN — VANCOMYCIN HYDROCHLORIDE 1250 MG: 10 INJECTION, POWDER, LYOPHILIZED, FOR SOLUTION INTRAVENOUS at 20:29

## 2021-03-09 RX ADMIN — ONDANSETRON 4 MG: 2 INJECTION INTRAMUSCULAR; INTRAVENOUS at 11:05

## 2021-03-09 RX ADMIN — FAMOTIDINE 20 MG: 10 INJECTION INTRAVENOUS at 09:18

## 2021-03-09 RX ADMIN — TRANEXAMIC ACID 1000 MG: 1 INJECTION, SOLUTION INTRAVENOUS at 11:04

## 2021-03-09 RX ADMIN — TRANEXAMIC ACID 1000 MG: 1 INJECTION, SOLUTION INTRAVENOUS at 10:22

## 2021-03-09 RX ADMIN — FENTANYL CITRATE 50 MCG: 50 INJECTION, SOLUTION INTRAMUSCULAR; INTRAVENOUS at 11:54

## 2021-03-09 RX ADMIN — FENTANYL CITRATE 50 MCG: 50 INJECTION INTRAMUSCULAR; INTRAVENOUS at 10:52

## 2021-03-09 RX ADMIN — LIDOCAINE HYDROCHLORIDE 60 MG: 20 INJECTION, SOLUTION INFILTRATION; PERINEURAL at 10:16

## 2021-03-09 RX ADMIN — HYDROMORPHONE HYDROCHLORIDE 0.5 MG: 1 INJECTION, SOLUTION INTRAMUSCULAR; INTRAVENOUS; SUBCUTANEOUS at 12:22

## 2021-03-09 RX ADMIN — OXYCODONE 5 MG: 5 TABLET ORAL at 09:18

## 2021-03-09 RX ADMIN — MELOXICAM 15 MG: 15 TABLET ORAL at 14:19

## 2021-03-09 RX ADMIN — HYDROCODONE BITARTRATE AND ACETAMINOPHEN 1 TABLET: 5; 325 TABLET ORAL at 16:05

## 2021-03-09 RX ADMIN — PRAVASTATIN SODIUM 40 MG: 40 TABLET ORAL at 20:59

## 2021-03-09 RX ADMIN — DEXAMETHASONE SODIUM PHOSPHATE 8 MG: 10 INJECTION INTRAMUSCULAR; INTRAVENOUS at 10:24

## 2021-03-09 RX ADMIN — ROCURONIUM BROMIDE 25 MG: 50 INJECTION INTRAVENOUS at 10:18

## 2021-03-09 RX ADMIN — ACETAMINOPHEN 1000 MG: 500 TABLET ORAL at 09:18

## 2021-03-09 RX ADMIN — GLYCOPYRROLATE 0.4 MG: 0.2 INJECTION INTRAMUSCULAR; INTRAVENOUS at 11:10

## 2021-03-09 NOTE — OP NOTE
Total Hip Revision Operative Note  Dr. SARATH Cesar II  [phone]    PATIENT NAME: Mariela Ruiz  MRN: [id number]  : 1945 AGE: 76 y.o. 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 This case would not have been possible without another set of skilled surgical hands for retraction, use of instrumentation, and general assistance.  This assistance was vital to the success of the case.   ESTIMATED BLOOD LOSS: 300cc  SPONGE AND NEEDLE COUNT: Correct  INDICATIONS: This patient was noted to have a metal-on-metal implant of her left side with some metallosis and concern for bony reaction from the metal-on-metal implant. A discussion of operative versus nonoperative treatment was had with the patient. They elected to undergo revision hip arthroplasty. The risks of surgery were discussed and included the risk of anesthesia, infection, damage to neurovascular structures, implant loosening/failure, fracture, hardware prominence, dislocation, the need for further procedures, medical complications, and others. No guarantees were made. The patient wished to proceed with surgery and a surgical consent was signed.  COMPONENTS:   · Biolox delta ceramic femoral head 28+5 with a 12/14 taper and a [employer] 28 mm inner diameter 46 mm outer diameter dual mobility insert    PERTINENT FINDINGS: Metallosis noted throughout the hip with destruction of some of the soft tissues.  Well fixed implants    DETAILS OF PROCEDURE:   The patient was met in the preoperative area. The site was marked. The  consent and H&P were reviewed. The patient was then wheeled back to the operative suite and underwent anesthesia. The patient was positioned laterally using the pegboard.  An axillary roll was placed under the down arm. Excess hair about the operative hip was removed using surgical clippers. Surgical alcohol was used to thoroughly clean the entire operative extremity.     The hip and leg were then prepped in the normal sterile fashion, which included ChloraPrep, multiple layers of sterile drapes, and surgical space suits for the entire operative team. New outer gloves were used by all sterile surgical team members after final draping. The surgical incision was marked. A surgical timeout was performed in which administration of preoperative antibiotics and tranexamic acid, if not contraindicated, was confirmed.    A standard posterior approach to the hip was then utilized. Retractors were then placed around the acetabulum and excess scar tissue was excised. The hip was then dislocated and the femoral head was removed using a bone tamp and mallet.     I exposed the screw when area on the shoulder of the femoral implant and an extractor was screwed in.  I was unable to easily extract the femoral component and I decided to keep it.  I then exposed the acetabulum where retractors were placed and excess scar tissue was removed.  The acetabular component was also noted to be well seated.  At that point, I trialed the hip using a dual mobility construct and it was noted to have excellent stability.  The real implant was then opened and assembled on the back table and then inserted into the hip.  The hip was reduced and then thoroughly irrigated and injected with an analgesic cocktail.  The hip was then closed in layers and a sterile dressing was applied.    The patient was moved from the operative table to the bed. The patient was taken to the recovery room in stable condition. There were no complications and the patient  "tolerated the procedure well.    R \"Danny\" Tali ALLEN MD  Orthopaedic Surgery  [facility]  [phone]                  "

## 2021-03-09 NOTE — ANESTHESIA PREPROCEDURE EVALUATION
Anesthesia Evaluation     Patient summary reviewed and Nursing notes reviewed   NPO Solid Status: > 8 hours  NPO Liquid Status: > 8 hours           Airway   Mallampati: II  Neck ROM: full  No difficulty expected  Dental      Comment: Previous injury to left upper front tooth    Pulmonary     breath sounds clear to auscultation  (+) sleep apnea,   Cardiovascular     Rhythm: regular    (+) valvular problems/murmurs MVP, dysrhythmias, hyperlipidemia,     ROS comment: RBBB    Neuro/Psych  (+) seizures,     GI/Hepatic/Renal/Endo    (+)  GERD,      Musculoskeletal     Abdominal    Substance History      OB/GYN          Other   arthritis,                    Anesthesia Plan    ASA 3     general     intravenous induction     Anesthetic plan, all risks, benefits, and alternatives have been provided, discussed and informed consent has been obtained with: patient.

## 2021-03-09 NOTE — ANESTHESIA POSTPROCEDURE EVALUATION
Patient: Mariela Ruiz    Procedure Summary     Date: 03/09/21 Room / Location: [facility] OR  / [facility] MAIN OR    Anesthesia Start: 1012 Anesthesia Stop: 1134    Procedure: LEFT TOTAL HIP ARTHROPLASTY REVISION POSTERIOR (Left Hip) Diagnosis:     Surgeons: Christiano Cesar II, MD Provider: Martín Ling MD    Anesthesia Type: general ASA Status: 3          Anesthesia Type: general    Vitals  Vitals Value Taken Time   /55 03/09/21 1255   Temp 36.4 °C (97.5 °F) 03/09/21 1255   Pulse 64 03/09/21 1302   Resp 18 03/09/21 1255   SpO2 92 % 03/09/21 1304   Vitals shown include unvalidated device data.        Post Anesthesia Care and Evaluation    Patient location during evaluation: PACU  Patient participation: complete - patient participated  Level of consciousness: awake and alert  Pain management: adequate  Airway patency: patent  Anesthetic complications: No anesthetic complications    Cardiovascular status: acceptable  Respiratory status: acceptable  Hydration status: acceptable    Comments: ---------------------------               03/09/21                      1322         ---------------------------   BP:          118/65         Pulse:         54           Resp:          16           Temp:   36.4 °C (97.6 °F)   SpO2:          96%         ---------------------------

## 2021-03-09 NOTE — PLAN OF CARE
Goal Outcome Evaluation:  Plan of Care Reviewed With: patient     Outcome Summary: Pt is a 75 yo female who presents POD0 s/p L posterior MAXIMO revision demonstrating post op pain, impaired L hip ROM, L LE weakness, and decreased endurance limiting mobility. Pt was independent with mobility prior to admission. Currently ambulating with walker and assist x1 due to above impairments. Pt will benefit from continued PT to address impairments and increase independence with mobility. Pt has walker and raised toilet seat at home. Plans to DC home tomorrow and continue with  PT. No problems anticipated, will plan to advance ambulation and attempt stairs at next session.

## 2021-03-09 NOTE — ANESTHESIA PROCEDURE NOTES
Airway  Urgency: elective    Date/Time: 3/9/2021 10:20 AM  Airway not difficult    General Information and Staff    Patient location during procedure: OR  Anesthesiologist: Martín Ling MD    Indications and Patient Condition  Indications for airway management: airway protection    Preoxygenated: yes  Mask difficulty assessment: 1 - vent by mask    Final Airway Details  Final airway type: endotracheal airway      Successful airway: ETT  Cuffed: yes   Successful intubation technique: direct laryngoscopy  Endotracheal tube insertion site: oral  Blade: Mejia  Blade size: 2  ETT size (mm): 7.5  Cormack-Lehane Classification: grade I - full view of glottis  Placement verified by: chest auscultation and capnometry   Measured from: teeth  ETT/EBT  to teeth (cm): 19  Number of attempts at approach: 1  Assessment: lips, teeth, and gum same as pre-op and atraumatic intubation    Additional Comments  Pre oxygenated  Easy mask vent  Atraumatic intubation  + etco2  Breath sounds equal bilaterally

## 2021-03-09 NOTE — THERAPY EVALUATION
Patient Name: Mariela Ruiz  : 1945    MRN: [id number]                              Today's Date: 3/9/2021       Admit Date: 3/9/2021    Visit Dx: No diagnosis found.  Patient Active Problem List   Diagnosis   • Seizure disorder (CMS/HCC)   • Hyperlipidemia   • Vitamin D insufficiency   • Age-related osteoporosis without current pathological fracture   • Sleep apnea   • Chronic venous insufficiency   • Postsurgical hypothyroidism   • Chronic fatigue syndrome   • Gastroesophageal reflux disease   • Dupuytren's contracture   • History of biliary T-tube placement   • History of partial thyroidectomy   • Mitral valve prolapse   • Multinodular goiter   • Right fascicular block   • Restless legs syndrome   • History of cardiac catheterization   • Urge incontinence of urine   • Left knee pain   • Ligamentous laxity of knee   • DJD (degenerative joint disease) of knee   • Adverse drug effect, subsequent encounter   • Abnormal blood level of chromium   • Abnormal blood level of cobalt   • Family history of diabetes mellitus   • Wasp sting   • Cellulitis of right upper extremity   • Closed fracture of neck of right femur (CMS/[facility])   • Thrombocytopenia (CMS/[facility])   • .   • Fever   • S/P revision of total hip     Past Medical History:   Diagnosis Date   • Abnormal EKG     LAST SAW CARDIOLOGY  DR DORAN   • Arthritis    • BBB (bundle branch block)     RIGHT   • Chronic venous insufficiency    • Dupuytren's contracture    • History of staph infection     S/P KNEE DEC 2016   • History of transfusion    • Hyperlipidemia    • Lymphedema     LEFT LEG   • MVP (mitral valve prolapse)    • Nontoxic multinodular goiter    • Osteoporosis     Dr. Cabrera   • Pelvic joint pain, left    • Postsurgical hypothyroidism    • Presence of left artificial hip joint     METAL ON METAL AS NOTED PER DR CLEMENT NOTE   • Restless leg syndrome    • Seizure disorder (CMS/HCC)     Dr. Whitman, HX  LAST SEIZURE   • Sleep apnea     DOES NOT HAVE  MACHINE   • Vitamin D insufficiency      Past Surgical History:   Procedure Laterality Date   • APPENDECTOMY     • CARDIAC CATHETERIZATION      NORMAL    • COLONOSCOPY  08/17/2017    Normal except for anal fissure.  Dr. Brandt.  [facility].   • KNEE MINI REVISION Left 11/15/2016    Procedure: LEFT KNEE POLY CHANGE WITH HI POST STABILIZER;  Surgeon: Pablito Claudio MD;  Location: [facility] OR;  Service:    • KNEE MINI REVISION Left 12/13/2016    Procedure: LT KNEE REMOVAL/REPLACEMENT LOCKING PIN ;  Surgeon: Pablito Claudio MD;  Location: [facility] OR;  Service:    • MAMMO FINE NEEDLE ASPIRATION UNILATERAL      RIGHT THYROID NODULE, 2009 BENIGN    • OR REVISE KNEE JOINT REPLACE,1 PART Left 2/20/2017    Procedure: LT KNEE REMOVAL ANTIBIOTIC SPACER AND KNEE REVISION;  Surgeon: Christiano Cesar MD;  Location: [facility] OR;  Service: Orthopedics   • OR TOTAL KNEE ARTHROPLASTY Left 12/24/2016    Procedure: LEFT KNEE WASHOUT WITH POLY CHANGE;  Surgeon: Christiano Cesar MD;  Location: [facility] OR;  Service: Orthopedics   • REPLACEMENT TOTAL KNEE Left    • THYROIDECTOMY, PARTIAL      1979 LEFT LOBECTOMY AND ISTHMECTOMY    • TOTAL ABDOMINAL HYSTERECTOMY WITH SALPINGO OOPHORECTOMY     • TOTAL HIP ARTHROPLASTY Left    • TOTAL HIP ARTHROPLASTY Right 9/14/2019    Procedure: TOTAL HIP ARTHROPLASTY ANTERIOR WITH HANA TABLE;  Surgeon: Christiano Cesar II, MD;  Location: [facility] OR;  Service: Orthopedics   • TOTAL KNEE  PROSTHESIS REMOVAL W/ SPACER INSERTION Left 12/29/2016    Procedure: LT KNEE IMPLANT REMOVAL WITH INSERTION OF SPACER ;  Surgeon: Christiano Cesar MD;  Location: [facility] OR;  Service:      General Information     Row Name 03/09/21 1555          Physical Therapy Time and Intention    Document Type  evaluation  -EE     Mode of Treatment  individual therapy;physical therapy  -EE     Row Name 03/09/21 1555          General Information    Prior Level of Function  independent:;all household  mobility;community mobility  -EE     Existing Precautions/Restrictions  fall;left;hip, posterior  -EE     Barriers to Rehab  none identified  -EE     Row Name 03/09/21 1555          Living Environment    Lives With  spouse  -EE     Row Name 03/09/21 1555          Home Main Entrance    Number of Stairs, Main Entrance  three  -EE     Stair Railings, Main Entrance  railings safe and in good condition  -EE     Row Name 03/09/21 1555          Cognition    Orientation Status (Cognition)  oriented x 4  -EE     Row Name 03/09/21 1555          Safety Issues, Functional Mobility    Impairments Affecting Function (Mobility)  endurance/activity tolerance;range of motion (ROM);strength;pain  -EE       User Key  (r) = Recorded By, (t) = Taken By, (c) = Cosigned By    Initials Name Provider Type    Sultana Topete PT Physical Therapist        Mobility     Row Name 03/09/21 1555          Bed Mobility    Bed Mobility  supine-sit  -EE     Supine-Sit [place] (Bed Mobility)  supervision  -EE     Assistive Device (Bed Mobility)  head of bed elevated;bed rails  -     Row Name 03/09/21 1555          Transfers    Comment (Transfers)  vc's for hand placement  -     Row Name 03/09/21 1555          Sit-Stand Transfer    Sit-Stand [place] (Transfers)  contact guard;verbal cues  -EE     Assistive Device (Sit-Stand Transfers)  walker, front-wheeled  -EE     Row Name 03/09/21 1555          Gait/Stairs (Locomotion)    [place] Level (Gait)  contact guard;verbal cues  -EE     Assistive Device (Gait)  walker, front-wheeled  -EE     Distance in Feet (Gait)  30'  -EE     Deviations/Abnormal Patterns (Gait)  gunnar decreased;left sided deviations;antalgic  -EE     Bilateral Gait Deviations  forward flexed posture  -EE     Left Sided Gait Deviations  heel strike decreased;weight shift ability decreased  -EE     Comment (Gait/Stairs)  vc's for gait sequencing with walker, pt demonstrates step to gait pattern  -EE       User Key  (r) =  Recorded By, (t) = Taken By, (c) = Cosigned By    Initials Name Provider Type    EE Sultana Brumfield PT Physical Therapist        Obj/Interventions     Row Name 03/09/21 1556          Range of Motion Comprehensive    General Range of Motion  lower extremity range of motion deficits identified  -EE     Comment, General Range of Motion  L hip ROM limited by pain and posterior hip precautions, R LE WFL  -EE     Row Name 03/09/21 1556          Strength Comprehensive (MMT)    General Manual Muscle Testing (MMT) Assessment  lower extremity strength deficits identified  -EE     Comment, General Manual Muscle Testing (MMT) Assessment  L hip weakness noted post op,  RLE WFL for age  -EE     Row Name 03/09/21 1556          Motor Skills    Therapeutic Exercise  -- MAXIMO protocol x 10 reps  -EE     Row Name 03/09/21 1556          Balance    Balance Assessment  standing dynamic balance  -EE     Dynamic Standing Balance  mild impairment;supported;standing CGA with rwx  -EE     Row Name 03/09/21 1556          Sensory Assessment (Somatosensory)    Sensory Assessment (Somatosensory)  sensation intact  -EE       User Key  (r) = Recorded By, (t) = Taken By, (c) = Cosigned By    Initials Name Provider Type    EE Sultana Brumfield, FARHANA Physical Therapist        Goals/Plan     Row Name 03/09/21 1559          Bed Mobility Goal 1 (PT)    Activity/Assistive Device (Bed Mobility Goal 1, PT)  bed mobility activities, all  -EE     [place] Level/Cues Needed (Bed Mobility Goal 1, PT)  independent  -EE     Time Frame (Bed Mobility Goal 1, PT)  3 days  -EE     Progress/Outcomes (Bed Mobility Goal 1, PT)  goal ongoing  -EE     Row Name 03/09/21 1559          Transfer Goal 1 (PT)    Activity/Assistive Device (Transfer Goal 1, PT)  sit-to-stand/stand-to-sit;walker, rolling  -EE     [place] Level/Cues Needed (Transfer Goal 1, PT)  standby assist  -EE     Time Frame (Transfer Goal 1, PT)  3 days  -EE     Progress/Outcome (Transfer Goal 1, PT)  goal  ongoing  -EE     Row Name 03/09/21 1559          Gait Training Goal 1 (PT)    Activity/Assistive Device (Gait Training Goal 1, PT)  gait (walking locomotion);walker, rolling  -EE     [place] Level (Gait Training Goal 1, PT)  standby assist  -EE     Distance (Gait Training Goal 1, PT)  150'  -EE     Time Frame (Gait Training Goal 1, PT)  3 days  -EE     Progress/Outcome (Gait Training Goal 1, PT)  goal ongoing  -EE     Row Name 03/09/21 1559          Stairs Goal 1 (PT)    Activity/Assistive Device (Stairs Goal 1, PT)  ascending stairs;descending stairs  -EE     [place] Level/Cues Needed (Stairs Goal 1, PT)  contact guard assist  -EE     Number of Stairs (Stairs Goal 1, PT)  3  -EE     Time Frame (Stairs Goal 1, PT)  3 days  -EE     Progress/Outcome (Stairs Goal 1, PT)  goal ongoing  -EE     Row Name 03/09/21 1559          Patient Education Goal (PT)    Activity (Patient Education Goal, PT)  maintain posterior hip precautions with mobility, complete MAXIMO HEP  -EE     [place]/Cues/Accuracy (Memory Goal 2, PT)  demonstrates adequately;independent  -EE     Time Frame (Patient Education Goal, PT)  3 days  -EE     Progress/Outcome (Patient Education Goal, PT)  goal ongoing  -EE       User Key  (r) = Recorded By, (t) = Taken By, (c) = Cosigned By    Initials Name Provider Type    Sultana Topete, PT Physical Therapist        Clinical Impression     Row Name 03/09/21 1557          Pain    Additional Documentation  Pain Scale: Numbers Pre/Post-Treatment (Group)  -EE     Row Name 03/09/21 1557          Pain Scale: Numbers Pre/Post-Treatment    Pretreatment Pain Rating  4/10  -EE     Posttreatment Pain Rating  6/10  -EE     Pain Location - Side  Left  -EE     Pain Location  hip  -EE     Pain Intervention(s)  Repositioned;Elevated;Rest;Medication (See MAR)  -EE     Row Name 03/09/21 0407          Plan of Care Review    Plan of Care Reviewed With  patient  -EE     Outcome Summary  Pt is a 75 yo female who presents  POD0 s/p L posterior MAXIMO revision demonstrating post op pain, impaired L hip ROM, L LE weakness, and decreased endurance limiting mobility. Pt was independent with mobility prior to admission. Currently ambulating with walker and assist x1 due to above impairments. Pt will benefit from continued PT to address impairments and increase independence with mobility. Pt has walker and raised toilet seat at home. Plans to DC home tomorrow and continue with HH PT. No problems anticipated, will plan to advance ambulation and attempt stairs at next session.  -EE     Row Name 03/09/21 7545          Therapy Assessment/Plan (PT)    Rehab Potential (PT)  good, to achieve stated therapy goals  -EE     Criteria for Skilled Interventions Met (PT)  yes;skilled treatment is necessary;meets criteria  -EE     Row Name 03/09/21 9406          Positioning and Restraints    Pre-Treatment Position  in bed  -EE     Post Treatment Position  chair  -EE     In Chair  reclined;call light within reach;encouraged to call for assist;exit alarm on;legs elevated;notified nsg;with family/caregiver  -EE       User Key  (r) = Recorded By, (t) = Taken By, (c) = Cosigned By    Initials Name Provider Type    EE Sultana Brumfield, PT Physical Therapist        Outcome Measures     Row Name 03/09/21 1600          How much help from another person do you currently need...    Turning from your back to your side while in flat bed without using bedrails?  4  -EE     Moving from lying on back to sitting on the side of a flat bed without bedrails?  3  -EE     Moving to and from a bed to a chair (including a wheelchair)?  3  -EE     Standing up from a chair using your arms (e.g., wheelchair, bedside chair)?  3  -EE     Climbing 3-5 steps with a railing?  2  -EE     To walk in hospital room?  3  -EE     AM-PAC 6 Clicks Score (PT)  18  -EE     Row Name 03/09/21 1600          Functional Assessment    Outcome Measure Options  AM-PAC 6 Clicks Basic Mobility (PT)  -EE        User Key  (r) = Recorded By, (t) = Taken By, (c) = Cosigned By    Initials Name Provider Type    EE Sultana Brumfield PT Physical Therapist        Physical Therapy Education                 Title: PT OT SLP Therapies (In Progress)     Topic: Physical Therapy (In Progress)     Point: Mobility training (Done)     Learning Progress Summary           Patient Acceptance, E,TB, VU,NR by EE at 3/9/2021 1600                   Point: Home exercise program (Done)     Learning Progress Summary           Patient Acceptance, E,TB, VU,NR by EE at 3/9/2021 1600                   Point: Body mechanics (Not Started)     Learner Progress:  Not documented in this visit.          Point: Precautions (Done)     Learning Progress Summary           Patient Acceptance, E,TB, VU,NR by EE at 3/9/2021 1600                               User Key     Initials Effective Dates Name Provider Type Discipline    EE 04/03/18 -  Sultana Brumfield PT Physical Therapist PT              PT Recommendation and Plan  Planned Therapy Interventions (PT): bed mobility training, gait training, home exercise program, patient/family education, ROM (range of motion), stair training, strengthening, stretching, transfer training  Plan of Care Reviewed With: patient  Outcome Summary: Pt is a 77 yo female who presents POD0 s/p L posterior MAXIMO revision demonstrating post op pain, impaired L hip ROM, L LE weakness, and decreased endurance limiting mobility. Pt was independent with mobility prior to admission. Currently ambulating with walker and assist x1 due to above impairments. Pt will benefit from continued PT to address impairments and increase independence with mobility. Pt has walker and raised toilet seat at home. Plans to DC home tomorrow and continue with  PT. No problems anticipated, will plan to advance ambulation and attempt stairs at next session.     Time Calculation:   PT Charges     Row Name 03/09/21 1600             Time Calculation    Start Time  1535  -EE       Stop Time  1555  -EE      Time Calculation (min)  20 min  -EE      PT Received On  03/09/21  -EE      PT - Next Appointment  03/10/21  -EE      PT Goal Re-Cert Due Date  03/12/21  -EE         Time Calculation- PT    Total Timed Code Minutes- PT  11 minute(s)  -EE        User Key  (r) = Recorded By, (t) = Taken By, (c) = Cosigned By    Initials Name Provider Type    EE Sultana Brumfield, PT Physical Therapist        Therapy Charges for Today     Code Description Service Date Service Provider Modifiers Qty    13165144758 HC PT EVAL LOW COMPLEXITY 2 3/9/2021 Sultana Brumfield, PT GP 1    67498051067 HC PT THER PROC EA 15 MIN 3/9/2021 Sultana Brumfield, PT GP 1          PT G-Codes  Outcome Measure Options: AM-PAC 6 Clicks Basic Mobility (PT)  AM-PAC 6 Clicks Score (PT): 18      Patient was wearing a face mask during this therapy encounter. Therapist used appropriate personal protective equipment including eye protection, mask, and gloves.  Mask used was standard procedure mask. Appropriate PPE was worn during the entire therapy session. Hand hygiene was completed before and after therapy session. Patient is not in enhanced droplet precautions.     Sultana Brumfield PT  3/9/2021

## 2021-03-10 LAB
ANION GAP SERPL CALCULATED.3IONS-SCNC: 7.6 MMOL/L (ref 5–15)
BUN SERPL-MCNC: 10 MG/DL (ref 8–23)
BUN/CREAT SERPL: 17.2 (ref 7–25)
CALCIUM SPEC-SCNC: 9 MG/DL (ref 8.6–10.5)
CHLORIDE SERPL-SCNC: 105 MMOL/L (ref 98–107)
CO2 SERPL-SCNC: 26.4 MMOL/L (ref 22–29)
CREAT SERPL-MCNC: 0.58 MG/DL (ref 0.57–1)
GFR SERPL CREATININE-BSD FRML MDRD: 102 ML/MIN/1.73
GLUCOSE SERPL-MCNC: 99 MG/DL (ref 65–99)
HCT VFR BLD AUTO: 36.3 % (ref 34–46.6)
HGB BLD-MCNC: 12.3 G/DL (ref 12–15.9)
POTASSIUM SERPL-SCNC: 4.5 MMOL/L (ref 3.5–5.2)
SODIUM SERPL-SCNC: 139 MMOL/L (ref 136–145)

## 2021-03-10 PROCEDURE — 63710000001 TAMSULOSIN 0.4 MG CAPSULE: Performed by: ORTHOPAEDIC SURGERY

## 2021-03-10 PROCEDURE — 63710000001 ASPIRIN EC 325 MG TABLET DELAYED-RELEASE: Performed by: ORTHOPAEDIC SURGERY

## 2021-03-10 PROCEDURE — 97116 GAIT TRAINING THERAPY: CPT

## 2021-03-10 PROCEDURE — 63710000001 PHENOBARBITAL 32.4 MG TABLET: Performed by: ORTHOPAEDIC SURGERY

## 2021-03-10 PROCEDURE — G0378 HOSPITAL OBSERVATION PER HR: HCPCS

## 2021-03-10 PROCEDURE — A9270 NON-COVERED ITEM OR SERVICE: HCPCS | Performed by: ORTHOPAEDIC SURGERY

## 2021-03-10 PROCEDURE — 97110 THERAPEUTIC EXERCISES: CPT

## 2021-03-10 PROCEDURE — 63710000001 HYDROCODONE-ACETAMINOPHEN 5-325 MG TABLET: Performed by: ORTHOPAEDIC SURGERY

## 2021-03-10 PROCEDURE — 85018 HEMOGLOBIN: CPT | Performed by: ORTHOPAEDIC SURGERY

## 2021-03-10 PROCEDURE — 63710000001 LEVOTHYROXINE 50 MCG TABLET: Performed by: ORTHOPAEDIC SURGERY

## 2021-03-10 PROCEDURE — 80048 BASIC METABOLIC PNL TOTAL CA: CPT | Performed by: ORTHOPAEDIC SURGERY

## 2021-03-10 PROCEDURE — 63710000001 MELOXICAM 15 MG TABLET: Performed by: ORTHOPAEDIC SURGERY

## 2021-03-10 PROCEDURE — 63710000001 FAMOTIDINE 20 MG TABLET: Performed by: ORTHOPAEDIC SURGERY

## 2021-03-10 PROCEDURE — 63710000001 PRAMIPEXOLE 0.5 MG TABLET: Performed by: ORTHOPAEDIC SURGERY

## 2021-03-10 PROCEDURE — 85014 HEMATOCRIT: CPT | Performed by: ORTHOPAEDIC SURGERY

## 2021-03-10 PROCEDURE — 63710000001 PRAVASTATIN 40 MG TABLET: Performed by: ORTHOPAEDIC SURGERY

## 2021-03-10 RX ORDER — HYDROCODONE BITARTRATE AND ACETAMINOPHEN 5; 325 MG/1; MG/1
2 TABLET ORAL EVERY 4 HOURS PRN
Qty: 50 TABLET | Refills: 0 | Status: SHIPPED | OUTPATIENT
Start: 2021-03-10 | End: 2021-03-19

## 2021-03-10 RX ORDER — ASPIRIN 325 MG
325 TABLET, DELAYED RELEASE (ENTERIC COATED) ORAL DAILY
Qty: 30 TABLET | Refills: 0 | Status: SHIPPED | OUTPATIENT
Start: 2021-03-10 | End: 2022-04-19

## 2021-03-10 RX ORDER — PRAMIPEXOLE DIHYDROCHLORIDE 0.5 MG/1
0.5 TABLET ORAL DAILY
Status: DISCONTINUED | OUTPATIENT
Start: 2021-03-10 | End: 2021-03-11 | Stop reason: HOSPADM

## 2021-03-10 RX ORDER — TAMSULOSIN HYDROCHLORIDE 0.4 MG/1
0.4 CAPSULE ORAL ONCE
Status: COMPLETED | OUTPATIENT
Start: 2021-03-10 | End: 2021-03-10

## 2021-03-10 RX ADMIN — TAMSULOSIN HYDROCHLORIDE 0.4 MG: 0.4 CAPSULE ORAL at 09:24

## 2021-03-10 RX ADMIN — MELOXICAM 15 MG: 15 TABLET ORAL at 09:24

## 2021-03-10 RX ADMIN — PHENOBARBITAL 129.6 MG: 32.4 TABLET ORAL at 21:35

## 2021-03-10 RX ADMIN — PHENOBARBITAL 64.8 MG: 32.4 TABLET ORAL at 11:46

## 2021-03-10 RX ADMIN — HYDROCODONE BITARTRATE AND ACETAMINOPHEN 1 TABLET: 5; 325 TABLET ORAL at 05:41

## 2021-03-10 RX ADMIN — ASPIRIN 325 MG: 325 TABLET, COATED ORAL at 09:24

## 2021-03-10 RX ADMIN — PRAVASTATIN SODIUM 40 MG: 40 TABLET ORAL at 20:32

## 2021-03-10 RX ADMIN — FAMOTIDINE 40 MG: 20 TABLET, FILM COATED ORAL at 09:23

## 2021-03-10 RX ADMIN — PRAMIPEXOLE DIHYDROCHLORIDE 0.5 MG: 0.5 TABLET ORAL at 09:24

## 2021-03-10 RX ADMIN — LEVOTHYROXINE SODIUM 50 MCG: 0.05 TABLET ORAL at 05:03

## 2021-03-10 RX ADMIN — HYDROCODONE BITARTRATE AND ACETAMINOPHEN 1 TABLET: 5; 325 TABLET ORAL at 20:32

## 2021-03-10 NOTE — PROGRESS NOTES
Discharge Planning Assessment  [facility]     Patient Name: Mariela Ruiz  MRN: [id number]  Today's Date: 3/10/2021    Admit Date: 3/9/2021    Discharge Needs Assessment     Row Name 03/10/21 1017       Living Environment    Lives With  spouse    Name(s) of Who Lives With Patient  /Tyson.    Current Living Arrangements  home/apartment/condo    Primary Care Provided by  self    Provides Primary Care For  no one    Family Caregiver if Needed  spouse    Quality of Family Relationships  helpful;involved;supportive    Able to Return to Prior Arrangements  yes       Resource/Environmental Concerns    Transportation Concerns  car, none       Transition Planning    Patient/Family Anticipates Transition to  home with family;home with help/services    Patient/Family Anticipated Services at Transition      Transportation Anticipated  family or friend will provide       Discharge Needs Assessment    Readmission Within the Last 30 Days  no previous admission in last 30 days    Equipment Currently Used at Home  cane, straight;walker, rolling    Discharge Facility/Level of Care Needs  home with home health    Provided Post Acute Provider List?  Refused    Refused Provider List Comment  Requests [facility] .        Discharge Plan     Row Name 03/10/21 1019       Plan    Plan  Home with family support & [facility].    Patient/Family in Agreement with Plan  yes    Plan Comments  Spoke with the patient, verified current information and CCP role explained. Patient said she lives with her /Tyson and has good family support at home. She's IADL and uses both a cane and walker at home. She's been to both [facility] and [place] SNFs in the past. Patient has also worked with [facility] and [facility] . Patient plans to d/c home with family support and  PT. She requests a referral to [facility] . Referral sent in [facility]. Patient also said she plans for her daughter/Javan to transport her  home by car at d/c. No other needs identified.        Continued Care and Services - Admitted Since 3/9/2021     Home Medical Care     Service Provider Request Status Selected Services Address Phone Fax Patient Preferred    [facility]  Pending - Request Sent N/A [address], [address], [facility] [address] [phone] --              Expected Discharge Date and Time     Expected Discharge Date Expected Discharge Time    Mar 10, 2021         Demographic Summary     Row Name 03/10/21 1016       General Information    Admission Type  observation    Reason for Consult  discharge planning    Preferred Language  English     Used During This Interaction  no       Contact Information    Permission Granted to Share Info With  ;family/designee        Functional Status     Row Name 03/10/21 1016       Functional Status    Usual Activity Tolerance  good    Current Activity Tolerance  good       Functional Status, IADL    Medications  independent    Meal Preparation  assistive equipment    Housekeeping  assistive equipment    Laundry  assistive equipment    Shopping  assistive equipment       Mental Status Summary    Recent Changes in Mental Status/Cognitive Functioning  no changes        Psychosocial     Row Name 03/10/21 1017       Intellectual Performance WDL    Level of Consciousness  Alert       Coping/Stress    Patient Personal Strengths  able to adapt    Sources of Support  spouse    Reaction to Health Status  accepting    Understanding of Condition and Treatment  adequate understanding of medical condition       Developmental Stage (Eriksson's)    Developmental Stage  Stage 8 (65 years-death/Late Adulthood) Integrity vs. Despair        Abuse/Neglect    No documentation.       Legal    No documentation.       Substance Abuse    No documentation.       Patient Forms    No documentation.           Joyce Stewart RN

## 2021-03-10 NOTE — PLAN OF CARE
Goal Outcome Evaluation:  Plan of Care Reviewed With: patient, daughter      Vss and on RA. Up with stand by assist with walker. A&Ox4. Forgetful at times. Unable to empty bladder. Refer to documentation for PVR. Discharge once urinary retention is resolved. PO pain medication controlling pain. No distress noted. Dressing CDI. No distress noted.

## 2021-03-10 NOTE — PLAN OF CARE
Goal Outcome Evaluation:  Plan of Care Reviewed With: patient  Progress: improving  Outcome Summary: vss, dsg c/d/i, neurovascular status wnl, voiding-states some hesitancy to start urine stream, reports adequate pain control, home discharge planned and possibly today, will cont to monitor

## 2021-03-10 NOTE — DISCHARGE INSTRUCTIONS
Total Hip  Discharge Instructions  Dr. SARATH Wilcox” Redby II  [phone]    • INCISION CARE  o Wash your hands prior to dressing changes  o ROSALINE Wound VAC: Postoperatively you had a ROSALINE Wound Vac placed on the incision. This was placed under sterile conditions in the operating room. It remains in place for 7 days postoperatively. After 7 days, the entire dressing must be removed, including all of the sticky adhesive. The dressing and battery pack provide gentle suction to the incision and provide several benefits over a traditional dressing:  - It maintains the sterile environment of the OR and reduces the risk of infection  - The suction removes unwanted buildup of blood/hematoma under the skin to reduce swelling  - The suction also promotes fresh blood supply to the skin and soft tissue to speed up healing  - The postoperative scar is reduced in size  - Showering is permitted immediately after surgery, but the battery pack must be protected or removed during the shower.   o After 7 days the ROSALINE Wound Vac is removed. If there is no drainage, no dressing is required. If there is some scant drainage a dry bandage can be applied and changed daily until seen in the office or until the drainage stops.   o No creams or ointments to the incisions until 4 weeks post op.  o Do not touch or pick at the incision  o Check incision every day and notify surgeon immediately if any of the following signs or symptoms are seen:  - Increase in redness  - Increase in swelling around the incision and of the entire extremity  - Increase in pain  - NEW drainage or oozing from the incision  - Pulling apart of the edges of the incision  - Increase in overall body temperature (greater than 100.4 degrees)  o Zip-Line: your incision was closed with a state of the art device.   - Is a non-invasive and easy to use wound closure device that replaces sutures, staples and glue for surgical incisions  - It minimizes scarring and eliminating  “railroad” marks that come with staples or sutures  - It makes removal as atraumatic as peeling off a bandage  - Can be removed at home or by a physical therapist or nurse at 14 days postoperatively    • ACTIVITIES  o Exercises:  - Physical therapy will begin immediately while in the hospital. Patients going to a nursing home will get therapy as part of their care at the SNU/SNF facility. Patients going home may also have a therapist come to the house to help them mobilize until they can safely get to an outpatient therapy facility.  - Elevate the affected leg most of the day during the first week post operatively. Caution must be taken to avoid pillow placement directly under the heel of the leg, as this can cause pressure ulcers even with a soft pillow. All pillows and blankets should be placed underneath of the thigh and calf so that the heel is free-floating.  - Use cold packs for 20-30 minutes approximately 5 times per day.  - You should perform the daily stretching and strengthening exercises as taught by the therapist as often as possible. This can be done many times a day.  - Full weight bearing is allowed after surgery. It will be sore/painful to put weight on the leg, but this will help the bone to heal and prevent complications such as pneumonia, bed sores and blood clots. Mobilization is vital to the recovery process.  o Activities of Daily Living:  - No tub baths, hot tubs, or swimming pools for 4 weeks.  - May shower and let water run over the incision immediately after surgery. The battery pack of the ROSALINE Wound Vac must be protected or removed while in the shower. After the ROSALINE is removed 7 days after surgery showering is permitted as long as there is no drainage from the wound.     • Restrictions  o Weight: It is ok to allow full weight bearing after surgery. Weight on the leg actually quickens the recovery process. While it will be sore/painful to put weight on the leg, it is safe to do so. Hip  replacement after hip fracture has a much slower recover process. It can take months to heal fully from a hip fracture and patients even make some slow benefits up to a year afterwards.   o Driving: Many patients have questions about when it is safe to return to driving. The answer is that this is extremely variable. It depends on the extent of the surgery, as well as how quickly you heal. Certainly left leg surgeries make returning to driving easier while right leg surgeries require more extensive rehabilitation before driving can be safe. Until you can press down on the brake hard, and are off of all narcotics, driving is not permitted. Your surgeon cannot “clear” you to return to driving, only you can make the decision when you feel it is safe.    • Medications  o Anticoagulants: After upper extremity surgery most patients do not require an anticoagulant unless you have another injury that will be keeping you from mobilizing. Lower extremity surgery typically does require use of an anticoagulation medicine.   - IF YOU HAD LOWER EXTREMITY SURGERY AND ARE NOT DISCHARGED HOME WITH ANY ANTICOAGULANT MEDICINE YOU SHOULD TAKE ASPIRIN 325mg DAILY FOR 30 DAYS POSTOPERATIVELY.  - If you are discharged home with an anticoagulant such as Aspirin, Xarelto, Eliquis, Coumadin, or Lovenox, follow these simple instructions:   - Notify surgeon immediately if any sarah bleeding is noted in the urine, stool, emesis, or from the nose or the incision. Blood in the stool will often appear as black rather than red. Blood in urine may appear as pink. Blood in emesis may appear as brown/black like coffee grounds.  - You will need to apply pressure for longer periods of time to any cuts or abrasions to stop bleeding  - Avoid alcohol while taking anticoagulants  - Most anticoagulants are to be taken for 30 days postoperatively. After this time, you may stop using them unless instructed otherwise.   - If you were already taking an  anticoagulant (commonly Aspirin, Coumadin, or Plavix) you will likely be resuming your normal dose postoperatively and will be continuing that medication at the discretion of the prescribing physician.  o Stool Softeners: You will be at greater risk of constipation after surgery due to being less mobile and the pain medications.  - Take stool softeners as needed. Over the counter Colace 100 mg 1-2 capsules twice daily can be taken.  - If stools become too loose or too frequent, please decreases the dosage or stop the stool softener.  - If constipation occurs despite use of stool softeners, you are to continue the stool softeners and add a laxative (Milk of Magnesia 1 ounce daily as needed)  - Drink plenty of fluids, and eat fruits and vegetables during your recovery time. Getting up and mobilizing will help the bowels to recover their regular function, as will weaning off of all narcotics when the pain becomes tolerable.  o Pain Medications: Utilized after surgery are narcotics. This is some general information about these medications.  - CLASSIFICATION: Pain medications are called Opioids and are narcotics  - LEGALITIES: It is illegal to share narcotics with others  - DRIVING: it is illegal to drive while under the influence of narcotics. Doing so is a DUI.  - POTENTIAL SIDE EFFECTS: nausea, vomiting, itching, dizziness, drowsiness, dry mouth, constipation, and difficulty urinating.  - POTENTIAL ADVERSE EFFECTS:  - Opioid tolerance can develop with use of pain medications and this simply means that it requires more and more of the medication to control pain. However, this is seen more in patients that use opioids for longer periods of time.  - Opioid dependence can develop with use of Opioids. People with opioid dependence will experience withdrawal symptoms upon cessation of the medication.  - Opioid addiction can develop with use of Opioids. The incidence of this is very unlikely in patients who take the  medications as ordered and stop the medications as instructed.  - Opioid overdose can be dangerous, but is unlikely when the medication is taken as ordered and stopped when ordered. It is important not to mix opioids with alcohol as this can lead to over sedation and respiratory difficulty.  - DOSAGE:  - After the initial surgical pain begins to resolve, you may begin to decrease the pain medication. By the end of a few weeks, you should be off of pain medications.  - Refills will not be given by the office during evening hours, on weekends, or after 6 weeks post-op. You are responsible for weaning off of pain medication. You can increase the time between narcotic pills, taking one every 4 then 6 then 8 hours and so on.  - To seek refills on pain medications during the initial 6-week post-operative period, you must call the office to request the refill. The office will then notify you when to  the prescription. DO NOT wait until you are out of the medication to request a refill. Prescriptions will not be filled over the weekend and depending on the schedule, it may take a couple days for the prescription to be available. Someone will have to pick the prescription in person at the office.    • FOLLOW-UP VISITS  o You will need to follow up in the office with your surgeon in 3 weeks, or as instructed elsewhere in your discharge paperwork. Please call this number [phone] to schedule this appointment. If you are going to an SNF/SNU facility, they will arrange for you to follow up in the office.  o If you have any concerns or suspected complications prior to your follow up visit, please call the office. Do not wait until your appointment time if you suspect complications. These will need to be addressed in the office promptly.      Christiano Cesar II, MD  Orthopaedic Surgery  [facility]

## 2021-03-10 NOTE — DISCHARGE SUMMARY
Orthopaedic Discharge Summary  Dr. CLEMENS “Danny” Tali II  [phone]    NAME: Mariela Ruiz PCP: Duglas Rock MD   :  MRN: [id number]  [id number] LOS:  ADMIT: 0 days  3/9/2021   AGE/SEX: 76 y.o. 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]    · Surgery Performed: [place] REVISE TOTAL HIP REPLACEMENT [65543] (LEFT TOTAL HIP ARTHROPLASTY REVISION POSTERIOR)    · Discharge Medications:         Discharge Medications      New Medications      Instructions Start Date   aspirin  MG tablet   325 mg, Oral, Daily      HYDROcodone-acetaminophen 5-325 MG per tablet  Commonly known as: NORCO   2 tablets, Oral, Every 4 Hours PRN         Changes to Medications      Instructions Start Date   PHENobarbital 64.8 MG tablet  Commonly known as: LUMINAL  What changed:   · how much to take  · how to take this  · when to take this  · additional instructions   TAKE 3 TABLETS BY MOUTH DAILY         Continue These Medications      Instructions Start Date   CHLORHEXIDINE GLUCONATE CLOTH EX   Apply externally, AS DIRECTED PREOP       cholecalciferol 25 MCG (1000 UT) tablet  Commonly known as: VITAMIN D3   1,000 Units, Oral, Daily      coenzyme Q10 100 MG capsule   100 mg, Oral, Daily, HOLD PRIOR TO SURG      mupirocin 2 % nasal ointment  Commonly known as: BACTROBAN   Nasal, 2 Times Daily, AS DIRECTED PREOP       pramipexole 0.5 MG tablet  Commonly known as: MIRAPEX   TAKE 4 TABLETS BY MOUTH ONCE DAILY      pravastatin 40 MG tablet  Commonly known as: PRAVACHOL   TAKE 1 TABLET BY MOUTH EVERY DAY      Synthroid 50 MCG tablet  Generic drug: levothyroxine   TAKE 1 TABLET BY MOUTH IN THE MORNING BEFORE BREAKFAST             · Vitals:     Vitals:    21 1420 21 1901 21 2253 03/10/21 0222   BP: 110/62 124/67 118/69 119/69   BP Location: Left arm Right arm Right arm Right arm   Patient Position: Lying Lying Lying Lying   Pulse: 83 75 83 80   Resp: 16 16 16 16   Temp: 97.3 °F  (36.3 °C) 97.5 °F (36.4 °C) 97.7 °F (36.5 °C) 98.2 °F (36.8 °C)   TempSrc: Skin Skin Skin Skin   SpO2: 94% 94% 95% 99%   Weight:       Height:           · Labs:      Admission on 03/09/2021   Component Date Value Ref Range Status   • Hemoglobin 03/09/2021 12.4  12.0 - 15.9 g/dL Final   • Hematocrit 03/09/2021 37.7  34.0 - 46.6 % Final   • Glucose 03/10/2021 99  65 - 99 mg/dL Final   • BUN 03/10/2021 10  8 - 23 mg/dL Final   • Creatinine 03/10/2021 0.58  0.57 - 1.00 mg/dL Final   • Sodium 03/10/2021 139  136 - 145 mmol/L Final   • Potassium 03/10/2021 4.5  3.5 - 5.2 mmol/L Final   • Chloride 03/10/2021 105  98 - 107 mmol/L Final   • CO2 03/10/2021 26.4  22.0 - 29.0 mmol/L Final   • Calcium 03/10/2021 9.0  8.6 - 10.5 mg/dL Final   • eGFR Non African Amer 03/10/2021 102  >60 mL/min/1.73 Final   • BUN/Creatinine Ratio 03/10/2021 17.2  7.0 - 25.0 Final   • Anion Gap 03/10/2021 7.6  5.0 - 15.0 mmol/L Final   • Hemoglobin 03/10/2021 12.3  12.0 - 15.9 g/dL Final   • Hematocrit 03/10/2021 36.3  34.0 - 46.6 % Final        No results found.    · Hospital Course:   76 y.o. 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 Post-operatively the patient transferred to the floor where the patient underwent mobilization therapy. Opioids were titrated to achieve appropriate pain management to allow for participation in mobilization exercises. Vital signs and laboratory values are now within safe parameters for discharge. The dressings and/or incision is intact without signs or symptoms of active infection. Operative extremity neurovascular status remains intact as compared to the preoperative exam. Appropriate education re: incision care, activity levels, medications, and follow up visits was completed and all questions were answered. The  "patient is now deemed stable for discharge.    HOME: The patient progressed well with physical therapy. There were cleared for discharge to home. The patietn was sent home in good condition}.       R \"Danny\" Tali ALLEN MD  Orthopaedic Surgery  [facility]  [phone]                                               "

## 2021-03-10 NOTE — NURSING NOTE
Dr Cesar here, notified that patient  is having large urine residual and required I/o cath. Orders given.

## 2021-03-10 NOTE — PROGRESS NOTES
"Plan home discharge today as long as she is able to urinate on her own    R \"Danny\" Tali ALLEN MD  Orthopaedic Surgery  [facility]  [phone] - [place] Office  [phone] - [place] Office    "

## 2021-03-10 NOTE — PLAN OF CARE
Goal Outcome Evaluation:     Progress: improving  Outcome Summary: Pt doing well this am, although she does reports L hip soreness. Pt improving with mobility today. She was able to ambulate approx 250 ft with Rwx and negotiate 4 steps with CGA. She also tolerated all hip exercises well. Plans home today as long as she is able to urinate prior to DC.  Patient was intermittently wearing a face mask during this therapy encounter. Therapist used appropriate personal protective equipment including eye protection, mask, and gloves.  Mask used was standard procedure mask. Appropriate PPE was worn during the entire therapy session. Hand hygiene was completed before and after therapy session. Patient is not in enhanced droplet precautions.

## 2021-03-10 NOTE — DISCHARGE PLACEMENT REQUEST
"Marsha Mares (76 y.o. Female)     Date of Birth Social Security Number Address Home Phone MRN    1945  [address] [id number] [phone] [id number]    Protestant Marital Status          Restoration        Admission Date Admission Type Admitting Provider Attending Provider Department, Room/Bed    3/9/21 Elective Christiano Cesar II, MD Sweet, Richard Alexander II, MD [address], P886/1    Discharge Date Discharge Disposition Discharge Destination         Home or Self Care              Attending Provider: Christiano Cesar II, MD    Allergies: Clindamycin/lincomycin, Sulfa Antibiotics, Duricef [Cefadroxil], Keflex [Cephalexin]    Isolation: None   Infection: None   Code Status: CPR    Ht: 177.8 cm (70\")   Wt: 90.4 kg (199 lb 4.8 oz)    Admission Cmt: None   Principal Problem: None                Active Insurance as of 3/9/2021     Primary Coverage     Payor Plan Insurance Group Employer/Plan Group    MEDICARE RAILROAD MEDICARE      Payor Plan Address Payor Plan Phone Number Payor Plan Fax Number Effective Dates    [address] [phone]  2/1/2012 - None Entered    [facility] [id number]       Subscriber Name Subscriber Birth Date Member ID       MARSHA MARES 1945 [id number]           Secondary Coverage     Payor Plan Insurance Group Employer/Plan Group    [employer] [place] [employer] [place]     Payor Plan Address Payor Plan Phone Number Payor Plan Fax Number Effective Dates    [address] [place] [address] [phone]  2/1/2012 - None Entered    [place] [address]       Subscriber Name Subscriber Birth Date Member ID       MARSHA MARES 1945 [id number]                 Emergency Contacts      (Rel.) Home Phone Work Phone Mobile Phone    Tyson Mares (Spouse) [phone] -- --    Ronda Keita (Daughter) [phone] -- [phone]    Justo Collier (Son) [phone] -- --    Julia Jeter (Friend) -- -- " [phone]

## 2021-03-10 NOTE — THERAPY TREATMENT NOTE
Patient Name: Mariela Ruiz  : 1945    MRN: [id number]                              Today's Date: 3/10/2021       Admit Date: 3/9/2021    Visit Dx: No diagnosis found.  Patient Active Problem List   Diagnosis   • Seizure disorder (CMS/[facility])   • Hyperlipidemia   • Vitamin D insufficiency   • Age-related osteoporosis without current pathological fracture   • Sleep apnea   • Chronic venous insufficiency   • Postsurgical hypothyroidism   • Chronic fatigue syndrome   • Gastroesophageal reflux disease   • Dupuytren's contracture   • History of biliary T-tube placement   • History of partial thyroidectomy   • Mitral valve prolapse   • Multinodular goiter   • Right fascicular block   • Restless legs syndrome   • History of cardiac catheterization   • Urge incontinence of urine   • Left knee pain   • Ligamentous laxity of knee   • DJD (degenerative joint disease) of knee   • Adverse drug effect, subsequent encounter   • Abnormal blood level of chromium   • Abnormal blood level of cobalt   • Family history of diabetes mellitus   • Wasp sting   • Cellulitis of right upper extremity   • Closed fracture of neck of right femur (CMS/[facility])   • Thrombocytopenia (CMS/[facility])   • .   • Fever   • S/P revision of total hip     Past Medical History:   Diagnosis Date   • Abnormal EKG     LAST SAW CARDIOLOGY  DR DORAN   • Arthritis    • BBB (bundle branch block)     RIGHT   • Chronic venous insufficiency    • Dupuytren's contracture    • History of staph infection     S/P KNEE DEC 2016   • History of transfusion    • Hyperlipidemia    • Lymphedema     LEFT LEG   • MVP (mitral valve prolapse)    • Nontoxic multinodular goiter    • Osteoporosis     Dr. Cabrera   • Pelvic joint pain, left    • Postsurgical hypothyroidism    • Presence of left artificial hip joint     METAL ON METAL AS NOTED PER DR CLEMENT NOTE   • Restless leg syndrome    • Seizure disorder (CMS/[facility])     Dr. Whitman, HX  LAST SEIZURE   • Sleep apnea     DOES NOT  HAVE MACHINE   • Vitamin D insufficiency      Past Surgical History:   Procedure Laterality Date   • APPENDECTOMY     • CARDIAC CATHETERIZATION      NORMAL    • COLONOSCOPY  08/17/2017    Normal except for anal fissure.  Dr. Brandt.  [facility].   • KNEE MINI REVISION Left 11/15/2016    Procedure: LEFT KNEE POLY CHANGE WITH HI POST STABILIZER;  Surgeon: Pablito Claudio MD;  Location: [facility] OR;  Service:    • KNEE MINI REVISION Left 12/13/2016    Procedure: LT KNEE REMOVAL/REPLACEMENT LOCKING PIN ;  Surgeon: Pablito Claudio MD;  Location: [facility] OR;  Service:    • MAMMO FINE NEEDLE ASPIRATION UNILATERAL      RIGHT THYROID NODULE, 2009 BENIGN    • NH REVISE KNEE JOINT REPLACE,1 PART Left 2/20/2017    Procedure: LT KNEE REMOVAL ANTIBIOTIC SPACER AND KNEE REVISION;  Surgeon: Christiano Cesar MD;  Location: [facility] OR;  Service: Orthopedics   • NH TOTAL KNEE ARTHROPLASTY Left 12/24/2016    Procedure: LEFT KNEE WASHOUT WITH POLY CHANGE;  Surgeon: Christiano Cesar MD;  Location: [facility] OR;  Service: Orthopedics   • REPLACEMENT TOTAL KNEE Left    • THYROIDECTOMY, PARTIAL      1979 LEFT LOBECTOMY AND ISTHMECTOMY    • TOTAL ABDOMINAL HYSTERECTOMY WITH SALPINGO OOPHORECTOMY     • TOTAL HIP ARTHROPLASTY Left    • TOTAL HIP ARTHROPLASTY Right 9/14/2019    Procedure: TOTAL HIP ARTHROPLASTY ANTERIOR WITH HANA TABLE;  Surgeon: Christiano Cesar II, MD;  Location: [facility];  Service: Orthopedics   • TOTAL KNEE  PROSTHESIS REMOVAL W/ SPACER INSERTION Left 12/29/2016    Procedure: LT KNEE IMPLANT REMOVAL WITH INSERTION OF SPACER ;  Surgeon: Christiano Cesar MD;  Location: [facility];  Service:      General Information     Row Name 03/10/21 0911          Physical Therapy Time and Intention    Document Type  discharge treatment;therapy note (daily note)  -EJ     Mode of Treatment  physical therapy  -EJ       User Key  (r) = Recorded By, (t) = Taken By, (c) = Cosigned By    Initials Name  Provider Type    Tabitha Saeed, PT Physical Therapist        Mobility     Row Name 03/10/21 0911          Bed Mobility    Supine-Sit [place] (Bed Mobility)  contact guard  -EJ     Assistive Device (Bed Mobility)  head of bed elevated;bed rails  -EJ     Row Name 03/10/21 0911          Sit-Stand Transfer    Sit-Stand [place] (Transfers)  contact guard;verbal cues  -EJ     Assistive Device (Sit-Stand Transfers)  walker, front-wheeled  -EJ     Row Name 03/10/21 0911          Gait/Stairs (Locomotion)    [place] Level (Gait)  contact guard;verbal cues  -EJ     Assistive Device (Gait)  walker, front-wheeled  -EJ     Distance in Feet (Gait)  250  -EJ     Deviations/Abnormal Patterns (Gait)  gunnar decreased;left sided deviations;antalgic  -EJ     Bilateral Gait Deviations  forward flexed posture  -EJ     [place] Level (Stairs)  verbal cues;contact guard  -EJ     Handrail Location (Stairs)  both sides  -EJ     Number of Steps (Stairs)  4  -EJ     Ascending Technique (Stairs)  step-to-step  -EJ     Descending Technique (Stairs)  step-to-step  -EJ     Comment (Gait/Stairs)  cues for sequencing  -EJ       User Key  (r) = Recorded By, (t) = Taken By, (c) = Cosigned By    Initials Name Provider Type    Tabitha Saeed, PT Physical Therapist        Obj/Interventions     Row Name 03/10/21 0913          Motor Skills    Therapeutic Exercise  -- L THR protocol x 10 reps  -EJ       User Key  (r) = Recorded By, (t) = Taken By, (c) = Cosigned By    Initials Name Provider Type    Tabitha Saeed, PT Physical Therapist        Goals/Plan    No documentation.       Clinical Impression     Row Name 03/10/21 0913          Pain Scale: Numbers Pre/Post-Treatment    Pretreatment Pain Rating  6/10  -EJ     Posttreatment Pain Rating  6/10  -EJ     Pain Location - Side  Left  -EJ     Pain Location  hip  -EJ     Pain Intervention(s)  Repositioned;Ambulation/increased activity  -EJ     Row Name 03/10/21 0913           Plan of Care Review    Progress  improving  -EJ     Outcome Summary  Pt doing well this am, although she does reports L hip soreness. Pt improving with mobility today. She was able to ambulate approx 250 ft with Rwx and negotiate 4 steps with CGA. She also tolerated all hip exercises well. Plans home today as long as she is able to urinate prior to DC.  -EJ     Row Name 03/10/21 0913          Positioning and Restraints    Pre-Treatment Position  in bed  -EJ     Post Treatment Position  chair  -EJ     In Chair  notified nsg;reclined;call light within reach;encouraged to call for assist;exit alarm on;with family/caregiver  -EJ       User Key  (r) = Recorded By, (t) = Taken By, (c) = Cosigned By    Initials Name Provider Type    Tabitha Saeed, PT Physical Therapist        Outcome Measures     Row Name 03/10/21 0914          How much help from another person do you currently need...    Turning from your back to your side while in flat bed without using bedrails?  4  -EJ     Moving from lying on back to sitting on the side of a flat bed without bedrails?  3  -EJ     Moving to and from a bed to a chair (including a wheelchair)?  3  -EJ     Standing up from a chair using your arms (e.g., wheelchair, bedside chair)?  3  -EJ     Climbing 3-5 steps with a railing?  3  -EJ     To walk in hospital room?  3  -EJ     AM-PAC 6 Clicks Score (PT)  19  -EJ       User Key  (r) = Recorded By, (t) = Taken By, (c) = Cosigned By    Initials Name Provider Type    Tabitha Saeed, PT Physical Therapist        Physical Therapy Education                 Title: PT OT SLP Therapies (Resolved)     Topic: Physical Therapy (Resolved)     Point: Mobility training (Resolved)     Learning Progress Summary           Patient Acceptance, E,TB, VU,NR by EE at 3/9/2021 1600                   Point: Home exercise program (Resolved)     Learning Progress Summary           Patient Acceptance, E,TB, VU,NR by EE at 3/9/2021 1600                    Point: Body mechanics (Resolved)     Learner Progress:  Not documented in this visit.          Point: Precautions (Resolved)     Learning Progress Summary           Patient Acceptance, E,TB, VU,NR by  at 3/9/2021 1600                               User Key     Initials Effective Dates Name Provider Type Discipline     04/03/18 -  Sultana Brumfield, PT Physical Therapist PT              PT Recommendation and Plan     Progress: improving  Outcome Summary: Pt doing well this am, although she does reports L hip soreness. Pt improving with mobility today. She was able to ambulate approx 250 ft with Rwx and negotiate 4 steps with CGA. She also tolerated all hip exercises well. Plans home today as long as she is able to urinate prior to DC.     Time Calculation:   PT Charges     Row Name 03/10/21 0915             Time Calculation    Start Time  0845  -EJ      Stop Time  0910  -EJ      Time Calculation (min)  25 min  -EJ      PT Received On  03/10/21  -EJ      PT - Next Appointment  03/11/21  -EJ        User Key  (r) = Recorded By, (t) = Taken By, (c) = Cosigned By    Initials Name Provider Type    Tabitha Saeed, PT Physical Therapist        Therapy Charges for Today     Code Description Service Date Service Provider Modifiers Qty    88770768190 HC PT THER PROC EA 15 MIN 3/10/2021 Tabitha Cox, PT GP 1    07117091538 HC GAIT TRAINING EA 15 MIN 3/10/2021 Tabitha Cox, PT GP 1          PT G-Codes  Outcome Measure Options: AM-PAC 6 Clicks Basic Mobility (PT)  AM-PAC 6 Clicks Score (PT): 19    Tabitha Cox PT  3/10/2021

## 2021-03-11 VITALS
TEMPERATURE: 98.2 F | BODY MASS INDEX: 28.53 KG/M2 | RESPIRATION RATE: 16 BRPM | OXYGEN SATURATION: 95 % | HEART RATE: 100 BPM | SYSTOLIC BLOOD PRESSURE: 125 MMHG | DIASTOLIC BLOOD PRESSURE: 81 MMHG | HEIGHT: 70 IN | WEIGHT: 199.3 LBS

## 2021-03-11 LAB
HCT VFR BLD AUTO: 30.6 % (ref 34–46.6)
HGB BLD-MCNC: 10.2 G/DL (ref 12–15.9)

## 2021-03-11 PROCEDURE — A9270 NON-COVERED ITEM OR SERVICE: HCPCS | Performed by: ORTHOPAEDIC SURGERY

## 2021-03-11 PROCEDURE — 85014 HEMATOCRIT: CPT | Performed by: ORTHOPAEDIC SURGERY

## 2021-03-11 PROCEDURE — 63710000001 FAMOTIDINE 20 MG TABLET: Performed by: ORTHOPAEDIC SURGERY

## 2021-03-11 PROCEDURE — 63710000001 ASPIRIN EC 325 MG TABLET DELAYED-RELEASE: Performed by: ORTHOPAEDIC SURGERY

## 2021-03-11 PROCEDURE — 97110 THERAPEUTIC EXERCISES: CPT

## 2021-03-11 PROCEDURE — 63710000001 PHENOBARBITAL 32.4 MG TABLET: Performed by: ORTHOPAEDIC SURGERY

## 2021-03-11 PROCEDURE — 94799 UNLISTED PULMONARY SVC/PX: CPT

## 2021-03-11 PROCEDURE — 63710000001 MELOXICAM 15 MG TABLET: Performed by: ORTHOPAEDIC SURGERY

## 2021-03-11 PROCEDURE — G0378 HOSPITAL OBSERVATION PER HR: HCPCS

## 2021-03-11 PROCEDURE — 97116 GAIT TRAINING THERAPY: CPT

## 2021-03-11 PROCEDURE — 63710000001 LEVOTHYROXINE 50 MCG TABLET: Performed by: ORTHOPAEDIC SURGERY

## 2021-03-11 PROCEDURE — 85018 HEMOGLOBIN: CPT | Performed by: ORTHOPAEDIC SURGERY

## 2021-03-11 RX ADMIN — FAMOTIDINE 40 MG: 20 TABLET, FILM COATED ORAL at 07:49

## 2021-03-11 RX ADMIN — PHENOBARBITAL 64.8 MG: 32.4 TABLET ORAL at 10:54

## 2021-03-11 RX ADMIN — MELOXICAM 15 MG: 15 TABLET ORAL at 07:49

## 2021-03-11 RX ADMIN — LEVOTHYROXINE SODIUM 50 MCG: 0.05 TABLET ORAL at 05:39

## 2021-03-11 RX ADMIN — ASPIRIN 325 MG: 325 TABLET, COATED ORAL at 07:50

## 2021-03-11 NOTE — PLAN OF CARE
Goal Outcome Evaluation:  Plan of Care Reviewed With: patient  Progress: improving  Outcome Summary: VSS, ambulating well, po pain meds effective, forgetful at times. Still with persistent large post void residual. straight cath for 650cc after voiding 300cc. anticipates discharge today if voiding improves.

## 2021-03-11 NOTE — PROGRESS NOTES
Case Management Discharge Note      Final Note: Home with family support & Yumi MALLOY.    Provided Post Acute Provider List?: Refused  Refused Provider List Comment: Requests Yumi MALLOY.    Selected Continued Care - Discharged on 3/11/2021 Admission date: 3/9/2021 - Discharge disposition: Home or Self Care    Destination    No services have been selected for the patient.              Durable Medical Equipment    No services have been selected for the patient.              Dialysis/Infusion    No services have been selected for the patient.              Home Medical Care Coordination complete    Service Provider Selected Services Address Phone Fax Patient Preferred    [facility] [address], [address], [place] [address] [phone] --          Therapy    No services have been selected for the patient.              Community Resources    No services have been selected for the patient.                       Final Discharge Disposition Code: 06 - home with home health care

## 2021-03-11 NOTE — PLAN OF CARE
Goal Outcome Evaluation:  Plan of Care Reviewed With: patient  Progress: improving  Outcome Summary: Pt is continuing to improve with mobility and requiring less assistance. She was able to ambulate approx 400 ft with Rwx and SBA. No unsteaadiness noted. Encouraged pt to ambulate with nsg as well while here in hospital. Long discussion with pt regarding safety and fall protocols while in hospital and importance of bed/chair alarms. Also discussed with RN. Nsg staff to assist pt with ambulation as well. Pt likely home today.  Patient was intermittently wearing a face mask during this therapy encounter. Therapist used appropriate personal protective equipment including eye protection, mask, and gloves.  Mask used was standard procedure mask. Appropriate PPE was worn during the entire therapy session. Hand hygiene was completed before and after therapy session. Patient is not in enhanced droplet precautions.

## 2021-03-11 NOTE — THERAPY DISCHARGE NOTE
Patient Name: Mariela Ruiz  : 1945    MRN: [id number]                              Today's Date: 3/11/2021       Admit Date: 3/9/2021    Visit Dx: No diagnosis found.  Patient Active Problem List   Diagnosis   • Seizure disorder (CMS/[facility])   • Hyperlipidemia   • Vitamin D insufficiency   • Age-related osteoporosis without current pathological fracture   • Sleep apnea   • Chronic venous insufficiency   • Postsurgical hypothyroidism   • Chronic fatigue syndrome   • Gastroesophageal reflux disease   • Dupuytren's contracture   • History of biliary T-tube placement   • History of partial thyroidectomy   • Mitral valve prolapse   • Multinodular goiter   • Right fascicular block   • Restless legs syndrome   • History of cardiac catheterization   • Urge incontinence of urine   • Left knee pain   • Ligamentous laxity of knee   • DJD (degenerative joint disease) of knee   • Adverse drug effect, subsequent encounter   • Abnormal blood level of chromium   • Abnormal blood level of cobalt   • Family history of diabetes mellitus   • Wasp sting   • Cellulitis of right upper extremity   • Closed fracture of neck of right femur (CMS/[facility])   • Thrombocytopenia (CMS/[facility])   • .   • Fever   • S/P revision of total hip     Past Medical History:   Diagnosis Date   • Abnormal EKG     LAST SAW CARDIOLOGY  DR DORAN   • Arthritis    • BBB (bundle branch block)     RIGHT   • Chronic venous insufficiency    • Dupuytren's contracture    • History of staph infection     S/P KNEE DEC 2016   • History of transfusion    • Hyperlipidemia    • Lymphedema     LEFT LEG   • MVP (mitral valve prolapse)    • Nontoxic multinodular goiter    • Osteoporosis     Dr. Cabrera   • Pelvic joint pain, left    • Postsurgical hypothyroidism    • Presence of left artificial hip joint     METAL ON METAL AS NOTED PER DR CLEMENT NOTE   • Restless leg syndrome    • Seizure disorder (CMS/[facility])     Dr. Whitman, HX  LAST SEIZURE   • Sleep apnea     DOES NOT  HAVE MACHINE   • Vitamin D insufficiency      Past Surgical History:   Procedure Laterality Date   • APPENDECTOMY     • CARDIAC CATHETERIZATION      NORMAL    • COLONOSCOPY  08/17/2017    Normal except for anal fissure.  Dr. Brandt.  [facility].   • KNEE MINI REVISION Left 11/15/2016    Procedure: LEFT KNEE POLY CHANGE WITH HI POST STABILIZER;  Surgeon: Pablito Claudio MD;  Location: [facility] OR;  Service:    • KNEE MINI REVISION Left 12/13/2016    Procedure: LT KNEE REMOVAL/REPLACEMENT LOCKING PIN ;  Surgeon: Pablito Claudio MD;  Location: [facility] OR;  Service:    • MAMMO FINE NEEDLE ASPIRATION UNILATERAL      RIGHT THYROID NODULE, 2009 BENIGN    • [place] REVISE KNEE JOINT REPLACE,1 PART Left 2/20/2017    Procedure: LT KNEE REMOVAL ANTIBIOTIC SPACER AND KNEE REVISION;  Surgeon: Christiano Cesar MD;  Location: [facility] OR;  Service: Orthopedics   • [place] TOTAL KNEE ARTHROPLASTY Left 12/24/2016    Procedure: LEFT KNEE WASHOUT WITH POLY CHANGE;  Surgeon: Christiano Cesar MD;  Location: [facility] OR;  Service: Orthopedics   • REPLACEMENT TOTAL KNEE Left    • THYROIDECTOMY, PARTIAL      1979 LEFT LOBECTOMY AND ISTHMECTOMY    • TOTAL ABDOMINAL HYSTERECTOMY WITH SALPINGO OOPHORECTOMY     • TOTAL HIP ARTHROPLASTY Left    • TOTAL HIP ARTHROPLASTY Right 9/14/2019    Procedure: TOTAL HIP ARTHROPLASTY ANTERIOR WITH HANA TABLE;  Surgeon: Christiano Cesar II, MD;  Location: [facility] OR;  Service: Orthopedics   • TOTAL HIP ARTHROPLASTY REVISION Left 3/9/2021    Procedure: LEFT TOTAL HIP ARTHROPLASTY REVISION POSTERIOR;  Surgeon: Christiano Cesar II, MD;  Location: [facility] OR;  Service: Orthopedics;  Laterality: Left;   • TOTAL KNEE  PROSTHESIS REMOVAL W/ SPACER INSERTION Left 12/29/2016    Procedure: LT KNEE IMPLANT REMOVAL WITH INSERTION OF SPACER ;  Surgeon: Christiano Cesra MD;  Location: [facility] OR;  Service:      General Information     Row Name 03/11/21 0982          Physical Therapy  Time and Intention    Document Type  therapy note (daily note);discharge treatment  -EJ     Mode of Treatment  physical therapy  -EJ     Row Name 03/11/21 0927          General Information    Existing Precautions/Restrictions  left;hip, posterior  -EJ       User Key  (r) = Recorded By, (t) = Taken By, (c) = Cosigned By    Initials Name Provider Type    Tabitha Saeed, PT Physical Therapist        Mobility     Row Name 03/11/21 0927          Bed Mobility    Supine-Sit [place] (Bed Mobility)  supervision  -EJ     Row Name 03/11/21 0927          Sit-Stand Transfer    Sit-Stand [place] (Transfers)  verbal cues;standby assist  -EJ     Assistive Device (Sit-Stand Transfers)  walker, front-wheeled  -EJ     Row Name 03/11/21 0927          Gait/Stairs (Locomotion)    [place] Level (Gait)  verbal cues;standby assist  -EJ     Assistive Device (Gait)  walker, front-wheeled  -EJ     Distance in Feet (Gait)  400  -EJ     Deviations/Abnormal Patterns (Gait)  gunnar decreased  -EJ       User Key  (r) = Recorded By, (t) = Taken By, (c) = Cosigned By    Initials Name Provider Type    Tabitha Saeed PT Physical Therapist        Obj/Interventions    No documentation.       Goals/Plan    No documentation.       Clinical Impression     Row Name 03/11/21 0928          Pain Scale: Numbers Pre/Post-Treatment    Pretreatment Pain Rating  2/10  -EJ     Posttreatment Pain Rating  2/10  -EJ     Pain Location - Side  Left  -EJ     Pain Location  hip  -EJ     Pain Intervention(s)  Repositioned;Ambulation/increased activity  -EJ     Row Name 03/11/21 0928          Plan of Care Review    Plan of Care Reviewed With  patient  -EJ     Progress  improving  -EJ     Outcome Summary  Pt is continuing to improve with mobility and requiring less assistance. She was able to ambulate approx 400 ft with Rwx and SBA. No unsteaadiness noted. Encouraged pt to ambulate with nsg as well while here in hospital. Long discussion with pt  regarding safety and fall protocols while in hospital and importance of bed/chair alarms. Also discussed with RN. [facility] staff to assist pt with ambulation as well. Pt likely home today.  -EJ     Row Name 03/11/21 0928          Positioning and Restraints    Pre-Treatment Position  in bed  -EJ     Post Treatment Position  bathroom  -EJ     Bathroom  notified nsg;sitting;call light within reach;encouraged to call for assist  -EJ       User Key  (r) = Recorded By, (t) = Taken By, (c) = Cosigned By    Initials Name Provider Type    Tabitha Saeed, PT Physical Therapist        Outcome Measures     Row Name 03/11/21 0931          How much help from another person do you currently need...    Turning from your back to your side while in flat bed without using bedrails?  4  -EJ     Moving from lying on back to sitting on the side of a flat bed without bedrails?  4  -EJ     Moving to and from a bed to a chair (including a wheelchair)?  3  -EJ     Standing up from a chair using your arms (e.g., wheelchair, bedside chair)?  3  -EJ     Climbing 3-5 steps with a railing?  3  -EJ     To walk in hospital room?  3  -EJ     AM-PAC 6 Clicks Score (PT)  20  -EJ       User Key  (r) = Recorded By, (t) = Taken By, (c) = Cosigned By    Initials Name Provider Type    Tabitha Saeed, PT Physical Therapist        Physical Therapy Education                 Title: PT OT SLP Therapies (Resolved)     Topic: Physical Therapy (Resolved)     Point: Mobility training (Resolved)     Learning Progress Summary           Patient Acceptance, E,TB, VU,NR by EE at 3/9/2021 1600                   Point: Home exercise program (Resolved)     Learning Progress Summary           Patient Acceptance, E,TB, VU,NR by EE at 3/9/2021 1600                   Point: Body mechanics (Resolved)     Learner Progress:  Not documented in this visit.          Point: Precautions (Resolved)     Learning Progress Summary           Patient Acceptance, E,TB, VU,NR by EE  at 3/9/2021 1600                               User Key     Initials Effective Dates Name Provider Type Discipline    EE 04/03/18 -  Sultana Brumfield, PT Physical Therapist PT              PT Recommendation and Plan     Plan of Care Reviewed With: patient  Progress: improving  Outcome Summary: Pt is continuing to improve with mobility and requiring less assistance. She was able to ambulate approx 400 ft with Rwx and SBA. No unsteaadiness noted. Encouraged pt to ambulate with nsg as well while here in hospital. Long discussion with pt regarding safety and fall protocols while in hospital and importance of bed/chair alarms. Also discussed with RN. Nsg staff to assist pt with ambulation as well. Pt likely home today.     Time Calculation:   PT Charges     Row Name 03/11/21 0931             Time Calculation    Start Time  0900  -EJ      Stop Time  0924  -EJ      Time Calculation (min)  24 min  -EJ      PT Received On  03/11/21  -EJ      PT - Next Appointment  03/12/21  -EJ        User Key  (r) = Recorded By, (t) = Taken By, (c) = Cosigned By    Initials Name Provider Type    Tabitha Saeed, PT Physical Therapist        Therapy Charges for Today     Code Description Service Date Service Provider Modifiers Qty    59278383908 HC PT THER PROC EA 15 MIN 3/10/2021 Tabitha Cox, PT GP 1    57202997404 HC GAIT TRAINING EA 15 MIN 3/10/2021 Tabitha Cox, PT GP 1    33687773572 HC PT THER PROC EA 15 MIN 3/11/2021 Tabitha Cox, PT GP 1    03703078077 HC GAIT TRAINING EA 15 MIN 3/11/2021 Tabitha Cox, PT GP 1          PT G-Codes  Outcome Measure Options: AM-PAC 6 Clicks Basic Mobility (PT)  AM-PAC 6 Clicks Score (PT): 20    PT Discharge Summary  Reason for Discharge: Discharge from facility  Discharge Destination: Home with assist, Home with home health    Tabitha Cox PT  3/11/2021

## 2021-03-11 NOTE — PROGRESS NOTES
"Patient unable to discharge home yesterday due to continued urinary retention.  Still had to be straight cathed twice overnight.  Plan for urology consult today.  She is okay for discharge home once her urinary retention has been managed.    R \"Danny\" Tali ALLEN MD  Orthopaedic Surgery  [facility]  [phone] - [place] Office  [phone] - [place] Office    "

## 2021-03-11 NOTE — CONSULTS
FIRST UROLOGY CONSULT      Patient Identification:  NAME:  Mariela Ruiz  Age:  76 y.o.   Sex:  female   :  1945   MRN:  [id number]       Chief complaint: Urinary retention    History of present illness:  This is a 76 year old woman who underwent left total hip revision by Dr. Clement on 3/9/21. Postoperatively her childress catheter was removed on POD1, but she was unable to urinate. A PVR was checked this morning with bladder scan and seen to be 767. She underwent straight catheterization. A urology consultation was requested. She denies difficulty emptying prior to admission. No GH, no recent UTI, no prior  procedures.      Past medical history:  Past Medical History:   Diagnosis Date   • Abnormal EKG     LAST SAW CARDIOLOGY  DR DORAN   • Arthritis    • BBB (bundle branch block)     RIGHT   • Chronic venous insufficiency    • Dupuytren's contracture    • History of staph infection     S/P KNEE DEC 2016   • History of transfusion    • Hyperlipidemia    • Lymphedema     LEFT LEG   • MVP (mitral valve prolapse)    • Nontoxic multinodular goiter    • Osteoporosis     Dr. Cabrera   • Pelvic joint pain, left    • Postsurgical hypothyroidism    • Presence of left artificial hip joint     METAL ON METAL AS NOTED PER DR CLEMENT NOTE   • Restless leg syndrome    • Seizure disorder (CMS/[facility])     Dr. Whitman, HX  LAST SEIZURE   • Sleep apnea     DOES NOT HAVE MACHINE   • Vitamin D insufficiency        Past surgical history:  Past Surgical History:   Procedure Laterality Date   • APPENDECTOMY     • CARDIAC CATHETERIZATION      NORMAL    • COLONOSCOPY  2017    Normal except for anal fissure.  Dr. Brandt.  [facility].   • KNEE MINI REVISION Left 11/15/2016    Procedure: LEFT KNEE POLY CHANGE WITH HI POST STABILIZER;  Surgeon: Pablito Claudio MD;  Location: [facility];  Service:    • KNEE MINI REVISION Left 2016    Procedure: LT KNEE REMOVAL/REPLACEMENT LOCKING PIN ;  Surgeon: Pablito DELA CRUZ  MD Shanon;  Location: [facility] MAIN OR;  Service:    • MAMMO FINE NEEDLE ASPIRATION UNILATERAL      RIGHT THYROID NODULE, 2009 BENIGN    • MA REVISE KNEE JOINT REPLACE,1 PART Left 2/20/2017    Procedure: LT KNEE REMOVAL ANTIBIOTIC SPACER AND KNEE REVISION;  Surgeon: Christiano Cesar MD;  Location: [facility] MAIN OR;  Service: Orthopedics   • MA TOTAL KNEE ARTHROPLASTY Left 12/24/2016    Procedure: LEFT KNEE WASHOUT WITH POLY CHANGE;  Surgeon: Christiano Cesar MD;  Location: [facility] MAIN OR;  Service: Orthopedics   • REPLACEMENT TOTAL KNEE Left    • THYROIDECTOMY, PARTIAL      1979 LEFT LOBECTOMY AND ISTHMECTOMY    • TOTAL ABDOMINAL HYSTERECTOMY WITH SALPINGO OOPHORECTOMY     • TOTAL HIP ARTHROPLASTY Left    • TOTAL HIP ARTHROPLASTY Right 9/14/2019    Procedure: TOTAL HIP ARTHROPLASTY ANTERIOR WITH HANA TABLE;  Surgeon: Christiano Cesar II, MD;  Location: [facility] OR;  Service: Orthopedics   • TOTAL HIP ARTHROPLASTY REVISION Left 3/9/2021    Procedure: LEFT TOTAL HIP ARTHROPLASTY REVISION POSTERIOR;  Surgeon: Christiano Cesar II, MD;  Location: [facility] MAIN OR;  Service: Orthopedics;  Laterality: Left;   • TOTAL KNEE  PROSTHESIS REMOVAL W/ SPACER INSERTION Left 12/29/2016    Procedure: LT KNEE IMPLANT REMOVAL WITH INSERTION OF SPACER ;  Surgeon: Christiano Cesar MD;  Location: [facility] OR;  Service:        Allergies:  Clindamycin/lincomycin, Sulfa antibiotics, Duricef [cefadroxil], and Keflex [cephalexin]    Home medications:  Medications Prior to Admission   Medication Sig Dispense Refill Last Dose   • CHLORHEXIDINE GLUCONATE CLOTH EX Apply  topically. AS DIRECTED PREOP   3/9/2021 at Unknown time   • cholecalciferol (VITAMIN D3) 1000 units tablet Take 1 tablet by mouth Daily.   Past Week at Unknown time   • coenzyme Q10 100 MG capsule Take 100 mg by mouth Daily. HOLD PRIOR TO SURG   Past Week at Unknown time   • mupirocin (BACTROBAN) 2 % nasal ointment into the nostril(s) as directed by provider 2  (Two) Times a Day. AS DIRECTED PREOP   3/9/2021 at Unknown time   • PHENobarbital (LUMINAL) 64.8 MG tablet TAKE 3 TABLETS BY MOUTH DAILY (Patient taking differently: 1 TAB IN AM AND 2 TAB AT HS) 270 tablet 1 3/9/2021 at 0630   • pramipexole (MIRAPEX) 0.5 MG tablet TAKE 4 TABLETS BY MOUTH ONCE DAILY 360 tablet 3 3/8/2021 at Unknown time   • pravastatin (PRAVACHOL) 40 MG tablet TAKE 1 TABLET BY MOUTH EVERY DAY 90 tablet 1 3/8/2021 at 2000   • Synthroid 50 MCG tablet TAKE 1 TABLET BY MOUTH IN THE MORNING BEFORE BREAKFAST 90 tablet 2 3/9/2021 at 0630        Hospital medications:  aspirin, 325 mg, Oral, Daily  famotidine, 40 mg, Oral, Daily  levothyroxine, 50 mcg, Oral, QAM AC  meloxicam, 15 mg, Oral, Daily  PHENobarbital, 129.6 mg, Oral, Nightly  PHENobarbital, 64.8 mg, Oral, Daily  pramipexole, 0.5 mg, Oral, Daily  pravastatin, 40 mg, Oral, Nightly      lactated ringers, 9 mL/hr, Last Rate: Stopped (21 1416)  sodium chloride, 100 mL/hr, Last Rate: Stopped (21 1835)      HYDROcodone-acetaminophen  •  HYDROcodone-acetaminophen  •  HYDROmorphone **AND** naloxone  •  ondansetron **OR** ondansetron    Family history:  Family History   Problem Relation Age of Onset   • Heart disease Father    • Diabetes Sister    • Hypertension Sister    • Hyperlipidemia Sister    • Obesity Sister    • Malig Hyperthermia Neg Hx        Social history:  Social History     Tobacco Use   • Smoking status: Never Smoker   • Smokeless tobacco: Never Used   Vaping Use   • Vaping Use: Never used   Substance Use Topics   • Alcohol use: No   • Drug use: No       Review of systems:      Positive for:  As per HPI  Negative for:  As per HPI    Objective:  TMax 24 hours:   Temp (24hrs), Av.3 °F (36.8 °C), Min:97.3 °F (36.3 °C), Max:98.9 °F (37.2 °C)      Vitals Ranges:   Temp:  [97.3 °F (36.3 °C)-98.9 °F (37.2 °C)] 98.5 °F (36.9 °C)  Heart Rate:  [87-96] 96  Resp:  [16-18] 18  BP: (104-126)/(60-65) 125/65    Intake/Output Last 3  shifts:  I/O last 3 completed shifts:  In: 1520 [P.O.:1270; IV Piggyback:250]  Out: 4000 [Urine:4000]     Physical Exam:    General Appearance:    Alert, cooperative, NAD   HEENT:    No trauma, pupils reactive, hearing intact   Back:     No CVA tenderness   Lungs:     Respirations unlabored, no wheezing    Heart:    intact peripheral pulses   Abdomen:     Soft, NDNT, no masses, no guarding   :    Pelvic not performed, bladder nondistended and nontender   Extremities:   No edema, no deformity   Lymphatic:   No neck or groin LAD   Skin:   No bleeding, bruising or rashes   Neuro/Psych:   Orientation intact, mood/affect pleasant, no focal findings       Results review:   I reviewed the patient's new clinical results.    Data review:  Lab Results (last 24 hours)     Procedure Component Value Units Date/Time    Hemoglobin & Hematocrit, Blood [[id number]]  (Abnormal) Collected: 03/11/21 0509    Specimen: Blood from Arm, Right Updated: 03/11/21 0606     Hemoglobin 10.2 g/dL      Hematocrit 30.6 %            Imaging:  Imaging Results (Last 24 Hours)     ** No results found for the last 24 hours. **             Assessment:       S/P revision of total hip    Urinary retention    Plan:     - Postop retention is common due to the effects of anesthesia, recumbency, and pain medication  - Continue straight catheterization q4-6h prn retention while in the hospital and place an indwelling catheter upon discharge if patient unable to urinate or PVR > 250  - Will arrange follow up in 1 week    Martín Key Jr., MD  03/11/21  08:12 EST

## 2021-03-12 NOTE — NURSING NOTE
Patient voiding well this morning, reporting no discomfort. Post void bladder scan revealed 217 mL urine retained. Will discharge patient with follow up to see urology.

## 2021-03-12 NOTE — H&P
Orthopaedic Surgery  History & Physical  Dr. SARATH Clement II  [phone]    HPI:  Patient is a 76 y.o. Not  or  female who presents with retained left hip metal-on-metal construct.    MEDICAL HISTORY  Past Medical History:   Diagnosis Date   • Abnormal EKG     LAST SAW CARDIOLOGY 2017 DR DORAN   • Arthritis    • BBB (bundle branch block)     RIGHT   • Chronic venous insufficiency    • Dupuytren's contracture    • History of staph infection     S/P KNEE DEC 2016   • History of transfusion    • Hyperlipidemia    • Lymphedema     LEFT LEG   • MVP (mitral valve prolapse)    • Nontoxic multinodular goiter    • Osteoporosis     Dr. Cabrera   • Pelvic joint pain, left    • Postsurgical hypothyroidism    • Presence of left artificial hip joint     METAL ON METAL AS NOTED PER DR CLEMENT NOTE   • Restless leg syndrome    • Seizure disorder (CMS/HCC)     Dr. Whitman, HX 1980 LAST SEIZURE   • Sleep apnea     DOES NOT HAVE MACHINE   • Vitamin D insufficiency    ·   Past Surgical History:   Procedure Laterality Date   • APPENDECTOMY     • CARDIAC CATHETERIZATION      NORMAL    • COLONOSCOPY  08/17/2017    Normal except for anal fissure.  Dr. Brandt.  [facility].   • KNEE MINI REVISION Left 11/15/2016    Procedure: LEFT KNEE POLY CHANGE WITH HI POST STABILIZER;  Surgeon: Pablito Claudio MD;  Location: [facility];  Service:    • KNEE MINI REVISION Left 12/13/2016    Procedure: LT KNEE REMOVAL/REPLACEMENT LOCKING PIN ;  Surgeon: Pablito Claudio MD;  Location: [facility];  Service:    • MAMMO FINE NEEDLE ASPIRATION UNILATERAL      RIGHT THYROID NODULE, 2009 BENIGN    • NH REVISE KNEE JOINT REPLACE,1 PART Left 2/20/2017    Procedure: LT KNEE REMOVAL ANTIBIOTIC SPACER AND KNEE REVISION;  Surgeon: Christiano Clement MD;  Location: [facility] OR;  Service: Orthopedics   • NH TOTAL KNEE ARTHROPLASTY Left 12/24/2016    Procedure: LEFT KNEE WASHOUT WITH POLY CHANGE;  Surgeon: Christiano Clement MD;  Location:  [facility] MAIN OR;  Service: Orthopedics   • REPLACEMENT TOTAL KNEE Left    • THYROIDECTOMY, PARTIAL      1979 LEFT LOBECTOMY AND ISTHMECTOMY    • TOTAL ABDOMINAL HYSTERECTOMY WITH SALPINGO OOPHORECTOMY     • TOTAL HIP ARTHROPLASTY Left    • TOTAL HIP ARTHROPLASTY Right 9/14/2019    Procedure: TOTAL HIP ARTHROPLASTY ANTERIOR WITH HANA TABLE;  Surgeon: Christiano Cesar II, MD;  Location: [facility] MAIN OR;  Service: Orthopedics   • TOTAL HIP ARTHROPLASTY REVISION Left 3/9/2021    Procedure: LEFT TOTAL HIP ARTHROPLASTY REVISION POSTERIOR;  Surgeon: Christiano Cesar II, MD;  Location: [facility] MAIN OR;  Service: Orthopedics;  Laterality: Left;   • TOTAL KNEE  PROSTHESIS REMOVAL W/ SPACER INSERTION Left 12/29/2016    Procedure: LT KNEE IMPLANT REMOVAL WITH INSERTION OF SPACER ;  Surgeon: Christiano Cesar MD;  Location: [facility] MAIN OR;  Service:    ·   Prior to Admission medications    Medication Sig Start Date End Date Taking? Authorizing Provider   CHLORHEXIDINE GLUCONATE CLOTH EX Apply  topically. AS DIRECTED PREOP   Yes ProviderAleksey MD   cholecalciferol (VITAMIN D3) 1000 units tablet Take 1 tablet by mouth Daily. 4/16/18  Yes Duglas Rock MD   coenzyme Q10 100 MG capsule Take 100 mg by mouth Daily. HOLD PRIOR TO SURG   Yes ProviderAleksey MD   mupirocin (BACTROBAN) 2 % nasal ointment into the nostril(s) as directed by provider 2 (Two) Times a Day. AS DIRECTED PREOP   Yes ProviderAleksey MD   PHENobarbital (LUMINAL) 64.8 MG tablet TAKE 3 TABLETS BY MOUTH DAILY  Patient taking differently: 1 TAB IN AM AND 2 TAB AT HS 2/25/21  Yes Steven Liu II, MD   pramipexole (MIRAPEX) 0.5 MG tablet TAKE 4 TABLETS BY MOUTH ONCE DAILY 5/15/20  Yes Steven Liu II, MD   pravastatin (PRAVACHOL) 40 MG tablet TAKE 1 TABLET BY MOUTH EVERY DAY 11/23/20  Yes Duglas Rock MD   Synthroid 50 MCG tablet TAKE 1 TABLET BY MOUTH IN THE MORNING BEFORE BREAKFAST 10/12/20  Yes Duglas Rock MD  "  aspirin  MG tablet Take 1 tablet by mouth Daily. 3/10/21   Christiano Cesar II, MD   HYDROcodone-acetaminophen (NORCO) 5-325 MG per tablet Take 2 tablets by mouth Every 4 (Four) Hours As Needed for Severe Pain  for up to 9 days. 3/10/21 3/19/21  Christiano Cesar II, MD   ·   Allergies   Allergen Reactions   • Clindamycin/Lincomycin Itching   • Sulfa Antibiotics      RASH   • Duricef [Cefadroxil] Hives and Rash   • Keflex [Cephalexin] Rash   ·   Most Recent Immunizations   Administered Date(s) Administered   • Tdap 12/06/2017   ·   Social History     Tobacco Use   • Smoking status: Never Smoker   • Smokeless tobacco: Never Used   Substance Use Topics   • Alcohol use: No   ·    Social History     Substance and Sexual Activity   Drug Use No   ·     REVIEW OF SYSTEMS:  · Head: negative for headache  · Respiratory: negative for shortness of breath.   · Cardiovascular: negative for chest pain.   · Gastrointestinal: negative abdominal pain.   · Neurological: negative for LOC  · Psychiatric/Behavioral: negative for memory loss.   · All other systems reviewed and are negative    VITALS: /81 (BP Location: Right arm, Patient Position: Lying)   Pulse 100   Temp 98.2 °F (36.8 °C) (Oral)   Resp 16   Ht 177.8 cm (70\")   Wt 90.4 kg (199 lb 4.8 oz)   SpO2 95%   Breastfeeding No   BMI 28.60 kg/m²  Body mass index is 28.6 kg/m².    PHYSICAL EXAM:   · CONSTITUTIONAL: A&Ox3, No acute distress  · LUNGS: Equal chest rise, no shortness of air  · CARDIOVASCULAR: palpable peripheral pulses  · SKIN: no skin lesions in the area examined  · LYMPH: no lymphadenopathy in the area examined  · EXTREMITY: Left Lower Extremity  · Tenderness to Palpation: No tenderness to palpation  · Gross Deformity: No Gross Deformity  · Pulses:  Brisk Capillary Refill  · Sensation: Intact to Saphenous, Sural, Deep Peroneal, Superficial Peroneal, and Tibial Nerves and grossly throughout extremity  · Motor: 5/5 EHL/FHL/TA/GS " "motor complexes    RADIOLOGY REVIEW:   XR Hip With or Without Pelvis 1 View Left    Result Date: 3/9/2021   Postsurgical changes.    This report was finalized on 3/9/2021 12:41 PM by Dr. Steven Zealya M.D.        LABS:   Results for the past 24 hours: No results found for this or any previous visit (from the past 24 hour(s)).    IMPRESSION:  Patient is a 76 y.o. Not  or  female with left hip retained metal-on-metal construct    PLAN:   · Admited to: Christiano Cesar II, MD   · Disposition: Left hip revision for metal-on-metal construct and bone scan with possible loosening of femoral component.    R \"Danny\" Tali ALLEN MD  Orthopaedic Surgery  [facility]  [phone] - [place] Office  [phone] - [place] Office    "

## 2021-03-31 ENCOUNTER — IMMUNIZATION (OUTPATIENT)
Dept: VACCINE CLINIC | Facility: HOSPITAL | Age: 76
End: 2021-03-31

## 2021-03-31 DIAGNOSIS — Z23 IMMUNIZATION DUE: ICD-10-CM

## 2021-03-31 PROCEDURE — 91300 HC SARSCOV02 VAC 30MCG/0.3ML IM: CPT | Performed by: INTERNAL MEDICINE

## 2021-03-31 PROCEDURE — 0001A: CPT | Performed by: INTERNAL MEDICINE

## 2021-04-09 ENCOUNTER — TELEPHONE (OUTPATIENT)
Dept: ENDOCRINOLOGY | Age: 76
End: 2021-04-09

## 2021-04-09 RX ORDER — AMOXICILLIN AND CLAVULANATE POTASSIUM 500; 125 MG/1; MG/1
1 TABLET, FILM COATED ORAL 2 TIMES DAILY
Qty: 14 TABLET | Refills: 0 | Status: SHIPPED | OUTPATIENT
Start: 2021-04-09 | End: 2021-06-08 | Stop reason: SDUPTHER

## 2021-04-21 ENCOUNTER — APPOINTMENT (OUTPATIENT)
Dept: VACCINE CLINIC | Facility: HOSPITAL | Age: 76
End: 2021-04-21

## 2021-04-21 ENCOUNTER — IMMUNIZATION (OUTPATIENT)
Dept: VACCINE CLINIC | Facility: HOSPITAL | Age: 76
End: 2021-04-21

## 2021-04-21 PROCEDURE — 0002A: CPT | Performed by: INTERNAL MEDICINE

## 2021-04-21 PROCEDURE — 91300 HC SARSCOV02 VAC 30MCG/0.3ML IM: CPT | Performed by: INTERNAL MEDICINE

## 2021-05-11 DIAGNOSIS — G25.81 RESTLESS LEGS SYNDROME: ICD-10-CM

## 2021-05-11 RX ORDER — PRAMIPEXOLE DIHYDROCHLORIDE 0.5 MG/1
TABLET ORAL
Qty: 360 TABLET | Refills: 0 | Status: SHIPPED | OUTPATIENT
Start: 2021-05-11 | End: 2021-05-18

## 2021-05-18 ENCOUNTER — OFFICE VISIT (OUTPATIENT)
Dept: NEUROLOGY | Facility: CLINIC | Age: 76
End: 2021-05-18

## 2021-05-18 VITALS
HEART RATE: 83 BPM | HEIGHT: 70 IN | WEIGHT: 198 LBS | BODY MASS INDEX: 28.35 KG/M2 | OXYGEN SATURATION: 98 % | SYSTOLIC BLOOD PRESSURE: 122 MMHG | DIASTOLIC BLOOD PRESSURE: 84 MMHG

## 2021-05-18 DIAGNOSIS — G25.81 RESTLESS LEGS SYNDROME: ICD-10-CM

## 2021-05-18 DIAGNOSIS — G40.909 SEIZURE DISORDER (HCC): ICD-10-CM

## 2021-05-18 DIAGNOSIS — G60.9 PERIPHERAL NEUROPATHY, IDIOPATHIC: Primary | ICD-10-CM

## 2021-05-18 PROCEDURE — 99214 OFFICE O/P EST MOD 30 MIN: CPT | Performed by: PSYCHIATRY & NEUROLOGY

## 2021-05-18 RX ORDER — PRAMIPEXOLE DIHYDROCHLORIDE 0.5 MG/1
TABLET ORAL
Qty: 150 TABLET | Refills: 3 | Status: SHIPPED | OUTPATIENT
Start: 2021-05-18 | End: 2022-02-09

## 2021-05-18 NOTE — PROGRESS NOTES
Chief Complaint   Patient presents with   • Seizures       Patient ID: Mariela Ruiz is a 76 y.o. female.    HPI: I have had the pleasure of seeing your patient today.  As you may know she is a 76-year-old female with a history of seizures and restless leg syndrome.  She denies any seizures since last follow-up.  She is taking the phenobarbital as scheduled.  She says that with respect to restless leg syndrome she will have more bad nights than good nights.  She frequently will have times in the evening while trying to sleep that her legs bother her.  She is still taking the Mirapex still 0.5 mg 4 tablets daily.  She has had some numbness in her feet.  She struggles with lymphedema and has a consultation with a vascular specialist soon.    The following portions of the patient's history were reviewed and updated as appropriate: allergies, current medications, past family history, past medical history, past social history, past surgical history and problem list.    Review of Systems   Musculoskeletal: Positive for gait problem (balance issues. pt states she has fallen multiple times since 2019. uses cane. balance issues. knee problems). Negative for back pain and neck pain.   Allergic/Immunologic: Negative for environmental allergies, food allergies and immunocompromised state.   Neurological: Negative for dizziness, tremors, seizures, syncope, facial asymmetry, speech difficulty, weakness, light-headedness, numbness and headaches.   Hematological: Negative for adenopathy. Does not bruise/bleed easily.   Psychiatric/Behavioral: Negative for agitation, behavioral problems, confusion, decreased concentration, dysphoric mood, hallucinations, self-injury, sleep disturbance and suicidal ideas. The patient is not nervous/anxious and is not hyperactive.       I have reviewed the review of systems above performed by my medical assistant.      Vitals:    05/18/21 1238   BP: 122/84   Pulse: 83   SpO2: 98%       Neurologic  Exam     Mental Status   Oriented to person, place, and time.   Concentration: normal.   Level of consciousness: alert  Knowledge: consistent with education (No deficits found.).     Cranial Nerves     CN II   Visual fields full to confrontation.     CN III, IV, VI   Pupils are equal, round, and reactive to light.  Extraocular motions are normal.   CN III: no CN III palsy  CN VI: no CN VI palsy    CN V   Facial sensation intact.     CN VII   Facial expression full, symmetric.     CN VIII   CN VIII normal.     CN IX, X   CN IX normal.   CN X normal.     CN XI   CN XI normal.     CN XII   CN XII normal.     Motor Exam     Strength   Right neck flexion: 5/5  Left neck flexion: 5/5  Right neck extension: 5/5  Left neck extension: 5/5  Right deltoid: 5/5  Left deltoid: 5/5  Right biceps: 5/5  Left biceps: 5/5  Right triceps: 5/5  Left triceps: 5/5  Right wrist flexion: 5/5  Left wrist flexion: 5/5  Right wrist extension: 5/5  Left wrist extension: 5/5  Right interossei: 5/5  Left interossei: 5/5  Right abdominals: 5/5  Left abdominals: 5/5  Right iliopsoas: 5/5  Left iliopsoas: 5/5  Right quadriceps: 5/5  Left quadriceps: 5/5  Right hamstrin/5  Left hamstrin/5  Right glutei: 5/5  Left glutei: 5/5  Right anterior tibial: 5/5  Left anterior tibial: 5/5  Right posterior tibial: 5/5  Left posterior tibial: 5/5  Right peroneal: 5/5  Left peroneal: 5/5  Right gastroc: 5/5  Left gastroc: 5/5    Sensory Exam   Light touch normal.   Right leg vibration: decreased from ankle  Left leg vibration: decreased from ankle    Gait, Coordination, and Reflexes     Gait  Gait: wide-based    Coordination   Romberg: negative    Reflexes   Right brachioradialis: 2+  Left brachioradialis: 2+  Right biceps: 2+  Left biceps: 2+  Right triceps: 2+  Left triceps: 2+  Right patellar: 0  Left patellar: 0  Right achilles: 0  Left achilles: 0  Right : 2+  Left : 2+Station is normal.       Physical Exam  Vitals reviewed.    Constitutional:       Appearance: She is well-developed.   HENT:      Head: Normocephalic and atraumatic.   Eyes:      Extraocular Movements: EOM normal.      Pupils: Pupils are equal, round, and reactive to light.   Cardiovascular:      Rate and Rhythm: Normal rate and regular rhythm.   Pulmonary:      Breath sounds: Normal breath sounds.   Musculoskeletal:         General: Normal range of motion.   Skin:     General: Skin is warm.   Neurological:      Mental Status: She is oriented to person, place, and time.      Coordination: Romberg Test normal.      Deep Tendon Reflexes:      Reflex Scores:       Tricep reflexes are 2+ on the right side and 2+ on the left side.       Bicep reflexes are 2+ on the right side and 2+ on the left side.       Brachioradialis reflexes are 2+ on the right side and 2+ on the left side.       Patellar reflexes are 0 on the right side and 0 on the left side.       Achilles reflexes are 0 on the right side and 0 on the left side.        Procedures    Assessment/Plan: We are going to increase the Mirapex to 5 tablets daily.  We are also going to schedule an EMG/nerve conduction study of both lower extremities.  Return to clinic in 6 months or sooner if needed.  She can call for results of EMG.  A total of 30 minutes was spent face-to-face with the patient today.  Of that greater than 50% of this time was spent discussing signs and symptoms of peripheral neuropathy, restless leg syndrome, seizures, patient education, plan of care and prognosis.       Diagnoses and all orders for this visit:    1. Peripheral neuropathy, idiopathic (Primary)  -     EMG & Nerve Conduction Test; Future    2. Restless legs syndrome  -     pramipexole (Mirapex) 0.5 MG tablet; 5 tablets daily  Dispense: 150 tablet; Refill: 3    3. Seizure disorder (CMS/[facility])           Steven Liu II, MD

## 2021-05-23 RX ORDER — PRAVASTATIN SODIUM 40 MG
TABLET ORAL
Qty: 90 TABLET | Refills: 1 | Status: SHIPPED | OUTPATIENT
Start: 2021-05-23 | End: 2021-11-22

## 2021-06-08 ENCOUNTER — TELEPHONE (OUTPATIENT)
Dept: ENDOCRINOLOGY | Age: 76
End: 2021-06-08

## 2021-06-08 DIAGNOSIS — I87.2 CHRONIC VENOUS INSUFFICIENCY: Primary | ICD-10-CM

## 2021-06-08 RX ORDER — AMOXICILLIN AND CLAVULANATE POTASSIUM 500; 125 MG/1; MG/1
1 TABLET, FILM COATED ORAL 2 TIMES DAILY
Qty: 14 TABLET | Refills: 0 | Status: SHIPPED | OUTPATIENT
Start: 2021-06-08 | End: 2021-10-08

## 2021-06-08 NOTE — TELEPHONE ENCOUNTER
6/8/ pt called stated caretenders was out there today to see them. Her legs are warm to the touch and red they said she has cellulitis and needs an antibiotic

## 2021-06-08 NOTE — TELEPHONE ENCOUNTER
Prescription sent to [facility] for Augmentin 500 mg twice daily for 7 days for possible stasis dermatitis.  Patient will be seen in the lymphedema clinic next week.

## 2021-06-14 ENCOUNTER — TELEPHONE (OUTPATIENT)
Dept: ENDOCRINOLOGY | Age: 76
End: 2021-06-14

## 2021-06-14 NOTE — TELEPHONE ENCOUNTER
Spoke with patient.  She has bilateral leg edema with some redness.  She has chronic venous insufficiency and will be seen at the lymphedema clinic later this week.  She had her daughter thinks that she has exertional dyspnea but she denies paroxysmal nocturnal dyspnea, orthopnea, or chest pains.  Patient was advised to go to the emergency room for evaluation.

## 2021-06-14 NOTE — TELEPHONE ENCOUNTER
6/14 pt called in stated caretnders told her legs are swelling and her lungs are filled with fluid / she needs a water pill

## 2021-06-18 ENCOUNTER — APPOINTMENT (OUTPATIENT)
Dept: INFUSION THERAPY | Facility: HOSPITAL | Age: 76
End: 2021-06-18

## 2021-07-15 DIAGNOSIS — E89.0 POSTSURGICAL HYPOTHYROIDISM: ICD-10-CM

## 2021-07-16 RX ORDER — LEVOTHYROXINE SODIUM 50 MCG
TABLET ORAL
Qty: 90 TABLET | Refills: 0 | Status: SHIPPED | OUTPATIENT
Start: 2021-07-16 | End: 2021-10-08 | Stop reason: SDUPTHER

## 2021-08-23 DIAGNOSIS — G40.909 SEIZURE DISORDER (HCC): ICD-10-CM

## 2021-08-24 RX ORDER — PHENOBARBITAL 64.8 MG/1
TABLET ORAL
Qty: 270 TABLET | Refills: 0 | Status: SHIPPED | OUTPATIENT
Start: 2021-08-24 | End: 2021-11-15

## 2021-08-24 NOTE — TELEPHONE ENCOUNTER
Provider: FELTON  Caller: PT  Relationship to Patient: SELF  Pharmacy: [facility]  Phone Number: [phone]  Reason for Call: PT FOLLOWING UP RE: RX; ADV PROVIDER HAS TO REVIEW PRIOR TO REFILL.    THANK YOU.

## 2021-08-27 ENCOUNTER — APPOINTMENT (OUTPATIENT)
Dept: INFUSION THERAPY | Facility: HOSPITAL | Age: 76
End: 2021-08-27

## 2021-10-08 ENCOUNTER — OFFICE VISIT (OUTPATIENT)
Dept: ENDOCRINOLOGY | Age: 76
End: 2021-10-08

## 2021-10-08 VITALS
HEART RATE: 98 BPM | DIASTOLIC BLOOD PRESSURE: 70 MMHG | WEIGHT: 176.2 LBS | OXYGEN SATURATION: 97 % | BODY MASS INDEX: 25.22 KG/M2 | HEIGHT: 70 IN | SYSTOLIC BLOOD PRESSURE: 144 MMHG

## 2021-10-08 DIAGNOSIS — R79.0 ABNORMAL BLOOD LEVEL OF COBALT: ICD-10-CM

## 2021-10-08 DIAGNOSIS — E89.0 HISTORY OF PARTIAL THYROIDECTOMY: ICD-10-CM

## 2021-10-08 DIAGNOSIS — E78.5 HYPERLIPIDEMIA, UNSPECIFIED HYPERLIPIDEMIA TYPE: Primary | ICD-10-CM

## 2021-10-08 DIAGNOSIS — E55.9 VITAMIN D INSUFFICIENCY: ICD-10-CM

## 2021-10-08 DIAGNOSIS — M72.0 DUPUYTREN'S CONTRACTURE: ICD-10-CM

## 2021-10-08 DIAGNOSIS — E89.0 POSTSURGICAL HYPOTHYROIDISM: ICD-10-CM

## 2021-10-08 DIAGNOSIS — M81.0 AGE-RELATED OSTEOPOROSIS WITHOUT CURRENT PATHOLOGICAL FRACTURE: ICD-10-CM

## 2021-10-08 DIAGNOSIS — R79.0 ABNORMAL BLOOD LEVEL OF CHROMIUM: ICD-10-CM

## 2021-10-08 DIAGNOSIS — E04.2 MULTINODULAR GOITER: ICD-10-CM

## 2021-10-08 DIAGNOSIS — I87.2 CHRONIC VENOUS INSUFFICIENCY: ICD-10-CM

## 2021-10-08 DIAGNOSIS — E53.8 VITAMIN B12 DEFICIENCY: ICD-10-CM

## 2021-10-08 DIAGNOSIS — Z83.3 FAMILY HISTORY OF DIABETES MELLITUS: ICD-10-CM

## 2021-10-08 PROCEDURE — 99214 OFFICE O/P EST MOD 30 MIN: CPT | Performed by: INTERNAL MEDICINE

## 2021-10-08 RX ORDER — LEVOTHYROXINE SODIUM 50 MCG
50 TABLET ORAL
Qty: 90 TABLET | Refills: 1 | Status: SHIPPED | OUTPATIENT
Start: 2021-10-08 | End: 2022-04-19 | Stop reason: SDUPTHER

## 2021-10-08 NOTE — PROGRESS NOTES
Subjective   Mariela Ruiz is a 76 y.o. female.     F/u for hypothyrodiism, hyperlipidemia, sleep apnea      Patient is a 76-year-old female who has been lost to follow-up since December 2019 due to the pandemic and multiple hospitalizations of her  who has passed away since.    She has hypothyroidism and has been taking Synthroid 50 µg per day.   She had a previous left thyroid lobectomy in the 1970s for benign lesion.  She had a fine needle biopsy right thyroid nodule done in by Dr. Brandt in August 2017 which was read as follicular lesion of undetermined significance.  She had ultrasound-guided needle biopsy done at [facility] on November 16, 2017 and pathology was read as benign follicular cells.  She denies any changes in her thyroid.    She has no previous history of head or neck radiation therapy.  She denies pressure symptoms     She has hyperlipidemia and has been on pravastatin 40 mg once a day.  She denies myalgia.   She has lost 3 pounds since March 2021 due to poor appetite..  Her last meal was last night.     She has osteoporosis on bone density done in her gynecologist in November 2015.   She had a follow-up bone density done at [facility] in November 2017 which showed osteoporosis with a 9.7% decrease on the right hip.  T score of the right hip was -2.8.  She was never on biphosphonate.  She has not used Prolia or Reclast.  She has vitamin D insufficiency and has been taking vit D occasionally.  She did not get Actonel filled because of high cost.  She does not want to use Fosamax.  She denies heartburn.     She had a previous left hip replacement and the joint is worn.  Serum cobalt and chromium are elevated.  She had a right total hip arthroplasty done by Dr. Christiano Cesar in September 2019.  She had a previous left total knee replacement and it continues to bother her.    She has Dupuytren's contracture and was seen by Dr. Mota who advised surgery.  She has not seen her since 2019.     She  "has a history of seizure disorder and has been on phenobarbital prescribed by Dr. Whitman.  She denies any recent seizures.  She will be following up with Dr. Liu in the future.     She has sleep apnea but is unable to tolerate a CPAP.  She complains of chronic fatigue.    The following portions of the patient's history were reviewed and updated as appropriate: allergies, current medications, past family history, past medical history, past social history, past surgical history and problem list.    Review of Systems   Eyes: Negative for visual disturbance.   Respiratory: Negative for apnea and shortness of breath.    Cardiovascular: Negative for chest pain and palpitations.   Gastrointestinal: Positive for constipation. Negative for abdominal pain and anal bleeding.   Genitourinary: Negative.    Musculoskeletal: Negative for myalgias.   Neurological: Negative for numbness.     Objective      Vitals:    10/08/21 1118   BP: 144/70   BP Location: Left arm   Patient Position: Sitting   Cuff Size: Large Adult   Pulse: 98   SpO2: 97%   Weight: 79.9 kg (176 lb 3.2 oz)   Height: 177.8 cm (70\")     Physical Exam  Constitutional:       General: She is not in acute distress.     Appearance: Normal appearance. She is not ill-appearing, toxic-appearing or diaphoretic.   HENT:      Nose: No congestion or rhinorrhea.   Eyes:      General: No scleral icterus.        Right eye: No discharge.         Left eye: No discharge.      Extraocular Movements: Extraocular movements intact.      Conjunctiva/sclera: Conjunctivae normal.   Cardiovascular:      Rate and Rhythm: Normal rate and regular rhythm.      Pulses: Normal pulses.      Heart sounds: Normal heart sounds. No murmur heard.   No friction rub.   Pulmonary:      Effort: No respiratory distress.      Breath sounds: No stridor. No wheezing, rhonchi or rales.   Chest:      Chest wall: No tenderness.   Abdominal:      General: Bowel sounds are normal. There is no distension.      " Palpations: Abdomen is soft. There is no mass.      Tenderness: There is no right CVA tenderness or left CVA tenderness.   Musculoskeletal:         General: Normal range of motion.      Comments: Dupuytren contracture on left hand. No calf tenderness. Compression hoses bilaterally.   Skin:     General: Skin is warm and dry.   Neurological:      Mental Status: She is alert.      Comments: Intact light touch in lower extremities       Admission on 03/09/2021, Discharged on 03/11/2021   Component Date Value Ref Range Status   • Hemoglobin 03/09/2021 12.4  12.0 - 15.9 g/dL Final   • Hematocrit 03/09/2021 37.7  34.0 - 46.6 % Final   • Glucose 03/10/2021 99  65 - 99 mg/dL Final   • BUN 03/10/2021 10  8 - 23 mg/dL Final   • Creatinine 03/10/2021 0.58  0.57 - 1.00 mg/dL Final   • Sodium 03/10/2021 139  136 - 145 mmol/L Final   • Potassium 03/10/2021 4.5  3.5 - 5.2 mmol/L Final   • Chloride 03/10/2021 105  98 - 107 mmol/L Final   • CO2 03/10/2021 26.4  22.0 - 29.0 mmol/L Final   • Calcium 03/10/2021 9.0  8.6 - 10.5 mg/dL Final   • eGFR Non African Amer 03/10/2021 102  >60 mL/min/1.73 Final   • BUN/Creatinine Ratio 03/10/2021 17.2  7.0 - 25.0 Final   • Anion Gap 03/10/2021 7.6  5.0 - 15.0 mmol/L Final   • Hemoglobin 03/10/2021 12.3  12.0 - 15.9 g/dL Final   • Hematocrit 03/10/2021 36.3  34.0 - 46.6 % Final   • Hemoglobin 03/11/2021 10.2* 12.0 - 15.9 g/dL Final   • Hematocrit 03/11/2021 30.6* 34.0 - 46.6 % Final     Assessment/Plan   Diagnoses and all orders for this visit:    1. Hyperlipidemia, unspecified hyperlipidemia type (Primary)  -     Comprehensive Metabolic Panel  -     Lipid Panel    2. Postsurgical hypothyroidism  -     Synthroid 50 MCG tablet; Take 1 tablet by mouth Every Morning Before Breakfast.  Dispense: 90 tablet; Refill: 1  -     TSH  -     T4, Free    3. Vitamin D insufficiency  -     Vitamin D 25 Hydroxy  -     DEXA Bone Density Axial  -     Comprehensive Metabolic Panel    4. Age-related osteoporosis  without current pathological fracture  -     Vitamin D 25 Hydroxy  -     DEXA Bone Density Axial    5. Chronic venous insufficiency    6. Dupuytren's contracture    7. History of partial thyroidectomy  -     TSH  -     T4, Free    8. Multinodular goiter    9. Abnormal blood level of cobalt  -     Cobalt    10. Abnormal blood level of chromium  -     Chromium Level    11. Family history of diabetes mellitus  -     Comprehensive Metabolic Panel  -     Hemoglobin A1c    12. Vitamin B12 deficiency  -     CBC & Differential  -     Vitamin B12      Continue Synthroid 50 mcg/day.  Continue pravastatin 40 mg/day.  Schedule bone density.  Take vitamin D supplements regularly.  Check 25-hydroxy vitamin D levels.  Advised to follow-up with Dr. Mota for Dupuytren contracture.  Check chromium and cobalt levels.  Will defer seizure therapy to neurologist.  Check vitamin B12.  Check hemoglobin A1c.  Flu vaccine this fall.  Patient advised to see Dr. Brandt for screening colonoscopy.    Copy my note sent to Dr. Steven Liu, Dr. Nehemiah Mota, Dr. Brandt and Dr. Christiano Cesar    Follow-up in 4 months.

## 2021-10-12 LAB
25(OH)D3+25(OH)D2 SERPL-MCNC: 35.4 NG/ML (ref 30–100)
ALBUMIN SERPL-MCNC: 4.3 G/DL (ref 3.5–5.2)
ALBUMIN/GLOB SERPL: 2 G/DL
ALP SERPL-CCNC: 134 U/L (ref 39–117)
ALT SERPL-CCNC: 12 U/L (ref 1–33)
AST SERPL-CCNC: 18 U/L (ref 1–32)
BASOPHILS # BLD AUTO: 0.05 10*3/MM3 (ref 0–0.2)
BASOPHILS NFR BLD AUTO: 1.3 % (ref 0–1.5)
BILIRUB SERPL-MCNC: 0.3 MG/DL (ref 0–1.2)
BUN SERPL-MCNC: 9 MG/DL (ref 8–23)
BUN/CREAT SERPL: 13.4 (ref 7–25)
CALCIUM SERPL-MCNC: 9.8 MG/DL (ref 8.6–10.5)
CHLORIDE SERPL-SCNC: 102 MMOL/L (ref 98–107)
CHOLEST SERPL-MCNC: 203 MG/DL (ref 0–200)
CO2 SERPL-SCNC: 29.8 MMOL/L (ref 22–29)
COBALT SERPL-MCNC: 2.3 UG/L (ref 0–0.9)
CR SERPL-MCNC: 2.4 UG/L (ref 0.1–2.1)
CREAT SERPL-MCNC: 0.67 MG/DL (ref 0.57–1)
EOSINOPHIL # BLD AUTO: 0 10*3/MM3 (ref 0–0.4)
EOSINOPHIL NFR BLD AUTO: 0 % (ref 0.3–6.2)
ERYTHROCYTE [DISTWIDTH] IN BLOOD BY AUTOMATED COUNT: 12.7 % (ref 12.3–15.4)
GLOBULIN SER CALC-MCNC: 2.1 GM/DL
GLUCOSE SERPL-MCNC: 88 MG/DL (ref 65–99)
HBA1C MFR BLD: 4.55 % (ref 4.8–5.6)
HCT VFR BLD AUTO: 36.3 % (ref 34–46.6)
HDLC SERPL-MCNC: 96 MG/DL (ref 40–60)
HGB BLD-MCNC: 11.9 G/DL (ref 12–15.9)
IMM GRANULOCYTES # BLD AUTO: 0.01 10*3/MM3 (ref 0–0.05)
IMM GRANULOCYTES NFR BLD AUTO: 0.3 % (ref 0–0.5)
IMP & REVIEW OF LAB RESULTS: NORMAL
LDLC SERPL CALC-MCNC: 99 MG/DL (ref 0–100)
LYMPHOCYTES # BLD AUTO: 1.28 10*3/MM3 (ref 0.7–3.1)
LYMPHOCYTES NFR BLD AUTO: 34.4 % (ref 19.6–45.3)
MCH RBC QN AUTO: 30.1 PG (ref 26.6–33)
MCHC RBC AUTO-ENTMCNC: 32.8 G/DL (ref 31.5–35.7)
MCV RBC AUTO: 91.7 FL (ref 79–97)
MONOCYTES # BLD AUTO: 0.35 10*3/MM3 (ref 0.1–0.9)
MONOCYTES NFR BLD AUTO: 9.4 % (ref 5–12)
NEUTROPHILS # BLD AUTO: 2.03 10*3/MM3 (ref 1.7–7)
NEUTROPHILS NFR BLD AUTO: 54.6 % (ref 42.7–76)
NRBC BLD AUTO-RTO: 0 /100 WBC (ref 0–0.2)
PLATELET # BLD AUTO: 219 10*3/MM3 (ref 140–450)
POTASSIUM SERPL-SCNC: 4.3 MMOL/L (ref 3.5–5.2)
PROT SERPL-MCNC: 6.4 G/DL (ref 6–8.5)
RBC # BLD AUTO: 3.96 10*6/MM3 (ref 3.77–5.28)
SODIUM SERPL-SCNC: 139 MMOL/L (ref 136–145)
T4 FREE SERPL-MCNC: 1.08 NG/DL (ref 0.93–1.7)
TRIGL SERPL-MCNC: 40 MG/DL (ref 0–150)
TSH SERPL DL<=0.005 MIU/L-ACNC: 0.44 UIU/ML (ref 0.27–4.2)
VIT B12 SERPL-MCNC: 335 PG/ML (ref 211–946)
VLDLC SERPL CALC-MCNC: 8 MG/DL (ref 5–40)
WBC # BLD AUTO: 3.72 10*3/MM3 (ref 3.4–10.8)

## 2021-10-12 NOTE — PROGRESS NOTES
Normal vitamin D.LDL 99.  HDL 96.  Triglycerides 40.  Continue pravastatin 40 mg/day.TSH 0.443.  Normal free T4.  Continue Synthroid 50 mcg/day.Serum chromium lower at 2.4 mcg/L but is still elevatedSerum cobalt lower at 2.3 mcg/L but is still elevated.    Chromium and cobalt are elevated most likely due to prosthetic joint breakdown.  Follow-up with your orthopedic surgeon.  Hemoglobin improved to 11.9 g per DL.Normal vitamin B12.Hemoglobin A1c 4.55% and is in nondiabetic range.Copy of labs sent to Dr. Steven Liu and orthopedic surgeons Dr. Cesar and Dr. Ed Chun.  Please notify patient of results.

## 2021-11-12 ENCOUNTER — TELEPHONE (OUTPATIENT)
Dept: NEUROLOGY | Facility: CLINIC | Age: 76
End: 2021-11-12

## 2021-11-12 NOTE — TELEPHONE ENCOUNTER
PT CALLED REGARDING HER APPT ON 11-19-21. SHE HAS A POSSIBLE CONFLICT AND WANTED TO KNOW IF SHE RESCHEDULED HOW LONG WOULD IT BE BEFORE SHE IS ABLE TO GET AN APPT.    TRIED RESCHEDULING AND THE FIRST AVAILABLE WAS IN AUG. SHE KEPT THE APPT. SHE IS CONCERNED ABOUT GETTING HER SEIZURE RX REFILLED IF SHE CANCELLED.    DR YA HASN'T SEEN HER YET SO I WASN'T SURE HOW THAT WOULD WORK.     SHE IS GOING TO CHECK TO SEE IF THE OTHER APPT IN CONFLICT WOULD BE AN ISSUE AND CALL US BACK IF IT WAS.

## 2021-11-15 DIAGNOSIS — G40.909 SEIZURE DISORDER (HCC): ICD-10-CM

## 2021-11-15 RX ORDER — PHENOBARBITAL 64.8 MG/1
TABLET ORAL
Qty: 270 TABLET | Refills: 0 | Status: SHIPPED | OUTPATIENT
Start: 2021-11-15 | End: 2021-11-29

## 2021-11-18 ENCOUNTER — TELEPHONE (OUTPATIENT)
Dept: ENDOCRINOLOGY | Age: 76
End: 2021-11-18

## 2021-11-18 ENCOUNTER — TELEPHONE (OUTPATIENT)
Dept: NEUROLOGY | Facility: CLINIC | Age: 76
End: 2021-11-18

## 2021-11-18 NOTE — TELEPHONE ENCOUNTER
Alize    Pt called very upset that you was wanting to r/s her 3:20 appt for tomorrow because she hasn't had micheal EMG she says she doesn't need the test she has RLS and they don't know what causes it. Pt says she needs to keep her appt tomorrow so she can get her sz meds.  She wants a phone call directly from Dr. Liu but told her I would have you call her first thing in the morning after you speak with Dr. Liu.  Please call her at [phone].    ThanksYasmin

## 2021-11-18 NOTE — TELEPHONE ENCOUNTER
Patient called    She said she has an appointment with a neurologist at 3:30. She said they said she does not have an appointment and they mentioned a test for neuropathy. She said she does not have neuropathy.    Can someone call her asap?

## 2021-11-19 ENCOUNTER — TELEPHONE (OUTPATIENT)
Dept: NEUROLOGY | Facility: CLINIC | Age: 76
End: 2021-11-19

## 2021-11-19 NOTE — TELEPHONE ENCOUNTER
We do not have anything sooner. If pt calls back please inform her we will call if someone cancels.

## 2021-11-19 NOTE — TELEPHONE ENCOUNTER
Called and spoke to pt. I explained in Dr. Liu's. note he stated for pt to have emg completed and follow up. I just explained to the patient I am doing my job and if pt's haven't had their testing completed they need to reschedule until after that is finished.     She states she does not have peripheral neuropathy. I asked her has she had testing to confirm that ? She stated well no. I explained that's why dr. Liu ordered testing. She stated oh. She still does not want testing done because she doesn't have it. I explained that is fine. However treatment or plan of care could be difficult from here on if testing not completed I just wanted to make her aware. She again says I don't have this and don't need testing. Stated understanding to pt. We will see her at her apt this afternoon.

## 2021-11-19 NOTE — TELEPHONE ENCOUNTER
WE HAD TO R/S HER APPT DUE TO DR YA HAVING TO LEAVE FOR FAMILY EMERGENCY,SHE IS SCHEDULED FOR 1.6.22, SHE WOULD LIKE FOR YOU TO FIND HER SOMETHING SOONER

## 2021-11-22 RX ORDER — PRAVASTATIN SODIUM 40 MG
TABLET ORAL
Qty: 90 TABLET | Refills: 1 | Status: SHIPPED | OUTPATIENT
Start: 2021-11-22 | End: 2022-04-19 | Stop reason: SDUPTHER

## 2021-11-29 DIAGNOSIS — G40.909 SEIZURE DISORDER (HCC): ICD-10-CM

## 2021-11-29 RX ORDER — PHENOBARBITAL 64.8 MG/1
TABLET ORAL
Qty: 270 TABLET | Refills: 0 | Status: SHIPPED | OUTPATIENT
Start: 2021-11-29 | End: 2022-05-12

## 2022-01-05 ENCOUNTER — TELEPHONE (OUTPATIENT)
Dept: NEUROLOGY | Facility: CLINIC | Age: 77
End: 2022-01-05

## 2022-01-05 NOTE — TELEPHONE ENCOUNTER
Caller: Mariela Ruiz    Relationship to patient: Self    Best call back number: [phone]    Chief complaint: NA    Type of visit: F/U     Requested date: NEXT FEW WEEKS      If rescheduling, when is the original appointment: 1-6-22     Additional notes:PATIENT ASKING IF SHE CAN RESCHEDULE APPOINTMENT FOR NEXT COUPLE OF WEEKS IF POSSIBLE. THE ONLY DAY SHE SAID DOESN'T WORK IS January 11TH. 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 SHE SAID SHE IS SORRY AND DOES NOT WANT TO CAUSE PROBLEMS BUT DOESN'T FEEL SAFE COMING IN TOMORROW DUE TO BAD WEATHER AND WANTS TO SEE IF CAN MOVE TO A DIFFERENT DAY. PLEASE ADVISE.

## 2022-02-09 DIAGNOSIS — G25.81 RESTLESS LEGS SYNDROME: ICD-10-CM

## 2022-02-09 RX ORDER — PRAMIPEXOLE DIHYDROCHLORIDE 0.5 MG/1
TABLET ORAL
Qty: 450 TABLET | Refills: 0 | Status: SHIPPED | OUTPATIENT
Start: 2022-02-09 | End: 2022-05-12

## 2022-02-14 ENCOUNTER — TELEMEDICINE (OUTPATIENT)
Dept: NEUROLOGY | Facility: CLINIC | Age: 77
End: 2022-02-14

## 2022-02-14 DIAGNOSIS — G40.909 SEIZURE DISORDER: ICD-10-CM

## 2022-02-14 DIAGNOSIS — G25.81 RESTLESS LEGS SYNDROME: Primary | ICD-10-CM

## 2022-02-14 PROCEDURE — 99213 OFFICE O/P EST LOW 20 MIN: CPT | Performed by: PSYCHIATRY & NEUROLOGY

## 2022-02-14 NOTE — PROGRESS NOTES
Chief complaint: History of seizures, restless leg syndrome.    Patient ID: Mariela Ruiz is a 77 y.o. female.    HPI:Unable to complete visit using a video connection to the patient. A phone visit was used to complete this visits. Total time of discussion was 20 minutes.  The patient did consent to this type of encounter.  She is at home and I am here in the neurology clinic.  I have had the pleasure of speaking with Mariela today.  As you may know she is a 77-year-old female whom I have seen previously for history of seizures and restless leg syndrome.  She denies any seizures since last follow-up.  She is still taking phenobarbital as scheduled.  No significant side effects.  She has been doing well with respect to the restless leg syndrome issue.  She is now taking Mirapex 0.5 mg 5 times daily.  At her last clinic visit we talked about issues regarding numbness in both of her feet.  She did also have issues with lymphedema and in consultation with a vascular specialist has had some therapies for that.  We scheduled her for an EMG/nerve conduction study however she did not feel comfortable going for that test given the current status with COVID-19 pandemic.    The following portions of the patient's history were reviewed and updated as appropriate: allergies, current medications, past family history, past medical history, past social history, past surgical history and problem list.    Review of Systems   I have reviewed the review of systems above performed by my medical assistant.      There were no vitals filed for this visit.    Neurologic Exam    Physical Exam    Procedures    Assessment/Plan: We are going to continue the current medication regimen including the phenobarbital and Mirapex.  We will see her back in 6 months or sooner if needed.  We will likely check a phenobarbital level at that time.  For now she would like to hold off on scheduling the EMG/nerve conduction study in regards to numbness of both  feet.  A total of 20 minutes was spent during this visit discussing signs and symptoms of seizures, restless leg syndrome, patient education, plan of care and prognosis.       Diagnoses and all orders for this visit:    1. Restless legs syndrome (Primary)    2. Seizure disorder (HCC)           Steven Liu II, MD

## 2022-02-15 ENCOUNTER — TELEPHONE (OUTPATIENT)
Dept: ENDOCRINOLOGY | Age: 77
End: 2022-02-15

## 2022-02-15 RX ORDER — ESCITALOPRAM OXALATE 10 MG/1
10 TABLET ORAL DAILY
Qty: 30 TABLET | Refills: 2 | Status: SHIPPED | OUTPATIENT
Start: 2022-02-15 | End: 2022-03-24 | Stop reason: SDUPTHER

## 2022-02-15 NOTE — TELEPHONE ENCOUNTER
Patient called wanted to know if Dr. Rock can call her in a script for her nerves ASAP.    Please call patient back at [phone]

## 2022-03-02 ENCOUNTER — HOSPITAL ENCOUNTER (EMERGENCY)
Facility: HOSPITAL | Age: 77
Discharge: HOME | End: 2022-03-02
Attending: EMERGENCY MEDICINE | Admitting: EMERGENCY MEDICINE

## 2022-03-02 ENCOUNTER — APPOINTMENT (OUTPATIENT)
Dept: GENERAL RADIOLOGY | Facility: HOSPITAL | Age: 77
End: 2022-03-02

## 2022-03-02 VITALS
RESPIRATION RATE: 16 BRPM | HEART RATE: 74 BPM | TEMPERATURE: 98.9 F | DIASTOLIC BLOOD PRESSURE: 78 MMHG | OXYGEN SATURATION: 98 % | WEIGHT: 176 LBS | BODY MASS INDEX: 28.28 KG/M2 | HEIGHT: 66 IN | SYSTOLIC BLOOD PRESSURE: 110 MMHG

## 2022-03-02 DIAGNOSIS — M79.605 PAIN IN LEFT LEG: Primary | ICD-10-CM

## 2022-03-02 PROCEDURE — 73502 X-RAY EXAM HIP UNI 2-3 VIEWS: CPT

## 2022-03-02 PROCEDURE — 99282 EMERGENCY DEPT VISIT SF MDM: CPT

## 2022-03-02 PROCEDURE — 73552 X-RAY EXAM OF FEMUR 2/>: CPT

## 2022-03-02 NOTE — DISCHARGE INSTRUCTIONS
Rest    Ice    Take over the counter Tylenol or Ibuprofen as needed for pain    Return Precautions    Although you are being discharged from the ED today, I encourage you to return for worsening symptoms.  Things can, and do, change such that treatment at home with medication may not be adequate.      Specifically, return for any of the following:    Chest pain, shortness of breath, pain or nausea and vomiting not controlled by medications provided.    Please make a follow up with your Primary Care Provider for a blood pressure recheck.

## 2022-03-02 NOTE — ED PROVIDER NOTES
EMERGENCY DEPARTMENT ENCOUNTER    Room Number:  37/37  Date seen:  3/2/2022  Time seen: 15:04 EST  PCP: Duglas Rock MD  Historian: patient    HPI:  Chief complaint:left hip pain, s/p fall  A complete HPI/ROS/PMH/PSH/SH/FH are unobtainable due to: n/a  Context:Mariela Ruiz is a 77 y.o. female who presents to the ED with c/o moderate pain to left hip and left femur area after fall at home last night. The pain is made better by rest, she is able to ambulate with a walker.  She states she sometimes uses a cane and was using cane last night when she fell.  Has hitting her head and is not on any blood thinners.  She has not had this problem before.  She states that she has history of bilateral hip replacements and knee replacements.    Patient was placed in face mask in first look. Patient was wearing facemask when I entered the room and throughout our encounter. I wore full protective equipment throughout this patient encounter including a N95 face mask, eye shield and gloves. Hand hygiene/washing of hands was performed before donning protective equipment and after removal when leaving the room.    MEDICAL RECORD REVIEW    ALLERGIES  Clindamycin/lincomycin, Duricef [cefadroxil], Keflex [cephalexin], and Sulfa antibiotics    PAST MEDICAL HISTORY  Active Ambulatory Problems     Diagnosis Date Noted   • Seizure disorder (HCC)    • Hyperlipidemia    • Vitamin D insufficiency    • Age-related osteoporosis without current pathological fracture    • Sleep apnea    • Chronic venous insufficiency    • Postsurgical hypothyroidism    • Chronic fatigue syndrome 11/07/2016   • Gastroesophageal reflux disease 11/07/2016   • Dupuytren's contracture 11/07/2016   • History of biliary T-tube placement 11/07/2016   • History of partial thyroidectomy 10/04/2012   • Mitral valve prolapse 11/07/2016   • Multinodular goiter 11/07/2016   • Right fascicular block 11/07/2016   • Restless legs syndrome 11/07/2016   • History of cardiac  catheterization 11/07/2016   • Urge incontinence of urine 11/07/2016   • Left knee pain 11/15/2016   • Ligamentous laxity of knee 12/02/2016   • DJD (degenerative joint disease) of knee 12/24/2016   • Adverse drug effect, subsequent encounter 11/07/2017   • Abnormal blood level of chromium 04/05/2018   • Abnormal blood level of cobalt 04/05/2018   • Family history of diabetes mellitus 09/13/2018   • Wasp sting 08/16/2019   • Cellulitis of right upper extremity 08/17/2019   • Closed fracture of neck of right femur (HCC) 09/13/2019   • Thrombocytopenia (HCC) 09/16/2019   • . 09/16/2019   • Fever 09/16/2019   • S/P revision of total hip 03/09/2021     Resolved Ambulatory Problems     Diagnosis Date Noted   • Pruritic rash 10/18/2017   • Urine retention 09/16/2019     Past Medical History:   Diagnosis Date   • Abnormal EKG    • Arthritis    • BBB (bundle branch block)    • History of staph infection    • History of transfusion    • Lymphedema    • MVP (mitral valve prolapse)    • Nontoxic multinodular goiter    • Osteoporosis    • Pelvic joint pain, left    • Presence of left artificial hip joint    • Restless leg syndrome        PAST SURGICAL HISTORY  Past Surgical History:   Procedure Laterality Date   • APPENDECTOMY     • CARDIAC CATHETERIZATION      NORMAL    • COLONOSCOPY  08/17/2017    Normal except for anal fissure.  Dr. Brandt.  [facility].   • KNEE MINI REVISION Left 11/15/2016    Procedure: LEFT KNEE POLY CHANGE WITH HI POST STABILIZER;  Surgeon: Pablito Claudio MD;  Location: [facility] OR;  Service:    • KNEE MINI REVISION Left 12/13/2016    Procedure: LT KNEE REMOVAL/REPLACEMENT LOCKING PIN ;  Surgeon: Pablito Claudio MD;  Location: [facility] OR;  Service:    • MAMMO FINE NEEDLE ASPIRATION UNILATERAL      RIGHT THYROID NODULE, 2009 BENIGN    • NH REVISE KNEE JOINT REPLACE,1 PART Left 2/20/2017    Procedure: LT KNEE REMOVAL ANTIBIOTIC SPACER AND KNEE REVISION;  Surgeon: Christiano Cesar MD;   Location: [facility] OR;  Service: Orthopedics   • TX TOTAL KNEE ARTHROPLASTY Left 12/24/2016    Procedure: LEFT KNEE WASHOUT WITH POLY CHANGE;  Surgeon: Christiano Cesar MD;  Location: [facility] OR;  Service: Orthopedics   • REPLACEMENT TOTAL KNEE Left    • THYROIDECTOMY, PARTIAL      1979 LEFT LOBECTOMY AND ISTHMECTOMY    • TOTAL ABDOMINAL HYSTERECTOMY WITH SALPINGO OOPHORECTOMY     • TOTAL HIP ARTHROPLASTY Left    • TOTAL HIP ARTHROPLASTY Right 9/14/2019    Procedure: TOTAL HIP ARTHROPLASTY ANTERIOR WITH HANA TABLE;  Surgeon: Christiano Cesar II, MD;  Location: [facility] OR;  Service: Orthopedics   • TOTAL HIP ARTHROPLASTY REVISION Left 3/9/2021    Procedure: LEFT TOTAL HIP ARTHROPLASTY REVISION POSTERIOR;  Surgeon: Christiano Cesar II, MD;  Location: [facility] OR;  Service: Orthopedics;  Laterality: Left;   • TOTAL KNEE  PROSTHESIS REMOVAL W/ SPACER INSERTION Left 12/29/2016    Procedure: LT KNEE IMPLANT REMOVAL WITH INSERTION OF SPACER ;  Surgeon: Christiano Cesar MD;  Location: [facility] OR;  Service:        FAMILY HISTORY  Family History   Problem Relation Age of Onset   • Heart disease Father    • Diabetes Sister    • Hypertension Sister    • Hyperlipidemia Sister    • Obesity Sister    • Malig Hyperthermia Neg Hx        SOCIAL HISTORY  Social History     Socioeconomic History   • Marital status:    Tobacco Use   • Smoking status: Never Smoker   • Smokeless tobacco: Never Used   Vaping Use   • Vaping Use: Never used   Substance and Sexual Activity   • Alcohol use: No   • Drug use: No   • Sexual activity: Defer       REVIEW OF SYSTEMS  Review of Systems    All systems reviewed and negative except for those discussed in HPI.     PHYSICAL EXAM    ED Triage Vitals [03/02/22 1422]   Temp Heart Rate Resp BP SpO2   98.9 °F (37.2 °C) 82 16 -- 97 %      Temp src Heart Rate Source Patient Position BP Location FiO2 (%)   Temporal Monitor -- -- --     Physical Exam    I have reviewed the  triage vital signs and nursing notes.      GENERAL: not distressed  HENT: nares patent  EYES: no scleral icterus  NECK: no ROM limitations  CV: regular rhythm, regular rate, no murmur, no rubs, no gallups  RESPIRATORY: normal effort, CTAB  ABDOMEN: soft  : deferred  MUSCULOSKELETAL: no obvious deformity to left thigh, knee or lower leg.  She is able to bear weight and take a few steps.   NEURO: alert, moves all extremities, follows commands  SKIN: warm, dry      RADIOLOGY RESULTS  XR Hip With or Without Pelvis 2 - 3 View Left, XR Femur 2 View Left    Result Date: 3/2/2022  PELVIS AND LEFT HIP, LEFT FEMUR  HISTORY: Evaluate for periprosthetic fracture.  PELVIS AND LEFT HIP: An AP view of the pelvis as well as AP and frog leg lateral views of left hip demonstrates a left hip prosthesis with no evidence of fracture or dislocation. There is diffuse osteopenia.  LEFT FEMUR: AP and lateral views of the left femur again demonstrate diffuse osteopenia, particularly distally adjacent to the left knee prosthesis. There is no evidence of fracture.  Further evaluation could be performed with a CT examination of the hip/femur as indicated.           PROGRESS, DATA ANALYSIS, CONSULTS AND MEDICAL DECISION MAKING  All labs have been independently reviewed by me.  All radiology studies have been reviewed by me and discussed with radiologist dictating the report.  EKG's independently viewed and interpreted by me unless stated otherwise. Discussion below represents my analysis of pertinent findings related to patient's condition, differential diagnosis, treatment plan and final disposition.     ED Course as of 03/02/22 1854   Wed Mar 02, 2022   1513 I discussed with patient that I ordered hip and femur imaging.  I discussed with her that sometimes at this imaging is negative we will do a CT scan.  I did try to stand the patient which she was able to do but when I went to ambulate her she had significant pain in her left upper thigh  "area. [EW]   1743 I discussed imaging with patient.  She is able to use walker at home.  She has pain meds to take at home.  She will call Dr. Cesar's office for follow up tomorrow.  [EW]      ED Course User Index  [EW] Sultana Hughes, REMY     DDX: left femur fracture, periprostetic fracture left hip, left thigh contusion    MDM:  Imaging negative. Pt can ambulate with walker which is her baseline. She is established with Dr. Cesar, orthopedics and will follow up with him.      Reviewed pt's history and workup with Dr. Nieto.  After a bedside evaluation, Dr. Nieto agrees with the plan of care.    The patient's history, physical exam, and lab findings were discussed with the physician, who also performed a face to face history and physical exam.  I discussed all results and noted any abnormalities with patient.  Discussed absoute need to recheck abnormalities with their family physician.  I answered any of the patient's questions.  Discussed plan for discharge, as there is no emergent indication for admission.  Pt is agreeable and understands need for follow up and repeat testing.  Pt is aware that discharge does not mean that nothing is wrong but it indicates no emergency is present and they must continue care with their family physician.  Pt is discharged with instructions to follow up with primary care doctor to have their blood pressure rechecked.       Disposition vitals:  /78 (BP Location: Left arm, Patient Position: Sitting)   Pulse 74   Temp 98.9 °F (37.2 °C) (Temporal)   Resp 16   Ht 167.6 cm (66\")   Wt 79.8 kg (176 lb)   SpO2 98%   BMI 28.41 kg/m²       DIAGNOSIS  Final diagnoses:   Pain in left leg       FOLLOW UP   Christiano Cesar II, MD  [address]  [place] [address]  [phone]    Schedule an appointment as soon as possible for a visit            Sultana Hughes APRN  03/02/22 7341    "

## 2022-03-24 RX ORDER — ESCITALOPRAM OXALATE 20 MG/1
20 TABLET ORAL DAILY
Qty: 30 TABLET | Refills: 2 | Status: SHIPPED | OUTPATIENT
Start: 2022-03-24 | End: 2022-07-22 | Stop reason: SDUPTHER

## 2022-03-24 NOTE — TELEPHONE ENCOUNTER
Please check if patient has been taking escitalopram 10 mg daily.  If she is, increase escitalopram to 20 mg 1 tablet daily.  I prefer not using lorazepam because of potential for sedation and increased tolerance with long-term use.

## 2022-03-24 NOTE — TELEPHONE ENCOUNTER
3/24 pt called in and got hold of dr Yi stating her het is pounding she has a friend on lorazepam and it is working for her she wants to know if you will prescribe this for her instead of the citalopram

## 2022-03-24 NOTE — TELEPHONE ENCOUNTER
3/24 called and lm for pt to call back  She has been taking it everyday  Sending you an rx to sign for the 20 mg

## 2022-04-18 ENCOUNTER — TELEPHONE (OUTPATIENT)
Dept: ORTHOPEDIC SURGERY | Facility: CLINIC | Age: 77
End: 2022-04-18

## 2022-04-18 NOTE — TELEPHONE ENCOUNTER
Provider: REMY MO    Caller: MARSHA MARES    Relationship to Patient: PATIENT    Phone Number: [phone]    Reason for Call: PT WAS SEEN IN AT [facility] ON 03/02/2022. PT STATED THAT SHE FELL AT HOME AND WAS SEEN IN THE ER FOR LEFT HIP AND LEFT KNEE PAIN. PT IS UPSET BECAUSE THE ER CONSULTED WITH PATIENTS OLD ORTHOPEDIC DR. SHE STATED SHE KEEP TELLING THE STAFF THAT SHE IS NOW A PATIENT OF DR ZAPATA AND WANTS TO KNOW WHY HE WAS NOT CONSULTED WITH. SHE SAID THAT SHE HAS BEEN CALLING OUR OFFICE AND PUSHING DIFFERENT PROMPTS TO TRY AND SPEAK TO SOMEONE. SHE STATED THAT SHE HAS LEFT [employer] MESSAGES AND NO ONE HAS CALLED HER BACK. SHE IS NOW WANTING TO SPEAK WITH THE  IN REGARDS TO EVERYTHING THAT HAS HAPPENED. PT IS VERY UPSET ABOUT EVERYTHING THAT HAS HAPPENED.     When was the patient last seen: LAST SEEN MASOUD CRUZ ON 02/04/2020    PLEASE CALL PATIENT. THANK YOU

## 2022-04-18 NOTE — PROGRESS NOTES
Subjective   Mariela Ruiz is a 77 y.o. female.     F/u for hypothyrodiism, hyperlipidemia, sleep apnea         She has hypothyroidism and has been taking Synthroid 50 µg per day.   She had a previous left thyroid lobectomy in the 1970s for benign lesion.  She had a fine needle biopsy right thyroid nodule done in by Dr. Brandt in August 2017 which was read as follicular lesion of undetermined significance.  She had ultrasound-guided needle biopsy done at [place] on November 16, 2017 and pathology was read as benign follicular cells.  She denies any changes in her thyroid.    She has no previous history of head or neck radiation therapy.  She denies pressure symptoms     She has hyperlipidemia and has been on pravastatin 40 mg once a day.  She denies myalgia.   She has lost 9 pounds since 10/21.  Her last meal was last night     She has osteoporosis on bone density done in her gynecologist in November 2015.   She had a follow-up bone density done at [place] in November 2017 which showed osteoporosis with a 9.7% decrease on the right hip.  T score of the right hip was -2.8.  She was never on biphosphonate.  She has not used Prolia or Reclast.  She has vitamin D insufficiency and has been taking vit D occasionally.  She did not get Actonel filled because of high cost.  She does not want to use Fosamax.  She denies heartburn.     She had second left total hip replacement done by Dr. Cesar in March 2021 and is still having pain and difficulty with ambulation..  She had a right total hip arthroplasty done by Dr. Christiano Cesar in September 2019.  She had a previous left total knee replacement and it continues to bother her.  She was told that there are no surgical options for the left knee.     She has Dupuytren's contracture and was seen by Dr. Mota who advised surgery.  She has not seen her since 2019.     She has a history of seizure disorder and has been on phenobarbital prescribed by Dr. Whitman.  She denies any  "recent seizures.  She follows with Dr. Liu.     She has sleep apnea but is unable to tolerate a CPAP.  She complains of chronic fatigue.  She has not followed up with a sleep specialist.      The following portions of the patient's history were reviewed and updated as appropriate: allergies, current medications, past family history, past medical history, past social history, past surgical history and problem list.    Review of Systems   Eyes: Negative for visual disturbance.   Respiratory: Negative for shortness of breath.    Gastrointestinal: Negative.    Endocrine: Negative for cold intolerance and heat intolerance.   Genitourinary: Negative.    Musculoskeletal: Negative for myalgias.   Neurological: Negative for numbness.   Hematological: Negative for adenopathy. Does not bruise/bleed easily.     Objective      Vitals:    04/19/22 0755   BP: 130/66   Pulse: 78   SpO2: 98%   Weight: 75.8 kg (167 lb)   Height: 172.7 cm (68\")     Physical Exam  Constitutional:       General: She is not in acute distress.     Appearance: Normal appearance. She is not ill-appearing, toxic-appearing or diaphoretic.   Eyes:      General: No scleral icterus.        Right eye: No discharge.         Left eye: No discharge.      Extraocular Movements: Extraocular movements intact.      Conjunctiva/sclera: Conjunctivae normal.   Neck:      Vascular: No carotid bruit.   Cardiovascular:      Rate and Rhythm: Normal rate and regular rhythm.      Heart sounds: Normal heart sounds. No murmur heard.    No friction rub. No gallop.   Pulmonary:      Effort: Pulmonary effort is normal. No respiratory distress.      Breath sounds: Normal breath sounds. No stridor. No rales.   Chest:      Chest wall: No tenderness.   Abdominal:      General: Bowel sounds are normal. There is no distension.      Palpations: Abdomen is soft. There is no mass.      Tenderness: There is no right CVA tenderness or left CVA tenderness.   Musculoskeletal:      Cervical " back: Normal range of motion and neck supple. No rigidity or tenderness.      Right lower leg: No edema.      Left lower leg: No edema.      Comments: Dupuytren contracture on both hands left greater than right   Lymphadenopathy:      Cervical: No cervical adenopathy.   Skin:     General: Skin is warm and dry.   Neurological:      Mental Status: She is alert and oriented to person, place, and time.       Office Visit on 10/08/2021   Component Date Value Ref Range Status   • 25 Hydroxy, Vitamin D 10/08/2021 35.4  30.0 - 100.0 ng/mL Final    Comment: Results may be falsely increased if patient taking Biotin.  Reference Range for Total Vitamin D 25(OH)  Deficiency <20.0 ng/mL  Insufficiency 21-29 ng/mL  Sufficiency  ng/mL  Toxicity >100 ng/ml     • Glucose 10/08/2021 88  65 - 99 mg/dL Final   • BUN 10/08/2021 9  8 - 23 mg/dL Final   • Creatinine 10/08/2021 0.67  0.57 - 1.00 mg/dL Final   • eGFR Non  Am 10/08/2021 86  >60 mL/min/1.73 Final    Comment: The MDRD GFR formula is only valid for adults with stable  renal function between ages 18 and 70.     • eGFR  Am 10/08/2021 104  >60 mL/min/1.73 Final   • BUN/Creatinine Ratio 10/08/2021 13.4  7.0 - 25.0 Final   • Sodium 10/08/2021 139  136 - 145 mmol/L Final   • Potassium 10/08/2021 4.3  3.5 - 5.2 mmol/L Final   • Chloride 10/08/2021 102  98 - 107 mmol/L Final   • Total CO2 10/08/2021 29.8 (A) 22.0 - 29.0 mmol/L Final   • Calcium 10/08/2021 9.8  8.6 - 10.5 mg/dL Final   • Total Protein 10/08/2021 6.4  6.0 - 8.5 g/dL Final   • Albumin 10/08/2021 4.30  3.50 - 5.20 g/dL Final   • Globulin 10/08/2021 2.1  gm/dL Final   • A/G Ratio 10/08/2021 2.0  g/dL Final   • Total Bilirubin 10/08/2021 0.3  0.0 - 1.2 mg/dL Final   • Alkaline Phosphatase 10/08/2021 134 (A) 39 - 117 U/L Final   • AST (SGOT) 10/08/2021 18  1 - 32 U/L Final   • ALT (SGPT) 10/08/2021 12  1 - 33 U/L Final   • Total Cholesterol 10/08/2021 203 (A) 0 - 200 mg/dL Final    Comment: Cholesterol  Reference Ranges  (U.S. Department of Health and Human Services ATP III  Classifications)  Desirable          <200 mg/dL  Borderline High    200-239 mg/dL  High Risk          >240 mg/dL  Triglyceride Reference Ranges  (U.S. Department of Health and Human Services ATP III  Classifications)  Normal           <150 mg/dL  Borderline High  150-199 mg/dL  High             200-499 mg/dL  Very High        >500 mg/dL  HDL Reference Ranges  (U.S. Department of Health and Human Services ATP III  Classifcations)  Low     <40 mg/dl (major risk factor for CHD)  High    >60 mg/dl ('negative' risk factor for CHD)  LDL Reference Ranges  (U.S. Department of Health and Human Services ATP III  Classifcations)  Optimal          <100 mg/dL  Near Optimal     100-129 mg/dL  Borderline High  130-159 mg/dL  High             160-189 mg/dL  Very High        >189 mg/dL     • Triglycerides 10/08/2021 40  0 - 150 mg/dL Final   • HDL Cholesterol 10/08/2021 96 (A) 40 - 60 mg/dL Final   • VLDL Cholesterol El[place] 10/08/2021 8  5 - 40 mg/dL Final   • LDL Chol Calc ([facility]) 10/08/2021 99  0 - 100 mg/dL Final   • TSH 10/08/2021 0.443  0.270 - 4.200 uIU/mL Final   • Free T4 10/08/2021 1.08  0.93 - 1.70 ng/dL Final    Results may be falsely increased if patient taking Biotin.   • Chromium, Plasma 10/08/2021 2.4 (A) 0.1 - 2.1 ug/L Final                                    Detection Limit = 0.1   • Cobalt 10/08/2021 2.3 (A) 0.0 - 0.9 ug/L Final    Comment:                        Occupational Exposure: DENIA 1.0                                  Detection Limit = 1.0     • WBC 10/08/2021 3.72  3.40 - 10.80 10*3/mm3 Final   • RBC 10/08/2021 3.96  3.77 - 5.28 10*6/mm3 Final   • Hemoglobin 10/08/2021 11.9 (A) 12.0 - 15.9 g/dL Final   • Hematocrit 10/08/2021 36.3  34.0 - 46.6 % Final   • MCV 10/08/2021 91.7  79.0 - 97.0 fL Final   • MCH 10/08/2021 30.1  26.6 - 33.0 pg Final   • MCHC 10/08/2021 32.8  31.5 - 35.7 g/dL Final   • RDW 10/08/2021 12.7  12.3 - 15.4 % Final   •  Platelets 10/08/2021 219  140 - 450 10*3/mm3 Final   • Neutrophil Rel % 10/08/2021 54.6  42.7 - 76.0 % Final   • Lymphocyte Rel % 10/08/2021 34.4  19.6 - 45.3 % Final   • Monocyte Rel % 10/08/2021 9.4  5.0 - 12.0 % Final   • Eosinophil Rel % 10/08/2021 0.0 (A) 0.3 - 6.2 % Final   • Basophil Rel % 10/08/2021 1.3  0.0 - 1.5 % Final   • Neutrophils Absolute 10/08/2021 2.03  1.70 - 7.00 10*3/mm3 Final   • Lymphocytes Absolute 10/08/2021 1.28  0.70 - 3.10 10*3/mm3 Final   • Monocytes Absolute 10/08/2021 0.35  0.10 - 0.90 10*3/mm3 Final   • Eosinophils Absolute 10/08/2021 0.00  0.00 - 0.40 10*3/mm3 Final   • Basophils Absolute 10/08/2021 0.05  0.00 - 0.20 10*3/mm3 Final   • Immature Granulocyte Rel % 10/08/2021 0.3  0.0 - 0.5 % Final   • Immature Grans Absolute 10/08/2021 0.01  0.00 - 0.05 10*3/mm3 Final   • nRBC 10/08/2021 0.0  0.0 - 0.2 /100 WBC Final   • Vitamin B-12 10/08/2021 335  211 - 946 pg/mL Final    Results may be falsely increased if patient taking Biotin.   • Hemoglobin A1C 10/08/2021 4.55 (A) 4.80 - 5.60 % Final    Comment: Hemoglobin A1C Ranges:  Increased Risk for Diabetes  5.7% to 6.4%  Diabetes                     >= 6.5%  Diabetic Goal                < 7.0%     • Interpretation 10/08/2021 Note   Final    Supplemental report is available.     Assessment/Plan   Diagnoses and all orders for this visit:    1. Age-related osteoporosis without current pathological fracture (Primary)  -     DEXA Bone Density Axial  -     Comprehensive Metabolic Panel  -     Vitamin D 25 Hydroxy    2. Seizure disorder (HCC)    3. Hyperlipidemia, unspecified hyperlipidemia type  -     Comprehensive Metabolic Panel  -     Lipid Panel    4. Vitamin D insufficiency  -     Vitamin D 25 Hydroxy    5. Obstructive sleep apnea syndrome    6. Postsurgical hypothyroidism  -     Synthroid 50 MCG tablet; Take 1 tablet by mouth Every Morning Before Breakfast.  Dispense: 90 tablet; Refill: 2  -     T4, Free  -     TSH    7. Dupuytren's  contracture    8. History of partial thyroidectomy    9. S/P revision of total hip    10. Abnormal blood level of chromium  -     Chromium Level    11. Abnormal blood level of cobalt  -     Cobalt    Other orders  -     pravastatin (PRAVACHOL) 40 MG tablet; Take 1 tablet by mouth Daily.  Dispense: 90 tablet; Refill: 2      Continue Synthroid 50 mcg daily.  Continue pravastatin 40 mg/day.  Schedule bone density at [facility].  Check vitamin D.  Advised to follow-up with hand surgeon with regards to the Dupuytren contracture.  Advised to follow-up with sleep specialist.  Follow-up with Dr. Cesar.  Advised to discuss with him about rehab referral.    Copy of my note sent to Dr. Danny Cesar, Dr. Liu, Dr. Mota    RTC 4 mos.

## 2022-04-19 ENCOUNTER — OFFICE VISIT (OUTPATIENT)
Dept: ENDOCRINOLOGY | Age: 77
End: 2022-04-19

## 2022-04-19 VITALS
HEIGHT: 68 IN | WEIGHT: 167 LBS | OXYGEN SATURATION: 98 % | HEART RATE: 78 BPM | BODY MASS INDEX: 25.31 KG/M2 | DIASTOLIC BLOOD PRESSURE: 66 MMHG | SYSTOLIC BLOOD PRESSURE: 130 MMHG

## 2022-04-19 DIAGNOSIS — R79.0 ABNORMAL BLOOD LEVEL OF COBALT: ICD-10-CM

## 2022-04-19 DIAGNOSIS — E78.5 HYPERLIPIDEMIA, UNSPECIFIED HYPERLIPIDEMIA TYPE: ICD-10-CM

## 2022-04-19 DIAGNOSIS — E89.0 POSTSURGICAL HYPOTHYROIDISM: ICD-10-CM

## 2022-04-19 DIAGNOSIS — Z96.649 S/P REVISION OF TOTAL HIP: ICD-10-CM

## 2022-04-19 DIAGNOSIS — M72.0 DUPUYTREN'S CONTRACTURE: ICD-10-CM

## 2022-04-19 DIAGNOSIS — E89.0 HISTORY OF PARTIAL THYROIDECTOMY: ICD-10-CM

## 2022-04-19 DIAGNOSIS — E55.9 VITAMIN D INSUFFICIENCY: ICD-10-CM

## 2022-04-19 DIAGNOSIS — R79.0 ABNORMAL BLOOD LEVEL OF CHROMIUM: ICD-10-CM

## 2022-04-19 DIAGNOSIS — G40.909 SEIZURE DISORDER: ICD-10-CM

## 2022-04-19 DIAGNOSIS — M81.0 AGE-RELATED OSTEOPOROSIS WITHOUT CURRENT PATHOLOGICAL FRACTURE: Primary | ICD-10-CM

## 2022-04-19 DIAGNOSIS — G47.33 OBSTRUCTIVE SLEEP APNEA SYNDROME: ICD-10-CM

## 2022-04-19 PROBLEM — T63.461A WASP STING: Status: RESOLVED | Noted: 2019-08-16 | Resolved: 2022-04-19

## 2022-04-19 PROBLEM — L03.113 CELLULITIS OF RIGHT UPPER EXTREMITY: Status: RESOLVED | Noted: 2019-08-17 | Resolved: 2022-04-19

## 2022-04-19 PROCEDURE — 99214 OFFICE O/P EST MOD 30 MIN: CPT | Performed by: INTERNAL MEDICINE

## 2022-04-19 RX ORDER — LEVOTHYROXINE SODIUM 50 MCG
50 TABLET ORAL
Qty: 90 TABLET | Refills: 2 | Status: SHIPPED | OUTPATIENT
Start: 2022-04-19 | End: 2023-02-10

## 2022-04-19 RX ORDER — PRAVASTATIN SODIUM 40 MG
40 TABLET ORAL DAILY
Qty: 90 TABLET | Refills: 2 | Status: SHIPPED | OUTPATIENT
Start: 2022-04-19 | End: 2022-05-17

## 2022-04-25 ENCOUNTER — TELEPHONE (OUTPATIENT)
Dept: ENDOCRINOLOGY | Age: 77
End: 2022-04-25

## 2022-04-25 LAB
25(OH)D3+25(OH)D2 SERPL-MCNC: 24.7 NG/ML (ref 30–100)
ALBUMIN SERPL-MCNC: 4.4 G/DL (ref 3.7–4.7)
ALBUMIN/GLOB SERPL: 1.9 {RATIO} (ref 1.2–2.2)
ALP SERPL-CCNC: 133 IU/L (ref 44–121)
ALT SERPL-CCNC: 11 IU/L (ref 0–32)
AST SERPL-CCNC: 16 IU/L (ref 0–40)
BILIRUB SERPL-MCNC: 0.3 MG/DL (ref 0–1.2)
BUN SERPL-MCNC: 10 MG/DL (ref 8–27)
BUN/CREAT SERPL: 14 (ref 12–28)
CALCIUM SERPL-MCNC: 10 MG/DL (ref 8.7–10.3)
CHLORIDE SERPL-SCNC: 101 MMOL/L (ref 96–106)
CHOLEST SERPL-MCNC: 189 MG/DL (ref 100–199)
CO2 SERPL-SCNC: 26 MMOL/L (ref 20–29)
COBALT SERPL-MCNC: 1.9 UG/L (ref 0–0.9)
CR SERPL-MCNC: 1.9 UG/L (ref 0.1–2.1)
CREAT SERPL-MCNC: 0.74 MG/DL (ref 0.57–1)
EGFRCR SERPLBLD CKD-EPI 2021: 84 ML/MIN/1.73
GLOBULIN SER CALC-MCNC: 2.3 G/DL (ref 1.5–4.5)
GLUCOSE SERPL-MCNC: 90 MG/DL (ref 65–99)
HDLC SERPL-MCNC: 94 MG/DL
IMP & REVIEW OF LAB RESULTS: NORMAL
LDLC SERPL CALC-MCNC: 86 MG/DL (ref 0–99)
POTASSIUM SERPL-SCNC: 4.3 MMOL/L (ref 3.5–5.2)
PROT SERPL-MCNC: 6.7 G/DL (ref 6–8.5)
SODIUM SERPL-SCNC: 140 MMOL/L (ref 134–144)
T4 FREE SERPL-MCNC: 1.14 NG/DL (ref 0.82–1.77)
TRIGL SERPL-MCNC: 48 MG/DL (ref 0–149)
TSH SERPL DL<=0.005 MIU/L-ACNC: 1.03 UIU/ML (ref 0.45–4.5)
VLDLC SERPL CALC-MCNC: 9 MG/DL (ref 5–40)

## 2022-04-25 NOTE — TELEPHONE ENCOUNTER
They cannot do a bone density in both of your hips because there are metal implants.  However, you do not have a metal implants on your spine and so bone density of the lumbar spine can be done.

## 2022-04-25 NOTE — PROGRESS NOTES
LDL 86.  HDL 94.  Triglycerides 48.  Continue low-fat diet.  Normal thyroid function tests.    Chromium level normal after hip replacement  [place] levels coming down.  Vitamin D insufficient at 24.7 ng/mL.  Take vitamin D3 2000 units daily.  Copy of labs sent to Dr. Danny Cesar, and Dr. Steven Liu.  Copy of labs sent to patient through [employer].

## 2022-04-25 NOTE — TELEPHONE ENCOUNTER
4/25 the hospital called in stating she cant get the dexa scan done due to the metal in her       this is the patient calling about her dexa scan & whether or not its ok since she has metal in her body body

## 2022-04-26 ENCOUNTER — OFFICE VISIT (OUTPATIENT)
Dept: ORTHOPEDIC SURGERY | Facility: CLINIC | Age: 77
End: 2022-04-26

## 2022-04-26 VITALS — WEIGHT: 173 LBS | TEMPERATURE: 97.1 F | HEIGHT: 67 IN | BODY MASS INDEX: 27.15 KG/M2

## 2022-04-26 DIAGNOSIS — M79.652 LEFT THIGH PAIN: Primary | ICD-10-CM

## 2022-04-26 DIAGNOSIS — Z96.642 STATUS POST LEFT HIP REPLACEMENT: Primary | ICD-10-CM

## 2022-04-26 PROCEDURE — 99213 OFFICE O/P EST LOW 20 MIN: CPT | Performed by: ORTHOPAEDIC SURGERY

## 2022-04-26 RX ORDER — LUBIPROSTONE 24 UG/1
24 CAPSULE ORAL
COMMUNITY
Start: 2021-12-10 | End: 2023-03-21

## 2022-04-26 NOTE — PROGRESS NOTES
Patient: Mariela Ruiz  YOB: 1945 77 y.o. female  Medical Record Number: [id number]    Chief Complaints:   Chief Complaint   Patient presents with   • Left Hip - Follow-up, Pain       History of Present Illness:Mariela Ruiz is a 77 y.o. female who presents with complaints of left mid thigh pain.  She had a left acetabular revision by Dr. Danny Cesar 1 year ago.  She has had a left knee revision by Dr. Chuy Cesar for infection with multiple surgeries.    Allergies:   Allergies   Allergen Reactions   • Clindamycin/Lincomycin Itching   • Duricef [Cefadroxil] Hives and Rash   • Keflex [Cephalexin] Rash   • Sulfa Antibiotics Rash     RASH       Medications:   Current Outpatient Medications   Medication Sig Dispense Refill   • cholecalciferol (VITAMIN D3) 1000 units tablet Take 1 tablet by mouth Daily.     • coenzyme Q10 100 MG capsule Take 100 mg by mouth Daily. HOLD PRIOR TO SURG     • escitalopram (LEXAPRO) 20 MG tablet Take 1 tablet by mouth Daily. 30 tablet 2   • linaclotide (LINZESS) 145 MCG capsule capsule Take 145 mcg by mouth Daily.     • lubiprostone (AMITIZA) 24 MCG capsule Take 24 mcg by mouth.     • PHENobarbital (LUMINAL) 64.8 MG tablet TAKE 3 TABLETS BY MOUTH EVERY  tablet 0   • pramipexole (MIRAPEX) 0.5 MG tablet TAKE 5 TABLETS BY MOUTH ONCE DAILY 450 tablet 0   • pravastatin (PRAVACHOL) 40 MG tablet Take 1 tablet by mouth Daily. 90 tablet 2   • Synthroid 50 MCG tablet Take 1 tablet by mouth Every Morning Before Breakfast. 90 tablet 2     No current facility-administered medications for this visit.         The following portions of the patient's history were reviewed and updated as appropriate: allergies, current medications, past family history, past medical history, past social history, past surgical history and problem list.    Review of Systems:   A 14 point review of systems was performed. All systems negative except pertinent positives/negative listed in HPI  "above    Physical Exam:   Vitals:    04/26/22 1333   Temp: 97.1 °F (36.2 °C)   Weight: 78.5 kg (173 lb)   Height: 170.2 cm (67\")       General: A and O x 3, ASA, NAD    SCLERA:    Normal    DENTITION:   Normal  Pain in mid thigh with ambulation  Healed hip and knee incisions without evidence of infection      Radiology:  Xrays 2views left hip (AP bilateral hips, and lateral of the hip) taken at the hospital were reviewed  demonstrating  a well positioned total hip without evidence of wear, loosening, or osteoarthritis.    Assessment/Plan: Left femur mid thigh pain since a fall about 6 weeks ago.  She has had a previous left total hip by Dr. Dial and a acetabular revision by Dr. Danny Cesar about a year ago.  She is also had multiple left knee surgeries by Dr. Chuy Cesar for infection with reimplantation.  At any rate she was doing okay until about 6 weeks ago when she had a fall.  There is been a negative x-ray but I cannot confirm for sure whether there could be a mental more nondisplaced fracture without higher level imaging.  I am going to get a CT scan of the left femur and will call her with results      Ed Chun MD  4/26/2022  "

## 2022-05-05 ENCOUNTER — HOSPITAL ENCOUNTER (OUTPATIENT)
Dept: CT IMAGING | Facility: HOSPITAL | Age: 77
Discharge: HOME | End: 2022-05-05
Admitting: ORTHOPAEDIC SURGERY

## 2022-05-05 DIAGNOSIS — M79.652 LEFT THIGH PAIN: ICD-10-CM

## 2022-05-05 PROCEDURE — 73700 CT LOWER EXTREMITY W/O DYE: CPT

## 2022-05-11 DIAGNOSIS — G40.909 SEIZURE DISORDER: ICD-10-CM

## 2022-05-11 DIAGNOSIS — G25.81 RESTLESS LEGS SYNDROME: ICD-10-CM

## 2022-05-12 RX ORDER — PRAMIPEXOLE DIHYDROCHLORIDE 0.5 MG/1
TABLET ORAL
Qty: 450 TABLET | Refills: 0 | Status: SHIPPED | OUTPATIENT
Start: 2022-05-12 | End: 2022-08-16

## 2022-05-12 RX ORDER — PHENOBARBITAL 64.8 MG/1
TABLET ORAL
Qty: 270 TABLET | Refills: 0 | Status: SHIPPED | OUTPATIENT
Start: 2022-05-12 | End: 2022-08-16

## 2022-05-13 ENCOUNTER — TELEPHONE (OUTPATIENT)
Dept: ENDOCRINOLOGY | Age: 77
End: 2022-05-13

## 2022-05-13 NOTE — TELEPHONE ENCOUNTER
PT CALLED AND STATED SHE HAS A RASH ALL OVER HER FACE, NECK AND HANDS. PT DID WANT TO GO TO THE UC OR THE ER. SHE WANTS TO KNOW WHAT SHE CAN DO FOR THE RASH BECAUSE IT IS SPREADING ALL OVER.      MARSHA MARES [phone]

## 2022-05-16 ENCOUNTER — OFFICE VISIT (OUTPATIENT)
Dept: ENDOCRINOLOGY | Age: 77
End: 2022-05-16

## 2022-05-16 VITALS
SYSTOLIC BLOOD PRESSURE: 140 MMHG | OXYGEN SATURATION: 100 % | HEIGHT: 68 IN | BODY MASS INDEX: 25.16 KG/M2 | HEART RATE: 93 BPM | DIASTOLIC BLOOD PRESSURE: 75 MMHG | WEIGHT: 166 LBS

## 2022-05-16 DIAGNOSIS — M81.0 AGE-RELATED OSTEOPOROSIS WITHOUT CURRENT PATHOLOGICAL FRACTURE: ICD-10-CM

## 2022-05-16 DIAGNOSIS — E55.9 VITAMIN D INSUFFICIENCY: ICD-10-CM

## 2022-05-16 DIAGNOSIS — L30.9 DERMATITIS: Primary | ICD-10-CM

## 2022-05-16 DIAGNOSIS — E89.0 POSTSURGICAL HYPOTHYROIDISM: ICD-10-CM

## 2022-05-16 DIAGNOSIS — E78.5 HYPERLIPIDEMIA, UNSPECIFIED HYPERLIPIDEMIA TYPE: ICD-10-CM

## 2022-05-16 PROCEDURE — 99214 OFFICE O/P EST MOD 30 MIN: CPT | Performed by: INTERNAL MEDICINE

## 2022-05-16 NOTE — PROGRESS NOTES
"Obie Ruiz is a 77 y.o. female.     History of Present Illness     Patient was seen in the urgent care for pruritic rash 3 days ago.  She was given a steroid shot and was sent home on Medrol Dosepak.  The rash is improving.  She has intermittent itching.  She denies shortness of breath.  She takes Benadryl intermittently.  She denies drowsiness related to Benadryl.    She had gel injection on the right knee which did not improve her pain.  She has chronic left hip and left knee pain.  She will follow with Dr. Ed Chun.    She has hypothyroidism is on Synthroid 50 mcg/day.    She has hyperlipidemia and is on pravastatin 40 mg/day.  She denies myalgia.    She has sleep apnea and is unable to tolerate a CPAP.  She has not follow-up with a sleep specialist.    She has history of seizure disorder and is on phenobarbital.  She denies any recent seizures.  She follows with Dr. Steven Liu.    She has Dupuytren contracture on both hands and will see Dr. Mota for follow-up.        The following portions of the patient's history were reviewed and updated as appropriate: allergies, current medications, past family history, past medical history, past social history, past surgical history and problem list.    Review of Systems   Eyes: Negative for visual disturbance.   Respiratory: Negative for shortness of breath and wheezing.    Cardiovascular: Negative for chest pain and palpitations.   Gastrointestinal: Negative.    Endocrine: Negative for cold intolerance and heat intolerance.   Genitourinary: Negative.    Musculoskeletal: Negative.  Negative for myalgias and neck stiffness.   Neurological: Negative for weakness and numbness.   Hematological: Negative for adenopathy. Does not bruise/bleed easily.     Vitals:    05/16/22 1353   BP: 140/75   Pulse: 93   SpO2: 100%   Weight: 75.3 kg (166 lb)   Height: 172.7 cm (68\")      Objective   Physical Exam  Constitutional:       General: She is not in acute " distress.     Appearance: Normal appearance. She is obese. She is not ill-appearing, toxic-appearing or diaphoretic.   Eyes:      General: No scleral icterus.        Right eye: No discharge.         Left eye: No discharge.      Extraocular Movements: Extraocular movements intact.      Conjunctiva/sclera: Conjunctivae normal.   Neck:      Vascular: No carotid bruit.   Cardiovascular:      Rate and Rhythm: Normal rate and regular rhythm.      Heart sounds: Normal heart sounds. No murmur heard.    No friction rub.   Pulmonary:      Effort: Pulmonary effort is normal.      Breath sounds: Normal breath sounds.   Abdominal:      General: Bowel sounds are normal. There is no distension.      Palpations: Abdomen is soft. There is no mass.      Tenderness: There is no right CVA tenderness or left CVA tenderness.   Musculoskeletal:      Cervical back: Normal range of motion and neck supple.   Lymphadenopathy:      Cervical: No cervical adenopathy.   Skin:     General: Skin is warm and dry.   Neurological:      Mental Status: She is alert and oriented to person, place, and time.       Office Visit on 04/19/2022   Component Date Value Ref Range Status   • Glucose 04/19/2022 90  65 - 99 mg/dL Final   • BUN 04/19/2022 10  8 - 27 mg/dL Final   • Creatinine 04/19/2022 0.74  0.57 - 1.00 mg/dL Final   • EGFR Result 04/19/2022 84  >59 mL/min/1.73 Final   • BUN/Creatinine Ratio 04/19/2022 14  12 - 28 Final   • Sodium 04/19/2022 140  134 - 144 mmol/L Final   • Potassium 04/19/2022 4.3  3.5 - 5.2 mmol/L Final   • Chloride 04/19/2022 101  96 - 106 mmol/L Final   • Total CO2 04/19/2022 26  20 - 29 mmol/L Final   • Calcium 04/19/2022 10.0  8.7 - 10.3 mg/dL Final   • Total Protein 04/19/2022 6.7  6.0 - 8.5 g/dL Final   • Albumin 04/19/2022 4.4  3.7 - 4.7 g/dL Final   • Globulin 04/19/2022 2.3  1.5 - 4.5 g/dL Final   • A/G Ratio 04/19/2022 1.9  1.2 - 2.2 Final   • Total Bilirubin 04/19/2022 0.3  0.0 - 1.2 mg/dL Final   • Alkaline Phosphatase  04/19/2022 133 (A) 44 - 121 IU/L Final   • AST (SGOT) 04/19/2022 16  0 - 40 IU/L Final   • ALT (SGPT) 04/19/2022 11  0 - 32 IU/L Final   • Total Cholesterol 04/19/2022 189  100 - 199 mg/dL Final   • Triglycerides 04/19/2022 48  0 - 149 mg/dL Final   • HDL Cholesterol 04/19/2022 94  >39 mg/dL Final   • VLDL Cholesterol [place] 04/19/2022 9  5 - 40 mg/dL Final   • LDL Chol Calc ([facility]) 04/19/2022 86  0 - 99 mg/dL Final   • Free T4 04/19/2022 1.14  0.82 - 1.77 ng/dL Final   • TSH 04/19/2022 1.030  0.450 - 4.500 uIU/mL Final   • Chromium, Plasma 04/19/2022 1.9  0.1 - 2.1 ug/L Final                                    Detection Limit = 0.1   • Cobalt 04/19/2022 1.9 (A) 0.0 - 0.9 ug/L Final    Comment:                        Occupational Exposure: DENIA 1.0                                  Detection Limit = 1.0     • 25 Hydroxy, Vitamin D 04/19/2022 24.7 (A) 30.0 - 100.0 ng/mL Final    Comment: Vitamin D deficiency has been defined by the [employer] of  Medicine and an Endocrine Society practice guideline as a  level of serum 25-OH vitamin D less than 20 ng/mL (1,2).  The Endocrine Society went on to further define vitamin D  insufficiency as a level between 21 and 29 ng/mL (2).  1. IOM ([employer] of Medicine). 2010. Dietary reference     intakes for calcium and D. [place]: The     National Academies Press.  2. Elise MF, [place], Pradeep ROWE, et al.     Evaluation, treatment, and prevention of vitamin D     deficiency: an Endocrine Society clinical practice     guideline. JCEM. 2011 Jul; 96(7):1911-30.     • Interpretation 04/19/2022 Note   Final    Supplemental report is available.     Assessment & Plan   Diagnoses and all orders for this visit:    1. Dermatitis (Primary)    2. Hyperlipidemia, unspecified hyperlipidemia type    3. Vitamin D insufficiency    4. Age-related osteoporosis without current pathological fracture    5. Postsurgical hypothyroidism      Continue Medrol Dosepak.  May use betamethasone  dipropionate 0.05% cream twice a day as needed.  May use Benadryl 25 mg every 4-6 hours as needed for itching.  Continue pravastatin 40 mg/day.  Continue vitamin D3 1000 units/day.  Printed information on osteoporosis was given to the patient.  Continue Synthroid 50 mcg daily.    Follow-up as scheduled.

## 2022-05-17 RX ORDER — PRAVASTATIN SODIUM 40 MG
TABLET ORAL
Qty: 90 TABLET | Refills: 2 | Status: SHIPPED | OUTPATIENT
Start: 2022-05-17 | End: 2023-02-09

## 2022-05-26 DIAGNOSIS — Z96.642 STATUS POST LEFT HIP REPLACEMENT: Primary | ICD-10-CM

## 2022-05-27 DIAGNOSIS — Z96.642 STATUS POST LEFT HIP REPLACEMENT: Primary | ICD-10-CM

## 2022-05-27 DIAGNOSIS — M25.562 LEFT KNEE PAIN, UNSPECIFIED CHRONICITY: ICD-10-CM

## 2022-05-27 DIAGNOSIS — M79.652 LEFT THIGH PAIN: ICD-10-CM

## 2022-06-13 ENCOUNTER — TELEPHONE (OUTPATIENT)
Dept: ORTHOPEDIC SURGERY | Facility: CLINIC | Age: 77
End: 2022-06-13

## 2022-06-13 NOTE — TELEPHONE ENCOUNTER
I called and spoke to the patient and answered her questions about her scans.  She will need a follow-up visit with Dr. Chun to go over her CT and her nuclear medicine bone scan.  We will have her medical assistant call and arrange an appointment to be seen within the next 3 weeks.

## 2022-06-13 NOTE — TELEPHONE ENCOUNTER
Caller: MARSHA  Relationship to Patient: SELF    Phone Number: [phone]  Reason for Call: PT CALLED STATING THAT SHE IS TO HAVE A WHOLE BODY SCAN DONE. STATES NOBODY HAS TOLD HER WHY SHE NEEDS THIS. HAS SEVERAL QUESTIONS. NEEDS TO KNOW IF IT IS OK IF SHE HAS METAL IN HER BODY FROM KNEE REPLACEMENT. ALSO STATES THAT SHE HAD A CT SCAN PRIOR TO THIS BUT WAS NEVER GIVEN RESULTS. PT BODY SCAN IS TOMORROW 06/14/22 PLEASE ADVISE ASAP

## 2022-06-14 ENCOUNTER — HOSPITAL ENCOUNTER (OUTPATIENT)
Dept: NUCLEAR MEDICINE | Facility: HOSPITAL | Age: 77
Discharge: HOME | End: 2022-06-14

## 2022-06-14 DIAGNOSIS — Z96.642 STATUS POST LEFT HIP REPLACEMENT: ICD-10-CM

## 2022-06-14 DIAGNOSIS — M79.652 LEFT THIGH PAIN: ICD-10-CM

## 2022-06-14 DIAGNOSIS — M25.562 LEFT KNEE PAIN, UNSPECIFIED CHRONICITY: ICD-10-CM

## 2022-06-14 PROCEDURE — A9503 TC99M MEDRONATE: HCPCS | Performed by: NURSE PRACTITIONER

## 2022-06-14 PROCEDURE — 0 TECHNETIUM MEDRONATE KIT: Performed by: NURSE PRACTITIONER

## 2022-06-14 PROCEDURE — 78306 BONE IMAGING WHOLE BODY: CPT

## 2022-06-14 RX ORDER — TC 99M MEDRONATE 20 MG/10ML
24 INJECTION, POWDER, LYOPHILIZED, FOR SOLUTION INTRAVENOUS
Status: COMPLETED | OUTPATIENT
Start: 2022-06-14 | End: 2022-06-14

## 2022-06-14 RX ADMIN — Medication 24 MILLICURIE: at 08:27

## 2022-06-21 NOTE — PROGRESS NOTES
Patient is scheduled to see Dr. Chun on July 5.  He can go over the CT scan, and bone scan and further treatment recommendations during this visit.

## 2022-07-05 ENCOUNTER — OFFICE VISIT (OUTPATIENT)
Dept: ORTHOPEDIC SURGERY | Facility: CLINIC | Age: 77
End: 2022-07-05

## 2022-07-05 VITALS — TEMPERATURE: 98 F | WEIGHT: 160 LBS | BODY MASS INDEX: 26.66 KG/M2 | HEIGHT: 65 IN

## 2022-07-05 DIAGNOSIS — M17.11 PRIMARY OSTEOARTHRITIS OF RIGHT KNEE: Primary | ICD-10-CM

## 2022-07-05 PROCEDURE — 99213 OFFICE O/P EST LOW 20 MIN: CPT | Performed by: ORTHOPAEDIC SURGERY

## 2022-07-05 NOTE — PROGRESS NOTES
Patient: Mariela Ruiz  YOB: 1945 77 y.o. female  Medical Record Number: [id number]    Chief Complaints:   Chief Complaint   Patient presents with   • Left Hip - Follow-up     Whole body DEXA scan         History of Present Illness:Mariela Ruiz is a 77 y.o. female who presents for follow-up of left knee and hip revision she had some thigh pain which persisted for a while after a fall she has had a bone scan and is here for for follow-up.  Overall she states her pain in the left side is minimal she is having more pain in the right knee she has been to the knee arthritis [facility] for multiple injections still having persistent fairly severe pain.    Allergies:   Allergies   Allergen Reactions   • Clindamycin/Lincomycin Itching   • Duricef [Cefadroxil] Hives and Rash   • Keflex [Cephalexin] Rash   • Sulfa Antibiotics Rash     RASH       Medications:   Current Outpatient Medications   Medication Sig Dispense Refill   • cholecalciferol (VITAMIN D3) 1000 units tablet Take 1 tablet by mouth Daily.     • coenzyme Q10 100 MG capsule Take 100 mg by mouth Daily. HOLD PRIOR TO SURG     • escitalopram (LEXAPRO) 20 MG tablet Take 1 tablet by mouth Daily. 30 tablet 2   • linaclotide (LINZESS) 145 MCG capsule capsule Take 145 mcg by mouth Daily.     • lubiprostone (AMITIZA) 24 MCG capsule Take 24 mcg by mouth.     • PHENobarbital (LUMINAL) 64.8 MG tablet TAKE 3 TABLETS BY MOUTH EVERY  tablet 0   • pramipexole (MIRAPEX) 0.5 MG tablet TAKE 5 TABLETS BY MOUTH ONCE DAILY 450 tablet 0   • pravastatin (PRAVACHOL) 40 MG tablet TAKE 1 TABLET BY MOUTH EVERY DAY 90 tablet 2   • Synthroid 50 MCG tablet Take 1 tablet by mouth Every Morning Before Breakfast. 90 tablet 2     No current facility-administered medications for this visit.         The following portions of the patient's history were reviewed and updated as appropriate: allergies, current medications, past family history, past medical history, past social  "history, past surgical history and problem list.    Review of Systems:   A 14 point review of systems was performed. All systems negative except pertinent positives/negative listed in HPI above    Physical Exam:   Vitals:    07/05/22 0803   Temp: 98 °F (36.7 °C)   Weight: 72.6 kg (160 lb)   Height: 165.1 cm (65\")       General: A and O x 3, ASA, NAD    SCLERA:    Normal    DENTITION:   Normal  Knee:  right    ALIGNMENT:     Varus  ,   Patella  tracks  midline    GAIT:    Antalgic    SKIN:    No abnormality    RANGE OF MOTION:   7  -  95   DEG    STRENGTH:   4  / 5    LIGAMENTS:    No varus / valgus instability.   Negative  Lachman.    MENISCUS:     Negative   Surjit       DISTAL PULSES:    Paplable    DISTAL SENSATION :   Intact    LYMPHATICS:     No   lymphadenopathy    OTHER:          - No  effusion      - Crepitance with ROM       Radiology:  Xrays 3views right  Knee (ap,lateral, sunrise) taken previously demonstratingadvanced varus osteoarthritis with bone on bone articulation, subchondral cysts, and periarticular osteophytes  Recent bone scan dated 6/14/22 films and report reviewed which shows no significant uptake to suggest loosening.  It is consistent with previous arthroplasties.    Assessment/Plan:  Left hip revision and knee revision both structurally fine and do not see any evidence of bone scan loosening and clinically she is doing okay.  After the fall things have slowly returned to her normal.  Major issue is right knee pain she has advanced arthritis has had injections without improvement.  I will send her to physical therapy.  We both like to keep her out of the operating room.  I will see her back as needed.  "

## 2022-07-20 ENCOUNTER — TELEPHONE (OUTPATIENT)
Dept: ENDOCRINOLOGY | Age: 77
End: 2022-07-20

## 2022-07-22 ENCOUNTER — TELEPHONE (OUTPATIENT)
Dept: ENDOCRINOLOGY | Age: 77
End: 2022-07-22

## 2022-07-22 RX ORDER — ESCITALOPRAM OXALATE 20 MG/1
20 TABLET ORAL DAILY
Qty: 90 TABLET | Refills: 2 | Status: SHIPPED | OUTPATIENT
Start: 2022-07-22 | End: 2023-07-22

## 2022-07-22 NOTE — TELEPHONE ENCOUNTER
PT CALLED REQUESTING REFILL ON LEXAPRO 20 MG SHE STATES HER CARDIO DOCTOR WOULD LIKE FOR HER TO STAY ON SCRIPT    PLEASE SEND TO [facility] ON [address]

## 2022-08-02 ENCOUNTER — LAB (OUTPATIENT)
Dept: LAB | Facility: HOSPITAL | Age: 77
End: 2022-08-02

## 2022-08-02 ENCOUNTER — OFFICE VISIT (OUTPATIENT)
Dept: NEUROLOGY | Facility: CLINIC | Age: 77
End: 2022-08-02

## 2022-08-02 VITALS
HEIGHT: 65 IN | HEART RATE: 79 BPM | OXYGEN SATURATION: 98 % | WEIGHT: 164 LBS | BODY MASS INDEX: 27.32 KG/M2 | DIASTOLIC BLOOD PRESSURE: 82 MMHG | SYSTOLIC BLOOD PRESSURE: 134 MMHG

## 2022-08-02 DIAGNOSIS — G40.909 SEIZURE DISORDER: ICD-10-CM

## 2022-08-02 DIAGNOSIS — G40.909 SEIZURE DISORDER: Primary | ICD-10-CM

## 2022-08-02 LAB
ALBUMIN SERPL-MCNC: 4.4 G/DL (ref 3.5–5.2)
ALP SERPL-CCNC: 133 U/L (ref 39–117)
ALT SERPL W P-5'-P-CCNC: 12 U/L (ref 1–33)
AST SERPL-CCNC: 18 U/L (ref 1–32)
BILIRUB CONJ SERPL-MCNC: <0.2 MG/DL (ref 0–0.3)
BILIRUB INDIRECT SERPL-MCNC: ABNORMAL MG/DL
BILIRUB SERPL-MCNC: 0.3 MG/DL (ref 0–1.2)
PHENOBARB SERPL-MCNC: 26.6 MCG/ML (ref 10–30)
PROT SERPL-MCNC: 6.8 G/DL (ref 6–8.5)

## 2022-08-02 PROCEDURE — 99213 OFFICE O/P EST LOW 20 MIN: CPT | Performed by: PSYCHIATRY & NEUROLOGY

## 2022-08-02 PROCEDURE — 80184 ASSAY OF PHENOBARBITAL: CPT

## 2022-08-02 PROCEDURE — 36415 COLL VENOUS BLD VENIPUNCTURE: CPT

## 2022-08-02 PROCEDURE — 80076 HEPATIC FUNCTION PANEL: CPT

## 2022-08-02 NOTE — PROGRESS NOTES
Chief Complaint   Patient presents with   • Seizures   • Restless Legs Syndrome       Patient ID: Mariela Ruiz is a 77 y.o. female.    HPI: I had the pleasure of seeing your patient again today.  As you know she is a 77-year-old female here for the management of seizures.  She denies any seizures since last follow-up.  She is taking phenobarbital 64.8 mg 3 times daily.  She recently suffered the loss of her .  She finds the grieving process a little difficult.  She was recently started on Lexapro which he says is starting to help some.  She has a history of Dupuytren's contractures bilaterally.  They are quite bothersome for her.  She has very limited range of motion of her fingers and limited use of her hands in general.    The following portions of the patient's history were reviewed and updated as appropriate: allergies, current medications, past family history, past medical history, past social history, past surgical history and problem list.    Review of Systems   Musculoskeletal: Negative for back pain, gait problem and neck pain.   Neurological: Negative for dizziness, tremors, seizures, syncope, facial asymmetry, speech difficulty, weakness, light-headedness, numbness and headaches.   Psychiatric/Behavioral: Negative for agitation, behavioral problems, confusion, decreased concentration, dysphoric mood, hallucinations, self-injury, sleep disturbance and suicidal ideas. The patient is not nervous/anxious and is not hyperactive.       I have reviewed the review of systems above performed by my medical assistant.      Vitals:    08/02/22 1239   BP: 134/82   Pulse: 79   SpO2: 98%       Neurologic Exam     Mental Status   Oriented to person, place, and time.   Concentration: normal.   Level of consciousness: alert  Knowledge: consistent with education (No deficits found.).     Cranial Nerves     CN II   Visual fields full to confrontation.     CN III, IV, VI   Pupils are equal, round, and reactive to  light.  Extraocular motions are normal.   CN III: no CN III palsy  CN VI: no CN VI palsy    CN V   Facial sensation intact.     CN VII   Facial expression full, symmetric.     CN VIII   CN VIII normal.     CN IX, X   CN IX normal.   CN X normal.     CN XI   CN XI normal.     CN XII   CN XII normal.     Motor Exam     Strength   Right neck flexion: 5/5  Left neck flexion: 5/5  Right neck extension: 5/5  Left neck extension: 5/5  Right deltoid: 5/5  Left deltoid: 5/5  Right biceps: 5/5  Left biceps: 5/5  Right triceps: 5/5  Left triceps: 5/5  Right wrist flexion: 5/5  Left wrist flexion: 5/5  Right wrist extension: 5/5  Left wrist extension: 5/5  Right interossei: 5/5  Left interossei: 5/5  Right abdominals: 5/5  Left abdominals: 5/5  Right iliopsoas: 5/5  Left iliopsoas: 5/5  Right quadriceps: 5/5  Left quadriceps: 5/5  Right hamstrin/5  Left hamstrin/5  Right glutei: 5/5  Left glutei: 5/5  Right anterior tibial: 5/5  Left anterior tibial: 5/5  Right posterior tibial: 5/5  Left posterior tibial: 5/5  Right peroneal: 5/5  Left peroneal: 5/5  Right gastroc: 5/5  Left gastroc: 5/5    Sensory Exam   Light touch normal.   Vibration normal.     Gait, Coordination, and Reflexes     Gait  Gait: normal    Reflexes   Right brachioradialis: 2+  Left brachioradialis: 2+  Right biceps: 2+  Left biceps: 2+  Right triceps: 2+  Left triceps: 2+  Right patellar: 2+  Left patellar: 2+  Right achilles: 2+  Left achilles: 2+  Right : 2+  Left : 2+Station is normal.       Physical Exam  Vitals reviewed.   Constitutional:       Appearance: She is well-developed.   HENT:      Head: Normocephalic and atraumatic.   Eyes:      Extraocular Movements: EOM normal.      Pupils: Pupils are equal, round, and reactive to light.   Cardiovascular:      Rate and Rhythm: Normal rate and regular rhythm.   Pulmonary:      Breath sounds: Normal breath sounds.   Musculoskeletal:         General: Normal range of motion.   Skin:     General:  Skin is warm.   Neurological:      Mental Status: She is oriented to person, place, and time.      Gait: Gait is intact.      Deep Tendon Reflexes:      Reflex Scores:       Tricep reflexes are 2+ on the right side and 2+ on the left side.       Bicep reflexes are 2+ on the right side and 2+ on the left side.       Brachioradialis reflexes are 2+ on the right side and 2+ on the left side.       Patellar reflexes are 2+ on the right side and 2+ on the left side.       Achilles reflexes are 2+ on the right side and 2+ on the left side.        Procedures    Assessment/Plan: I would like to refer her to a hand surgeon for the issues related to Dupuytren's contracture.  We will continue her phenobarbital at its current dose however I would like to check a phenobarbital level as well as hepatic panel today.  We will see her back in 1 year or sooner if needed.  A total of 15 minutes was spent face-to-face with the patient today.  50% of this time was spent discussing signs and symptoms of seizures, patient education, plan of care and prognosis.       Diagnoses and all orders for this visit:    1. Seizure disorder (HCC) (Primary)  -     Phenobarbital Level; Future  -     Hepatic Function Panel; Future           Steven Liu II, MD

## 2022-08-03 ENCOUNTER — HOSPITAL ENCOUNTER (OUTPATIENT)
Dept: PHYSICAL THERAPY | Facility: HOSPITAL | Age: 77
Setting detail: THERAPIES SERIES
Discharge: HOME | End: 2022-08-03

## 2022-08-08 ENCOUNTER — APPOINTMENT (OUTPATIENT)
Dept: BONE DENSITY | Facility: HOSPITAL | Age: 77
End: 2022-08-08

## 2022-08-16 DIAGNOSIS — G40.909 SEIZURE DISORDER: ICD-10-CM

## 2022-08-16 DIAGNOSIS — G25.81 RESTLESS LEGS SYNDROME: ICD-10-CM

## 2022-08-16 RX ORDER — PHENOBARBITAL 64.8 MG/1
TABLET ORAL
Qty: 270 TABLET | Status: CANCELLED | OUTPATIENT
Start: 2022-08-16

## 2022-08-16 RX ORDER — PHENOBARBITAL 64.8 MG/1
TABLET ORAL
Qty: 270 TABLET | Refills: 2 | Status: SHIPPED | OUTPATIENT
Start: 2022-08-16 | End: 2023-03-10

## 2022-08-16 RX ORDER — PRAMIPEXOLE DIHYDROCHLORIDE 0.5 MG/1
TABLET ORAL
Qty: 450 TABLET | Refills: 0 | Status: SHIPPED | OUTPATIENT
Start: 2022-08-16 | End: 2022-11-16

## 2022-09-08 ENCOUNTER — APPOINTMENT (OUTPATIENT)
Dept: GENERAL RADIOLOGY | Facility: HOSPITAL | Age: 77
End: 2022-09-08

## 2022-09-08 ENCOUNTER — HOSPITAL ENCOUNTER (EMERGENCY)
Facility: HOSPITAL | Age: 77
Discharge: HOME | End: 2022-09-08
Attending: EMERGENCY MEDICINE | Admitting: EMERGENCY MEDICINE

## 2022-09-08 ENCOUNTER — APPOINTMENT (OUTPATIENT)
Dept: CT IMAGING | Facility: HOSPITAL | Age: 77
End: 2022-09-08

## 2022-09-08 VITALS
TEMPERATURE: 96.8 F | HEART RATE: 99 BPM | OXYGEN SATURATION: 95 % | SYSTOLIC BLOOD PRESSURE: 132 MMHG | DIASTOLIC BLOOD PRESSURE: 80 MMHG | RESPIRATION RATE: 18 BRPM

## 2022-09-08 DIAGNOSIS — S80.01XA CONTUSION OF RIGHT KNEE, INITIAL ENCOUNTER: ICD-10-CM

## 2022-09-08 DIAGNOSIS — S16.1XXA CERVICAL STRAIN, ACUTE, INITIAL ENCOUNTER: ICD-10-CM

## 2022-09-08 DIAGNOSIS — S09.90XA CLOSED HEAD INJURY, INITIAL ENCOUNTER: ICD-10-CM

## 2022-09-08 DIAGNOSIS — S40.012A CONTUSION OF LEFT SHOULDER, INITIAL ENCOUNTER: ICD-10-CM

## 2022-09-08 DIAGNOSIS — S70.02XA CONTUSION OF LEFT HIP, INITIAL ENCOUNTER: ICD-10-CM

## 2022-09-08 DIAGNOSIS — S50.02XA CONTUSION OF LEFT ELBOW, INITIAL ENCOUNTER: ICD-10-CM

## 2022-09-08 DIAGNOSIS — S32.592A PUBIC RAMUS FRACTURE, LEFT, CLOSED, INITIAL ENCOUNTER: Primary | ICD-10-CM

## 2022-09-08 PROCEDURE — 73502 X-RAY EXAM HIP UNI 2-3 VIEWS: CPT

## 2022-09-08 PROCEDURE — 99283 EMERGENCY DEPT VISIT LOW MDM: CPT

## 2022-09-08 PROCEDURE — 96372 THER/PROPH/DIAG INJ SC/IM: CPT

## 2022-09-08 PROCEDURE — 70450 CT HEAD/BRAIN W/O DYE: CPT

## 2022-09-08 PROCEDURE — 72125 CT NECK SPINE W/O DYE: CPT

## 2022-09-08 PROCEDURE — 72192 CT PELVIS W/O DYE: CPT

## 2022-09-08 PROCEDURE — 73562 X-RAY EXAM OF KNEE 3: CPT

## 2022-09-08 PROCEDURE — 73030 X-RAY EXAM OF SHOULDER: CPT

## 2022-09-08 PROCEDURE — 73080 X-RAY EXAM OF ELBOW: CPT

## 2022-09-08 PROCEDURE — 25010000002 KETOROLAC TROMETHAMINE PER 15 MG: Performed by: EMERGENCY MEDICINE

## 2022-09-08 RX ORDER — ACETAMINOPHEN 500 MG
1000 TABLET ORAL ONCE
Status: DISCONTINUED | OUTPATIENT
Start: 2022-09-08 | End: 2022-09-09 | Stop reason: HOSPADM

## 2022-09-08 RX ORDER — METHOCARBAMOL 750 MG/1
750 TABLET, FILM COATED ORAL 3 TIMES DAILY PRN
Qty: 15 TABLET | Refills: 0 | Status: SHIPPED | OUTPATIENT
Start: 2022-09-08 | End: 2023-03-21

## 2022-09-08 RX ORDER — TRAMADOL HYDROCHLORIDE 50 MG/1
50 TABLET ORAL EVERY 8 HOURS PRN
Qty: 15 TABLET | Refills: 0 | Status: SHIPPED | OUTPATIENT
Start: 2022-09-08 | End: 2023-03-21

## 2022-09-08 RX ORDER — NAPROXEN 500 MG/1
500 TABLET ORAL 2 TIMES DAILY PRN
Qty: 15 TABLET | Refills: 0 | Status: SHIPPED | OUTPATIENT
Start: 2022-09-08 | End: 2023-03-21

## 2022-09-08 RX ORDER — KETOROLAC TROMETHAMINE 30 MG/ML
30 INJECTION, SOLUTION INTRAMUSCULAR; INTRAVENOUS ONCE
Status: COMPLETED | OUTPATIENT
Start: 2022-09-08 | End: 2022-09-08

## 2022-09-08 RX ORDER — TRAMADOL HYDROCHLORIDE 50 MG/1
50 TABLET ORAL ONCE
Status: COMPLETED | OUTPATIENT
Start: 2022-09-08 | End: 2022-09-08

## 2022-09-08 RX ADMIN — KETOROLAC TROMETHAMINE 30 MG: 30 INJECTION, SOLUTION INTRAMUSCULAR; INTRAVENOUS at 22:05

## 2022-09-08 RX ADMIN — TRAMADOL HYDROCHLORIDE 50 MG: 50 TABLET, COATED ORAL at 20:59

## 2022-09-08 NOTE — ED NOTES
Pt to ED via ems from home s/p mechanical trip and fall while working in the yard, pt normally ambulates with cane, but was not using at time of fall. States fell onto L side, pain present to R side of head and L inner groin area with movement. Mild discomfort to L knee. Unsure of any head injury, denies LOC, not on blood thinners.     No shortening or rotation noted.     Pt reports hx of L hip replacement.

## 2022-09-08 NOTE — ED NOTES
Pt arrives via EMS from home for a mechanical fall after her knee gave out on her. Pt complains of left hip pain that radiates from her groin shoulder. Pt rates pain 10/10. EMS denies rotation, shortening. Pt has distal palpable pulses as well.

## 2022-09-08 NOTE — ED PROVIDER NOTES
EMERGENCY DEPARTMENT ENCOUNTER    Room Number:  A01/01  Date of encounter:  9/8/2022  PCP: Duglas Rock MD  Historian: Patient  Full history not obtainable due to: None    HPI:  Chief Complaint: Left hip pain    Context: Mariela Ruiz is a 77 y.o. female with a PMH significant for seizures, hyperlipidemia, venous insufficiency, restless leg syndrome who presents to the ED c/o multiple injuries related to a fall today.  The patient fell down several stairs at her house after slipping.  She fell mostly onto her left side but believes that she struck her head on something on the way down to the ground.  She did not lose consciousness.  She denies numbness or weakness of the face or extremities, slurred speech, visual disturbance, photophobia, headache.  She also denies neck pain.  Her primary concern is for aching discomfort to the left hip which radiates into the groin.  She denies low back pain.  She also has discomfort to the right knee, left elbow, left shoulder.  There is some swelling to the right knee anteriorly with no decreased range of motion, tenderness, erythema, ecchymosis, wound.  No obvious injuries to the left elbow or left shoulder where there is no limited range of motion.       MEDICAL RECORD REVIEW:    Upon review of the medical record it appears the patient was most recently evaluated in the office with neurology for seizure disorder on August 2, 2022.      PAST MEDICAL HISTORY    Active Ambulatory Problems     Diagnosis Date Noted   • Seizure disorder (HCC)    • Hyperlipidemia    • Vitamin D insufficiency    • Age-related osteoporosis without current pathological fracture    • Sleep apnea    • Chronic venous insufficiency    • Postsurgical hypothyroidism    • Chronic fatigue syndrome 11/07/2016   • Gastroesophageal reflux disease 11/07/2016   • Dupuytren's contracture 11/07/2016   • History of biliary T-tube placement 11/07/2016   • History of partial thyroidectomy 10/04/2012   • Mitral  valve prolapse 11/07/2016   • Multinodular goiter 11/07/2016   • Right fascicular block 11/07/2016   • Restless legs syndrome 11/07/2016   • History of cardiac catheterization 11/07/2016   • Urge incontinence of urine 11/07/2016   • Left knee pain 11/15/2016   • Ligamentous laxity of knee 12/02/2016   • DJD (degenerative joint disease) of knee 12/24/2016   • Adverse drug effect, subsequent encounter 11/07/2017   • Abnormal blood level of chromium 04/05/2018   • Abnormal blood level of cobalt 04/05/2018   • Family history of diabetes mellitus 09/13/2018   • Closed fracture of neck of right femur (HCC) 09/13/2019   • Thrombocytopenia (HCC) 09/16/2019   • . 09/16/2019   • Fever 09/16/2019   • S/P revision of total hip 03/09/2021     Resolved Ambulatory Problems     Diagnosis Date Noted   • Pruritic rash 10/18/2017   • Wasp sting 08/16/2019   • Cellulitis of right upper extremity 08/17/2019   • Urine retention 09/16/2019     Past Medical History:   Diagnosis Date   • Abnormal EKG    • Arthritis    • BBB (bundle branch block)    • History of staph infection    • History of transfusion    • Lymphedema    • MVP (mitral valve prolapse)    • Nontoxic multinodular goiter    • Osteoporosis    • Pelvic joint pain, left    • Presence of left artificial hip joint    • Restless leg syndrome          PAST SURGICAL HISTORY  Past Surgical History:   Procedure Laterality Date   • APPENDECTOMY     • CARDIAC CATHETERIZATION      NORMAL    • COLONOSCOPY  08/17/2017    Normal except for anal fissure.  Dr. Brandt.  [facility].   • KNEE MINI REVISION Left 11/15/2016    Procedure: LEFT KNEE POLY CHANGE WITH HI POST STABILIZER;  Surgeon: Pablito Claudio MD;  Location: [facility] OR;  Service:    • KNEE MINI REVISION Left 12/13/2016    Procedure: LT KNEE REMOVAL/REPLACEMENT LOCKING PIN ;  Surgeon: Pablito Claudio MD;  Location: [facility] OR;  Service:    • MAMMO FINE NEEDLE ASPIRATION UNILATERAL      RIGHT THYROID NODULE, 2009 BENIGN     • KS REVISE KNEE JOINT REPLACE,1 PART Left 2/20/2017    Procedure: LT KNEE REMOVAL ANTIBIOTIC SPACER AND KNEE REVISION;  Surgeon: Christiano Cesar MD;  Location: [facility] MAIN OR;  Service: Orthopedics   • KS TOTAL KNEE ARTHROPLASTY Left 12/24/2016    Procedure: LEFT KNEE WASHOUT WITH POLY CHANGE;  Surgeon: Christiano Cesar MD;  Location: [facility] MAIN OR;  Service: Orthopedics   • REPLACEMENT TOTAL KNEE Left    • THYROIDECTOMY, PARTIAL      1979 LEFT LOBECTOMY AND ISTHMECTOMY    • TOTAL ABDOMINAL HYSTERECTOMY WITH SALPINGO OOPHORECTOMY     • TOTAL HIP ARTHROPLASTY Left    • TOTAL HIP ARTHROPLASTY Right 9/14/2019    Procedure: TOTAL HIP ARTHROPLASTY ANTERIOR WITH HANA TABLE;  Surgeon: Christiano Cesar II, MD;  Location: [facility] MAIN OR;  Service: Orthopedics   • TOTAL HIP ARTHROPLASTY REVISION Left 3/9/2021    Procedure: LEFT TOTAL HIP ARTHROPLASTY REVISION POSTERIOR;  Surgeon: Christiano Cesar II, MD;  Location: [facility] MAIN OR;  Service: Orthopedics;  Laterality: Left;   • TOTAL KNEE  PROSTHESIS REMOVAL W/ SPACER INSERTION Left 12/29/2016    Procedure: LT KNEE IMPLANT REMOVAL WITH INSERTION OF SPACER ;  Surgeon: Christiano Cesar MD;  Location: [facility] MAIN OR;  Service:          FAMILY HISTORY  Family History   Problem Relation Age of Onset   • Heart disease Father    • Diabetes Sister    • Hypertension Sister    • Hyperlipidemia Sister    • Obesity Sister    • Malig Hyperthermia Neg Hx          SOCIAL HISTORY  Social History     Socioeconomic History   • Marital status:    Tobacco Use   • Smoking status: Never Smoker   • Smokeless tobacco: Never Used   Vaping Use   • Vaping Use: Never used   Substance and Sexual Activity   • Alcohol use: No   • Drug use: No   • Sexual activity: Defer         ALLERGIES  Clindamycin/lincomycin, Duricef [cefadroxil], Keflex [cephalexin], and Sulfa antibiotics        REVIEW OF SYSTEMS    All systems reviewed and marked as negative except as listed in HPI      PHYSICAL EXAM    I have reviewed the triage vital signs and nursing notes.    ED Triage Vitals [09/08/22 1750]   Temp Heart Rate Resp BP SpO2   96.8 °F (36 °C) 99 18 132/80 97 %      Temp src Heart Rate Source Patient Position BP Location FiO2 (%)   Oral -- -- -- --       Physical Exam  Constitutional:       General: She is not in acute distress.     Appearance: She is well-developed.      Interventions: Nasal cannula in place.   HENT:      Head: Normocephalic and atraumatic.   Eyes:      General: No scleral icterus.     Extraocular Movements: Extraocular movements intact.      Conjunctiva/sclera: Conjunctivae normal.      Pupils: Pupils are equal, round, and reactive to light.   Neck:      Trachea: No tracheal deviation.   Cardiovascular:      Rate and Rhythm: Normal rate and regular rhythm.   Pulmonary:      Effort: Pulmonary effort is normal.      Breath sounds: Normal breath sounds.   Chest:      Chest wall: No deformity or tenderness.   Abdominal:      Palpations: Abdomen is soft.      Tenderness: There is no abdominal tenderness.   Musculoskeletal:         General: No deformity.      Left shoulder: No swelling, deformity or tenderness. Normal range of motion. Normal pulse.      Left elbow: No swelling or deformity. Normal range of motion. No tenderness.      Cervical back: Normal range of motion. No spinous process tenderness or muscular tenderness.      Left hip: Tenderness present. No deformity. Normal range of motion. Normal strength.      Right knee: Swelling present. No ecchymosis or bony tenderness. Normal range of motion. No tenderness.   Lymphadenopathy:      Cervical: No cervical adenopathy.   Skin:     General: Skin is warm and dry.   Neurological:      Mental Status: She is alert and oriented to person, place, and time.      Cranial Nerves: Cranial nerves are intact.      Sensory: Sensation is intact.      Motor: Motor function is intact.      Coordination: Coordination is intact.   Psychiatric:          Behavior: Behavior normal.         Vital signs and nursing notes reviewed.            LAB RESULTS  No results found for this or any previous visit (from the past 24 hour(s)).    Ordered the above labs and independently reviewed the results.        RADIOLOGY  XR Knee 3 View Right, XR Elbow 3+ View Left, XR Shoulder 2+ View Left, XR Hip With or Without Pelvis 2 - 3 View Left    Result Date: 9/8/2022  XR ELBOW 3+ VW LEFT-, XR SHOULDER 2+ VW LEFT-, XR HIP W OR WO PELVIS 2-3 VIEW LEFT-, XR KNEE 3 VW RIGHT-  INDICATIONS: Trauma  TECHNIQUE: 2 views of the left elbow, 2 views of the left shoulder, frontal views of the pelvis, 2 views of the left hip, 4 views of the right knee  COMPARISON: None available  FINDINGS:  No acute fracture is identified. Hypertrophic degenerative changes are seen at the left acromioclavicular joint, left glenohumeral joint, left elbow, and right knee, with prominent joint space narrowing at the right knee. No left elbow or right knee effusion. Bilateral hip arthroplasty hardware appears intact. Follow-up/further evaluation can be obtained as indications persist.       As described.  This report was finalized on 9/8/2022 7:49 PM by Dr. Steven Zelaya M.D.      CT Head Without Contrast, CT Cervical Spine Without Contrast    Result Date: 9/9/2022  CT HEAD AND CERVICAL SPINE WITHOUT CONTRAST  HISTORY: Fall, closed head injury, neck pain.  COMPARISON: CT head and cervical spine 09/11/2020.  CT HEAD WITHOUT CONTRAST: The brain and ventricles are symmetrical. There is no evidence of hemorrhage, hydrocephalus or of abnormal extra-axial fluid. No focal area of decreased attenuation to suggest acute infarction is identified. A small lacunar infarct is appreciated involving the anterior limb of the internal capsule on the right and involving the anterior aspect of the basal ganglia on the left. These were present previously. Bone windows showed no evidence of a calvarial fracture.      No  evidence of fracture or of intracranial hemorrhage.  CT EXAMINATION OF THE CERVICAL SPINE WITHOUT CONTRAST:  The alignment of the cervical spine is within normal limits. There is no evidence of fracture.  C2-3: Mild central disc osteophyte complex is present with no evidence of herniation.  C3-4: Mild central disc osteophyte complex is present with no evidence of herniation.  C4-5: A right paracentral disc osteophyte complex is present resulting in mild flattening of the ventral surface of the thecal sac.  C5-6: There is mild canal stenosis secondary to a central disc osteophyte complex.  C6-7: A mild central disc osteophyte complex is present with no evidence of herniation  C7-T1: There is no evidence of disc bulge or herniation.  Decreased attenuation is noted involving the right lobe of the thyroid gland consistent with a large cyst or nodule. This measures approximately 3 cm in AP dimension and appears similar to the CT examination of 09/11/2020. The left lobe of the thyroid gland is not visualized.  IMPRESSION: No evidence of fracture. Multilevel degenerative disease involving the cervical spine as described. A 3 cm nodule or a cyst involving the right lobe of the thyroid gland is similar in appearance as compared to the CT examination of 09/11/2020.    Radiation dose reduction techniques were utilized, including automated exposure control and exposure modulation based on body size.  This report was finalized on 9/9/2022 6:39 AM by Dr. Anuj Beckwith M.D.      CT Pelvis Without Contrast    Result Date: 9/8/2022  CT OF THE BONY PELVIS  HISTORY: Pelvic pain after a fall  COMPARISON: 09/18/2022  TECHNIQUE: Axial CT imaging was obtained through the pelvis. Coronal and sagittal reformatted images were obtained.  FINDINGS: The patient is status post bilateral hip arthroplasties. This results in significant streak artifact. In addition, the patient is osteopenic. Patient is noted to have a left sacral fracture.  However, it can also be seen on the patient's CT from 09/17/2020. There is also a fracture of left inferior pubic ramus. Patient did have a left inferior pubic ramus fracture on prior CT, although it appears to extend more posteriorly on the current study. There is potentially a fracture of the left superior pubic ramus. There is potentially also a fracture of the right inferior pubic ramus. Bilateral hip arthroplasties project in expected position. Assessment of soft tissues is limited, due to streak artifact. No obvious pelvic hematoma is seen.       1. Fracture of the left inferior pubic ramus appears to extend more posteriorly than on prior CT, and may reflect a superimposed acute fracture. 2. Questionable fractures of the superior pubic rami bilaterally. MRI could be considered for further assessment of all fractures. 3. Chronic fracture of the left sacrum.  Radiation dose reduction techniques were utilized, including automated exposure control and exposure modulation based on body size.  This report was finalized on 9/8/2022 9:15 PM by Dr. Shavon Ng M.D.        I ordered the above noted radiological studies. Independently reviewed by me and discussed with radiologist.  See dictation above for official radiology interpretation.      PROCEDURES    Procedures        MEDICATIONS GIVEN IN ER    Medications   acetaminophen (TYLENOL) tablet 1,000 mg (has no administration in time range)         PROGRESS, DATA ANALYSIS, CONSULTS, AND MEDICAL DECISION MAKING    All labs have been independently reviewed by me.  All radiology studies have been reviewed by me.   EKG's independently reviewed by me.  Discussion below represents my analysis of pertinent findings related to patient's condition, differential diagnosis, treatment plan and final disposition.    DIFFERENTIAL DIAGNOSIS INCLUDE BUT NOT LIMITED TO:     Left hip fracture, left hip dislocation, left hip contusion, right knee contusion, right knee  fracture    ED Course as of 09/09/22 1516   u Sep 08, 2022 2032 Patient is still having significant pain with ambulation.  I explained that despite her x-rays appearing reassuring, I have ordered a CT pelvis to further evaluate for possible occult pubic ramus fracture or sacral fracture. [JR]   2130 CT pelvis does demonstrate some pubic ramus fractures they are not significantly displaced. [JR]   2159 Patient reassessed.  Informed patient of pubic ramus fracture, offered admission for pain control and rehab placement, but she prefers to go home.  Will Rx tramadol prn for pain.   [JR]      ED Course User Index  [JR] Garrison Woo MD       AS OF 18:51 EDT VITALS:    BP - 132/80  HR - 99  TEMP - 96.8 °F (36 °C) (Oral)  02 SATS - 95%      DIAGNOSIS  Final diagnoses:   Closed head injury, initial encounter   Cervical strain, acute, initial encounter   Contusion of right knee, initial encounter   Contusion of left hip, initial encounter   Contusion of left elbow, initial encounter   Contusion of left shoulder, initial encounter   Pubic ramus fracture, left, closed, initial encounter ([facility])         DISPOSITION  D/c    Pt masked in first look. I wore a surgical mask throughout my encounters with the pt. I performed hand hygiene on entry into the pt room and upon exit.     Dictated utilizing Dragon dictation     Note Disclaimer: At [facility], we believe that sharing information builds trust and better relationships. You are receiving this note because you recently visited [facility]. It is possible you will see health information before a provider has talked with you about it. This kind of information can be easy to misunderstand. To help you fully understand what it means for your health, we urge you to discuss this note with your provider.      Cristopher Arechiga PA  09/09/22 1517

## 2022-09-09 NOTE — ED PROVIDER NOTES
MD ATTESTATION NOTE    The TORSTEN and I have discussed this patient's history, physical exam, and treatment plan.  I have reviewed the documentation and personally had a face to face interaction with the patient. I affirm the documentation and agree with the treatment and plan.  The attached note describes my personal findings.      I provided a substantive portion of the care of the patient.  I personally performed the physical exam in its entirety, and below are my findings.  For this patient encounter, the patient wore surgical mask, I wore full protective PPE including N95 and eye protection.      Brief HPI: Patient presents with multiple complaints after a fall.  She is currently having mostly pain in her groin when she tries to walk.    PHYSICAL EXAM  ED Triage Vitals [09/08/22 1750]   Temp Heart Rate Resp BP SpO2   96.8 °F (36 °C) 99 18 132/80 97 %      Temp src Heart Rate Source Patient Position BP Location FiO2 (%)   Oral -- -- -- --         GENERAL: Awake and alert, no acute distress  HENT: nares patent  EYES: no scleral icterus, EOMI  CV: regular rhythm, normal rate  RESPIRATORY: normal effort  ABDOMEN: soft, nondistended  MUSCULOSKELETAL: no deformity, patient has antalgic gait.  There is no deformity or rotation of the lower extremities.  NEURO: alert, moves all extremities, follows commands  PSYCH:  calm, cooperative  SKIN: warm, dry    Vital signs and nursing notes reviewed.    ED Course as of 09/09/22 0224   Thu Sep 08, 2022   2032 Patient is still having significant pain with ambulation.  I explained that despite her x-rays appearing reassuring, I have ordered a CT pelvis to further evaluate for possible occult pubic ramus fracture or sacral fracture. [JR]   2130 CT pelvis does demonstrate some pubic ramus fractures they are not significantly displaced. [JR]   2159 Patient reassessed.  Informed patient of pubic ramus fracture, offered admission for pain control and rehab placement, but she prefers to  go home.  Will Rx tramadol prn for pain.   [JR]      ED Course User Index  [JR] Garrison Woo MD     Final diagnoses:   Closed head injury, initial encounter   Cervical strain, acute, initial encounter   Contusion of right knee, initial encounter   Contusion of left hip, initial encounter   Contusion of left elbow, initial encounter   Contusion of left shoulder, initial encounter   Pubic ramus fracture, left, closed, initial encounter ([facility])         Plan:   DISCHARGE    Patient discharged in stable condition.    Reviewed implications of results, diagnosis, meds, responsibility to follow up, warning signs and symptoms of possible worsening, potential complications and reasons to return to ER.    Patient/Family voiced understanding of above instructions.    Discussed plan for discharge, as there is no emergent indication for admission. Patient referred to primary care provider for BP management due to today's BP. Pt/family is agreeable and understands need for follow up and repeat testing.  Pt is aware that discharge does not mean that nothing is wrong but it indicates no emergency is present that requires admission and they must continue care with follow-up as given below or physician of their choice.     FOLLOW-UP  Duglas Rock MD  [address] [place] [address]  [phone]    Schedule an appointment as soon as possible for a visit in 2 days  As needed         Medication List      New Prescriptions    methocarbamol 750 MG tablet  Commonly known as: ROBAXIN  Take 1 tablet by mouth 3 (Three) Times a Day As Needed for Muscle Spasms.     naproxen 500 MG tablet  Commonly known as: NAPROSYN  Take 1 tablet by mouth 2 (Two) Times a Day As Needed for Mild Pain.     traMADol 50 MG tablet  Commonly known as: ULTRAM  Take 1 tablet by mouth Every 8 (Eight) Hours As Needed for Moderate Pain.           Where to Get Your Medications      These medications were sent to [id number] -  [place], [place] - [address] - [phone]  - [phone]   [address] [address]    Phone: [phone]   · methocarbamol 750 MG tablet  · naproxen 500 MG tablet  · traMADol 50 MG tablet            Garrison Woo MD  09/09/22 0228

## 2022-09-09 NOTE — DISCHARGE INSTRUCTIONS
Please follow-up with your PCP.    Rest.  Increase fluid intake.  Ice as needed.  Use sling as needed for support to your left elbow and shoulder.  Elevate to reduce swelling.  Use assistance when ambulating as instructed.    Although you are being discharged in the ED today, I encouraged her to return for worsening symptoms.  Things can, and do, change such a treatment at home with medication may not be adequate.  Specifically I recommend returning for any other worsening or alarming symptoms.     Take meds as prescribed.    You have been evaluated in the emergency department for your presenting symptoms and deemed appropriate for discharge home.  Understand that your health care does not end here today.  It is important that your primary care physician be made aware of your visit.  I recommend calling your primary care provider in the next 48 hours to arrange follow-up care.  Your primary care provider needs to review your treatment and recovery to ensure that no further treatment is necessary.  Additional testing or procedures may be necessary as an outpatient at the discretion of your primary care provider.    I also recommend following up with your PCP for recheck of your blood pressure and treatment as needed.

## 2022-09-12 ENCOUNTER — TELEPHONE (OUTPATIENT)
Dept: ENDOCRINOLOGY | Age: 77
End: 2022-09-12

## 2022-09-12 RX ORDER — AMOXICILLIN AND CLAVULANATE POTASSIUM 500; 125 MG/1; MG/1
1 TABLET, FILM COATED ORAL 2 TIMES DAILY
Qty: 14 TABLET | Refills: 0 | Status: SHIPPED | OUTPATIENT
Start: 2022-09-12 | End: 2022-09-19

## 2022-09-12 NOTE — TELEPHONE ENCOUNTER
Pt called in with UTI she has burning, frequency and urine is orange. / she is over in the ER with now she fell 2 weeks ago and has some chips in her pelvis

## 2022-09-13 ENCOUNTER — TELEPHONE (OUTPATIENT)
Dept: ORTHOPEDIC SURGERY | Facility: CLINIC | Age: 77
End: 2022-09-13

## 2022-09-13 DIAGNOSIS — S32.82XA MULTIPLE CLOSED FRACTURES OF PELVIS WITHOUT DISRUPTION OF PELVIC RING, INITIAL ENCOUNTER: Primary | ICD-10-CM

## 2022-09-13 NOTE — TELEPHONE ENCOUNTER
The radiologist recommended an MRI for further evaluation of the fractures. Dr. Chun has ordered the MRI and wants to see the patient back in 4 weeks for re-evaluation.

## 2022-09-13 NOTE — TELEPHONE ENCOUNTER
Provider: DR. ZAPATA    Caller: MARSHA MARES    Relationship to Patient: SELF    Phone Number: [phone]    Reason for Call: PATIENT STATES SHE WAS SEEN IN THE ER FOR A PELVIC FX. SHE STATES THEY TOLD HER TO GO HOME AND BE ON BEDREST. PATIENT IS ASKING IF DR. ZAPATA WOULD LOOK AT HER RESULTS AND IMAGING AND ADVISE IF THIS IS SERIOUS AND SHOULD SHE MAKE AN APPOINTMENT. SHE HAS LIMITED HELP/TRANSPORTATION RIGHT NOW. PLEASE CALL HER TO DISCUSS.

## 2022-09-14 ENCOUNTER — TELEPHONE (OUTPATIENT)
Dept: ORTHOPEDIC SURGERY | Facility: CLINIC | Age: 77
End: 2022-09-14

## 2022-09-14 NOTE — TELEPHONE ENCOUNTER
Spoke with patient to confirm she will be called for an MRI. Confirmed order placed by Warren CLEMENS as of 9/13/22

## 2022-09-19 ENCOUNTER — TELEPHONE (OUTPATIENT)
Dept: ORTHOPEDIC SURGERY | Facility: CLINIC | Age: 77
End: 2022-09-19

## 2022-09-19 NOTE — TELEPHONE ENCOUNTER
The MRI of the pelvis will show us all we need to see for the pelvic fractures she has and that was what was recommended by the radiologist. She has had both of her replaced already therefore an MRI of her hip is not appropriate.

## 2022-09-19 NOTE — TELEPHONE ENCOUNTER
Caller: PATIENT    Relationship: SELF     Best call back number: [phone]    What orders are you requesting (i.e. lab or imaging): MRI OF PELVIS     In what timeframe would the patient need to come in: ASAP    Where will you receive your lab/imaging services: PATIENT DID NOT KNOW THE NAME.    Additional notes: PATIENT CALLED TO REQUEST THE MRI ORDER OF HER PELVIS BE SENT OUTSIDE OF [facility] [place], operated by [facility] BUT SHE DID NOT KNOW THE NAME OR THE FACILITY OF HAVE THEIR FAX NUMBER. PATIENT IS GOING TO CALL BACK WITH MORE DETAILS. THANK YOU!

## 2022-09-19 NOTE — TELEPHONE ENCOUNTER
Caller: MARSHA     Relationship: SELF     Best call back number: [phone]    What orders are you requesting (i.e. lab or imaging): MRI L HIP     In what timeframe would the patient need to come in: ASAP     Where will you receive your lab/imaging services: [facility] , F#[id number]     Additional notes: PT REQUESTS AN MRI ORDER OF HER L HIP ALSO, STATES THERE IS ONLY AN ORDER OF HER PELVIC AND REQUESTING HER HIP TO BE DONE AS WELL.

## 2022-09-22 ENCOUNTER — TELEPHONE (OUTPATIENT)
Dept: ORTHOPEDIC SURGERY | Facility: CLINIC | Age: 77
End: 2022-09-22

## 2022-09-22 NOTE — TELEPHONE ENCOUNTER
"Caller: Mariela Ruiz    Relationship to patient: Self    Best call back number: [phone]  Patient is needin. MRI ORDER FAXD TO [facility] .[phone]  PATIENT CALLED YESTERDAY AND IS UPSET BECAUSE [facility] NEVER RECD THE ORDER  PATIENT WOULD LIKE A CALL BACK AFTER ORDER HAS BEEN FAXD  2. PATIENT STATES THAT SHE IS IN A LOT OF PAIN AND CAN HARDLY WALK; ALSO, HER KNEE HAS A \"HUGE KNOT\" ON IT AND IS VERY VERY PAINFUL      UNABLE TO WARM TRANSFER  "

## 2022-10-04 ENCOUNTER — OFFICE VISIT (OUTPATIENT)
Dept: ORTHOPEDIC SURGERY | Facility: CLINIC | Age: 77
End: 2022-10-04

## 2022-10-04 VITALS — BODY MASS INDEX: 27.02 KG/M2 | HEIGHT: 65 IN | WEIGHT: 162.2 LBS | TEMPERATURE: 96.8 F

## 2022-10-04 DIAGNOSIS — R52 PAIN: Primary | ICD-10-CM

## 2022-10-04 DIAGNOSIS — S32.82XA MULTIPLE CLOSED FRACTURES OF PELVIS WITHOUT DISRUPTION OF PELVIC RING, INITIAL ENCOUNTER: ICD-10-CM

## 2022-10-04 PROCEDURE — 99214 OFFICE O/P EST MOD 30 MIN: CPT | Performed by: ORTHOPAEDIC SURGERY

## 2022-10-04 PROCEDURE — 73502 X-RAY EXAM HIP UNI 2-3 VIEWS: CPT | Performed by: ORTHOPAEDIC SURGERY

## 2022-10-04 NOTE — PROGRESS NOTES
Patient: Mariela Ruiz  YOB: 1945 77 y.o. female  Medical Record Number: [id number]    Chief Complaints:   Chief Complaint   Patient presents with   • Pelvis - Follow-up, Pain       History of Present Illness:Mariela Ruiz is a 77 y.o. female who presents with left-sided pubic pain she had a fall.  She is had bilateral hip replacements by Dr. Danny Cesar.  She has had moderate pain about the left-sided groin region since the fall.    Allergies:   Allergies   Allergen Reactions   • Clindamycin/Lincomycin Itching   • Duricef [Cefadroxil] Hives and Rash   • Keflex [Cephalexin] Rash   • Sulfa Antibiotics Rash     RASH       Medications:   Current Outpatient Medications   Medication Sig Dispense Refill   • cholecalciferol (VITAMIN D3) 1000 units tablet Take 1 tablet by mouth Daily.     • coenzyme Q10 100 MG capsule Take 100 mg by mouth Daily. HOLD PRIOR TO SURG     • escitalopram (LEXAPRO) 20 MG tablet Take 1 tablet by mouth Daily. 90 tablet 2   • naproxen (NAPROSYN) 500 MG tablet Take 1 tablet by mouth 2 (Two) Times a Day As Needed for Mild Pain. 15 tablet 0   • PHENobarbital (LUMINAL) 64.8 MG tablet TAKE 3 TABLETS BY MOUTH EVERY  tablet 2   • pramipexole (MIRAPEX) 0.5 MG tablet TAKE 5 TABLETS BY MOUTH ONCE DAILY 450 tablet 0   • pravastatin (PRAVACHOL) 40 MG tablet TAKE 1 TABLET BY MOUTH EVERY DAY 90 tablet 2   • Synthroid 50 MCG tablet Take 1 tablet by mouth Every Morning Before Breakfast. 90 tablet 2   • linaclotide (LINZESS) 145 MCG capsule capsule Take 145 mcg by mouth Daily.     • lubiprostone (AMITIZA) 24 MCG capsule Take 24 mcg by mouth.     • methocarbamol (ROBAXIN) 750 MG tablet Take 1 tablet by mouth 3 (Three) Times a Day As Needed for Muscle Spasms. 15 tablet 0   • traMADol (ULTRAM) 50 MG tablet Take 1 tablet by mouth Every 8 (Eight) Hours As Needed for Moderate Pain. 15 tablet 0     No current facility-administered medications for this visit.         The following portions of the  "patient's history were reviewed and updated as appropriate: allergies, current medications, past family history, past medical history, past social history, past surgical history and problem list.    Review of Systems:   A 14 point review of systems was performed. All systems negative except pertinent positives/negative listed in HPI above    Physical Exam:   Vitals:    10/04/22 1119   Temp: 96.8 °F (36 °C)   Weight: 73.6 kg (162 lb 3.2 oz)   Height: 165.1 cm (65\")   PainSc: 10-Worst pain ever       General: A and O x 3, ASA, NAD    SCLERA:    Normal    DENTITION:   Normal   Walks with a slow gait mild tenderness to palpation about the pubic rami no pain with either hip range of motion      Radiology:  Xrays 2views left hip (AP bilateral hips, and lateral of the hip) ordered and reviewed for evaluation of hip pain  demonstrating minimally displaced left-sided rami fractures.  Bilateral total hips without complicating factors noted    Recent MRI the pelvis is consistent with the above    Assessment/Plan: Left sided rami fractures from a fall.  At this point I think water therapy would be indicated to work on leg strengthening with decrease in pain.  I have her return in 8 weeks we will have repeat x-rays inlet and outlet view of her pelvis to evaluate healing of the rami fractures. Will see GUSTAVO Chun MD  10/4/2022  "

## 2022-10-19 ENCOUNTER — APPOINTMENT (OUTPATIENT)
Dept: MRI IMAGING | Facility: HOSPITAL | Age: 77
End: 2022-10-19

## 2022-11-16 DIAGNOSIS — G25.81 RESTLESS LEGS SYNDROME: ICD-10-CM

## 2022-11-16 RX ORDER — PRAMIPEXOLE DIHYDROCHLORIDE 0.5 MG/1
TABLET ORAL
Qty: 450 TABLET | Refills: 1 | Status: SHIPPED | OUTPATIENT
Start: 2022-11-16

## 2022-11-29 ENCOUNTER — OFFICE VISIT (OUTPATIENT)
Dept: ORTHOPEDIC SURGERY | Facility: CLINIC | Age: 77
End: 2022-11-29

## 2022-11-29 VITALS — HEIGHT: 65 IN | WEIGHT: 162.1 LBS | RESPIRATION RATE: 12 BRPM | BODY MASS INDEX: 27.01 KG/M2 | TEMPERATURE: 97.6 F

## 2022-11-29 DIAGNOSIS — Z09 FOLLOW-UP EXAM: Primary | ICD-10-CM

## 2022-11-29 DIAGNOSIS — S32.82XA MULTIPLE CLOSED FRACTURES OF PELVIS WITHOUT DISRUPTION OF PELVIC RING, INITIAL ENCOUNTER: ICD-10-CM

## 2022-11-29 PROCEDURE — 73502 X-RAY EXAM HIP UNI 2-3 VIEWS: CPT | Performed by: NURSE PRACTITIONER

## 2022-11-29 PROCEDURE — 99024 POSTOP FOLLOW-UP VISIT: CPT | Performed by: NURSE PRACTITIONER

## 2022-11-29 RX ORDER — CHLORAL HYDRATE 500 MG
CAPSULE ORAL
COMMUNITY

## 2022-11-29 NOTE — PROGRESS NOTES
Mariela Ruiz : 1945 MRN: [id number] DATE: 2022    DIAGNOSIS: 8 week follow up conservative treatment of nondisplaced fractures of left superior and inferior pubic rami    SUBJECTIVE:Patient returns today for 8 week follow up of conservative treatment of a left superior and inferior pubic rami nondisplaced fractures.  Patient reports today that she is progressed well and has no pain to her groin or hip area.  Patient states that she is able to ambulate and function relatively okay in regards to the hip.  She states she has a very arthritic right knee and does not want surgery at this time and that is what limits her, not the hip.  Patient states that she was not able to do the water therapy at [facility] due to conflicting issues with them and her schedule.  However patient states that she has been able to do home therapy exercises.  She denies any signs or symptoms of infection, and she is without any other significant complaints today.    OBJECTIVE:   Exam:.  No obvious bruising or swelling about the hip noted.  No sign of infection. Range of motion is progressing as expected. The calf is soft and nontender with a negative Homans sign. Strength progressing    DIAGNOSTIC STUDIES  Xrays: 2 views of the left hip (AP pelvis and lateral left hip) were ordered and reviewed for evaluation of recent nondisplaced left superior and inferior pubic rami fractures.  Patient does have some new callus formation noted to the area of concern.  She also does have a left hip replacement that is maintained alignment with no sign of failure or loosening noted.  In comparison to previous x-rays there is new callus formation noted.    ASSESSMENT: 8 week status post conservative treatment of nondisplaced left superior and inferior pubic rami fracture    PLAN: 1) Activity as tolerated   2) Continue hip strengthening exercises    3) Follow up as needed    REMY Mariscal  2022

## 2023-02-09 RX ORDER — PRAVASTATIN SODIUM 40 MG
TABLET ORAL
Qty: 90 TABLET | Refills: 2 | Status: SHIPPED | OUTPATIENT
Start: 2023-02-09

## 2023-02-10 DIAGNOSIS — E89.0 POSTSURGICAL HYPOTHYROIDISM: ICD-10-CM

## 2023-02-10 RX ORDER — LEVOTHYROXINE SODIUM 50 MCG
TABLET ORAL
Qty: 90 TABLET | Refills: 0 | Status: SHIPPED | OUTPATIENT
Start: 2023-02-10

## 2023-02-21 NOTE — ED TRIAGE NOTES
All triage performed with this RN wearing appropriate PPE.  Pt placed in mask upon arrival to ED.  Patient c/o left hip pain radiating down her leg after a trip and fall last night. She reports she tripped over her cane. She did not hit her head and no LOC.   
normal (ped)...

## 2023-03-10 DIAGNOSIS — G40.909 SEIZURE DISORDER: ICD-10-CM

## 2023-03-10 RX ORDER — PHENOBARBITAL 64.8 MG/1
TABLET ORAL
Qty: 270 TABLET | Refills: 3 | Status: SHIPPED | OUTPATIENT
Start: 2023-03-10

## 2023-03-10 NOTE — TELEPHONE ENCOUNTER
Caller:  MARSHA     Relationship: SELF    Best call back number: [phone]    Requested Prescriptions:   Requested Prescriptions     Pending Prescriptions Disp Refills   • PHENobarbital (LUMINAL) 64.8 MG tablet [Pharmacy Med Name: PHENOBARBITAL 64.8MG TABLETS] 270 tablet 3     Sig: TAKE 3 TABLETS BY MOUTH EVERY DAY        Pharmacy where request should be sent: [id number] [address] AT [facility] [phone] [facility] [phone] FX     Does the patient have less than a 3 day supply:  [] Yes  [x] No    Would you like a call back once the refill request has been completed: [] Yes [x] No    If the office needs to give you a call back, can they leave a voicemail: [] Yes [x] No    Marquis Romero Rep   03/10/23 11:11 EST

## 2023-03-21 ENCOUNTER — OFFICE VISIT (OUTPATIENT)
Dept: ENDOCRINOLOGY | Age: 78
End: 2023-03-21
Payer: MEDICARE

## 2023-03-21 VITALS
TEMPERATURE: 98 F | BODY MASS INDEX: 29.36 KG/M2 | WEIGHT: 176.2 LBS | OXYGEN SATURATION: 97 % | SYSTOLIC BLOOD PRESSURE: 130 MMHG | DIASTOLIC BLOOD PRESSURE: 72 MMHG | HEIGHT: 65 IN | HEART RATE: 72 BPM

## 2023-03-21 DIAGNOSIS — E78.5 HYPERLIPIDEMIA, UNSPECIFIED HYPERLIPIDEMIA TYPE: ICD-10-CM

## 2023-03-21 DIAGNOSIS — R79.0 ABNORMAL BLOOD LEVEL OF CHROMIUM: ICD-10-CM

## 2023-03-21 DIAGNOSIS — E89.0 POSTSURGICAL HYPOTHYROIDISM: Primary | ICD-10-CM

## 2023-03-21 DIAGNOSIS — M81.0 AGE-RELATED OSTEOPOROSIS WITHOUT CURRENT PATHOLOGICAL FRACTURE: ICD-10-CM

## 2023-03-21 DIAGNOSIS — R79.0 ABNORMAL BLOOD LEVEL OF COBALT: ICD-10-CM

## 2023-03-21 DIAGNOSIS — E55.9 VITAMIN D INSUFFICIENCY: ICD-10-CM

## 2023-03-21 DIAGNOSIS — E89.0 HISTORY OF PARTIAL THYROIDECTOMY: ICD-10-CM

## 2023-03-21 DIAGNOSIS — Z83.3 FAMILY HISTORY OF DIABETES MELLITUS IN SISTER: ICD-10-CM

## 2023-03-21 DIAGNOSIS — G40.909 SEIZURE DISORDER: ICD-10-CM

## 2023-03-21 DIAGNOSIS — G47.33 OBSTRUCTIVE SLEEP APNEA SYNDROME: ICD-10-CM

## 2023-03-21 PROCEDURE — 99214 OFFICE O/P EST MOD 30 MIN: CPT | Performed by: INTERNAL MEDICINE

## 2023-03-21 RX ORDER — FUROSEMIDE 20 MG/1
TABLET ORAL
Qty: 30 TABLET | Refills: 11 | Status: SHIPPED | OUTPATIENT
Start: 2023-03-21

## 2023-03-21 NOTE — PROGRESS NOTES
Obie Ruiz is a 78 y.o. female.     History of Present Illness        She has hypothyroidism is on Synthroid 50 mcg/day.  She had a previous partial thyroidectomy for benign disease.     She has hyperlipidemia and is on pravastatin 40 mg/day.  She denies myalgia.  Her last meal was last night.     She has sleep apnea and is unable to tolerate a CPAP.  She has not made a follow-up with a sleep specialist.     She has history of seizure disorder and is on phenobarbital.  She denies any recent seizures.    She has restless leg syndrome which is improved with Mirapex.  She follows with Dr. Steven Liu.    She has degenerative joint disease.  She had gel injection on the right knee which did not improve her pain.  She has chronic left hip and left knee pain.  She will follow with Dr. Ed Chun.    Serum chromium normalized after she had hip replacement.  Serum cobalt is coming down.     She has Dupuytren contracture on both hands and has not seen Dr. Mota for follow-up.    She has osteoporosis on bone density done at [place] by her gynecologist Dr. Cabrera in 2017.  She is on vitamin D 4000 units/day.  She has not followed up with her gynecologist.    She does not have a personal history of diabetes mellitus.  Her sister has diabetes mellitus.    She does not have a PCP.      The following portions of the patient's history were reviewed and updated as appropriate: allergies, current medications, past family history, past medical history, past social history, past surgical history and problem list.    Review of Systems   Gastrointestinal: Positive for constipation. Negative for anal bleeding and blood in stool.   Genitourinary: Negative.    Musculoskeletal: Positive for arthralgias (knees and hips). Negative for myalgias.   Neurological: Negative for seizures.     Vitals:    03/21/23 1042   BP: 130/72   Pulse: 72   Temp: 98 °F (36.7 °C)   TempSrc: Temporal   SpO2: 97%   Weight: 79.9 kg (176 lb 3.2 oz)  "  Height: 165.1 cm (65\")      Objective   Physical Exam  Eyes:      General:         Right eye: No discharge.         Left eye: No discharge.      Extraocular Movements: Extraocular movements intact.   Cardiovascular:      Rate and Rhythm: Normal rate and regular rhythm.      Heart sounds: Normal heart sounds. No murmur heard.    No friction rub.   Pulmonary:      Breath sounds: Normal breath sounds. No rales.   Chest:      Chest wall: No tenderness.   Abdominal:      General: Bowel sounds are normal.      Palpations: Abdomen is soft.      Tenderness: There is no right CVA tenderness or left CVA tenderness.   Musculoskeletal:      Right lower leg: Edema present.      Left lower leg: Edema present.      Comments: Dupuytren contractures on both hands, worse on the left.  No calf tenderness   Skin:     General: Skin is warm.   Neurological:      Mental Status: She is oriented to person, place, and time.       Lab on 08/02/2022   Component Date Value Ref Range Status   • Total Protein 08/02/2022 6.8  6.0 - 8.5 g/dL Final   • Albumin 08/02/2022 4.40  3.50 - 5.20 g/dL Final   • ALT (SGPT) 08/02/2022 12  1 - 33 U/L Final   • AST (SGOT) 08/02/2022 18  1 - 32 U/L Final   • Alkaline Phosphatase 08/02/2022 133 (H)  39 - 117 U/L Final   • Total Bilirubin 08/02/2022 0.3  0.0 - 1.2 mg/dL Final   • Bilirubin, Direct 08/02/2022 <0.2  0.0 - 0.3 mg/dL Final   • Bilirubin, Indirect 08/02/2022    Final    Unable to calculate   • Phenobarbital 08/02/2022 26.6  10.0 - 30.0 mcg/mL Final     Assessment & Plan   Diagnoses and all orders for this visit:    1. Postsurgical hypothyroidism (Primary)  -     TSH  -     T4, Free    2. Seizure disorder (HCC)  -     Comprehensive Metabolic Panel    3. Hyperlipidemia, unspecified hyperlipidemia type  -     Lipid Panel    4. Age-related osteoporosis without current pathological fracture  -     Comprehensive Metabolic Panel  -     C-Telopeptide, Serum  -     Propeptide Type I Collagen  -     ABIMBOLA + " PE    5. Vitamin D insufficiency  -     Vitamin D,25-Hydroxy    6. Obstructive sleep apnea syndrome    7. History of partial thyroidectomy    8. Abnormal blood level of chromium  -     Cobalt    9. Abnormal blood level of cobalt  -     Chromium Level    10. Family history of diabetes mellitus in sister  -     Hemoglobin A1c      Continue Synthroid 50 mcg/day.  Check thyroid function tests and adjust dose if needed.  Continue pravastatin 40 mg/day.  Check lipid panel.  We will defer treatment of seizure disorder and restless leg syndrome to Dr. Liu.  Follow-up with Dr. Ed Chun.  Check serum cobalt and serum chromium.  Advised to follow-up with Dr. Mota.  Schedule bone density at [facility].  Continue vitamin D 4000 units/day.  Check vitamin D levels.  Advised to get a PCP.    Copy my note sent to Dr. Steven Liu, Dr. Ed Chun, and Dr. Mota.    RTC 6 mos.

## 2023-03-29 LAB
25(OH)D3+25(OH)D2 SERPL-MCNC: 31.9 NG/ML (ref 30–100)
ALBUMIN SERPL ELPH-MCNC: 4 G/DL (ref 2.9–4.4)
ALBUMIN SERPL-MCNC: 4.5 G/DL (ref 3.5–5.2)
ALBUMIN/GLOB SERPL: 1.4 {RATIO} (ref 0.7–1.7)
ALBUMIN/GLOB SERPL: 1.9 G/DL
ALP SERPL-CCNC: 138 U/L (ref 39–117)
ALPHA1 GLOB SERPL ELPH-MCNC: 0.3 G/DL (ref 0–0.4)
ALPHA2 GLOB SERPL ELPH-MCNC: 0.6 G/DL (ref 0.4–1)
ALT SERPL-CCNC: 12 U/L (ref 1–33)
AST SERPL-CCNC: 17 U/L (ref 1–32)
B-GLOBULIN SERPL ELPH-MCNC: 0.9 G/DL (ref 0.7–1.3)
BILIRUB SERPL-MCNC: 0.2 MG/DL (ref 0–1.2)
BUN SERPL-MCNC: 10 MG/DL (ref 8–23)
BUN/CREAT SERPL: 14.3 (ref 7–25)
CALCIUM SERPL-MCNC: 10.2 MG/DL (ref 8.6–10.5)
CHLORIDE SERPL-SCNC: 98 MMOL/L (ref 98–107)
CHOLEST SERPL-MCNC: 196 MG/DL (ref 0–200)
CO2 SERPL-SCNC: 28.3 MMOL/L (ref 22–29)
COBALT SERPL-MCNC: 1.5 UG/L (ref 0–0.9)
COLLAGEN CTX SERPL-MCNC: 1312 PG/ML
CR SERPL-MCNC: 2.1 UG/L (ref 0.1–2.1)
CREAT SERPL-MCNC: 0.7 MG/DL (ref 0.57–1)
EGFRCR SERPLBLD CKD-EPI 2021: 89.8 ML/MIN/1.73
GAMMA GLOB SERPL ELPH-MCNC: 1.1 G/DL (ref 0.4–1.8)
GLOBULIN SER CALC-MCNC: 2.4 GM/DL
GLOBULIN SER-MCNC: 2.9 G/DL (ref 2.2–3.9)
GLUCOSE SERPL-MCNC: 95 MG/DL (ref 65–99)
HBA1C MFR BLD: 5.1 % (ref 4.8–5.6)
HDLC SERPL-MCNC: 98 MG/DL (ref 40–60)
IGA SERPL-MCNC: 112 MG/DL (ref 64–422)
IGG SERPL-MCNC: 1095 MG/DL (ref 586–1602)
IGM SERPL-MCNC: 157 MG/DL (ref 26–217)
IMP & REVIEW OF LAB RESULTS: NORMAL
INTERPRETATION SERPL IEP-IMP: NORMAL
LABORATORY COMMENT REPORT: NORMAL
LDLC SERPL CALC-MCNC: 87 MG/DL (ref 0–100)
M PROTEIN SERPL ELPH-MCNC: NORMAL G/DL
PINP SER-MCNC: 168 UG/L
POTASSIUM SERPL-SCNC: 4.9 MMOL/L (ref 3.5–5.2)
PROT SERPL-MCNC: 6.9 G/DL (ref 6–8.5)
SODIUM SERPL-SCNC: 136 MMOL/L (ref 136–145)
T4 FREE SERPL-MCNC: 1.03 NG/DL (ref 0.93–1.7)
TRIGL SERPL-MCNC: 60 MG/DL (ref 0–150)
TSH SERPL DL<=0.005 MIU/L-ACNC: 1.98 UIU/ML (ref 0.27–4.2)
VLDLC SERPL CALC-MCNC: 11 MG/DL (ref 5–40)

## 2023-03-31 NOTE — PROGRESS NOTES
LDL 87.  HDL 98.  Continue pravastatin 40 mg/day.  [place] level coming down.  Normal chromium levels.  Elevated C-telopeptide.  She has elevated bone turnover which may be due to osteoporosis.  Bone density pending.  Remind patient to make follow-up appointment with Dr. Cabrera she may need treatment for osteoporosis..  Normal pro peptide type I collagen.  Normal serum protein electrophoresis  Normal vitamin D.  Normal thyroid function tests.  Copy of labs sent to Dr. Ed Chun, Dr. Steven Liu, and patient through [employer].  Send copy of labs to Dr. Ty Cabrera

## 2023-05-17 DIAGNOSIS — E89.0 POSTSURGICAL HYPOTHYROIDISM: ICD-10-CM

## 2023-05-17 DIAGNOSIS — G25.81 RESTLESS LEGS SYNDROME: ICD-10-CM

## 2023-05-17 RX ORDER — PRAMIPEXOLE DIHYDROCHLORIDE 0.5 MG/1
TABLET ORAL
Qty: 450 TABLET | Refills: 0 | Status: SHIPPED | OUTPATIENT
Start: 2023-05-17

## 2023-05-17 RX ORDER — LEVOTHYROXINE SODIUM 50 MCG
50 TABLET ORAL
Qty: 90 TABLET | Refills: 1 | Status: SHIPPED | OUTPATIENT
Start: 2023-05-17 | End: 2023-08-15

## 2023-07-11 ENCOUNTER — TELEPHONE (OUTPATIENT)
Dept: ENDOCRINOLOGY | Age: 78
End: 2023-07-11

## 2023-07-11 NOTE — TELEPHONE ENCOUNTER
Caller: Sara Mariela JOSE DE JESUS    Relationship: Self    Best call back number: [phone]    What form or medical record are you requesting: REFERRAL     Who is requesting this form or medical record from you: PROVIDER     How would you like to receive the form or medical records (pick-up, mail, fax): FAX   If fax, what is the fax number: [phone]      Timeframe paperwork needed: ASAP     Additional notes: [facility] DEPT, PLEASE FAX A REFERRAL FOR PT TO GET LEG WRAPPED. PLEASE CALL PT AND ADVISE ONCE REQ IS COMPLETED.

## 2023-07-17 ENCOUNTER — TELEPHONE (OUTPATIENT)
Dept: ENDOCRINOLOGY | Age: 78
End: 2023-07-17
Payer: MEDICARE

## 2023-07-27 ENCOUNTER — HOSPITAL ENCOUNTER (OUTPATIENT)
Dept: PHYSICAL THERAPY | Facility: HOSPITAL | Age: 78
Setting detail: THERAPIES SERIES
Discharge: HOME | End: 2023-07-27
Payer: MEDICARE

## 2023-07-27 DIAGNOSIS — I89.0 LYMPHEDEMA: Primary | ICD-10-CM

## 2023-07-27 PROCEDURE — 97162 PT EVAL MOD COMPLEX 30 MIN: CPT

## 2023-07-27 NOTE — THERAPY EVALUATION
Physical Therapy Lymphedema Initial Evaluation  [facility]     Patient Name: Mariela Ruiz  : 1945  MRN: [id number]  Today's Date: 2023      Visit Date: 2023    Visit Dx:    ICD-10-CM ICD-9-CM   1. Lymphedema  I89.0 457.1       Patient Active Problem List   Diagnosis    Seizure disorder    Hyperlipidemia    Vitamin D insufficiency    Age-related osteoporosis without current pathological fracture    Sleep apnea    Chronic venous insufficiency    Postsurgical hypothyroidism    Chronic fatigue syndrome    Gastroesophageal reflux disease    Dupuytren's contracture    History of biliary T-tube placement    History of partial thyroidectomy    Mitral valve prolapse    Multinodular goiter    Right fascicular block    Restless legs syndrome    History of cardiac catheterization    Urge incontinence of urine    Left knee pain    Ligamentous laxity of knee    DJD (degenerative joint disease) of knee    Adverse drug effect, subsequent encounter    Abnormal blood level of chromium    Abnormal blood level of cobalt    Family history of diabetes mellitus    Closed fracture of neck of right femur    Thrombocytopenia    .    Fever    S/P revision of total hip        Past Medical History:   Diagnosis Date    Abnormal EKG     LAST SAW CARDIOLOGY   ANDREEA    Arthritis     BBB (bundle branch block)     RIGHT    Chronic venous insufficiency     Dupuytren's contracture     History of staph infection     S/P KNEE DEC 2016    History of transfusion     Hyperlipidemia     Lymphedema     LEFT LEG    MVP (mitral valve prolapse)     Nontoxic multinodular goiter     Osteoporosis     Dr. Cabrera    Pelvic joint pain, left     Postsurgical hypothyroidism     Presence of left artificial hip joint     METAL ON METAL AS NOTED PER DR CLEMENT NOTE    Restless leg syndrome     Seizure disorder     Dr. Whitman, HX  LAST SEIZURE    Sleep apnea     DOES NOT HAVE MACHINE    Vitamin D insufficiency         Past Surgical  History:   Procedure Laterality Date    APPENDECTOMY      CARDIAC CATHETERIZATION      NORMAL     COLONOSCOPY  08/17/2017    Normal except for anal fissure.  Dr. Brandt.  [facility].    KNEE MINI REVISION Left 11/15/2016    Procedure: LEFT KNEE POLY CHANGE WITH HI POST STABILIZER;  Surgeon: Pablito Claudio MD;  Location: [facility] MAIN OR;  Service:     KNEE MINI REVISION Left 12/13/2016    Procedure: LT KNEE REMOVAL/REPLACEMENT LOCKING PIN ;  Surgeon: Pablito Claudio MD;  Location: [facility] MAIN OR;  Service:     MAMMO FINE NEEDLE ASPIRATION UNILATERAL      RIGHT THYROID NODULE, 2009 BENIGN     KY ARTHRP KNE CONDYLE&PLATU MEDIAL&LAT COMPARTMENTS Left 12/24/2016    Procedure: LEFT KNEE WASHOUT WITH POLY CHANGE;  Surgeon: Christiano Cesar MD;  Location: [facility] OR;  Service: Orthopedics    KY REVJ TOTAL KNEE ARTHRP W/WO ALGRFT 1 COMPONENT Left 2/20/2017    Procedure: LT KNEE REMOVAL ANTIBIOTIC SPACER AND KNEE REVISION;  Surgeon: Christiano Cesar MD;  Location: [facility] OR;  Service: Orthopedics    REPLACEMENT TOTAL KNEE Left     THYROIDECTOMY, PARTIAL      1979 LEFT LOBECTOMY AND ISTHMECTOMY     TOTAL ABDOMINAL HYSTERECTOMY WITH SALPINGO OOPHORECTOMY      TOTAL HIP ARTHROPLASTY Left     TOTAL HIP ARTHROPLASTY Right 9/14/2019    Procedure: TOTAL HIP ARTHROPLASTY ANTERIOR WITH HANA TABLE;  Surgeon: Christiano Cesar II, MD;  Location: [facility] OR;  Service: Orthopedics    TOTAL HIP ARTHROPLASTY REVISION Left 3/9/2021    Procedure: LEFT TOTAL HIP ARTHROPLASTY REVISION POSTERIOR;  Surgeon: Christiano Cesar II, MD;  Location: [facility] OR;  Service: Orthopedics;  Laterality: Left;    TOTAL KNEE  PROSTHESIS REMOVAL W/ SPACER INSERTION Left 12/29/2016    Procedure: LT KNEE IMPLANT REMOVAL WITH INSERTION OF SPACER ;  Surgeon: Christiano Cesar MD;  Location: [facility] OR;  Service:        Visit Dx:    ICD-10-CM ICD-9-CM   1. Lymphedema  I89.0 457.1        Patient History       Row Name 07/27/23  1400             History    Chief Complaint Swelling;Tightness  -KD      Date Current Problem(s) Began 07/17/23  -KD      Brief Description of Current Complaint Pt states she's had swelling in legs, abi left, since 2014, worse recently.  -KD      Patient/Caregiver Goals Decrease swelling;Improve mobility;Know what to do to help the symptoms  -KD      Are you or can you be pregnant No  -KD         Pain     Pain Location Leg  B lower legs/feet  -KD      Pain at Present 0  no true pain, but has some soreness  -KD      Is your sleep disturbed? No  -KD      Difficulties with recreational activities? Edema completely interferes with leisure activities  -KD         Fall Risk Assessment    Any falls in the past year: Yes  -KD      Number of falls reported in the last 12 months unsure  -KD      Factors that contributed to the fall: Lost balance  -KD         Services    Are you currently receiving Home Health services No  -KD         Daily Activities    Primary Language English  -KD      Are you able to read Yes  -KD      Are you able to write Yes  -KD      How does patient learn best? Demonstration  -KD      Teaching needs identified Home Exercise Program;Management of Condition  -KD      Patient is concerned about/has problems with Performing home management (household chores, shopping, care of dependents);Walking  -KD      Does patient have problems with the following? None  -KD      Barriers to learning None  -KD      Pt Participated in POC and Goals Yes  -KD         Safety    Are you being hurt, hit, or frightened by anyone at home or in your life? No  -KD      Are you being neglected by a caregiver No  -KD                User Key  (r) = Recorded By, (t) = Taken By, (c) = Cosigned By      Initials Name Provider Type    Susan Cosme, PT Physical Therapist                     Lymphedema       Row Name 07/27/23 1400             Subjective Comments    Subjective Comments Pt states her daughter has osme health issues,  "just moved in with her. States she thinks she will be able to learn to bandage herself-- she is independent with donning/doffing socks and shoes.  -KD         Lymphedema Assessment    Lymphedema Classification RLE:;LLE:;stage 2 (Spontaneously Irreversible)  -KD      Infections or Cellulitis? no  -KD         LLIS - Physical Concerns    The amount of pain associated with my lymphedema is: 3  -KD      The amount of limb heaviness associated with my lymphedema is: 2  -KD      The amount of skin tightness associated with my lymphedema is: 2  -KD      The size of my swollen limb(s) seems: 4  -KD      Lymphedema affects the movement of my swollen limb(s): 4  -KD      The strength in my swollen limb(s) is: 4  -KD         LLIS - Psychosocial Concerns    Lymphedema affects my body image (i.e., \"how I think I look\"). 0  -KD      Lymphedema affects my socializing with others. 3  -KD      Lymphedema affects my intimate relations with spouse or partner (rate 0 if not applicable 0  -KD      Lymphedema \"gets me down\" (i.e., depression, frustration, or anger) 3  -KD      I must rely on others for help due to my lymphedema. 0  -KD      I know what to do to manage my lymphedema 1  -KD         LLIS - Functional Concerns    Lymphedema affects my ability to perform self-care activities (i.e. eating, dressing, hygiene) 0  -KD      Lymphedema affects my ability to perform routine home or work-related activities. 2  -KD      Lymphedema affects my performance of preferred leisure activities. 4  -KD      Lymphedema affects proper fit of clothing/shoes 0  -KD      Lymphedema affects my sleep 0  -KD         Posture/Observations    Posture/Observations Comments Amb using rolling walker independently.  -KD         General ROM    GENERAL ROM COMMENTS Gen LE ROM is WFLs  -KD         MMT (Manual Muscle Testing)    General MMT Comments Strength in B hips is at least 3/5, knee/ankle mm. at least 3+/5  -KD         Lymphedema Edema Assessment    Edema " Assessment Comment Mod 2+ to semi-firm edema bilateral ankles to knees, min edema in feet.  -KD         Skin Changes/Observations    Skin Observations Comment Skin is intact, noted a faint redness, intact, normal temp.  -KD         Lymphedema Sensation    Lymphedema Sensation Comments Intact to lt touch, pt reports some toe numbness  -KD         Lymphedema Measurements    Measurement Type(s) Circumferential  -KD      Circumferential Areas Lower extremities  -KD         BLE Circumferential (cm)    Measurement Location 1 Knee  -KD      Left 1 40.5 cm  -KD      Right 1 39 cm  -KD      Measurement Location 2 10cm below knee  -KD      Left 2 47 cm  -KD      Right 2 41.2 cm  -KD      Measurement Location 3 20cm below knee  -KD      Left 3 44.5 cm  -KD      Right 3 40 cm  -KD      Measurement Location 4 30cm below knee  -KD      Left 4 37.5 cm  -KD      Right 4 34.5 cm  -KD      Measurement Location 5 Ankle  -KD      Left 5 31 cm  -KD      Right 5 28.5 cm  -KD      Measurement Location 6 Midfoot  -KD      Left 6 22 cm  -KD      Right 6 22 cm  -KD      LLE Circumferential Total 222.5 cm  -KD      RLE Circumferential Total 205.2 cm  -KD         Lymphedema Life Impact Scale Totals    A.  Total Q1 - Q17 (Do not include Q18) 32  -KD      B.  Total number of questions answered (Q1-Q17) 17  -KD      C. Divide A by B 1.88  -KD      D. Multiple C by 25 47  -KD                User Key  (r) = Recorded By, (t) = Taken By, (c) = Cosigned By      Initials Name Provider Type    Susan Cosme, PT Physical Therapist                                    Therapy Education  Education Details: Reviewed condition and treatment protocol. Also reviewed Healthy Habits for Patients at Risk for Lymphedema per NLN with pt.  Given: Symptoms/condition management  Program: New  How Provided: Verbal, Written  Provided to: Patient  Level of Understanding: Verbalized       OP Exercises       Row Name 07/27/23 1400             Subjective Comments     Subjective Comments Pt states her daughter has osme health issues, just moved in with her. States she thinks she will be able to learn to bandage herself-- she is independent with donning/doffing socks and shoes.  -KD                User Key  (r) = Recorded By, (t) = Taken By, (c) = Cosigned By      Initials Name Provider Type    Susan Cosme, PT Physical Therapist                                 PT OP Goals       Row Name 07/27/23 1400          PT Short Term Goals    STG Date to Achieve 08/10/23  -KD     STG 1 Pt to demonstrate awareness of condition and precautions for improved prevention, management, care of symptoms, and ease of transition to self-care of condition.  -KD     STG 1 Progress New  -KD     STG 2 Patient/family independent with self-wrapping techniques of compression bandages as indicated for improved self-management of condition.  -KD     STG 2 Progress New  -KD     STG 3 Patient demonstrate decreased net edema of  BLE's >/=5-10cm for decreased edema symptoms, decreased risk of infection, and improved skin care.  -KD     STG 3 Progress New  -KD        Long Term Goals    LTG Date to Achieve 08/26/23  -KD     LTG 1 Patient/family independent with self-care techniques for self-management of condition.  -KD     LTG 1 Progress New  -KD     LTG 2 Patient demonstrate decreased net edema of BLE's >/=10-20cm for decreased edema symptoms, decreased risk of infection, and improved skin care.  -KD     LTG 2 Progress New  -KD     LTG 3 Patient/family independent with compression garments as indicated for self-management of condition.  -KD     LTG 3 Progress New  -KD        Time Calculation    PT Goal Re-Cert Due Date 10/25/23  -KD               User Key  (r) = Recorded By, (t) = Taken By, (c) = Cosigned By      Initials Name Provider Type    Susan Cosme, PT Physical Therapist                     PT Assessment/Plan       Row Name 07/27/23 1429          PT Assessment    Functional Limitations Impaired  gait;Impaired locomotion;Limitation in home management;Performance in leisure activities  -KD     Impairments Edema;Gait;Impaired lymphatic circulation;Impaired venous circulation;Muscle strength  -KD     Assessment Comments Pt presents in the Lymphedema Clinic today with mod 2+ to pitting edema of bilateral ankles to knees (L>R), min edema dorsum of feet. Total circumferential measurements: R BL=501.2 cm; L OA=159.5 cm. Skin on lower legs is clean, intact, very faint redness, normal temp.  Sensation is intact except for toe numbnes.  General mobility is fair to good-- pt amb with rolling walker and does report some mobility issues. She has had several procedures done to her left knee, says swelling started in that leg soon after TKR in 2014. Lymphedema Lifestyle Impact Scale score is 47.   Functional concerns include mod difficulty with routine home related activities and complete interference with leisure activities. Physical concerns include limb heaviness/tightness, size of legs, and movement/strength of legs due to edema. Pt's daughter lives with her, pt feels she can learn to bandage herself-- at least would like to try. She is able to independently don/doff socks/shoes. Patient is a good candidate for Complete Decongestive Therapy to reduce swelling, protect/promote skin integrity, decrease risk of infection, and instruction to patient/family of self-management skills. Pt agrees to participate in Phase I (skilled care) and Phase II (self-management) of CDT.  -KD     Rehab Potential Good  -KD     Patient/caregiver participated in establishment of treatment plan and goals Yes  -KD     Patient would benefit from skilled therapy intervention Yes  -KD        PT Plan    PT Frequency 5x/week  -KD     Predicted Duration of Therapy Intervention (PT) 4 weeks  -KD     PT Plan Comments See pt per PT Plan.  -KD               User Key  (r) = Recorded By, (t) = Taken By, (c) = Cosigned By      Initials Name Provider Type     KD Susan Martel, PT Physical Therapist                                 Time Calculation:   Start Time: 1315  Stop Time: 1405  Time Calculation (min): 50 min  Untimed Charges  PT Eval/Re-eval Minutes: 50  Total Minutes  Untimed Charges Total Minutes: 50   Total Minutes: 50   Therapy Charges for Today       Code Description Service Date Service Provider Modifiers Qty    30996562758 HC PT EVAL MOD COMPLEXITY 3 7/27/2023 Susan Martel, PT GP 1                      Susan Martel, PT  7/27/2023

## 2023-08-02 ENCOUNTER — OFFICE VISIT (OUTPATIENT)
Dept: NEUROLOGY | Facility: CLINIC | Age: 78
End: 2023-08-02
Payer: MEDICARE

## 2023-08-02 VITALS
BODY MASS INDEX: 29.32 KG/M2 | HEIGHT: 65 IN | OXYGEN SATURATION: 98 % | HEART RATE: 76 BPM | SYSTOLIC BLOOD PRESSURE: 124 MMHG | DIASTOLIC BLOOD PRESSURE: 64 MMHG

## 2023-08-02 DIAGNOSIS — G25.81 RESTLESS LEGS SYNDROME: ICD-10-CM

## 2023-08-02 DIAGNOSIS — G40.909 SEIZURE DISORDER: Primary | ICD-10-CM

## 2023-08-02 PROCEDURE — 1159F MED LIST DOCD IN RCRD: CPT | Performed by: PSYCHIATRY & NEUROLOGY

## 2023-08-02 PROCEDURE — 1160F RVW MEDS BY RX/DR IN RCRD: CPT | Performed by: PSYCHIATRY & NEUROLOGY

## 2023-08-02 PROCEDURE — 99213 OFFICE O/P EST LOW 20 MIN: CPT | Performed by: PSYCHIATRY & NEUROLOGY

## 2023-08-03 ENCOUNTER — TELEPHONE (OUTPATIENT)
Dept: NEUROLOGY | Facility: CLINIC | Age: 78
End: 2023-08-03
Payer: MEDICARE

## 2023-08-03 DIAGNOSIS — G40.909 SEIZURE DISORDER: Primary | ICD-10-CM

## 2023-08-03 NOTE — TELEPHONE ENCOUNTER
Provider: LEISA YA MD    Caller: MARSHA    Relationship to Patient: SELF    Phone Number: [phone]    Reason for Call: PT CALLED TO SEE IF THE LAB OFFICE RECEIVED THE ORDERS FOR HER LABS.  STATED  THEY HAVE NOT RECEIVED THE ORDERS.  PATIENT ASKED TO PLEASE ORDER LABS AND THEN SHE WILL GET THEM DONE.    PT STATED YOU CAN LEAVE A VOICE MAIL IF SHE DOES NOT ANSWER    When was the patient last seen: 8-3-23    PLEASE CALL & ADVISE

## 2023-08-11 ENCOUNTER — LAB (OUTPATIENT)
Dept: LAB | Facility: HOSPITAL | Age: 78
End: 2023-08-11
Payer: MEDICARE

## 2023-08-11 DIAGNOSIS — G40.909 SEIZURE DISORDER: ICD-10-CM

## 2023-08-11 DIAGNOSIS — G25.81 RESTLESS LEGS SYNDROME: ICD-10-CM

## 2023-08-11 LAB
ALBUMIN SERPL-MCNC: 4.1 G/DL (ref 3.5–5.2)
ALP SERPL-CCNC: 131 U/L (ref 39–117)
ALT SERPL W P-5'-P-CCNC: 16 U/L (ref 1–33)
AST SERPL-CCNC: 20 U/L (ref 1–32)
BILIRUB CONJ SERPL-MCNC: <0.2 MG/DL (ref 0–0.3)
BILIRUB INDIRECT SERPL-MCNC: ABNORMAL MG/DL
BILIRUB SERPL-MCNC: 0.2 MG/DL (ref 0–1.2)
PHENOBARB SERPL-MCNC: 23 MCG/ML (ref 10–30)
PROT SERPL-MCNC: 6.6 G/DL (ref 6–8.5)

## 2023-08-11 PROCEDURE — 36415 COLL VENOUS BLD VENIPUNCTURE: CPT

## 2023-08-11 PROCEDURE — 80184 ASSAY OF PHENOBARBITAL: CPT

## 2023-08-11 PROCEDURE — 80076 HEPATIC FUNCTION PANEL: CPT

## 2023-08-11 RX ORDER — PRAMIPEXOLE DIHYDROCHLORIDE 0.5 MG/1
TABLET ORAL
Qty: 450 TABLET | Refills: 0 | Status: SHIPPED | OUTPATIENT
Start: 2023-08-11

## 2023-09-15 DIAGNOSIS — G40.909 SEIZURE DISORDER: ICD-10-CM

## 2023-09-15 RX ORDER — PHENOBARBITAL 64.8 MG/1
194.4 TABLET ORAL DAILY
Qty: 90 TABLET | Refills: 0 | Status: SHIPPED | OUTPATIENT
Start: 2023-09-15 | End: 2023-10-15

## 2023-09-25 DIAGNOSIS — G40.909 SEIZURE DISORDER: ICD-10-CM

## 2023-09-25 NOTE — TELEPHONE ENCOUNTER
Provider: LEISA YA MD     Caller: MARSHA    Relationship to Patient: SELF    Phone Number: [phone]    Reason for Call: PT CALLING REGARDING THE REFILL OF PHENOBARBITAL.   STATED THAT [facility] REFILLED THE PRESCRIPTION BUT IT IS WRONG.  STATED PROVIDER WAS OUT AND THE OTHER PROVIDER COULD NOT FILL THE WHOLE REFILL REQUEST..   STATED SHE IS TO GET A 90 DAY SUPPLY THAT  PILLS.   STATED SHE RECEIVED A 30 DAY SUPPLY WITH 90 PILLS.  PT STATED SHE NEEDS THE OTHER 180 PILLS. STATED SHE USES [facility] ON [facility] [place]    When was the patient last seen: 9-15-23    PLEASE CALL & ADVISE

## 2023-09-26 RX ORDER — PHENOBARBITAL 64.8 MG/1
194.4 TABLET ORAL DAILY
Qty: 270 TABLET | Refills: 1 | Status: SHIPPED | OUTPATIENT
Start: 2023-09-26

## 2023-10-05 ENCOUNTER — OFFICE VISIT (OUTPATIENT)
Dept: ENDOCRINOLOGY | Age: 78
End: 2023-10-05
Payer: MEDICARE

## 2023-10-05 VITALS
DIASTOLIC BLOOD PRESSURE: 68 MMHG | TEMPERATURE: 95.7 F | OXYGEN SATURATION: 97 % | HEART RATE: 76 BPM | BODY MASS INDEX: 29.32 KG/M2 | SYSTOLIC BLOOD PRESSURE: 126 MMHG | HEIGHT: 65 IN

## 2023-10-05 DIAGNOSIS — I87.2 CHRONIC VENOUS INSUFFICIENCY: ICD-10-CM

## 2023-10-05 DIAGNOSIS — M72.0 DUPUYTREN'S CONTRACTURE: ICD-10-CM

## 2023-10-05 DIAGNOSIS — R79.0 ABNORMAL BLOOD LEVEL OF CHROMIUM: ICD-10-CM

## 2023-10-05 DIAGNOSIS — E89.0 POSTSURGICAL HYPOTHYROIDISM: Primary | ICD-10-CM

## 2023-10-05 DIAGNOSIS — G47.33 OBSTRUCTIVE SLEEP APNEA SYNDROME: ICD-10-CM

## 2023-10-05 DIAGNOSIS — G40.909 SEIZURE DISORDER: ICD-10-CM

## 2023-10-05 DIAGNOSIS — R79.0 ABNORMAL BLOOD LEVEL OF COBALT: ICD-10-CM

## 2023-10-05 DIAGNOSIS — M81.0 AGE-RELATED OSTEOPOROSIS WITHOUT CURRENT PATHOLOGICAL FRACTURE: ICD-10-CM

## 2023-10-05 DIAGNOSIS — E78.5 HYPERLIPIDEMIA, UNSPECIFIED HYPERLIPIDEMIA TYPE: ICD-10-CM

## 2023-10-05 DIAGNOSIS — E89.0 HISTORY OF PARTIAL THYROIDECTOMY: ICD-10-CM

## 2023-10-05 PROCEDURE — 99214 OFFICE O/P EST MOD 30 MIN: CPT | Performed by: INTERNAL MEDICINE

## 2023-10-05 RX ORDER — PENICILLIN V POTASSIUM 500 MG/1
TABLET ORAL
COMMUNITY
Start: 2023-09-29 | End: 2023-10-05

## 2023-10-05 NOTE — PROGRESS NOTES
"Obie Ruiz is a 78 y.o. female.     History of Present Illness     She has hypothyroidism and is on Synthroid 50 mcg/day.  She had a previous partial thyroidectomy for benign disease.     She has hyperlipidemia and is on pravastatin 40 mg/day.  She denies myalgia.  Her last meal was last night.     She has sleep apnea and is unable to tolerate a CPAP.  She has not made a follow-up with a sleep specialist.     She has history of seizure disorder and is on phenobarbital.  She denies any recent seizures.    She has restless leg syndrome which is improved with Mirapex.  She follows with Dr. Steven Liu.     She has degenerative joint disease.  She had gel injection on the right knee which did not improve her pain.  She has chronic left hip and left knee pain.  She will follow with Dr. Ed Chun.     Serum chromium normalized after she had left hip replacement.  Serum cobalt is coming down.     She has Dupuytren contracture on both hands and is following with Dr. Mota.  She had previous surgery on the right hand done by Dr. Thayer.    She has osteoporosis on bone density done at [place] by her gynecologist Dr. Cabrera in 2017.  She is on vitamin D 4000 units/day.  She has not followed up with her gynecologist.  She does not remember whether she had a bone density done in March 2023.     She does not have a personal history of diabetes mellitus.  Her sister has diabetes mellitus.    She has chronic venous insufficiency and is being followed at the lymphedema clinic and by Dr. Richardson.     She does not have a PCP.    The following portions of the patient's history were reviewed and updated as appropriate: allergies, current medications, past family history, past medical history, past social history, past surgical history, and problem list.    Review of Systems  Vitals:    10/05/23 0912   BP: 126/68   Pulse: 76   Temp: 95.7 °F (35.4 °C)   TempSrc: Temporal   SpO2: 97%   Height: 165.1 cm (65\")    "   Objective   Physical Exam  Constitutional:       General: She is not in acute distress.     Appearance: Normal appearance. She is obese. She is not ill-appearing.   Eyes:      General: No scleral icterus.        Right eye: No discharge.         Left eye: No discharge.      Extraocular Movements: Extraocular movements intact.      Conjunctiva/sclera: Conjunctivae normal.   Neck:      Vascular: No carotid bruit.   Cardiovascular:      Rate and Rhythm: Normal rate.   Pulmonary:      Effort: No respiratory distress.      Breath sounds: Normal breath sounds. No stridor. No rales.   Chest:      Chest wall: No tenderness.   Abdominal:      General: Bowel sounds are normal.      Palpations: Abdomen is soft.      Tenderness: There is no right CVA tenderness or left CVA tenderness.   Musculoskeletal:      Right lower leg: Edema present.      Left lower leg: Edema present.      Comments: Bilateral Unna boots in place.  Dupuytren contracture on both hands, more severe on the left.   Lymphadenopathy:      Cervical: No cervical adenopathy.   Neurological:      Mental Status: She is alert and oriented to person, place, and time.     Lab on 08/11/2023   Component Date Value Ref Range Status    Total Protein 08/11/2023 6.6  6.0 - 8.5 g/dL Final    Albumin 08/11/2023 4.1  3.5 - 5.2 g/dL Final    ALT (SGPT) 08/11/2023 16  1 - 33 U/L Final    AST (SGOT) 08/11/2023 20  1 - 32 U/L Final    Alkaline Phosphatase 08/11/2023 131 (H)  39 - 117 U/L Final    Total Bilirubin 08/11/2023 0.2  0.0 - 1.2 mg/dL Final    Bilirubin, Direct 08/11/2023 <0.2  0.0 - 0.3 mg/dL Final    Bilirubin, Indirect 08/11/2023    Final    Unable to calculate    Phenobarbital 08/11/2023 23.0  10.0 - 30.0 mcg/mL Final     Assessment & Plan   Diagnoses and all orders for this visit:    1. Postsurgical hypothyroidism (Primary)    2. Hyperlipidemia, unspecified hyperlipidemia type    3. Obstructive sleep apnea syndrome    4. Abnormal blood level of chromium    5.  Abnormal blood level of cobalt    6. History of partial thyroidectomy    7. Chronic venous insufficiency    8. Age-related osteoporosis without current pathological fracture    9. Seizure disorder    10. Dupuytren's contracture      Continue Synthroid 50 mcg/day.  Continue pravastatin 40 mg/day and low-fat diet.  Advised to follow-up with sleep specialist.  Patient will let me know when she is ready to get a referral.  Follow-up with Dr. Steven Liu and Dr. Ed Chun as scheduled.  Advised to see hand surgeon.  Check if she had bone density done this year at [facility].  If not, reschedule bone density.  Advised to see gynecologist.  Continue vitamin D3 4000 units/day.  Follow-up with Dr. Richardson.   Advised to get a PCP.    RTC 4 mos.

## 2023-10-05 NOTE — Clinical Note
Please call [place] [facility] and see if the bone density ordered in March 2023  was done.  If not, please schedule patient for bone density

## 2023-10-06 DIAGNOSIS — M81.0 AGE-RELATED OSTEOPOROSIS WITHOUT CURRENT PATHOLOGICAL FRACTURE: Primary | ICD-10-CM

## 2023-10-11 LAB
25(OH)D3+25(OH)D2 SERPL-MCNC: 30.1 NG/ML (ref 30–100)
ALBUMIN SERPL-MCNC: 4.3 G/DL (ref 3.8–4.8)
ALBUMIN/GLOB SERPL: 1.7 {RATIO} (ref 1.2–2.2)
ALP SERPL-CCNC: 135 IU/L (ref 44–121)
ALT SERPL-CCNC: 17 IU/L (ref 0–32)
AST SERPL-CCNC: 20 IU/L (ref 0–40)
BILIRUB SERPL-MCNC: 0.2 MG/DL (ref 0–1.2)
BUN SERPL-MCNC: 13 MG/DL (ref 8–27)
BUN/CREAT SERPL: 17 (ref 12–28)
CALCIUM SERPL-MCNC: 9.7 MG/DL (ref 8.7–10.3)
CHLORIDE SERPL-SCNC: 102 MMOL/L (ref 96–106)
CHOLEST SERPL-MCNC: 163 MG/DL (ref 100–199)
CO2 SERPL-SCNC: 26 MMOL/L (ref 20–29)
COBALT SERPL-MCNC: 1.9 UG/L (ref 0–0.9)
CR SERPL-MCNC: 1.6 UG/L (ref 0.1–2.1)
CREAT SERPL-MCNC: 0.77 MG/DL (ref 0.57–1)
EGFRCR SERPLBLD CKD-EPI 2021: 80 ML/MIN/1.73
GLOBULIN SER CALC-MCNC: 2.5 G/DL (ref 1.5–4.5)
GLUCOSE SERPL-MCNC: 91 MG/DL (ref 70–99)
HDLC SERPL-MCNC: 83 MG/DL
IMP & REVIEW OF LAB RESULTS: NORMAL
LDLC SERPL CALC-MCNC: 69 MG/DL (ref 0–99)
POTASSIUM SERPL-SCNC: 4.8 MMOL/L (ref 3.5–5.2)
PROT SERPL-MCNC: 6.8 G/DL (ref 6–8.5)
SODIUM SERPL-SCNC: 141 MMOL/L (ref 134–144)
T4 FREE SERPL-MCNC: 0.97 NG/DL (ref 0.82–1.77)
TRIGL SERPL-MCNC: 51 MG/DL (ref 0–149)
TSH SERPL DL<=0.005 MIU/L-ACNC: 1.52 UIU/ML (ref 0.45–4.5)
VLDLC SERPL CALC-MCNC: 11 MG/DL (ref 5–40)

## 2023-10-12 DIAGNOSIS — G40.909 SEIZURE DISORDER: ICD-10-CM

## 2023-10-12 NOTE — TELEPHONE ENCOUNTER
Provider: FELTON    Caller: PATIENT    Relationship to Patient: SELF    Pharmacy: [facility]    Phone Number: [phone]    Reason for Call: PT IS CALLING REGARDING HER PHENOBARBITAL RX.  PT CALLED 9/15/23 AND THE RX WAS FILLED FOR 90 PILLS WHICH IS A ONE MONTH SUPPLY AND THE PT USUALLY GETS A 3 MONTH SUPPLY  PILLS.  PT CALLED BACK ON 9/25/23 TO GET THE REST OF THE RX SENT TO THE PHARMACY.  THE OFFICE CALLED THE REST OF THE SCRIPT IN, BUT THE PT WAS NOT ABLE TO GET IT FILLED BECAUSE THE INSURANCE COMPANY WOULD NOT PAY FOR IT BECAUSE THEY SAID IT WAS TOO SOON FOR A REFILL. SINCE SHE WAS UNABLE TO GET THE REST OF THE RX, SHE WILL NEED THIS FILLED AGAIN ON 10/15/23.  PT IS ASKING THAT THIS BE SENT IN FOR A 90 DAY SUPPLY, 270 PILLS PLEASE     Requested Prescriptions:   Requested Prescriptions     Pending Prescriptions Disp Refills    PHENobarbital (LUMINAL) 64.8 MG tablet 270 tablet 1     Sig: Take 3 tablets by mouth Daily.        Pharm[facility] where request should be sent: [facility]    Last office visit with prescribing clinician: 8/2/2023   Last telemedicine visit with prescribing clinician: Visit date not found   Next office visit with prescribing clinician: 8/2/2024   Does the patient have less than a 3 day supply:  [x] Yes  [] No    Would you like a call back once the refill request has been completed: [x] Yes [] No    If the office needs to give you a call back, can they leave a voicemail: [x] Yes [] No    Marquis Baker Rep   10/12/23 16:10 EDT

## 2023-10-13 RX ORDER — PHENOBARBITAL 64.8 MG/1
194.4 TABLET ORAL DAILY
Qty: 270 TABLET | Refills: 1 | Status: SHIPPED | OUTPATIENT
Start: 2023-10-13

## 2023-11-05 DIAGNOSIS — E89.0 POSTSURGICAL HYPOTHYROIDISM: ICD-10-CM

## 2023-11-06 DIAGNOSIS — E78.5 HYPERLIPIDEMIA, UNSPECIFIED HYPERLIPIDEMIA TYPE: Primary | ICD-10-CM

## 2023-11-06 RX ORDER — PRAVASTATIN SODIUM 40 MG
40 TABLET ORAL NIGHTLY
Qty: 90 TABLET | Refills: 2 | Status: SHIPPED | OUTPATIENT
Start: 2023-11-06

## 2023-11-06 RX ORDER — LEVOTHYROXINE SODIUM 50 MCG
50 TABLET ORAL
Qty: 90 TABLET | Refills: 2 | Status: SHIPPED | OUTPATIENT
Start: 2023-11-06

## 2023-11-08 DIAGNOSIS — G25.81 RESTLESS LEGS SYNDROME: ICD-10-CM

## 2023-11-08 RX ORDER — PRAMIPEXOLE DIHYDROCHLORIDE 0.5 MG/1
TABLET ORAL
Qty: 450 TABLET | Refills: 0 | Status: SHIPPED | OUTPATIENT
Start: 2023-11-08

## 2024-01-10 DIAGNOSIS — M16.10 PRIMARY OSTEOARTHRITIS OF HIP, UNSPECIFIED LATERALITY: ICD-10-CM

## 2024-01-10 DIAGNOSIS — I87.2 CHRONIC VENOUS INSUFFICIENCY: Primary | ICD-10-CM

## 2024-01-18 ENCOUNTER — TELEPHONE (OUTPATIENT)
Dept: ENDOCRINOLOGY | Age: 79
End: 2024-01-18
Payer: MEDICARE

## 2024-01-18 NOTE — TELEPHONE ENCOUNTER
Caller: MYNOR MAYO    Relationship to patient: HOME HEALTH NURSE    Best call back number: [phone]    Patient is needing: PLEASE LET MYNOR KNOW HOW MANY TIMES A WEEK PT IS NEEDING LEGS WRAPPED

## 2024-01-19 NOTE — TELEPHONE ENCOUNTER
Called and spoke with HomeHealth zaki. I informed the HomeHealth nurse that dressings need to be changed 2-3 times a week. I also informed her the pt needs to follow up with Dr. Richardson.

## 2024-01-19 NOTE — TELEPHONE ENCOUNTER
Please change dressing 2-3 times a week.  Please notify home health.  Please instruct patient to make a follow-up appointment with Dr. Richardson

## 2024-01-24 ENCOUNTER — TELEPHONE (OUTPATIENT)
Dept: ENDOCRINOLOGY | Age: 79
End: 2024-01-24
Payer: MEDICARE

## 2024-01-24 NOTE — TELEPHONE ENCOUNTER
Home health physical therapy called asking for an order for once a week for 6 weeks.     They do verbal orders

## 2024-02-02 DIAGNOSIS — G25.81 RESTLESS LEGS SYNDROME: ICD-10-CM

## 2024-02-02 RX ORDER — PRAMIPEXOLE DIHYDROCHLORIDE 0.5 MG/1
TABLET ORAL
Qty: 450 TABLET | Refills: 0 | Status: SHIPPED | OUTPATIENT
Start: 2024-02-02

## 2024-03-19 ENCOUNTER — DOCUMENTATION (OUTPATIENT)
Dept: PHYSICAL THERAPY | Facility: HOSPITAL | Age: 79
End: 2024-03-19
Payer: MEDICARE

## 2024-03-19 DIAGNOSIS — I89.0 LYMPHEDEMA: Primary | ICD-10-CM

## 2024-03-19 NOTE — THERAPY DISCHARGE NOTE
Outpatient Physical Therapy Discharge Summary         Patient Name: Mariela Ruiz  : 1945  MRN: [id number]    Today's Date: 3/19/2024    Visit Dx:    ICD-10-CM ICD-9-CM   1. Lymphedema  I89.0 457.1        PT OP Goals       Row Name 24 1200          PT Short Term Goals    STG Date to Achieve 08/10/23  -KD     STG 1 Pt to demonstrate awareness of condition and precautions for improved prevention, management, care of symptoms, and ease of transition to self-care of condition.  -KD     STG 1 Progress Not Met  -KD     STG 2 Patient/family independent with self-wrapping techniques of compression bandages as indicated for improved self-management of condition.  -KD     STG 2 Progress Not Met  -KD     STG 3 Patient demonstrate decreased net edema of  BLE's >/=5-10cm for decreased edema symptoms, decreased risk of infection, and improved skin care.  -KD     STG 3 Progress Not Met  -KD     STG 4 Progress --  7 seconds on either LE  -KD        Long Term Goals    LTG Date to Achieve 23  -KD     LTG 1 Patient/family independent with self-care techniques for self-management of condition.  -KD     LTG 1 Progress Not Met  -KD     LTG 2 Patient demonstrate decreased net edema of BLE's >/=10-20cm for decreased edema symptoms, decreased risk of infection, and improved skin care.  -KD     LTG 2 Progress Not Met  -KD     LTG 3 Patient/family independent with compression garments as indicated for self-management of condition.  -KD     LTG 3 Progress Not Met  -KD               User Key  (r) = Recorded By, (t) = Taken By, (c) = Cosigned By      Initials Name Provider Type    Susan Cosme, PT Physical Therapist                    OP PT Discharge Summary  Date of Discharge: 24  Reason for Discharge: other (comment) (Pt received therapy elsewhere)  Outcomes Achieved: Other (Pt received therapy elsewhere)  Discharge Destination: Other (comment) (Pt received therapy elsewhere)      Time Calculation:                     Susan Martel, PT  3/19/2024

## 2024-04-01 ENCOUNTER — TELEPHONE (OUTPATIENT)
Dept: ENDOCRINOLOGY | Age: 79
End: 2024-04-01
Payer: MEDICARE

## 2024-04-01 NOTE — TELEPHONE ENCOUNTER
Patient called stating she got her dexa scan done at [facility] in [facility]. She is asking if we have the results. If not can we request the results

## 2024-04-02 NOTE — TELEPHONE ENCOUNTER
Called and spoke with [facility] medical records department they are going to be faxing the Dexa scan to us.

## 2024-04-11 ENCOUNTER — TELEPHONE (OUTPATIENT)
Age: 79
End: 2024-04-11

## 2024-04-11 DIAGNOSIS — I89.0 LYMPHEDEMA: Primary | ICD-10-CM

## 2024-05-02 DIAGNOSIS — G25.81 RESTLESS LEGS SYNDROME: ICD-10-CM

## 2024-05-02 RX ORDER — PRAMIPEXOLE DIHYDROCHLORIDE 0.5 MG/1
TABLET ORAL
Qty: 450 TABLET | Refills: 1 | Status: SHIPPED | OUTPATIENT
Start: 2024-05-02

## 2024-05-29 ENCOUNTER — TELEPHONE (OUTPATIENT)
Age: 79
End: 2024-05-29
Payer: MEDICARE

## 2024-05-29 NOTE — TELEPHONE ENCOUNTER
Pt wanted to see if Dr. Richardson can send an order for a pressure wrap. He did on 5/23. I will resend it to [phone]

## 2024-06-25 ENCOUNTER — TRANSCRIBE ORDERS (OUTPATIENT)
Dept: PHYSICAL THERAPY | Facility: HOSPITAL | Age: 79
End: 2024-06-25
Payer: MEDICARE

## 2024-06-25 DIAGNOSIS — I89.0 LYMPHEDEMA: Primary | ICD-10-CM

## 2024-06-27 DIAGNOSIS — G40.909 SEIZURE DISORDER: ICD-10-CM

## 2024-06-28 RX ORDER — PHENOBARBITAL 64.8 MG/1
194.4 TABLET ORAL DAILY
Qty: 270 TABLET | Refills: 1 | Status: SHIPPED | OUTPATIENT
Start: 2024-06-28

## 2024-07-03 ENCOUNTER — OFFICE VISIT (OUTPATIENT)
Dept: ENDOCRINOLOGY | Age: 79
End: 2024-07-03
Payer: MEDICARE

## 2024-07-03 VITALS
DIASTOLIC BLOOD PRESSURE: 84 MMHG | OXYGEN SATURATION: 94 % | WEIGHT: 212.6 LBS | HEIGHT: 65 IN | HEART RATE: 84 BPM | TEMPERATURE: 96.9 F | SYSTOLIC BLOOD PRESSURE: 126 MMHG | BODY MASS INDEX: 35.42 KG/M2

## 2024-07-03 DIAGNOSIS — E78.5 HYPERLIPIDEMIA, UNSPECIFIED HYPERLIPIDEMIA TYPE: ICD-10-CM

## 2024-07-03 DIAGNOSIS — M19.91 PRIMARY OSTEOARTHRITIS, UNSPECIFIED SITE: ICD-10-CM

## 2024-07-03 DIAGNOSIS — E53.8 VITAMIN B12 DEFICIENCY: ICD-10-CM

## 2024-07-03 DIAGNOSIS — M72.0 DUPUYTREN'S CONTRACTURE: ICD-10-CM

## 2024-07-03 DIAGNOSIS — R79.0 ABNORMAL BLOOD LEVEL OF COBALT: ICD-10-CM

## 2024-07-03 DIAGNOSIS — E89.0 POSTSURGICAL HYPOTHYROIDISM: Primary | ICD-10-CM

## 2024-07-03 DIAGNOSIS — M81.0 AGE-RELATED OSTEOPOROSIS WITHOUT CURRENT PATHOLOGICAL FRACTURE: ICD-10-CM

## 2024-07-03 DIAGNOSIS — G47.33 OBSTRUCTIVE SLEEP APNEA SYNDROME: ICD-10-CM

## 2024-07-03 DIAGNOSIS — Z87.898 HISTORY OF SEIZURE: ICD-10-CM

## 2024-07-03 PROCEDURE — 99214 OFFICE O/P EST MOD 30 MIN: CPT | Performed by: INTERNAL MEDICINE

## 2024-07-03 RX ORDER — ASPIRIN 81 MG/1
81 TABLET ORAL DAILY
COMMUNITY

## 2024-07-03 RX ORDER — ALENDRONATE SODIUM 70 MG/1
70 TABLET ORAL
Qty: 4 TABLET | Refills: 11 | Status: SHIPPED | OUTPATIENT
Start: 2024-07-03 | End: 2025-07-03

## 2024-07-03 RX ORDER — MAGNESIUM 200 MG
TABLET ORAL
Qty: 90 TABLET | Refills: 2 | Status: SHIPPED | OUTPATIENT
Start: 2024-07-03

## 2024-07-03 NOTE — PROGRESS NOTES
Subjective   Mariela Ruiz is a 79 y.o. female.     History of Present Illness        She has hypothyroidism and is on Synthroid 50 mcg/day.  She had a previous partial thyroidectomy for benign disease.     She has hyperlipidemia and is on pravastatin 40 mg/day.  She denies myalgia.  Her last meal was 7 AM.     She has sleep apnea and is unable to tolerate a CPAP.  She has not made a follow-up with a sleep specialist.  She wakes up rested.     She has history of seizure disorder and is on phenobarbital.  She denies any recent seizures.    She has restless leg syndrome which is improved with Mirapex.  She follows with Dr. Steven Liu.     She has degenerative joint disease.  She had gel injection on the right knee which did not improve her pain.  She has chronic left hip and left knee pain.  She had revision left total hip arthroplasty done by Dr. Ed Chun in March 2021     Serum chromium normalized after she had left hip replacement.  Serum cobalt is coming down.     She has Dupuytren contracture on both hands and has not followed up with Dr. Mota.  She had previous surgery on the right hand done by Dr. Thayer.     She has osteoporosis on bone density done at [place] by her gynecologist Dr. Cabrera in 2017.  She has not followed up with her gynecologist.      Bone density done at [facility] in April 2024 showed osteoporosis.  She is on vitamin D3 4000 units/day.  She has not used the prescription drug for osteoporosis in the past.  She denies heartburn.  She denies alcohol or tobacco use.     She does not have a personal history of diabetes mellitus.  Her sister has diabetes mellitus.     She has chronic venous insufficiency and is being followed at the lymphedema clinic and by Dr. Richardson.    She has history of vitamin B12 deficiency with elevated intrinsic factor antibodies.  She has stopped taking vitamin B12 supplements.     She does not have a PCP.    The following portions of the patient's  "history were reviewed and updated as appropriate: allergies, current medications, past family history, past medical history, past social history, past surgical history, and problem list.    Review of Systems   Eyes:  Negative for visual disturbance.   Respiratory:  Negative for shortness of breath and wheezing.    Cardiovascular:  Negative for chest pain and palpitations.   Gastrointestinal: Negative.    Genitourinary: Negative.    Musculoskeletal:  Negative for arthralgias.   Neurological:  Negative for numbness.     Vitals:    07/03/24 1339   BP: 126/84   Pulse: 84   Temp: 96.9 °F (36.1 °C)   TempSrc: Temporal   SpO2: 94%   Weight: 96.4 kg (212 lb 9.6 oz)   Height: 165.1 cm (65\")      Objective   Physical Exam  Constitutional:       General: She is not in acute distress.     Appearance: Normal appearance. She is obese. She is not ill-appearing.   Eyes:      General: No scleral icterus.        Right eye: No discharge.         Left eye: No discharge.   Neck:      Vascular: No carotid bruit.   Cardiovascular:      Rate and Rhythm: Normal rate and regular rhythm.      Heart sounds: Normal heart sounds.      No gallop.   Pulmonary:      Effort: No respiratory distress.      Breath sounds: Normal breath sounds. No rales.   Chest:      Chest wall: No tenderness.   Abdominal:      General: Bowel sounds are normal.      Palpations: Abdomen is soft.      Tenderness: There is no right CVA tenderness or left CVA tenderness.   Musculoskeletal:      Right lower leg: Edema present.      Left lower leg: Edema present.   Lymphadenopathy:      Cervical: No cervical adenopathy.   Neurological:      Mental Status: She is alert and oriented to person, place, and time.       Office Visit on 10/05/2023   Component Date Value Ref Range Status    TSH 10/05/2023 1.520  0.450 - 4.500 uIU/mL Final    Free T4 10/05/2023 0.97  0.82 - 1.77 ng/dL Final    Glucose 10/05/2023 91  70 - 99 mg/dL Final    BUN 10/05/2023 13  8 - 27 mg/dL Final    " Creatinine 10/05/2023 0.77  0.57 - 1.00 mg/dL Final    EGFR Result 10/05/2023 80  >59 mL/min/1.73 Final    BUN/Creatinine Ratio 10/05/2023 17  12 - 28 Final    Sodium 10/05/2023 141  134 - 144 mmol/L Final    Potassium 10/05/2023 4.8  3.5 - 5.2 mmol/L Final    Chloride 10/05/2023 102  96 - 106 mmol/L Final    Total CO2 10/05/2023 26  20 - 29 mmol/L Final    Calcium 10/05/2023 9.7  8.7 - 10.3 mg/dL Final    Total Protein 10/05/2023 6.8  6.0 - 8.5 g/dL Final    Albumin 10/05/2023 4.3  3.8 - 4.8 g/dL Final    Globulin 10/05/2023 2.5  1.5 - 4.5 g/dL Final    A/G Ratio 10/05/2023 1.7  1.2 - 2.2 Final    Total Bilirubin 10/05/2023 0.2  0.0 - 1.2 mg/dL Final    Alkaline Phosphatase 10/05/2023 135 (H)  44 - 121 IU/L Final    AST (SGOT) 10/05/2023 20  0 - 40 IU/L Final    ALT (SGPT) 10/05/2023 17  0 - 32 IU/L Final    Total Cholesterol 10/05/2023 163  100 - 199 mg/dL Final    Triglycerides 10/05/2023 51  0 - 149 mg/dL Final    HDL Cholesterol 10/05/2023 83  >39 mg/dL Final    VLDL Cholesterol Elver 10/05/2023 11  5 - 40 mg/dL Final    LDL Chol Calc (NIH) 10/05/2023 69  0 - 99 mg/dL Final    Chromium, Plasma 10/05/2023 1.6  0.1 - 2.1 ug/L Final                                    Detection Limit = 0.1    Marcellus 10/05/2023 1.9 (H)  0.0 - 0.9 ug/L Final    Comment:                        Occupational Exposure: DENIA 1.0                                  Detection Limit = 1.0      25 Hydroxy, Vitamin D 10/05/2023 30.1  30.0 - 100.0 ng/mL Final    Comment: Vitamin D deficiency has been defined by the [place] of  Medicine and an Endocrine Society practice guideline as a  level of serum 25-OH vitamin D less than 20 ng/mL (1,2).  The Endocrine Society went on to further define vitamin D  insufficiency as a level between 21 and 29 ng/mL (2).  1. IOM ([place] of [employer]). 2010. Dietary reference     intakes for calcium and D. [place]: The     National Academies Press.  2. Elise WU, Wilmar REINA, Pradeep ROWE, et al.      Evaluation, treatment, and prevention of vitamin D     deficiency: an Endocrine Society clinical practice     guideline. JCEM. 2011 Jul; 96(7):1911-30.      Interpretation 10/05/2023 Note   Final    Supplemental report is available.     Assessment & Plan   Diagnoses and all orders for this visit:    1. Postsurgical hypothyroidism (Primary)  -     TSH Rfx On Abnormal To Free T4    2. Hyperlipidemia, unspecified hyperlipidemia type  -     Comprehensive Metabolic Panel  -     Lipid Panel    3. Obstructive sleep apnea syndrome    4. History of seizure    5. Primary osteoarthritis, unspecified site    6. Dupuytren's contracture    7. Age-related osteoporosis without current pathological fracture  -     Vitamin D,25-Hydroxy    8. Abnormal blood level of cobalt  -     Cobalt    9. Vitamin B12 deficiency  -     Vitamin B12  -     CBC & Differential    Other orders  -     Cyanocobalamin (Vitamin B-12) 1000 MCG sublingual tablet; 1 tablet daily  Dispense: 90 tablet; Refill: 2  -     alendronate (Fosamax) 70 MG tablet; Take 1 tablet by mouth Every 7 (Seven) Days. Taken on the stomach with 1 glass of water.  No food until 1/2-hour later.  Dispense: 4 tablet; Refill: 11      Continue Synthroid 50 mcg/day.    Continue pravastatin 40 mg/day.    Will defer treatment of sleep apnea and restless leg syndrome to Dr. Steven Liu.    Patient has history of elevated chromium and cobalt due to worn-out prosthesis.  Serum chromiun has normalized and serum cobalt is mildly elevated.    Advised to follow-up with hand surgeon with regards to the Dupuytren contracture    Patient has osteoporosis.  Start alendronate 70 mg weekly.  Side effects discussed with patient.  Continue vitamin D3 4000 units/day.  Printed information on osteoporosis treatment was given to the patient.    Follow-up at the lymphedema clinic and with Dr. Richardson.    RTC 6 mos.

## 2024-07-05 LAB
25(OH)D3+25(OH)D2 SERPL-MCNC: 32.1 NG/ML (ref 30–100)
ALBUMIN SERPL-MCNC: 4.3 G/DL (ref 3.5–5.2)
ALBUMIN/GLOB SERPL: 1.7 G/DL
ALP SERPL-CCNC: 149 U/L (ref 39–117)
ALT SERPL-CCNC: 16 U/L (ref 1–33)
AST SERPL-CCNC: 29 U/L (ref 1–32)
BASOPHILS # BLD AUTO: 0.03 10*3/MM3 (ref 0–0.2)
BASOPHILS NFR BLD AUTO: 0.5 % (ref 0–1.5)
BILIRUB SERPL-MCNC: 0.3 MG/DL (ref 0–1.2)
BUN SERPL-MCNC: 9 MG/DL (ref 8–23)
BUN/CREAT SERPL: 11.7 (ref 7–25)
CALCIUM SERPL-MCNC: 10.2 MG/DL (ref 8.6–10.5)
CHLORIDE SERPL-SCNC: 105 MMOL/L (ref 98–107)
CHOLEST SERPL-MCNC: 187 MG/DL (ref 0–200)
CO2 SERPL-SCNC: 27 MMOL/L (ref 22–29)
COBALT SERPL-MCNC: <1 UG/L (ref 0–0.9)
CREAT SERPL-MCNC: 0.77 MG/DL (ref 0.57–1)
EGFRCR SERPLBLD CKD-EPI 2021: 79.6 ML/MIN/1.73
EOSINOPHIL # BLD AUTO: 0 10*3/MM3 (ref 0–0.4)
EOSINOPHIL NFR BLD AUTO: 0 % (ref 0.3–6.2)
ERYTHROCYTE [DISTWIDTH] IN BLOOD BY AUTOMATED COUNT: 12.2 % (ref 12.3–15.4)
GLOBULIN SER CALC-MCNC: 2.5 GM/DL
GLUCOSE SERPL-MCNC: 101 MG/DL (ref 65–99)
HCT VFR BLD AUTO: 37 % (ref 34–46.6)
HDLC SERPL-MCNC: 93 MG/DL (ref 40–60)
HGB BLD-MCNC: 12.2 G/DL (ref 12–15.9)
IMM GRANULOCYTES # BLD AUTO: 0.01 10*3/MM3 (ref 0–0.05)
IMM GRANULOCYTES NFR BLD AUTO: 0.2 % (ref 0–0.5)
IMP & REVIEW OF LAB RESULTS: NORMAL
LDLC SERPL CALC-MCNC: 82 MG/DL (ref 0–100)
LYMPHOCYTES # BLD AUTO: 1.38 10*3/MM3 (ref 0.7–3.1)
LYMPHOCYTES NFR BLD AUTO: 24.7 % (ref 19.6–45.3)
MCH RBC QN AUTO: 30.3 PG (ref 26.6–33)
MCHC RBC AUTO-ENTMCNC: 33 G/DL (ref 31.5–35.7)
MCV RBC AUTO: 92 FL (ref 79–97)
MONOCYTES # BLD AUTO: 0.46 10*3/MM3 (ref 0.1–0.9)
MONOCYTES NFR BLD AUTO: 8.2 % (ref 5–12)
NEUTROPHILS # BLD AUTO: 3.7 10*3/MM3 (ref 1.7–7)
NEUTROPHILS NFR BLD AUTO: 66.4 % (ref 42.7–76)
NRBC BLD AUTO-RTO: 0 /100 WBC (ref 0–0.2)
PLATELET # BLD AUTO: 241 10*3/MM3 (ref 140–450)
POTASSIUM SERPL-SCNC: 4.1 MMOL/L (ref 3.5–5.2)
PROT SERPL-MCNC: 6.8 G/DL (ref 6–8.5)
RBC # BLD AUTO: 4.02 10*6/MM3 (ref 3.77–5.28)
SODIUM SERPL-SCNC: 141 MMOL/L (ref 136–145)
TRIGL SERPL-MCNC: 65 MG/DL (ref 0–150)
TSH SERPL DL<=0.005 MIU/L-ACNC: 1.39 UIU/ML (ref 0.27–4.2)
VIT B12 SERPL-MCNC: 212 PG/ML (ref 211–946)
VLDLC SERPL CALC-MCNC: 12 MG/DL (ref 5–40)
WBC # BLD AUTO: 5.58 10*3/MM3 (ref 3.4–10.8)

## 2024-07-07 NOTE — PROGRESS NOTES
LDL 82.  HDL 93.  Continue pravastatin 40 mg/day.  Normal hemoglobin and hematocrit.  Normal cobalt.  Normal vitamin B12.  Continue vitamin D3 1000 mcg sublingual daily  Normal thyroid function test.  Continue Synthroid 50 mcg/day.  Normal vitamin D.  Continue vitamin D3 4000 units/day.  Copy of labs sent to Dr. Steven Liu and to patient through [employer].

## 2024-07-08 ENCOUNTER — TELEPHONE (OUTPATIENT)
Dept: ENDOCRINOLOGY | Age: 79
End: 2024-07-08
Payer: MEDICARE

## 2024-07-08 NOTE — TELEPHONE ENCOUNTER
Caller: [facility]    Relationship to patient: PHARMACY    Best call back number: [phone]    Patient is needing: PHARMACY IS WANTING TO KNOW IF THEY CAN CHANGE THE PRESCRIPTION VITAMIN B12 TO REGULAR TABLETS. PLEASE ADVISE

## 2024-07-09 NOTE — TELEPHONE ENCOUNTER
Called and spoke with pharmacy, pharmacy stated that they were going to reach out to the pt because she is going to have to get that OTC if provider doesn't want to switch rx.

## 2024-07-31 DIAGNOSIS — E78.5 HYPERLIPIDEMIA, UNSPECIFIED HYPERLIPIDEMIA TYPE: ICD-10-CM

## 2024-07-31 RX ORDER — PRAVASTATIN SODIUM 40 MG
TABLET ORAL
Qty: 90 TABLET | Refills: 2 | Status: SHIPPED | OUTPATIENT
Start: 2024-07-31

## 2024-07-31 NOTE — TELEPHONE ENCOUNTER
Rx Refill Note  Requested Prescriptions     Pending Prescriptions Disp Refills    pravastatin (PRAVACHOL) 40 MG tablet [Pharmacy Med Name: PRAVASTATIN 40MG TABLETS] 90 tablet 2     Sig: TAKE 1 TABLET BY MOUTH EVERY NIGHT FOR HIGH AMOUNT OF FATS IN THE BLOOD      Last office visit with prescribing clinician: 7/3/2024   Last telemedicine visit with prescribing clinician: Visit date not found   Next office visit with prescribing clinician: 1/7/2025                         Would you like a call back once the refill request has been completed: [] Yes [] No    If the office needs to give you a call back, can they leave a voicemail: [] Yes [] No    Suzan Cooper MA  07/31/24, 07:55 EDT

## 2024-08-02 ENCOUNTER — OFFICE VISIT (OUTPATIENT)
Dept: NEUROLOGY | Facility: CLINIC | Age: 79
End: 2024-08-02
Payer: MEDICARE

## 2024-08-02 VITALS
HEART RATE: 89 BPM | WEIGHT: 209 LBS | DIASTOLIC BLOOD PRESSURE: 78 MMHG | BODY MASS INDEX: 34.82 KG/M2 | SYSTOLIC BLOOD PRESSURE: 132 MMHG | OXYGEN SATURATION: 98 % | HEIGHT: 65 IN | RESPIRATION RATE: 24 BRPM

## 2024-08-02 DIAGNOSIS — G40.909 SEIZURE DISORDER: Primary | ICD-10-CM

## 2024-08-02 DIAGNOSIS — M72.0 DUPUYTREN'S CONTRACTURE: ICD-10-CM

## 2024-08-02 DIAGNOSIS — G25.81 RESTLESS LEGS SYNDROME: ICD-10-CM

## 2024-08-02 PROBLEM — E78.5 HYPERLIPIDEMIA, UNSPECIFIED: Status: ACTIVE | Noted: 2024-01-10

## 2024-08-02 PROBLEM — E04.9 ENLARGED THYROID GLAND: Status: ACTIVE | Noted: 2017-06-29

## 2024-08-02 PROBLEM — E03.9 HYPOTHYROIDISM, UNSPECIFIED: Status: ACTIVE | Noted: 2024-01-10

## 2024-08-02 NOTE — PROGRESS NOTES
DOS: 2024  NAME: Mariela Ruiz   : 1945  PCP: Provider, No Known    Chief Complaint   Patient presents with    Seizure disorder      SUBJECTIVE  Neurological Problem:  79 y.o. RHW female with a past medical history of hyperlipidemia, hypothyroidism s/p partial thyroidectomy, seizure disorder, restless leg syndrome, sleep apnea, Dupuytren's contracture, lymphedema. They are seen in follow up today for seizure disorder and RLS, however the problem is new to the examiner. Patient last seen by Dr. Steven Liu in 2023, with a summary of the history taken from the previous note with additions/modifications as indicated. She is unaccompanied.    Interval History:   Mrs. uRiz has followed with our clinic since prior to  for epilepsy and restless leg syndrome, previously seen by Dr. Whitman and more recently by Dr. Liu.  Per review of chart she apparently began experiencing seizures around the age of 40 years old.  Her seizures presented as head turning to the left followed by a grunting sound with left facial contortion, hands clenched and slumping over with occasional focal shaking of the left arm.  Past EEGs have confirmed left temporal lobe spike and wave activity.  She has been maintained on phenobarbital 64.8 mg 3 tablets daily for many years.  He has also been diagnosed with restless leg syndrome, has been stable on Mirapex 0.5 mg, most recent dosing at 5 tablets nightly.  She also has a history of Dupuytren's contractures bilaterally with limited range of motion of her fingers and use of her hands in general.  These contractures are worse in her left hand.  She has been in consultation in the past with a hand specialist.  Last office visit with Dr. Liu in 2023 she was stable with no new neurologic issues.    She presents today in follow-up and reports that she has experienced no seizure activity since her last office visit 1 year ago.  She said that her last seizure was many  years ago.  She is compliant with phenobarbital 64.8 mg 3 tablets daily.  Restless leg syndrome she states that she is stable.  She takes Mirapex 0.5 mg 5 tablets nightly.  She says sometimes she will miss a tablet though does not give a reason why.  She tells me today that her Dupuytren's contracture is worse and she is considering surgical consultation again.  Previously she saw a Dr. Mota.  She does have significant lymphedema, says that her legs are wrapped 3 times a week in the lymphedema clinic.  She denies any falls.  She does walk with the assistance of a rollator walker.  She denies any development of an interval resting tremor.  She denies any headaches or dizziness.  She denies any focal or unilateral symptoms such as visual or speech deficit, facial droop, weakness or numbness to one side of the body.      Review of Systems   Musculoskeletal:  Negative for gait problem.   Neurological:  Negative for dizziness, tremors, seizures, syncope, facial asymmetry, speech difficulty, weakness, light-headedness, numbness and headaches.   Psychiatric/Behavioral:  Negative for agitation, behavioral problems, confusion, decreased concentration, dysphoric mood, hallucinations, self-injury, sleep disturbance and suicidal ideas. The patient is not nervous/anxious and is not hyperactive.         The following portions of the patient's history were reviewed and updated as appropriate: allergies, current medications, past family history, past medical history, past social history, past surgical history and problem list.    Current Medications:   Current Outpatient Medications:     aspirin 81 MG EC tablet, Take 1 tablet by mouth Daily., Disp: , Rfl:     cholecalciferol (VITAMIN D3) 1000 units tablet, Take 1 tablet by mouth Daily., Disp: , Rfl:     Cyanocobalamin (Vitamin B-12) 1000 MCG sublingual tablet, 1 tablet daily, Disp: 90 tablet, Rfl: 2    PHENobarbital 64.8 MG tablet, TAKE 3 TABLETS BY MOUTH DAILY, Disp: 270  "tablet, Rfl: 1    pramipexole (MIRAPEX) 0.5 MG tablet, TAKE 5 TABLETS BY MOUTH ONCE DAILY, Disp: 450 tablet, Rfl: 1    pravastatin (PRAVACHOL) 40 MG tablet, TAKE 1 TABLET BY MOUTH EVERY NIGHT FOR HIGH AMOUNT OF FATS IN THE BLOOD, Disp: 90 tablet, Rfl: 2    Synthroid 50 MCG tablet, Take 1 tablet by mouth Every Morning. Indications: Underactive Thyroid, Disp: 90 tablet, Rfl: 2    alendronate (Fosamax) 70 MG tablet, Take 1 tablet by mouth Every 7 (Seven) Days. Taken on the stomach with 1 glass of water.  No food until 1/2-hour later. (Patient not taking: Reported on 8/2/2024), Disp: 4 tablet, Rfl: 11    coenzyme Q10 100 MG capsule, Take 1 capsule by mouth Daily. HOLD PRIOR TO SURG (Patient not taking: Reported on 8/2/2024), Disp: , Rfl:     escitalopram (LEXAPRO) 20 MG tablet, Take 1 tablet by mouth Daily. (Patient not taking: Reported on 8/2/2024), Disp: 90 tablet, Rfl: 2  **I did not stop or change the above medications.  Patient's medication list was updated to reflect medications they have reported as currently taking, including medication changes made by other providers.    Objective   Vital Signs:  /78   Pulse 89   Resp 24   Ht 165.1 cm (65\")   Wt 94.8 kg (209 lb)   SpO2 98%   BMI 34.78 kg/m²   Body mass index is 34.78 kg/m².    Physical Exam   Physical Exam:  GENERAL: NAD, alert  HEENT: Normocephalic, atraumatic   Resp: Even and unlabored  Extremities: Significant edema to bilateral lower extremities, legs are wrapped from knee to toes  Skin: Warm and dry  Psychiatric: Normal mood and affect    Neurological:   MS: AOx3, recent/remote memory intact, normal attention/concentration, language intact, no neglect.   CN: visual acuity grossly normal, PERRL, EOMI, no nystagmus, no facial droop, no dysarthria, hearing symmetric, tongue midline, bilateral shoulder shrug symmetric  Motor: Normal tone and bulk, left hand spasticity. No tremor or abnormal movements noted. No asterixis.    Trapezius elevation: " "5/5  Shoulder abductors 5/5  Elbow flexors 5/5  Elbow extensors 5/5   Left  2+  Right  2+  Hip flexors 5/5  Knee extensors 5/5  Hamstring strength 5/5  Dorsiflexors 5/5  Plantar flexors 5/5  Sensory: Intact to crude touch in all four ext.  Coordination: No dysmetria finger to nose   Rapid alternating movements: Normal finger to thumb tap  Gait and station: Slow, steady gait with rollator walker for ambulation assistance.     Result Review :  The following data was reviewed by: REMY Guerra on 08/02/2024:  Laboratory Results:         Lab Results   Component Value Date    WBC 5.58 07/03/2024    HGB 12.2 07/03/2024    HCT 37.0 07/03/2024    MCV 92.0 07/03/2024     07/03/2024     Lab Results   Component Value Date    GLUCOSE 101 (H) 07/03/2024    BUN 9 07/03/2024    CREATININE 0.77 07/03/2024    EGFRIFNONA 86 10/08/2021    EGFRIFAFRI 104 10/08/2021    BCR 11.7 07/03/2024    K 4.1 07/03/2024    CO2 27.0 07/03/2024    CALCIUM 10.2 07/03/2024    PROTENTOTREF 6.8 07/03/2024    ALBUMIN 4.3 07/03/2024    LABIL2 1.7 07/03/2024    AST 29 07/03/2024    ALT 16 07/03/2024     Lab Results   Component Value Date    HGBA1C 5.10 03/21/2023     No results found for: \"CHOL\"  Lab Results   Component Value Date    HDL 93 (H) 07/03/2024    HDL 83 10/05/2023    HDL 98 (H) 03/21/2023     Lab Results   Component Value Date    LDL 82 07/03/2024    LDL 69 10/05/2023    LDL 87 03/21/2023     Lab Results   Component Value Date    TRIG 65 07/03/2024    TRIG 51 10/05/2023    TRIG 60 03/21/2023     No results found for: \"RPR\"  Lab Results   Component Value Date    TSH 1.390 07/03/2024     Lab Results   Component Value Date    [id number] 212 07/03/2024                Assessment and Plan   Diagnoses and all orders for this visit:    1. Seizure disorder (Primary)  -     Phenobarbital Level; Future  -     Hepatic Function Panel; Future    2. Restless legs syndrome    3. Dupuytren's contracture      We will continue Mariela on her " current dosing of phenobarbital 64.8 mg 3 tablets daily.  We will recheck a phenobarbital level and hepatic function panel.  As for her restless leg syndrome we will continue the Mirapex 0.5 mg 5 tablets nightly.  She does not want to adjust her dose at this time.  She mentions that she is interested in discussing surgery for her Dupuytren's contractures however wants to focus on her medical issues first.  I told her that if she needs a referral or any assistance from clinic in that avenue we are happy to provide it.    We will see Mariela back in 12 months with Dr. Liu, sooner if symptoms warrant.     REMY Guerra  [facility]   08/02/24       I spent 30 minutes caring for Mariela on this date of service. This time includes time spent by me in the following activities:preparing for the visit, reviewing tests, obtaining and/or reviewing a separately obtained history, performing a medically appropriate examination and/or evaluation , counseling and educating the patient/family/caregiver, ordering medications, tests, or procedures, referring and communicating with other health care professionals , documenting information in the medical record, independently interpreting results and communicating that information with the patient/family/caregiver, and care coordination  Follow Up   Return in about 1 year (around 8/2/2025).  Patient was given instructions and counseling regarding her condition or for health maintenance advice. Please see specific information pulled into the AVS if appropriate.       Dictated using Dragon Dictation

## 2024-08-09 ENCOUNTER — LAB (OUTPATIENT)
Dept: LAB | Facility: HOSPITAL | Age: 79
End: 2024-08-09
Payer: MEDICARE

## 2024-08-09 DIAGNOSIS — G40.909 SEIZURE DISORDER: Primary | ICD-10-CM

## 2024-08-09 DIAGNOSIS — R89.9 ABNORMAL LABORATORY TEST: ICD-10-CM

## 2024-08-09 DIAGNOSIS — G40.909 SEIZURE DISORDER: ICD-10-CM

## 2024-08-09 LAB
ALBUMIN SERPL-MCNC: 4.4 G/DL (ref 3.5–5.2)
ALP SERPL-CCNC: 176 U/L (ref 39–117)
ALT SERPL W P-5'-P-CCNC: 11 U/L (ref 1–33)
AST SERPL-CCNC: 22 U/L (ref 1–32)
BILIRUB CONJ SERPL-MCNC: <0.2 MG/DL (ref 0–0.3)
BILIRUB INDIRECT SERPL-MCNC: ABNORMAL MG/DL
BILIRUB SERPL-MCNC: 0.3 MG/DL (ref 0–1.2)
PHENOBARB SERPL-MCNC: 27.1 MCG/ML (ref 10–30)
PROT SERPL-MCNC: 7.1 G/DL (ref 6–8.5)

## 2024-08-09 PROCEDURE — 36415 COLL VENOUS BLD VENIPUNCTURE: CPT

## 2024-08-09 PROCEDURE — 80184 ASSAY OF PHENOBARBITAL: CPT

## 2024-08-09 PROCEDURE — 80076 HEPATIC FUNCTION PANEL: CPT

## 2024-08-15 DIAGNOSIS — E89.0 POSTSURGICAL HYPOTHYROIDISM: ICD-10-CM

## 2024-08-16 DIAGNOSIS — E89.0 POSTSURGICAL HYPOTHYROIDISM: ICD-10-CM

## 2024-08-16 RX ORDER — LEVOTHYROXINE SODIUM 50 MCG
50 TABLET ORAL EVERY MORNING
Qty: 90 TABLET | Refills: 2 | Status: SHIPPED | OUTPATIENT
Start: 2024-08-16

## 2024-08-16 RX ORDER — LEVOTHYROXINE SODIUM 50 MCG
50 TABLET ORAL EVERY MORNING
Qty: 90 TABLET | Refills: 2 | Status: SHIPPED | OUTPATIENT
Start: 2024-08-16 | End: 2024-08-16 | Stop reason: SDUPTHER

## 2024-08-16 NOTE — TELEPHONE ENCOUNTER
Rx Refill Note  Requested Prescriptions     Pending Prescriptions Disp Refills    Synthroid 50 MCG tablet [Pharmacy Med Name: Synthroid 50 MCG Oral Tablet] 90 tablet 0     Sig: TAKE 1 TABLET BY MOUTH EVERY MORNING      Last office visit with prescribing clinician: 7/3/2024   Last telemedicine visit with prescribing clinician: Visit date not found   Next office visit with prescribing clinician: 1/7/2025                         Would you like a call back once the refill request has been completed: [] Yes [] No    If the office needs to give you a call back, can they leave a voicemail: [] Yes [] No    Suzan Cooper MA  08/16/24, 07:41 EDT

## 2024-08-16 NOTE — TELEPHONE ENCOUNTER
Rx Refill Note  Requested Prescriptions     Pending Prescriptions Disp Refills    Synthroid 50 MCG tablet 90 tablet 2     Sig: Take 1 tablet by mouth Every Morning.      Last office visit with prescribing clinician: 7/3/2024   Last telemedicine visit with prescribing clinician: Visit date not found   Next office visit with prescribing clinician: 1/7/2025                         Would you like a call back once the refill request has been completed: [] Yes [] No    If the office needs to give you a call back, can they leave a voicemail: [] Yes [] No    Laura De Leon MA  08/16/24, 10:57 EDT

## 2024-10-25 DIAGNOSIS — G25.81 RESTLESS LEGS SYNDROME: ICD-10-CM

## 2024-10-25 RX ORDER — PRAMIPEXOLE DIHYDROCHLORIDE 0.5 MG/1
TABLET ORAL
Qty: 450 TABLET | Refills: 0 | Status: SHIPPED | OUTPATIENT
Start: 2024-10-25

## 2024-10-31 ENCOUNTER — HOSPITAL ENCOUNTER (INPATIENT)
Facility: HOSPITAL | Age: 79
LOS: 6 days | Discharge: NURSING, REHAB OR LONG-TERM CARE | End: 2024-11-06
Attending: EMERGENCY MEDICINE | Admitting: INTERNAL MEDICINE
Payer: MEDICARE

## 2024-10-31 ENCOUNTER — APPOINTMENT (OUTPATIENT)
Dept: CT IMAGING | Facility: HOSPITAL | Age: 79
End: 2024-10-31
Payer: MEDICARE

## 2024-10-31 DIAGNOSIS — G40.909 SEIZURE DISORDER: ICD-10-CM

## 2024-10-31 DIAGNOSIS — K81.0 ACUTE CHOLECYSTITIS: Primary | ICD-10-CM

## 2024-10-31 PROBLEM — R79.89 ELEVATED TROPONIN: Status: ACTIVE | Noted: 2024-10-31

## 2024-10-31 LAB
ABO GROUP BLD: NORMAL
ALBUMIN SERPL-MCNC: 3.4 G/DL (ref 3.5–5.2)
ALBUMIN/GLOB SERPL: 1 G/DL
ALP SERPL-CCNC: 153 U/L (ref 39–117)
ALT SERPL W P-5'-P-CCNC: 12 U/L (ref 1–33)
ANION GAP SERPL CALCULATED.3IONS-SCNC: 8 MMOL/L (ref 5–15)
ANTI-C: NORMAL
ANTI-D: NORMAL
ANTI-E: NORMAL
AST SERPL-CCNC: 18 U/L (ref 1–32)
BACTERIA UR QL AUTO: ABNORMAL /HPF
BASOPHILS # BLD AUTO: 0.04 10*3/MM3 (ref 0–0.2)
BASOPHILS NFR BLD AUTO: 0.3 % (ref 0–1.5)
BILIRUB SERPL-MCNC: 0.8 MG/DL (ref 0–1.2)
BILIRUB UR QL STRIP: NEGATIVE
BLD GP AB SCN SERPL QL: POSITIVE
BUN SERPL-MCNC: 21 MG/DL (ref 8–23)
BUN/CREAT SERPL: 22.6 (ref 7–25)
CALCIUM SPEC-SCNC: 9.5 MG/DL (ref 8.6–10.5)
CHLORIDE SERPL-SCNC: 101 MMOL/L (ref 98–107)
CLARITY UR: CLEAR
CO2 SERPL-SCNC: 25 MMOL/L (ref 22–29)
COLOR UR: YELLOW
CREAT SERPL-MCNC: 0.93 MG/DL (ref 0.57–1)
D-LACTATE SERPL-SCNC: 1.2 MMOL/L (ref 0.5–2)
DEPRECATED RDW RBC AUTO: 40.7 FL (ref 37–54)
EGFRCR SERPLBLD CKD-EPI 2021: 63.4 ML/MIN/1.73
EOSINOPHIL # BLD AUTO: 0 10*3/MM3 (ref 0–0.4)
EOSINOPHIL NFR BLD AUTO: 0 % (ref 0.3–6.2)
ERYTHROCYTE [DISTWIDTH] IN BLOOD BY AUTOMATED COUNT: 12.4 % (ref 12.3–15.4)
GEN 5 2HR TROPONIN T REFLEX: 26 NG/L
GLOBULIN UR ELPH-MCNC: 3.3 GM/DL
GLUCOSE SERPL-MCNC: 96 MG/DL (ref 65–99)
GLUCOSE UR STRIP-MCNC: NEGATIVE MG/DL
HCT VFR BLD AUTO: 36.8 % (ref 34–46.6)
HGB BLD-MCNC: 12.4 G/DL (ref 12–15.9)
HGB UR QL STRIP.AUTO: NEGATIVE
HOLD SPECIMEN: NORMAL
HOLD SPECIMEN: NORMAL
HYALINE CASTS UR QL AUTO: ABNORMAL /LPF
IMM GRANULOCYTES # BLD AUTO: 0.06 10*3/MM3 (ref 0–0.05)
IMM GRANULOCYTES NFR BLD AUTO: 0.4 % (ref 0–0.5)
KETONES UR QL STRIP: NEGATIVE
LEUKOCYTE ESTERASE UR QL STRIP.AUTO: ABNORMAL
LIPASE SERPL-CCNC: 24 U/L (ref 13–60)
LYMPHOCYTES # BLD AUTO: 0.93 10*3/MM3 (ref 0.7–3.1)
LYMPHOCYTES NFR BLD AUTO: 6.2 % (ref 19.6–45.3)
MCH RBC QN AUTO: 30.2 PG (ref 26.6–33)
MCHC RBC AUTO-ENTMCNC: 33.7 G/DL (ref 31.5–35.7)
MCV RBC AUTO: 89.5 FL (ref 79–97)
MONOCYTES # BLD AUTO: 1.46 10*3/MM3 (ref 0.1–0.9)
MONOCYTES NFR BLD AUTO: 9.7 % (ref 5–12)
NEUTROPHILS NFR BLD AUTO: 12.51 10*3/MM3 (ref 1.7–7)
NEUTROPHILS NFR BLD AUTO: 83.4 % (ref 42.7–76)
NITRITE UR QL STRIP: NEGATIVE
NRBC BLD AUTO-RTO: 0 /100 WBC (ref 0–0.2)
PH UR STRIP.AUTO: 7 [PH] (ref 5–8)
PLATELET # BLD AUTO: 210 10*3/MM3 (ref 140–450)
PMV BLD AUTO: 10.8 FL (ref 6–12)
POTASSIUM SERPL-SCNC: 4.2 MMOL/L (ref 3.5–5.2)
PROT SERPL-MCNC: 6.7 G/DL (ref 6–8.5)
PROT UR QL STRIP: ABNORMAL
QT INTERVAL: 312 MS
QT INTERVAL: 367 MS
QTC INTERVAL: 447 MS
QTC INTERVAL: 469 MS
RBC # BLD AUTO: 4.11 10*6/MM3 (ref 3.77–5.28)
RBC # UR STRIP: ABNORMAL /HPF
REF LAB TEST METHOD: ABNORMAL
RH BLD: NEGATIVE
SODIUM SERPL-SCNC: 134 MMOL/L (ref 136–145)
SP GR UR STRIP: >1.03 (ref 1–1.03)
SQUAMOUS #/AREA URNS HPF: ABNORMAL /HPF
T&S EXPIRATION DATE: NORMAL
TROPONIN T DELTA: 10 NG/L
TROPONIN T SERPL HS-MCNC: 16 NG/L
UROBILINOGEN UR QL STRIP: ABNORMAL
WBC # UR STRIP: ABNORMAL /HPF
WBC NRBC COR # BLD AUTO: 15 10*3/MM3 (ref 3.4–10.8)
WHOLE BLOOD HOLD COAG: NORMAL
WHOLE BLOOD HOLD SPECIMEN: NORMAL

## 2024-10-31 PROCEDURE — 81001 URINALYSIS AUTO W/SCOPE: CPT | Performed by: NURSE PRACTITIONER

## 2024-10-31 PROCEDURE — 83605 ASSAY OF LACTIC ACID: CPT | Performed by: NURSE PRACTITIONER

## 2024-10-31 PROCEDURE — 87040 BLOOD CULTURE FOR BACTERIA: CPT | Performed by: NURSE PRACTITIONER

## 2024-10-31 PROCEDURE — 86901 BLOOD TYPING SEROLOGIC RH(D): CPT | Performed by: NURSE PRACTITIONER

## 2024-10-31 PROCEDURE — 86900 BLOOD TYPING SEROLOGIC ABO: CPT | Performed by: NURSE PRACTITIONER

## 2024-10-31 PROCEDURE — 99285 EMERGENCY DEPT VISIT HI MDM: CPT

## 2024-10-31 PROCEDURE — 93010 ELECTROCARDIOGRAM REPORT: CPT | Performed by: INTERNAL MEDICINE

## 2024-10-31 PROCEDURE — 36415 COLL VENOUS BLD VENIPUNCTURE: CPT | Performed by: NURSE PRACTITIONER

## 2024-10-31 PROCEDURE — 93005 ELECTROCARDIOGRAM TRACING: CPT | Performed by: EMERGENCY MEDICINE

## 2024-10-31 PROCEDURE — 25810000003 SODIUM CHLORIDE 0.9 % SOLUTION: Performed by: INTERNAL MEDICINE

## 2024-10-31 PROCEDURE — 25010000002 ONDANSETRON PER 1 MG: Performed by: NURSE PRACTITIONER

## 2024-10-31 PROCEDURE — 86850 RBC ANTIBODY SCREEN: CPT | Performed by: NURSE PRACTITIONER

## 2024-10-31 PROCEDURE — 25010000002 PIPERACILLIN SOD-TAZOBACTAM PER 1 G: Performed by: INTERNAL MEDICINE

## 2024-10-31 PROCEDURE — 83690 ASSAY OF LIPASE: CPT | Performed by: NURSE PRACTITIONER

## 2024-10-31 PROCEDURE — 84484 ASSAY OF TROPONIN QUANT: CPT | Performed by: NURSE PRACTITIONER

## 2024-10-31 PROCEDURE — 93005 ELECTROCARDIOGRAM TRACING: CPT | Performed by: NURSE PRACTITIONER

## 2024-10-31 PROCEDURE — 80053 COMPREHEN METABOLIC PANEL: CPT | Performed by: NURSE PRACTITIONER

## 2024-10-31 PROCEDURE — 74177 CT ABD & PELVIS W/CONTRAST: CPT

## 2024-10-31 PROCEDURE — 99221 1ST HOSP IP/OBS SF/LOW 40: CPT | Performed by: SURGERY

## 2024-10-31 PROCEDURE — 25010000002 PIPERACILLIN SOD-TAZOBACTAM PER 1 G: Performed by: NURSE PRACTITIONER

## 2024-10-31 PROCEDURE — 25010000002 MORPHINE PER 10 MG: Performed by: NURSE PRACTITIONER

## 2024-10-31 PROCEDURE — 86870 RBC ANTIBODY IDENTIFICATION: CPT | Performed by: NURSE PRACTITIONER

## 2024-10-31 PROCEDURE — 85025 COMPLETE CBC W/AUTO DIFF WBC: CPT | Performed by: NURSE PRACTITIONER

## 2024-10-31 PROCEDURE — 86902 BLOOD TYPE ANTIGEN DONOR EA: CPT

## 2024-10-31 PROCEDURE — 86922 COMPATIBILITY TEST ANTIGLOB: CPT

## 2024-10-31 PROCEDURE — 25810000003 SODIUM CHLORIDE 0.9 % SOLUTION: Performed by: NURSE PRACTITIONER

## 2024-10-31 PROCEDURE — 36415 COLL VENOUS BLD VENIPUNCTURE: CPT

## 2024-10-31 PROCEDURE — 25510000001 IOPAMIDOL 61 % SOLUTION: Performed by: EMERGENCY MEDICINE

## 2024-10-31 PROCEDURE — 86920 COMPATIBILITY TEST SPIN: CPT

## 2024-10-31 RX ORDER — SODIUM CHLORIDE 9 MG/ML
40 INJECTION, SOLUTION INTRAVENOUS AS NEEDED
Status: DISCONTINUED | OUTPATIENT
Start: 2024-10-31 | End: 2024-11-06 | Stop reason: HOSPADM

## 2024-10-31 RX ORDER — PHENOBARBITAL 32.4 MG/1
129.6 TABLET ORAL NIGHTLY
Status: DISCONTINUED | OUTPATIENT
Start: 2024-10-31 | End: 2024-11-06 | Stop reason: HOSPADM

## 2024-10-31 RX ORDER — SODIUM CHLORIDE 0.9 % (FLUSH) 0.9 %
10 SYRINGE (ML) INJECTION AS NEEDED
Status: DISCONTINUED | OUTPATIENT
Start: 2024-10-31 | End: 2024-11-06 | Stop reason: HOSPADM

## 2024-10-31 RX ORDER — PRAVASTATIN SODIUM 40 MG
40 TABLET ORAL NIGHTLY
Status: DISCONTINUED | OUTPATIENT
Start: 2024-10-31 | End: 2024-11-06 | Stop reason: HOSPADM

## 2024-10-31 RX ORDER — CHOLECALCIFEROL (VITAMIN D3) 25 MCG
4000 TABLET ORAL DAILY
Status: DISCONTINUED | OUTPATIENT
Start: 2024-10-31 | End: 2024-11-06 | Stop reason: HOSPADM

## 2024-10-31 RX ORDER — NITROGLYCERIN 0.4 MG/1
0.4 TABLET SUBLINGUAL
Status: DISCONTINUED | OUTPATIENT
Start: 2024-10-31 | End: 2024-11-06 | Stop reason: HOSPADM

## 2024-10-31 RX ORDER — ONDANSETRON 2 MG/ML
4 INJECTION INTRAMUSCULAR; INTRAVENOUS ONCE
Status: COMPLETED | OUTPATIENT
Start: 2024-10-31 | End: 2024-10-31

## 2024-10-31 RX ORDER — MORPHINE SULFATE 2 MG/ML
4 INJECTION, SOLUTION INTRAMUSCULAR; INTRAVENOUS ONCE
Status: COMPLETED | OUTPATIENT
Start: 2024-10-31 | End: 2024-10-31

## 2024-10-31 RX ORDER — SODIUM CHLORIDE 0.9 % (FLUSH) 0.9 %
10 SYRINGE (ML) INJECTION EVERY 12 HOURS SCHEDULED
Status: DISCONTINUED | OUTPATIENT
Start: 2024-10-31 | End: 2024-11-06 | Stop reason: HOSPADM

## 2024-10-31 RX ORDER — IOPAMIDOL 612 MG/ML
100 INJECTION, SOLUTION INTRAVASCULAR
Status: COMPLETED | OUTPATIENT
Start: 2024-10-31 | End: 2024-10-31

## 2024-10-31 RX ORDER — PHENOBARBITAL 32.4 MG/1
64.8 TABLET ORAL DAILY
Status: DISCONTINUED | OUTPATIENT
Start: 2024-11-01 | End: 2024-11-06 | Stop reason: HOSPADM

## 2024-10-31 RX ORDER — PHENOBARBITAL 97.2 MG/1
194.4 TABLET ORAL DAILY
Status: DISCONTINUED | OUTPATIENT
Start: 2024-10-31 | End: 2024-10-31

## 2024-10-31 RX ORDER — MORPHINE SULFATE 2 MG/ML
4 INJECTION, SOLUTION INTRAMUSCULAR; INTRAVENOUS EVERY 4 HOURS PRN
Status: DISCONTINUED | OUTPATIENT
Start: 2024-10-31 | End: 2024-11-06 | Stop reason: HOSPADM

## 2024-10-31 RX ORDER — SODIUM CHLORIDE 9 MG/ML
100 INJECTION, SOLUTION INTRAVENOUS CONTINUOUS
Status: DISCONTINUED | OUTPATIENT
Start: 2024-10-31 | End: 2024-11-01

## 2024-10-31 RX ORDER — LEVOTHYROXINE SODIUM 50 UG/1
50 TABLET ORAL EVERY MORNING
Status: DISCONTINUED | OUTPATIENT
Start: 2024-11-01 | End: 2024-11-06 | Stop reason: HOSPADM

## 2024-10-31 RX ORDER — PRAMIPEXOLE DIHYDROCHLORIDE 0.5 MG/1
0.5 TABLET ORAL DAILY
Status: DISCONTINUED | OUTPATIENT
Start: 2024-10-31 | End: 2024-11-06 | Stop reason: HOSPADM

## 2024-10-31 RX ORDER — ONDANSETRON 2 MG/ML
4 INJECTION INTRAMUSCULAR; INTRAVENOUS EVERY 6 HOURS PRN
Status: DISCONTINUED | OUTPATIENT
Start: 2024-10-31 | End: 2024-11-06 | Stop reason: HOSPADM

## 2024-10-31 RX ADMIN — PIPERACILLIN AND TAZOBACTAM 3.38 G: 3; .375 INJECTION, POWDER, FOR SOLUTION INTRAVENOUS at 15:21

## 2024-10-31 RX ADMIN — IOPAMIDOL 85 ML: 612 INJECTION, SOLUTION INTRAVENOUS at 13:36

## 2024-10-31 RX ADMIN — SODIUM CHLORIDE 100 ML/HR: 9 INJECTION, SOLUTION INTRAVENOUS at 18:35

## 2024-10-31 RX ADMIN — PRAVASTATIN SODIUM 40 MG: 40 TABLET ORAL at 21:07

## 2024-10-31 RX ADMIN — PHENOBARBITAL 129.6 MG: 32.4 TABLET ORAL at 23:14

## 2024-10-31 RX ADMIN — Medication 10 ML: at 21:07

## 2024-10-31 RX ADMIN — MORPHINE SULFATE 4 MG: 2 INJECTION, SOLUTION INTRAMUSCULAR; INTRAVENOUS at 13:06

## 2024-10-31 RX ADMIN — Medication 4000 UNITS: at 18:34

## 2024-10-31 RX ADMIN — PIPERACILLIN AND TAZOBACTAM 3.38 G: 3; .375 INJECTION, POWDER, FOR SOLUTION INTRAVENOUS at 23:14

## 2024-10-31 RX ADMIN — PRAMIPEXOLE DIHYDROCHLORIDE 0.5 MG: 0.5 TABLET ORAL at 21:07

## 2024-10-31 RX ADMIN — ONDANSETRON 4 MG: 2 INJECTION, SOLUTION INTRAMUSCULAR; INTRAVENOUS at 13:05

## 2024-10-31 RX ADMIN — SODIUM CHLORIDE 500 ML: 9 INJECTION, SOLUTION INTRAVENOUS at 13:06

## 2024-10-31 NOTE — CONSULTS
Cc: Abdominal pain, abnormal CT    History of presenting illness:   This is a nice 79-year-old female who states she was in her normal state of health until about 3 days ago when she began having abdominal pain.  She describes this as more lower abdominal pain, seemingly greater on the right than the left.  This progressed over the course of a couple of days and she began having some vomiting a couple of days ago.  The pain began to migrate a bit towards the upper abdomen.  She denies any fever or chills but has had decreased appetite.  She feels she has had some swelling in the upper abdomen.    Past Medical History: Seizure disorder, gastroesophageal reflux disease, urinary incontinence, hypothyroidism, hyperlipidemia, degenerative disc disease    Past Surgical History: Chart states the patient has had a remote appendectomy.  Partial hysterectomy.  Patient is not sure if this is the case.  She has had what sounds like a partial thyroidectomy.  She has had multiple knee surgeries.  She denies any other additional abdominal surgery, although again in the chart it states that she had a biliary tube placed.  The patient denies this, there is no scarring on the abdominal wall to suggest this and I suspect this is erroneous.  She has had a lateral internal sphincterotomy for fissure.    Medications: Pramipexole, Synthroid, pravastatin, Fosamax, phenobarbital    Allergies: Clindamycin, Duricef, sulfa drugs    Social History: She is a non-smoker    Family History: Negative for colon cancer    Review of Systems:  Constitutional: Positive for decreased appetite, denies fever or chills  Neck: no swollen glands or dysphagia or odynophagia  Respiratory: negative for SOB, cough, hemoptysis or wheezing  Cardiovascular: negative for chest pain, palpitations or peripheral edema  Gastrointestinal: Positive for abdominal pain, bloating, vomiting      Physical Exam:  BMI: 36.9  Temperature 100.3 heart rate 105 blood pressure  124/63  General: alert and oriented, appropriate, no acute distress  Eyes: No scleral icterus, extraocular movements are intact  Neck: Supple without lymphadenopathy or thyromegaly, trachea is in the midline  Respiratory: There is good bilateral chest expansion, no use of accessory muscles is noted  Cardiovascular: No jugular venous distention or peripheral edema is seen  Gastrointestinal: There is mild tenderness in the right mid abdomen.  There is no guarding.  Gallbladder is not palpable.  There is no hernia noted.    Laboratory data: White blood cell count is 15.0, hemoglobin 12.4, platelets 210.  Lipase and lactate are normal.  Chemistries are unremarkable including liver function studies.  Total bilirubin 0.8.  Alkaline phosphatase mildly elevated at 153, this is generally in the range it has been.    Imaging data: CT images are reviewed by me.  The gallbladder is severely distended with wall thickening and pericholecystic fluid noted.  There are inflammatory changes noted.  No obvious stones are seen.      Assessment and plan:   -Probable acute cholecystitis  -Question of abnormal EKG, will defer further evaluation to hospitalist service.  -I discussed with the patient that she is likely to require cholecystectomy.  Once she is felt to be cleared from a medical standpoint we will plan for robotic cholecystectomy, possible open.  She is at a higher risk than average for conversion to open surgery given the severity of the pericholecystic inflammation and the large size of the gallbladder.  -Continue IV fluids, I think antibiotics are reasonable in this situation and antiemetics, narcotics as needed for symptom management.    Risks associated with the procedure are noted to include, but not be limited to, bleeding, infection, injury to intra-abdominal structures including the liver, small and large intestine, stomach, duodenum, common bile duct and major vascular structures.  Possible need for conversion to  open surgery, further revisional surgery, postoperative ERCP discussed.      Bin Heaton MD, FACS  General, Minimally Invasive and Endoscopic Surgery  [facility]    [address], [address]  [place], [place], [address]  P: [phone]  F: [phone]

## 2024-10-31 NOTE — H&P
Internal medicine history and physical  INTERNAL MEDICINE   [facility]       Patient Identification:  Name: Mariela Ruiz  Age: 79 y.o.  Sex: female  :  1945  MRN: [id number]                   Primary Care Physician: Provider, No Known                               Date of admission:10/31/2024    Chief Complaint: Recurrent abdominal discomfort since Monday.    History of Present Illness:   Patient is a 79-year-old female with complicated past medical history remarkable for seizure disorder, hypothyroidism, lymphedema, sleep apnea, mitral valve prolapse, osteoporosis, sleep apnea and chronic EKG changes consisting of right bundle branch block pattern and was evaluated by cardiology in 2017, history of chronic venous insufficiency was in her usual state of her health until this past Monday when she started having upper abdominal discomfort with associated decreased appetite and nausea.  Patient despite these symptoms were able to perform physical activities without any limitation or any worsening dyspnea on exertion.  Last few days her symptoms were better until today it got worse again because of symptoms not getting any better and because her family members were pushing her to be evaluated she decided to come to the emergency room for further evaluation of her CT scan of the abdomen and pelvis showed enlarged gallbladder with pericholecystic fluid findings consistent with inflammatory gallbladder.  No definite gallstones seen.  Patient was noted to have elevated white blood cell count and slightly abnormal liver function test with alkaline phosphatase of 153.  Initially patient was noted to have troponin of 16 with T delta of +10.  EKG performed earlier showed sinus rhythm, left ventricular hypertrophy heart rate of 98 bpm right bundle branch block with left anterior fascicular block.  Patient blood pressure has been stable but earlier she did have episode of tachycardia with low-grade  temperature.  Patient is currently feeling better after receiving pain medication and antibiotic therapy.  General surgery service have evaluated the patient and plan to perform cholecystectomy once cleared by cardiology service.  As mentioned above patient does not have any functional decline in terms of dyspnea exertion or decrease in her ability to carry on routines of her life despite abdominal discomfort and nausea.  Furthermore patient recently had left heart catheterization which showed normal coronaries after she was found to have false positive perfusion scan.  Her EKG findings suggest stable changes with no acute ST segment elevation.  Significance of elevated troponin in this situation is unclear.      Past Medical History:  Past Medical History:   Diagnosis Date    Abnormal EKG     LAST SAW CARDIOLOGY 2017 DR DORAN    Arthritis     BBB (bundle branch block)     RIGHT    Chronic venous insufficiency     Dupuytren's contracture     History of staph infection     S/P KNEE DEC 2016    History of transfusion     Hyperlipidemia     Lymphedema     LEFT LEG    MVP (mitral valve prolapse)     Nontoxic multinodular goiter     Osteoporosis     Dr. Cabrera    Pelvic joint pain, left     Postsurgical hypothyroidism     Presence of left artificial hip joint     METAL ON METAL AS NOTED PER DR CLEMENT NOTE    Restless leg syndrome     Seizure disorder     Dr. Whitman, HX 1980 LAST SEIZURE    Sleep apnea     DOES NOT HAVE MACHINE    Vitamin D insufficiency      Past Surgical History:  Past Surgical History:   Procedure Laterality Date    APPENDECTOMY      CARDIAC CATHETERIZATION      NORMAL     COLONOSCOPY  08/17/2017    Normal except for anal fissure.  Dr. Brandt.  [facility].    KNEE MINI REVISION Left 11/15/2016    Procedure: LEFT KNEE POLY CHANGE WITH HI POST STABILIZER;  Surgeon: Pablito Claudio MD;  Location: [facility];  Service:     KNEE MINI REVISION Left 12/13/2016    Procedure: LT KNEE  REMOVAL/REPLACEMENT LOCKING PIN ;  Surgeon: Pablito Claudio MD;  Location: [facility] MAIN OR;  Service:     MAMMO FINE NEEDLE ASPIRATION UNILATERAL      RIGHT THYROID NODULE, 2009 BENIGN     ND ARTHRP KNE CONDYLE&PLATU MEDIAL&LAT COMPARTMENTS Left 12/24/2016    Procedure: LEFT KNEE WASHOUT WITH POLY CHANGE;  Surgeon: Christiano Cesar MD;  Location: [facility] MAIN OR;  Service: Orthopedics    ND REVJ TOTAL KNEE ARTHRP W/WO ALGRFT 1 COMPONENT Left 2/20/2017    Procedure: LT KNEE REMOVAL ANTIBIOTIC SPACER AND KNEE REVISION;  Surgeon: Christiano Cesar MD;  Location: [facility] MAIN OR;  Service: Orthopedics    REPLACEMENT TOTAL KNEE Left     THYROIDECTOMY, PARTIAL      1979 LEFT LOBECTOMY AND ISTHMECTOMY     TOTAL ABDOMINAL HYSTERECTOMY WITH SALPINGO OOPHORECTOMY      TOTAL HIP ARTHROPLASTY Left     TOTAL HIP ARTHROPLASTY Right 9/14/2019    Procedure: TOTAL HIP ARTHROPLASTY ANTERIOR WITH HANA TABLE;  Surgeon: Christiano Cesar II, MD;  Location: [facility] MAIN OR;  Service: Orthopedics    TOTAL HIP ARTHROPLASTY REVISION Left 3/9/2021    Procedure: LEFT TOTAL HIP ARTHROPLASTY REVISION POSTERIOR;  Surgeon: Christiano Cesar II, MD;  Location: [facility] MAIN OR;  Service: Orthopedics;  Laterality: Left;    TOTAL KNEE  PROSTHESIS REMOVAL W/ SPACER INSERTION Left 12/29/2016    Procedure: LT KNEE IMPLANT REMOVAL WITH INSERTION OF SPACER ;  Surgeon: Christiano Cesar MD;  Location: [facility] MAIN OR;  Service:       Home Meds:  Medications Prior to Admission   Medication Sig Dispense Refill Last Dose/Taking    cholecalciferol (VITAMIN D3) 1000 units tablet Take 1 tablet by mouth Daily. (Patient taking differently: Take 4 tablets by mouth Daily.)   10/30/2024    Cyanocobalamin (Vitamin B-12) 1000 MCG sublingual tablet 1 tablet daily 90 tablet 2 10/30/2024    PHENobarbital 64.8 MG tablet TAKE 3 TABLETS BY MOUTH DAILY (Patient taking differently: Take 3 tablets by mouth Daily. Takes 1 tablet in morning, 2 tablets at night) 270 tablet  1 10/30/2024    pramipexole (MIRAPEX) 0.5 MG tablet TAKE 5 TABLETS BY MOUTH ONCE DAILY (Patient taking differently: Take  by mouth Daily. TAKES 2-3 TABLETS BY MOUTH ONCE DAILY) 450 tablet 0 Taking Differently    pravastatin (PRAVACHOL) 40 MG tablet TAKE 1 TABLET BY MOUTH EVERY NIGHT FOR HIGH AMOUNT OF FATS IN THE BLOOD 90 tablet 2 10/30/2024    Synthroid 50 MCG tablet Take 1 tablet by mouth Every Morning. Indications: Underactive Thyroid 90 tablet 2 10/30/2024    alendronate (Fosamax) 70 MG tablet Take 1 tablet by mouth Every 7 (Seven) Days. Taken on the stomach with 1 glass of water.  No food until 1/2-hour later. (Patient not taking: Reported on 8/2/2024) 4 tablet 11     aspirin 81 MG EC tablet Take 1 tablet by mouth Daily.   Unknown    coenzyme Q10 100 MG capsule Take 1 capsule by mouth Daily. HOLD PRIOR TO SURG (Patient not taking: Reported on 8/2/2024)   Not Taking    escitalopram (LEXAPRO) 20 MG tablet Take 1 tablet by mouth Daily. (Patient not taking: Reported on 8/2/2024) 90 tablet 2      Current Meds:     Current Facility-Administered Medications:     sodium chloride 0.9 % flush 10 mL, 10 mL, Intravenous, PRN, Herth, Christin, APRN  Allergies:  Allergies   Allergen Reactions    Clindamycin/Lincomycin Itching    Duricef [Cefadroxil] Hives and Rash    Sulfa Antibiotics Rash     RASH     Social History:   Social History     Tobacco Use    Smoking status: Never     Passive exposure: Never    Smokeless tobacco: Never   Substance Use Topics    Alcohol use: No      Family History:  Family History   Problem Relation Age of Onset    Heart disease Father     Diabetes Sister     Hypertension Sister     Hyperlipidemia Sister     Obesity Sister     Malig Hyperthermia Neg Hx           Review of Systems  See history of present illness and past medical history.  As described in history presenting illness.      Vitals:   /73 (BP Location: Left arm, Patient Position: Lying)   Pulse 109   Temp 98.1 °F (36.7 °C) (Oral)    "Resp 18   Ht 165.1 cm (65\")   Wt 101 kg (222 lb 10.6 oz)   SpO2 96%   BMI 37.05 kg/m²   I/O:   Intake/Output Summary (Last 24 hours) at 10/31/2024 3772  Last data filed at 10/31/2024 1605  Gross per 24 hour   Intake 100 ml   Output --   Net 100 ml     Exam:  Patient is examined using the personal protective equipment as per guidelines from infection control for this particular patient as enacted.  Hand washing was performed before and after patient interaction.  General Appearance:    Alert, cooperative, no distress, appears stated age   Head:    Normocephalic, without obvious abnormality, atraumatic   Eyes:    PERRL, conjunctiva/corneas clear, EOM's intact, both eyes   Ears:    Normal external ear canals, both ears   Nose: No nasal deformity noted   Throat:   Lips, tongue, gums normal; oral mucosa pink and moist   Neck: Supple and no adenopathy   Back:     Symmetric, no curvature, ROM normal, no CVA tenderness   Lungs:     Clear to auscultation bilaterally, respirations unlabored   Chest Wall:    No tenderness or deformity    Heart:  S1-S2 regular 2/6 systolic murmur noted   Abdomen:   Epigastric and right upper quadrant tenderness which much improved compared to when she come into the hospital.   Extremities:   Extremities normal, atraumatic, no cyanosis or edema   Pulses:   Pulses palpable in all extremities; symmetric all extremities   Skin:   Skin color normal, Skin is warm and dry,  no rashes or palpable lesions   Neurologic: Alert and oriented x 3 grossly nonfocal examination.       Data Review:      I reviewed the patient's new clinical results.  Results from last 7 days   Lab Units 10/31/24  1238   WBC 10*3/mm3 15.00*   HEMOGLOBIN g/dL 12.4   PLATELETS 10*3/mm3 210     Results from last 7 days   Lab Units 10/31/24  1238   SODIUM mmol/L 134*   POTASSIUM mmol/L 4.2   CHLORIDE mmol/L 101   CO2 mmol/L 25.0   BUN mg/dL 21   CREATININE mg/dL 0.93   CALCIUM mg/dL 9.5   GLUCOSE mg/dL 96     No radiology results " for the last 30 days.  Microbiology Results (last 10 days)       ** No results found for the last 240 hours. **          ECG 12 Lead Rhythm Change   Final Result   HEART RATE=98  bpm   RR Ysnqgrlk=461  ms   CT Cmhtmtse=153  ms   P Horizontal Axis=28  deg   P Front Axis=31  deg   QRSD Xgyybmic=692  ms   QT Nhspdltc=942  ms   LYyP=208  ms   QRS Axis=-50  deg   T Wave Axis=53  deg   - ABNORMAL ECG -   Sinus rhythm   RBBB and LAFB   Left ventricular hypertrophy   Premature atrial contractions have resolved   Electronically Signed By: Beata Andujar ([facility]) 2024-10-31 15:44:49   Date and Time of Study:2024-10-31 13:18:31      ECG 12 Lead Chest Pain   Final Result   HEART ORFY=907  bpm   RR Ncxgnelz=101  ms   CT Qgmcwoum=869  ms   P Horizontal Axis=27  deg   P Front Axis=52  deg   QRSD Wmylunvm=644  ms   QT Gsbsprym=034  ms   HBlL=639  ms   QRS Axis=-52  deg   T Wave Axis=84  deg   - ABNORMAL ECG -   Sinus tachycardia   Atrial premature complexes   Left anterior fascicular block   Right bundle branch block   Left ventricular hypertrophy   No change from previous tracing   Electronically Signed By: Beata Andujar ([facility]) 2024-10-31 15:45:41   Date and Time of Study:2024-10-31 12:28:14      Telemetry Scan   Final Result      Telemetry Scan   Final Result      Telemetry Scan   Final Result      Telemetry Scan   Final Result      Telemetry Scan   Final Result      Telemetry Scan   Final Result          Assessment:  Active Hospital Problems    Diagnosis  POA    **Acute cholecystitis [K81.0]  Yes    Elevated troponin [R79.89]  Unknown    Hypothyroidism, unspecified [E03.9]  Yes    Seizure disorder [G40.909]  Yes     Dr. Whitman      Sleep apnea [G47.30]  Yes       Medical decision making/care plan: See admitting orders  Acute cholecystitis-plan is to admit the patient, keep her n.p.o. except for ice chips and medications and clear liquid as recommended by Dr. Simpson, general surgery evaluation which has been performed in the  emergency room, broad-spectrum antibiotic therapy after blood cultures are drawn and provide symptomatic relief for pain and nausea.  Elevated troponin in the setting of negative left heart catheterization in July 2022 following false positive perfusion scan-significance unclear especially in this situation of no change in her functional capacity and ability to do ADLs despite abdominal discomfort as documented above.  Her EKG EKG findings are unchanged.  Because of episodes of tachycardia which could be related to underlying sepsis due to acute cholecystitis and pain and the fact that she is abnormal urinalysis and elevated troponin and request by nursing and general surgery service to have some cardiac clearance we will get cardiology consultation.  Seizure disorder-continue her home regimen of phenobarbital and provide her with seizure precautions.  Continue with hydration and making sure that electrolytes are replaced properly.  Hypothyroidism-continue thyroid replacement therapy.  History of sleep apnea-provided with continuous pulse ox monitoring while receiving narcotic pain medications and low threshold to check ABG to rule out hypercapnic respiratory failure if she shows somnolence and changes in mental status.  Rabia Grullon MD   10/31/2024  17:59 EDT    Parts of this note may be an electronic transcription/translation of spoken language to printed text using the Dragon dictation system.

## 2024-10-31 NOTE — ED PROVIDER NOTES
EMERGENCY DEPARTMENT ENCOUNTER  Room Number:  03/03  PCP: Provider, No Known  Independent Historians: Patient      HPI:  Chief Complaint: had concerns including Abdominal Pain.     A complete HPI/ROS/PMH/PSH/SH/FH are unobtainable due to:     Chronic or social conditions impacting patient care (Social Determinants of Health):       Context: Mariela Ruiz is a pleasant afebrile 79 y.o.  female with a medical history of seizure, sleep apnea who presents to the ED c/o acute abdominal pain.    C/o abd pain x 3 days with n/v with poor po intake. No diarrhea. Last BM was about 3 days  Pain better with leg elevation  No fever or chills  Remotely s/p appendectomy  Denies any chest pain, fever, chills diarrhea  During my evaluation patient states she is feeling a little bit short of breath while lying back, advised we will check an EKG for which she assures me that she had a normal coronary workup years ago with Dr. Luther and although she had a false positive stress test or coronary artery catheterization was reassuring.  She denies any exertional dyspnea      Review of prior external notes (non-ED) -and- Review of prior external test results outside of this encounter:  7/22/22dr. Luther  false positive perfusion scan with normal coronary catheterization    Prescription drug monitoring program review:         PAST MEDICAL HISTORY  Active Ambulatory Problems     Diagnosis Date Noted    Seizure disorder     Hyperlipidemia     Vitamin D insufficiency     Age-related osteoporosis without current pathological fracture     Sleep apnea     Chronic venous insufficiency     Postsurgical hypothyroidism     Chronic fatigue syndrome 11/07/2016    Gastroesophageal reflux disease 11/07/2016    Dupuytren's contracture 11/07/2016    History of biliary T-tube placement 11/07/2016    History of partial thyroidectomy 10/04/2012    Mitral valve prolapse 11/07/2016    Multinodular goiter 11/07/2016    Right fascicular block  11/07/2016    Restless legs syndrome 11/07/2016    History of cardiac catheterization 11/07/2016    Urge incontinence of urine 11/07/2016    Left knee pain 11/15/2016    Ligamentous laxity of knee 12/02/2016    DJD (degenerative joint disease) of knee 12/24/2016    Adverse drug effect, subsequent encounter 11/07/2017    Abnormal blood level of chromium 04/05/2018    Abnormal blood level of cobalt 04/05/2018    Family history of diabetes mellitus 09/13/2018    Closed fracture of neck of right femur 09/13/2019    Thrombocytopenia 09/16/2019    . 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    Enlarged thyroid gland 06/29/2017    Palmar fascial fibromatosis (dupuytren) 01/10/2024    Hypothyroidism, unspecified 01/10/2024    Hyperlipidemia, unspecified 01/10/2024     Resolved Ambulatory Problems     Diagnosis Date Noted    Pruritic rash 10/18/2017    Wasp sting 08/16/2019    Cellulitis of right upper extremity 08/17/2019    Urine retention 09/16/2019     Past Medical History:   Diagnosis Date    Abnormal EKG     Arthritis     BBB (bundle branch block)     History of staph infection     History of transfusion     Lymphedema     MVP (mitral valve prolapse)     Nontoxic multinodular goiter     Osteoporosis     Pelvic joint pain, left     Presence of left artificial hip joint     Restless leg syndrome          PAST SURGICAL HISTORY  Past Surgical History:   Procedure Laterality Date    APPENDECTOMY      CARDIAC CATHETERIZATION      NORMAL     COLONOSCOPY  08/17/2017    Normal except for anal fissure.  Dr. Brandt.  [facility].    KNEE MINI REVISION Left 11/15/2016    Procedure: LEFT KNEE POLY CHANGE WITH HI POST STABILIZER;  Surgeon: Pablito Claudio MD;  Location: [facility] OR;  Service:     KNEE MINI REVISION Left 12/13/2016    Procedure: LT KNEE REMOVAL/REPLACEMENT LOCKING PIN ;  Surgeon: Pablito Claudio MD;  Location: [facility] OR;  Service:     MAMMO FINE NEEDLE ASPIRATION UNILATERAL       RIGHT THYROID NODULE, 2009 BENIGN     OR ARTHRP KNE CONDYLE&PLATU MEDIAL&LAT COMPARTMENTS Left 12/24/2016    Procedure: LEFT KNEE WASHOUT WITH POLY CHANGE;  Surgeon: Christiano Cesar MD;  Location: [facility] MAIN OR;  Service: Orthopedics    OR REVJ TOTAL KNEE ARTHRP W/WO ALGRFT 1 COMPONENT Left 2/20/2017    Procedure: LT KNEE REMOVAL ANTIBIOTIC SPACER AND KNEE REVISION;  Surgeon: Christiano Cesar MD;  Location: [facility] MAIN OR;  Service: Orthopedics    REPLACEMENT TOTAL KNEE Left     THYROIDECTOMY, PARTIAL      1979 LEFT LOBECTOMY AND ISTHMECTOMY     TOTAL ABDOMINAL HYSTERECTOMY WITH SALPINGO OOPHORECTOMY      TOTAL HIP ARTHROPLASTY Left     TOTAL HIP ARTHROPLASTY Right 9/14/2019    Procedure: TOTAL HIP ARTHROPLASTY ANTERIOR WITH HANA TABLE;  Surgeon: Christiano Cesar II, MD;  Location: [facility] MAIN OR;  Service: Orthopedics    TOTAL HIP ARTHROPLASTY REVISION Left 3/9/2021    Procedure: LEFT TOTAL HIP ARTHROPLASTY REVISION POSTERIOR;  Surgeon: Christiano Cesar II, MD;  Location: [facility] MAIN OR;  Service: Orthopedics;  Laterality: Left;    TOTAL KNEE  PROSTHESIS REMOVAL W/ SPACER INSERTION Left 12/29/2016    Procedure: LT KNEE IMPLANT REMOVAL WITH INSERTION OF SPACER ;  Surgeon: Christiano Cesar MD;  Location: [facility] MAIN OR;  Service:          FAMILY HISTORY  Family History   Problem Relation Age of Onset    Heart disease Father     Diabetes Sister     Hypertension Sister     Hyperlipidemia Sister     Obesity Sister     Malig Hyperthermia Neg Hx          SOCIAL HISTORY  Social History     Socioeconomic History    Marital status:    Tobacco Use    Smoking status: Never     Passive exposure: Never    Smokeless tobacco: Never   Vaping Use    Vaping status: Never Used   Substance and Sexual Activity    Alcohol use: No    Drug use: No    Sexual activity: Defer         ALLERGIES  Clindamycin/lincomycin, Duricef [cefadroxil], and Sulfa antibiotics      REVIEW OF SYSTEMS  Review of Systems  Included  in HPI  All systems reviewed and negative except for those discussed in HPI.      PHYSICAL EXAM    I have reviewed the triage vital signs and nursing notes.    ED Triage Vitals [10/31/24 1217]   Temp Heart Rate Resp BP SpO2   100.3 °F (37.9 °C) 106 16 -- 95 %      Temp src Heart Rate Source Patient Position BP Location FiO2 (%)   -- -- -- -- --       Physical Exam  GENERAL: alert, no acute distress, chronically ill-appearing  SKIN: Warm, dry, mild generalized sallow/pallor  HENT: Normocephalic, atraumatic  EYES: no scleral icterus no injection, EOMI   CV: Irregularly irregular rhythm without tachycardia during my evaluation, no peripheral edema  RESPIRATORY: normal effort, lungs clear, able to speak in full sentences without him to distress  ABDOMEN: soft, mild diffuse abdominal tenderness to palpation, no focal tenderness, no ascites noted, mild abdominal obesity present  MUSCULOSKELETAL: no deformity active range of motion all extremities bilaterally  NEURO: alert, moves all extremities, follows commands            LAB RESULTS  Recent Results (from the past 24 hours)   ECG 12 Lead Chest Pain    Collection Time: 10/31/24 12:28 PM   Result Value Ref Range    QT Interval 312 ms    QTC Interval 447 ms   Comprehensive Metabolic Panel    Collection Time: 10/31/24 12:38 PM    Specimen: Blood   Result Value Ref Range    Glucose 96 65 - 99 mg/dL    BUN 21 8 - 23 mg/dL    Creatinine 0.93 0.57 - 1.00 mg/dL    Sodium 134 (L) 136 - 145 mmol/L    Potassium 4.2 3.5 - 5.2 mmol/L    Chloride 101 98 - 107 mmol/L    CO2 25.0 22.0 - 29.0 mmol/L    Calcium 9.5 8.6 - 10.5 mg/dL    Total Protein 6.7 6.0 - 8.5 g/dL    Albumin 3.4 (L) 3.5 - 5.2 g/dL    ALT (SGPT) 12 1 - 33 U/L    AST (SGOT) 18 1 - 32 U/L    Alkaline Phosphatase 153 (H) 39 - 117 U/L    Total Bilirubin 0.8 0.0 - 1.2 mg/dL    Globulin 3.3 gm/dL    A/G Ratio 1.0 g/dL    BUN/Creatinine Ratio 22.6 7.0 - 25.0    Anion Gap 8.0 5.0 - 15.0 mmol/L    eGFR 63.4 >60.0 mL/min/1.73    Lipase    Collection Time: 10/31/24 12:38 PM    Specimen: Blood   Result Value Ref Range    Lipase 24 13 - 60 U/L   Lactic Acid, Plasma    Collection Time: 10/31/24 12:38 PM    Specimen: Blood   Result Value Ref Range    Lactate 1.2 0.5 - 2.0 mmol/L   High Sensitivity Troponin T    Collection Time: 10/31/24 12:38 PM    Specimen: Blood   Result Value Ref Range    HS Troponin T 16 (H) <14 ng/L   Green Top (Gel)    Collection Time: 10/31/24 12:38 PM   Result Value Ref Range    Extra Tube Hold for add-ons.    Lavender Top    Collection Time: 10/31/24 12:38 PM   Result Value Ref Range    Extra Tube hold for add-on    Gold Top - SST    Collection Time: 10/31/24 12:38 PM   Result Value Ref Range    Extra Tube Hold for add-ons.    Light Blue Top    Collection Time: 10/31/24 12:38 PM   Result Value Ref Range    Extra Tube Hold for add-ons.    CBC Auto Differential    Collection Time: 10/31/24 12:38 PM    Specimen: Blood   Result Value Ref Range    WBC 15.00 (H) 3.40 - 10.80 10*3/mm3    RBC 4.11 3.77 - 5.28 10*6/mm3    Hemoglobin 12.4 12.0 - 15.9 g/dL    Hematocrit 36.8 34.0 - 46.6 %    MCV 89.5 79.0 - 97.0 fL    MCH 30.2 26.6 - 33.0 pg    MCHC 33.7 31.5 - 35.7 g/dL    RDW 12.4 12.3 - 15.4 %    RDW-SD 40.7 37.0 - 54.0 fl    MPV 10.8 6.0 - 12.0 fL    Platelets 210 140 - 450 10*3/mm3    Neutrophil % 83.4 (H) 42.7 - 76.0 %    Lymphocyte % 6.2 (L) 19.6 - 45.3 %    Monocyte % 9.7 5.0 - 12.0 %    Eosinophil % 0.0 (L) 0.3 - 6.2 %    Basophil % 0.3 0.0 - 1.5 %    Immature Grans % 0.4 0.0 - 0.5 %    Neutrophils, Absolute 12.51 (H) 1.70 - 7.00 10*3/mm3    Lymphocytes, Absolute 0.93 0.70 - 3.10 10*3/mm3    Monocytes, Absolute 1.46 (H) 0.10 - 0.90 10*3/mm3    Eosinophils, Absolute 0.00 0.00 - 0.40 10*3/mm3    Basophils, Absolute 0.04 0.00 - 0.20 10*3/mm3    Immature Grans, Absolute 0.06 (H) 0.00 - 0.05 10*3/mm3    nRBC 0.0 0.0 - 0.2 /100 WBC   Type & Screen    Collection Time: 10/31/24 12:38 PM    Specimen: Blood   Result Value Ref  Range    ABO Type AB     RH type Negative     Antibody Screen Positive     T&S Expiration Date 11/3/2024 11:59:59 PM    ECG 12 Lead Rhythm Change    Collection Time: 10/31/24  1:18 PM   Result Value Ref Range    QT Interval 367 ms    QTC Interval 469 ms   High Sensitivity Troponin T 2Hr    Collection Time: 10/31/24  2:59 PM    Specimen: Arm, Left; Blood   Result Value Ref Range    HS Troponin T 26 (H) <14 ng/L    Troponin T Delta 10 (C) >=-4 - <+4 ng/L         RADIOLOGY  CT Abdomen Pelvis With Contrast    Result Date: 10/31/2024  CT ABDOMEN AND PELVIS WITH IV CONTRAST  HISTORY: 79-year-old female with diffuse lower abdominal pain. Appendectomy, hysterectomy and bilateral salpingo-oophorectomy in the past.  TECHNIQUE: Radiation dose reduction techniques were utilized, including automated exposure control and exposure modulation based on body size. 3 mm images were obtained through the abdomen and pelvis after the administration of IV contrast. No recent previous CT of the abdomen for comparison.  FINDINGS: 1. There is extensive acute cholecystitis. The gallbladder is markedly distended, thick-walled, and has pericholecystic fluid. There is no intra or extrahepatic biliary dilatation. There are no definite calcified stones within the gallbladder. There is a tiny amount of perihepatic fluid, small amount of free fluid in the mesentery inferior to the gallbladder and there is a small to moderate volume of free fluid within the dependent aspect of the pelvis. No fluid collections or abscess. Surgical consultation is recommended.  2. Liver and spleen appear unremarkable. Pancreas, adrenals, and right kidney appear unremarkable. Left kidney appears unremarkable other than a 6 mm infundibular stone at the lower pole and a 3 mm stone at the upper pole. Distal ureters are poorly seen secondary to extensive streak artifact from the bilateral hip arthroplasty hardware. Urinary bladder is not abnormally distended.  3. There is  no bowel ileus or obstruction. There is no evidence for colitis. There is mild secondary thickening of the hepatic flexure and duodenum adjacent to the inflamed gallbladder.  4. There is a tiny right pleural effusion and there is dependent atelectatic change at the right lung base.         MEDICATIONS GIVEN IN ER  Medications   sodium chloride 0.9 % flush 10 mL (has no administration in time range)   morphine injection 4 mg (4 mg Intravenous Given 10/31/24 1306)   ondansetron (ZOFRAN) injection 4 mg (4 mg Intravenous Given 10/31/24 1305)   sodium chloride 0.9 % bolus 500 mL (0 mL Intravenous Stopped 10/31/24 1517)   iopamidol (ISOVUE-300) 61 % injection 100 mL (85 mL Intravenous Given 10/31/24 1336)   piperacillin-tazobactam (ZOSYN) 3.375 g IVPB in 100 mL NS MBP (CD) (3.375 g Intravenous New Bag 10/31/24 1521)         ORDERS PLACED DURING THIS VISIT:  Orders Placed This Encounter   Procedures    Blood Culture - Blood,    Blood Culture - Blood,    CT Abdomen Pelvis With Contrast    [place] Draw    Comprehensive Metabolic Panel    Lipase    Urinalysis With Microscopic If Indicated (No Culture) - Urine, Clean Catch    Lactic Acid, Plasma    High Sensitivity Troponin T    CBC Auto Differential    High Sensitivity Troponin T 2Hr    Surgery (on-call MD unless specified)    [facility] (on-call MD unless specified) Details    ECG 12 Lead Chest Pain    ECG 12 Lead Rhythm Change    Telemetry Scan    Telemetry Scan    Telemetry Scan    Telemetry Scan    Telemetry Scan    Type & Screen    Insert Peripheral IV    Inpatient Admission    CBC & Differential    Green Top (Gel)    Lavender Top    Gold Top - SST    Light Blue Top         OUTPATIENT MEDICATION MANAGEMENT:  Current Facility-Administered Medications Ordered in Epic   Medication Dose Route Frequency Provider Last Rate Last Admin    sodium chloride 0.9 % flush 10 mL  10 mL Intravenous PRN Christin Steward APRN         Current Outpatient Medications Ordered in Epic   Medication Sig  Dispense Refill    alendronate (Fosamax) 70 MG tablet Take 1 tablet by mouth Every 7 (Seven) Days. Taken on the stomach with 1 glass of water.  No food until 1/2-hour later. (Patient not taking: Reported on 8/2/2024) 4 tablet 11    aspirin 81 MG EC tablet Take 1 tablet by mouth Daily.      cholecalciferol (VITAMIN D3) 1000 units tablet Take 1 tablet by mouth Daily.      coenzyme Q10 100 MG capsule Take 1 capsule by mouth Daily. HOLD PRIOR TO SURG (Patient not taking: Reported on 8/2/2024)      Cyanocobalamin (Vitamin B-12) 1000 MCG sublingual tablet 1 tablet daily 90 tablet 2    escitalopram (LEXAPRO) 20 MG tablet Take 1 tablet by mouth Daily. (Patient not taking: Reported on 8/2/2024) 90 tablet 2    PHENobarbital 64.8 MG tablet TAKE 3 TABLETS BY MOUTH DAILY 270 tablet 1    pramipexole (MIRAPEX) 0.5 MG tablet TAKE 5 TABLETS BY MOUTH ONCE DAILY 450 tablet 0    pravastatin (PRAVACHOL) 40 MG tablet TAKE 1 TABLET BY MOUTH EVERY NIGHT FOR HIGH AMOUNT OF FATS IN THE BLOOD 90 tablet 2    Synthroid 50 MCG tablet Take 1 tablet by mouth Every Morning. Indications: Underactive Thyroid 90 tablet 2         PROCEDURES  Procedures            PROGRESS, DATA ANALYSIS, CONSULTS, AND MEDICAL DECISION MAKING  All labs have been independently interpreted by me.  All radiology studies have been reviewed by me. All EKG's have been independently viewed and interpreted by me.  Discussion below represents my analysis of pertinent findings related to patient's condition, differential diagnosis, treatment plan and final disposition.    Differential diagnosis includes but is not limited to colitis, diverticulitis, cholecystitis.                      ED Course as of 10/31/24 1602   Thu Oct 31, 2024   1454 CT Abdomen Pelvis With Contrast  Per my independent interpretation is enlarged and inflamed appearing gallbladder, no intra-abdominal free air appreciated []   1516 Phone call with dr. chow.  Discussed the patient, relevant history, exam,  diagnostics, ED findings/progress, and concerns.  They agree to consult.    []   1549 ECG 12 Lead Chest Pain  Per my independent interpretation is sinus tachycardia rate of 123, QTc 447, no obvious ischemia []   1556 Phone call with dr. arreola.  Discussed the patient, relevant history, exam, diagnostics, ED findings/progress, and concerns.  They agree to admit to a tele inpt bed   []      ED Course User Index  [] Christin Steward APRN             AS OF 16:02 EDT VITALS:    BP - 124/63  HR - 105  TEMP - 100.3 °F (37.9 °C)  O2 SATS - 93%    COMPLEXITY OF CARE  The patient requires admission.      DIAGNOSIS  Final diagnoses:   Acute cholecystitis         DISPOSITION  ED Disposition       ED Disposition   Decision to Admit    Condition   --    Comment   Level of Care: Telemetry [5]   Diagnosis: Acute cholecystitis [575.0.ICD-9-CM]   Admitting Physician: CYNTHIA ARREOLA [[id number]]   Attending Physician: CYNTHIA ARREOLA [[id number]]   Certification: I Certify That Inpatient Hospital Services Are Medically Necessary For Greater Than 2 Midnights                  Please note that portions of this document were completed with a voice recognition program.    Note Disclaimer: At [facility], we believe that sharing information builds trust and better relationships. You are receiving this note because you recently visited [facility]. It is possible you will see health information before a provider has talked with you about it. This kind of information can be easy to misunderstand. To help you fully understand what it means for your health, we urge you to discuss this note with your provider.         Christin Steward APRN  10/31/24 [id number]

## 2024-10-31 NOTE — ED PROVIDER NOTES
MD ATTESTATION NOTE    The TORSTEN and I have discussed this patient's history, physical exam, and treatment plan.  I have reviewed the documentation and personally had a face to face interaction with the patient. I affirm the documentation and agree with the treatment and plan.  The attached note describes my personal findings.        SHARED APC FACE TO FACE: I performed a substantive part of the MDM during the patient's E/M visit. I personally evaluated and examined the patient. I personally made or approved the documented management plan and acknowledge its risk of complications.      Brief HPI: Patient presents for evaluation of abdominal pain.  States has been going on for 3 days.  Patient has had vomiting without diarrhea.  Epigastric and lower abdominal pain.  Has had her gallbladder taken out in the past.    PHYSICAL EXAM  ED Triage Vitals   Temp Heart Rate Resp BP SpO2   10/31/24 1217 10/31/24 1217 10/31/24 1217 10/31/24 1222 10/31/24 1217   100.3 °F (37.9 °C) 106 16 145/70 95 %      Temp src Heart Rate Source Patient Position BP Location FiO2 (%)   -- -- -- -- --                GENERAL: no acute distress  HENT: nares patent  EYES: no scleral icterus  CV: Tachycardic and irregular  RESPIRATORY: normal effort  ABDOMEN: soft.  Mild epigastric and bilateral lower quadrant abdominal tenderness  MUSCULOSKELETAL: no deformity  NEURO: alert, moves all extremities, follows commands  PSYCH:  calm, cooperative  SKIN: warm, dry    Vital signs and nursing notes reviewed.    EKG          EKG time: 1228  Rhythm/Rate: Sinus tachycardia 123  P waves and NE: Normal P waves  QRS, axis: LVH   ST and T waves: Nonspecific ST-T waves    Interpreted Contemporaneously by me, independently viewed  Unchanged compared to prior 3/2/21     ED Course as of 10/31/24 1611   Thu Oct 31, 2024   1454 CT Abdomen Pelvis With Contrast  Per my independent interpretation is enlarged and inflamed appearing gallbladder, no intra-abdominal free air  appreciated []   1519 Phone call with dr. chow.  Discussed the patient, relevant history, exam, diagnostics, ED findings/progress, and concerns.  They agree to consult.    []   1549 ECG 12 Lead Chest Pain  Per my independent interpretation is sinus tachycardia rate of 123, QTc 447, no obvious ischemia []   1556 Phone call with dr. arreola.  Discussed the patient, relevant history, exam, diagnostics, ED findings/progress, and concerns.  They agree to admit to a tele inpt bed   []      ED Course User Index  [] Christin Steward APRN         Plan: admit       Mihai Kwong MD  10/31/24 [id number]

## 2024-10-31 NOTE — ED NOTES
Nursing report ED to floor  Mariela DELA CRUZ Sara  79 y.o.  female    HPI :  HPI  Stated Reason for Visit: abd pain x 2 days.  vomited twice yesterday  History Obtained From: patient    Chief Complaint  Chief Complaint   Patient presents with    Abdominal Pain       Admitting doctor:   Rabia Grullon MD    Admitting diagnosis:   The encounter diagnosis was Acute cholecystitis.    Code status:   Current Code Status       Date Active Code Status Order ID Comments User Context       Prior            Allergies:   Clindamycin/lincomycin, Duricef [cefadroxil], and Sulfa antibiotics    Isolation:   No active isolations    Intake and Output  No intake or output data in the 24 hours ending 10/31/24 1601    Weight:   There were no vitals filed for this visit.    Most recent vitals:   Vitals:    10/31/24 1431 10/31/24 1439 10/31/24 1522 10/31/24 1531   BP: 143/63  126/59 124/63   Pulse: 104 105 108 105   Resp:       Temp:       SpO2: 94% 93% 100% 93%       Active LDAs/IV Access:   Lines, Drains & Airways       Active LDAs       Name Placement date Placement time Site Days    Peripheral IV 10/31/24 1238 Right Antecubital 10/31/24  1238  Antecubital  less than 1                    Labs (abnormal labs have a star):   Labs Reviewed   COMPREHENSIVE METABOLIC PANEL - Abnormal; Notable for the following components:       Result Value    Sodium 134 (*)     Albumin 3.4 (*)     Alkaline Phosphatase 153 (*)     All other components within normal limits    Narrative:     GFR Normal >60  Chronic Kidney Disease <60  Kidney Failure <15    The GFR formula is only valid for adults with stable renal function between ages 18 and 70.   TROPONIN - Abnormal; Notable for the following components:    HS Troponin T 16 (*)     All other components within normal limits    Narrative:     High Sensitive Troponin T Reference Range:  <14.0 ng/L- Negative Female for AMI  <22.0 ng/L- Negative Male for AMI  >=14 - Abnormal Female indicating possible myocardial  injury.  >=22 - Abnormal Male indicating possible myocardial injury.   Clinicians would have to utilize clinical acumen, EKG, Troponin, and serial changes to determine if it is an Acute Myocardial Infarction or myocardial injury due to an underlying chronic condition.        CBC WITH AUTO DIFFERENTIAL - Abnormal; Notable for the following components:    WBC 15.00 (*)     Neutrophil % 83.4 (*)     Lymphocyte % 6.2 (*)     Eosinophil % 0.0 (*)     Neutrophils, Absolute 12.51 (*)     Monocytes, Absolute 1.46 (*)     Immature Grans, Absolute 0.06 (*)     All other components within normal limits   HIGH SENSITIVITIY TROPONIN T 2HR - Abnormal; Notable for the following components:    HS Troponin T 26 (*)     Troponin T Delta 10 (*)     All other components within normal limits    Narrative:     High Sensitive Troponin T Reference Range:  <14.0 ng/L- Negative Female for AMI  <22.0 ng/L- Negative Male for AMI  >=14 - Abnormal Female indicating possible myocardial injury.  >=22 - Abnormal Male indicating possible myocardial injury.   Clinicians would have to utilize clinical acumen, EKG, Troponin, and serial changes to determine if it is an Acute Myocardial Infarction or myocardial injury due to an underlying chronic condition.        LIPASE - Normal   LACTIC ACID, PLASMA - Normal   BLOOD CULTURE   BLOOD CULTURE   RAINBOW DRAW    Narrative:     The following orders were created for panel order [place] Draw.  Procedure                               Abnormality         Status                     ---------                               -----------         ------                     Green Top (Gel)[[id number]]                                  Final result               Lavender Top[[id number]]                                     Final result               Gold Top - SST[[id number]]                                   Final result               Light Blue Top[[id number]]                                   Final result                  Please view results for these tests on the individual orders.   URINALYSIS W/ MICROSCOPIC IF INDICATED (NO CULTURE)   TYPE AND SCREEN   CBC AND DIFFERENTIAL    Narrative:     The following orders were created for panel order CBC & Differential.  Procedure                               Abnormality         Status                     ---------                               -----------         ------                     CBC Auto Differential[[id number]]        Abnormal            Final result                 Please view results for these tests on the individual orders.   GREEN TOP   LAVENDER TOP   GOLD TOP - SST   LIGHT BLUE TOP       EKG:   ECG 12 Lead Rhythm Change   Final Result   HEART RATE=98  bpm   RR Voxsuurn=111  ms   TX Kdjoujah=001  ms   P Horizontal Axis=28  deg   P Front Axis=31  deg   QRSD Rjyflagk=009  ms   QT Roewqxum=321  ms   HSzH=902  ms   QRS Axis=-50  deg   T Wave Axis=53  deg   - ABNORMAL ECG -   Sinus rhythm   RBBB and LAFB   Left ventricular hypertrophy   Premature atrial contractions have resolved   Electronically Signed By: Beata Andujar ([facility]) 2024-10-31 15:44:49   Date and Time of Study:2024-10-31 13:18:31      ECG 12 Lead Chest Pain   Final Result   HEART VSEY=570  bpm   RR Zyatpvzu=078  ms   TX Dxrrzlvv=382  ms   P Horizontal Axis=27  deg   P Front Axis=52  deg   QRSD Luaqgbom=227  ms   QT Ibbvqzrh=081  ms   BZrI=593  ms   QRS Axis=-52  deg   T Wave Axis=84  deg   - ABNORMAL ECG -   Sinus tachycardia   Atrial premature complexes   Left anterior fascicular block   Right bundle branch block   Left ventricular hypertrophy   No change from previous tracing   Electronically Signed By: Beata Andujar ([facility]) 2024-10-31 15:45:41   Date and Time of Study:2024-10-31 12:28:14      Telemetry Scan   Final Result      Telemetry Scan   Final Result      Telemetry Scan   Final Result      Telemetry Scan   Final Result      Telemetry Scan   Final Result          Meds given in ED:   Medications   sodium  chloride 0.9 % flush 10 mL (has no administration in time range)   morphine injection 4 mg (4 mg Intravenous Given 10/31/24 1306)   ondansetron (ZOFRAN) injection 4 mg (4 mg Intravenous Given 10/31/24 1305)   sodium chloride 0.9 % bolus 500 mL (0 mL Intravenous Stopped 10/31/24 1517)   iopamidol (ISOVUE-300) 61 % injection 100 mL (85 mL Intravenous Given 10/31/24 1336)   piperacillin-tazobactam (ZOSYN) 3.375 g IVPB in 100 mL NS MBP (CD) (3.375 g Intravenous New Bag 10/31/24 1521)       Imaging results:  No radiology results for the last day    Ambulatory status:   - assist Xs 1    Social issues:   Social History     Socioeconomic History    Marital status:    Tobacco Use    Smoking status: Never     Passive exposure: Never    Smokeless tobacco: Never   Vaping Use    Vaping status: Never Used   Substance and Sexual Activity    Alcohol use: No    Drug use: No    Sexual activity: Defer       Peripheral Neurovascular  Peripheral Neurovascular (Adult)  Peripheral Neurovascular WDL: .WDL except, neurovascular assessment lower  Additional Documentation: Edema (Group)  Edema  Edema: leg, left, leg, right  Leg, Left Edema: 3+ (Moderate)  Leg, Right Edema: 2+ (Mild)  LLE Neurovascular Assessment  Temperature LLE: cool  Color LLE: no discoloration  Sensation LLE: no tenderness  RLE Neurovascular Assessment  Temperature RLE: cool  Color RLE: no discoloration  Sensation RLE: no tenderness    Neuro Cognitive  Neuro Cognitive (Adult)  Cognitive/Neuro/Behavioral WDL: WDL, orientation, level of consciousness  Level of Consciousness: Alert  Orientation: oriented x 4    Learning  Learning Assessment  Learning Readiness and Ability: no barriers identified  Education Provided  Person Taught: patient, family member/friend  Teaching Method: verbal instruction  Teaching Focus: symptom/problem overview, diagnostic test, medical device/equipment use, medication administration  Education Outcome Evaluation: acceptance expressed,  verbalizes understanding    Respiratory  Respiratory WDL  Respiratory WDL: WDL    Abdominal Pain       Pain Assessments  Pain (Adult)  (0-10) Pain Rating: Rest: 8  Pain Location: abdomen    NIH Stroke Scale       Candace Ayala RN  10/31/24 16:01 EDT

## 2024-11-01 PROBLEM — R50.9 FEVER: Status: RESOLVED | Noted: 2019-09-16 | Resolved: 2024-11-01

## 2024-11-01 PROBLEM — S72.001A CLOSED FRACTURE OF NECK OF RIGHT FEMUR: Status: RESOLVED | Noted: 2019-09-13 | Resolved: 2024-11-01

## 2024-11-01 LAB
ALBUMIN SERPL-MCNC: 3 G/DL (ref 3.5–5.2)
ALBUMIN/GLOB SERPL: 1.2 G/DL
ALP SERPL-CCNC: 121 U/L (ref 39–117)
ALT SERPL W P-5'-P-CCNC: 10 U/L (ref 1–33)
ANION GAP SERPL CALCULATED.3IONS-SCNC: 7 MMOL/L (ref 5–15)
AST SERPL-CCNC: 14 U/L (ref 1–32)
BASOPHILS # BLD AUTO: 0.04 10*3/MM3 (ref 0–0.2)
BASOPHILS NFR BLD AUTO: 0.4 % (ref 0–1.5)
BILIRUB SERPL-MCNC: 0.6 MG/DL (ref 0–1.2)
BUN SERPL-MCNC: 18 MG/DL (ref 8–23)
BUN/CREAT SERPL: 24 (ref 7–25)
CALCIUM SPEC-SCNC: 9 MG/DL (ref 8.6–10.5)
CHLORIDE SERPL-SCNC: 105 MMOL/L (ref 98–107)
CO2 SERPL-SCNC: 23 MMOL/L (ref 22–29)
CREAT SERPL-MCNC: 0.75 MG/DL (ref 0.57–1)
DEPRECATED RDW RBC AUTO: 40.3 FL (ref 37–54)
EGFRCR SERPLBLD CKD-EPI 2021: 82.1 ML/MIN/1.73
EOSINOPHIL # BLD AUTO: 0 10*3/MM3 (ref 0–0.4)
EOSINOPHIL NFR BLD AUTO: 0 % (ref 0.3–6.2)
ERYTHROCYTE [DISTWIDTH] IN BLOOD BY AUTOMATED COUNT: 12.1 % (ref 12.3–15.4)
GLOBULIN UR ELPH-MCNC: 2.6 GM/DL
GLUCOSE SERPL-MCNC: 83 MG/DL (ref 65–99)
HCT VFR BLD AUTO: 33.1 % (ref 34–46.6)
HGB BLD-MCNC: 10.8 G/DL (ref 12–15.9)
IMM GRANULOCYTES # BLD AUTO: 0.04 10*3/MM3 (ref 0–0.05)
IMM GRANULOCYTES NFR BLD AUTO: 0.4 % (ref 0–0.5)
LYMPHOCYTES # BLD AUTO: 0.8 10*3/MM3 (ref 0.7–3.1)
LYMPHOCYTES NFR BLD AUTO: 8 % (ref 19.6–45.3)
MCH RBC QN AUTO: 29.7 PG (ref 26.6–33)
MCHC RBC AUTO-ENTMCNC: 32.6 G/DL (ref 31.5–35.7)
MCV RBC AUTO: 90.9 FL (ref 79–97)
MONOCYTES # BLD AUTO: 0.94 10*3/MM3 (ref 0.1–0.9)
MONOCYTES NFR BLD AUTO: 9.3 % (ref 5–12)
NEUTROPHILS NFR BLD AUTO: 8.24 10*3/MM3 (ref 1.7–7)
NEUTROPHILS NFR BLD AUTO: 81.9 % (ref 42.7–76)
NRBC BLD AUTO-RTO: 0 /100 WBC (ref 0–0.2)
PLATELET # BLD AUTO: 189 10*3/MM3 (ref 140–450)
PMV BLD AUTO: 10.5 FL (ref 6–12)
POTASSIUM SERPL-SCNC: 3.9 MMOL/L (ref 3.5–5.2)
PROT SERPL-MCNC: 5.6 G/DL (ref 6–8.5)
RBC # BLD AUTO: 3.64 10*6/MM3 (ref 3.77–5.28)
SODIUM SERPL-SCNC: 135 MMOL/L (ref 136–145)
TROPONIN T SERPL HS-MCNC: 12 NG/L
WBC NRBC COR # BLD AUTO: 10.06 10*3/MM3 (ref 3.4–10.8)

## 2024-11-01 PROCEDURE — 80053 COMPREHEN METABOLIC PANEL: CPT | Performed by: INTERNAL MEDICINE

## 2024-11-01 PROCEDURE — 85025 COMPLETE CBC W/AUTO DIFF WBC: CPT | Performed by: INTERNAL MEDICINE

## 2024-11-01 PROCEDURE — 97162 PT EVAL MOD COMPLEX 30 MIN: CPT

## 2024-11-01 PROCEDURE — 25010000002 MORPHINE PER 10 MG: Performed by: INTERNAL MEDICINE

## 2024-11-01 PROCEDURE — 97530 THERAPEUTIC ACTIVITIES: CPT

## 2024-11-01 PROCEDURE — 99231 SBSQ HOSP IP/OBS SF/LOW 25: CPT

## 2024-11-01 PROCEDURE — 84484 ASSAY OF TROPONIN QUANT: CPT | Performed by: INTERNAL MEDICINE

## 2024-11-01 PROCEDURE — 99222 1ST HOSP IP/OBS MODERATE 55: CPT | Performed by: INTERNAL MEDICINE

## 2024-11-01 PROCEDURE — 25010000002 PIPERACILLIN SOD-TAZOBACTAM PER 1 G: Performed by: INTERNAL MEDICINE

## 2024-11-01 PROCEDURE — 25810000003 SODIUM CHLORIDE 0.9 % SOLUTION: Performed by: INTERNAL MEDICINE

## 2024-11-01 RX ORDER — ACETAMINOPHEN 500 MG
1000 TABLET ORAL EVERY 8 HOURS PRN
Status: DISCONTINUED | OUTPATIENT
Start: 2024-11-01 | End: 2024-11-06 | Stop reason: HOSPADM

## 2024-11-01 RX ORDER — OXYCODONE AND ACETAMINOPHEN 5; 325 MG/1; MG/1
1 TABLET ORAL EVERY 6 HOURS PRN
Status: DISCONTINUED | OUTPATIENT
Start: 2024-11-01 | End: 2024-11-06 | Stop reason: HOSPADM

## 2024-11-01 RX ADMIN — PHENOBARBITAL 64.8 MG: 32.4 TABLET ORAL at 13:13

## 2024-11-01 RX ADMIN — PIPERACILLIN AND TAZOBACTAM 3.38 G: 3; .375 INJECTION, POWDER, LYOPHILIZED, FOR SOLUTION INTRAVENOUS at 15:45

## 2024-11-01 RX ADMIN — PRAMIPEXOLE DIHYDROCHLORIDE 0.5 MG: 0.5 TABLET ORAL at 10:05

## 2024-11-01 RX ADMIN — MORPHINE SULFATE 4 MG: 2 INJECTION, SOLUTION INTRAMUSCULAR; INTRAVENOUS at 10:06

## 2024-11-01 RX ADMIN — Medication 10 ML: at 10:06

## 2024-11-01 RX ADMIN — LEVOTHYROXINE SODIUM 50 MCG: 50 TABLET ORAL at 06:39

## 2024-11-01 RX ADMIN — PIPERACILLIN AND TAZOBACTAM 3.38 G: 3; .375 INJECTION, POWDER, FOR SOLUTION INTRAVENOUS at 06:43

## 2024-11-01 RX ADMIN — OXYCODONE HYDROCHLORIDE AND ACETAMINOPHEN 1 TABLET: 5; 325 TABLET ORAL at 22:29

## 2024-11-01 RX ADMIN — Medication 10 ML: at 21:05

## 2024-11-01 RX ADMIN — Medication 4000 UNITS: at 10:05

## 2024-11-01 RX ADMIN — PRAVASTATIN SODIUM 40 MG: 40 TABLET ORAL at 21:05

## 2024-11-01 RX ADMIN — SODIUM CHLORIDE 100 ML/HR: 9 INJECTION, SOLUTION INTRAVENOUS at 06:00

## 2024-11-01 RX ADMIN — PIPERACILLIN AND TAZOBACTAM 3.38 G: 3; .375 INJECTION, POWDER, LYOPHILIZED, FOR SOLUTION INTRAVENOUS at 23:00

## 2024-11-01 RX ADMIN — PHENOBARBITAL 129.6 MG: 32.4 TABLET ORAL at 21:59

## 2024-11-01 NOTE — PROGRESS NOTES
Name: Mariela Ruiz ADMIT: 10/31/2024   : 1945  PCP: Duglas Rock MD    MRN: [id number] LOS: 1 days   AGE/SEX: 79 y.o. female  ROOM[facility]     Subjective   Subjective   Laying in bed.  No acute events overnight.  Denies significant abdominal pain, nausea or vomiting this morning.  No fevers no chills.  Was able to tolerate clear liquid diet.      Review of Systems   As above  Objective   Objective   Vital Signs  Temp:  [97.5 °F (36.4 °C)-98.8 °F (37.1 °C)] 97.5 °F (36.4 °C)  Heart Rate:  [] 84  Resp:  [16-20] 16  BP: (111-143)/(59-78) 132/62  SpO2:  [92 %-100 %] 100 %  on  Flow (L/min) (Oxygen Therapy):  [2-3] 3;   Device (Oxygen Therapy): nasal cannula  Body mass index is 37.05 kg/m².  Physical Exam    General: Alert, laying in bed, not in distress,  HEENT: Normocephalic, atraumatic  CV: Regular rate and rhythm, no murmurs rubs or gallops  Lungs: Clear to auscultation bilaterally, no crackles or wheezes  Abdomen: Soft, nontender, nondistended  Extremities: Bilateral lower extremity lymphedema, no cyanosis     Results Review     I reviewed the patient's new clinical results.  Results from last 7 days   Lab Units 24  0603 10/31/24  1238   WBC 10*3/mm3 10.06 15.00*   HEMOGLOBIN g/dL 10.8* 12.4   PLATELETS 10*3/mm3 189 210     Results from last 7 days   Lab Units 24  0603 10/31/24  1238   SODIUM mmol/L 135* 134*   POTASSIUM mmol/L 3.9 4.2   CHLORIDE mmol/L 105 101   CO2 mmol/L 23.0 25.0   BUN mg/dL 18 21   CREATININE mg/dL 0.75 0.93   GLUCOSE mg/dL 83 96   Estimated Creatinine Clearance: 74 mL/min (by C-G formula based on SCr of 0.75 mg/dL).  Results from last 7 days   Lab Units 24  0603 10/31/24  1238   ALBUMIN g/dL 3.0* 3.4*   BILIRUBIN mg/dL 0.6 0.8   ALK PHOS U/L 121* 153*   AST (SGOT) U/L 14 18   ALT (SGPT) U/L 10 12     Results from last 7 days   Lab Units 24  0603 10/31/24  1238   CALCIUM mg/dL 9.0 9.5   ALBUMIN g/dL 3.0* 3.4*     Results from last 7 days   Lab  "Units 10/31/24  1238   LACTATE mmol/L 1.2     SARS-CoV-2 PCR   Date Value Ref Range Status   03/06/2021 Not Detected Not Detected Final     Comment:     Nucleic acid specific to SARS-CoV-2 (COVID-19) virus was not detected in  this sample by the TaqPath (TM) COVID-19 Combo Kit.          SARS-CoV-2 (COVID-19) nucleic acid testing performed using [employer] Aptima (R) SARS-CoV-2 Assay or [employer] TaqPath (TM)  COVID-19 Combo Kit as indicated above under Emergency Use Authorization (EUA) from the FDA. Aptima (R) and TaqPath (TM) test performance  characteristics verified by [facility] in accordance with the FDAs Guidance Document (Policy for Diagnostic Tests for Coronavirus Disease-2019  during the Public Health Emergency) issued on March 16, 2020. The laboratory is regulated under CLIA as qualified to perform high-complexity testing  Unless otherwise noted, all testing was performed at [facility], CLIA No. [id number], [place] State Licensee No. [id number]     No results found for: \"HGBA1C\", \"POCGLU\"        CT Abdomen Pelvis With Contrast  CT ABDOMEN AND PELVIS WITH IV CONTRAST     HISTORY: 79-year-old female with diffuse lower abdominal pain.  Appendectomy, hysterectomy and bilateral salpingo-oophorectomy in the  past.     TECHNIQUE: Radiation dose reduction techniques were utilized, including  automated exposure control and exposure modulation based on body size.   3 mm images were obtained through the abdomen and pelvis after the  administration of IV contrast. No recent previous CT of the abdomen for  comparison.     FINDINGS:  1. There is extensive acute cholecystitis. The gallbladder is markedly  distended, thick-walled, and has pericholecystic fluid. There is no  intra or extrahepatic biliary dilatation. There are no definite  calcified gallstones. There is a tiny amount of perihepatic fluid, small  amount of free fluid in the mesentery inferior to the gallbladder and  there is a small to moderate " volume of free fluid within the dependent  aspect of the pelvis. No fluid collections or abscess. Surgical  consultation is recommended.     2. Liver and spleen appear unremarkable. Pancreas, adrenals, and right  kidney appear unremarkable. Left kidney appears unremarkable other than  a 6 mm infundibular stone at the lower pole and a 3 mm stone at the  upper pole. Distal ureters are poorly seen secondary to extensive streak  artifact from the bilateral hip arthroplasty hardware. Urinary bladder  is not abnormally distended.     3. There is no bowel ileus or obstruction. There is no evidence for  colitis. There is mild secondary thickening of the hepatic flexure and  duodenum adjacent to the inflamed gallbladder.     4. There is a tiny right pleural effusion and there is dependent  atelectatic change at the right lung base.     This report was finalized on 10/31/2024 4:22 PM by Dr. Lisa Lucas M.D  on Workstation: [id number]       Scheduled Medications  cholecalciferol, 4,000 Units, Oral, Daily  levothyroxine, 50 mcg, Oral, QAM  PHENobarbital, 129.6 mg, Oral, Nightly  PHENobarbital, 64.8 mg, Oral, Daily  piperacillin-tazobactam, 3.375 g, Intravenous, Q8H  pramipexole, 0.5 mg, Oral, Daily  pravastatin, 40 mg, Oral, Nightly  sodium chloride, 10 mL, Intravenous, Q12H    Infusions   Diet  NPO Diet NPO Type: Strict NPO    I have personally reviewed     [x]  Laboratory   [x]  Microbiology   [x]  Radiology   [x]  EKG/Telemetry  []  Cardiology/Vascular   []  Pathology    []  Records       Assessment/Plan     Active Hospital Problems    Diagnosis  POA    **Acute cholecystitis [K81.0]  Yes    Elevated troponin [R79.89]  Unknown    Hypothyroidism, unspecified [E03.9]  Yes    Seizure disorder [G40.909]  Yes    Sleep apnea [G47.30]  Yes      Resolved Hospital Problems   No resolved problems to display.       79 y.o. female admitted with Acute cholecystitis.    Acute cholecystitis  Continue IV Zosyn, antiemetics, supportive  care  Blood cultures pending  Patient tolerated clear liquid diet this morning.  Changed to n.p.o. pending possible cholecystectomy this afternoon    Elevated troponin    negative left heart catheterization in July 2022 following false positive perfusion scan  --significance unclear especially in this situation of no change in her functional capacity and ability to do ADLs despite abdominal discomfort as documented above.   - EKG with RBBB, unchanged compared to prior.  -Cardiology evaluation pending for preop risk assessment      Seizure disorder  -continue her home regimen of phenobarbital   -seizure precautions.     Hypothyroidism  -continue thyroid replacement therapy.    Anemia   -Hemoglobin 10.8, down from 12.4 yesterday.  Suspect primarily dilutional due to IV fluids.  -To monitor daily, check iron panel, B12 folate    History of sleep apnea        SCDs for DVT prophylaxis.  Full code.  Discussed with patient and care team on multidisciplinary rounds.  Expected Discharge Date: 11/4/2024; Expected Discharge Time:        Copied text in this note has been reviewed and is accurate as of 11/01/24.         Dictated utilizing Dragon dictation        Valdemar Barros MD  [facility]  11/01/24  12:25 EDT

## 2024-11-01 NOTE — CONSULTS
Date of Hospital Visit: 24  Encounter Provider: Joo Law MD  Place of Service: [facility] CARDIOLOGY  Patient Name: Mariela Ruiz  :1945  Referral Provider: Rabia Grullon MD    Chief complaint: ryan trop, cardiac clearance    History of Present Illness    Ms. Ruiz is a 80yo woman who has previously seen Dr Luther at [place].    She has a reported history of MVP but had an echo a few years ago that was normal and reported normal MV structure. She had a normal coronary angiograph in .     She presented to the ED yesterday with abdominal pain. Reportedly she was experiencing dyspnea but denies this to me. HsTn was 16->26. She denies chest pain, palps, LH, or syncope. She ambulates with a walker and has no CP or dyspnea with exertion.     Her CT of the abdomen showed acute cholecystitis.  Dr. Heaton would like to take her to the OR, and is asking us for cardiac clearance.    Past Medical History:   Diagnosis Date    Arthritis     Chronic venous insufficiency     Dupuytren's contracture     History of staph infection     S/P KNEE DEC 2016    History of transfusion     Hyperlipidemia     Lymphedema     LEFT LEG    Nontoxic multinodular goiter     Osteoporosis     Dr. Cabrera    Pelvic joint pain, left     Postsurgical hypothyroidism     Presence of left artificial hip joint     METAL ON METAL AS NOTED PER DR CLEMENT NOTE    Restless leg syndrome     Right bundle branch block     Seizure disorder     Dr. Whitman, HX  LAST SEIZURE    Sleep apnea     DOES NOT HAVE MACHINE    Vitamin D insufficiency        Past Surgical History:   Procedure Laterality Date    APPENDECTOMY      CARDIAC CATHETERIZATION      NORMAL     COLONOSCOPY  2017    Normal except for anal fissure.  Dr. Brandt.  Psychiatric.    KNEE MINI REVISION Left 11/15/2016    Procedure: LEFT KNEE POLY CHANGE WITH HI POST STABILIZER;  Surgeon: Pablito Claudio MD;  Location: [facility];  Service:      KNEE MINI REVISION Left 12/13/2016    Procedure: LT KNEE REMOVAL/REPLACEMENT LOCKING PIN ;  Surgeon: Pablito Claudio MD;  Location: [facility] MAIN OR;  Service:     MAMMO FINE NEEDLE ASPIRATION UNILATERAL      RIGHT THYROID NODULE, 2009 BENIGN     IN ARTHRP KNE CONDYLE&PLATU MEDIAL&LAT COMPARTMENTS Left 12/24/2016    Procedure: LEFT KNEE WASHOUT WITH POLY CHANGE;  Surgeon: Christiano Cesar MD;  Location: [facility] MAIN OR;  Service: Orthopedics    IN REVJ TOTAL KNEE ARTHRP W/WO ALGRFT 1 COMPONENT Left 2/20/2017    Procedure: LT KNEE REMOVAL ANTIBIOTIC SPACER AND KNEE REVISION;  Surgeon: Christiano Cesar MD;  Location: [facility] MAIN OR;  Service: Orthopedics    REPLACEMENT TOTAL KNEE Left     THYROIDECTOMY, PARTIAL      1979 LEFT LOBECTOMY AND ISTHMECTOMY     TOTAL ABDOMINAL HYSTERECTOMY WITH SALPINGO OOPHORECTOMY      TOTAL HIP ARTHROPLASTY Left     TOTAL HIP ARTHROPLASTY Right 9/14/2019    Procedure: TOTAL HIP ARTHROPLASTY ANTERIOR WITH HANA TABLE;  Surgeon: Christiano Cesar II, MD;  Location: [facility] MAIN OR;  Service: Orthopedics    TOTAL HIP ARTHROPLASTY REVISION Left 3/9/2021    Procedure: LEFT TOTAL HIP ARTHROPLASTY REVISION POSTERIOR;  Surgeon: Christiano Cesar II, MD;  Location: [facility] MAIN OR;  Service: Orthopedics;  Laterality: Left;    TOTAL KNEE  PROSTHESIS REMOVAL W/ SPACER INSERTION Left 12/29/2016    Procedure: LT KNEE IMPLANT REMOVAL WITH INSERTION OF SPACER ;  Surgeon: Christiano Cesar MD;  Location: [facility] MAIN OR;  Service:        Prior to Admission medications    Medication Sig Start Date End Date Taking? Authorizing Provider   cholecalciferol (VITAMIN D3) 1000 units tablet Take 1 tablet by mouth Daily.  Patient taking differently: Take 4 tablets by mouth Daily. 4/16/18  Yes Duglas Rock MD   Cyanocobalamin (Vitamin B-12) 1000 MCG sublingual tablet 1 tablet daily 7/3/24  Yes Duglas Rock MD   PHENobarbital 64.8 MG tablet TAKE 3 TABLETS BY MOUTH DAILY  Patient taking  differently: Take 3 tablets by mouth Daily. Takes 1 tablet in morning, 2 tablets at night 6/28/24  Yes Steven Liu II, MD   pramipexole (MIRAPEX) 0.5 MG tablet TAKE 5 TABLETS BY MOUTH ONCE DAILY  Patient taking differently: Take  by mouth Daily. TAKES 2-3 TABLETS BY MOUTH ONCE DAILY 10/25/24  Yes Steven Liu II, MD   pravastatin (PRAVACHOL) 40 MG tablet TAKE 1 TABLET BY MOUTH EVERY NIGHT FOR HIGH AMOUNT OF FATS IN THE BLOOD 7/31/24  Yes Duglas Rock MD   Synthroid 50 MCG tablet Take 1 tablet by mouth Every Morning. Indications: Underactive Thyroid 8/16/24  Yes Duglas Rock MD   alendronate (Fosamax) 70 MG tablet Take 1 tablet by mouth Every 7 (Seven) Days. Taken on the stomach with 1 glass of water.  No food until 1/2-hour later.  Patient not taking: Reported on 8/2/2024 7/3/24 7/3/25  Duglas Rock MD   aspirin 81 MG EC tablet Take 1 tablet by mouth Daily.    ProviderAleksey MD   coenzyme Q10 100 MG capsule Take 1 capsule by mouth Daily. HOLD PRIOR TO SURG  Patient not taking: Reported on 8/2/2024    ProviderAleksey MD   escitalopram (LEXAPRO) 20 MG tablet Take 1 tablet by mouth Daily.  Patient not taking: Reported on 8/2/2024 7/22/22 7/22/23  Duglas Rock MD       Social History     Socioeconomic History    Marital status:    Tobacco Use    Smoking status: Never     Passive exposure: Never    Smokeless tobacco: Never   Vaping Use    Vaping status: Never Used   Substance and Sexual Activity    Alcohol use: No    Drug use: No    Sexual activity: Defer       Family History   Problem Relation Age of Onset    Heart disease Father     Diabetes Sister     Hypertension Sister     Hyperlipidemia Sister     Obesity Sister     Malig Hyperthermia Neg Hx        Review of Systems   Gastrointestinal:  Positive for abdominal pain.   Musculoskeletal:  Positive for arthralgias.   Psychiatric/Behavioral:  Positive for confusion. The patient is nervous/anxious.         Objective:  "    Vitals:    10/31/24 2008 10/31/24 2335 11/01/24 0743 11/01/24 1239   BP: 111/78 132/66 132/62 119/54   BP Location: Left arm Left arm Left arm Right arm   Patient Position: Lying Lying Lying Lying   Pulse: 107 90 84 104   Resp: 20 18 16 18   Temp: 98.8 °F (37.1 °C) 98.2 °F (36.8 °C) 97.5 °F (36.4 °C) 98.2 °F (36.8 °C)   TempSrc: Oral Oral Oral Oral   SpO2: 93% 99% 100% 96%   Weight:       Height:         Body mass index is 37.05 kg/m².    Last Weight and Admission Weight        10/31/24  1650   Weight: 101 kg (222 lb 10.6 oz)     Flowsheet Rows      Flowsheet Row First Filed Value   Admission Height 165.1 cm (65\") Documented at 10/31/2024 1650   Admission Weight 101 kg (222 lb 0.1 oz) Documented at 10/31/2024 1605              Intake/Output Summary (Last 24 hours) at 11/1/2024 1402  Last data filed at 10/31/2024 1804  Gross per 24 hour   Intake 340 ml   Output --   Net 340 ml         Physical Exam  Vitals reviewed.   Constitutional:       Appearance: She is well-developed.   HENT:      Head: Normocephalic.      Nose: Nose normal.      Mouth/Throat:      Pharynx: Oropharynx is clear.   Eyes:      Conjunctiva/sclera: Conjunctivae normal.   Neck:      Vascular: No JVD.   Cardiovascular:      Rate and Rhythm: Normal rate and regular rhythm.      Pulses: Normal pulses.      Heart sounds: Normal heart sounds.   Pulmonary:      Effort: Pulmonary effort is normal.      Breath sounds: Normal breath sounds.   Abdominal:      Palpations: Abdomen is soft.      Tenderness: There is no abdominal tenderness.   Musculoskeletal:         General: No swelling. Normal range of motion.      Cervical back: Normal range of motion.   Skin:     General: Skin is warm and dry.   Neurological:      General: No focal deficit present.      Mental Status: She is alert.   Psychiatric:         Mood and Affect: Mood is anxious.         Speech: Speech is rapid and pressured.                 Lab Review:                Results from last 7 days "   Lab Units 11/01/24  0603   SODIUM mmol/L 135*   POTASSIUM mmol/L 3.9   CHLORIDE mmol/L 105   CO2 mmol/L 23.0   BUN mg/dL 18   CREATININE mg/dL 0.75   GLUCOSE mg/dL 83   CALCIUM mg/dL 9.0     Results from last 7 days   Lab Units 11/01/24  0603 10/31/24  1459 10/31/24  1238   HSTROP T ng/L 12 26* 16*     Results from last 7 days   Lab Units 11/01/24  0603   WBC 10*3/mm3 10.06   HEMOGLOBIN g/dL 10.8*   HEMATOCRIT % 33.1*   PLATELETS 10*3/mm3 189               I personally viewed and interpreted the patient's EKG/Telemetry data    Assessment/Plan:     1. Acute cholecystitis  2. Elevated Tn    Her EKG shows a RBBB (old) and LVH. It is not ischemic. To me, she denies any exertional symptoms of CP/SOA. Her hsTn is very mildly elevated but this is likely 2/2 acute infection/demand. In the absence of anginal symptoms or EKG changes or other reasons to subject critical CAD, I would recommend proceeding with non-elective surgery without further cardiac evaluation. We will see again after surgery. Discussed with Dr Heaton.

## 2024-11-01 NOTE — PLAN OF CARE
Goal Outcome Evaluation:         Patient A&Ox4. Sometimes forgetful but easily redirected. NPO at 0001 for cholecystectomy tomorrow. PRN pain medication given once for a severe headache which has now resolved. Up to the BSC with assist x1. VSS.

## 2024-11-01 NOTE — PLAN OF CARE
Goal Outcome Evaluation:  Plan of Care Reviewed With: patient           Outcome Evaluation: Pt. is a 79 year old Female admitted to the hospital with Acute Cholecystitis.  Pt. reports that prior to admission she was using a Rwx for ambulation.  Pt. currently presents with decreased strength, decreased balance, and decreased tolerance to functional activity.  This PM, pt. able to ambulate 30 feet, CGA x 1, with use of Rwx.  Pt. requires CGA x 1 for bed mobility and CGA x 1 for sit <-> stand transfers.  Limited in upright mobility due to overall fatigue and SOA (VSS throughout).  Pt. will benefit from skilled inpt. P.T. to address her functional deficits and to assist pt. in regaining her maximum level of independence with functional mobility.    Anticipated Discharge Disposition (PT): home with assist, home with home health

## 2024-11-01 NOTE — PROGRESS NOTES
Discharge Planning Assessment  [facility]     Patient Name: Mariela Ruiz  MRN: [id number]  Today's Date: 11/1/2024    Admit Date: 10/31/2024    Plan: Return home with family   Discharge Needs Assessment       Row Name 11/01/24 0942       Living Environment    People in Home child(aneudy), adult    Name(s) of People in Home Jovani Keita    Current Living Arrangements home    Potentially Unsafe Housing Conditions none    Primary Care Provided by self    Provides Primary Care For no one    Family Caregiver if Needed child(aneudy), adult    Family Caregiver Names Daughter Ronda Keita [phone] or chad Collier [phone]    Quality of Family Relationships helpful    Able to Return to Prior Arrangements yes       Resource/Environmental Concerns    Resource/Environmental Concerns none    Transportation Concerns none       Transition Planning    Patient/Family Anticipates Transition to home with family    Patient/Family Anticipated Services at Transition none    Transportation Anticipated family or friend will provide       Discharge Needs Assessment    Readmission Within the Last 30 Days no previous admission in last 30 days    Equipment Currently Used at Home walker, rolling    Concerns to be Addressed denies needs/concerns at this time    Anticipated Changes Related to Illness none    Equipment Needed After Discharge none                   Discharge Plan       Row Name 11/01/24 0944       Plan    Plan Return home with family    Patient/Family in Agreement with Plan yes    Plan Comments Spoke with patient at bedside.  She lives with daughter Ronda Keita [phone] in a house with 3 RONNIE and a handrail.  She uses a walker, has used HH in the past and has never been to SNF.  She states she follows with Dr. Rock but received a letter from [facility] that they were no longer accepting referrals to PCP.  Call to [facility] liason - she is not aware of that.  Gave list of [facility] physicans to patient.  Pharmacy is  [facility] in [place].  [place] will assist if needed.  She plans to return home at [place].  CCP will follow as needed.  Cindy PAULINO                  Continued Care and Services - Admitted Since 10/31/2024    No active coordination exists for this encounter.       Expected Discharge Date and Time       Expected Discharge Date Expected Discharge Time    Nov 4, 2024            Demographic Summary       Row Name 11/01/24 0941       General Information    Admission Type inpatient    Arrived From home    Referral Source admission list    Reason for Consult discharge planning    Preferred Language English                   Functional Status       Row Name 11/01/24 0941       Functional Status    Usual Activity Tolerance moderate    Current Activity Tolerance moderate       Functional Status, IADL    Medications independent    Meal Preparation independent;assistive person  Daughter lives with her    Housekeeping independent;assistive person    Laundry independent;assistive person    Shopping independent;assistive person       Mental Status    General Appearance WDL WDL       Mental Status Summary    Recent Changes in Mental Status/Cognitive Functioning no changes                                 Becky S. [place], JEFFERSON

## 2024-11-01 NOTE — PROGRESS NOTES
Acute Care General Surgery Progress Note    Patient: Mariela Ruiz  YOB: 1945  MRN: [id number]      Assessment  Mariela Ruiz is a 79 y.o. female with probable acute cholecystitis. Doing well this morning, denies symptoms, just complaining of hunger. Questionable EKG in ED, currently waiting for cardiology daksha for surgery. Currently receiving IV zosyn, WBC normalized today. AVSS.     Plan  Once cleared by cardiology, plan for laparoscopic cholecystectomy with Dr. Sudarshan Davila for diet today, NPO at midnight    Subjective  Denies nausea or vomiting. Hungry and would like to eat.     Objective    Vitals:    11/01/24 0743   BP: 132/62   Pulse: 84   Resp: 16   Temp: 97.5 °F (36.4 °C)   SpO2: 100%           Physical Exam  Constitutional: alert, oriented, in no acute distress  Respiratory: Normal work of breathing, Symmetric excursion  Cardiovascular: Well pefused, no jugular venous distention evident   Abdominal: soft, non-distended, mildly tender RUQ  Skin: warm, dry, no jaundice      Laboratory Results  Results from last 7 days   Lab Units 11/01/24  0603 10/31/24  1238   WBC 10*3/mm3 10.06 15.00*   HEMOGLOBIN g/dL 10.8* 12.4   HEMATOCRIT % 33.1* 36.8   PLATELETS 10*3/mm3 189 210     Results from last 7 days   Lab Units 11/01/24  0603 10/31/24  1238   SODIUM mmol/L 135* 134*   POTASSIUM mmol/L 3.9 4.2   CHLORIDE mmol/L 105 101   CO2 mmol/L 23.0 25.0   BUN mg/dL 18 21   CREATININE mg/dL 0.75 0.93   CALCIUM mg/dL 9.0 9.5   BILIRUBIN mg/dL 0.6 0.8   ALK PHOS U/L 121* 153*   ALT (SGPT) U/L 10 12   AST (SGOT) U/L 14 18   GLUCOSE mg/dL 83 96     I have personally reviewed 11/1 labs         REMY Wynne  Acute Care General Surgery  [facility]    [address], [address]  [place], [place], [address]  P: [phone]  F: [phone]

## 2024-11-01 NOTE — PLAN OF CARE
Goal Outcome Evaluation:  Plan of Care Reviewed With: patient           Outcome Evaluation: Pt is AOX4, vital sign,lab and medication reviewed.No complain of pain this shift. O2 sat to 80's to 89 and got her on 2L. Cardiology consult completed. medication administered per ordered. Plan of care continue.

## 2024-11-01 NOTE — THERAPY EVALUATION
Patient Name: Mariela Ruiz  : 1945    MRN: [id number]                              Today's Date: 2024       Admit Date: 10/31/2024    Visit Dx:     ICD-10-CM ICD-9-CM   1. Acute cholecystitis  K81.0 575.0     Patient Active Problem List   Diagnosis    Seizure disorder    Hyperlipidemia    Vitamin D insufficiency    Age-related osteoporosis without current pathological fracture    Sleep apnea    Chronic venous insufficiency    Postsurgical hypothyroidism    Chronic fatigue syndrome    Gastroesophageal reflux disease    Dupuytren's contracture    History of biliary T-tube placement    History of partial thyroidectomy    Multinodular goiter    Restless legs syndrome    History of cardiac catheterization    Urge incontinence of urine    Left knee pain    Ligamentous laxity of knee    DJD (degenerative joint disease) of knee    Adverse drug effect, subsequent encounter    Abnormal blood level of chromium    Abnormal blood level of cobalt    Family history of diabetes mellitus    Thrombocytopenia    .    S/P revision of total hip    Enlarged thyroid gland    Palmar fascial fibromatosis (dupuytren)    Hypothyroidism, unspecified    Hyperlipidemia, unspecified    Acute cholecystitis    Elevated troponin     Past Medical History:   Diagnosis Date    Arthritis     Chronic venous insufficiency     Dupuytren's contracture     History of staph infection     S/P KNEE DEC 2016    History of transfusion     Hyperlipidemia     Lymphedema     LEFT LEG    Nontoxic multinodular goiter     Osteoporosis     Dr. Cabrera    Pelvic joint pain, left     Postsurgical hypothyroidism     Presence of left artificial hip joint     METAL ON METAL AS NOTED PER DR CLEMENT NOTE    Restless leg syndrome     Right bundle branch block     Seizure disorder     Dr. Whitman, HX  LAST SEIZURE    Sleep apnea     DOES NOT HAVE MACHINE    Vitamin D insufficiency      Past Surgical History:   Procedure Laterality Date    APPENDECTOMY      CARDIAC  CATHETERIZATION      NORMAL     COLONOSCOPY  08/17/2017    Normal except for anal fissure.  Dr. Brandt.  [facility].    KNEE MINI REVISION Left 11/15/2016    Procedure: LEFT KNEE POLY CHANGE WITH HI POST STABILIZER;  Surgeon: Pablito Claudio MD;  Location: [facility] MAIN OR;  Service:     KNEE MINI REVISION Left 12/13/2016    Procedure: LT KNEE REMOVAL/REPLACEMENT LOCKING PIN ;  Surgeon: Pablito Claudio MD;  Location: [facility] MAIN OR;  Service:     MAMMO FINE NEEDLE ASPIRATION UNILATERAL      RIGHT THYROID NODULE, 2009 BENIGN     NV ARTHRP KNE CONDYLE&PLATU MEDIAL&LAT COMPARTMENTS Left 12/24/2016    Procedure: LEFT KNEE WASHOUT WITH POLY CHANGE;  Surgeon: Christiano Cesar MD;  Location: [facility] MAIN OR;  Service: Orthopedics    NV REVJ TOTAL KNEE ARTHRP W/WO ALGRFT 1 COMPONENT Left 2/20/2017    Procedure: LT KNEE REMOVAL ANTIBIOTIC SPACER AND KNEE REVISION;  Surgeon: Christiano Cesar MD;  Location: [facility] MAIN OR;  Service: Orthopedics    REPLACEMENT TOTAL KNEE Left     THYROIDECTOMY, PARTIAL      1979 LEFT LOBECTOMY AND ISTHMECTOMY     TOTAL ABDOMINAL HYSTERECTOMY WITH SALPINGO OOPHORECTOMY      TOTAL HIP ARTHROPLASTY Left     TOTAL HIP ARTHROPLASTY Right 9/14/2019    Procedure: TOTAL HIP ARTHROPLASTY ANTERIOR WITH HANA TABLE;  Surgeon: Christiano Cesar II, MD;  Location: [facility] MAIN OR;  Service: Orthopedics    TOTAL HIP ARTHROPLASTY REVISION Left 3/9/2021    Procedure: LEFT TOTAL HIP ARTHROPLASTY REVISION POSTERIOR;  Surgeon: Christiano Cesar II, MD;  Location: [facility] MAIN OR;  Service: Orthopedics;  Laterality: Left;    TOTAL KNEE  PROSTHESIS REMOVAL W/ SPACER INSERTION Left 12/29/2016    Procedure: LT KNEE IMPLANT REMOVAL WITH INSERTION OF SPACER ;  Surgeon: Christiano Cesar MD;  Location: [facility] MAIN OR;  Service:       General Information       Row Name 11/01/24 1543          Physical Therapy Time and Intention    Document Type evaluation  Pt. admitted with Acute Cholecystitis  -MS     Mode  of Treatment physical therapy;individual therapy  -MS       Row Name 11/01/24 1543          General Information    Patient Profile Reviewed yes  -MS     Prior Level of Function independent:  Use of Rwx for ambulation  -MS     Existing Precautions/Restrictions fall  Exit alarm  -MS     Barriers to Rehab none identified  -MS       Row Name 11/01/24 1543          Cognition    Orientation Status (Cognition) oriented x 3  -MS       Row Name 11/01/24 1543          Safety Issues/Impairments Affecting Functional Mobility    Comment, Safety Issues/Impairments (Mobility) Gait belt used for safety.  -MS               User Key  (r) = Recorded By, (t) = Taken By, (c) = Cosigned By      Initials Name Provider Type    Obey Chisholm, PT Physical Therapist                   Mobility       Row Name 11/01/24 1545          Bed Mobility    Bed Mobility supine-sit;sit-supine  -MS     Supine-Sit [place] (Bed Mobility) contact guard  -MS     Sit-Supine [place] (Bed Mobility) contact guard  -MS       Row Name 11/01/24 1548          Sit-Stand Transfer    Sit-Stand [place] (Transfers) contact guard  -MS     Assistive Device (Sit-Stand Transfers) walker, front-wheeled  -MS       Row Name 11/01/24 1548          Gait/Stairs (Locomotion)    [place] Level (Gait) contact guard  -MS     Assistive Device (Gait) walker, front-wheeled  -MS     Distance in Feet (Gait) 30  -MS     Deviations/Abnormal Patterns (Gait) gunnar decreased  -MS     Bilateral Gait Deviations forward flexed posture  -MS     Comment, (Gait/Stairs) Limited in upright mobility due to overall fatigue and SOA  -MS               User Key  (r) = Recorded By, (t) = Taken By, (c) = Cosigned By      Initials Name Provider Type    Obey Chisholm, PT Physical Therapist                   Obj/Interventions       Row Name 11/01/24 1541          Range of Motion Comprehensive    Comment, General Range of Motion BUE/LE (WFL's)  -MS       Row Name 11/01/24 1543           Strength Comprehensive (MMT)    Comment, General Manual Muscle Testing (MMT) Assessment BUE (3+/5);  BLE (3/5)  -MS               User Key  (r) = Recorded By, (t) = Taken By, (c) = Cosigned By      Initials Name Provider Type    MS Ugpta, Obey ANDERSON, PT Physical Therapist                   Goals/Plan       Row Name 11/01/24 1546          Bed Mobility Goal 1 (PT)    Activity/Assistive Device (Bed Mobility Goal 1, PT) bed mobility activities, all  -MS     [place] Level/Cues Needed (Bed Mobility Goal 1, PT) standby assist  -MS     Time Frame (Bed Mobility Goal 1, PT) long term goal (LTG);1 week  -MS       Row Name 11/01/24 1546          Transfer Goal 1 (PT)    Activity/Assistive Device (Transfer Goal 1, PT) transfers, all;walker, rolling  -MS     [place] Level/Cues Needed (Transfer Goal 1, PT) standby assist  -MS     Time Frame (Transfer Goal 1, PT) long term goal (LTG);1 week  -MS       Row Name 11/01/24 1546          Gait Training Goal 1 (PT)    Activity/Assistive Device (Gait Training Goal 1, PT) gait (walking locomotion);walker, rolling  -MS     [place] Level (Gait Training Goal 1, PT) standby assist  -MS     Distance (Gait Training Goal 1, PT) 100 feet  -MS     Time Frame (Gait Training Goal 1, PT) long term goal (LTG);1 week  -MS       Row Name 11/01/24 1546          Therapy Assessment/Plan (PT)    Planned Therapy Interventions (PT) balance training;bed mobility training;gait training;home exercise program;patient/family education;postural re-education;transfer training;strengthening  -MS               User Key  (r) = Recorded By, (t) = Taken By, (c) = Cosigned By      Initials Name Provider Type    MS GaleanaGuptaObey, PT Physical Therapist                   Clinical Impression       Row Name 11/01/24 1549          Pain    Pretreatment Pain Rating 0/10 - no pain  -MS     Posttreatment Pain Rating 0/10 - no pain  -MS       Row Name 11/01/24 1544          Plan of Care Review    Plan of Care  Reviewed With patient;family  -MS       Row Name 11/01/24 1545          Therapy Assessment/Plan (PT)    Rehab Potential (PT) good  -MS     Criteria for Skilled Interventions Met (PT) skilled treatment is necessary  -MS     Therapy Frequency (PT) 6 times/wk  -MS       Row Name 11/01/24 1545          Positioning and Restraints    Pre-Treatment Position in bed  -MS     Post Treatment Position bed  -MS     In Bed notified nsg;supine;call light within reach;encouraged to call for assist;exit alarm on  All lines intact.  -MS               User Key  (r) = Recorded By, (t) = Taken By, (c) = Cosigned By      Initials Name Provider Type    Obey Chisholm, PT Physical Therapist                   Outcome Measures       Row Name 11/01/24 1546          How much help from another person do you currently need...    Turning from your back to your side while in flat bed without using bedrails? 3  -MS     Moving from lying on back to sitting on the side of a flat bed without bedrails? 3  -MS     Moving to and from a bed to a chair (including a wheelchair)? 3  -MS     Standing up from a chair using your arms (e.g., wheelchair, bedside chair)? 3  -MS     Climbing 3-5 steps with a railing? 3  -MS     To walk in hospital room? 3  -MS     AM-PAC 6 Clicks Score (PT) 18  -MS     Highest Level of Mobility Goal 6 --> Walk 10 steps or more  -MS       Row Name 11/01/24 1546          Functional Assessment    Outcome Measure Options AM-PAC 6 Clicks Basic Mobility (PT)  -MS               User Key  (r) = Recorded By, (t) = Taken By, (c) = Cosigned By      Initials Name Provider Type    Obey Chisholm, PT Physical Therapist                                 Physical Therapy Education       Title: PT OT SLP Therapies (In Progress)       Topic: Physical Therapy (In Progress)       Point: Mobility training (Done)       Learning Progress Summary            Patient Acceptance, E,D, VU,NR by MS at 11/1/2024 9976                      Point: Home  exercise program (Not Started)       Learner Progress:  Not documented in this visit.              Point: Body mechanics (Done)       Learning Progress Summary            Patient Acceptance, E,D, VU,NR by MS at 11/1/2024 1546                      Point: Precautions (Done)       Learning Progress Summary            Patient Acceptance, E,D, VU,NR by MS at 11/1/2024 1546                                      User Key       Initials Effective Dates Name Provider Type Discipline    MS 06/16/21 -  Obey Gupta, PT Physical Therapist PT                  PT Recommendation and Plan  Planned Therapy Interventions (PT): balance training, bed mobility training, gait training, home exercise program, patient/family education, postural re-education, transfer training, strengthening  Outcome Evaluation: Pt. is a 79 year old Female admitted to the hospital with Acute Cholecystitis.  Pt. reports that prior to admission she was using a Rwx for ambulation.  Pt. currently presents with decreased strength, decreased balance, and decreased tolerance to functional activity.  This PM, pt. able to ambulate 30 feet, CGA x 1, with use of Rwx.  Pt. requires CGA x 1 for bed mobility and CGA x 1 for sit <-> stand transfers.  Limited in upright mobility due to overall fatigue and SOA (VSS throughout).  Pt. will benefit from skilled inpt. P.T. to address her functional deficits and to assist pt. in regaining her maximum level of independence with functional mobility.     Time Calculation:         PT Charges       Row Name 11/01/24 1549             Time Calculation    Start Time 1350  -MS      Stop Time 1408  -MS      Time Calculation (min) 18 min  -MS      PT Received On 11/01/24  -MS      PT - Next Appointment 11/02/24  -MS      PT Goal Re-Cert Due Date 11/08/24  -MS         Time Calculation- PT    Total Timed Code Minutes- PT 17 minute(s)  -MS                User Key  (r) = Recorded By, (t) = Taken By, (c) = Cosigned By      Initials Name  Provider Type    Obey Chisholm, PT Physical Therapist                  Therapy Charges for Today       Code Description Service Date Service Provider Modifiers Qty    34880460943 HC PT EVAL MOD COMPLEXITY 2 11/1/2024 Obey Gupta, PT GP 1    49360202997 HC PT THERAPEUTIC ACT EA 15 MIN 11/1/2024 Obey Gupta, PT GP 1            PT G-Codes  Outcome Measure Options: AM-PAC 6 Clicks Basic Mobility (PT)  AM-PAC 6 Clicks Score (PT): 18  PT Discharge Summary  Anticipated Discharge Disposition (PT): home with assist, home with home health    Obey Gupta, PT  11/1/2024

## 2024-11-02 ENCOUNTER — APPOINTMENT (OUTPATIENT)
Dept: GENERAL RADIOLOGY | Facility: HOSPITAL | Age: 79
End: 2024-11-02
Payer: MEDICARE

## 2024-11-02 ENCOUNTER — ANESTHESIA EVENT (OUTPATIENT)
Dept: PERIOP | Facility: HOSPITAL | Age: 79
End: 2024-11-02
Payer: MEDICARE

## 2024-11-02 ENCOUNTER — ANESTHESIA (OUTPATIENT)
Dept: PERIOP | Facility: HOSPITAL | Age: 79
End: 2024-11-02
Payer: MEDICARE

## 2024-11-02 LAB
ANION GAP SERPL CALCULATED.3IONS-SCNC: 11.1 MMOL/L (ref 5–15)
BUN SERPL-MCNC: 24 MG/DL (ref 8–23)
BUN/CREAT SERPL: 18.5 (ref 7–25)
CALCIUM SPEC-SCNC: 8.9 MG/DL (ref 8.6–10.5)
CHLORIDE SERPL-SCNC: 104 MMOL/L (ref 98–107)
CO2 SERPL-SCNC: 23.9 MMOL/L (ref 22–29)
CREAT SERPL-MCNC: 1.3 MG/DL (ref 0.57–1)
DEPRECATED RDW RBC AUTO: 40.2 FL (ref 37–54)
EGFRCR SERPLBLD CKD-EPI 2021: 42.4 ML/MIN/1.73
ERYTHROCYTE [DISTWIDTH] IN BLOOD BY AUTOMATED COUNT: 12 % (ref 12.3–15.4)
FERRITIN SERPL-MCNC: 254 NG/ML (ref 13–150)
GLUCOSE SERPL-MCNC: 93 MG/DL (ref 65–99)
HCT VFR BLD AUTO: 30 % (ref 34–46.6)
HGB BLD-MCNC: 10.1 G/DL (ref 12–15.9)
IRON 24H UR-MRATE: 21 MCG/DL (ref 37–145)
IRON SATN MFR SERPL: 9 % (ref 20–50)
MAGNESIUM SERPL-MCNC: 2 MG/DL (ref 1.6–2.4)
MCH RBC QN AUTO: 30.8 PG (ref 26.6–33)
MCHC RBC AUTO-ENTMCNC: 33.7 G/DL (ref 31.5–35.7)
MCV RBC AUTO: 91.5 FL (ref 79–97)
PHOSPHATE SERPL-MCNC: 3 MG/DL (ref 2.5–4.5)
PLATELET # BLD AUTO: 197 10*3/MM3 (ref 140–450)
PMV BLD AUTO: 10.5 FL (ref 6–12)
POTASSIUM SERPL-SCNC: 4 MMOL/L (ref 3.5–5.2)
RBC # BLD AUTO: 3.28 10*6/MM3 (ref 3.77–5.28)
SODIUM SERPL-SCNC: 139 MMOL/L (ref 136–145)
TIBC SERPL-MCNC: 246 MCG/DL (ref 298–536)
TRANSFERRIN SERPL-MCNC: 165 MG/DL (ref 200–360)
WBC NRBC COR # BLD AUTO: 7.3 10*3/MM3 (ref 3.4–10.8)

## 2024-11-02 PROCEDURE — 25010000002 FENTANYL CITRATE (PF) 50 MCG/ML SOLUTION: Performed by: ANESTHESIOLOGY

## 2024-11-02 PROCEDURE — 25010000002 MORPHINE PER 10 MG: Performed by: SURGERY

## 2024-11-02 PROCEDURE — 80048 BASIC METABOLIC PNL TOTAL CA: CPT | Performed by: STUDENT IN AN ORGANIZED HEALTH CARE EDUCATION/TRAINING PROGRAM

## 2024-11-02 PROCEDURE — 25510000001 IOPAMIDOL 61 % SOLUTION: Performed by: SURGERY

## 2024-11-02 PROCEDURE — 25010000002 ONDANSETRON PER 1 MG: Performed by: ANESTHESIOLOGY

## 2024-11-02 PROCEDURE — 84466 ASSAY OF TRANSFERRIN: CPT | Performed by: STUDENT IN AN ORGANIZED HEALTH CARE EDUCATION/TRAINING PROGRAM

## 2024-11-02 PROCEDURE — 25810000003 LACTATED RINGERS PER 1000 ML: Performed by: STUDENT IN AN ORGANIZED HEALTH CARE EDUCATION/TRAINING PROGRAM

## 2024-11-02 PROCEDURE — 25010000002 SUGAMMADEX 200 MG/2ML SOLUTION: Performed by: ANESTHESIOLOGY

## 2024-11-02 PROCEDURE — 25010000002 PROPOFOL 200 MG/20ML EMULSION: Performed by: ANESTHESIOLOGY

## 2024-11-02 PROCEDURE — 74300 X-RAY BILE DUCTS/PANCREAS: CPT

## 2024-11-02 PROCEDURE — C1758 CATHETER, URETERAL: HCPCS | Performed by: SURGERY

## 2024-11-02 PROCEDURE — 83540 ASSAY OF IRON: CPT | Performed by: STUDENT IN AN ORGANIZED HEALTH CARE EDUCATION/TRAINING PROGRAM

## 2024-11-02 PROCEDURE — 88304 TISSUE EXAM BY PATHOLOGIST: CPT | Performed by: SURGERY

## 2024-11-02 PROCEDURE — 25010000002 DROPERIDOL PER 5 MG: Performed by: ANESTHESIOLOGY

## 2024-11-02 PROCEDURE — 25010000002 PIPERACILLIN SOD-TAZOBACTAM PER 1 G: Performed by: INTERNAL MEDICINE

## 2024-11-02 PROCEDURE — 8E0W4CZ ROBOTIC ASSISTED PROCEDURE OF TRUNK REGION, PERCUTANEOUS ENDOSCOPIC APPROACH: ICD-10-PCS | Performed by: SURGERY

## 2024-11-02 PROCEDURE — 25810000003 LACTATED RINGERS PER 1000 ML: Performed by: INTERNAL MEDICINE

## 2024-11-02 PROCEDURE — 25010000002 PIPERACILLIN SOD-TAZOBACTAM PER 1 G: Performed by: SURGERY

## 2024-11-02 PROCEDURE — 25010000002 LIDOCAINE 2% SOLUTION: Performed by: ANESTHESIOLOGY

## 2024-11-02 PROCEDURE — 47563 LAPARO CHOLECYSTECTOMY/GRAPH: CPT | Performed by: SPECIALIST/TECHNOLOGIST, OTHER

## 2024-11-02 PROCEDURE — 85027 COMPLETE CBC AUTOMATED: CPT | Performed by: STUDENT IN AN ORGANIZED HEALTH CARE EDUCATION/TRAINING PROGRAM

## 2024-11-02 PROCEDURE — 0DNU4ZZ RELEASE OMENTUM, PERCUTANEOUS ENDOSCOPIC APPROACH: ICD-10-PCS | Performed by: SURGERY

## 2024-11-02 PROCEDURE — 25010000002 DEXAMETHASONE SODIUM PHOSPHATE 20 MG/5ML SOLUTION: Performed by: ANESTHESIOLOGY

## 2024-11-02 PROCEDURE — 25010000002 HYDROMORPHONE PER 4 MG: Performed by: ANESTHESIOLOGY

## 2024-11-02 PROCEDURE — 84100 ASSAY OF PHOSPHORUS: CPT | Performed by: STUDENT IN AN ORGANIZED HEALTH CARE EDUCATION/TRAINING PROGRAM

## 2024-11-02 PROCEDURE — S2900 ROBOTIC SURGICAL SYSTEM: HCPCS | Performed by: SURGERY

## 2024-11-02 PROCEDURE — 47563 LAPARO CHOLECYSTECTOMY/GRAPH: CPT | Performed by: SURGERY

## 2024-11-02 PROCEDURE — 25010000002 INDOCYANINE GREEN 25 MG RECONSTITUTED SOLUTION: Performed by: SURGERY

## 2024-11-02 PROCEDURE — 0FT44ZZ RESECTION OF GALLBLADDER, PERCUTANEOUS ENDOSCOPIC APPROACH: ICD-10-PCS | Performed by: SURGERY

## 2024-11-02 PROCEDURE — 83735 ASSAY OF MAGNESIUM: CPT | Performed by: STUDENT IN AN ORGANIZED HEALTH CARE EDUCATION/TRAINING PROGRAM

## 2024-11-02 PROCEDURE — BF131ZZ FLUOROSCOPY OF GALLBLADDER AND BILE DUCTS USING LOW OSMOLAR CONTRAST: ICD-10-PCS | Performed by: SURGERY

## 2024-11-02 PROCEDURE — 82728 ASSAY OF FERRITIN: CPT | Performed by: STUDENT IN AN ORGANIZED HEALTH CARE EDUCATION/TRAINING PROGRAM

## 2024-11-02 PROCEDURE — 25010000002 DIPHENHYDRAMINE PER 50 MG: Performed by: ANESTHESIOLOGY

## 2024-11-02 DEVICE — MEDIUM-LARGE LIGATION CLIPS 6 CLIPS/CART
Type: IMPLANTABLE DEVICE | Site: ABDOMEN | Status: FUNCTIONAL
Brand: VAS-Q-CLIP

## 2024-11-02 RX ORDER — DROPERIDOL 2.5 MG/ML
0.62 INJECTION, SOLUTION INTRAMUSCULAR; INTRAVENOUS
Status: DISCONTINUED | OUTPATIENT
Start: 2024-11-02 | End: 2024-11-02 | Stop reason: HOSPADM

## 2024-11-02 RX ORDER — IPRATROPIUM BROMIDE AND ALBUTEROL SULFATE 2.5; .5 MG/3ML; MG/3ML
3 SOLUTION RESPIRATORY (INHALATION) ONCE AS NEEDED
Status: DISCONTINUED | OUTPATIENT
Start: 2024-11-02 | End: 2024-11-02 | Stop reason: HOSPADM

## 2024-11-02 RX ORDER — FENTANYL CITRATE 50 UG/ML
25 INJECTION, SOLUTION INTRAMUSCULAR; INTRAVENOUS
Status: DISCONTINUED | OUTPATIENT
Start: 2024-11-02 | End: 2024-11-02 | Stop reason: HOSPADM

## 2024-11-02 RX ORDER — ONDANSETRON 2 MG/ML
4 INJECTION INTRAMUSCULAR; INTRAVENOUS ONCE AS NEEDED
Status: COMPLETED | OUTPATIENT
Start: 2024-11-02 | End: 2024-11-02

## 2024-11-02 RX ORDER — DEXAMETHASONE SODIUM PHOSPHATE 4 MG/ML
INJECTION, SOLUTION INTRA-ARTICULAR; INTRALESIONAL; INTRAMUSCULAR; INTRAVENOUS; SOFT TISSUE AS NEEDED
Status: DISCONTINUED | OUTPATIENT
Start: 2024-11-02 | End: 2024-11-02 | Stop reason: SURG

## 2024-11-02 RX ORDER — NALOXONE HCL 0.4 MG/ML
0.2 VIAL (ML) INJECTION AS NEEDED
Status: DISCONTINUED | OUTPATIENT
Start: 2024-11-02 | End: 2024-11-02 | Stop reason: HOSPADM

## 2024-11-02 RX ORDER — DIPHENHYDRAMINE HYDROCHLORIDE 50 MG/ML
12.5 INJECTION INTRAMUSCULAR; INTRAVENOUS
Status: DISCONTINUED | OUTPATIENT
Start: 2024-11-02 | End: 2024-11-02 | Stop reason: HOSPADM

## 2024-11-02 RX ORDER — HYDROCODONE BITARTRATE AND ACETAMINOPHEN 5; 325 MG/1; MG/1
1 TABLET ORAL ONCE AS NEEDED
Status: DISCONTINUED | OUTPATIENT
Start: 2024-11-02 | End: 2024-11-02 | Stop reason: HOSPADM

## 2024-11-02 RX ORDER — INDOCYANINE GREEN AND WATER 25 MG
2.5 KIT INJECTION ONCE
Status: COMPLETED | OUTPATIENT
Start: 2024-11-02 | End: 2024-11-02

## 2024-11-02 RX ORDER — IOPAMIDOL 612 MG/ML
INJECTION, SOLUTION INTRAVASCULAR AS NEEDED
Status: DISCONTINUED | OUTPATIENT
Start: 2024-11-02 | End: 2024-11-02 | Stop reason: HOSPADM

## 2024-11-02 RX ORDER — PROPOFOL 10 MG/ML
INJECTION, EMULSION INTRAVENOUS AS NEEDED
Status: DISCONTINUED | OUTPATIENT
Start: 2024-11-02 | End: 2024-11-02 | Stop reason: SURG

## 2024-11-02 RX ORDER — LIDOCAINE HYDROCHLORIDE 20 MG/ML
INJECTION, SOLUTION INFILTRATION; PERINEURAL AS NEEDED
Status: DISCONTINUED | OUTPATIENT
Start: 2024-11-02 | End: 2024-11-02 | Stop reason: SURG

## 2024-11-02 RX ORDER — LABETALOL HYDROCHLORIDE 5 MG/ML
5 INJECTION, SOLUTION INTRAVENOUS
Status: DISCONTINUED | OUTPATIENT
Start: 2024-11-02 | End: 2024-11-02 | Stop reason: HOSPADM

## 2024-11-02 RX ORDER — HYDROMORPHONE HYDROCHLORIDE 1 MG/ML
0.25 INJECTION, SOLUTION INTRAMUSCULAR; INTRAVENOUS; SUBCUTANEOUS
Status: DISCONTINUED | OUTPATIENT
Start: 2024-11-02 | End: 2024-11-02 | Stop reason: HOSPADM

## 2024-11-02 RX ORDER — EPHEDRINE SULFATE 50 MG/ML
5 INJECTION, SOLUTION INTRAVENOUS ONCE AS NEEDED
Status: DISCONTINUED | OUTPATIENT
Start: 2024-11-02 | End: 2024-11-02 | Stop reason: HOSPADM

## 2024-11-02 RX ORDER — SODIUM CHLORIDE 0.9 % (FLUSH) 0.9 %
10 SYRINGE (ML) INJECTION EVERY 12 HOURS SCHEDULED
Status: DISCONTINUED | OUTPATIENT
Start: 2024-11-02 | End: 2024-11-02 | Stop reason: HOSPADM

## 2024-11-02 RX ORDER — PROMETHAZINE HYDROCHLORIDE 25 MG/1
25 SUPPOSITORY RECTAL ONCE AS NEEDED
Status: DISCONTINUED | OUTPATIENT
Start: 2024-11-02 | End: 2024-11-02 | Stop reason: HOSPADM

## 2024-11-02 RX ORDER — ONDANSETRON 2 MG/ML
INJECTION INTRAMUSCULAR; INTRAVENOUS AS NEEDED
Status: DISCONTINUED | OUTPATIENT
Start: 2024-11-02 | End: 2024-11-02 | Stop reason: SURG

## 2024-11-02 RX ORDER — SODIUM CHLORIDE, SODIUM LACTATE, POTASSIUM CHLORIDE, CALCIUM CHLORIDE 600; 310; 30; 20 MG/100ML; MG/100ML; MG/100ML; MG/100ML
100 INJECTION, SOLUTION INTRAVENOUS CONTINUOUS
Status: DISCONTINUED | OUTPATIENT
Start: 2024-11-02 | End: 2024-11-03

## 2024-11-02 RX ORDER — HYDRALAZINE HYDROCHLORIDE 20 MG/ML
5 INJECTION INTRAMUSCULAR; INTRAVENOUS
Status: DISCONTINUED | OUTPATIENT
Start: 2024-11-02 | End: 2024-11-02 | Stop reason: HOSPADM

## 2024-11-02 RX ORDER — HYDROCODONE BITARTRATE AND ACETAMINOPHEN 7.5; 325 MG/1; MG/1
1 TABLET ORAL EVERY 4 HOURS PRN
Status: DISCONTINUED | OUTPATIENT
Start: 2024-11-02 | End: 2024-11-02 | Stop reason: HOSPADM

## 2024-11-02 RX ORDER — SODIUM CHLORIDE 0.9 % (FLUSH) 0.9 %
10 SYRINGE (ML) INJECTION AS NEEDED
Status: DISCONTINUED | OUTPATIENT
Start: 2024-11-02 | End: 2024-11-02 | Stop reason: HOSPADM

## 2024-11-02 RX ORDER — SODIUM CHLORIDE 9 MG/ML
40 INJECTION, SOLUTION INTRAVENOUS AS NEEDED
Status: DISCONTINUED | OUTPATIENT
Start: 2024-11-02 | End: 2024-11-02 | Stop reason: HOSPADM

## 2024-11-02 RX ORDER — PROMETHAZINE HYDROCHLORIDE 25 MG/1
25 TABLET ORAL ONCE AS NEEDED
Status: DISCONTINUED | OUTPATIENT
Start: 2024-11-02 | End: 2024-11-02 | Stop reason: HOSPADM

## 2024-11-02 RX ORDER — ROCURONIUM BROMIDE 10 MG/ML
INJECTION, SOLUTION INTRAVENOUS AS NEEDED
Status: DISCONTINUED | OUTPATIENT
Start: 2024-11-02 | End: 2024-11-02 | Stop reason: SURG

## 2024-11-02 RX ORDER — FLUMAZENIL 0.1 MG/ML
0.2 INJECTION INTRAVENOUS AS NEEDED
Status: DISCONTINUED | OUTPATIENT
Start: 2024-11-02 | End: 2024-11-02 | Stop reason: HOSPADM

## 2024-11-02 RX ADMIN — PIPERACILLIN AND TAZOBACTAM 3.38 G: 3; .375 INJECTION, POWDER, LYOPHILIZED, FOR SOLUTION INTRAVENOUS at 15:14

## 2024-11-02 RX ADMIN — SUGAMMADEX 200 MG: 100 INJECTION, SOLUTION INTRAVENOUS at 15:36

## 2024-11-02 RX ADMIN — DIPHENHYDRAMINE HYDROCHLORIDE 12.5 MG: 50 INJECTION, SOLUTION INTRAMUSCULAR; INTRAVENOUS at 16:01

## 2024-11-02 RX ADMIN — PRAVASTATIN SODIUM 40 MG: 40 TABLET ORAL at 20:32

## 2024-11-02 RX ADMIN — FENTANYL CITRATE 25 MCG: 50 INJECTION, SOLUTION INTRAMUSCULAR; INTRAVENOUS at 16:10

## 2024-11-02 RX ADMIN — Medication 10 ML: at 20:32

## 2024-11-02 RX ADMIN — PIPERACILLIN AND TAZOBACTAM 3.38 G: 3; .375 INJECTION, POWDER, LYOPHILIZED, FOR SOLUTION INTRAVENOUS at 13:14

## 2024-11-02 RX ADMIN — PIPERACILLIN AND TAZOBACTAM 3.38 G: 3; .375 INJECTION, POWDER, LYOPHILIZED, FOR SOLUTION INTRAVENOUS at 06:57

## 2024-11-02 RX ADMIN — MORPHINE SULFATE 4 MG: 2 INJECTION, SOLUTION INTRAMUSCULAR; INTRAVENOUS at 17:58

## 2024-11-02 RX ADMIN — SODIUM CHLORIDE, POTASSIUM CHLORIDE, SODIUM LACTATE AND CALCIUM CHLORIDE 100 ML/HR: 600; 310; 30; 20 INJECTION, SOLUTION INTRAVENOUS at 18:44

## 2024-11-02 RX ADMIN — SODIUM CHLORIDE, POTASSIUM CHLORIDE, SODIUM LACTATE AND CALCIUM CHLORIDE 100 ML/HR: 600; 310; 30; 20 INJECTION, SOLUTION INTRAVENOUS at 12:28

## 2024-11-02 RX ADMIN — MORPHINE SULFATE 4 MG: 2 INJECTION, SOLUTION INTRAMUSCULAR; INTRAVENOUS at 23:24

## 2024-11-02 RX ADMIN — HYDROMORPHONE HYDROCHLORIDE 0.25 MG: 1 INJECTION, SOLUTION INTRAMUSCULAR; INTRAVENOUS; SUBCUTANEOUS at 15:57

## 2024-11-02 RX ADMIN — FENTANYL CITRATE 25 MCG: 50 INJECTION, SOLUTION INTRAMUSCULAR; INTRAVENOUS at 15:50

## 2024-11-02 RX ADMIN — ROCURONIUM BROMIDE 50 MG: 10 INJECTION, SOLUTION INTRAVENOUS at 12:59

## 2024-11-02 RX ADMIN — ROCURONIUM BROMIDE 20 MG: 10 INJECTION, SOLUTION INTRAVENOUS at 14:57

## 2024-11-02 RX ADMIN — PROPOFOL 70 MG: 10 INJECTION, EMULSION INTRAVENOUS at 12:59

## 2024-11-02 RX ADMIN — DROPERIDOL 0.62 MG: 2.5 INJECTION, SOLUTION INTRAMUSCULAR; INTRAVENOUS at 15:50

## 2024-11-02 RX ADMIN — INDOCYANINE GREEN AND WATER 2.5 MG: KIT at 12:46

## 2024-11-02 RX ADMIN — LIDOCAINE HYDROCHLORIDE 100 MG: 20 INJECTION, SOLUTION INFILTRATION; PERINEURAL at 12:59

## 2024-11-02 RX ADMIN — FENTANYL CITRATE 25 MCG: 50 INJECTION, SOLUTION INTRAMUSCULAR; INTRAVENOUS at 16:05

## 2024-11-02 RX ADMIN — ONDANSETRON 4 MG: 2 INJECTION, SOLUTION INTRAMUSCULAR; INTRAVENOUS at 16:00

## 2024-11-02 RX ADMIN — PIPERACILLIN AND TAZOBACTAM 3.38 G: 3; .375 INJECTION, POWDER, LYOPHILIZED, FOR SOLUTION INTRAVENOUS at 23:01

## 2024-11-02 RX ADMIN — DEXAMETHASONE SODIUM PHOSPHATE 8 MG: 4 INJECTION, SOLUTION INTRAMUSCULAR; INTRAVENOUS at 13:18

## 2024-11-02 RX ADMIN — FENTANYL CITRATE 25 MCG: 50 INJECTION, SOLUTION INTRAMUSCULAR; INTRAVENOUS at 15:55

## 2024-11-02 RX ADMIN — PHENOBARBITAL 129.6 MG: 32.4 TABLET ORAL at 20:31

## 2024-11-02 RX ADMIN — ONDANSETRON 4 MG: 2 INJECTION INTRAMUSCULAR; INTRAVENOUS at 13:18

## 2024-11-02 RX ADMIN — HYDROMORPHONE HYDROCHLORIDE 0.25 MG: 1 INJECTION, SOLUTION INTRAMUSCULAR; INTRAVENOUS; SUBCUTANEOUS at 16:02

## 2024-11-02 NOTE — CONSULTS
[facility] Consult Note      Patient Name: Mariela Ruiz  : 1945  MRN: [id number]  Primary Care Physician:  Duglas Rock MD  Referring Physician: Rabia Grullon MD  Date of admission: 10/31/2024    Subjective     Reason for Consult:  XUAN     HPI:   Mariela Ruiz is a 79 y.o. female with seizure disorder, hypothyroidism, lymphedema, osteoporosis who presented 10/31/24 with abd pain, nausea, dec'd appetite.  CT abd with contrast showed extensive acute cholecystitis.  Non-obs 6 mm left kidney stone.  She underwent lap-renetta and NICKO today. Cr was normal 0.9 on admission, 0.75 yesterday and inc'd to 1.3 today.  Briefly hypotensive overnight to 96/51.  Hgb has dipped from 12.4 to 10.1.  UA before onset of Xuan showed 30 mg/dL protein.  LR IVF started this AM.  On zosyn.    Patient is very lethargic right having just received morphine 4mg IV.  Unable to provide hx.  She's on 3L NC O2.      Review of Systems:   14 point review of systems is otherwise negative except for mentioned above on HPI    Personal History     Past Medical History:   Diagnosis Date    Arthritis     Chronic venous insufficiency     Dupuytren's contracture     History of staph infection     S/P KNEE DEC 2016    History of transfusion     Hyperlipidemia     Lymphedema     LEFT LEG    Nontoxic multinodular goiter     Osteoporosis     Dr. Cabrera    Pelvic joint pain, left     Postsurgical hypothyroidism     Presence of left artificial hip joint     METAL ON METAL AS NOTED PER DR CLEMENT NOTE    Restless leg syndrome     Right bundle branch block     Seizure disorder     Dr. Whitman, HX  LAST SEIZURE    Sleep apnea     DOES NOT HAVE MACHINE    Vitamin D insufficiency        Past Surgical History:   Procedure Laterality Date    APPENDECTOMY      CARDIAC CATHETERIZATION      NORMAL     COLONOSCOPY  2017    Normal except for anal fissure.  Dr. Brandt.  [facility].    KNEE MINI REVISION Left 11/15/2016     Procedure: LEFT KNEE POLY CHANGE WITH HI POST STABILIZER;  Surgeon: Pablito Claudio MD;  Location: [facility] MAIN OR;  Service:     KNEE MINI REVISION Left 12/13/2016    Procedure: LT KNEE REMOVAL/REPLACEMENT LOCKING PIN ;  Surgeon: Pablito Claudio MD;  Location: [facility] MAIN OR;  Service:     MAMMO FINE NEEDLE ASPIRATION UNILATERAL      RIGHT THYROID NODULE, 2009 BENIGN     [place] ARTHRP KNE CONDYLE&PLATU MEDIAL&LAT COMPARTMENTS Left 12/24/2016    Procedure: LEFT KNEE WASHOUT WITH POLY CHANGE;  Surgeon: Christiano Cesar MD;  Location: [facility] MAIN OR;  Service: Orthopedics    [place] REVJ TOTAL KNEE ARTHRP W/WO ALGRFT 1 COMPONENT Left 2/20/2017    Procedure: LT KNEE REMOVAL ANTIBIOTIC SPACER AND KNEE REVISION;  Surgeon: Christiano Cesar MD;  Location: [facility] MAIN OR;  Service: Orthopedics    REPLACEMENT TOTAL KNEE Left     THYROIDECTOMY, PARTIAL      1979 LEFT LOBECTOMY AND ISTHMECTOMY     TOTAL ABDOMINAL HYSTERECTOMY WITH SALPINGO OOPHORECTOMY      TOTAL HIP ARTHROPLASTY Left     TOTAL HIP ARTHROPLASTY Right 9/14/2019    Procedure: TOTAL HIP ARTHROPLASTY ANTERIOR WITH HANA TABLE;  Surgeon: Christiano Cesar II, MD;  Location: [facility] OR;  Service: Orthopedics    TOTAL HIP ARTHROPLASTY REVISION Left 3/9/2021    Procedure: LEFT TOTAL HIP ARTHROPLASTY REVISION POSTERIOR;  Surgeon: Christiano Cesar II, MD;  Location: [facility] OR;  Service: Orthopedics;  Laterality: Left;    TOTAL KNEE  PROSTHESIS REMOVAL W/ SPACER INSERTION Left 12/29/2016    Procedure: LT KNEE IMPLANT REMOVAL WITH INSERTION OF SPACER ;  Surgeon: Christiano Cesar MD;  Location: [facility] OR;  Service:        Family History: family history includes Diabetes in her sister; Heart disease in her father; Hyperlipidemia in her sister; Hypertension in her sister; Obesity in her sister.    Social History:  reports that she has never smoked. She has never been exposed to tobacco smoke. She has never used smokeless tobacco. She reports that she does  not drink alcohol and does not use drugs.    Home Medications:  Prior to Admission medications    Medication Sig Start Date End Date Taking? Authorizing Provider   cholecalciferol (VITAMIN D3) 1000 units tablet Take 1 tablet by mouth Daily.  Patient taking differently: Take 4 tablets by mouth Daily. 4/16/18  Yes Duglas Rock MD   Cyanocobalamin (Vitamin B-12) 1000 MCG sublingual tablet 1 tablet daily 7/3/24  Yes Duglas Rock MD   PHENobarbital 64.8 MG tablet TAKE 3 TABLETS BY MOUTH DAILY  Patient taking differently: Take  by mouth See Admin Instructions. Takes 1 tablet in morning, 2 tablets at night 6/28/24  Yes Steven Liu II, MD   pramipexole (MIRAPEX) 0.5 MG tablet TAKE 5 TABLETS BY MOUTH ONCE DAILY  Patient taking differently: Take  by mouth Daily. TAKES 2-3 TABLETS BY MOUTH ONCE DAILY 10/25/24  Yes Steven Liu II, MD   pravastatin (PRAVACHOL) 40 MG tablet TAKE 1 TABLET BY MOUTH EVERY NIGHT FOR HIGH AMOUNT OF FATS IN THE BLOOD 7/31/24  Yes Duglas Rock MD   Synthroid 50 MCG tablet Take 1 tablet by mouth Every Morning. Indications: Underactive Thyroid 8/16/24  Yes Duglas Rock MD   alendronate (Fosamax) 70 MG tablet Take 1 tablet by mouth Every 7 (Seven) Days. Taken on the stomach with 1 glass of water.  No food until 1/2-hour later.  Patient not taking: Reported on 8/2/2024 7/3/24 7/3/25  Duglas Rock MD   aspirin 81 MG EC tablet Take 1 tablet by mouth Daily.    ProviderAleksey MD   coenzyme Q10 100 MG capsule Take 1 capsule by mouth Daily. HOLD PRIOR TO SURG  Patient not taking: Reported on 8/2/2024    Aleksey Blood MD   escitalopram (LEXAPRO) 20 MG tablet Take 1 tablet by mouth Daily.  Patient not taking: Reported on 8/2/2024 7/22/22 7/22/23  Duglas Rock MD       Allergies:  Allergies   Allergen Reactions    Clindamycin/Lincomycin Itching    Duricef [Cefadroxil] Hives and Rash    Sulfa Antibiotics Rash     RASH       Objective     Vitals:   Temp:   [97.5 °F (36.4 °C)-98.8 °F (37.1 °C)] 97.5 °F (36.4 °C)  Heart Rate:  [73-93] 76  Resp:  [14-22] 14  BP: ()/(45-71) 144/71  Flow (L/min) (Oxygen Therapy):  [2-3] 2    Intake/Output Summary (Last 24 hours) at 11/2/2024 1818  Last data filed at 11/2/2024 1514  Gross per 24 hour   Intake 200 ml   Output --   Net 200 ml       Physical Exam:    General Appearance: ill appearing WF drowsy/lethargic on NC O2  Skin: warm and dry  HEENT: oral mucosa normal, nonicteric sclera  Neck: supple, no JVD  Lungs: CTA bilat no rales  Heart: RRR, normal S1 and S2  Abdomen: soft, nontender, nondistended  : no palpable bladder  Extremities: no edema, cyanosis or clubbing  Neuro: normal speech and mental status     Scheduled Meds:     cholecalciferol, 4,000 Units, Oral, Daily  levothyroxine, 50 mcg, Oral, QAM  PHENobarbital, 129.6 mg, Oral, Nightly  PHENobarbital, 64.8 mg, Oral, Daily  piperacillin-tazobactam, 3.375 g, Intravenous, Q8H  pramipexole, 0.5 mg, Oral, Daily  pravastatin, 40 mg, Oral, Nightly  sodium chloride, 10 mL, Intravenous, Q12H      IV Meds:   lactated ringers, 100 mL/hr, Last Rate: 100 mL/hr (11/02/24 1254)        Results Reviewed:   I have personally reviewed the results from the time of this admission to 11/2/2024 18:18 EDT     Lab Results   Component Value Date    GLUCOSE 93 11/02/2024    CALCIUM 8.9 11/02/2024     11/02/2024    K 4.0 11/02/2024    CO2 23.9 11/02/2024     11/02/2024    BUN 24 (H) 11/02/2024    CREATININE 1.30 (H) 11/02/2024    EGFRIFAFRI 104 10/08/2021    EGFRIFNONA 86 10/08/2021    BCR 18.5 11/02/2024    ANIONGAP 11.1 11/02/2024      Lab Results   Component Value Date    MG 2.0 11/02/2024    PHOS 3.0 11/02/2024    ALBUMIN 3.0 (L) 11/01/2024           Assessment / Plan     ASSESSMENT:  JACIEL - unclear if oliguric.  Cr up 0.75 to 1.3 this AM (before surgery).  Poss prerenal/hemodynamic from vol depletion and mild/transient hypotension (BP was 90s/50s).  K/HCo3 normal.  Rule out  retention  Acute cholecystitis, POD0 lap-renetta + NICKO.  On zosyn  Acute encephalopathy - appears from morphine just given   Dyslipidemia on statin, check CK  Seizure disorder treated  Hypothyroidism, treated     PLAN:  Check random bladder scan  Agree with LR IVF, limit total duration to 2L   D/W RN     Thank you for involving us in the care of Mariela Ruiz.  Please feel free to call with any questions.    Carlo Hampton MD  11/02/24  18:18 EDT    [facility]  [phone]

## 2024-11-02 NOTE — PROGRESS NOTES
Name: Mariela Ruiz ADMIT: 10/31/2024   : 1945  PCP: Duglas Rock MD    MRN: [id number] LOS: 2 days   AGE/SEX: 79 y.o. female  ROOM: [facility]     Subjective   Subjective   No acute events overnight.  Laying in bed.  No new complaints.  Denies abdominal pain, nausea or vomiting.  N.p.o. after midnight for planned cholecystectomy.  Review of Systems   As above  Objective   Objective   Vital Signs  Temp:  [97.5 °F (36.4 °C)-98.5 °F (36.9 °C)] 97.5 °F (36.4 °C)  Heart Rate:  [] 75  Resp:  [16-20] 16  BP: ()/(51-56) 116/55  SpO2:  [96 %-100 %] 100 %  on  Flow (L/min) (Oxygen Therapy):  [2-3] 2;   Device (Oxygen Therapy): nasal cannula  Body mass index is 37.05 kg/m².  Physical Exam    General: Alert, oriented x 3, laying in bed, not in distress,  HEENT: Normocephalic, atraumatic  CV: Regular rate and rhythm, no murmurs rubs or gallops  Lungs: Clear to auscultation bilaterally, no crackles or wheezes  Abdomen: Soft, nontender, nondistended  Extremities: Bilateral lower extremity lymphedema, no cyanosis     Results Review     I reviewed the patient's new clinical results.  Results from last 7 days   Lab Units 24  0759 24  0603 10/31/24  1238   WBC 10*3/mm3 7.30 10.06 15.00*   HEMOGLOBIN g/dL 10.1* 10.8* 12.4   PLATELETS 10*3/mm3 197 189 210     Results from last 7 days   Lab Units 24  0759 24  0603 10/31/24  1238   SODIUM mmol/L 139 135* 134*   POTASSIUM mmol/L 4.0 3.9 4.2   CHLORIDE mmol/L 104 105 101   CO2 mmol/L 23.9 23.0 25.0   BUN mg/dL 24* 18 21   CREATININE mg/dL 1.30* 0.75 0.93   GLUCOSE mg/dL 93 83 96   Estimated Creatinine Clearance: 42.7 mL/min (A) (by PHI-G formula based on SCr of 1.3 mg/dL (H)).  Results from last 7 days   Lab Units 24  0603 10/31/24  1238   ALBUMIN g/dL 3.0* 3.4*   BILIRUBIN mg/dL 0.6 0.8   ALK PHOS U/L 121* 153*   AST (SGOT) U/L 14 18   ALT (SGPT) U/L 10 12     Results from last 7 days   Lab Units 24  0759 24  0603  "10/31/24  1238   CALCIUM mg/dL 8.9 9.0 9.5   ALBUMIN g/dL  --  3.0* 3.4*   MAGNESIUM mg/dL 2.0  --   --    PHOSPHORUS mg/dL 3.0  --   --      Results from last 7 days   Lab Units 10/31/24  1238   LACTATE mmol/L 1.2     SARS-CoV-2 PCR   Date Value Ref Range Status   03/06/2021 Not Detected Not Detected Final     Comment:     Nucleic acid specific to SARS-CoV-2 (COVID-19) virus was not detected in  this sample by the TaqPath (TM) COVID-19 Combo Kit.          SARS-CoV-2 (COVID-19) nucleic acid testing performed using Rot[employer]View Aptima (R) SARS-CoV-2 Assay or SureSpeak TaqPath (TM)  COVID-19 Combo Kit as indicated above under Emergency Use Authorization (EUA) from the FDA. Aptima (R) and TaqPath (TM) test performance  characteristics verified by [facility] in accordance with the FDAs Guidance Document (Policy for Diagnostic Tests for Coronavirus Disease-2019  during the Public Health Emergency) issued on March 16, 2020. The laboratory is regulated under CLIA as qualified to perform high-complexity testing  Unless otherwise noted, all testing was performed at [facility], CLIA No. [id number], [place] State Licensee No. [id number]     No results found for: \"HGBA1C\", \"POCGLU\"        CT Abdomen Pelvis With Contrast  CT ABDOMEN AND PELVIS WITH IV CONTRAST     HISTORY: 79-year-old female with diffuse lower abdominal pain.  Appendectomy, hysterectomy and bilateral salpingo-oophorectomy in the  past.     TECHNIQUE: Radiation dose reduction techniques were utilized, including  automated exposure control and exposure modulation based on body size.   3 mm images were obtained through the abdomen and pelvis after the  administration of IV contrast. No recent previous CT of the abdomen for  comparison.     FINDINGS:  1. There is extensive acute cholecystitis. The gallbladder is markedly  distended, thick-walled, and has pericholecystic fluid. There is no  intra or extrahepatic biliary dilatation. There are no " definite  calcified gallstones. There is a tiny amount of perihepatic fluid, small  amount of free fluid in the mesentery inferior to the gallbladder and  there is a small to moderate volume of free fluid within the dependent  aspect of the pelvis. No fluid collections or abscess. Surgical  consultation is recommended.     2. Liver and spleen appear unremarkable. Pancreas, adrenals, and right  kidney appear unremarkable. Left kidney appears unremarkable other than  a 6 mm infundibular stone at the lower pole and a 3 mm stone at the  upper pole. Distal ureters are poorly seen secondary to extensive streak  artifact from the bilateral hip arthroplasty hardware. Urinary bladder  is not abnormally distended.     3. There is no bowel ileus or obstruction. There is no evidence for  colitis. There is mild secondary thickening of the hepatic flexure and  duodenum adjacent to the inflamed gallbladder.     4. There is a tiny right pleural effusion and there is dependent  atelectatic change at the right lung base.     This report was finalized on 10/31/2024 4:22 PM by Dr. Lisa Lucas M.D  on Workstation: [id number]       Scheduled Medications  cholecalciferol, 4,000 Units, Oral, Daily  levothyroxine, 50 mcg, Oral, QAM  PHENobarbital, 129.6 mg, Oral, Nightly  PHENobarbital, 64.8 mg, Oral, Daily  piperacillin-tazobactam, 3.375 g, Intravenous, Q8H  pramipexole, 0.5 mg, Oral, Daily  pravastatin, 40 mg, Oral, Nightly  sodium chloride, 10 mL, Intravenous, Q12H    Infusions  lactated ringers, 100 mL/hr    Diet  NPO Diet NPO Type: Strict NPO    I have personally reviewed     [x]  Laboratory   [x]  Microbiology   [x]  Radiology   [x]  EKG/Telemetry  []  Cardiology/Vascular   []  Pathology    []  Records       Assessment/Plan     Active Hospital Problems    Diagnosis  POA    **Acute cholecystitis [K81.0]  Yes    Elevated troponin [R79.89]  Unknown    Hypothyroidism, unspecified [E03.9]  Yes    Seizure disorder [G40.909]  Yes    Sleep  apnea [G47.30]  Yes      Resolved Hospital Problems   No resolved problems to display.       79 y.o. female admitted with Acute cholecystitis.    Acute cholecystitis  Continue IV Zosyn, antiemetics, supportive care  Blood cultures negative to date  General Surgery following, plan for cholecystectomy this morning    Mild JACIEL  -Creatinine 1.3 this morning, up from 0.75 yesterday.  -Secondary to contrast?  -Continue IV fluids  -Nephrology consult    Elevated troponin    negative left heart catheterization in July 2022 following false positive perfusion scan  --significance unclear especially in this situation of no change in her functional capacity and ability to do ADLs despite abdominal discomfort as documented above.   - EKG with RBBB, unchanged compared to prior.  - cardiology evaluated,      Seizure disorder  -continue her home regimen of phenobarbital   -seizure precautions.     Hypothyroidism  -continue thyroid replacement therapy.    Anemia   -Hemoglobin 12.4 on admission, 10.1 this morning.  No signs of bleeding reported.  Suspect primarily dilutional  -Iron panel, B12 folate pending  -Daily CBC    History of sleep apnea  Nightly oxygen.  CPAP if available.      SCDs for DVT prophylaxis.  Full code.  Discussed with patient and care team on multidisciplinary rounds.  Expected Discharge Date: 11/4/2024; Expected Discharge Time:        Copied text in this note has been reviewed and is accurate as of 11/02/24.         Dictated utilizing Dragon dictation        Valdemar Barros MD  [facility]  11/02/24  11:27 EDT

## 2024-11-02 NOTE — ANESTHESIA POSTPROCEDURE EVALUATION
Patient: Mariela Ruiz    Procedure Summary       Date: 11/02/24 Room / Location: [facility] OR [address] [place] [address] MAIN OR    Anesthesia Start: 1254 Anesthesia Stop: 1549    Procedure: CHOLECYSTECTOMY LAPAROSCOPIC WITH DAVINCI ROBOT with cholangiogram, possible open (Abdomen) Diagnosis:       Acute cholecystitis      (Acute cholecystitis [K81.0])    Surgeons: Bin Heaton MD Provider: Nehemiah Moore MD    Anesthesia Type: general ASA Status: 3            Anesthesia Type: general    Vitals  Vitals Value Taken Time   /55 11/02/24 1615   Temp 36.5 °C (97.7 °F) 11/02/24 1615   Pulse 91 11/02/24 1619   Resp 16 11/02/24 1615   SpO2 95 % 11/02/24 1619   Vitals shown include unfiled device data.        Post Anesthesia Care and Evaluation    Patient location during evaluation: bedside  Patient participation: complete - patient participated  Level of consciousness: awake and alert  Pain management: adequate    Airway patency: patent  Anesthetic complications: No anesthetic complications  PONV Status: controlled  Cardiovascular status: blood pressure returned to baseline and acceptable  Respiratory status: acceptable  Hydration status: acceptable

## 2024-11-02 NOTE — PLAN OF CARE
Goal Outcome Evaluation:           Patient A&Ox4 but forgetful. Cholecystectomy performed today by Dr. Heaton. She has 4 lap sites. All 4 lap sites are clean, dry, and intact. LR infusing per order. PRN pain medication given once she was back from surgery for severe pain. VSS.

## 2024-11-02 NOTE — ANESTHESIA PROCEDURE NOTES
Airway  Date/Time: 11/2/2024 1:02 PM  Airway not difficult    General Information and Staff    Anesthesiologist: Nehemiah Moore MD    Indications and Patient Condition    Preoxygenated: yes  Mask difficulty assessment: 1 - vent by mask    Final Airway Details  Final airway type: endotracheal airway      Successful airway: ETT  Cuffed: yes   Successful intubation technique: direct laryngoscopy  Endotracheal tube insertion site: oral  Blade: Mejia  Blade size: 2  ETT size (mm): 7.5  Cormack-Lehane Classification: grade I - full view of glottis  Placement verified by: chest auscultation and capnometry   Measured from: teeth  ETT/EBT  to teeth (cm): 22  Assessment: lips, teeth, and gum same as pre-op and atraumatic intubation

## 2024-11-02 NOTE — SIGNIFICANT NOTE
11/02/24 1320   OTHER   Discipline physical therapist   Rehab Time/Intention   Session Not Performed other (see comments)  (pt in OR, PT will f/u tomorrow)   Therapy Assessment/Plan (PT)   Criteria for Skilled Interventions Met (PT) skilled treatment is necessary   Recommendation   PT - Next Appointment 11/03/24        YOB: 2007  Location: [facility]  Location Address: [address] [place] [address], [facility], [address] [place] [address]  Location Phone: [phone]    Chief Complaint   Patient presents with   • Follow-up     ears,tubes       History of Present Illness  Lj Sotelo is a 10 y.o. male.  Lj Sotelo is here for follow up of ENT complaints. The patient has had no problems.  The symptoms are not localized to a particular location. The patient has had a resolution of the symptoms. The symptoms have been present for the last several weeks The symptoms are aggravated by  no identifiable factors. The symptoms are improved by no identifiable factors.       Past Medical History:   Diagnosis Date   • ADHD (attention deficit hyperactivity disorder)    • Chronic adenoid hypertrophy    • Chronic Eustachian tube dysfunction, bilateral    • Chronic otitis media    • Snores        Past Surgical History:   Procedure Laterality Date   • MYRINGOTOMY WITH INSERTION OF EAR TUBES AND ADENOIDECTOMY Bilateral 2017    Procedure: MYRINGOTOMY WITH INSERTION OF EAR TUBES AND ADENOIDECTOMY;  Surgeon: Sai Lopez MD;  Location: [facility];  Service:    • NO PAST SURGERIES           Current Outpatient Prescriptions:   •  fluticasone (FLONASE) 50 MCG/ACT nasal spray, 1 spray into each nostril Daily, Disp: 16 g, Rfl: 5  •  methylphenidate (CONCERTA) 27 MG CR tablet, Take 27 mg by mouth Every Morning  , Disp: , Rfl:   •  Multiple Vitamins-Minerals (MULTIVITAL PO), Take  by mouth., Disp: , Rfl:   •  Omega-3 Fatty Acids (FISH OIL) 1000 MG capsule capsule, Take  by mouth Daily With Breakfast., Disp: , Rfl:   •  saccharomyces boulardii (FLORASTOR) 250 MG capsule, Take 250 mg by mouth 2 (Two) Times a Day., Disp: , Rfl:     Review of patient's allergies indicates no known allergies.    Family History   Problem Relation Age of Onset   • Diabetes Father    • Heart disease Father        Social History     Social History   • Marital status:  Single     Spouse name: N/A   • Number of children: N/A   • Years of education: N/A     Occupational History   • Not on file.     Social History Main Topics   • Smoking status: Never Smoker   • Smokeless tobacco: Never Used   • Alcohol use Not on file   • Drug use: Not on file   • Sexual activity: Not on file     Other Topics Concern   • Not on file     Social History Narrative       Review of Systems   Constitutional: Negative.    HENT: Negative.    Eyes: Negative.    Respiratory: Negative.    Cardiovascular: Negative.    Gastrointestinal: Negative.    Endocrine: Negative.    Genitourinary: Negative.    Musculoskeletal: Negative.    Skin: Negative.    Allergic/Immunologic: Negative.    Neurological: Negative.    Psychiatric/Behavioral: Negative.        Vitals:    01/30/18 0921   Temp: 98.2 °F (36.8 °C)       Objective     Physical Exam  CONSTITUTIONAL: well nourished, alert,  in no acute distress     COMMUNICATION AND VOICE: able to communicate normally, normal voice quality    HEAD: normocephalic, no lesions, atraumatic, no tenderness, no masses     FACE: appearance normal, no lesions, no tenderness, no deformities, facial motion symmetric    SALIVARY GLANDS: parotid glands with no tenderness, no swelling, no masses, submandibular glands with normal size, nontender    EYES: ocular motility normal, eyelids normal, orbits normal, no proptosis, conjunctiva normal , pupils equal, round     EARS:  Hearing: response to conversational voice normal bilaterally   External Ears: auricles without lesions  Otoscopic: bilateral TMs with intact and patent PET    NOSE:  External Nose: structure normal, no tenderness on palpation, no nasal discharge, no lesions, no evidence of trauma, nostrils patent   Intranasal Exam: nasal mucosa normal, vestibule within normal limits, inferior turbinate normal, nasal septum midline     ORAL:  Lips: upper and lower lips without lesion   Teeth: dentition within normal limits for age   Gums:  gingivae healthy   Oral Mucosa: oral mucosa normal, no mucosal lesions   Floor of Mouth: Warthin’s duct patent, mucosa normal  Tongue: lingual mucosa normal without lesions, normal tongue mobility   Palate: soft and hard palates with normal mucosa and structure  Oropharynx: oropharyngeal mucosa normal    NECK: neck appearance normal, no mass,  noted without erythema or tenderness    LYMPH NODES: no lymphadenopathy    CHEST/RESPIRATORY: respiratory effort normal    CARDIOVASCULAR: extremities without cyanosis or edema      NEUROLOGIC/PSYCHIATRIC:  mood normal, affect appropriate, CN II-XII intact grossly    Assessment/Plan   Lj was seen today for follow-up.    Diagnoses and all orders for this visit:    ETD (Eustachian tube dysfunction), bilateral    Chronic mucoid otitis media of both ears      * Surgery not found *  No orders of the defined types were placed in this encounter.    Return in about 6 months (around 7/30/2018).       Patient Instructions   Call for problems or worsening symptoms    Dry ear precautions

## 2024-11-02 NOTE — ANESTHESIA PREPROCEDURE EVALUATION
Anesthesia Evaluation     Patient summary reviewed and Nursing notes reviewed   no history of anesthetic complications:   NPO Solid Status: > 8 hours  NPO Liquid Status: > 2 hours           Airway   Mallampati: II  TM distance: >3 FB  Neck ROM: full  Dental      Pulmonary    (+) ,sleep apnea  Cardiovascular     ECG reviewed    (+) hyperlipidemia      Neuro/Psych  (+) seizures  GI/Hepatic/Renal/Endo    (+) obesity, GERD, thyroid problem hypothyroidism    ROS Comment: Acute cholecystitis    Elevated Cr    Musculoskeletal     Abdominal    Substance History      OB/GYN          Other   blood dyscrasia anemia,                   Anesthesia Plan    ASA 3     general     intravenous induction     Anesthetic plan, risks, benefits, and alternatives have been provided, discussed and informed consent has been obtained with: patient.      CODE STATUS:    Code Status (Patient has no pulse and is not breathing): CPR (Attempt to Resuscitate)  Medical Interventions (Patient has pulse or is breathing): Full Support

## 2024-11-02 NOTE — OP NOTE
Operative Note :   Bin Heaton MD    Mariela Ruiz  1945    Procedure Date: 11/02/24    Pre-op Diagnosis:  Acute cholecystitis [K81.0]    Post-Op Diagnosis:  Acute cholecystitis with hydrops of the gallbladder  Extensive adhesions of the omentum to the abdominal wall    Procedure:   Laparoscopic cholecystectomy with cholangiogram and da Kenney robot assistance  Laparoscopic lysis of adhesions    Surgeon: Bin Heaton MD    Assistant: Pola Wu CSA was responsible for performing the following activities: Irrigation, suction, retraction, manipulation of the camera, closure of skin and placement of dressings as well as exchange of instruments at bedside and their skilled assistance was necessary for the success of this case.    Anesthesia:  General    EBL:   25 cc    Specimens:   Gallbladder and contents    Indications:  79-year-old patient presenting with acute cholecystitis    Findings:   Acute cholecystitis with hydrops of the gallbladder  Extensive adhesions of the omentum to the abdominal wall  Unremarkable operative cholangiogram    Recommendations:   Continue antibiotics at least overnight  Routine postcholecystectomy care    Description of procedure:    After obtaining informed consent, patient was brought to the operating room and placed under a general anesthetic.  The abdomen was sterilely prepped and draped.  Bush's point was identified and anesthetized with a Marcaine solution.  8 mm skin incision made and then 0 degree scope and Optiview trocar was used with direct access technique, placed through the abdominal wall layers into the abdominal cavity.  The abdomen was then insufflated and inspection of the abdomen demonstrated no evidence of intra-abdominal injury.  Immediately evident was extensive adhesions of what seemed to be just omentum to the abdominal wall.  It was difficult to find another place to get a trocar in but eventually in the left lower quadrant I was able to find  deja get a second trocar.  I then used the monopolar scissors to begin taking down these adhesions.  Eventually I was able to take down enough of the adhesions of the omentum to get enough space to place the additional trocars.  I then used the needle aspirator to withdraw about 120 cc of but was essentially clear bile from the gallbladder in order to be able to grasp it.  The gallbladder was extremely large.  Additional 8 mm robotic trocars were then introduced into the abdomen with an 8 mm right lateral trocar, 8 mm right mid abdomen trocar and 8 mm left midabdomen trocar.  The patient was positioned with the head up and right side up.  The da Kenney Xi robot was then brought up and docked.  From right to left the fenestrated bipolar grasper, 30 degree scope, monopolar hook and ProGrasp forceps were introduced under direct visualization.  I then moved to the console.  The fundus of the gallbladder was grasped and retracted cephalad.  Any adhesions to the fundus or infundibulum were taken down with a combination of sharp and blunt dissection, exposing the infundibulum.  The infundibulum was then grasped and retracted laterally.  The peritoneum overlying the cystic triangle was incised.  Next using a combination of sharp and blunt dissection and using fluorescent cholangiography, the cystic duct was dissected and identified completely.  It should be noted that ICG did not enter the cystic duct or gallbladder.The cystic artery was identified and dissected completely.  The remainder of the cystic triangle was cleared out, creating the retrocystic window and then the lower one third of the gallbladder was mobilized off the bed of the liver, giving the critical view of safety.  Again the fluoroscopic cholangiography was used to confirm impression of the anatomy.  2 clips were placed proximally on the cystic artery.  A clip was placed on the gallbladder side of the gallbladder cystic duct junction.  Incision was made in  the cystic duct.  The cholangiocatheter was introduced through the abdominal wall and clipped into position in the cystic duct.  We shot our cholangiogram.  There was good filling of the duodenum.  The common bile duct was not significantly dilated and no filling defects were identified.  We did have to put the patient's head down in order to get the proximal biliary radicles to fill, but this did appear to fill normally.  The cholangiocatheter was withdrawn.  The cystic duct was then clipped and then divided between the clips.  The cystic artery was divided between clips.  The gallbladder was removed from the bed of the liver with electrocautery and then placed in an Endo Catch bag.  The bed of the liver was inspected and any small bleeders were cauterized.  The clips on the cystic duct and cystic artery were inspected and appeared to be in good position.  The abdomen was irrigated.  Trocars were withdrawn under direct visualization after removal of the gallbladder.  The gallbladder was removed through the left mid abdominal wall port site.  The fascia at this site had to be opened more widely in order to excise the specimen.  The fascia at this site was closed with a running 0 Vicryl suture.  Skin incisions were closed with 4-0 Monocryl.  Patient tolerated this well.    Bin Heaton MD  General and Endoscopic Surgery  [facility]    [address], [address]  [place], [place], [address]  P: [phone]

## 2024-11-03 LAB
ALBUMIN SERPL-MCNC: 2.9 G/DL (ref 3.5–5.2)
ANION GAP SERPL CALCULATED.3IONS-SCNC: 13 MMOL/L (ref 5–15)
BUN SERPL-MCNC: 25 MG/DL (ref 8–23)
BUN/CREAT SERPL: 22.7 (ref 7–25)
CALCIUM SPEC-SCNC: 9.1 MG/DL (ref 8.6–10.5)
CHLORIDE SERPL-SCNC: 102 MMOL/L (ref 98–107)
CK SERPL-CCNC: 106 U/L (ref 20–180)
CO2 SERPL-SCNC: 19 MMOL/L (ref 22–29)
CREAT SERPL-MCNC: 1.1 MG/DL (ref 0.57–1)
DEPRECATED RDW RBC AUTO: 39.2 FL (ref 37–54)
EGFRCR SERPLBLD CKD-EPI 2021: 51.9 ML/MIN/1.73
ERYTHROCYTE [DISTWIDTH] IN BLOOD BY AUTOMATED COUNT: 11.6 % (ref 12.3–15.4)
FOLATE SERPL-MCNC: 14.3 NG/ML (ref 4.78–24.2)
GLUCOSE SERPL-MCNC: 84 MG/DL (ref 65–99)
HCT VFR BLD AUTO: 37.4 % (ref 34–46.6)
HGB BLD-MCNC: 12.4 G/DL (ref 12–15.9)
MAGNESIUM SERPL-MCNC: 2.2 MG/DL (ref 1.6–2.4)
MCH RBC QN AUTO: 30.2 PG (ref 26.6–33)
MCHC RBC AUTO-ENTMCNC: 33.2 G/DL (ref 31.5–35.7)
MCV RBC AUTO: 91.2 FL (ref 79–97)
PHOSPHATE SERPL-MCNC: 3.1 MG/DL (ref 2.5–4.5)
PLATELET # BLD AUTO: 232 10*3/MM3 (ref 140–450)
PMV BLD AUTO: 10.6 FL (ref 6–12)
POTASSIUM SERPL-SCNC: 4.4 MMOL/L (ref 3.5–5.2)
RBC # BLD AUTO: 4.1 10*6/MM3 (ref 3.77–5.28)
SODIUM SERPL-SCNC: 134 MMOL/L (ref 136–145)
VIT B12 BLD-MCNC: 239 PG/ML (ref 211–946)
WBC NRBC COR # BLD AUTO: 8.95 10*3/MM3 (ref 3.4–10.8)

## 2024-11-03 PROCEDURE — 83735 ASSAY OF MAGNESIUM: CPT | Performed by: SURGERY

## 2024-11-03 PROCEDURE — 97530 THERAPEUTIC ACTIVITIES: CPT

## 2024-11-03 PROCEDURE — 25010000002 HEPARIN (PORCINE) PER 1000 UNITS: Performed by: STUDENT IN AN ORGANIZED HEALTH CARE EDUCATION/TRAINING PROGRAM

## 2024-11-03 PROCEDURE — 25010000002 MORPHINE PER 10 MG: Performed by: SURGERY

## 2024-11-03 PROCEDURE — 80069 RENAL FUNCTION PANEL: CPT | Performed by: INTERNAL MEDICINE

## 2024-11-03 PROCEDURE — 99232 SBSQ HOSP IP/OBS MODERATE 35: CPT | Performed by: NURSE PRACTITIONER

## 2024-11-03 PROCEDURE — 99024 POSTOP FOLLOW-UP VISIT: CPT | Performed by: SURGERY

## 2024-11-03 PROCEDURE — 82607 VITAMIN B-12: CPT | Performed by: SURGERY

## 2024-11-03 PROCEDURE — 82746 ASSAY OF FOLIC ACID SERUM: CPT | Performed by: SURGERY

## 2024-11-03 PROCEDURE — 25010000002 PIPERACILLIN SOD-TAZOBACTAM PER 1 G: Performed by: SURGERY

## 2024-11-03 PROCEDURE — 82550 ASSAY OF CK (CPK): CPT | Performed by: INTERNAL MEDICINE

## 2024-11-03 PROCEDURE — 85027 COMPLETE CBC AUTOMATED: CPT | Performed by: SURGERY

## 2024-11-03 RX ORDER — HEPARIN SODIUM 5000 [USP'U]/ML
5000 INJECTION, SOLUTION INTRAVENOUS; SUBCUTANEOUS EVERY 8 HOURS SCHEDULED
Status: DISCONTINUED | OUTPATIENT
Start: 2024-11-03 | End: 2024-11-06 | Stop reason: HOSPADM

## 2024-11-03 RX ORDER — UREA 10 %
1000 LOTION (ML) TOPICAL DAILY
Status: DISCONTINUED | OUTPATIENT
Start: 2024-11-03 | End: 2024-11-06 | Stop reason: HOSPADM

## 2024-11-03 RX ADMIN — MORPHINE SULFATE 4 MG: 2 INJECTION, SOLUTION INTRAMUSCULAR; INTRAVENOUS at 10:42

## 2024-11-03 RX ADMIN — HEPARIN SODIUM 5000 UNITS: 5000 INJECTION INTRAVENOUS; SUBCUTANEOUS at 22:21

## 2024-11-03 RX ADMIN — PIPERACILLIN AND TAZOBACTAM 3.38 G: 3; .375 INJECTION, POWDER, LYOPHILIZED, FOR SOLUTION INTRAVENOUS at 22:21

## 2024-11-03 RX ADMIN — PIPERACILLIN AND TAZOBACTAM 3.38 G: 3; .375 INJECTION, POWDER, LYOPHILIZED, FOR SOLUTION INTRAVENOUS at 16:45

## 2024-11-03 RX ADMIN — LEVOTHYROXINE SODIUM 50 MCG: 50 TABLET ORAL at 06:44

## 2024-11-03 RX ADMIN — HEPARIN SODIUM 5000 UNITS: 5000 INJECTION INTRAVENOUS; SUBCUTANEOUS at 15:38

## 2024-11-03 RX ADMIN — PHENOBARBITAL 64.8 MG: 32.4 TABLET ORAL at 08:24

## 2024-11-03 RX ADMIN — PHENOBARBITAL 129.6 MG: 32.4 TABLET ORAL at 19:47

## 2024-11-03 RX ADMIN — CYANOCOBALAMIN TAB 500 MCG 1000 MCG: 500 TAB at 08:25

## 2024-11-03 RX ADMIN — Medication 10 ML: at 08:25

## 2024-11-03 RX ADMIN — PRAMIPEXOLE DIHYDROCHLORIDE 0.5 MG: 0.5 TABLET ORAL at 08:25

## 2024-11-03 RX ADMIN — Medication 4000 UNITS: at 08:24

## 2024-11-03 RX ADMIN — PIPERACILLIN AND TAZOBACTAM 3.38 G: 3; .375 INJECTION, POWDER, LYOPHILIZED, FOR SOLUTION INTRAVENOUS at 06:53

## 2024-11-03 RX ADMIN — OXYCODONE HYDROCHLORIDE AND ACETAMINOPHEN 1 TABLET: 5; 325 TABLET ORAL at 08:25

## 2024-11-03 RX ADMIN — PRAVASTATIN SODIUM 40 MG: 40 TABLET ORAL at 19:47

## 2024-11-03 NOTE — PLAN OF CARE
Goal Outcome Evaluation:  Plan of Care Reviewed With: patient        Progress: improving  Outcome Evaluation: Pt s/p hermila renetta performed on 11/2. Pt resting in bed and agreeable to participate in PT interventions. She completed bed mobility requiring modA, STS requiring modA, and standing pivot transfer ambulating up to 3 ft using 2WW requiring Jose David. Continued deficits with strength, pain management, balance and activity tolerance indicating need for skilled PT services. PT recommending SNF vs HHPT pending pt's progress during hospital stay.    Anticipated Discharge Disposition (PT): home with home health, skilled nursing facility

## 2024-11-03 NOTE — PLAN OF CARE
Goal Outcome Evaluation:  Plan of Care Reviewed With: patient           Outcome Evaluation: Pt. vital sign, lab and medication reviewed.complain of pain and PRN med given.Pt wants to urinate and sit on bedside commode twice this shift and her urine output is 100ml and 50ml. bladder scan done and notify nephrology, suggest to do staight cath got 800ml output. Assist x 2,on 2L O2. plan of care continue.

## 2024-11-03 NOTE — PROGRESS NOTES
Postop day 1 after robotic cholecystectomy for acute cholecystitis    Subjective:  Complains of abdominal pain.  Eating some but not much of an appetite to this point.    Objective:  Afebrile with stable vitals  General: Awake and alert without distress  Eyes: No icterus  Abdomen: Soft, appropriate tenderness, sites are clean    White blood cell count 8.9, hemoglobin 12.4.  Chemistries look okay.    Assessment and plan:  -Acute cholecystitis, now day 1 after cholecystectomy  -Advance diet as tolerated  -Mobilize as able  -Would benefit from at least 1 more day in the hospital  -Continue antibiotics today, likely can discontinue as of tomorrow     Bin Heaton MD  General and Endoscopic Surgery  [facility]    [address], [address]  [place], [place], [address]  P: [phone]  F: [phone]

## 2024-11-03 NOTE — THERAPY TREATMENT NOTE
Patient Name: Mariela Ruiz  : 1945    MRN: [id number]                              Today's Date: 11/3/2024       Admit Date: 10/31/2024    Visit Dx:     ICD-10-CM ICD-9-CM   1. Acute cholecystitis  K81.0 575.0     Patient Active Problem List   Diagnosis    Seizure disorder    Hyperlipidemia    Vitamin D insufficiency    Age-related osteoporosis without current pathological fracture    Sleep apnea    Chronic venous insufficiency    Postsurgical hypothyroidism    Chronic fatigue syndrome    Gastroesophageal reflux disease    Dupuytren's contracture    History of biliary T-tube placement    History of partial thyroidectomy    Multinodular goiter    Restless legs syndrome    History of cardiac catheterization    Urge incontinence of urine    Left knee pain    Ligamentous laxity of knee    DJD (degenerative joint disease) of knee    Adverse drug effect, subsequent encounter    Abnormal blood level of chromium    Abnormal blood level of cobalt    Family history of diabetes mellitus    Thrombocytopenia    .    S/P revision of total hip    Enlarged thyroid gland    Palmar fascial fibromatosis (dupuytren)    Hypothyroidism, unspecified    Hyperlipidemia, unspecified    Acute cholecystitis    Elevated troponin     Past Medical History:   Diagnosis Date    Arthritis     Chronic venous insufficiency     Dupuytren's contracture     History of staph infection     S/P KNEE DEC 2016    History of transfusion     Hyperlipidemia     Lymphedema     LEFT LEG    Nontoxic multinodular goiter     Osteoporosis     Dr. Cabrera    Pelvic joint pain, left     Postsurgical hypothyroidism     Presence of left artificial hip joint     METAL ON METAL AS NOTED PER DR CLEMENT NOTE    Restless leg syndrome     Right bundle branch block     Seizure disorder     Dr. Whitman, HX  LAST SEIZURE    Sleep apnea     DOES NOT HAVE MACHINE    Vitamin D insufficiency      Past Surgical History:   Procedure Laterality Date    APPENDECTOMY      CARDIAC  CATHETERIZATION      NORMAL     COLONOSCOPY  08/17/2017    Normal except for anal fissure.  Dr. Brandt.  [facility].    KNEE MINI REVISION Left 11/15/2016    Procedure: LEFT KNEE POLY CHANGE WITH HI POST STABILIZER;  Surgeon: Pablito Claudio MD;  Location: [facility] MAIN OR;  Service:     KNEE MINI REVISION Left 12/13/2016    Procedure: LT KNEE REMOVAL/REPLACEMENT LOCKING PIN ;  Surgeon: Pablito Claudio MD;  Location: [facility] OR;  Service:     MAMMO FINE NEEDLE ASPIRATION UNILATERAL      RIGHT THYROID NODULE, 2009 BENIGN     NY ARTHRP KNE CONDYLE&PLATU MEDIAL&LAT COMPARTMENTS Left 12/24/2016    Procedure: LEFT KNEE WASHOUT WITH POLY CHANGE;  Surgeon: Christiano Cesar MD;  Location: [facility] OR;  Service: Orthopedics    NY REVJ TOTAL KNEE ARTHRP W/WO ALGRFT 1 COMPONENT Left 2/20/2017    Procedure: LT KNEE REMOVAL ANTIBIOTIC SPACER AND KNEE REVISION;  Surgeon: Christiano Cesar MD;  Location: [facility] OR;  Service: Orthopedics    REPLACEMENT TOTAL KNEE Left     THYROIDECTOMY, PARTIAL      1979 LEFT LOBECTOMY AND ISTHMECTOMY     TOTAL ABDOMINAL HYSTERECTOMY WITH SALPINGO OOPHORECTOMY      TOTAL HIP ARTHROPLASTY Left     TOTAL HIP ARTHROPLASTY Right 9/14/2019    Procedure: TOTAL HIP ARTHROPLASTY ANTERIOR WITH HANA TABLE;  Surgeon: Christiano Cesar II, MD;  Location: [facility] OR;  Service: Orthopedics    TOTAL HIP ARTHROPLASTY REVISION Left 3/9/2021    Procedure: LEFT TOTAL HIP ARTHROPLASTY REVISION POSTERIOR;  Surgeon: Christiano Cesar II, MD;  Location: [facility];  Service: Orthopedics;  Laterality: Left;    TOTAL KNEE  PROSTHESIS REMOVAL W/ SPACER INSERTION Left 12/29/2016    Procedure: LT KNEE IMPLANT REMOVAL WITH INSERTION OF SPACER ;  Surgeon: Christiano Cesar MD;  Location: [facility];  Service:       General Information       Row Name 11/03/24 1123          Physical Therapy Time and Intention    Document Type therapy note (daily note)  -GORDON     Mode of Treatment individual  therapy;physical therapy  -       Row Name 11/03/24 1123          General Information    Patient Profile Reviewed yes  -GORDON     Existing Precautions/Restrictions fall  -GORDON     Barriers to Rehab none identified  -       Row Name 11/03/24 1123          Living Environment    People in Home child(aneudy), adult  -GORDON       Row Name 11/03/24 1123          Cognition    Orientation Status (Cognition) oriented x 4  -GORDON       Row Name 11/03/24 1123          Safety Issues/Impairments Affecting Functional Mobility    Impairments Affecting Function (Mobility) balance;endurance/activity tolerance;strength;pain  -GORDON               User Key  (r) = Recorded By, (t) = Taken By, (c) = Cosigned By      Initials Name Provider Type    Praveena Mcgovern PT Physical Therapist                   Mobility       Row Name 11/03/24 1124          Bed Mobility    Bed Mobility supine-sit  -GORDON     Supine-Sit [place] (Bed Mobility) moderate assist (50% patient effort);verbal cues  -GORDON     Assistive Device (Bed Mobility) head of bed elevated;bed rails  -       Row Name 11/03/24 1124          Sit-Stand Transfer    Sit-Stand [place] (Transfers) moderate assist (50% patient effort);verbal cues  -GORDON     Assistive Device (Sit-Stand Transfers) walker, front-wheeled  -GORDON       Row Name 11/03/24 1124          Gait/Stairs (Locomotion)    [place] Level (Gait) minimum assist (75% patient effort);verbal cues  -GORDON     Assistive Device (Gait) walker, front-wheeled  -GORDON     Patient was able to Ambulate yes  -GORDON     Distance in Feet (Gait) 3  -GORDON     Deviations/Abnormal Patterns (Gait) gunnar decreased;antalgic;weight shifting decreased;stride length decreased  -GORDON     Bilateral Gait Deviations forward flexed posture;heel strike decreased  -GORDON     [place] Level (Stairs) not tested  -GORDON               User Key  (r) = Recorded By, (t) = Taken By, (c) = Cosigned By      Initials Name Provider Type    Praveena Mcgovern PT Physical Therapist                    Obj/Interventions       Row Name 11/03/24 1125          Range of Motion Comprehensive    General Range of Motion bilateral lower extremity ROM WFL  -[facility] Name 11/03/24 1125          Strength Comprehensive (MMT)    General Manual Muscle Testing (MMT) Assessment lower extremity strength deficits identified  -     Comment, General Manual Muscle Testing (MMT) Assessment BLE > 3+/5 MMT  -[facility] Name 11/03/24 1125          Motor Skills    Therapeutic Exercise other (see comments)  Supine BLE therex x 10 reps each (APs, quad sets)  -[facility] Name 11/03/24 1125          Balance    Balance Assessment sitting static balance;sitting dynamic balance;standing static balance;standing dynamic balance  -     Static Sitting Balance contact guard  -     Dynamic Sitting Balance contact guard;minimal assist  -     Position, Sitting Balance unsupported;sitting edge of bed  -     Static Standing Balance minimal assist;contact guard  -     Dynamic Standing Balance minimal assist  -     Position/Device Used, Standing Balance supported;walker, front-wheeled  -     Balance Interventions sitting;standing;static;minimal challenge;dynamic;weight shifting activity  -[facility] Name 11/03/24 1125          Sensory Assessment (Somatosensory)    Sensory Assessment (Somatosensory) sensation intact  -               User Key  (r) = Recorded By, (t) = Taken By, (c) = Cosigned By      Initials Name Provider Type    Praveena Mcgovern PT Physical Therapist                   Goals/Plan       Row Name 11/03/24 1128          Therapy Assessment/Plan (PT)    Planned Therapy Interventions (PT) balance training;bed mobility training;gait training;home exercise program;patient/family education;strengthening;stretching;transfer training  -               User Key  (r) = Recorded By, (t) = Taken By, (c) = Cosigned By      Initials Name Provider Type    Praveena Mcgovern PT Physical Therapist                   Clinical  Impression       Row Name 11/03/24 1126          Pain    Pretreatment Pain Rating 5/10  -GORDON     Posttreatment Pain Rating 5/10  -GORDON     Pain Location abdomen  -GORDON     Pain Side/Orientation generalized;other (see comments)  and incisional  -GORDON     Pain Management Interventions premedicated for activity  -GORDON     Response to Pain Interventions activity participation with increased pain  -GORDON       Row Name 11/03/24 1126          Plan of Care Review    Plan of Care Reviewed With patient  -GORDON     Progress improving  -GORDON     Outcome Evaluation Pt s/p hermila jackson performed on 11/2. Pt resting in bed and agreeable to participate in PT interventions. She completed bed mobility requiring modA, STS requiring modA, and standing pivot transfer ambulating up to 3 ft using 2WW requiring Jose David. Continued deficits with strength, pain management, balance and activity tolerance indicating need for skilled PT services. PT recommending SNF vs HHPT pending pt's progress during hospital stay.  -GORDON       Row Name 11/03/24 1126          Therapy Assessment/Plan (PT)    Rehab Potential (PT) good  -GORDON     Criteria for Skilled Interventions Met (PT) skilled treatment is necessary  -GORDON     Therapy Frequency (PT) 6 times/wk  -GORDON               User Key  (r) = Recorded By, (t) = Taken By, (c) = Cosigned By      Initials Name Provider Type    Praveena Mcgovern, PT Physical Therapist                   Outcome Measures       Row Name 11/03/24 1128 11/03/24 0025       How much help from another person do you currently need...    Turning from your back to your side while in flat bed without using bedrails? 2  -GORDON 3  -TK    Moving from lying on back to sitting on the side of a flat bed without bedrails? 2  -GORDON 3  -TK    Moving to and from a bed to a chair (including a wheelchair)? 2  -GORDON 3  -TK    Standing up from a chair using your arms (e.g., wheelchair, bedside chair)? 2  -GORDON 3  -TK    Climbing 3-5 steps with a railing? 1  -GORDON 3  -TK    To walk in hospital  room? 3  -GORDON 3  -TK    AM-PAC 6 Clicks Score (PT) 12  -GORDON 18  -TK    Highest Level of Mobility Goal 4 --> Transfer to chair/commode  -GORDON 6 --> Walk 10 steps or more  -TK              User Key  (r) = Recorded By, (t) = Taken By, (c) = Cosigned By      Initials Name Provider Type    GORDON Praveena Goldstein, PT Physical Therapist    TK Mee Boss, RN Registered Nurse                                 Physical Therapy Education       Title: PT OT SLP Therapies (Done)       Topic: Physical Therapy (Done)       Point: Mobility training (Done)       Learning Progress Summary            Patient Acceptance, E, VU by  at 11/3/2024 1128    Acceptance, E,D, VU,NR by MS at 11/1/2024 1546                      Point: Home exercise program (Done)       Learning Progress Summary            Patient Acceptance, E, VU by  at 11/3/2024 1128                      Point: Body mechanics (Done)       Learning Progress Summary            Patient Acceptance, E, VU by  at 11/3/2024 1128    Acceptance, E,D, VU,NR by MS at 11/1/2024 1546                      Point: Precautions (Done)       Learning Progress Summary            Patient Acceptance, E, VU by  at 11/3/2024 1128    Acceptance, E,D, VU,NR by MS at 11/1/2024 1546                                      User Key       Initials Effective Dates Name Provider Type Discipline    MS 06/16/21 -  Obey Gupta, PT Physical Therapist PT     08/24/23 -  Praveena Goldstein, PT Physical Therapist PT                  PT Recommendation and Plan  Planned Therapy Interventions (PT): balance training, bed mobility training, gait training, home exercise program, patient/family education, strengthening, stretching, transfer training  Progress: improving  Outcome Evaluation: Pt s/p hermila vermae performed on 11/2. Pt resting in bed and agreeable to participate in PT interventions. She completed bed mobility requiring modA, STS requiring modA, and standing pivot transfer ambulating up to 3 ft using 2WW requiring  Jose David. Continued deficits with strength, pain management, balance and activity tolerance indicating need for skilled PT services. PT recommending SNF vs HHPT pending pt's progress during hospital stay.     Time Calculation:         PT Charges       Row Name 11/03/24 1129             Time Calculation    Start Time 1050  -GORDON      Stop Time 1116  -GORDON      Time Calculation (min) 26 min  -GORDON      PT Received On 11/03/24  -GORDON      PT - Next Appointment 11/04/24  -GORDON         Time Calculation- PT    Total Timed Code Minutes- PT 25 minute(s)  -GORDON         Timed Charges    08670 - PT Therapeutic Activity Minutes 25  -GORDON         Total Minutes    Timed Charges Total Minutes 25  -GORDON       Total Minutes 25  -GORDON                User Key  (r) = Recorded By, (t) = Taken By, (c) = Cosigned By      Initials Name Provider Type    Praveena Mcgovern, FARHANA Physical Therapist                  Therapy Charges for Today       Code Description Service Date Service Provider Modifiers Qty    [id number]  PT THERAPEUTIC ACT EA 15 MIN 11/3/2024 Praveena Goldstein, FARHANA GP 2            PT G-Codes  Outcome Measure Options: AM-PAC 6 Clicks Basic Mobility (PT)  AM-PAC 6 Clicks Score (PT): 12  PT Discharge Summary  Anticipated Discharge Disposition (PT): home with home health, skilled nursing facility    Praveena Goldstein PT  11/3/2024

## 2024-11-03 NOTE — PROGRESS NOTES
Name: Mariela Ruiz ADMIT: 10/31/2024   : 1945  PCP: Duglas Rock MD    MRN: [id number] LOS: 3 days   AGE/SEX: 79 y.o. female  ROOM: [facility]     Subjective   Subjective   Status post cholecystectomy yesterday.  Lying in bed.  Complains of a postop abdominal soreness.  Denies nausea or vomiting.  Had urinary retention, had to be straight cathed once we have 800 cc of urine removed.  Feels weak.    Review of Systems   As above  Objective   Objective   Vital Signs  Temp:  [97.5 °F (36.4 °C)-99.1 °F (37.3 °C)] 97.5 °F (36.4 °C)  Heart Rate:  [] 97  Resp:  [14-22] 18  BP: (109-152)/(45-97) 128/64  SpO2:  [91 %-100 %] 98 %  on  Flow (L/min) (Oxygen Therapy):  [2] 2;   Device (Oxygen Therapy): nasal cannula  Body mass index is 37.05 kg/m².  Physical Exam    General: Alert, o, laying in bed, not in distress,  HEENT: Normocephalic, atraumatic  CV: Regular rate and rhythm, no murmurs rubs or gallops  Lungs: Clear to auscultation bilaterally, no crackles or wheezes  Abdomen: Soft, nontender, nondistended  Extremities: Bilateral lower extremity lymphedema, no cyanosis     Results Review     I reviewed the patient's new clinical results.  Results from last 7 days   Lab Units 11/03/24  0439 11/02/24  0759 11/01/24  0603 10/31/24  1238   WBC 10*3/mm3 8.95 7.30 10.06 15.00*   HEMOGLOBIN g/dL 12.4 10.1* 10.8* 12.4   PLATELETS 10*3/mm3 232 197 189 210     Results from last 7 days   Lab Units 11/03/24  0439 11/02/24  0759 11/01/24  0603 10/31/24  1238   SODIUM mmol/L 134* 139 135* 134*   POTASSIUM mmol/L 4.4 4.0 3.9 4.2   CHLORIDE mmol/L 102 104 105 101   CO2 mmol/L 19.0* 23.9 23.0 25.0   BUN mg/dL 25* 24* 18 21   CREATININE mg/dL 1.10* 1.30* 0.75 0.93   GLUCOSE mg/dL 84 93 83 96   Estimated Creatinine Clearance: 50.4 mL/min (A) (by C-G formula based on SCr of 1.1 mg/dL (H)).  Results from last 7 days   Lab Units 24  0439 24  0603 10/31/24  1238   ALBUMIN g/dL 2.9* 3.0* 3.4*   BILIRUBIN mg/dL  --   "0.6 0.8   ALK PHOS U/L  --  121* 153*   AST (SGOT) U/L  --  14 18   ALT (SGPT) U/L  --  10 12     Results from last 7 days   Lab Units 11/03/24  0439 11/02/24  0759 11/01/24  0603 10/31/24  1238   CALCIUM mg/dL 9.1 8.9 9.0 9.5   ALBUMIN g/dL 2.9*  --  3.0* 3.4*   MAGNESIUM mg/dL 2.2 2.0  --   --    PHOSPHORUS mg/dL 3.1 3.0  --   --      Results from last 7 days   Lab Units 10/31/24  1238   LACTATE mmol/L 1.2     SARS-CoV-2 PCR   Date Value Ref Range Status   03/06/2021 Not Detected Not Detected Final     Comment:     Nucleic acid specific to SARS-CoV-2 (COVID-19) virus was not detected in  this sample by the TaqPath (TM) COVID-19 Combo Kit.          SARS-CoV-2 (COVID-19) nucleic acid testing performed using [employer] Aptima (R) SARS-CoV-2 Assay or [employer] TaqPath (TM)  COVID-19 Combo Kit as indicated above under Emergency Use Authorization (EUA) from the FDA. Aptima (R) and TaqPath (TM) test performance  characteristics verified by [facility] in accordance with the FDAs Guidance Document (Policy for Diagnostic Tests for Coronavirus Disease-2019  during the Public Health Emergency) issued on March 16, 2020. The laboratory is regulated under CLIA as qualified to perform high-complexity testing  Unless otherwise noted, all testing was performed at [facility], CLIA No. [id number], [facility] Licensee No. [id number]     No results found for: \"HGBA1C\", \"POCGLU\"        FL Cholangiogram Operative  INTRAOPERATIVE PORTABLE VIEW CHOLANGIOGRAM     CLINICAL INFORMATION: Post cholecystectomy     FINDINGS: Upon injection of the cystic duct with contrast, the proximal  intrahepatic, common hepatic and common bile ducts are opacified.  Contrast passes into the duodenum. A filling defect within the common  bile duct on initial images does not persist on subsequent images.     Fluoroscopy time 40 s, 252 images  Reference Air Kerma 18 mGy     This report was finalized on 11/2/2024 3:33 PM by Dr. Sultana Arias M.D  on " Workstation: [id number]       Scheduled Medications  cholecalciferol, 4,000 Units, Oral, Daily  levothyroxine, 50 mcg, Oral, QAM  PHENobarbital, 129.6 mg, Oral, Nightly  PHENobarbital, 64.8 mg, Oral, Daily  piperacillin-tazobactam, 3.375 g, Intravenous, Q8H  pramipexole, 0.5 mg, Oral, Daily  pravastatin, 40 mg, Oral, Nightly  sodium chloride, 10 mL, Intravenous, Q12H  vitamin B-12, 1,000 mcg, Oral, Daily    Infusions     Diet  Diet: Regular/House; Fluid Consistency: Thin (IDDSI 0)    I have personally reviewed     [x]  Laboratory   [x]  Microbiology   [x]  Radiology   [x]  EKG/Telemetry  []  Cardiology/Vascular   []  Pathology    []  Records       Assessment/Plan     Active Hospital Problems    Diagnosis  POA    **Acute cholecystitis [K81.0]  Yes    Elevated troponin [R79.89]  Unknown    Hypothyroidism, unspecified [E03.9]  Yes    Seizure disorder [G40.909]  Yes    Sleep apnea [G47.30]  Yes      Resolved Hospital Problems   No resolved problems to display.       79 y.o. female admitted with Acute cholecystitis.    Acute cholecystitis  -Status post laparoscopic cholecystectomy with cholangiogram and da [place] robot assistance, Laparoscopic lysis of adhesions per general surgery on 11/2/2024  Blood cultures negative to date  -Continue pain management, supportive care, IV fluids  -Duration of antibiotics per general surgery    Mild JACIEL  -Creatinine trended up 1.30 11/2/2024  -Initiated on IV fluids.  Creatinine improving  -Nephrology managing    Elevated troponin    negative left heart catheterization in July 2022 following false positive perfusion scan  --significance unclear especially in this situation of no change in her functional capacity and ability to do ADLs despite abdominal discomfort as documented above.   - EKG with RBBB, unchanged compared to prior.  - cardiology evaluated      Seizure disorder  -continue her home regimen of phenobarbital   -seizure precautions.     Hypothyroidism  -continue thyroid  replacement therapy.    Anemia   -Hemoglobin 12.4 on admission, trended down to as low as 10.1  -No signs of bleeding reported  -Iron panel 11/2/2024 consistent with anemia of chronic disease, however with iron saturation of 9 underlying iron deficiency cannot be included.  Will need repeat iron panel outpatient once acute illness resolves  -B12 low end of normal, initiated on B12 supplement  -Hemoglobin stable, monitor    Postop urinary tension  -Status post straight cath.  Bladder PVR, straight cath for PVR above 300        History of sleep apnea  Nightly oxygen.  CPAP if available.      SCDs for DVT prophylaxis.  Full code.  Discussed with patient  Disposition: To be determined pending PT/CP eval.  Likely stable to be discharged in 1-2 days  Expected Discharge Date: 11/4/2024; Expected Discharge Time:        Copied text in this note has been reviewed and is accurate as of 11/03/24.         Dictated utilizing Dragon dictation        Valdemar Barros MD  [facility]  11/03/24  08:36 EST

## 2024-11-03 NOTE — PROGRESS NOTES
[facility] Progress Note      Patient Name: Mariela Ruiz  : 1945  MRN: [id number]  Primary Care Physician:  Duglas Rock MD  Date of admission: 10/31/2024    Subjective     Interval History:   F/u JACIEL    Review of Systems:      Resting     Objective     Vitals:   Temp:  [97.5 °F (36.4 °C)-99.1 °F (37.3 °C)] 98.2 °F (36.8 °C)  Heart Rate:  [] 97  Resp:  [14-18] 18  BP: (105-152)/(48-97) 105/54  Flow (L/min) (Oxygen Therapy):  [2-3] 3    Intake/Output Summary (Last 24 hours) at 11/3/2024 1452  Last data filed at 11/3/2024 1254  Gross per 24 hour   Intake 340 ml   Output 950 ml   Net -610 ml       Physical Exam:    General Appearance: frail WF resting comfortably in chair  Neck: supple, no JVD  Lungs: CTA bilat no rales  Heart: RRR, normal S1 and S2  Abdomen: soft, nontender, nondistended  Extremities: 1+ BLE edema    Scheduled Meds:     cholecalciferol, 4,000 Units, Oral, Daily  heparin (porcine), 5,000 Units, Subcutaneous, Q8H  levothyroxine, 50 mcg, Oral, QAM  PHENobarbital, 129.6 mg, Oral, Nightly  PHENobarbital, 64.8 mg, Oral, Daily  piperacillin-tazobactam, 3.375 g, Intravenous, Q8H  pramipexole, 0.5 mg, Oral, Daily  pravastatin, 40 mg, Oral, Nightly  sodium chloride, 10 mL, Intravenous, Q12H  vitamin B-12, 1,000 mcg, Oral, Daily      IV Meds:        Results Reviewed:   I have personally reviewed the results from the time of this admission to 11/3/2024 14:52 EST     Results from last 7 days   Lab Units 24  0439 24  0759 24  0603 10/31/24  1238   SODIUM mmol/L 134* 139 135* 134*   POTASSIUM mmol/L 4.4 4.0 3.9 4.2   CHLORIDE mmol/L 102 104 105 101   CO2 mmol/L 19.0* 23.9 23.0 25.0   BUN mg/dL 25* 24* 18 21   CREATININE mg/dL 1.10* 1.30* 0.75 0.93   CALCIUM mg/dL 9.1 8.9 9.0 9.5   BILIRUBIN mg/dL  --   --  0.6 0.8   ALK PHOS U/L  --   --  121* 153*   ALT (SGPT) U/L  --   --  10 12   AST (SGOT) U/L  --   --  14 18   GLUCOSE mg/dL 84 93 83  96     Estimated Creatinine Clearance: 50.4 mL/min (A) (by C-G formula based on SCr of 1.1 mg/dL (H)).  Results from last 7 days   Lab Units 11/03/24  0439 11/02/24  0759   MAGNESIUM mg/dL 2.2 2.0   PHOSPHORUS mg/dL 3.1 3.0         Results from last 7 days   Lab Units 11/03/24  0439 11/02/24  0759 11/01/24  0603 10/31/24  1238   WBC 10*3/mm3 8.95 7.30 10.06 15.00*   HEMOGLOBIN g/dL 12.4 10.1* 10.8* 12.4   PLATELETS 10*3/mm3 232 197 189 210           Assessment / Plan     ASSESSMENT:  JACIEL - likely prerenal/hemodynamic from vol depletion and mild/transient hypotension (BP was 90s/50s).  Also urinary retention.  K/HCo3 normal.  Cr improved slightly 1.3 to 1.1 with IVF.  Has chronic lymphedema.  Bicarb down to 19 with normal AG.  UOP decent 850 cc/24h    Acute cholecystitis, POD1 lap-renetta + NICKO.  On zosyn  Acute encephalopathy   Dyslipidemia on statin, CK normal   Seizure disorder treated  Hypothyroidism, treated   Urinary retention - required straight cath last night and not voiding today    PLAN:  Hold further IVF & encourage PO intake  Repeat bladder scan, anchor childress if > 250, d/w RN  Monitor bicarb, won't add NaHCo3 yet on account of lymphedema      Carlo Hampton MD  11/03/24  14:52 EST    [facility]  [phone]

## 2024-11-03 NOTE — PROGRESS NOTES
Hospital Follow Up    LOS:  LOS: 3 days   Patient Name: Mariela Ruiz  Age/Sex: 79 y.o. female  : 1945  MRN: [id number]    Day of Service: 24   Length of Stay: 3  Encounter Provider: REMY Hermosillo  Place of Service: [facility]  Patient Care Team:  Duglas Rock MD as PCP - General (Endocrinology)  Anuj Brandt MD as Consulting Physician (General Surgery)  Ed Chun MD as Surgeon (Orthopedic Surgery)  Amador Richardson DO (Vascular Surgery)  Christiano Cesar II, MD as Consulting Physician (Orthopedic Surgery)  Steven Liu II, MD as Consulting Physician (Neurology)    Subjective:     Chief Complaint: Acute cholecystitis/elevated troponin    Interval History: No complaints today out of bed in chair not passing gas yet    Objective:     Objective:  Temp:  [97.5 °F (36.4 °C)-99.1 °F (37.3 °C)] 98.2 °F (36.8 °C)  Heart Rate:  [] 97  Resp:  [14-18] 18  BP: (105-152)/(48-97) 105/54     Intake/Output Summary (Last 24 hours) at 11/3/2024 1457  Last data filed at 11/3/2024 1254  Gross per 24 hour   Intake 340 ml   Output 950 ml   Net -610 ml     Body mass index is 37.05 kg/m².      10/31/24  1605 10/31/24  1650   Weight: 101 kg (222 lb 0.1 oz) 101 kg (222 lb 10.6 oz)     Weight change:     Physical Exam:   General Appearance:    Awake alert and oriented in no acute distress.   Color:  Skin:  Neuro:  HEENT:    Lungs:     Pink  Warm and dry  No focal, motor or sensory deficits  Neck supple, pupils equal, round and reactive. No JVD, No Bruit  Clear to auscultation,respirations regular, even and                  unlabored    Heart:    Regular rate and rhythm, S1 and S2, no murmur, no gallop, no rub. No edema, DP/PT pulses are 2+   Chest Wall:    No abnormalities observed   Abdomen:     Normal bowel sounds, no masses, no organomegaly, soft      slightly tender from surgery, non-distended, no guarding, no ascites noted  "  Extremities:   Moves all extremities well, no edema, no cyanosis, no redness       Lab Review:   Results from last 7 days   Lab Units 11/03/24  0439 11/02/24  0759 11/01/24  0603 10/31/24  1238   SODIUM mmol/L 134* 139 135* 134*   POTASSIUM mmol/L 4.4 4.0 3.9 4.2   CHLORIDE mmol/L 102 104 105 101   CO2 mmol/L 19.0* 23.9 23.0 25.0   BUN mg/dL 25* 24* 18 21   CREATININE mg/dL 1.10* 1.30* 0.75 0.93   GLUCOSE mg/dL 84 93 83 96   CALCIUM mg/dL 9.1 8.9 9.0 9.5   AST (SGOT) U/L  --   --  14 18   ALT (SGPT) U/L  --   --  10 12     Results from last 7 days   Lab Units 11/03/24  0439 11/01/24  0603 10/31/24  1459 10/31/24  1238   CK TOTAL U/L 106  --   --   --    HSTROP T ng/L  --  12 26* 16*     Results from last 7 days   Lab Units 11/03/24  0439 11/02/24  0759   WBC 10*3/mm3 8.95 7.30   HEMOGLOBIN g/dL 12.4 10.1*   HEMATOCRIT % 37.4 30.0*   PLATELETS 10*3/mm3 232 197         Results from last 7 days   Lab Units 11/03/24  0439 11/02/24  0759   MAGNESIUM mg/dL 2.2 2.0           Invalid input(s): \"LDLCALC\"          I reviewed the patient's new clinical results.  I personally viewed and interpreted the patient's EKG  Current Medications:   Scheduled Meds:cholecalciferol, 4,000 Units, Oral, Daily  heparin (porcine), 5,000 Units, Subcutaneous, Q8H  levothyroxine, 50 mcg, Oral, QAM  PHENobarbital, 129.6 mg, Oral, Nightly  PHENobarbital, 64.8 mg, Oral, Daily  piperacillin-tazobactam, 3.375 g, Intravenous, Q8H  pramipexole, 0.5 mg, Oral, Daily  pravastatin, 40 mg, Oral, Nightly  sodium chloride, 10 mL, Intravenous, Q12H  vitamin B-12, 1,000 mcg, Oral, Daily      Continuous Infusions:     Allergies:  Allergies   Allergen Reactions    Clindamycin/Lincomycin Itching    Duricef [Cefadroxil] Hives and Rash    Sulfa Antibiotics Rash     RASH       Assessment:     1.  Acute cholecystitis-status postcholecystectomy with cholangiogram.  Postop day 1  2.  Reported history of mitral valve prolapse stable on echo in the past  3.  " Hypothyroidism  4.  Dyslipidemia  5.  Mild elevation in troponin secondary to acute infection no further workup planned as no cardiac symptoms or EKG changes to suggest critical CAD      Plan:   Stable postop.  No chest pain.  Will see as needed        REMY Hermosillo  11/03/24  14:57 EST  Electronically signed by REMY Hermosillo, 11/03/24, 2:57 PM EST.

## 2024-11-04 LAB
ALBUMIN SERPL-MCNC: 2.9 G/DL (ref 3.5–5.2)
ANION GAP SERPL CALCULATED.3IONS-SCNC: 8.9 MMOL/L (ref 5–15)
BH BB BLOOD EXPIRATION DATE: NORMAL
BH BB BLOOD EXPIRATION DATE: NORMAL
BH BB BLOOD TYPE BARCODE: 600
BH BB BLOOD TYPE BARCODE: 600
BH BB DISPENSE STATUS: NORMAL
BH BB DISPENSE STATUS: NORMAL
BH BB PRODUCT CODE: NORMAL
BH BB PRODUCT CODE: NORMAL
BH BB UNIT NUMBER: NORMAL
BH BB UNIT NUMBER: NORMAL
BUN SERPL-MCNC: 24 MG/DL (ref 8–23)
BUN/CREAT SERPL: 22.2 (ref 7–25)
CALCIUM SPEC-SCNC: 9.5 MG/DL (ref 8.6–10.5)
CHLORIDE SERPL-SCNC: 105 MMOL/L (ref 98–107)
CO2 SERPL-SCNC: 24.1 MMOL/L (ref 22–29)
CREAT SERPL-MCNC: 1.08 MG/DL (ref 0.57–1)
CROSSMATCH INTERPRETATION: NORMAL
CROSSMATCH INTERPRETATION: NORMAL
DEPRECATED RDW RBC AUTO: 37.4 FL (ref 37–54)
EGFRCR SERPLBLD CKD-EPI 2021: 53 ML/MIN/1.73
ERYTHROCYTE [DISTWIDTH] IN BLOOD BY AUTOMATED COUNT: 11.7 % (ref 12.3–15.4)
GLUCOSE SERPL-MCNC: 116 MG/DL (ref 65–99)
HCT VFR BLD AUTO: 32.2 % (ref 34–46.6)
HGB BLD-MCNC: 10.8 G/DL (ref 12–15.9)
MAGNESIUM SERPL-MCNC: 1.9 MG/DL (ref 1.6–2.4)
MCH RBC QN AUTO: 29.8 PG (ref 26.6–33)
MCHC RBC AUTO-ENTMCNC: 33.5 G/DL (ref 31.5–35.7)
MCV RBC AUTO: 88.7 FL (ref 79–97)
PHOSPHATE SERPL-MCNC: 2 MG/DL (ref 2.5–4.5)
PHOSPHATE SERPL-MCNC: 2.2 MG/DL (ref 2.5–4.5)
PLATELET # BLD AUTO: 232 10*3/MM3 (ref 140–450)
PMV BLD AUTO: 10.3 FL (ref 6–12)
POTASSIUM SERPL-SCNC: 4 MMOL/L (ref 3.5–5.2)
RBC # BLD AUTO: 3.63 10*6/MM3 (ref 3.77–5.28)
SODIUM SERPL-SCNC: 138 MMOL/L (ref 136–145)
UNIT  ABO: NORMAL
UNIT  ABO: NORMAL
UNIT  RH: NORMAL
UNIT  RH: NORMAL
WBC NRBC COR # BLD AUTO: 7.78 10*3/MM3 (ref 3.4–10.8)

## 2024-11-04 PROCEDURE — 25010000002 HEPARIN (PORCINE) PER 1000 UNITS: Performed by: STUDENT IN AN ORGANIZED HEALTH CARE EDUCATION/TRAINING PROGRAM

## 2024-11-04 PROCEDURE — 83735 ASSAY OF MAGNESIUM: CPT | Performed by: SURGERY

## 2024-11-04 PROCEDURE — 97530 THERAPEUTIC ACTIVITIES: CPT

## 2024-11-04 PROCEDURE — 25010000002 PIPERACILLIN SOD-TAZOBACTAM PER 1 G: Performed by: SURGERY

## 2024-11-04 PROCEDURE — 99024 POSTOP FOLLOW-UP VISIT: CPT | Performed by: SURGERY

## 2024-11-04 PROCEDURE — 97166 OT EVAL MOD COMPLEX 45 MIN: CPT

## 2024-11-04 PROCEDURE — 80069 RENAL FUNCTION PANEL: CPT | Performed by: INTERNAL MEDICINE

## 2024-11-04 PROCEDURE — 84100 ASSAY OF PHOSPHORUS: CPT | Performed by: STUDENT IN AN ORGANIZED HEALTH CARE EDUCATION/TRAINING PROGRAM

## 2024-11-04 PROCEDURE — 85027 COMPLETE CBC AUTOMATED: CPT | Performed by: SURGERY

## 2024-11-04 RX ORDER — POLYETHYLENE GLYCOL 3350 17 G/17G
17 POWDER, FOR SOLUTION ORAL DAILY
Status: DISCONTINUED | OUTPATIENT
Start: 2024-11-04 | End: 2024-11-06

## 2024-11-04 RX ORDER — AMOXICILLIN 250 MG
2 CAPSULE ORAL 2 TIMES DAILY
Status: DISCONTINUED | OUTPATIENT
Start: 2024-11-04 | End: 2024-11-06

## 2024-11-04 RX ADMIN — PRAMIPEXOLE DIHYDROCHLORIDE 0.5 MG: 0.5 TABLET ORAL at 08:07

## 2024-11-04 RX ADMIN — Medication 2 PACKET: at 06:42

## 2024-11-04 RX ADMIN — CYANOCOBALAMIN TAB 500 MCG 1000 MCG: 500 TAB at 08:07

## 2024-11-04 RX ADMIN — ACETAMINOPHEN 1000 MG: 500 TABLET ORAL at 16:02

## 2024-11-04 RX ADMIN — PIPERACILLIN AND TAZOBACTAM 3.38 G: 3; .375 INJECTION, POWDER, LYOPHILIZED, FOR SOLUTION INTRAVENOUS at 06:18

## 2024-11-04 RX ADMIN — HEPARIN SODIUM 5000 UNITS: 5000 INJECTION INTRAVENOUS; SUBCUTANEOUS at 15:17

## 2024-11-04 RX ADMIN — PRAVASTATIN SODIUM 40 MG: 40 TABLET ORAL at 21:04

## 2024-11-04 RX ADMIN — POTASSIUM, SODIUM PHOSPHATES 280 MG-160 MG-250 MG ORAL POWDER PACKET 2 PACKET: POWDER IN PACKET at 16:03

## 2024-11-04 RX ADMIN — SENNOSIDES AND DOCUSATE SODIUM 2 TABLET: 50; 8.6 TABLET ORAL at 21:04

## 2024-11-04 RX ADMIN — HEPARIN SODIUM 5000 UNITS: 5000 INJECTION INTRAVENOUS; SUBCUTANEOUS at 06:18

## 2024-11-04 RX ADMIN — SENNOSIDES AND DOCUSATE SODIUM 2 TABLET: 50; 8.6 TABLET ORAL at 09:08

## 2024-11-04 RX ADMIN — POLYETHYLENE GLYCOL 3350 17 G: 17 POWDER, FOR SOLUTION ORAL at 09:08

## 2024-11-04 RX ADMIN — Medication 4000 UNITS: at 08:07

## 2024-11-04 RX ADMIN — PHENOBARBITAL 129.6 MG: 32.4 TABLET ORAL at 21:04

## 2024-11-04 RX ADMIN — HEPARIN SODIUM 5000 UNITS: 5000 INJECTION INTRAVENOUS; SUBCUTANEOUS at 21:04

## 2024-11-04 RX ADMIN — Medication 10 ML: at 21:04

## 2024-11-04 RX ADMIN — Medication 10 ML: at 08:07

## 2024-11-04 RX ADMIN — PHENOBARBITAL 64.8 MG: 32.4 TABLET ORAL at 08:07

## 2024-11-04 RX ADMIN — LEVOTHYROXINE SODIUM 50 MCG: 50 TABLET ORAL at 06:18

## 2024-11-04 RX ADMIN — PIPERACILLIN AND TAZOBACTAM 3.38 G: 3; .375 INJECTION, POWDER, LYOPHILIZED, FOR SOLUTION INTRAVENOUS at 15:18

## 2024-11-04 NOTE — DISCHARGE PLACEMENT REQUEST
"Marsha Mares (79 y.o. Female)       Date of Birth   1945    Social Security Number       Address   [address] [place] [address]    Home Phone   [phone]    MRN   [id number]       [facility]    Marital Status                               Admission Date   10/31/24    Admission Type   Emergency    Admitting Provider   Rabia Grullon MD    Attending Provider   Valdemar Barros MD    Department, Room/Bed   [address], [address]       Discharge Date       Discharge Disposition       Discharge Destination                                 Attending Provider: Valdemar Barros MD    Allergies: Clindamycin/lincomycin, Duricef [Cefadroxil], Sulfa Antibiotics    Isolation: None   Infection: None   Code Status: CPR    Ht: 165.1 cm (65\")   Wt: 101 kg (222 lb 10.6 oz)    Admission Cmt: None   Principal Problem: Acute cholecystitis [K81.0]                   Active Insurance as of 10/31/2024       Primary Coverage       Payor Plan Insurance Group Employer/Plan Group    MEDICARE RAILROAD MEDICARE        Payor Plan Address Payor Plan Phone Number Payor Plan Fax Number Effective Dates    [address] [phone]  2/1/2012 - None Entered    [facility] [id number]         Subscriber Name Subscriber Birth Date Member ID       MARSHA MARES 1945 [id number]               Secondary Coverage       Payor Plan Insurance Group Employer/Plan Group    [employer] [place] [employer] [place] [place]       Payor Plan Address Payor Plan Phone Number Payor Plan Fax Number Effective Dates    [address] [place] [address] [phone]  2/1/2012 - None Entered    [place] [address]         Subscriber Name Subscriber Birth Date Member ID       MARSHA MARES 1945 [id number]                     Emergency Contacts        (Rel.) Home Phone Work Phone Mobile Phone    Ronda Keita (Daughter) [phone] -- [phone]    Justo Collier (Son) [phone] -- --      "

## 2024-11-04 NOTE — THERAPY EVALUATION
Patient Name: Mariela Ruiz  : 1945    MRN: [id number]                              Today's Date: 2024       Admit Date: 10/31/2024    Visit Dx:     ICD-10-CM ICD-9-CM   1. Acute cholecystitis  K81.0 575.0     Patient Active Problem List   Diagnosis    Seizure disorder    Hyperlipidemia    Vitamin D insufficiency    Age-related osteoporosis without current pathological fracture    Sleep apnea    Chronic venous insufficiency    Postsurgical hypothyroidism    Chronic fatigue syndrome    Gastroesophageal reflux disease    Dupuytren's contracture    History of biliary T-tube placement    History of partial thyroidectomy    Multinodular goiter    Restless legs syndrome    History of cardiac catheterization    Urge incontinence of urine    Left knee pain    Ligamentous laxity of knee    DJD (degenerative joint disease) of knee    Adverse drug effect, subsequent encounter    Abnormal blood level of chromium    Abnormal blood level of cobalt    Family history of diabetes mellitus    Thrombocytopenia    .    S/P revision of total hip    Enlarged thyroid gland    Palmar fascial fibromatosis (dupuytren)    Hypothyroidism, unspecified    Hyperlipidemia, unspecified    Acute cholecystitis    Elevated troponin     Past Medical History:   Diagnosis Date    Arthritis     Chronic venous insufficiency     Dupuytren's contracture     History of staph infection     S/P KNEE DEC 2016    History of transfusion     Hyperlipidemia     Lymphedema     LEFT LEG    Nontoxic multinodular goiter     Osteoporosis     Dr. Cabrera    Pelvic joint pain, left     Postsurgical hypothyroidism     Presence of left artificial hip joint     METAL ON METAL AS NOTED PER DR CLEMENT NOTE    Restless leg syndrome     Right bundle branch block     Seizure disorder     Dr. Whitman, HX  LAST SEIZURE    Sleep apnea     DOES NOT HAVE MACHINE    Vitamin D insufficiency      Past Surgical History:   Procedure Laterality Date    APPENDECTOMY      CARDIAC  CATHETERIZATION      NORMAL     CHOLECYSTECTOMY N/A 11/2/2024    Procedure: CHOLECYSTECTOMY LAPAROSCOPIC WITH DAVINCI ROBOT with cholangiogram, possible open;  Surgeon: Bin Heaton MD;  Location: [facility] MAIN OR;  Service: Robotics - DaVinci;  Laterality: N/A;    COLONOSCOPY  08/17/2017    Normal except for anal fissure.  Dr. Brandt.  [facility].    KNEE MINI REVISION Left 11/15/2016    Procedure: LEFT KNEE POLY CHANGE WITH HI POST STABILIZER;  Surgeon: Pablito Claudio MD;  Location: [facility] MAIN OR;  Service:     KNEE MINI REVISION Left 12/13/2016    Procedure: LT KNEE REMOVAL/REPLACEMENT LOCKING PIN ;  Surgeon: Pablito Claudio MD;  Location: [facility] MAIN OR;  Service:     MAMMO FINE NEEDLE ASPIRATION UNILATERAL      RIGHT THYROID NODULE, 2009 BENIGN     [place] ARTHRP KNE CONDYLE&PLATU MEDIAL&LAT COMPARTMENTS Left 12/24/2016    Procedure: LEFT KNEE WASHOUT WITH POLY CHANGE;  Surgeon: Christiano Cesar MD;  Location: [facility] MAIN OR;  Service: Orthopedics    NY REVJ TOTAL KNEE ARTHRP W/WO ALGRFT 1 COMPONENT Left 2/20/2017    Procedure: LT KNEE REMOVAL ANTIBIOTIC SPACER AND KNEE REVISION;  Surgeon: Christiano Cesar MD;  Location: [facility] MAIN OR;  Service: Orthopedics    REPLACEMENT TOTAL KNEE Left     THYROIDECTOMY, PARTIAL      1979 LEFT LOBECTOMY AND ISTHMECTOMY     TOTAL ABDOMINAL HYSTERECTOMY WITH SALPINGO OOPHORECTOMY      TOTAL HIP ARTHROPLASTY Left     TOTAL HIP ARTHROPLASTY Right 9/14/2019    Procedure: TOTAL HIP ARTHROPLASTY ANTERIOR WITH HANA TABLE;  Surgeon: Christiano Cesar II, MD;  Location: [facility] MAIN OR;  Service: Orthopedics    TOTAL HIP ARTHROPLASTY REVISION Left 3/9/2021    Procedure: LEFT TOTAL HIP ARTHROPLASTY REVISION POSTERIOR;  Surgeon: Christiano Cesar II, MD;  Location: [facility] MAIN OR;  Service: Orthopedics;  Laterality: Left;    TOTAL KNEE  PROSTHESIS REMOVAL W/ SPACER INSERTION Left 12/29/2016    Procedure: LT KNEE IMPLANT REMOVAL WITH INSERTION OF SPACER ;  Surgeon:  Christiano Cesar MD;  Location: [facility] OR;  Service:       General Information       Row Name 11/04/24 1235          OT Time and Intention    Subjective Information pain  -PP     Document Type evaluation  -PP     Mode of Treatment individual therapy;occupational therapy  -PP     Patient Effort adequate  -PP     Symptoms Noted During/After Treatment none  -PP       Row Name 11/04/24 1235          General Information    Patient Profile Reviewed yes  -PP     Prior Level of Function independent:;ADL's  (I) for ADLs. Mod I for fxnl mob using rwx in home and rollator in community.  -PP     Existing Precautions/Restrictions fall  -PP     Barriers to Rehab none identified  -PP       Row Name 11/04/24 1235          Living Environment    People in Home child(aneudy), adult  -PP       Row Name 11/04/24 1235          Cognition    Orientation Status (Cognition) oriented x 4  Pt noted to have difficulty understanding concepts previously explained and to perseverate on things.  -PP       Row Name 11/04/24 1235          Safety Issues/Impairments Affecting Functional Mobility    Safety Issues Affecting Function (Mobility) safety precautions follow-through/compliance;problem-solving;safety precaution awareness  -PP     Impairments Affecting Function (Mobility) balance;endurance/activity tolerance;strength;pain  -PP     Comment, Safety Issues/Impairments (Mobility) gait belt and non skid socks worn for safety  -PP               User Key  (r) = Recorded By, (t) = Taken By, (c) = Cosigned By      Initials Name Provider Type    PP Wood Fraire OT Occupational Therapist                     Mobility/ADL's       Row Name 11/04/24 1238          Bed Mobility    Comment, (Bed Mobility) NT, pt UIC at session start/ end  -PP       Row Name 11/04/24 1238          Transfers    Transfers sit-stand transfer;stand-sit transfer  -PP       Row Name 11/04/24 1238          Sit-Stand Transfer    Sit-Stand [place] (Transfers) minimum assist  (75% patient effort);moderate assist (50% patient effort);verbal cues;nonverbal cues (demo/gesture)  -PP     Assistive Device (Sit-Stand Transfers) walker, front-wheeled  -PP     Comment, (Sit-Stand Transfer) 2x STS from recliner, initially Mod A but prog to Min A w/ Vcs for tech and hand/foot placement  -PP       Row Name 11/04/24 1238          Functional Mobility    Functional Mobility- Ind. Level contact guard assist;minimum assist (75% patient effort)  -PP     Functional Mobility- Device walker, front-wheeled  -PP     Functional Mobility- Comment Pt able to ambulate short distance to/from chair taking steps forward and backward  -PP       Row Name 11/04/24 1238          Activities of Daily Living    BADL Assessment/Intervention grooming;feeding;toileting;lower body dressing  -PP       Row Name 11/04/24 1238          Grooming Assessment/Training    [place] Level (Grooming) grooming skills;wash face, hands;set up  -     Position (Grooming) supported sitting  -PP       Row Name 11/04/24 1238          Self-Feeding Assessment/Training    [place] Level (Feeding) feeding skills;independent  -PP       Row Name 11/04/24 1238          Toileting Assessment/Training    Comment, (Toileting) FC present and use of BSC w/ assist  -PP       Row Name 11/04/24 1238          Lower Body Dressing Assessment/Training    [place] Level (Lower Body Dressing) don;socks;dependent (less than 25% patient effort)  -PP               User Key  (r) = Recorded By, (t) = Taken By, (c) = Cosigned By      Initials Name Provider Type    PP Wood Fraire, OT Occupational Therapist                   Obj/Interventions       Row Name 11/04/24 1257          Sensory Assessment (Somatosensory)    Sensory Assessment (Somatosensory) sensation intact  -PP       Row Name 11/04/24 1257          Vision Assessment/Intervention    Visual Impairment/Limitations WFL  -PP       Row Name 11/04/24 1257          Range of Motion Comprehensive     General Range of Motion bilateral upper extremity ROM WFL  -PP       Row Name 11/04/24 1257          Strength Comprehensive (MMT)    Comment, General Manual Muscle Testing (MMT) Assessment BUE grossly 3+/5, gen weakness noted  -PP       Row Name 11/04/24 1257          Balance    Static Sitting Balance standby assist;supervision  -PP     Position, Sitting Balance unsupported  -PP     Static Standing Balance contact guard  -PP     Dynamic Standing Balance contact guard;minimal assist  -PP     Position/Device Used, Standing Balance supported;walker, front-wheeled  -PP     Balance Interventions sitting;standing;supported;static;dynamic  -PP               User Key  (r) = Recorded By, (t) = Taken By, (c) = Cosigned By      Initials Name Provider Type    PP Wood Fraire, OT Occupational Therapist                   Goals/Plan       Row Name 11/04/24 1307          Bed Mobility Goal 1 (OT)    Activity/Assistive Device (Bed Mobility Goal 1, OT) bed mobility activities, all  -PP     [place] Level/Cues Needed (Bed Mobility Goal 1, OT) modified independence  -PP     Time Frame (Bed Mobility Goal 1, OT) short term goal (STG);2 weeks  -PP       Row Name 11/04/24 1307          Transfer Goal 1 (OT)    Activity/Assistive Device (Transfer Goal 1, OT) transfers, all  -PP     [place] Level/Cues Needed (Transfer Goal 1, OT) modified independence  -PP     Time Frame (Transfer Goal 1, OT) short term goal (STG);2 weeks  -PP     Progress/Outcome (Transfer Goal 1, OT) new goal  -PP       Row Name 11/04/24 1307          Dressing Goal 1 (OT)    Activity/Device (Dressing Goal 1, OT) dressing skills, all;upper body dressing;lower body dressing  -PP     [place]/Cues Needed (Dressing Goal 1, OT) modified independence  -PP     Time Frame (Dressing Goal 1, OT) short term goal (STG);2 weeks  -PP     Progress/Outcome (Dressing Goal 1, OT) new goal  -PP       Row Name 11/04/24 1307          Toileting Goal 1 (OT)    Activity/Device  (Toileting Goal 1, OT) toileting skills, all  -PP     [place] Level/Cues Needed (Toileting Goal 1, OT) modified independence  -PP     Time Frame (Toileting Goal 1, OT) short term goal (STG);2 weeks  -PP     Progress/Outcome (Toileting Goal 1, OT) new goal  -PP       Row Name 11/04/24 1307          Grooming Goal 1 (OT)    Activity/Device (Grooming Goal 1, OT) grooming skills, all  -PP     [place] (Grooming Goal 1, OT) modified independence  -PP     Time Frame (Grooming Goal 1, OT) short term goal (STG);2 weeks  -PP     Progress/Outcome (Grooming Goal 1, OT) new goal  -PP       Row Name 11/04/24 1307          Therapy Assessment/Plan (OT)    Planned Therapy Interventions (OT) activity tolerance training;BADL retraining;functional balance retraining;occupation/activity based interventions;patient/caregiver education/training;strengthening exercise;transfer/mobility retraining  -PP               User Key  (r) = Recorded By, (t) = Taken By, (c) = Cosigned By      Initials Name Provider Type    PP Wood Fraire, OT Occupational Therapist                   Clinical Impression       Row Name 11/04/24 1300          Pain Assessment    Pretreatment Pain Rating 5/10  -PP     Posttreatment Pain Rating 5/10  -PP     Pain Management Interventions exercise or physical activity utilized  -PP     Response to Pain Interventions activity participation with increased pain  -PP       Row Name 11/04/24 1300          Plan of Care Review    Plan of Care Reviewed With patient  -PP     Progress improving  -PP     Outcome Evaluation Pt. is a 78 y/o female admitted to [facility] on 10/31/24 for Acute Cholecystitis. Pt reports living w/ dtr and being (I) for ADLs, using rwx in home and rollator in community for mob.  Pt currently presents with increased pain, decreased strength, impaired balance, and decreased act tolerance. Today pt is observed UIC, Mod A/ rwx for initial STS and Min A/ rwx for 2nd stand. She stood w/ Cga-Min A/ rwx and  tolerated fxnl mob short distance forwards and backwards. Pt s/u for seated G&H and dep for LBD. Pt will benefit from skilled OT acutely to address her functional deficits and below baseline performance in ADLs. Pt rec dc to SNF vs. home w/ assist and HH pending prog. OT will continue to monitor.  -PP       Row Name 11/04/24 1300          Therapy Assessment/Plan (OT)    Rehab Potential (OT) good  -PP     Criteria for Skilled Therapeutic Interventions Met (OT) yes;skilled treatment is necessary  -PP     Therapy Frequency (OT) 5 times/wk  -PP       Row Name 11/04/24 1300          Therapy Plan Review/Discharge Plan (OT)    Anticipated Discharge Disposition (OT) home with assist;home with home health;skilled nursing facility  pending prog  -PP       Row Name 11/04/24 1300          Vital Signs    Pre SpO2 (%) 93  -PP     O2 Delivery Pre Treatment room air  -PP     O2 Delivery Intra Treatment room air  -PP     O2 Delivery Post Treatment room air  -PP     Pre Patient Position Sitting  -PP     Intra Patient Position Standing  -PP     Post Patient Position Sitting  -PP       Row Name 11/04/24 1300          Positioning and Restraints    Pre-Treatment Position sitting in chair/recliner  -PP     Post Treatment Position chair  -PP     In Chair notified nsg;call light within reach;encouraged to call for assist;exit alarm on;sitting  -PP               User Key  (r) = Recorded By, (t) = Taken By, (c) = Cosigned By      Initials Name Provider Type    PP Wood Fraire OT Occupational Therapist                   Outcome Measures       Row Name 11/04/24 1307          How much help from another is currently needed...    Putting on and taking off regular lower body clothing? 1  -PP     Bathing (including washing, rinsing, and drying) 2  -PP     Toileting (which includes using toilet bed pan or urinal) 2  -PP     Putting on and taking off regular upper body clothing 3  -PP     Taking care of personal grooming (such as brushing  teeth) 3  -PP     Eating meals 4  -PP     AM-PAC 6 Clicks Score (OT) 15  -PP       Row Name 11/04/24 0808          How much help from another person do you currently need...    Turning from your back to your side while in flat bed without using bedrails? 2  -KE     Moving from lying on back to sitting on the side of a flat bed without bedrails? 2  -KE     Moving to and from a bed to a chair (including a wheelchair)? 2  -KE     Standing up from a chair using your arms (e.g., wheelchair, bedside chair)? 2  -KE     Climbing 3-5 steps with a railing? 1  -KE     To walk in hospital room? 2  -KE     AM-PAC 6 Clicks Score (PT) 11  -KE     Highest Level of Mobility Goal 4 --> Transfer to chair/commode  -KE       Row Name 11/04/24 1307          Functional Assessment    Outcome Measure Options AM-PAC 6 Clicks Daily Activity (OT)  -PP               User Key  (r) = Recorded By, (t) = Taken By, (c) = Cosigned By      Initials Name Provider Type    Mariya Felix, RN Registered Nurse    Wood Stevenson OT Occupational Therapist                    Occupational Therapy Education       Title: PT OT SLP Therapies (In Progress)       Topic: Occupational Therapy (In Progress)       Point: ADL training (Done)       Description:   Instruct learner(s) on proper safety adaptation and remediation techniques during self care or transfers.   Instruct in proper use of assistive devices.                  Learning Progress Summary            Patient Acceptance, E, VU by PP at 11/4/2024 1308    Comment: Pt Ed on OT role, dc planning and call light function.                      Point: Home exercise program (Not Started)       Description:   Instruct learner(s) on appropriate technique for monitoring, assisting and/or progressing therapeutic exercises/activities.                  Learner Progress:  Not documented in this visit.              Point: Precautions (Not Started)       Description:   Instruct learner(s) on prescribed  precautions during self-care and functional transfers.                  Learner Progress:  Not documented in this visit.              Point: Body mechanics (Not Started)       Description:   Instruct learner(s) on proper positioning and spine alignment during self-care, functional mobility activities and/or exercises.                  Learner Progress:  Not documented in this visit.                              User Key       Initials Effective Dates Name Provider Type Discipline     06/09/23 -  Wood Fraire OT Occupational Therapist OT                  OT Recommendation and Plan  Planned Therapy Interventions (OT): activity tolerance training, BADL retraining, functional balance retraining, occupation/activity based interventions, patient/caregiver education/training, strengthening exercise, transfer/mobility retraining  Therapy Frequency (OT): 5 times/wk  Plan of Care Review  Plan of Care Reviewed With: patient  Progress: improving  Outcome Evaluation: Pt. is a 80 y/o female admitted to [facility] on 10/31/24 for Acute Cholecystitis. Pt reports living w/ dtr and being (I) for ADLs, using rwx in home and rollator in community for mob.  Pt currently presents with increased pain, decreased strength, impaired balance, and decreased act tolerance. Today pt is observed UIC, Mod A/ rwx for initial STS and Min A/ rwx for 2nd stand. She stood w/ Cga-Min A/ rwx and tolerated fxnl mob short distance forwards and backwards. Pt s/u for seated G&H and dep for LBD. Pt will benefit from skilled OT acutely to address her functional deficits and below baseline performance in ADLs. Pt rec dc to SNF vs. home w/ assist and HH pending prog. OT will continue to monitor.     Time Calculation:   Evaluation Complexity (OT)  Review Occupational Profile/Medical/Therapy History Complexity: expanded/moderate complexity  Assessment, Occupational Performance/Identification of Deficit Complexity: 3-5 performance deficits  Clinical Decision  Making Complexity (OT): detailed assessment/moderate complexity  Overall Complexity of Evaluation (OT): moderate complexity     Time Calculation- OT       Row Name 11/04/24 1308             Time Calculation- OT    OT Start Time 1024  -PP      OT Stop Time 1045  -PP      OT Time Calculation (min) 21 min  -PP      Total Timed Code Minutes- OT 11 minute(s)  -PP      OT Received On 11/04/24  -PP      OT - Next Appointment 11/05/24  -PP      OT Goal Re-Cert Due Date 11/18/24  -PP         Timed Charges    14639 - OT Therapeutic Activity Minutes 11  -PP         Untimed Charges    OT Eval/Re-eval Minutes 10  -PP         Total Minutes    Timed Charges Total Minutes 11  -PP      Untimed Charges Total Minutes 10  -PP       Total Minutes 21  -PP                User Key  (r) = Recorded By, (t) = Taken By, (c) = Cosigned By      Initials Name Provider Type    PP [place], Porshia, OT Occupational Therapist                  Therapy Charges for Today       Code Description Service Date Service Provider Modifiers Qty    90611387630 HC OT THERAPEUTIC ACT EA 15 MIN 11/4/2024 [place], Porshia, OT GO 1    84814362344 HC OT EVAL MOD COMPLEXITY 3 11/4/2024 [place], Porshia, OT GO 1                 Porshia Juno, OT  11/4/2024

## 2024-11-04 NOTE — PLAN OF CARE
Goal Outcome Evaluation:  Plan of Care Reviewed With: patient           Outcome Evaluation: Pt in chair upon entry, pt isidro STS w/rwx and cues to stay on task, pt isidro amb ~5 steps chair to bed w/rwx and assist of 2 for safety, pt states her knees are not good and her stomach hurts so she will not be able to do much, pt wants home but dtr assists and is apparently on a cane per other staff, unsure she will be safe to return home at this level of assist, will follow for progress and incr distance as tolerated    Anticipated Discharge Disposition (PT): skilled nursing facility, home with assist, home with home health (pending family assist available and pts progression)

## 2024-11-04 NOTE — PLAN OF CARE
Goal Outcome Evaluation:           Progress: improving  Outcome Evaluation: Patient oriented X4 with forgetfulness. Sleeping between carre throughout night. No c/o pain. Malin intact, draining clear, yellow urine. SR on monitor.

## 2024-11-04 NOTE — CASE MANAGEMENT/SOCIAL WORK
Continued Stay Note  [facility]     Patient Name: Mariela Ruiz  MRN: [id number]  Today's Date: 11/4/2024    Admit Date: 10/31/2024    Plan: Home with family and HH pending   Discharge Plan       Row Name 11/04/24 1649       Plan    Plan Home with family and HH pending    Patient/Family in Agreement with Plan yes    Plan Comments [facility] has spoken to patient many times today about SNF placement. Provided her a list of facilities. Patient keeps saying that she wants to go home and adamantly declining SNF. [facility] talked to her granddaughter Candace, and she stated that family will be able to take care of her at home. [facility] referral pending. Family can transport home.                   Discharge Codes    No documentation.                 Expected Discharge Date and Time       Expected Discharge Date Expected Discharge Time    Nov 4, 2024

## 2024-11-04 NOTE — PROGRESS NOTES
Postop day #2 after robotic cholecystectomy for severe acute cholecystitis    Subjective:  Looks low but better today.  Tolerating some diet.  Up in chair.    Objective:  Afebrile, vitals are stable  General: Awake and alert, no distress  Eyes: No icterus  Abdomen: Soft, appropriate tenderness, dressings are clean.    Labs reviewed, unremarkable    Assessment and plan:  -Acute cholecystitis, now 2 days out from robotic cholecystectomy  -Looks little better today, continue diet  -Okay for discharge from surgical standpoint once felt to be medically stable    Bin Heaton MD  General and Endoscopic Surgery  [facility]    [address], [address]  [place], [place], [address]  P: [phone]  F: [phone]     yes

## 2024-11-04 NOTE — THERAPY TREATMENT NOTE
Patient Name: Mariela Ruiz  : 1945    MRN: [id number]                              Today's Date: 2024       Admit Date: 10/31/2024    Visit Dx:     ICD-10-CM ICD-9-CM   1. Acute cholecystitis  K81.0 575.0     Patient Active Problem List   Diagnosis    Seizure disorder    Hyperlipidemia    Vitamin D insufficiency    Age-related osteoporosis without current pathological fracture    Sleep apnea    Chronic venous insufficiency    Postsurgical hypothyroidism    Chronic fatigue syndrome    Gastroesophageal reflux disease    Dupuytren's contracture    History of biliary T-tube placement    History of partial thyroidectomy    Multinodular goiter    Restless legs syndrome    History of cardiac catheterization    Urge incontinence of urine    Left knee pain    Ligamentous laxity of knee    DJD (degenerative joint disease) of knee    Adverse drug effect, subsequent encounter    Abnormal blood level of chromium    Abnormal blood level of cobalt    Family history of diabetes mellitus    Thrombocytopenia    .    S/P revision of total hip    Enlarged thyroid gland    Palmar fascial fibromatosis (dupuytren)    Hypothyroidism, unspecified    Hyperlipidemia, unspecified    Acute cholecystitis    Elevated troponin     Past Medical History:   Diagnosis Date    Arthritis     Chronic venous insufficiency     Dupuytren's contracture     History of staph infection     S/P KNEE DEC 2016    History of transfusion     Hyperlipidemia     Lymphedema     LEFT LEG    Nontoxic multinodular goiter     Osteoporosis     Dr. Cabrera    Pelvic joint pain, left     Postsurgical hypothyroidism     Presence of left artificial hip joint     METAL ON METAL AS NOTED PER DR CLEMENT NOTE    Restless leg syndrome     Right bundle branch block     Seizure disorder     Dr. Whitman, HX  LAST SEIZURE    Sleep apnea     DOES NOT HAVE MACHINE    Vitamin D insufficiency      Past Surgical History:   Procedure Laterality Date    APPENDECTOMY      CARDIAC  CATHETERIZATION      NORMAL     CHOLECYSTECTOMY N/A 11/2/2024    Procedure: CHOLECYSTECTOMY LAPAROSCOPIC WITH DAVINCI ROBOT with cholangiogram, possible open;  Surgeon: Bin Heaton MD;  Location[facility];  Service: Robotics - DaVinci;  Laterality: N/A;    COLONOSCOPY  08/17/2017    Normal except for anal fissure.  Dr. Brandt.  [facility].    KNEE MINI REVISION Left 11/15/2016    Procedure: LEFT KNEE POLY CHANGE WITH HI POST STABILIZER;  Surgeon: Palbito Claudio MD;  [facility];  Service:     KNEE MINI REVISION Left 12/13/2016    Procedure: LT KNEE REMOVAL/REPLACEMENT LOCKING PIN ;  Surgeon: Pablito Claudio MD;  Location: [facility] MAIN OR;  Service:     MAMMO FINE NEEDLE ASPIRATION UNILATERAL      RIGHT THYROID NODULE, 2009 BENIGN     MS ARTHRP KNE CONDYLE&PLATU MEDIAL&LAT COMPARTMENTS Left 12/24/2016    Procedure: LEFT KNEE WASHOUT WITH POLY CHANGE;  Surgeon: Christiano Cesar MD;  Location: [facility] MAIN OR;  Service: Orthopedics    MS REVJ TOTAL KNEE ARTHRP W/WO ALGRFT 1 COMPONENT Left 2/20/2017    Procedure: LT KNEE REMOVAL ANTIBIOTIC SPACER AND KNEE REVISION;  Surgeon: Christiano Cesar MD;  Location: [facility] MAIN OR;  Service: Orthopedics    REPLACEMENT TOTAL KNEE Left     THYROIDECTOMY, PARTIAL      1979 LEFT LOBECTOMY AND ISTHMECTOMY     TOTAL ABDOMINAL HYSTERECTOMY WITH SALPINGO OOPHORECTOMY      TOTAL HIP ARTHROPLASTY Left     TOTAL HIP ARTHROPLASTY Right 9/14/2019    Procedure: TOTAL HIP ARTHROPLASTY ANTERIOR WITH HANA TABLE;  Surgeon: Christiano Cesar II, MD;  Location: [facility] MAIN OR;  Service: Orthopedics    TOTAL HIP ARTHROPLASTY REVISION Left 3/9/2021    Procedure: LEFT TOTAL HIP ARTHROPLASTY REVISION POSTERIOR;  Surgeon: Christiano Cesar II, MD;  Location: [facility] MAIN OR;  Service: Orthopedics;  Laterality: Left;    TOTAL KNEE  PROSTHESIS REMOVAL W/ SPACER INSERTION Left 12/29/2016    Procedure: LT KNEE IMPLANT REMOVAL WITH INSERTION OF SPACER ;  Surgeon:  "Christiano Cesar MD;  Location: [facility] MAIN OR;  Service:       General Information       Row Name 11/04/24 1705          Physical Therapy Time and Intention    Document Type therapy note (daily note)  -     Mode of Treatment individual therapy;physical therapy  -       Row Name 11/04/24 1705          General Information    Patient Profile Reviewed yes  -     Existing Precautions/Restrictions fall  -       Row Name 11/04/24 1705          Living Environment    People in Home child(aneudy), adult  dtr is main assist but is on a cane  -       Row Name 11/04/24 1705          Cognition    Orientation Status (Cognition) oriented x 3  impulsive, likes to be eager to get her \"things\" put back and not focus on task  -       Row Name 11/04/24 1705          Safety Issues/Impairments Affecting Functional Mobility    Safety Issues Affecting Function (Mobility) judgment;positioning of assistive device;problem-solving;safety precaution awareness;safety precautions follow-through/compliance  -     Impairments Affecting Function (Mobility) balance;coordination;endurance/activity tolerance;pain;range of motion (ROM);strength  -               User Key  (r) = Recorded By, (t) = Taken By, (c) = Cosigned By      Initials Name Provider Type     Danielle Mejia PTA Physical Therapist Assistant                   Mobility       Row Name 11/04/24 1718 11/04/24 1710       Bed Mobility    Sit-Supine [place] (Bed Mobility) 2 person assist;moderate assist (50% patient effort)  - --    Comment, (Bed Mobility) -- in chair  -      Row Name 11/04/24 1710          Sit-Stand Transfer    Sit-Stand [place] (Transfers) 2 person assist;minimum assist (75% patient effort)  -     Assistive Device (Sit-Stand Transfers) walker, front-wheeled  -     Comment, (Sit-Stand Transfer) pt having difficulty following through with task due to \"analyzing\" everything and where we should stand or where her things go -educ offered on " focusing on task first  -[facility] Name 11/04/24 1710          Gait/Stairs (Locomotion)    Dent Level (Gait) 2 person assist;minimum assist (75% patient effort)  -     Assistive Device (Gait) walker, front-wheeled  -     Distance in Feet (Gait) --  5 steps, chair to bed  -     Deviations/Abnormal Patterns (Gait) gunnar decreased;festinating/shuffling  -     Bilateral Gait Deviations forward flexed posture  -               User Key  (r) = Recorded By, (t) = Taken By, (c) = Cosigned By      Initials Name Provider Type    Danielle Mckinney PTA Physical Therapist Assistant                   Obj/Interventions    No documentation.                  Goals/Plan    No documentation.                  Clinical Impression       [facility] Name 11/04/24 1713          Pain    Pre/Posttreatment Pain Comment did not rate today, just c/o incr abd pain -alerted RN  -[facility] Name 11/04/24 1713          Plan of Care Review    Plan of Care Reviewed With patient  -     Outcome Evaluation Pt in chair upon entry, pt isidro STS w/rwx and cues to stay on task, pt isidro amb ~5 steps chair to bed w/rwx and assist of 2 for safety, pt states her knees are not good and her stomach hurts so she will not be able to do much, pt wants home but dtr assists and is apparently on a cane per other staff, unsure she will be safe to return home at this level of assist, will follow for progress and incr distance as tolerated  -[facility] Name 11/04/24 1713          Therapy Assessment/Plan (PT)    Rehab Potential (PT) fair  -     Criteria for Skilled Interventions Met (PT) yes  -[facility] Name 11/04/24 1713          Vital Signs    O2 Delivery Pre Treatment room air  -[facility] Name 11/04/24 1713          Positioning and Restraints    Pre-Treatment Position sitting in chair/recliner  -     Post Treatment Position bed  -     In Bed fowlers;call light within reach;encouraged to call for assist;exit alarm on;notified nsg;heels  elevated;side rails up x3  -               User Key  (r) = Recorded By, (t) = Taken By, (c) = Cosigned By      Initials Name Provider Type    Danielle Mckinney PTA Physical Therapist Assistant                   Outcome Measures       Row Name 11/04/24 1719 11/04/24 0808       How much help from another person do you currently need...    Turning from your back to your side while in flat bed without using bedrails? 2  -JM 2  -KE    Moving from lying on back to sitting on the side of a flat bed without bedrails? 2  -JM 2  -KE    Moving to and from a bed to a chair (including a wheelchair)? 2  -JM 2  -KE    Standing up from a chair using your arms (e.g., wheelchair, bedside chair)? 2  -JM 2  -KE    Climbing 3-5 steps with a railing? 1  -JM 1  -KE    To walk in hospital room? 2  - 2  -KE    AM-PAC 6 Clicks Score (PT) 11  - 11  -KE    Highest Level of Mobility Goal 4 --> Transfer to chair/commode  -JM 4 --> Transfer to chair/commode  -KE      Row Name 11/04/24 1307          Functional Assessment    Outcome Measure Options AM-PAC 6 Clicks Daily Activity (OT)  -PP               User Key  (r) = Recorded By, (t) = Taken By, (c) = Cosigned By      Initials Name Provider Type    Danielle Mckinney PTA Physical Therapist Assistant    Mariya Felix, RN Registered Nurse    Wood Stevenson OT Occupational Therapist                                 Physical Therapy Education       Title: PT OT SLP Therapies (In Progress)       Topic: Physical Therapy (In Progress)       Point: Mobility training (In Progress)       Learning Progress Summary            Patient Acceptance, E,D, NR by DANA at 11/4/2024 1719    Acceptance, E, VU by GORDON at 11/3/2024 1128    Acceptance, E,D, VU,NR by MS at 11/1/2024 1546                      Point: Home exercise program (In Progress)       Learning Progress Summary            Patient Acceptance, E,D, NR by DANA at 11/4/2024 1719    Acceptance, E, VU by GORDON at 11/3/2024 1128                       Point: Body mechanics (In Progress)       Learning Progress Summary            Patient Acceptance, E,D, NR by  at 11/4/2024 1719    Acceptance, E, VU by  at 11/3/2024 1128    Acceptance, E,D, VU,NR by MS at 11/1/2024 1546                      Point: Precautions (In Progress)       Learning Progress Summary            Patient Acceptance, E,D, NR by  at 11/4/2024 1719    Acceptance, E, VU by  at 11/3/2024 1128    Acceptance, E,D, VU,NR by MS at 11/1/2024 1546                                      User Key       Initials Effective Dates Name Provider Type [facility] 03/07/18 -  Danielle Mejia PTA Physical Therapist Assistant PT    MS 06/16/21 -  Obey Gupta PT Physical Therapist PT     08/24/23 -  Praveena Goldstein, PT Physical Therapist PT                  PT Recommendation and Plan     Outcome Evaluation: Pt in chair upon entry, pt isidro STS w/rwx and cues to stay on task, pt isidro amb ~5 steps chair to bed w/rwx and assist of 2 for safety, pt states her knees are not good and her stomach hurts so she will not be able to do much, pt wants home but dtr assists and is apparently on a cane per other staff, unsure she will be safe to return home at this level of assist, will follow for progress and incr distance as tolerated     Time Calculation:         PT Charges       Row Name 11/04/24 1705             Time Calculation    Start Time 1605  -      Stop Time 1630  -      Time Calculation (min) 25 min  -      PT Received On 11/04/24  -      PT - Next Appointment 11/05/24  -                User Key  (r) = Recorded By, (t) = Taken By, (c) = Cosigned By      Initials Name Provider Type     Danielle Mejia PTA Physical Therapist Assistant                  Therapy Charges for Today       Code Description Service Date Service Provider Modifiers Qty    74774232023 HC PT THERAPEUTIC ACT EA 15 MIN 11/4/2024 Danielle Mejia PTA GP 2    45555391933 HC PT THER SUPP EA 15 MIN 11/4/2024 Roberto  Danielle, PTA GP 2            PT G-Codes  Outcome Measure Options: AM-PAC 6 Clicks Daily Activity (OT)  AM-PAC 6 Clicks Score (PT): 11  AM-PAC 6 Clicks Score (OT): 15  PT Discharge Summary  Anticipated Discharge Disposition (PT): skilled nursing facility, home with assist, home with home health (pending family assist available and pts progression)    Danielle Mejia, HERMILO  11/4/2024

## 2024-11-04 NOTE — PROGRESS NOTES
Name: Mariela Ruiz ADMIT: 10/31/2024   : 1945  PCP: Duglas Rock MD    MRN: [id number] LOS: 4 days   AGE/SEX: 79 y.o. female  ROOM: [facility]     Subjective   Subjective   Malin catheter was placed yesterday due to recurrent retention per nephrology commendations.  Sitting up in the  chair this morning, reports abdominal pain controlled.  Tolerating diet.  Feels weak.  Discussed rehab recommendations per PT, reports she does not want to go to rehab but he will think about it.    Review of Systems   As above  Objective   Objective   Vital Signs  Temp:  [97.9 °F (36.6 °C)-98.6 °F (37 °C)] 97.9 °F (36.6 °C)  Heart Rate:  [90-91] 90  Resp:  [16-18] 16  BP: (105-117)/(52-67) 117/52  SpO2:  [95 %-100 %] 100 %  on  Flow (L/min) (Oxygen Therapy):  [2-3] 2;   Device (Oxygen Therapy): room air  Body mass index is 37.05 kg/m².  Physical Exam    General: Alert, sitting up in the chair, not in distress   HEENT: Normocephalic, atraumatic  CV: Regular rate and rhythm, no murmurs rubs or gallops  Lungs: Clear to auscultation bilaterally, no crackles or wheezes  Abdomen: Soft, nontender, nondistended  Extremities: Bilateral lower extremity lymphedema, no cyanosis     Results Review     I reviewed the patient's new clinical results.  Results from last 7 days   Lab Units 24  0603   WBC 10*3/mm3 7.78 8.95 7.30 10.06   HEMOGLOBIN g/dL 10.8* 12.4 10.1* 10.8*   PLATELETS 10*3/mm3 232 232 197 189     Results from last 7 days   Lab Units 24  0603   SODIUM mmol/L 138 134* 139 135*   POTASSIUM mmol/L 4.0 4.4 4.0 3.9   CHLORIDE mmol/L 105 102 104 105   CO2 mmol/L 24.1 19.0* 23.9 23.0   BUN mg/dL 24* 25* 24* 18   CREATININE mg/dL 1.08* 1.10* 1.30* 0.75   GLUCOSE mg/dL 116* 84 93 83   Estimated Creatinine Clearance: 51.4 mL/min (A) (by C-G formula based on SCr of 1.08 mg/dL (H)).  Results from last 7 days   Lab Units  "11/04/24 0457 11/03/24 0439 11/01/24  0603 10/31/24  1238   ALBUMIN g/dL 2.9* 2.9* 3.0* 3.4*   BILIRUBIN mg/dL  --   --  0.6 0.8   ALK PHOS U/L  --   --  121* 153*   AST (SGOT) U/L  --   --  14 18   ALT (SGPT) U/L  --   --  10 12     Results from last 7 days   Lab Units 11/04/24 0457 11/03/24 0439 11/02/24  0759 11/01/24  0603 10/31/24  1238   CALCIUM mg/dL 9.5 9.1 8.9 9.0 9.5   ALBUMIN g/dL 2.9* 2.9*  --  3.0* 3.4*   MAGNESIUM mg/dL 1.9 2.2 2.0  --   --    PHOSPHORUS mg/dL 2.2* 3.1 3.0  --   --      Results from last 7 days   Lab Units 10/31/24  1238   LACTATE mmol/L 1.2     SARS-CoV-2 PCR   Date Value Ref Range Status   03/06/2021 Not Detected Not Detected Final     Comment:     Nucleic acid specific to SARS-CoV-2 (COVID-19) virus was not detected in  this sample by the TaqPath (TM) COVID-19 Combo Kit.          SARS-CoV-2 (COVID-19) nucleic acid testing performed using [place] Aptima (R) SARS-CoV-2 Assay or Compario TaqPath (TM)  COVID-19 Combo Kit as indicated above under Emergency Use Authorization (EUA) from the FDA. Aptima (R) and TaqPath (TM) test performance  characteristics verified by [facility] in accordance with the FDAs Guidance Document (Policy for Diagnostic Tests for Coronavirus Disease-2019  during the Public Health Emergency) issued on March 16, 2020. The laboratory is regulated under CLIA as qualified to perform high-complexity testing  Unless otherwise noted, all testing was performed at [facility], CLIA No. [id number], [place] State Licensee No. [id number]     No results found for: \"HGBA1C\", \"POCGLU\"        FL Cholangiogram Operative  INTRAOPERATIVE PORTABLE VIEW CHOLANGIOGRAM     CLINICAL INFORMATION: Post cholecystectomy     FINDINGS: Upon injection of the cystic duct with contrast, the proximal  intrahepatic, common hepatic and common bile ducts are opacified.  Contrast passes into the duodenum. A filling defect within the common  bile duct on initial images does not persist " on subsequent images.     Fluoroscopy time 40 s, 252 images  Reference Air Kerma 18 mGy     This report was finalized on 11/2/2024 3:33 PM by Dr. Sultana Arias M.D  on Workstation: [id number]       Scheduled Medications  cholecalciferol, 4,000 Units, Oral, Daily  heparin (porcine), 5,000 Units, Subcutaneous, Q8H  levothyroxine, 50 mcg, Oral, QAM  PHENobarbital, 129.6 mg, Oral, Nightly  PHENobarbital, 64.8 mg, Oral, Daily  piperacillin-tazobactam, 3.375 g, Intravenous, Q8H  polyethylene glycol, 17 g, Oral, Daily  pramipexole, 0.5 mg, Oral, Daily  pravastatin, 40 mg, Oral, Nightly  senna-docusate sodium, 2 tablet, Oral, BID  sodium chloride, 10 mL, Intravenous, Q12H  vitamin B-12, 1,000 mcg, Oral, Daily    Infusions     Diet  Diet: Regular/House; Fluid Consistency: Thin (IDDSI 0)    I have personally reviewed     [x]  Laboratory   []  Microbiology   []  Radiology   []  EKG/Telemetry  []  Cardiology/Vascular   []  Pathology    []  Records       Assessment/Plan     Active Hospital Problems    Diagnosis  POA    **Acute cholecystitis [K81.0]  Yes    Elevated troponin [R79.89]  Unknown    Hypothyroidism, unspecified [E03.9]  Yes    Seizure disorder [G40.909]  Yes    Sleep apnea [G47.30]  Yes      Resolved Hospital Problems   No resolved problems to display.       79 y.o. female admitted with Acute cholecystitis.    Acute cholecystitis  -Status post laparoscopic cholecystectomy with cholangiogram and da [place] robot assistance, Laparoscopic lysis of adhesions per general surgery on 11/2/2024  -Blood cultures negative to date  -Continue pain management, supportive care, IV fluids  -Will discontinue antibiotics    Mild JACIEL  -Creatinine trended up 1.30 11/2/2024  -Status post IV fluids, creatinine improving    Elevated troponin    negative left heart catheterization in July 2022 following false positive perfusion scan  --significance unclear especially in this situation of no change in her functional capacity and ability to  do ADLs despite abdominal discomfort as documented above.   - EKG with RBBB, unchanged compared to prior.  - cardiology evaluated      Seizure disorder  -continue her home regimen of phenobarbital   -seizure precautions.     Hypothyroidism  -continue thyroid replacement therapy.    Anemia   -Hemoglobin 12.4 on admission  -No signs of bleeding reported  -Iron panel 11/2/2024 consistent with anemia of chronic disease, however with iron saturation of 9 underlying iron deficiency cannot be included.  Will need repeat iron panel outpatient once acute illness resolves  -B12 low end of normal, initiated on B12 supplement  -Hemoglobin 10.8, monitor    Postop urinary tension  -Status post Malin catheter placement bladder 11/3  -Management per nephrology      History of sleep apnea  Nightly oxygen.  CPAP if available.      DVT prophylaxis.  Subcu heparin  Full code.  Discussed with patient  Disposition: Home with home health versus SNF if agreeable, likely stable to be discharged in 1-2 days  Expected Discharge Date: 11/4/2024; Expected Discharge Time:        Copied text in this note has been reviewed and is accurate as of 11/04/24.         Dictated utilizing Dragon dictation        Valdemar Barros MD  [facility]  11/04/24  10:18 EST

## 2024-11-04 NOTE — PROGRESS NOTES
"Nutrition Services    Patient Name:  Mariela Ruiz  YOB: 1945  MRN: [id number]  Admit Date:  10/31/2024    Assessment Date:  11/04/24    NUTRITION SCREENING      Reason for Encounter MST score 2+, \"Unsure\" unintentional weight loss   Diagnosis/Problem Acute cholecystitis   JACIEL  Elevated troponin   Seizure disorder   Hypothyroidism   Anemia   Postop urinary tension   History of sleep apnea        PO Diet Diet: Regular/House; Fluid Consistency: Thin (IDDSI 0)   Supplements N/a   PO Intake % % - tolerating diet       Medications MAR reviewed by RD   Labs  Listed below, reviewed   Physical Findings Alert, oriented, room air, 3+ (moderate) edema   GI Function Constipation, last BM 10/29 - bowel regimen in place   Skin Status Abdomen surgical wound       Height  Weight  BMI  Weight Trend     Height: 165.1 cm (65\")  Weight: 101 kg (222 lb 10.6 oz) (10/31/24 1650)  Body mass index is 37.05 kg/m².  Gain       Nutrition Problem (PES) Inadequate oral intake related to acute cholecystitis s/p robotic cholecystectomy as evidence by po intake.       Intervention/Plan Addition of Boost Plus daily with breakfast to support po intake. Will continue to monitor po intakes. May consider stronger bowel regimen.    RD to follow up per protocol.     Results from last 7 days   Lab Units 11/04/24  0457 11/03/24  0439 11/02/24  0759 11/01/24  0603 10/31/24  1238   SODIUM mmol/L 138 134* 139 135* 134*   POTASSIUM mmol/L 4.0 4.4 4.0 3.9 4.2   CHLORIDE mmol/L 105 102 104 105 101   CO2 mmol/L 24.1 19.0* 23.9 23.0 25.0   BUN mg/dL 24* 25* 24* 18 21   CREATININE mg/dL 1.08* 1.10* 1.30* 0.75 0.93   CALCIUM mg/dL 9.5 9.1 8.9 9.0 9.5   BILIRUBIN mg/dL  --   --   --  0.6 0.8   ALK PHOS U/L  --   --   --  121* 153*   ALT (SGPT) U/L  --   --   --  10 12   AST (SGOT) U/L  --   --   --  14 18   GLUCOSE mg/dL 116* 84 93 83 96     Results from last 7 days   Lab Units 11/04/24  0457 11/03/24  0439 11/02/24  0759   MAGNESIUM mg/dL 1.9 " 2.2 2.0   PHOSPHORUS mg/dL 2.2* 3.1 3.0   HEMOGLOBIN g/dL 10.8* 12.4 10.1*   HEMATOCRIT % 32.2* 37.4 30.0*     Lab Results   Component Value Date    HGBA1C 5.10 03/21/2023         Electronically signed by:  Radha Duncan  11/04/24 11:54 EST

## 2024-11-04 NOTE — PLAN OF CARE
Goal Outcome Evaluation:  Plan of Care Reviewed With: patient        Progress: improving  Outcome Evaluation: Pt. is a 80 y/o female admitted to [facility] on 10/31/24 for Acute Cholecystitis. Pt reports living w/ dtr and being (I) for ADLs, using rwx in home and rollator in community for mob.  Pt currently presents with increased pain, decreased strength, impaired balance, and decreased act tolerance. Today pt is observed UIC, Mod A/ rwx for initial STS and Min A/ rwx for 2nd stand. She stood w/ Cga-Min A/ rwx and tolerated fxnl mob short distance forwards and backwards. Pt s/u for seated G&H and dep for LBD. Pt will benefit from skilled OT acutely to address her functional deficits and below baseline performance in ADLs. Pt rec dc to SNF vs. home w/ assist and HH pending prog. OT will continue to monitor.    Anticipated Discharge Disposition (OT): home with assist, home with home health, skilled nursing facility (pending prog)

## 2024-11-04 NOTE — PLAN OF CARE
Goal Outcome Evaluation:       Patient A&Ox4 but confused to situation at times. Up to chair with assist x2. PRN pain medication given for abdominal pain. Unable to void on her own. Bladder scan performed. Malin placed per nephrology. Urology consulted to see in the AM. VSS.

## 2024-11-04 NOTE — PROGRESS NOTES
[facility] Progress Note      Patient Name: Mariela Ruiz  : 1945  MRN: [id number]  Primary Care Physician:  Duglas Rock MD  Date of admission: 10/31/2024    Subjective     Interval History:   F/u JACIEL    Review of Systems:      Feels cold o/w no complaints    Objective     Vitals:   Temp:  [97.9 °F (36.6 °C)-98.6 °F (37 °C)] 97.9 °F (36.6 °C)  Heart Rate:  [90] 90  Resp:  [16-18] 16  BP: (114-117)/(52-67) 117/52  Flow (L/min) (Oxygen Therapy):  [2-3] 2    Intake/Output Summary (Last 24 hours) at 2024 1401  Last data filed at 2024 0914  Gross per 24 hour   Intake 600 ml   Output 550 ml   Net 50 ml       Physical Exam:    General Appearance: alert, oriented x 3, no acute distress   Neck: supple, no JVD  Lungs: CTA bilat no rales  Heart: RRR, normal S1 and S2  Abdomen: soft, nontender, nondistended  : no palpable bladder  Extremities: 1+ BLE pedal/ankle edema    Scheduled Meds:     cholecalciferol, 4,000 Units, Oral, Daily  heparin (porcine), 5,000 Units, Subcutaneous, Q8H  levothyroxine, 50 mcg, Oral, QAM  PHENobarbital, 129.6 mg, Oral, Nightly  PHENobarbital, 64.8 mg, Oral, Daily  piperacillin-tazobactam, 3.375 g, Intravenous, Q8H  polyethylene glycol, 17 g, Oral, Daily  pramipexole, 0.5 mg, Oral, Daily  pravastatin, 40 mg, Oral, Nightly  senna-docusate sodium, 2 tablet, Oral, BID  sodium chloride, 10 mL, Intravenous, Q12H  vitamin B-12, 1,000 mcg, Oral, Daily      IV Meds:        Results Reviewed:   I have personally reviewed the results from the time of this admission to 2024 14:01 EST     Results from last 7 days   Lab Units 24  0457 24  0439 24  0759 24  0603 10/31/24  1238   SODIUM mmol/L 138 134* 139 135* 134*   POTASSIUM mmol/L 4.0 4.4 4.0 3.9 4.2   CHLORIDE mmol/L 105 102 104 105 101   CO2 mmol/L 24.1 19.0* 23.9 23.0 25.0   BUN mg/dL 24* 25* 24* 18 21   CREATININE mg/dL 1.08* 1.10* 1.30* 0.75 0.93   CALCIUM mg/dL  9.5 9.1 8.9 9.0 9.5   BILIRUBIN mg/dL  --   --   --  0.6 0.8   ALK PHOS U/L  --   --   --  121* 153*   ALT (SGPT) U/L  --   --   --  10 12   AST (SGOT) U/L  --   --   --  14 18   GLUCOSE mg/dL 116* 84 93 83 96     Estimated Creatinine Clearance: 51.4 mL/min (A) (by C-G formula based on SCr of 1.08 mg/dL (H)).  Results from last 7 days   Lab Units 11/04/24 0457 11/03/24 0439 11/02/24  0759   MAGNESIUM mg/dL 1.9 2.2 2.0   PHOSPHORUS mg/dL 2.2* 3.1 3.0         Results from last 7 days   Lab Units 11/04/24 0457 11/03/24 0439 11/02/24  0759 11/01/24  0603 10/31/24  1238   WBC 10*3/mm3 7.78 8.95 7.30 10.06 15.00*   HEMOGLOBIN g/dL 10.8* 12.4 10.1* 10.8* 12.4   PLATELETS 10*3/mm3 232 232 197 189 210           Assessment / Plan     ASSESSMENT:  JACIEL - likely prerenal/hemodynamic from vol depletion and mild/transient hypotension (BP was 90s/50s).  Also urinary retention.  K/HCo3 normal.  Cr improved slightly 1.3 to 1.1 with IVF, stagnant but stable off IV fluids.  Has chronic lymphedema.   Lytes stable  Acute cholecystitis, s/p lap-renetta + NICKO. On zosyn  Hypophosphatemia, mild (2.2)  Dyslipidemia on statin, CK normal   Seizure disorder treated  Hypothyroidism, treated   Urinary retention - childress placed 11/3.  UROL to see   Chronic lymphedema     PLAN:  Keep childress pending UROL eval   Replace phos orally       Carlo Hampton MD  11/04/24  14:01 [facility]    [facility]  [phone]

## 2024-11-04 NOTE — CONSULTS
FIRST UROLOGY CONSULT      Patient Identification:  NAME:  Mariela Ruiz  Age:  79 y.o.   Sex:  female   :  1945   MRN:  [id number]       Chief complaint: Difficulty urinating    History of present illness: 79-year-old female with no prior urologic history and really no baseline voiding issues presents with multiple issues and acute cholecystitis.  She has been having trouble voiding.  They placed the catheter in her.  We are asked see her in consultation.  She states she is voiding fine with a had good control with no incontinence and no fecal issues.  Is clear her output is good and her creatinine remained stable.      Past medical history:  Past Medical History:   Diagnosis Date    Arthritis     Chronic venous insufficiency     Dupuytren's contracture     History of staph infection     S/P KNEE DEC 2016    History of transfusion     Hyperlipidemia     Lymphedema     LEFT LEG    Nontoxic multinodular goiter     Osteoporosis     Dr. Cabrera    Pelvic joint pain, left     Postsurgical hypothyroidism     Presence of left artificial hip joint     METAL ON METAL AS NOTED PER DR CLEMENT NOTE    Restless leg syndrome     Right bundle branch block     Seizure disorder     Dr. Whitman, HX  LAST SEIZURE    Sleep apnea     DOES NOT HAVE MACHINE    Vitamin D insufficiency        Past surgical history:  Past Surgical History:   Procedure Laterality Date    APPENDECTOMY      CARDIAC CATHETERIZATION      NORMAL     CHOLECYSTECTOMY N/A 2024    Procedure: CHOLECYSTECTOMY LAPAROSCOPIC WITH DAVINCI ROBOT with cholangiogram, possible open;  Surgeon: Bin Heaton MD;  Location: [facility] OR;  Service: Robotics - DaVinci;  Laterality: N/A;    COLONOSCOPY  2017    Normal except for anal fissure.  Dr. Brandt.  [facility].    KNEE MINI REVISION Left 11/15/2016    Procedure: LEFT KNEE POLY CHANGE WITH HI POST STABILIZER;  Surgeon: Pablito Claudio MD;  Location: [facility] OR;  Service:     KNEE  MINI REVISION Left 12/13/2016    Procedure: LT KNEE REMOVAL/REPLACEMENT LOCKING PIN ;  Surgeon: Pablito Claudio MD;  Location[facility] MAIN OR;  Service:     MAMMO FINE NEEDLE ASPIRATION UNILATERAL      RIGHT THYROID NODULE, 2009 BENIGN     LA ARTHRP KNE CONDYLE&PLATU MEDIAL&LAT COMPARTMENTS Left 12/24/2016    Procedure: LEFT KNEE WASHOUT WITH POLY CHANGE;  Surgeon: Christiano Cesar MD;  Location[facility];  Service: Orthopedics    LA REVJ TOTAL KNEE ARTHRP W/WO ALGRFT 1 COMPONENT Left 2/20/2017    Procedure: LT KNEE REMOVAL ANTIBIOTIC SPACER AND KNEE REVISION;  Surgeon: Christiano Cesar MD;  Location[facility];  Service: Orthopedics    REPLACEMENT TOTAL KNEE Left     THYROIDECTOMY, PARTIAL      1979 LEFT LOBECTOMY AND ISTHMECTOMY     TOTAL ABDOMINAL HYSTERECTOMY WITH SALPINGO OOPHORECTOMY      TOTAL HIP ARTHROPLASTY Left     TOTAL HIP ARTHROPLASTY Right 9/14/2019    Procedure: TOTAL HIP ARTHROPLASTY ANTERIOR WITH HANA TABLE;  Surgeon: Christiano Cesar II, MD;  Location: [facility] MAIN OR;  Service: Orthopedics    TOTAL HIP ARTHROPLASTY REVISION Left 3/9/2021    Procedure: LEFT TOTAL HIP ARTHROPLASTY REVISION POSTERIOR;  Surgeon: Christiano Cesar II, MD;  Location: [facility] MAIN OR;  Service: Orthopedics;  Laterality: Left;    TOTAL KNEE  PROSTHESIS REMOVAL W/ SPACER INSERTION Left 12/29/2016    Procedure: LT KNEE IMPLANT REMOVAL WITH INSERTION OF SPACER ;  Surgeon: Christiano Cesar MD;  Location: [facility] MAIN OR;  Service:        Allergies:  Clindamycin/lincomycin, Duricef [cefadroxil], and Sulfa antibiotics    Home medications:  Medications Prior to Admission   Medication Sig Dispense Refill Last Dose/Taking    cholecalciferol (VITAMIN D3) 1000 units tablet Take 1 tablet by mouth Daily. (Patient taking differently: Take 4 tablets by mouth Daily.)   10/30/2024    Cyanocobalamin (Vitamin B-12) 1000 MCG sublingual tablet 1 tablet daily 90 tablet 2 10/30/2024    PHENobarbital 64.8 MG tablet TAKE  3 TABLETS BY MOUTH DAILY (Patient taking differently: Take  by mouth See Admin Instructions. Takes 1 tablet in morning, 2 tablets at night) 270 tablet 1 10/30/2024    pramipexole (MIRAPEX) 0.5 MG tablet TAKE 5 TABLETS BY MOUTH ONCE DAILY (Patient taking differently: Take  by mouth Daily. TAKES 2-3 TABLETS BY MOUTH ONCE DAILY) 450 tablet 0 Taking Differently    pravastatin (PRAVACHOL) 40 MG tablet TAKE 1 TABLET BY MOUTH EVERY NIGHT FOR HIGH AMOUNT OF FATS IN THE BLOOD 90 tablet 2 10/30/2024    Synthroid 50 MCG tablet Take 1 tablet by mouth Every Morning. Indications: Underactive Thyroid 90 tablet 2 10/30/2024    alendronate (Fosamax) 70 MG tablet Take 1 tablet by mouth Every 7 (Seven) Days. Taken on the stomach with 1 glass of water.  No food until 1/2-hour later. (Patient not taking: Reported on 8/2/2024) 4 tablet 11     aspirin 81 MG EC tablet Take 1 tablet by mouth Daily.   Unknown    coenzyme Q10 100 MG capsule Take 1 capsule by mouth Daily. HOLD PRIOR TO SURG (Patient not taking: Reported on 8/2/2024)   Not Taking    escitalopram (LEXAPRO) 20 MG tablet Take 1 tablet by mouth Daily. (Patient not taking: Reported on 8/2/2024) 90 tablet 2        Hospital medications:  cholecalciferol, 4,000 Units, Oral, Daily  heparin (porcine), 5,000 Units, Subcutaneous, Q8H  levothyroxine, 50 mcg, Oral, QAM  PHENobarbital, 129.6 mg, Oral, Nightly  PHENobarbital, 64.8 mg, Oral, Daily  polyethylene glycol, 17 g, Oral, Daily  pramipexole, 0.5 mg, Oral, Daily  pravastatin, 40 mg, Oral, Nightly  senna-docusate sodium, 2 tablet, Oral, BID  sodium chloride, 10 mL, Intravenous, Q12H  vitamin B-12, 1,000 mcg, Oral, Daily           acetaminophen    Calcium Replacement - Follow Nurse / BPA Driven Protocol    Magnesium Standard Dose Replacement - Follow Nurse / BPA Driven Protocol    Morphine    nitroglycerin    ondansetron    oxyCODONE-acetaminophen    Phosphorus Replacement - Follow Nurse / BPA Driven Protocol    Potassium Replacement -  Follow Nurse / BPA Driven Protocol    sodium chloride    sodium chloride    sodium chloride    Family history:  Family History   Problem Relation Age of Onset    Heart disease Father     Diabetes Sister     Hypertension Sister     Hyperlipidemia Sister     Obesity Sister     Malig Hyperthermia Neg Hx        Social history:  Social History     Tobacco Use    Smoking status: Never     Passive exposure: Never    Smokeless tobacco: Never   Vaping Use    Vaping status: Never Used   Substance Use Topics    Alcohol use: No    Drug use: No       Review of systems:    Negative 12-system ROS except for the following: As above      Objective:  TMax 24 hours:   Temp (24hrs), Av °F (36.7 °C), Min:97.5 °F (36.4 °C), Max:98.6 °F (37 °C)      Vitals Ranges:   Temp:  [97.5 °F (36.4 °C)-98.6 °F (37 °C)] 97.5 °F (36.4 °C)  Heart Rate:  [] 107  Resp:  [16-18] 16  BP: (114-142)/(52-74) 142/74    Intake/Output Last 3 shifts:  I/O last 3 completed shifts:  In: 480 [P.O.:480]  Out: 1500 [Urine:1500]     Physical Exam:       General Appearance:    Alert, cooperative, in no acute distress   Head:    Normocephalic, without obvious abnormality, atraumatic   Eyes:          PERRL, conjunctivae and corneas clear   Ears:    Normal external inspection   Throat:   No oral lesions, oral mucosa moist   Neck:   Supple, no LAD, trachea midline   Back:     No CVA tenderness   Lungs:     Respirations unlabored, symmetric excursion    Heart:    RRR, intact peripheral pulses   Abdomen:   Postsurgical abdomen   :       Extremities:   No edema, no deformity   Skin:   No bleeding, bruising or rashes   Neuro/Psych:   Orientation intact, mood/affect pleasant, no focal findings       Data review:  Lab Results (last 24 hours)       Procedure Component Value Units Date/Time    Phosphorus [[id number]]  (Abnormal) Collected: 24 1501    Specimen: Blood Updated: 24 1536     Phosphorus 2.0 mg/dL     Blood Culture - Blood, Arm, Right [[id number]]   (Normal) Collected: 10/31/24 1503    Specimen: Blood from Arm, Right Updated: 11/04/24 1415     Blood Culture No growth at 4 days    Narrative:      Less than seven (7) mL's of blood was collected.  Insufficient quantity may yield false negative results.    Blood Culture - Blood, Arm, Left [[id number]]  (Normal) Collected: 10/31/24 1459    Specimen: Blood from Arm, Left Updated: 11/04/24 1415     Blood Culture No growth at 4 days    Tissue Pathology Exam [[id number]] Collected: 11/02/24 1519    Specimen: Tissue from Gallbladder Updated: 11/04/24 0707    Renal Function Panel [[id number]]  (Abnormal) Collected: 11/04/24 0457    Specimen: Blood Updated: 11/04/24 0600     Glucose 116 mg/dL      BUN 24 mg/dL      Creatinine 1.08 mg/dL      Sodium 138 mmol/L      Potassium 4.0 mmol/L      Chloride 105 mmol/L      CO2 24.1 mmol/L      Calcium 9.5 mg/dL      Albumin 2.9 g/dL      Phosphorus 2.2 mg/dL      Anion Gap 8.9 mmol/L      BUN/Creatinine Ratio 22.2     eGFR 53.0 mL/min/1.73     Narrative:      GFR Normal >60  Chronic Kidney Disease <60  Kidney Failure <15    The GFR formula is only valid for adults with stable renal function between ages 18 and 70.    Magnesium [[id number]]  (Normal) Collected: 11/04/24 0457    Specimen: Blood Updated: 11/04/24 0549     Magnesium 1.9 mg/dL     CBC (No Diff) [[id number]]  (Abnormal) Collected: 11/04/24 0457    Specimen: Blood Updated: 11/04/24 0526     WBC 7.78 10*3/mm3      RBC 3.63 10*6/mm3      Hemoglobin 10.8 g/dL      Hematocrit 32.2 %      MCV 88.7 fL      MCH 29.8 pg      MCHC 33.5 g/dL      RDW 11.7 %      RDW-SD 37.4 fl      MPV 10.3 fL      Platelets 232 10*3/mm3              Imaging:  Imaging Results (Last 24 Hours)       ** No results found for the last 24 hours. **             Results Review  I reviewed the patient's prior history and old records to the best of my ability.   I have personally reviewed all pertinent imaging myself and their accompanying reports.   I have  reviewed all labs.        Assessment:       Acute cholecystitis    Seizure disorder    Sleep apnea    Hypothyroidism, unspecified    Elevated troponin    Urinary retention multifactorial    Plan:     Voiding trial in the morning.    Nabil Elizondo MD  11/04/24  18:17 EST

## 2024-11-05 ENCOUNTER — APPOINTMENT (OUTPATIENT)
Dept: GENERAL RADIOLOGY | Facility: HOSPITAL | Age: 79
End: 2024-11-05
Payer: MEDICARE

## 2024-11-05 LAB
ALBUMIN SERPL-MCNC: 2.6 G/DL (ref 3.5–5.2)
ANION GAP SERPL CALCULATED.3IONS-SCNC: 8 MMOL/L (ref 5–15)
BACTERIA SPEC AEROBE CULT: NORMAL
BACTERIA SPEC AEROBE CULT: NORMAL
BUN SERPL-MCNC: 20 MG/DL (ref 8–23)
BUN/CREAT SERPL: 21.7 (ref 7–25)
CALCIUM SPEC-SCNC: 9 MG/DL (ref 8.6–10.5)
CHLORIDE SERPL-SCNC: 105 MMOL/L (ref 98–107)
CO2 SERPL-SCNC: 25 MMOL/L (ref 22–29)
CREAT SERPL-MCNC: 0.92 MG/DL (ref 0.57–1)
CYTO UR: NORMAL
DEPRECATED RDW RBC AUTO: 39.8 FL (ref 37–54)
EGFRCR SERPLBLD CKD-EPI 2021: 64.3 ML/MIN/1.73
ERYTHROCYTE [DISTWIDTH] IN BLOOD BY AUTOMATED COUNT: 12.1 % (ref 12.3–15.4)
GLUCOSE SERPL-MCNC: 98 MG/DL (ref 65–99)
HCT VFR BLD AUTO: 32.4 % (ref 34–46.6)
HGB BLD-MCNC: 10.7 G/DL (ref 12–15.9)
LAB AP CASE REPORT: NORMAL
MAGNESIUM SERPL-MCNC: 1.8 MG/DL (ref 1.6–2.4)
MCH RBC QN AUTO: 30.3 PG (ref 26.6–33)
MCHC RBC AUTO-ENTMCNC: 33 G/DL (ref 31.5–35.7)
MCV RBC AUTO: 91.8 FL (ref 79–97)
PATH REPORT.FINAL DX SPEC: NORMAL
PATH REPORT.GROSS SPEC: NORMAL
PHOSPHATE SERPL-MCNC: 2.3 MG/DL (ref 2.5–4.5)
PLATELET # BLD AUTO: 257 10*3/MM3 (ref 140–450)
PMV BLD AUTO: 10 FL (ref 6–12)
POTASSIUM SERPL-SCNC: 3.9 MMOL/L (ref 3.5–5.2)
RBC # BLD AUTO: 3.53 10*6/MM3 (ref 3.77–5.28)
SODIUM SERPL-SCNC: 138 MMOL/L (ref 136–145)
WBC NRBC COR # BLD AUTO: 6.55 10*3/MM3 (ref 3.4–10.8)

## 2024-11-05 PROCEDURE — 94618 PULMONARY STRESS TESTING: CPT

## 2024-11-05 PROCEDURE — 80069 RENAL FUNCTION PANEL: CPT | Performed by: INTERNAL MEDICINE

## 2024-11-05 PROCEDURE — 83735 ASSAY OF MAGNESIUM: CPT | Performed by: SURGERY

## 2024-11-05 PROCEDURE — 85027 COMPLETE CBC AUTOMATED: CPT | Performed by: SURGERY

## 2024-11-05 PROCEDURE — 25010000002 HEPARIN (PORCINE) PER 1000 UNITS: Performed by: STUDENT IN AN ORGANIZED HEALTH CARE EDUCATION/TRAINING PROGRAM

## 2024-11-05 PROCEDURE — 97110 THERAPEUTIC EXERCISES: CPT

## 2024-11-05 PROCEDURE — 71045 X-RAY EXAM CHEST 1 VIEW: CPT

## 2024-11-05 RX ADMIN — POLYETHYLENE GLYCOL 3350 17 G: 17 POWDER, FOR SOLUTION ORAL at 09:55

## 2024-11-05 RX ADMIN — PRAMIPEXOLE DIHYDROCHLORIDE 0.5 MG: 0.5 TABLET ORAL at 09:47

## 2024-11-05 RX ADMIN — HEPARIN SODIUM 5000 UNITS: 5000 INJECTION INTRAVENOUS; SUBCUTANEOUS at 20:43

## 2024-11-05 RX ADMIN — PHENOBARBITAL 129.6 MG: 32.4 TABLET ORAL at 22:03

## 2024-11-05 RX ADMIN — CYANOCOBALAMIN TAB 500 MCG 1000 MCG: 500 TAB at 09:48

## 2024-11-05 RX ADMIN — Medication 10 ML: at 09:52

## 2024-11-05 RX ADMIN — Medication 2 PACKET: at 17:56

## 2024-11-05 RX ADMIN — Medication 2 PACKET: at 13:21

## 2024-11-05 RX ADMIN — LEVOTHYROXINE SODIUM 50 MCG: 50 TABLET ORAL at 05:30

## 2024-11-05 RX ADMIN — HEPARIN SODIUM 5000 UNITS: 5000 INJECTION INTRAVENOUS; SUBCUTANEOUS at 13:21

## 2024-11-05 RX ADMIN — HEPARIN SODIUM 5000 UNITS: 5000 INJECTION INTRAVENOUS; SUBCUTANEOUS at 05:30

## 2024-11-05 RX ADMIN — Medication 4000 UNITS: at 09:47

## 2024-11-05 RX ADMIN — OXYCODONE HYDROCHLORIDE AND ACETAMINOPHEN 1 TABLET: 5; 325 TABLET ORAL at 05:30

## 2024-11-05 RX ADMIN — PRAVASTATIN SODIUM 40 MG: 40 TABLET ORAL at 20:43

## 2024-11-05 RX ADMIN — Medication 2 PACKET: at 10:45

## 2024-11-05 RX ADMIN — SENNOSIDES AND DOCUSATE SODIUM 2 TABLET: 50; 8.6 TABLET ORAL at 09:47

## 2024-11-05 RX ADMIN — PHENOBARBITAL 64.8 MG: 32.4 TABLET ORAL at 09:53

## 2024-11-05 RX ADMIN — Medication 10 ML: at 20:44

## 2024-11-05 RX ADMIN — LEVOTHYROXINE SODIUM 50 MCG: 50 TABLET ORAL at 09:47

## 2024-11-05 NOTE — PROGRESS NOTES
[facility] Progress Note      Patient Name: Mariela Ruiz  : 1945  MRN: [id number]  Primary Care Physician:  Duglas Rock MD  Date of admission: 10/31/2024    Subjective     Interval History:   F/u JACIEL    Review of Systems:   sleeping    Objective     Vitals:   Temp:  [97.5 °F (36.4 °C)-98.5 °F (36.9 °C)] 98.1 °F (36.7 °C)  Heart Rate:  [] 106  Resp:  [16-18] 18  BP: (117-142)/(52-74) 132/56  Flow (L/min) (Oxygen Therapy):  [2] 2    Intake/Output Summary (Last 24 hours) at 2024 0724  Last data filed at 2024 0500  Gross per 24 hour   Intake 600 ml   Output 1300 ml   Net -700 ml       Physical Exam:    General Appearance: frail WF resting comfortably on NC O2  Neck: supple, no JVD  Lungs: CTA bilat no rales  Heart: RRR, normal S1 and S2  Abdomen: soft, nontender, nondistended  Extremities: 1+ BLE edema, no cyanosis or clubbing       Scheduled Meds:     cholecalciferol, 4,000 Units, Oral, Daily  heparin (porcine), 5,000 Units, Subcutaneous, Q8H  levothyroxine, 50 mcg, Oral, QAM  PHENobarbital, 129.6 mg, Oral, Nightly  PHENobarbital, 64.8 mg, Oral, Daily  polyethylene glycol, 17 g, Oral, Daily  pramipexole, 0.5 mg, Oral, Daily  pravastatin, 40 mg, Oral, Nightly  senna-docusate sodium, 2 tablet, Oral, BID  sodium chloride, 10 mL, Intravenous, Q12H  vitamin B-12, 1,000 mcg, Oral, Daily      IV Meds:        Results Reviewed:   I have personally reviewed the results from the time of this admission to 2024 07:24 EST     Results from last 7 days   Lab Units 24  0501 24  0457 24  0439 24  0759 24  0603 10/31/24  1238   SODIUM mmol/L 138 138 134*   < > 135* 134*   POTASSIUM mmol/L 3.9 4.0 4.4   < > 3.9 4.2   CHLORIDE mmol/L 105 105 102   < > 105 101   CO2 mmol/L 25.0 24.1 19.0*   < > 23.0 25.0   BUN mg/dL 20 24* 25*   < > 18 21   CREATININE mg/dL 0.92 1.08* 1.10*   < > 0.75 0.93   CALCIUM mg/dL 9.0 9.5 9.1   < > 9.0 9.5   BILIRUBIN  mg/dL  --   --   --   --  0.6 0.8   ALK PHOS U/L  --   --   --   --  121* 153*   ALT (SGPT) U/L  --   --   --   --  10 12   AST (SGOT) U/L  --   --   --   --  14 18   GLUCOSE mg/dL 98 116* 84   < > 83 96    < > = values in this interval not displayed.     Estimated Creatinine Clearance: 60.3 mL/min (by C-G formula based on SCr of 0.92 mg/dL).  Results from last 7 days   Lab Units 11/05/24  0501 11/04/24  1501 11/04/24  0457 11/03/24  0439   MAGNESIUM mg/dL 1.8  --  1.9 2.2   PHOSPHORUS mg/dL 2.3* 2.0* 2.2* 3.1         Results from last 7 days   Lab Units 11/05/24  0501 11/04/24  0457 11/03/24  0439 11/02/24  0759 11/01/24  0603   WBC 10*3/mm3 6.55 7.78 8.95 7.30 10.06   HEMOGLOBIN g/dL 10.7* 10.8* 12.4 10.1* 10.8*   PLATELETS 10*3/mm3 257 232 232 197 189           Assessment / Plan     ASSESSMENT:  JACIEL - likely prerenal/hemodynamic from vol depletion and mild/transient hypotension (BP was 90s/50s).  Also urinary retention.  Resolved, Cr down to 0.9 (peak 1.3).  Lytes stable  Acute cholecystitis, s/p lap-renetta + NICKO. Completed zosyn  Hypophosphatemia, mild (2.3)  Dyslipidemia on statin, CK normal   Seizure disorder treated  Hypothyroidism, treated   Urinary retention - childress placed 11/3.  UROL eval noted   Chronic lymphedema    PLAN:  Voiding trial   Replace phos orally      Carlo Hampton MD  11/05/24  07:24 [facility]    [facility]  [phone]

## 2024-11-05 NOTE — PROGRESS NOTES
Exercise Oximetry    Patient Name:Mariela Ruiz   MRN: [id number]   Date: 11/05/24             ROOM AIR BASELINE   SpO2%  98   Heart Rate    Blood Pressure      EXERCISE ON ROOM AIR SpO2% EXERCISE ON O2 @ LPM SpO2%   1 MINUTE  1 MINUTE    2 MINUTES  2 MINUTES    3 MINUTES  3 MINUTES    4 MINUTES  4 MINUTES    5 MINUTES  5 MINUTES    6 MINUTES  6 MINUTES               Distance Walked   Distance Walked   Dyspnea (Km Scale)   Dyspnea (Km Scale)   Fatigue (Km Scale)   Fatigue (Km Scale)   SpO2% Post Exercise   SpO2% Post Exercise   HR Post Exercise   HR Post Exercise   Time to Recovery   Time to Recovery            With the help of the nurse. I attempted to walk her.. Took me 20 minutes just to get her to sit up in the bed.. We put a belt around her waiste.  She stood up for a bit and fell back to the bed She remained at 92 sitting on edge of bed.  This patient is to unsteady to walk. She needs PT

## 2024-11-05 NOTE — PLAN OF CARE
Goal Outcome Evaluation:  Plan of Care Reviewed With: patient        Progress: improving  Outcome Evaluation: Malin discontinued today 0600, pt voided x2 and bladder scan x2 no retention noted this shift, x2 BM.Vital signs, labs, and meds reviewed.

## 2024-11-05 NOTE — PROGRESS NOTES
Name: Mariela Ruiz ADMIT: 10/31/2024   : 1945  PCP: Duglas Rock MD    MRN: [id number] LOS: 5 days   AGE/SEX: 79 y.o. female  ROOM: [facility]     Subjective   Subjective    patient seen this morning.  Reports she is feeling tired, and now agreeable for rehab.  No significant abdominal pain.  Tolerating diet.    Review of Systems   As above  Objective   Objective   Vital Signs  Temp:  [98.1 °F (36.7 °C)-98.5 °F (36.9 °C)] 98.4 °F (36.9 °C)  Heart Rate:  [] 100  Resp:  [16-18] 18  BP: (117-139)/(53-63) 139/63  SpO2:  [93 %-94 %] 94 %  on  Flow (L/min) (Oxygen Therapy):  [2] 2;   Device (Oxygen Therapy): room air  Body mass index is 37.05 kg/m².  Physical Exam    General: Alert, laying in bed,, not in distress   HEENT: Normocephalic, atraumatic  CV: Regular rate and rhythm, no murmurs rubs or gallops  Lungs: Clear to auscultation bilaterally, no crackles or wheezes  Abdomen: Soft, nontender, nondistended  Extremities: Bilateral lower extremity lymphedema, no cyanosis     Results Review     I reviewed the patient's new clinical results.  Results from last 7 days   Lab Units 24  05024  0759   WBC 10*3/mm3 6.55 7.78 8.95 7.30   HEMOGLOBIN g/dL 10.7* 10.8* 12.4 10.1*   PLATELETS 10*3/mm3 257 232 232 197     Results from last 7 days   Lab Units 24  0501 247 24  0759   SODIUM mmol/L 138 138 134* 139   POTASSIUM mmol/L 3.9 4.0 4.4 4.0   CHLORIDE mmol/L 105 105 102 104   CO2 mmol/L 25.0 24.1 19.0* 23.9   BUN mg/dL 20 24* 25* 24*   CREATININE mg/dL 0.92 1.08* 1.10* 1.30*   GLUCOSE mg/dL 98 116* 84 93   Estimated Creatinine Clearance: 60.3 mL/min (by C-G formula based on SCr of 0.92 mg/dL).  Results from last 7 days   Lab Units 24  0501 24  0457 24  0439 24  0603 10/31/24  1238   ALBUMIN g/dL 2.6* 2.9* 2.9* 3.0* 3.4*   BILIRUBIN mg/dL  --   --   --  0.6 0.8   ALK PHOS U/L  --   --   --  121* 153*  "  AST (SGOT) U/L  --   --   --  14 18   ALT (SGPT) U/L  --   --   --  10 12     Results from last 7 days   Lab Units 11/05/24  0501 11/04/24  1501 11/04/24  0457 11/03/24  0439 11/02/24  0759 11/01/24  0603 11/01/24  0603   CALCIUM mg/dL 9.0  --  9.5 9.1 8.9  --  9.0   ALBUMIN g/dL 2.6*  --  2.9* 2.9*  --   --  3.0*   MAGNESIUM mg/dL 1.8  --  1.9 2.2 2.0  --   --    PHOSPHORUS mg/dL 2.3* 2.0* 2.2* 3.1 3.0   < >  --     < > = values in this interval not displayed.     Results from last 7 days   Lab Units 10/31/24  1238   LACTATE mmol/L 1.2     SARS-CoV-2 PCR   Date Value Ref Range Status   03/06/2021 Not Detected Not Detected Final     Comment:     Nucleic acid specific to SARS-CoV-2 (COVID-19) virus was not detected in  this sample by the TaqPath (TM) COVID-19 Combo Kit.          SARS-CoV-2 (COVID-19) nucleic acid testing performed using [employer] Aptima (R) SARS-CoV-2 Assay or Stripe TaqPath (TM)  COVID-19 Combo Kit as indicated above under Emergency Use Authorization (EUA) from the FDA. Aptima (R) and TaqPath (TM) test performance  characteristics verified by [facility] in accordance with the FDAs Guidance Document (Policy for Diagnostic Tests for Coronavirus Disease-2019  during the Public Health Emergency) issued on March 16, 2020. The laboratory is regulated under CLIA as qualified to perform high-complexity testing  Unless otherwise noted, all testing was performed at [facility], CLIA No. [id number], [place] State Licensee No. [id number]     No results found for: \"HGBA1C\", \"POCGLU\"        FL Cholangiogram Operative  INTRAOPERATIVE PORTABLE VIEW CHOLANGIOGRAM     CLINICAL INFORMATION: Post cholecystectomy     FINDINGS: Upon injection of the cystic duct with contrast, the proximal  intrahepatic, common hepatic and common bile ducts are opacified.  Contrast passes into the duodenum. A filling defect within the common  bile duct on initial images does not persist on subsequent images.     Fluoroscopy " time 40 s, 252 images  Reference Air Kerma 18 mGy     This report was finalized on 11/2/2024 3:33 PM by Dr. Sultana Arias M.D  on Workstation: [id number]       Scheduled Medications  cholecalciferol, 4,000 Units, Oral, Daily  heparin (porcine), 5,000 Units, Subcutaneous, Q8H  levothyroxine, 50 mcg, Oral, QAM  PHENobarbital, 129.6 mg, Oral, Nightly  PHENobarbital, 64.8 mg, Oral, Daily  polyethylene glycol, 17 g, Oral, Daily  potassium & sodium phosphates, 2 packet, Oral, TID AC  pramipexole, 0.5 mg, Oral, Daily  pravastatin, 40 mg, Oral, Nightly  senna-docusate sodium, 2 tablet, Oral, BID  sodium chloride, 10 mL, Intravenous, Q12H  vitamin B-12, 1,000 mcg, Oral, Daily    Infusions     Diet  Diet: Regular/House; Fluid Consistency: Thin (IDDSI 0)    I have personally reviewed     [x]  Laboratory   []  Microbiology   []  Radiology   []  EKG/Telemetry  []  Cardiology/Vascular   []  Pathology    []  Records       Assessment/Plan     Active Hospital Problems    Diagnosis  POA    **Acute cholecystitis [K81.0]  Yes    Elevated troponin [R79.89]  Unknown    Hypothyroidism, unspecified [E03.9]  Yes    Seizure disorder [G40.909]  Yes    Sleep apnea [G47.30]  Yes      Resolved Hospital Problems   No resolved problems to display.       79 y.o. female admitted with Acute cholecystitis.    Acute cholecystitis  -Status post laparoscopic cholecystectomy with cholangiogram and da Kenney robot assistance, Laparoscopic lysis of adhesions per general surgery on 11/2/2024  -Blood cultures negative to date  -Status post IV Zosyn        Mild JACIEL  -Creatinine trended up 1.30 11/2/2024  -Creatinine normalized.    Elevated troponin    negative left heart catheterization in July 2022 following false positive perfusion scan  --significance unclear especially in this situation of no change in her functional capacity and ability to do ADLs despite abdominal discomfort as documented above.   - EKG with RBBB, unchanged compared to prior.  -  cardiology evaluated      Seizure disorder  -continue her home regimen of phenobarbital   -seizure precautions.     Hypothyroidism  -continue levothyroxine    Anemia   -Hemoglobin 12.4 on admission  -No signs of bleeding reported  -Iron panel 11/2/2024 consistent with anemia of chronic disease, however with iron saturation of 9 underlying iron deficiency cannot be included.  Will need repeat iron panel outpatient once acute illness resolves  -B12 low end of normal, initiated on B12 supplement  -Hemoglobin stable    Postop urinary tension  -Status post Malin catheter placement bladder 11/3  -Urology evaluated, plan for voiding trial today      History of sleep apnea  Nightly oxygen.  CPAP if available.    Postop hypoxemia  -Remains on 2 L nasal cannula,  -Ordered chest x-ray  -Encourage I-S      DVT prophylaxis.  Subcu heparin  Full code.  Discussed with patient  Disposition: SNF, medically stable to to be discharged  Expected Discharge Date: 11/6/2024; Expected Discharge Time:        Copied text in this note has been reviewed and is accurate as of 11/05/24.         Dictated utilizing Dragon dictation        Valdemar Barros MD  [facility]  11/05/24  16:15 EST

## 2024-11-05 NOTE — PLAN OF CARE
Goal Outcome Evaluation:  Plan of Care Reviewed With: patient           Outcome Evaluation: Pt agreed to PT session, limited activity due to urgency for bsc, pt did require assist of 2 for mobility due to pain, pt is now considering SNU at [place] per CCP, would like to attempt amb next session , needs to be more mobile to go home w/family assist, has all equipment needed per pt    Anticipated Discharge Disposition (PT): skilled nursing facility (pt did plan home but unsure family can provide enough assist for safety, will follow for pt progress)

## 2024-11-05 NOTE — THERAPY TREATMENT NOTE
Patient Name: Mariela Ruiz  : 1945    MRN: [id number]                              Today's Date: 2024       Admit Date: 10/31/2024    Visit Dx:     ICD-10-CM ICD-9-CM   1. Acute cholecystitis  K81.0 575.0     Patient Active Problem List   Diagnosis    Seizure disorder    Hyperlipidemia    Vitamin D insufficiency    Age-related osteoporosis without current pathological fracture    Sleep apnea    Chronic venous insufficiency    Postsurgical hypothyroidism    Chronic fatigue syndrome    Gastroesophageal reflux disease    Dupuytren's contracture    History of biliary T-tube placement    History of partial thyroidectomy    Multinodular goiter    Restless legs syndrome    History of cardiac catheterization    Urge incontinence of urine    Left knee pain    Ligamentous laxity of knee    DJD (degenerative joint disease) of knee    Adverse drug effect, subsequent encounter    Abnormal blood level of chromium    Abnormal blood level of cobalt    Family history of diabetes mellitus    Thrombocytopenia    .    S/P revision of total hip    Enlarged thyroid gland    Palmar fascial fibromatosis (dupuytren)    Hypothyroidism, unspecified    Hyperlipidemia, unspecified    Acute cholecystitis    Elevated troponin     Past Medical History:   Diagnosis Date    Arthritis     Chronic venous insufficiency     Dupuytren's contracture     History of staph infection     S/P KNEE DEC 2016    History of transfusion     Hyperlipidemia     Lymphedema     LEFT LEG    Nontoxic multinodular goiter     Osteoporosis     Dr. Cabrera    Pelvic joint pain, left     Postsurgical hypothyroidism     Presence of left artificial hip joint     METAL ON METAL AS NOTED PER DR CLEMENT NOTE    Restless leg syndrome     Right bundle branch block     Seizure disorder     Dr. Whitman, HX  LAST SEIZURE    Sleep apnea     DOES NOT HAVE MACHINE    Vitamin D insufficiency      Past Surgical History:   Procedure Laterality Date    APPENDECTOMY      CARDIAC  CATHETERIZATION      NORMAL     CHOLECYSTECTOMY N/A 11/2/2024    Procedure: CHOLECYSTECTOMY LAPAROSCOPIC WITH DAVINCI ROBOT with cholangiogram, possible open;  Surgeon: Bin Heaton MD;  [facility];  Service: Robotics - DaVinci;  Laterality: N/A;    COLONOSCOPY  08/17/2017    Normal except for anal fissure.  Dr. Brandt.  [facility].    KNEE MINI REVISION Left 11/15/2016    Procedure: LEFT KNEE POLY CHANGE WITH HI POST STABILIZER;  Surgeon: Pablito Claudio MD;  [facility];  Service:     KNEE MINI REVISION Left 12/13/2016    Procedure: LT KNEE REMOVAL/REPLACEMENT LOCKING PIN ;  Surgeon: Pablito Claudio MD;  Location: [facility] MAIN OR;  Service:     MAMMO FINE NEEDLE ASPIRATION UNILATERAL      RIGHT THYROID NODULE, 2009 BENIGN     MD ARTHRP KNE CONDYLE&PLATU MEDIAL&LAT COMPARTMENTS Left 12/24/2016    Procedure: LEFT KNEE WASHOUT WITH POLY CHANGE;  Surgeon: Christiano Cesar MD;  Location: [facility] MAIN OR;  Service: Orthopedics    MD REVJ TOTAL KNEE ARTHRP W/WO ALGRFT 1 COMPONENT Left 2/20/2017    Procedure: LT KNEE REMOVAL ANTIBIOTIC SPACER AND KNEE REVISION;  Surgeon: Christiano Cesar MD;  Location: [facility] MAIN OR;  Service: Orthopedics    REPLACEMENT TOTAL KNEE Left     THYROIDECTOMY, PARTIAL      1979 LEFT LOBECTOMY AND ISTHMECTOMY     TOTAL ABDOMINAL HYSTERECTOMY WITH SALPINGO OOPHORECTOMY      TOTAL HIP ARTHROPLASTY Left     TOTAL HIP ARTHROPLASTY Right 9/14/2019    Procedure: TOTAL HIP ARTHROPLASTY ANTERIOR WITH HANA TABLE;  Surgeon: Christiano Cesar II, MD;  Location: [facility] MAIN OR;  Service: Orthopedics    TOTAL HIP ARTHROPLASTY REVISION Left 3/9/2021    Procedure: LEFT TOTAL HIP ARTHROPLASTY REVISION POSTERIOR;  Surgeon: Christiano Cesar II, MD;  Location: [facility] MAIN OR;  Service: Orthopedics;  Laterality: Left;    TOTAL KNEE  PROSTHESIS REMOVAL W/ SPACER INSERTION Left 12/29/2016    Procedure: LT KNEE IMPLANT REMOVAL WITH INSERTION OF SPACER ;  Surgeon:  "Christiano Cesar MD;  Location: [facility] OR;  Service:       General Information       Row Name 11/05/24 1527          Physical Therapy Time and Intention    Document Type therapy note (daily note)  -     Mode of Treatment individual therapy;physical therapy  -[facility] Name 11/05/24 1527          General Information    Patient Profile Reviewed yes  -     Existing Precautions/Restrictions fall  -[facility] Name 11/05/24 1527          Living Environment    People in Home child(aneudy), adult  -[facility] Name 11/05/24 1527          Cognition    Orientation Status (Cognition) oriented x 3  impulsive, likes to be eager to get her \"things\" put back and not focus on task  -[facility] Name 11/05/24 1527          Safety Issues/Impairments Affecting Functional Mobility    Impairments Affecting Function (Mobility) balance;coordination;endurance/activity tolerance;pain;range of motion (ROM);strength  -               User Key  (r) = Recorded By, (t) = Taken By, (c) = Cosigned By      Initials Name Provider Type     Danielle Mejia PTA Physical Therapist Assistant                   Mobility       [facility] Name 11/05/24 1528          Bed Mobility    Supine-Sit [place] (Bed Mobility) 2 person assist;minimum assist (75% patient effort)  -     Sit-Supine [place] (Bed Mobility) not tested  -     Assistive Device (Bed Mobility) bed rails;head of bed elevated  -     Comment, (Bed Mobility) pt required extra time, pain limiting  -[facility] Name 11/05/24 1528          Bed-Chair Transfer    Bed-Chair [place] (Transfers) 2 person assist;minimum assist (75% patient effort);contact guard;verbal cues  -     Comment, (Bed-Chair Transfer) bed to [facility]  -[facility] Name 11/05/24 1528          Sit-Stand Transfer    Sit-Stand [place] (Transfers) 2 person assist;minimum assist (75% patient effort);verbal cues  -     Assistive Device (Sit-Stand Transfers) --  HHA-2  -     Comment, (Sit-Stand " Transfer) pt very anxious upon arrival, cues to calm before trying to move, pt req assist of 2 for safety  -[facility] Name 11/05/24 1528          Gait/Stairs (Locomotion)    [place] Level (Gait) unable to assess  -     Comment, (Gait/Stairs) urgency to use bsc limiting , pt asked to sit a while, nsg alerted  -               User Key  (r) = Recorded By, (t) = Taken By, (c) = Cosigned By      Initials Name Provider Type    Danielle Mckinney PTA Physical Therapist Assistant                   Obj/Interventions    No documentation.                  Goals/Plan    No documentation.                  Clinical Impression       [facility] Name 11/05/24 1531          Pain    Pretreatment Pain Rating 8/10  -     Posttreatment Pain Rating 8/10  -     Pain Location abdomen  -[facility] Name 11/05/24 1531          Plan of Care Review    Plan of Care Reviewed With patient  -     Outcome Evaluation Pt agreed to PT session, limited activity due to urgency for bsc, pt did require assist of 2 for mobility due to pain, pt is now [place] [facility], would like to attempt amb next session , needs to be more mobile to go home w/family assist, has all equipment needed per pt  -[facility] Name 11/05/24 1531          Therapy Assessment/Plan (PT)    Rehab Potential (PT) fair  -     Criteria for Skilled Interventions Met (PT) yes  -[facility] Name 11/05/24 1531          Vital Signs    O2 Delivery Pre Treatment room air  -[facility] Name 11/05/24 1531          Positioning and Restraints    Pre-Treatment Position in bed  -     Post Treatment Position bsc  -     On BS commode sitting;with nsg  -               User Key  (r) = Recorded By, (t) = Taken By, (c) = Cosigned By      Initials Name Provider Type    Danielle Mckinney PTA Physical Therapist Assistant                   Outcome Measures       [facility] Name 11/05/24 1536 11/05/24 0835       How much help from another person do you currently need...     Turning from your back to your side while in flat bed without using bedrails? 2  - 2  -    Moving from lying on back to sitting on the side of a flat bed without bedrails? 2  - 2  -FH    Moving to and from a bed to a chair (including a wheelchair)? 3  - 2  -FH    Standing up from a chair using your arms (e.g., wheelchair, bedside chair)? 3  - 2  -FH    Climbing 3-5 steps with a railing? 1  - 1  -    To walk in hospital room? 2  - 2  -FH    AM-PAC 6 Clicks Score (PT) 13  - 11  -    Highest Level of Mobility Goal 4 --> Transfer to chair/commode  - 4 --> Transfer to chair/commode  -              User Key  (r) = Recorded By, (t) = Taken By, (c) = Cosigned By      Initials Name Provider Type    Danielle Mckinney PTA Physical Therapist Assistant     Mahendra Damon RN Registered Nurse                                 Physical Therapy Education       Title: PT OT SLP Therapies (In Progress)       Topic: Physical Therapy (Done)       Point: Mobility training (Done)       Learning Progress Summary            Patient Acceptance, E,TB,D, VU,NR by DANA at 11/5/2024 1538    Acceptance, E,D, NR by DANA at 11/4/2024 1719    Acceptance, E, VU by GORDON at 11/3/2024 1128    Acceptance, E,D, VU,NR by MS at 11/1/2024 1546                      Point: Home exercise program (Done)       Learning Progress Summary            Patient Acceptance, E,TB,D, VU,NR by DANA at 11/5/2024 1538    Acceptance, E,D, NR by DANA at 11/4/2024 1719    Acceptance, E, VU by GORDON at 11/3/2024 1128                      Point: Body mechanics (Done)       Learning Progress Summary            Patient Acceptance, E,TB,D, VU,NR by DANA at 11/5/2024 1538    Acceptance, E,D, NR by DANA at 11/4/2024 1719    Acceptance, E, VU by GORDON at 11/3/2024 1128    Acceptance, E,D, VU,NR by MS at 11/1/2024 1546                      Point: Precautions (Done)       Learning Progress Summary            Patient Acceptance, E,TB,D, VU,NR by DANA at 11/5/2024 1531     Acceptance, E,D, NR by  at 11/4/2024 1719    Acceptance, E, VU by  at 11/3/2024 1128    Acceptance, E,D, VU,NR by MS at 11/1/2024 1546                                      User Key       Initials Effective Dates Name Provider Type Discipline    DANA 03/07/18 -  Danielle Mejia PTA Physical Therapist Assistant PT    MS 06/16/21 -  Obey Gupta, PT Physical Therapist PT    GORDON 08/24/23 -  Praveena Goldstein PT Physical Therapist PT                  PT Recommendation and Plan     Outcome Evaluation: Pt agreed to PT session, limited activity due to urgency for bsc, pt did require assist of 2 for mobility due to pain, pt is now considering SNU at [place] per CCP, would like to attempt amb next session , needs to be more mobile to go home w/family assist, has all equipment needed per pt     Time Calculation:         PT Charges       Row Name 11/05/24 1539             Time Calculation    Start Time 1500  -      Stop Time 1518  -      Time Calculation (min) 18 min  -      PT Received On 11/05/24  -      PT - Next Appointment 11/06/24  -                User Key  (r) = Recorded By, (t) = Taken By, (c) = Cosigned By      Initials Name Provider Type    Danielle Mckinney PTA Physical Therapist Assistant                  Therapy Charges for Today       Code Description Service Date Service Provider Modifiers Qty    67659838075 HC PT THERAPEUTIC ACT EA 15 MIN 11/4/2024 Danielle Mejia, PTA GP 2    07026344619 HC PT THER SUPP EA 15 MIN 11/4/2024 Danielle Mejia, PTA GP 2    09567086620 HC PT THER SUPP EA 15 MIN 11/5/2024 Danielle Mejia, PTA GP 1    17190485605 HC PT THER PROC EA 15 MIN 11/5/2024 Danielle Mejia, PTA GP 1            PT G-Codes  Outcome Measure Options: AM-PAC 6 Clicks Daily Activity (OT)  AM-PAC 6 Clicks Score (PT): 13  AM-PAC 6 Clicks Score (OT): 15  PT Discharge Summary  Anticipated Discharge Disposition (PT): skilled nursing facility (pt did plan home but unsure family can provide enough assist  for safety, will follow for pt progress)    Danielle Mejia, PTA  11/5/2024

## 2024-11-05 NOTE — PLAN OF CARE
Goal Outcome Evaluation:           Progress: improving  Outcome Evaluation: Pt is A&O Plan to d/c home with family. Monitor labs. Denies other needs. VSS

## 2024-11-05 NOTE — CASE MANAGEMENT/SOCIAL WORK
Continued Stay Note  [facility]     Patient Name: Mariela Ruiz  MRN: [id number]  Today's Date: 11/5/2024    Admit Date: 10/31/2024    Plan: [facility] bed available tomorrow   Discharge Plan       Row Name 11/05/24 1439       Plan    Plan [facility] bed available tomorrow    Patient/Family in Agreement with Plan yes    Plan Comments CCP spoke to [facility] with [facility], she stated that there is not a bed available at [facility] but there is a bed available at [facility]. CCP ran this by the patient and she said she does not want [facility] but wants a referral to [facility]. CCP made the referral. Leelee stated that there is a bed at [facility] tomorrow. CCP discussed this with the patient and she is agreeable. Pharmacy is updated. Packet in CCP office. Will need transport.      Row Name 11/05/24 1209       Plan    Plan SNF referral pending    Plan Comments CCP spoke to patient and she is now agreeable to SNF placement. She requested a referral to [facility]. CCP asked for any backups but she would not give any. CCP made the referral.                   Discharge Codes    No documentation.                 Expected Discharge Date and Time       Expected Discharge Date Expected Discharge Time    Nov 5, 2024

## 2024-11-06 VITALS
TEMPERATURE: 97.9 F | BODY MASS INDEX: 37.1 KG/M2 | DIASTOLIC BLOOD PRESSURE: 77 MMHG | HEIGHT: 65 IN | HEART RATE: 90 BPM | RESPIRATION RATE: 16 BRPM | SYSTOLIC BLOOD PRESSURE: 143 MMHG | OXYGEN SATURATION: 97 % | WEIGHT: 222.66 LBS

## 2024-11-06 LAB
ALBUMIN SERPL-MCNC: 2.7 G/DL (ref 3.5–5.2)
ANION GAP SERPL CALCULATED.3IONS-SCNC: 10.8 MMOL/L (ref 5–15)
BUN SERPL-MCNC: 16 MG/DL (ref 8–23)
BUN/CREAT SERPL: 18.6 (ref 7–25)
CALCIUM SPEC-SCNC: 9.4 MG/DL (ref 8.6–10.5)
CHLORIDE SERPL-SCNC: 104 MMOL/L (ref 98–107)
CO2 SERPL-SCNC: 25.2 MMOL/L (ref 22–29)
CREAT SERPL-MCNC: 0.86 MG/DL (ref 0.57–1)
DEPRECATED RDW RBC AUTO: 37.7 FL (ref 37–54)
EGFRCR SERPLBLD CKD-EPI 2021: 69.7 ML/MIN/1.73
ERYTHROCYTE [DISTWIDTH] IN BLOOD BY AUTOMATED COUNT: 11.8 % (ref 12.3–15.4)
GLUCOSE SERPL-MCNC: 101 MG/DL (ref 65–99)
HCT VFR BLD AUTO: 31.5 % (ref 34–46.6)
HGB BLD-MCNC: 10.5 G/DL (ref 12–15.9)
MAGNESIUM SERPL-MCNC: 1.6 MG/DL (ref 1.6–2.4)
MCH RBC QN AUTO: 29.7 PG (ref 26.6–33)
MCHC RBC AUTO-ENTMCNC: 33.3 G/DL (ref 31.5–35.7)
MCV RBC AUTO: 89 FL (ref 79–97)
PHOSPHATE SERPL-MCNC: 3.4 MG/DL (ref 2.5–4.5)
PHOSPHATE SERPL-MCNC: 3.4 MG/DL (ref 2.5–4.5)
PLATELET # BLD AUTO: 271 10*3/MM3 (ref 140–450)
PMV BLD AUTO: 10.5 FL (ref 6–12)
POTASSIUM SERPL-SCNC: 3.6 MMOL/L (ref 3.5–5.2)
RBC # BLD AUTO: 3.54 10*6/MM3 (ref 3.77–5.28)
SODIUM SERPL-SCNC: 140 MMOL/L (ref 136–145)
WBC NRBC COR # BLD AUTO: 5.15 10*3/MM3 (ref 3.4–10.8)

## 2024-11-06 PROCEDURE — 97535 SELF CARE MNGMENT TRAINING: CPT

## 2024-11-06 PROCEDURE — 85027 COMPLETE CBC AUTOMATED: CPT | Performed by: SURGERY

## 2024-11-06 PROCEDURE — 25010000002 HEPARIN (PORCINE) PER 1000 UNITS: Performed by: STUDENT IN AN ORGANIZED HEALTH CARE EDUCATION/TRAINING PROGRAM

## 2024-11-06 PROCEDURE — 97110 THERAPEUTIC EXERCISES: CPT

## 2024-11-06 PROCEDURE — 84100 ASSAY OF PHOSPHORUS: CPT | Performed by: STUDENT IN AN ORGANIZED HEALTH CARE EDUCATION/TRAINING PROGRAM

## 2024-11-06 PROCEDURE — 80069 RENAL FUNCTION PANEL: CPT | Performed by: INTERNAL MEDICINE

## 2024-11-06 PROCEDURE — 83735 ASSAY OF MAGNESIUM: CPT | Performed by: SURGERY

## 2024-11-06 RX ORDER — PHENOBARBITAL 64.8 MG/1
129.6 TABLET ORAL NIGHTLY
Start: 2024-11-06 | End: 2024-11-07 | Stop reason: SDUPTHER

## 2024-11-06 RX ORDER — POTASSIUM CHLORIDE 750 MG/1
40 TABLET, FILM COATED, EXTENDED RELEASE ORAL EVERY 4 HOURS
Status: COMPLETED | OUTPATIENT
Start: 2024-11-06 | End: 2024-11-06

## 2024-11-06 RX ORDER — ACETAMINOPHEN 500 MG
1000 TABLET ORAL EVERY 8 HOURS PRN
Start: 2024-11-06

## 2024-11-06 RX ORDER — AMOXICILLIN 250 MG
2 CAPSULE ORAL 2 TIMES DAILY PRN
Status: DISCONTINUED | OUTPATIENT
Start: 2024-11-06 | End: 2024-11-06 | Stop reason: HOSPADM

## 2024-11-06 RX ORDER — PHENOBARBITAL 64.8 MG/1
64.8 TABLET ORAL DAILY
Start: 2024-11-07 | End: 2024-11-07 | Stop reason: SDUPTHER

## 2024-11-06 RX ORDER — POLYETHYLENE GLYCOL 3350 17 G/17G
17 POWDER, FOR SOLUTION ORAL DAILY PRN
Status: DISCONTINUED | OUTPATIENT
Start: 2024-11-06 | End: 2024-11-06 | Stop reason: HOSPADM

## 2024-11-06 RX ORDER — POLYETHYLENE GLYCOL 3350 17 G/17G
17 POWDER, FOR SOLUTION ORAL DAILY PRN
Start: 2024-11-06

## 2024-11-06 RX ORDER — OXYCODONE AND ACETAMINOPHEN 5; 325 MG/1; MG/1
1 TABLET ORAL EVERY 8 HOURS PRN
Qty: 6 TABLET | Refills: 0 | Status: SHIPPED | OUTPATIENT
Start: 2024-11-06 | End: 2024-11-08

## 2024-11-06 RX ORDER — AMOXICILLIN 250 MG
2 CAPSULE ORAL 2 TIMES DAILY PRN
Start: 2024-11-06

## 2024-11-06 RX ADMIN — HEPARIN SODIUM 5000 UNITS: 5000 INJECTION INTRAVENOUS; SUBCUTANEOUS at 06:13

## 2024-11-06 RX ADMIN — PHENOBARBITAL 64.8 MG: 32.4 TABLET ORAL at 08:25

## 2024-11-06 RX ADMIN — LEVOTHYROXINE SODIUM 50 MCG: 50 TABLET ORAL at 06:13

## 2024-11-06 RX ADMIN — Medication 10 ML: at 08:29

## 2024-11-06 RX ADMIN — HEPARIN SODIUM 5000 UNITS: 5000 INJECTION INTRAVENOUS; SUBCUTANEOUS at 13:17

## 2024-11-06 RX ADMIN — PRAMIPEXOLE DIHYDROCHLORIDE 0.5 MG: 0.5 TABLET ORAL at 08:25

## 2024-11-06 RX ADMIN — Medication 4000 UNITS: at 08:25

## 2024-11-06 RX ADMIN — CYANOCOBALAMIN TAB 500 MCG 1000 MCG: 500 TAB at 08:25

## 2024-11-06 RX ADMIN — POTASSIUM CHLORIDE 40 MEQ: 750 TABLET, EXTENDED RELEASE ORAL at 13:14

## 2024-11-06 RX ADMIN — POTASSIUM CHLORIDE 40 MEQ: 750 TABLET, EXTENDED RELEASE ORAL at 09:11

## 2024-11-06 NOTE — PLAN OF CARE
Goal Outcome Evaluation:  Plan of Care Reviewed With: patient        Progress: no change  Outcome Evaluation: Pt seen for OT session this am, pleasnat and agreeable. Pt still presents w/ abdomen pain during bed mob and fxnl xfers but pain improves during rest. Pt also presents with anxiety w/ fxnl xfers and reqs max cueing for sequencing and technique to increase safety. Today she was Sba for bed mob w/ increased time and using BUE to lift LE's. She stood and stand pivot xfers to BSC w/ Cga-Min-A and BUE support. Max A for toileting hygiene, and gown change following the need to urinate/ BM urgently. Pt rec dc home w/ assist and HH or  brief SNF stay pending prog w/ therapy and pain mgmt. OT will continue to monitor.    Anticipated Discharge Disposition (OT): skilled nursing facility, home with assist, home with home health

## 2024-11-06 NOTE — PLAN OF CARE
Problem: Adult Inpatient Plan of Care  Goal: Plan of Care Review  Outcome: Progressing  Flowsheets (Taken 11/6/2024 0537)  Progress: improving  Outcome Evaluation: Patient is voiding but not completely emptying bladder. With multiple BM the whole shift. Falls prevented.  Plan of Care Reviewed With: patient   Goal Outcome Evaluation:  Plan of Care Reviewed With: patient        Progress: improving  Outcome Evaluation: Patient is voiding but not completely emptying bladder. With multiple BM the whole shift. Falls prevented.

## 2024-11-06 NOTE — PROGRESS NOTES
"  [facility] Progress Note    Chief Complaint: None new    Malin was removed yesterday.  She is voiding.  She has a pure wick on right now.  She is having some high residuals and at 1 point was over 500 cc but they seem to be hanging around 3 to 400 cc.  She does not appear to be in any distress.    Review of Systems:    The following systems were reviewed and negative;  respiratory and cardiovascular          Vital Signs  /69 (BP Location: Right arm, Patient Position: Lying)   Pulse 96   Temp 98.2 °F (36.8 °C) (Oral)   Resp 16   Ht 165.1 cm (65\")   Wt 101 kg (222 lb 10.6 oz)   SpO2 99%   BMI 37.05 kg/m²     Physical Exam:     General Appearance:    Alert, cooperative, NAD   HEENT:    No trauma, pupils reactive, hearing intact   Back:     No CVA tenderness   Lungs:     Respirations unlabored, no wheezing    Heart:    RRR, intact peripheral pulses   Abdomen:   Soft benign   :       Extremities:   No edema, no deformity   Lymphatic:   No neck or groin LAD   Skin:   No bleeding, bruising or rashes   Neuro/Psych:   Orientation intact, mood/affect pleasant, no focal findings        Results Review:     I reviewed the patient's new clinical results.  Lab Results (last 24 hours)       Procedure Component Value Units Date/Time    Magnesium [[id number]]  (Normal) Collected: 11/06/24 0542    Specimen: Blood Updated: 11/06/24 0651     Magnesium 1.6 mg/dL     Renal Function Panel [[id number]]  (Abnormal) Collected: 11/06/24 0542    Specimen: Blood Updated: 11/06/24 0651     Glucose 101 mg/dL      BUN 16 mg/dL      Creatinine 0.86 mg/dL      Sodium 140 mmol/L      Potassium 3.6 mmol/L      Chloride 104 mmol/L      CO2 25.2 mmol/L      Calcium 9.4 mg/dL      Albumin 2.7 g/dL      Phosphorus 3.4 mg/dL      Anion Gap 10.8 mmol/L      BUN/Creatinine Ratio 18.6     eGFR 69.7 mL/min/1.73     Narrative:      GFR Normal >60  Chronic Kidney Disease <60  Kidney Failure <15    The GFR formula is only valid for adults with " stable renal function between ages 18 and 70.    CBC (No Diff) [[id number]]  (Abnormal) Collected: 11/06/24 0543    Specimen: Blood Updated: 11/06/24 0633     WBC 5.15 10*3/mm3      RBC 3.54 10*6/mm3      Hemoglobin 10.5 g/dL      Hematocrit 31.5 %      MCV 89.0 fL      MCH 29.7 pg      MCHC 33.3 g/dL      RDW 11.8 %      RDW-SD 37.7 fl      MPV 10.5 fL      Platelets 271 10*3/mm3     Phosphorus [[id number]]  (Normal) Collected: 11/06/24 0013    Specimen: Blood Updated: 11/06/24 0055     Phosphorus 3.4 mg/dL     Blood Culture - Blood, Arm, Left [[id number]]  (Normal) Collected: 10/31/24 1459    Specimen: Blood from Arm, Left Updated: 11/05/24 1415     Blood Culture No growth at 5 days    Blood Culture - Blood, Arm, Right [[id number]]  (Normal) Collected: 10/31/24 1503    Specimen: Blood from Arm, Right Updated: 11/05/24 1415     Blood Culture No growth at 5 days    Narrative:      Less than seven (7) mL's of blood was collected.  Insufficient quantity may yield false negative results.    Tissue Pathology Exam [[id number]] Collected: 11/02/24 1519    Specimen: Tissue from Gallbladder Updated: 11/05/24 1019     Case Report --     Surgical Pathology Report                         Case: [id number]                                  Authorizing Provider:  Bin Heaton MD      Collected:           11/02/2024 03:19 PM          Ordering Location:     [facility]  Received:            11/04/2024 07:07 AM                                 MAIN OR                                                                      Pathologist:           Angie Parks MD                                                          Specimen:    Gallbladder, Galbladder                                                                     Final Diagnosis --     1.  Gallbladder, cholecystectomy:   A.  Acute necrotizing cholecystitis and associated fibroinflammatory response.       Gross Description --     1. Gallbladder.  Received in  "formalin labeled \"gallbladder\" is a 12.5 x 5.0 x 4.5 cm gallbladder with attached cystic duct.  The lumen of the duct measures 0.3 cm in diameter.  The serosa is smooth dusky tan-red.  Contents include brown purulent bile with no choleliths present neither within the gallbladder nor within the container.  The mucosa is dark red and trabeculated and the wall thickness ranges from 0.5 cm to 1.5 cm.  Representative sections are submitted to include the cystic duct margin, fundus and body as 1A.    jap/uso/jab         Microscopic Description --     Unless \"gross only\" is specified, the final diagnosis for each specimen is based on microscopic examination of tissue.            Imaging Results (Last 24 Hours)       Procedure Component Value Units Date/Time    XR Chest 1 View [[id number]] Collected: 11/05/24 2056     Updated: 11/05/24 2102    Narrative:      XR CHEST 1 VW-     DATE OF EXAM: 11/5/2024 6:43 PM     INDICATION: Postop hypoxemia. Status post cholecystectomy.     COMPARISON: Chest radiograph 3/2/2021 and 9/16/2019. CT abdomen pelvis  10/31/2024.     TECHNIQUE: A single portable AP view of the chest was obtained.     FINDINGS:  Lordotic positioning and patient rotation. Multiple overlying artifacts.  Low lung volumes and elevation of the right hemidiaphragm with  ill-defined predominantly perihilar and basilar opacities in both lungs  with mild associated interstitial thickening. No pneumothorax. Stable  enlargement of the cardiac contour, likely accentuated by technique.  Free air under the diaphragm, which could be postoperative. Incompletely  evaluated severe chronic degenerative changes in both shoulders.       Impression:         1. Low lung volumes and elevation of the right hemidiaphragm with  ill-defined bilateral perihilar and bibasilar opacities in both lungs  and mild associated interstitial thickening, which could reflect  atelectasis, scarring, mild pulmonary edema, and/or pneumonia.  2. Free air " under the diaphragm, which could be postoperative.     This report was finalized on 11/5/2024 8:59 PM by Thor Werner MD on  Workstation: [id number]               Medication Review:   I have personally reviewed    Current Facility-Administered Medications:     acetaminophen (TYLENOL) tablet 1,000 mg, 1,000 mg, Oral, Q8H PRN, Bin Heaton MD, 1,000 mg at 11/04/24 1602    Calcium Replacement - Follow Nurse / BPA Driven Protocol, , Does not apply, PRN, Bin Heaton MD    cholecalciferol (VITAMIN D3) tablet 4,000 Units, 4,000 Units, Oral, Daily, Bin Heaton MD, 4,000 Units at 11/06/24 0825    heparin (porcine) 5000 UNIT/ML injection 5,000 Units, 5,000 Units, Subcutaneous, Q8H, Valdemar Barros MD, 5,000 Units at 11/06/24 0613    levothyroxine (SYNTHROID, LEVOTHROID) tablet 50 mcg, 50 mcg, Oral, QAM, Bin Heaton MD, 50 mcg at 11/06/24 0613    Magnesium Standard Dose Replacement - Follow Nurse / BPA Driven Protocol, , Does not apply, PRAISHWARYA, Bin Heaton MD    morphine injection 4 mg, 4 mg, Intravenous, Q4H PRN, Bin Heaton MD, 4 mg at 11/03/24 1042    nitroglycerin (NITROSTAT) SL tablet 0.4 mg, 0.4 mg, Sublingual, Q5 Min PRN, Bin Heaton MD    ondansetron (ZOFRAN) injection 4 mg, 4 mg, Intravenous, Q6H PRN, Bin Heaton MD    oxyCODONE-acetaminophen (PERCOCET) 5-325 MG per tablet 1 tablet, 1 tablet, Oral, Q6H PRN, Bin Heaton MD, 1 tablet at 11/05/24 0530    PHENobarbital tablet 129.6 mg, 129.6 mg, Oral, Nightly, Valdemar Barros MD, 129.6 mg at 11/05/24 2203    PHENobarbital tablet 64.8 mg, 64.8 mg, Oral, Daily, Valdemar Barros MD, 64.8 mg at 11/06/24 0825    Phosphorus Replacement - Follow Nurse / BPA Driven Protocol, , Does not apply, PRN, Bin Heaton MD    polyethylene glycol (MIRALAX) packet 17 g, 17 g, Oral, Daily PRN, Valdemar Barros MD    potassium chloride (K-DUR,KLOR-CON) ER tablet 40 mEq, 40 mEq, Oral, Q4H, Valdemar Barros  MD    Potassium Replacement - Follow Nurse / BPA Driven Protocol, , Does not apply, PRN, Bin Heaton MD    pramipexole (MIRAPEX) tablet 0.5 mg, 0.5 mg, Oral, Daily, Bin Heaton MD, 0.5 mg at 11/06/24 0825    pravastatin (PRAVACHOL) tablet 40 mg, 40 mg, Oral, Nightly, Bin Heaton MD, 40 mg at 11/05/24 2043    sennosides-docusate (PERICOLACE) 8.6-50 MG per tablet 2 tablet, 2 tablet, Oral, BID PRN, Valdemar Barros MD    sodium chloride 0.9 % flush 10 mL, 10 mL, Intravenous, PRN, Bin Heaton MD    sodium chloride 0.9 % flush 10 mL, 10 mL, Intravenous, Q12H, Bin Heaton MD, 10 mL at 11/06/24 0829    sodium chloride 0.9 % flush 10 mL, 10 mL, Intravenous, PRN, Bin Heaton MD    sodium chloride 0.9 % infusion 40 mL, 40 mL, Intravenous, PRN, Bin Heaton MD    vitamin B-12 (CYANOCOBALAMIN) tablet 1,000 mcg, 1,000 mcg, Oral, Daily, Valdemar Barros MD, 1,000 mcg at 11/06/24 0825    Allergies:    Clindamycin/lincomycin, Duricef [cefadroxil], and Sulfa antibiotics    Assessment:    Active Problems:    Acute cholecystitis    Seizure disorder    Sleep apnea    Hypothyroidism, unspecified    Elevated troponin       Urinary retention multiple factors appears to be doing well without her Malin.    Plan:    Continue to monitor her postvoid residuals for the next couple days just to assure they do not continue to rise.  I think is her strength improves and she becomes more mobile her residuals will start to drop.  Will follow peripherally.  Call if needed.  I would only catheterize her if she has persistent high residuals over 500 cc and not isolated events.      Nabil Elizondo MD    11/6/2024  08:31 EST

## 2024-11-06 NOTE — NURSING NOTE
Gave report to the receiving nurse at [facility]. Called Ronda Keita at  unavailable and voice mail full.

## 2024-11-06 NOTE — DISCHARGE SUMMARY
Patient Name: Mariela Ruiz  : 1945  MRN: [id number]    Date of Admission: 10/31/2024  Date of Discharge:  2024  Primary Care Physician: Duglas Rock MD      Chief Complaint:   Abdominal Pain      Discharge Diagnoses     Active Hospital Problems    Diagnosis  POA    **Acute cholecystitis [K81.0]  Yes    Elevated troponin [R79.89]  Unknown    Hypothyroidism, unspecified [E03.9]  Yes    Seizure disorder [G40.909]  Yes    Sleep apnea [G47.30]  Yes      Resolved Hospital Problems   No resolved problems to display.        Hospital Course     Patient is a 79-year-old female with complicated past medical history remarkable for seizure disorder, hypothyroidism, lymphedema, sleep apnea, mitral valve prolapse, osteoporosis, sleep apnea and chronic EKG changes consisting of right bundle branch block pattern, lymphedema/chronic venous insufficiency presented to the ED complaining of abdominal discomfort/pain, nausea, decreased appetite.        Acute cholecystitis  -Status post laparoscopic cholecystectomy with cholangiogram and da [place] robot assistance, Laparoscopic lysis of adhesions per general surgery on 2024  -Blood cultures were negative.  Completed course of IV Zosyn  -Did well postop.  Tolerated diet.             Mild JACIEL  -Creatinine trended up 1.30 2024  -Creatinine normalized with IV fluids.  Nephrology evaluated.     Elevated troponin    negative left heart catheterization in 2022 following false positive perfusion scan  - EKG with RBBB, unchanged compared to prior.  - cardiology evaluated prior to surgery.  No workup was indicated.  Patient was cleared for surgery.        Seizure disorder  -continue her home regimen of phenobarbital   -seizure precautions.      Hypothyroidism  -continue levothyroxine     Anemia   -Hemoglobin 12.4 on admission  -No signs of bleeding reported  -Iron panel 2024 consistent with anemia of chronic disease, however with iron saturation of 9  underlying iron deficiency cannot be excluded.  Will need repeat iron panel outpatient once acute illness resolves  -B12 low end of normal, continue B12 supplement  -Hemoglobin remained stable around 10.5.  -Repeat CBC in 1 week to monitor hemoglobin     Postop urinary tension  -Status post Malin catheter placement bladder 11/3, removed 11/5  -Continue to monitor PVR with, encourage ambulation.  Urology recommended to only catheterize her if she has persistent high residuals over 500 cc and not isolated events.         History of sleep apnea  Nightly oxygen.  CPAP if available.        At the time of discharge patient was told to take all medications as prescribed, keep all follow-up appointments, and call their doctor or return to the hospital with any worsening or concerning symptoms.           Day of Discharge     Subjective:  Patient this morning.  No acute events overnight.  Finally had multiple BMs, stool softeners held.  Malin catheter removed, voiding but with high residual, around 400 cc.  Denies abdominal discomfort.  No nausea or vomiting.  Tolerating diet.    Physical Exam:  Temp:  [98.2 °F (36.8 °C)-98.8 °F (37.1 °C)] 98.2 °F (36.8 °C)  Heart Rate:  [] 96  Resp:  [16-18] 16  BP: (133-139)/(63-78) 133/69  Body mass index is 37.05 kg/m².  Physical Exam    General: Alert, sitting up in the bed, not in distress,  HEENT: Normocephalic, atraumatic  CV: Regular rate and rhythm, no murmurs rubs or gallops  Lungs: CTA, no wheezing  Abdomen: Soft, nontender, nondistended  Extremities: Bilateral lower extremity edema no cyanosis     Consultants     Consult Orders (all) (From admission, onward)       Start     Ordered    11/04/24 0702  Inpatient Urology Consult  IN AM        Specialty:  Urology  Provider:  Nabil Elizondo MD    11/03/24 1647    11/02/24 1127  Inpatient Nephrology Consult  Once        Specialty:  Nephrology  Provider:  German George MD    11/02/24 1127    10/31/24 7777   Inpatient Cardiology Consult  Once        Specialty:  Cardiology  Provider:  Charlie Dennis MD    10/31/24 2155    10/31/24 1520  [facility] (on-call MD unless specified) Details  Once        Specialty:  Hospitalist  Provider:  (Not yet assigned)    10/31/24 1519    10/31/24 1424  Surgery (on-call MD unless specified)  Once        Specialty:  General Surgery  Provider:  (Not yet assigned)    10/31/24 1423                  Procedures     CHOLECYSTECTOMY LAPAROSCOPIC WITH DAVINCI ROBOT with cholangiogram, possible open      Imaging Results (All)       Procedure Component Value Units Date/Time    XR Chest 1 View [[id number]] Collected: 11/05/24 2056     Updated: 11/05/24 2102    Narrative:      XR CHEST 1 VW-     DATE OF EXAM: 11/5/2024 6:43 PM     INDICATION: Postop hypoxemia. Status post cholecystectomy.     COMPARISON: Chest radiograph 3/2/2021 and 9/16/2019. CT abdomen pelvis  10/31/2024.     TECHNIQUE: A single portable AP view of the chest was obtained.     FINDINGS:  Lordotic positioning and patient rotation. Multiple overlying artifacts.  Low lung volumes and elevation of the right hemidiaphragm with  ill-defined predominantly perihilar and basilar opacities in both lungs  with mild associated interstitial thickening. No pneumothorax. Stable  enlargement of the cardiac contour, likely accentuated by technique.  Free air under the diaphragm, which could be postoperative. Incompletely  evaluated severe chronic degenerative changes in both shoulders.       Impression:         1. Low lung volumes and elevation of the right hemidiaphragm with  ill-defined bilateral perihilar and bibasilar opacities in both lungs  and mild associated interstitial thickening, which could reflect  atelectasis, scarring, mild pulmonary edema, and/or pneumonia.  2. Free air under the diaphragm, which could be postoperative.     This report was finalized on 11/5/2024 8:59 PM by Thor Werner MD on  Workstation: [id number]       FL  Cholangiogram Operative [[id number]] Collected: 11/02/24 1532     Updated: 11/02/24 1536    Narrative:      INTRAOPERATIVE PORTABLE VIEW CHOLANGIOGRAM     CLINICAL INFORMATION: Post cholecystectomy     FINDINGS: Upon injection of the cystic duct with contrast, the proximal  intrahepatic, common hepatic and common bile ducts are opacified.  Contrast passes into the duodenum. A filling defect within the common  bile duct on initial images does not persist on subsequent images.     Fluoroscopy time 40 s, 252 images  Reference Air Kerma 18 mGy     This report was finalized on 11/2/2024 3:33 PM by Dr. Sultana Arias M.D  on Workstation: [id number]       CT Abdomen Pelvis With Contrast [[id number]] Collected: 10/31/24 1418     Updated: 10/31/24 1625    Narrative:      CT ABDOMEN AND PELVIS WITH IV CONTRAST     HISTORY: 79-year-old female with diffuse lower abdominal pain.  Appendectomy, hysterectomy and bilateral salpingo-oophorectomy in the  past.     TECHNIQUE: Radiation dose reduction techniques were utilized, including  automated exposure control and exposure modulation based on body size.   3 mm images were obtained through the abdomen and pelvis after the  administration of IV contrast. No recent previous CT of the abdomen for  comparison.     FINDINGS:  1. There is extensive acute cholecystitis. The gallbladder is markedly  distended, thick-walled, and has pericholecystic fluid. There is no  intra or extrahepatic biliary dilatation. There are no definite  calcified gallstones. There is a tiny amount of perihepatic fluid, small  amount of free fluid in the mesentery inferior to the gallbladder and  there is a small to moderate volume of free fluid within the dependent  aspect of the pelvis. No fluid collections or abscess. Surgical  consultation is recommended.     2. Liver and spleen appear unremarkable. Pancreas, adrenals, and right  kidney appear unremarkable. Left kidney appears unremarkable other than  a 6 mm  infundibular stone at the lower pole and a 3 mm stone at the  upper pole. Distal ureters are poorly seen secondary to extensive streak  artifact from the bilateral hip arthroplasty hardware. Urinary bladder  is not abnormally distended.     3. There is no bowel ileus or obstruction. There is no evidence for  colitis. There is mild secondary thickening of the hepatic flexure and  duodenum adjacent to the inflamed gallbladder.     4. There is a tiny right pleural effusion and there is dependent  atelectatic change at the right lung base.     This report was finalized on 10/31/2024 4:22 PM by Dr. Lisa Lucas M.D  on Workstation: [id number]             Results for orders placed in visit on 08/15/19    SCANNED VASCULAR STUDIES      Pertinent Labs     Results from last 7 days   Lab Units 11/06/24  0543 11/05/24  0501 11/04/24 0457 11/03/24 0439   WBC 10*3/mm3 5.15 6.55 7.78 8.95   HEMOGLOBIN g/dL 10.5* 10.7* 10.8* 12.4   PLATELETS 10*3/mm3 271 257 232 232     Results from last 7 days   Lab Units 11/06/24  0542 11/05/24  0501 11/04/24 0457 11/03/24  0439   SODIUM mmol/L 140 138 138 134*   POTASSIUM mmol/L 3.6 3.9 4.0 4.4   CHLORIDE mmol/L 104 105 105 102   CO2 mmol/L 25.2 25.0 24.1 19.0*   BUN mg/dL 16 20 24* 25*   CREATININE mg/dL 0.86 0.92 1.08* 1.10*   GLUCOSE mg/dL 101* 98 116* 84   Estimated Creatinine Clearance: 64.5 mL/min (by C-G formula based on SCr of 0.86 mg/dL).  Results from last 7 days   Lab Units 11/06/24  0542 11/05/24  0501 11/04/24 0457 11/03/24  0439 11/01/24  0603 10/31/24  1238   ALBUMIN g/dL 2.7* 2.6* 2.9* 2.9* 3.0* 3.4*   BILIRUBIN mg/dL  --   --   --   --  0.6 0.8   ALK PHOS U/L  --   --   --   --  121* 153*   AST (SGOT) U/L  --   --   --   --  14 18   ALT (SGPT) U/L  --   --   --   --  10 12     Results from last 7 days   Lab Units 11/06/24  0542 11/06/24  0013 11/05/24  0501 11/04/24  1501 11/04/24  0457 11/03/24  0439   CALCIUM mg/dL 9.4  --  9.0  --  9.5 9.1   ALBUMIN g/dL 2.7*  --  2.6*   "--  2.9* 2.9*   MAGNESIUM mg/dL 1.6  --  1.8  --  1.9 2.2   PHOSPHORUS mg/dL 3.4 3.4 2.3* 2.0* 2.2* 3.1     Results from last 7 days   Lab Units 10/31/24  1238   LIPASE U/L 24     Results from last 7 days   Lab Units 11/03/24  0439 11/01/24  0603 10/31/24  1459 10/31/24  1238   CK TOTAL U/L 106  --   --   --    HSTROP T ng/L  --  12 26* 16*           Invalid input(s): \"LDLCALC\"  Results from last 7 days   Lab Units 10/31/24  1503 10/31/24  1459   BLOODCX  No growth at 5 days No growth at 5 days         Test Results Pending at Discharge       Discharge Details        Discharge Medications        New Medications        Instructions Start Date   acetaminophen 500 MG tablet  Commonly known as: TYLENOL   1,000 mg, Oral, Every 8 Hours PRN      oxyCODONE-acetaminophen 5-325 MG per tablet  Commonly known as: PERCOCET   1 tablet, Oral, Every 8 Hours PRN      polyethylene glycol 17 g packet  Commonly known as: MIRALAX   17 g, Oral, Daily PRN      sennosides-docusate 8.6-50 MG per tablet  Commonly known as: PERICOLACE   2 tablets, Oral, 2 Times Daily PRN             Changes to Medications        Instructions Start Date   PHENobarbital 64.8 MG tablet  What changed:   how much to take  when to take this   129.6 mg, Oral, Nightly      PHENobarbital 64.8 MG tablet  What changed: You were already taking a medication with the same name, and this prescription was added. Make sure you understand how and when to take each.   64.8 mg, Oral, Daily   Start Date: November 7, 2024     pramipexole 0.5 MG tablet  Commonly known as: MIRAPEX  What changed:   how much to take  additional instructions   TAKE 5 TABLETS BY MOUTH ONCE DAILY             Continue These Medications        Instructions Start Date   aspirin 81 MG EC tablet   81 mg, Oral, Daily      cholecalciferol 25 MCG (1000 UT) tablet  Commonly known as: VITAMIN D3   1,000 Units, Oral, Daily      pravastatin 40 MG tablet  Commonly known as: PRAVACHOL   TAKE 1 TABLET BY MOUTH EVERY " NIGHT FOR HIGH AMOUNT OF FATS IN THE BLOOD      Synthroid 50 MCG tablet  Generic drug: levothyroxine   50 mcg, Oral, Every Morning      Vitamin B-12 1000 MCG sublingual tablet   1 tablet daily             Stop These Medications      coenzyme Q10 100 MG capsule     escitalopram 20 MG tablet  Commonly known as: LEXAPRO            ASK your doctor about these medications        Instructions Start Date   alendronate 70 MG tablet  Commonly known as: Fosamax   70 mg, Oral, Every 7 Days, Taken on the stomach with 1 glass of water.  No food until 1/2-hour later.               Allergies   Allergen Reactions    Clindamycin/Lincomycin Itching    Duricef [Cefadroxil] Hives and Rash    Sulfa Antibiotics Rash     RASH       Discharge Disposition:  Skilled Nursing Facility ([place] - External)      Discharge Diet:  Diet Order   Procedures    Diet: Regular/House; Fluid Consistency: Thin (IDDSI 0)       Discharge Activity:       CODE STATUS:    Code Status and Medical Interventions: CPR (Attempt to Resuscitate); Full Support   Ordered at: 10/31/24 1804     Code Status (Patient has no pulse and is not breathing):    CPR (Attempt to Resuscitate)     Medical Interventions (Patient has pulse or is breathing):    Full Support       Future Appointments   Date Time Provider Department Center   1/7/2025  2:00 PM Duglas Rock MD [facility]   8/5/2025 11:20 AM Steven Liu II, MD [facility]      Contact information for follow-up providers       Bin Heaton MD Follow up in 2 week(s).    Specialty: General Surgery  Contact information:  [address]  [facility] [id number] [phone]               Duglas Rock MD .    Specialty: Endocrinology  Contact information:  [address] [facility] [address]  [facility] [id number] [phone]                       Contact information for after-discharge care       Destination       [facility] .    Service: Skilled Nursing  Contact information:  [address] [facility] [address]  [phone]                                   Time Spent on Discharge:  Greater than 30 minutes      Valdemar Barros MD  [facility]  11/06/24  11:28 EST

## 2024-11-06 NOTE — THERAPY TREATMENT NOTE
Patient Name: Mariela Ruiz  : 1945    MRN: [id number]                              Today's Date: 2024       Admit Date: 10/31/2024    Visit Dx:     ICD-10-CM ICD-9-CM   1. Acute cholecystitis  K81.0 575.0     Patient Active Problem List   Diagnosis    Seizure disorder    Hyperlipidemia    Vitamin D insufficiency    Age-related osteoporosis without current pathological fracture    Sleep apnea    Chronic venous insufficiency    Postsurgical hypothyroidism    Chronic fatigue syndrome    Gastroesophageal reflux disease    Dupuytren's contracture    History of biliary T-tube placement    History of partial thyroidectomy    Multinodular goiter    Restless legs syndrome    History of cardiac catheterization    Urge incontinence of urine    Left knee pain    Ligamentous laxity of knee    DJD (degenerative joint disease) of knee    Adverse drug effect, subsequent encounter    Abnormal blood level of chromium    Abnormal blood level of cobalt    Family history of diabetes mellitus    Thrombocytopenia    .    S/P revision of total hip    Enlarged thyroid gland    Palmar fascial fibromatosis (dupuytren)    Hypothyroidism, unspecified    Hyperlipidemia, unspecified    Acute cholecystitis    Elevated troponin     Past Medical History:   Diagnosis Date    Arthritis     Chronic venous insufficiency     Dupuytren's contracture     History of staph infection     S/P KNEE DEC 2016    History of transfusion     Hyperlipidemia     Lymphedema     LEFT LEG    Nontoxic multinodular goiter     Osteoporosis     Dr. Cabrera    Pelvic joint pain, left     Postsurgical hypothyroidism     Presence of left artificial hip joint     METAL ON METAL AS NOTED PER DR CLEMENT NOTE    Restless leg syndrome     Right bundle branch block     Seizure disorder     Dr. Whitman, HX  LAST SEIZURE    Sleep apnea     DOES NOT HAVE MACHINE    Vitamin D insufficiency      Past Surgical History:   Procedure Laterality Date    APPENDECTOMY      CARDIAC  CATHETERIZATION      NORMAL     CHOLECYSTECTOMY N/A 11/2/2024    Procedure: CHOLECYSTECTOMY LAPAROSCOPIC WITH DAVINCI ROBOT with cholangiogram, possible open;  Surgeon: Bin Heaton MD;  Location: [facility] MAIN OR;  Service: Robotics - DaVinci;  Laterality: N/A;    COLONOSCOPY  08/17/2017    Normal except for anal fissure.  Dr. Brandt.  [facility].    KNEE MINI REVISION Left 11/15/2016    Procedure: LEFT KNEE POLY CHANGE WITH HI POST STABILIZER;  Surgeon: Pablito Claudio MD;  Location: [facility];  Service:     KNEE MINI REVISION Left 12/13/2016    Procedure: LT KNEE REMOVAL/REPLACEMENT LOCKING PIN ;  Surgeon: Pablito Claudio MD;  Location: [facility] MAIN OR;  Service:     MAMMO FINE NEEDLE ASPIRATION UNILATERAL      RIGHT THYROID NODULE, 2009 BENIGN     CA ARTHRP KNE CONDYLE&PLATU MEDIAL&LAT COMPARTMENTS Left 12/24/2016    Procedure: LEFT KNEE WASHOUT WITH POLY CHANGE;  Surgeon: Christiano Cesar MD;  Location: [facility] MAIN OR;  Service: Orthopedics    CA REVJ TOTAL KNEE ARTHRP W/WO ALGRFT 1 COMPONENT Left 2/20/2017    Procedure: LT KNEE REMOVAL ANTIBIOTIC SPACER AND KNEE REVISION;  Surgeon: Christiano Cesar MD;  Location: [facility] MAIN OR;  Service: Orthopedics    REPLACEMENT TOTAL KNEE Left     THYROIDECTOMY, PARTIAL      1979 LEFT LOBECTOMY AND ISTHMECTOMY     TOTAL ABDOMINAL HYSTERECTOMY WITH SALPINGO OOPHORECTOMY      TOTAL HIP ARTHROPLASTY Left     TOTAL HIP ARTHROPLASTY Right 9/14/2019    Procedure: TOTAL HIP ARTHROPLASTY ANTERIOR WITH HANA TABLE;  Surgeon: Christiano Cesar II, MD;  Location: [facility] MAIN OR;  Service: Orthopedics    TOTAL HIP ARTHROPLASTY REVISION Left 3/9/2021    Procedure: LEFT TOTAL HIP ARTHROPLASTY REVISION POSTERIOR;  Surgeon: Christiano Cesar II, MD;  Location: [facility] MAIN OR;  Service: Orthopedics;  Laterality: Left;    TOTAL KNEE  PROSTHESIS REMOVAL W/ SPACER INSERTION Left 12/29/2016    Procedure: LT KNEE IMPLANT REMOVAL WITH INSERTION OF SPACER ;  Surgeon:  Christiano Cesar MD;  Location: [facility] MAIN OR;  Service:       General Information       Row Name 11/06/24 1023          OT Time and Intention    Document Type therapy note (daily note)  -PP     Mode of Treatment individual therapy;occupational therapy  -PP     Patient Effort good  -PP       Row Name 11/06/24 1023          General Information    Patient Profile Reviewed yes  -PP     Existing Precautions/Restrictions fall  -PP       Row Name 11/06/24 1023          Cognition    Orientation Status (Cognition) oriented x 3  -PP       Row Name 11/06/24 1023          Safety Issues/Impairments Affecting Functional Mobility    Impairments Affecting Function (Mobility) balance;coordination;endurance/activity tolerance;pain;range of motion (ROM);strength  -PP     Comment, Safety Issues/Impairments (Mobility) gait belt and non skid socks worn for safety  -PP               User Key  (r) = Recorded By, (t) = Taken By, (c) = Cosigned By      Initials Name Provider Type    PP Wood Fraire OT Occupational Therapist                     Mobility/ADL's       Row Name 11/06/24 1024          Bed Mobility    Supine-Sit [place] (Bed Mobility) standby assist  -PP     Sit-Supine [place] (Bed Mobility) standby assist  -PP     Comment, (Bed Mobility) pt reqs extra time 2/2 slow movements, pt uses BUE to lift LE's in/out of bed  -PP       Row Name 11/06/24 1024          Transfers    Transfers sit-stand transfer;stand-sit transfer;toilet transfer  -PP     Comment, (Transfers) pt still very anxious during xfers and reqs max cueing for seq tech including hand/ foot placement  -PP       Row Name 11/06/24 1024          Bed-Chair Transfer    Comment, (Bed-Chair Transfer) Pt declines to sit UIC today  -PP       Row Name 11/06/24 1024          Sit-Stand Transfer    Sit-Stand [place] (Transfers) 2 person assist;minimum assist (75% patient effort);verbal cues;contact guard  -PP     Comment, (Sit-Stand Transfer) 2x from EOB and BSC  " -PP       Row Name 11/06/24 1024          Toilet Transfer    Type (Toilet Transfer) sit-stand;stand-sit  -PP     [place] Level (Toilet Transfer) contact guard;minimum assist (75% patient effort);verbal cues;nonverbal cues (demo/gesture)  -PP     Assistive Device (Toilet Transfer) commode, bedside without drop arms  -PP       Row Name 11/06/24 1024          Functional Mobility    Functional Mobility- Comment Pt declines today, saying \"I can't walk today\"  -PP       Row Name 11/06/24 1024          Grooming Assessment/Training    [place] Level (Grooming) grooming skills;wash face, hands;set up;oral care regimen  -PP     Position (Grooming) edge of bed sitting  -PP       Row Name 11/06/24 1024          Toileting Assessment/Training    [place] Level (Toileting) toileting skills;adjust/manage clothing;perform perineal hygiene;maximum assist (25% patient effort)  -PP     Assistive Devices (Toileting) commode, bedside without drop arms  -PP     Position (Toileting) supported standing;supported sitting  -PP     Comment, (Toileting) Cga and BUE support for standing bal  -PP       Row Name 11/06/24 1024          Lower Body Dressing Assessment/Training    [place] Level (Lower Body Dressing) lower body dressing skills;moderate assist (50% patient effort);socks  -PP     Position (Lower Body Dressing) edge of bed sitting  -PP               User Key  (r) = Recorded By, (t) = Taken By, (c) = Cosigned By      Initials Name Provider Type    PP Wood Fraire OT Occupational Therapist                   Obj/Interventions       Row Name 11/06/24 1031          Shoulder (Therapeutic Exercise)    Shoulder (Therapeutic Exercise) AROM (active range of motion)  -PP     Shoulder AROM (Therapeutic Exercise) bilateral;flexion;extension;aBduction;scapular elevation;scapular protraction;scapular retraction;sitting;10 repetitions  -PP       Row Name 11/06/24 1031          Elbow/Forearm (Therapeutic Exercise)    " Elbow/Forearm (Therapeutic Exercise) AROM (active range of motion)  -PP     Elbow/Forearm AROM (Therapeutic Exercise) bilateral;flexion;extension;sitting;10 repetitions  -PP       Row Name 11/06/24 1031          Motor Skills    Motor Skills functional endurance  -PP     Functional Endurance fair  -PP     Therapeutic Exercise shoulder;elbow/forearm  -PP       Row Name 11/06/24 1031          Balance    Balance Assessment sitting static balance  -PP     Static Sitting Balance supervision  -PP     Balance Interventions sitting;static;minimal challenge;occupation based/functional task  -PP               User Key  (r) = Recorded By, (t) = Taken By, (c) = Cosigned By      Initials Name Provider Type    PP Wood Fraire OT Occupational Therapist                   Goals/Plan    No documentation.                  Clinical Impression       Row Name 11/06/24 1117          Pain Assessment    Pretreatment Pain Rating 0/10 - no pain  -PP     Posttreatment Pain Rating 0/10 - no pain  -PP     Pain Location abdomen  -PP     Pain Management Interventions exercise or physical activity utilized;nursing notified;positioning techniques utilized  -PP     Pre/Posttreatment Pain Comment Pt reports 8/10 abdomen pain while moving but no pain at rest.  -PP       Row Name 11/06/24 1117          Plan of Care Review    Plan of Care Reviewed With patient  -PP     Progress no change  -PP     Outcome Evaluation Pt seen for OT session this am, pleasant and agreeable. Pt still presents w/ abdomen pain during bed mob and fxnl xfers but pain improves during rest. Pt also presents with anxiety w/ fxnl xfers and reqs max cueing for sequencing and technique to increase safety. Today, she was Sba for bed mob w/ increased time and using BUE to lift LE's. Sat EOB for G&H w/ S/u and BUE ther ex. She stood and stand pivot xfers to BSC w/ Cga-Min-A and BUE support. Max A for toileting hygiene, and gown change following the need to urinate/ BM urgently. Pt  rec dc home w/ assist and HH or  brief SNF stay pending prog w/ therapy and pain mgmt. OT will continue to monitor.  -PP       Row Name 11/06/24 1117          Therapy Plan Review/Discharge Plan (OT)    Anticipated Discharge Disposition (OT) skilled nursing facility;home with assist;home with home health  -PP       Row Name 11/06/24 1117          Vital Signs    Pre Patient Position Supine  -PP     Intra Patient Position Standing  -PP     Post Patient Position Supine  -PP       Row Name 11/06/24 1117          Positioning and Restraints    Pre-Treatment Position in bed  -PP     Post Treatment Position bed  -PP     In Bed notified nsg;sitting;call light within reach;encouraged to call for assist;exit alarm on  -PP               User Key  (r) = Recorded By, (t) = Taken By, (c) = Cosigned By      Initials Name Provider Type    Wood Stevenson, OT Occupational Therapist                   Outcome Measures       Row Name 11/06/24 1125          How much help from another is currently needed...    Putting on and taking off regular lower body clothing? 1  -PP     Bathing (including washing, rinsing, and drying) 2  -PP     Toileting (which includes using toilet bed pan or urinal) 2  -PP     Putting on and taking off regular upper body clothing 3  -PP     Taking care of personal grooming (such as brushing teeth) 3  -PP     Eating meals 4  -PP     AM-PAC 6 Clicks Score (OT) 15  -PP       Row Name 11/06/24 0831          How much help from another person do you currently need...    Turning from your back to your side while in flat bed without using bedrails? 3  -FH     Moving from lying on back to sitting on the side of a flat bed without bedrails? 2  -FH     Moving to and from a bed to a chair (including a wheelchair)? 2  -FH     Standing up from a chair using your arms (e.g., wheelchair, bedside chair)? 2  -FH     Climbing 3-5 steps with a railing? 1  -FH     To walk in hospital room? 2  -FH     AM-PAC 6 Clicks Score (PT) 12   -     Highest Level of Mobility Goal 4 --> Transfer to chair/commode  -       Row Name 11/06/24 1125          Functional Assessment    Outcome Measure Options AM-PAC 6 Clicks Daily Activity (OT)  -PP               User Key  (r) = Recorded By, (t) = Taken By, (c) = Cosigned By      Initials Name Provider Type    PP Wood Fraire OT Occupational Therapist     Mahendra Damon RN Registered Nurse                    Occupational Therapy Education       Title: PT OT SLP Therapies (In Progress)       Topic: Occupational Therapy (In Progress)       Point: ADL training (Done)       Description:   Instruct learner(s) on proper safety adaptation and remediation techniques during self care or transfers.   Instruct in proper use of assistive devices.                  Learning Progress Summary            Patient Acceptance, E, VU by PP at 11/4/2024 1308    Comment: Pt Ed on OT role, dc planning and call light function.                      Point: Home exercise program (Not Started)       Description:   Instruct learner(s) on appropriate technique for monitoring, assisting and/or progressing therapeutic exercises/activities.                  Learner Progress:  Not documented in this visit.              Point: Precautions (Not Started)       Description:   Instruct learner(s) on prescribed precautions during self-care and functional transfers.                  Learner Progress:  Not documented in this visit.              Point: Body mechanics (Not Started)       Description:   Instruct learner(s) on proper positioning and spine alignment during self-care, functional mobility activities and/or exercises.                  Learner Progress:  Not documented in this visit.                              User Key       Initials Effective Dates Name Provider Type Discipline     06/09/23 -  Wood Fraire OT Occupational Therapist OT                  OT Recommendation and Plan  Planned Therapy Interventions (OT):  activity tolerance training, BADL retraining, functional balance retraining, occupation/activity based interventions, patient/caregiver education/training, strengthening exercise, transfer/mobility retraining  Therapy Frequency (OT): 5 times/wk  Plan of Care Review  Plan of Care Reviewed With: patient  Progress: no change  Outcome Evaluation: Pt seen for OT session this am, pleasant and agreeable. Pt still presents w/ abdomen pain during bed mob and fxnl xfers but pain improves during rest. Pt also presents with anxiety w/ fxnl xfers and reqs max cueing for sequencing and technique to increase safety. Today, she was Sba for bed mob w/ increased time and using BUE to lift LE's. Sat EOB for G&H w/ S/u and BUE ther ex. She stood and stand pivot xfers to [facility] w/ Cga-Min-A and BUE support. Max A for toileting hygiene, and gown change following the need to urinate/ BM urgently. Pt rec dc home w/ assist and HH or  brief SNF stay pending prog w/ therapy and pain mgmt. OT will continue to monitor.     Time Calculation:   Evaluation Complexity (OT)  Review Occupational Profile/Medical/Therapy History Complexity: expanded/moderate complexity  Assessment, Occupational Performance/Identification of Deficit Complexity: 3-5 performance deficits  Clinical Decision Making Complexity (OT): detailed assessment/moderate complexity  Overall Complexity of Evaluation (OT): moderate complexity     Time Calculation- OT       Row Name 11/06/24 1125             Time Calculation- OT    OT Start Time 1011  -PP      OT Stop Time 1055  -PP      OT Time Calculation (min) 44 min  -PP      Total Timed Code Minutes- OT 44 minute(s)  -PP      OT Received On 11/06/24  -PP      OT - Next Appointment 11/07/24  -PP         Timed Charges    21760 - OT Therapeutic Exercise Minutes 21  -PP      89083 - OT Self Care/Mgmt Minutes 23  -PP         Total Minutes    Timed Charges Total Minutes 44  -PP       Total Minutes 44  -PP                User Key  (r) =  Recorded By, (t) = Taken By, (c) = Cosigned By      Initials Name Provider Type    PP Wood Fraire OT Occupational Therapist                  Therapy Charges for Today       Code Description Service Date Service Provider Modifiers Qty    43526760864 HC OT THER PROC EA 15 MIN 11/6/2024 Wood Fraire OT GO 1    10862614387 HC OT SELF CARE/MGMT/TRAIN EA 15 MIN 11/6/2024 Wood Fraire OT GO 2                 Wood Fraire OT  11/6/2024

## 2024-11-06 NOTE — PROGRESS NOTES
[facility] Progress Note      Patient Name: Mariela Ruiz  : 1945  MRN: [id number]  Primary Care Physician:  Duglas Rock MD  Date of admission: 10/31/2024    Subjective     Interval History:   F/u JACIEL    Review of Systems:   Malin was removed yesterday, continues to have urinary retention (PVR consistently >300ml)  No chest pain, dyspnea, or nausea    Objective     Vitals:   Temp:  [98.2 °F (36.8 °C)-98.8 °F (37.1 °C)] 98.2 °F (36.8 °C)  Heart Rate:  [] 96  Resp:  [16-18] 16  BP: (133-139)/(63-78) 133/69    Intake/Output Summary (Last 24 hours) at 2024 1305  Last data filed at 2024 1056  Gross per 24 hour   Intake 400 ml   Output 1045 ml   Net -645 ml       Physical Exam:    General Appearance: frail WF resting comfortably on NC O2  Neck: supple, no JVD  Lungs: CTA bilat no rales  Heart: RRR, normal S1 and S2  Abdomen: soft, nontender, nondistended  Extremities: 1+ BLE edema, no cyanosis or clubbing       Scheduled Meds:     cholecalciferol, 4,000 Units, Oral, Daily  heparin (porcine), 5,000 Units, Subcutaneous, Q8H  levothyroxine, 50 mcg, Oral, QAM  PHENobarbital, 129.6 mg, Oral, Nightly  PHENobarbital, 64.8 mg, Oral, Daily  potassium chloride ER, 40 mEq, Oral, Q4H  pramipexole, 0.5 mg, Oral, Daily  pravastatin, 40 mg, Oral, Nightly  sodium chloride, 10 mL, Intravenous, Q12H  vitamin B-12, 1,000 mcg, Oral, Daily      IV Meds:        Results Reviewed:   I have personally reviewed the results from the time of this admission to 2024 13:05 EST     Results from last 7 days   Lab Units 24  0542 24  0501 24  0457 24  0759 24  0603 10/31/24  1238   SODIUM mmol/L 140 138 138   < > 135* 134*   POTASSIUM mmol/L 3.6 3.9 4.0   < > 3.9 4.2   CHLORIDE mmol/L 104 105 105   < > 105 101   CO2 mmol/L 25.2 25.0 24.1   < > 23.0 25.0   BUN mg/dL 16 20 24*   < > 18 21   CREATININE mg/dL 0.86 0.92 1.08*   < > 0.75 0.93   CALCIUM mg/dL 9.4 9.0  9.5   < > 9.0 9.5   BILIRUBIN mg/dL  --   --   --   --  0.6 0.8   ALK PHOS U/L  --   --   --   --  121* 153*   ALT (SGPT) U/L  --   --   --   --  10 12   AST (SGOT) U/L  --   --   --   --  14 18   GLUCOSE mg/dL 101* 98 116*   < > 83 96    < > = values in this interval not displayed.     Estimated Creatinine Clearance: 64.5 mL/min (by C-G formula based on SCr of 0.86 mg/dL).  Results from last 7 days   Lab Units 11/06/24  0542 11/06/24  0013 11/05/24  0501 11/04/24  1501 11/04/24  0457   MAGNESIUM mg/dL 1.6  --  1.8  --  1.9   PHOSPHORUS mg/dL 3.4 3.4 2.3*   < > 2.2*    < > = values in this interval not displayed.         Results from last 7 days   Lab Units 11/06/24  0543 11/05/24  0501 11/04/24  0457 11/03/24  0439 11/02/24  0759   WBC 10*3/mm3 5.15 6.55 7.78 8.95 7.30   HEMOGLOBIN g/dL 10.5* 10.7* 10.8* 12.4 10.1*   PLATELETS 10*3/mm3 271 257 232 232 197           Assessment / Plan     ASSESSMENT:  JACIEL - likely prerenal/hemodynamic from vol depletion and mild/transient hypotension (BP was 90s/50s). Despite continued urinary retention, JACIEL resolved, Cr down to 0.86 (peak 1.3).  Lytes stable  Acute cholecystitis, s/p lap-renetta + NICKO. Completed zosyn  Hypophosphatemia, resolved  Dyslipidemia on statin, CK normal   Seizure disorder treated  Hypothyroidism, treated   Urinary retention - childress was removed yesterday, now PVR >300 ml consistently, evaluated by UROL, recommends I/O only when PVR >500 ml persistently    Chronic lymphedema    PLAN:  Patient discharging.  Patient seen by NP.  Agree with assessment and plan above.  Does not need follow up with us.      Carlo Hampton MD  11/06/24  13:05 [facility]    [facility]  [phone]

## 2024-11-07 ENCOUNTER — MEDICATION THERAPY MANAGEMENT (OUTPATIENT)
Dept: INTERNAL MEDICINE | Facility: HOSPITAL | Age: 79
End: 2024-11-07
Payer: MEDICARE

## 2024-11-07 DIAGNOSIS — G40.909 SEIZURE DISORDER: ICD-10-CM

## 2024-11-07 RX ORDER — PHENOBARBITAL 64.8 MG/1
64.8 TABLET ORAL DAILY
Start: 2024-11-07 | End: 2024-11-07

## 2024-11-07 RX ORDER — PHENOBARBITAL 64.8 MG/1
64.8 TABLET ORAL DAILY
Qty: 7 TABLET | Refills: 0 | Status: SHIPPED | OUTPATIENT
Start: 2024-11-07 | End: 2024-11-14

## 2024-11-07 RX ORDER — PHENOBARBITAL 64.8 MG/1
129.6 TABLET ORAL NIGHTLY
Qty: 14 TABLET | Refills: 0 | Status: SHIPPED | OUTPATIENT
Start: 2024-11-07 | End: 2024-11-14

## 2024-11-07 RX ORDER — PHENOBARBITAL 64.8 MG/1
129.6 TABLET ORAL NIGHTLY
Start: 2024-11-07 | End: 2024-11-07

## 2024-11-08 NOTE — CASE MANAGEMENT/SOCIAL WORK
Case Management Discharge Note      Final Note: Went to [facility] skilled. Malik chatman transport         Selected Continued Care - Discharged on 11/6/2024 Admission date: 10/31/2024 - Discharge disposition: Skilled Nursing Facility (DC - External)      Destination Coordination complete.      Service Provider Services Address Phone Fax Patient Preferred    [facility] [address] [phone] --              Durable Medical Equipment    No services have been selected for the patient.                Dialysis/Infusion    No services have been selected for the patient.                Home Medical Care    No services have been selected for the patient.                Therapy    No services have been selected for the patient.                Community Resources    No services have been selected for the patient.                Community & DME    No services have been selected for the patient.                    Transportation Services  W/C Yves: Kashif Chatman    Final Discharge Disposition Code: 03 - skilled nursing facility (SNF)

## 2024-12-30 ENCOUNTER — HOSPITAL ENCOUNTER (EMERGENCY)
Facility: HOSPITAL | Age: 79
Discharge: HOME | End: 2024-12-30
Attending: EMERGENCY MEDICINE | Admitting: EMERGENCY MEDICINE
Payer: MEDICARE

## 2024-12-30 VITALS
BODY MASS INDEX: 31.71 KG/M2 | HEART RATE: 89 BPM | WEIGHT: 202 LBS | TEMPERATURE: 97.7 F | RESPIRATION RATE: 18 BRPM | HEIGHT: 67 IN | DIASTOLIC BLOOD PRESSURE: 72 MMHG | OXYGEN SATURATION: 95 % | SYSTOLIC BLOOD PRESSURE: 142 MMHG

## 2024-12-30 DIAGNOSIS — I87.2 VENOUS INSUFFICIENCY: ICD-10-CM

## 2024-12-30 DIAGNOSIS — L03.115 CELLULITIS OF RIGHT LOWER EXTREMITY: Primary | ICD-10-CM

## 2024-12-30 LAB
ALBUMIN SERPL-MCNC: 3.4 G/DL (ref 3.5–5.2)
ALBUMIN/GLOB SERPL: 1.4 G/DL
ALP SERPL-CCNC: 132 U/L (ref 39–117)
ALT SERPL W P-5'-P-CCNC: 12 U/L (ref 1–33)
ANION GAP SERPL CALCULATED.3IONS-SCNC: 8 MMOL/L (ref 5–15)
AST SERPL-CCNC: 17 U/L (ref 1–32)
BASOPHILS # BLD AUTO: 0.04 10*3/MM3 (ref 0–0.2)
BASOPHILS NFR BLD AUTO: 0.9 % (ref 0–1.5)
BILIRUB SERPL-MCNC: 0.2 MG/DL (ref 0–1.2)
BUN SERPL-MCNC: 16 MG/DL (ref 8–23)
BUN/CREAT SERPL: 19.8 (ref 7–25)
CALCIUM SPEC-SCNC: 9 MG/DL (ref 8.6–10.5)
CHLORIDE SERPL-SCNC: 106 MMOL/L (ref 98–107)
CO2 SERPL-SCNC: 24 MMOL/L (ref 22–29)
CREAT SERPL-MCNC: 0.81 MG/DL (ref 0.57–1)
DEPRECATED RDW RBC AUTO: 41 FL (ref 37–54)
EGFRCR SERPLBLD CKD-EPI 2021: 74.9 ML/MIN/1.73
EOSINOPHIL # BLD AUTO: 0 10*3/MM3 (ref 0–0.4)
EOSINOPHIL NFR BLD AUTO: 0 % (ref 0.3–6.2)
ERYTHROCYTE [DISTWIDTH] IN BLOOD BY AUTOMATED COUNT: 12.4 % (ref 12.3–15.4)
GLOBULIN UR ELPH-MCNC: 2.5 GM/DL
GLUCOSE SERPL-MCNC: 96 MG/DL (ref 65–99)
HCT VFR BLD AUTO: 33 % (ref 34–46.6)
HGB BLD-MCNC: 11 G/DL (ref 12–15.9)
IMM GRANULOCYTES # BLD AUTO: 0.01 10*3/MM3 (ref 0–0.05)
IMM GRANULOCYTES NFR BLD AUTO: 0.2 % (ref 0–0.5)
LYMPHOCYTES # BLD AUTO: 1.61 10*3/MM3 (ref 0.7–3.1)
LYMPHOCYTES NFR BLD AUTO: 35.2 % (ref 19.6–45.3)
MCH RBC QN AUTO: 30.1 PG (ref 26.6–33)
MCHC RBC AUTO-ENTMCNC: 33.3 G/DL (ref 31.5–35.7)
MCV RBC AUTO: 90.4 FL (ref 79–97)
MONOCYTES # BLD AUTO: 0.52 10*3/MM3 (ref 0.1–0.9)
MONOCYTES NFR BLD AUTO: 11.4 % (ref 5–12)
NEUTROPHILS NFR BLD AUTO: 2.4 10*3/MM3 (ref 1.7–7)
NEUTROPHILS NFR BLD AUTO: 52.3 % (ref 42.7–76)
NRBC BLD AUTO-RTO: 0 /100 WBC (ref 0–0.2)
NT-PROBNP SERPL-MCNC: 430 PG/ML (ref 0–1800)
PLATELET # BLD AUTO: 235 10*3/MM3 (ref 140–450)
PMV BLD AUTO: 10.1 FL (ref 6–12)
POTASSIUM SERPL-SCNC: 4.1 MMOL/L (ref 3.5–5.2)
PROT SERPL-MCNC: 5.9 G/DL (ref 6–8.5)
RBC # BLD AUTO: 3.65 10*6/MM3 (ref 3.77–5.28)
SODIUM SERPL-SCNC: 138 MMOL/L (ref 136–145)
WBC NRBC COR # BLD AUTO: 4.58 10*3/MM3 (ref 3.4–10.8)

## 2024-12-30 PROCEDURE — 85025 COMPLETE CBC W/AUTO DIFF WBC: CPT | Performed by: PHYSICIAN ASSISTANT

## 2024-12-30 PROCEDURE — 83880 ASSAY OF NATRIURETIC PEPTIDE: CPT | Performed by: PHYSICIAN ASSISTANT

## 2024-12-30 PROCEDURE — 80053 COMPREHEN METABOLIC PANEL: CPT | Performed by: PHYSICIAN ASSISTANT

## 2024-12-30 PROCEDURE — 99283 EMERGENCY DEPT VISIT LOW MDM: CPT

## 2024-12-30 RX ORDER — FUROSEMIDE 20 MG/1
20 TABLET ORAL DAILY
Qty: 14 TABLET | Refills: 0 | Status: SHIPPED | OUTPATIENT
Start: 2024-12-30

## 2024-12-30 RX ORDER — SODIUM CHLORIDE 0.9 % (FLUSH) 0.9 %
10 SYRINGE (ML) INJECTION AS NEEDED
Status: DISCONTINUED | OUTPATIENT
Start: 2024-12-30 | End: 2024-12-30 | Stop reason: HOSPADM

## 2024-12-30 RX ORDER — DOXYCYCLINE 100 MG/1
100 CAPSULE ORAL 2 TIMES DAILY
Qty: 6 CAPSULE | Refills: 0 | Status: SHIPPED | OUTPATIENT
Start: 2024-12-30

## 2024-12-30 NOTE — ED PROVIDER NOTES
Pt presents to the ED c/o bilateral lower extremity swelling and redness.  Has been on doxycycline for the last 4 days.  Felt like it was getting worse today.  No fevers.  Not on any diuretics.  Does see lymphedema clinic.     On exam,   General: No acute distress, nontoxic  HEENT: EOMI  Pulm: Symmetric chest rise, nonlabored breathing  CV: Regular rate and rhythm, bilateral lower extremity lymphedema  GI: Nondistended  MSK: No deformity  Skin: Slight lower extremity erythema right greater than left without significant abnormal warmth  Neuro: Awake, alert, oriented x 4, moving all extremities, no focal deficits  Psych: Calm, cooperative    Vital signs and nursing notes reviewed.           Plan: Initial concern for cellulitis, lymphedema venous stasis dermatitis, renal failure, among others.  Plan for labs and reevaluate.    ED Course as of 12/30/24 0546   Mon Dec 30, 2024   0127 Patient presents with 4-5 day history of wounds to the right lower leg with erythema starting 2 days ago.  Patient prescribed doxycycline without much improvement.  No history of diabetes.  No fever.  Will check basic labs and reevaluate. [EE]   0207 WBC: 4.58 [EE]   0224 Glucose: 96 [DC]   0224 BUN: 16 [DC]   0224 Creatinine: 0.81 [DC]   0224 Sodium: 138 [DC]   0224 Potassium: 4.1 [DC]   0224 proBNP: 430.0 [DC]   0247 Updated patient on workup.  Her cellulitis is not overwhelming.  She has reassuring labs.  I suspect a lot of her swelling and redness is due to venous insufficiency and edema.  Plan to have her continue her doxycycline.  We will give her an additional 3 days of treatment.  We will also start her on Lasix for the next 14 days to help with the swelling.  She is agreeable to treatment plan. [EE]      ED Course User Index  [DC] Amador Alva MD  [EE] Emmanuel Vicente PA       Continue doxycycline, will start diuretics, continue with compression wraps/stockings and outpatient edema clinic follow-up.  PCP follow-up within 1 week  for recheck, ED return for worsening symptoms as needed.     MD Attestation Note    SHARED VISIT: This visit was performed by BOTH a physician and an APC. The substantive portion of the medical decision making was performed by this attesting physician who made or approved the management plan and takes responsibility for patient management. All studies in the APC note (if performed) were independently interpreted by me.                   Amador Alva MD  12/30/24 0546

## 2024-12-30 NOTE — ED PROVIDER NOTES
EMERGENCY DEPARTMENT ENCOUNTER    Room Number:  07/07  Date of encounter:  12/30/2024  PCP: Provider, No Known  Historian: Patient, daughter  Chronic or social conditions impacting care (social determinants of health): None    HPI:  Chief Complaint: Leg swelling, and redness  A complete HPI/ROS/PMH/PSH/SH/FH are unobtainable due to: Nothing    Context: Mariela Ruiz is a 79 y.o. female with a history of lymphedema, venous insufficiency, seizures, who presents to the ED c/o acute right lower leg wounds as well as bilateral leg swelling and redness.  Patient states that she typically wears compression stockings for her lymphedema.  5 days ago she noticed some open wounds to her right medial leg.  These look like apparent bug bites.  Starting 3 days ago she noticed that her right lower leg was becoming erythematous.  She was seen at the [facility] and started on doxycycline.  She states the redness seems to have worsened over the past 24 hours.  She denies any overt fever, chest pain, shortness of breath.    Review of prior external notes (non-ED):   I reviewed [facility] office visit from 12/27/2024.  Patient started on doxycycline for suspected cellulitis.    Review of prior external test results outside of this encounter:  I reviewed an renal function panel from 11/6/2024.  Creatinine 0.86, potassium 3.6    PAST MEDICAL HISTORY  Active Ambulatory Problems     Diagnosis Date Noted   • Seizure disorder    • Hyperlipidemia    • Vitamin D insufficiency    • Age-related osteoporosis without current pathological fracture    • Sleep apnea    • Chronic venous insufficiency    • Postsurgical hypothyroidism    • Chronic fatigue syndrome 11/07/2016   • Gastroesophageal reflux disease 11/07/2016   • Dupuytren's contracture 11/07/2016   • History of biliary T-tube placement 11/07/2016   • History of partial thyroidectomy 10/04/2012   • Multinodular goiter 11/07/2016   • Restless legs syndrome 11/07/2016   • History of  cardiac catheterization 11/07/2016   • Urge incontinence of urine 11/07/2016   • Left knee pain 11/15/2016   • Ligamentous laxity of knee 12/02/2016   • DJD (degenerative joint disease) of knee 12/24/2016   • Adverse drug effect, subsequent encounter 11/07/2017   • Abnormal blood level of chromium 04/05/2018   • Abnormal blood level of cobalt 04/05/2018   • Family history of diabetes mellitus 09/13/2018   • Thrombocytopenia 09/16/2019   • . 09/16/2019   • S/P revision of total hip 03/09/2021   • Enlarged thyroid gland 06/29/2017   • Palmar fascial fibromatosis (dupuytren) 01/10/2024   • Hypothyroidism, unspecified 01/10/2024   • Hyperlipidemia, unspecified 01/10/2024   • Acute cholecystitis 10/31/2024   • Elevated troponin 10/31/2024     Resolved Ambulatory Problems     Diagnosis Date Noted   • Mitral valve prolapse 11/07/2016   • Right fascicular block 11/07/2016   • Pruritic rash 10/18/2017   • Wasp sting 08/16/2019   • Cellulitis of right upper extremity 08/17/2019   • Closed fracture of neck of right femur 09/13/2019   • Fever 09/16/2019   • Urine retention 09/16/2019     Past Medical History:   Diagnosis Date   • Arthritis    • History of staph infection    • History of transfusion    • Lymphedema    • Nontoxic multinodular goiter    • Osteoporosis    • Pelvic joint pain, left    • Presence of left artificial hip joint    • Restless leg syndrome    • Right bundle branch block          PAST SURGICAL HISTORY  Past Surgical History:   Procedure Laterality Date   • APPENDECTOMY     • CARDIAC CATHETERIZATION      NORMAL    • CHOLECYSTECTOMY N/A 11/2/2024    Procedure: CHOLECYSTECTOMY LAPAROSCOPIC WITH DAVINCI ROBOT with cholangiogram, possible open;  Surgeon: Bin Heaton MD;  Location: [facility];  Service: Robotics - DaVinci;  Laterality: N/A;   • COLONOSCOPY  08/17/2017    Normal except for anal fissure.  Dr. Brandt.  [facility].   • KNEE MINI REVISION Left 11/15/2016    Procedure: LEFT KNEE POLY  CHANGE WITH HI POST STABILIZER;  Surgeon: Pablito Claudio MD;  Location: [facility] MAIN OR;  Service:    • KNEE MINI REVISION Left 12/13/2016    Procedure: LT KNEE REMOVAL/REPLACEMENT LOCKING PIN ;  Surgeon: Pablito Claudio MD;  Location: [facility] MAIN OR;  Service:    • MAMMO FINE NEEDLE ASPIRATION UNILATERAL      RIGHT THYROID NODULE, 2009 BENIGN    • DC ARTHRP KNE CONDYLE&PLATU MEDIAL&LAT COMPARTMENTS Left 12/24/2016    Procedure: LEFT KNEE WASHOUT WITH POLY CHANGE;  Surgeon: Christiano Cesar MD;  Location: [facility] MAIN OR;  Service: Orthopedics   • DC REVJ TOTAL KNEE ARTHRP W/WO ALGRFT 1 COMPONENT Left 2/20/2017    Procedure: LT KNEE REMOVAL ANTIBIOTIC SPACER AND KNEE REVISION;  Surgeon: Christiano Cesar MD;  Location: [facility] MAIN OR;  Service: Orthopedics   • REPLACEMENT TOTAL KNEE Left    • THYROIDECTOMY, PARTIAL      1979 LEFT LOBECTOMY AND ISTHMECTOMY    • TOTAL ABDOMINAL HYSTERECTOMY WITH SALPINGO OOPHORECTOMY     • TOTAL HIP ARTHROPLASTY Left    • TOTAL HIP ARTHROPLASTY Right 9/14/2019    Procedure: TOTAL HIP ARTHROPLASTY ANTERIOR WITH HANA TABLE;  Surgeon: Christiano Cesar II, MD;  Location: [facility] MAIN OR;  Service: Orthopedics   • TOTAL HIP ARTHROPLASTY REVISION Left 3/9/2021    Procedure: LEFT TOTAL HIP ARTHROPLASTY REVISION POSTERIOR;  Surgeon: Christiano Cesar II, MD;  Location: [facility] OR;  Service: Orthopedics;  Laterality: Left;   • TOTAL KNEE  PROSTHESIS REMOVAL W/ SPACER INSERTION Left 12/29/2016    Procedure: LT KNEE IMPLANT REMOVAL WITH INSERTION OF SPACER ;  Surgeon: Christiano Cesar MD;  Location: [facility] MAIN OR;  Service:          FAMILY HISTORY  Family History   Problem Relation Age of Onset   • Heart disease Father    • Diabetes Sister    • Hypertension Sister    • Hyperlipidemia Sister    • Obesity Sister    • Malig Hyperthermia Neg Hx          SOCIAL HISTORY  Social History     Socioeconomic History   • Marital status:    Tobacco Use   • Smoking status: Never      Passive exposure: Never   • Smokeless tobacco: Never   Vaping Use   • Vaping status: Never Used   Substance and Sexual Activity   • Alcohol use: No   • Drug use: No   • Sexual activity: Defer         ALLERGIES  Clindamycin/lincomycin, Duricef [cefadroxil], and Sulfa antibiotics        REVIEW OF SYSTEMS  All systems reviewed and negative except for those discussed in HPI.       PHYSICAL EXAM    I have reviewed the triage vital signs and nursing notes.    ED Triage Vitals   Temp Heart Rate Resp BP SpO2   12/30/24 0057 12/30/24 0057 12/30/24 0057 12/30/24 0105 12/30/24 0057   97.7 °F (36.5 °C) 107 18 155/74 96 %      Temp src Heart Rate Source Patient Position BP Location FiO2 (%)   12/30/24 0057 -- -- -- --   Tympanic           Physical Exam  GENERAL: Alert, oriented, well-appearing, not distressed  HENT: head atraumatic, no nuchal rigidity  EYES: no scleral icterus, EOMI  CV: regular rhythm, regular rate, no murmur  RESPIRATORY: normal effort, CTA  ABDOMEN: soft, nontender  MUSCULOSKELETAL: 3+ swelling consistent with lymphedema bilaterally.  Mild erythema to the right anterior lower leg.  No significant induration, warmth.  Multiple scabbed over lesions to the right medial calf.  No lymphangitis or abscess.  NEURO: alert, moves all extremities, follows commands  SKIN: warm, dry        LAB RESULTS  Recent Results (from the past 24 hours)   Comprehensive Metabolic Panel    Collection Time: 12/30/24  1:38 AM    Specimen: Blood   Result Value Ref Range    Glucose 96 65 - 99 mg/dL    BUN 16 8 - 23 mg/dL    Creatinine 0.81 0.57 - 1.00 mg/dL    Sodium 138 136 - 145 mmol/L    Potassium 4.1 3.5 - 5.2 mmol/L    Chloride 106 98 - 107 mmol/L    CO2 24.0 22.0 - 29.0 mmol/L    Calcium 9.0 8.6 - 10.5 mg/dL    Total Protein 5.9 (L) 6.0 - 8.5 g/dL    Albumin 3.4 (L) 3.5 - 5.2 g/dL    ALT (SGPT) 12 1 - 33 U/L    AST (SGOT) 17 1 - 32 U/L    Alkaline Phosphatase 132 (H) 39 - 117 U/L    Total Bilirubin 0.2 0.0 - 1.2 mg/dL    Globulin  2.5 gm/dL    A/G Ratio 1.4 g/dL    BUN/Creatinine Ratio 19.8 7.0 - 25.0    Anion Gap 8.0 5.0 - 15.0 mmol/L    eGFR 74.9 >60.0 mL/min/1.73   CBC Auto Differential    Collection Time: 12/30/24  1:38 AM    Specimen: Blood   Result Value Ref Range    WBC 4.58 3.40 - 10.80 10*3/mm3    RBC 3.65 (L) 3.77 - 5.28 10*6/mm3    Hemoglobin 11.0 (L) 12.0 - 15.9 g/dL    Hematocrit 33.0 (L) 34.0 - 46.6 %    MCV 90.4 79.0 - 97.0 fL    MCH 30.1 26.6 - 33.0 pg    MCHC 33.3 31.5 - 35.7 g/dL    RDW 12.4 12.3 - 15.4 %    RDW-SD 41.0 37.0 - 54.0 fl    MPV 10.1 6.0 - 12.0 fL    Platelets 235 140 - 450 10*3/mm3    Neutrophil % 52.3 42.7 - 76.0 %    Lymphocyte % 35.2 19.6 - 45.3 %    Monocyte % 11.4 5.0 - 12.0 %    Eosinophil % 0.0 (L) 0.3 - 6.2 %    Basophil % 0.9 0.0 - 1.5 %    Immature Grans % 0.2 0.0 - 0.5 %    Neutrophils, Absolute 2.40 1.70 - 7.00 10*3/mm3    Lymphocytes, Absolute 1.61 0.70 - 3.10 10*3/mm3    Monocytes, Absolute 0.52 0.10 - 0.90 10*3/mm3    Eosinophils, Absolute 0.00 0.00 - 0.40 10*3/mm3    Basophils, Absolute 0.04 0.00 - 0.20 10*3/mm3    Immature Grans, Absolute 0.01 0.00 - 0.05 10*3/mm3    nRBC 0.0 0.0 - 0.2 /100 WBC   BNP    Collection Time: 12/30/24  1:38 AM    Specimen: Blood   Result Value Ref Range    proBNP 430.0 0.0 - 1,800.0 pg/mL       Ordered the above labs and independently reviewed the results.      MEDICATIONS GIVEN IN ER    Medications   sodium chloride 0.9 % flush 10 mL (has no administration in time range)         ADDITIONAL ORDERS CONSIDERED BUT NOT ORDERED:  Admission was considered but after careful review of the patient's presentation, physical examination, diagnostic results, and response to treatment the patient may be safely discharged with outpatient follow-up.       PROGRESS, DATA ANALYSIS, CONSULTS, AND MEDICAL DECISION MAKING    All labs have been independently interpreted by myself.  All radiology studies have been independently interpreted by myself and discussed with radiologist  dictating the report.   EKGs independently interpreted by myself.  Discussion below represents my analysis of pertinent findings related to patient's condition, differential diagnosis, treatment plan and final disposition.    I have discussed case with Dr. Alva, emergency room physician.  He has performed his own bedside examination and agrees with treatment plan.    ED Course as of 12/30/24 0257   Mon Dec 30, 2024   0127 Patient presents with 4-5 day history of wounds to the right lower leg with erythema starting 2 days ago.  Patient prescribed doxycycline without much improvement.  No history of diabetes.  No fever.  Will check basic labs and reevaluate. [EE]   0207 WBC: 4.58 [EE]   0224 Glucose: 96 [DC]   0224 BUN: 16 [DC]   0224 Creatinine: 0.81 [DC]   0224 Sodium: 138 [DC]   0224 Potassium: 4.1 [DC]   0224 proBNP: 430.0 [DC]   0247 Updated patient on workup.  Her cellulitis is not overwhelming.  She has reassuring labs.  I suspect a lot of her swelling and redness is due to venous insufficiency and edema.  Plan to have her continue her doxycycline.  We will give her an additional 3 days of treatment.  We will also start her on Lasix for the next 14 days to help with the swelling.  She is agreeable to treatment plan. [EE]      ED Course User Index  [DC] Amador Alva MD  [EE] Emmanuel Vicente PA       AS OF 02:57 EST VITALS:    BP - 140/66  HR - 93  TEMP - 97.7 °F (36.5 °C) (Tympanic)  O2 SATS - 95%        DIAGNOSIS  Final diagnoses:   Cellulitis of right lower extremity   Venous insufficiency         DISPOSITION  Discharged    Admission was considered but after careful review of the patient's presentation, physical examination, diagnostic results, and response to treatment the patient may be safely discharged with outpatient follow-up.         Dictated utilizing Dragon dictation     Emmanuel Vicente PA  12/30/24 0257

## 2025-01-19 DIAGNOSIS — G25.81 RESTLESS LEGS SYNDROME: ICD-10-CM

## 2025-01-20 RX ORDER — PRAMIPEXOLE DIHYDROCHLORIDE 0.5 MG/1
TABLET ORAL
Qty: 450 TABLET | Refills: 1 | Status: SHIPPED | OUTPATIENT
Start: 2025-01-20

## 2025-01-24 ENCOUNTER — TELEPHONE (OUTPATIENT)
Age: 80
End: 2025-01-24
Payer: MEDICARE

## 2025-01-24 NOTE — TELEPHONE ENCOUNTER
Caller: Mariela Ruiz    Relationship to patient: Self    Best call back number: [phone]    Chief complaint: EDEMA IN LEGS    Type of visit: F/U    Requested date: ASAP      Additional notes:PT STATES ITS BEEN A LONG TIME SINCE SHE HAS SEEN DR. GREY, HE'S BEEN HELPING GET HER REFERRED OUT TO OTHER DR'S HOWEVER SHE IS NOW WANTING TO GET IN AND SEE HIM. PLEASE GIVE PT A CALL BACK TO DISCUSS SCHEDULING. THANK YOU

## 2025-01-24 NOTE — TELEPHONE ENCOUNTER
Ms. Ruiz called requesting to return to [facility].  She had spoken with Valery there and was told needed to be referred.  Patient was just there Sept 2024.  Spoke with Sultana Yousif NP and she referred back over to [facility] for wraps. Let patient and Valery know.

## 2025-01-24 NOTE — TELEPHONE ENCOUNTER
Spoke to pt and she reports swelling in both legs from knees to ankles, redness from calf down to ankles, pt was seen at urgent care and ER, pt reports symptoms have not improved with treatment, pt reports she has not seen lymphedema clinic since sometime in September, pt scheduled for f/u appt with  on 2/13 at 12:30, pt verbalized understanding.

## 2025-02-13 ENCOUNTER — OFFICE VISIT (OUTPATIENT)
Dept: ENDOCRINOLOGY | Age: 80
End: 2025-02-13
Payer: MEDICARE

## 2025-02-13 ENCOUNTER — OFFICE VISIT (OUTPATIENT)
Age: 80
End: 2025-02-13
Payer: MEDICARE

## 2025-02-13 ENCOUNTER — TELEPHONE (OUTPATIENT)
Dept: ENDOCRINOLOGY | Age: 80
End: 2025-02-13

## 2025-02-13 VITALS
WEIGHT: 199.8 LBS | DIASTOLIC BLOOD PRESSURE: 70 MMHG | SYSTOLIC BLOOD PRESSURE: 128 MMHG | BODY MASS INDEX: 31.29 KG/M2 | OXYGEN SATURATION: 98 % | TEMPERATURE: 98.1 F | HEART RATE: 102 BPM

## 2025-02-13 VITALS
DIASTOLIC BLOOD PRESSURE: 70 MMHG | WEIGHT: 199.9 LBS | SYSTOLIC BLOOD PRESSURE: 128 MMHG | BODY MASS INDEX: 31.37 KG/M2 | HEIGHT: 67 IN

## 2025-02-13 DIAGNOSIS — I87.2 CHRONIC VENOUS INSUFFICIENCY: Primary | ICD-10-CM

## 2025-02-13 DIAGNOSIS — M19.90 OSTEOARTHRITIS, UNSPECIFIED OSTEOARTHRITIS TYPE, UNSPECIFIED SITE: ICD-10-CM

## 2025-02-13 DIAGNOSIS — R79.0 ABNORMAL BLOOD LEVEL OF CHROMIUM: ICD-10-CM

## 2025-02-13 DIAGNOSIS — R79.0 ABNORMAL BLOOD LEVEL OF COBALT: ICD-10-CM

## 2025-02-13 DIAGNOSIS — M72.0 DUPUYTREN'S CONTRACTURE: ICD-10-CM

## 2025-02-13 DIAGNOSIS — I87.2 CHRONIC VENOUS INSUFFICIENCY: ICD-10-CM

## 2025-02-13 DIAGNOSIS — D51.0 PERNICIOUS ANEMIA: ICD-10-CM

## 2025-02-13 DIAGNOSIS — I87.323 CHRONIC VENOUS HYPERTENSION WITH INFLAMMATION INVOLVING BOTH SIDES: ICD-10-CM

## 2025-02-13 DIAGNOSIS — E53.8 VITAMIN B12 DEFICIENCY: ICD-10-CM

## 2025-02-13 DIAGNOSIS — I87.2 VENOUS (PERIPHERAL) INSUFFICIENCY: ICD-10-CM

## 2025-02-13 DIAGNOSIS — M81.0 AGE-RELATED OSTEOPOROSIS WITHOUT CURRENT PATHOLOGICAL FRACTURE: ICD-10-CM

## 2025-02-13 DIAGNOSIS — I87.8 VENOUS STASIS: ICD-10-CM

## 2025-02-13 DIAGNOSIS — E89.0 POSTSURGICAL HYPOTHYROIDISM: Primary | ICD-10-CM

## 2025-02-13 DIAGNOSIS — I89.0 LYMPHEDEMA: ICD-10-CM

## 2025-02-13 DIAGNOSIS — E78.5 HYPERLIPIDEMIA, UNSPECIFIED HYPERLIPIDEMIA TYPE: ICD-10-CM

## 2025-02-13 RX ORDER — MAGNESIUM 200 MG
TABLET ORAL
Qty: 90 TABLET | Refills: 2 | Status: SHIPPED | OUTPATIENT
Start: 2025-02-13

## 2025-02-13 RX ORDER — PENICILLIN V POTASSIUM 500 MG/1
TABLET, FILM COATED ORAL
COMMUNITY
Start: 2025-01-29 | End: 2025-02-13

## 2025-02-13 NOTE — PROGRESS NOTES
Patient Name: Mariela Ruiz    MRN: [id number] Encounter Date: 02/13/2025      Consulting Service: Vascular Surgery    Referring Provider: No ref. provider found       CHIEF COMPLAINT:  Chief Complaint   Patient presents with    Edema       Subjective    HPI: Mariela Ruiz is a 80 y.o. female is being seen for evaluation/management of complaints of lower extremity edema of bilateral lower extremity.   Symptoms include Richardson symptoms: Edema/Swelling.  Patient describes the discomfort from the veins as affecting their daily life.   Patient has negative family history of the varicose veins and negative family history of DVT.  At this point time attempts at symptomatic control using elevation, compression and nonsteroidals have been been attempted.  Prior prior venous interventions  include  none .    PAST MEDICAL HISTORY:   Past Medical History:   Diagnosis Date    Arthritis     Chronic venous insufficiency     Dupuytren's contracture     History of staph infection     S/P KNEE DEC 2016    History of transfusion     Hyperlipidemia     Lymphedema     LEFT LEG    Nontoxic multinodular goiter     Osteoporosis     Dr. Cabrera    Pelvic joint pain, left     Postsurgical hypothyroidism     Presence of left artificial hip joint     METAL ON METAL AS NOTED PER DR CLEMENT NOTE    Restless leg syndrome     Right bundle branch block     Seizure disorder     Dr. Whitman, HX 1980 LAST SEIZURE    Sleep apnea     DOES NOT HAVE MACHINE    Vitamin D insufficiency       PAST SURGICAL HISTORY:   Past Surgical History:   Procedure Laterality Date    APPENDECTOMY      CARDIAC CATHETERIZATION      NORMAL     CHOLECYSTECTOMY N/A 11/2/2024    Procedure: CHOLECYSTECTOMY LAPAROSCOPIC WITH DAVINCI ROBOT with cholangiogram, possible open;  Surgeon: Bin Heaton MD;  Location: [facility];  Service: Robotics - DaVinci;  Laterality: N/A;    COLONOSCOPY  08/17/2017    Normal except for anal fissure.  Dr. Brandt.  [facility].    KNEE MINI  REVISION Left 11/15/2016    Procedure: LEFT KNEE POLY CHANGE WITH HI POST STABILIZER;  Surgeon: Pablito Claudio MD;  Location: [facility] MAIN OR;  Service:     KNEE MINI REVISION Left 12/13/2016    Procedure: LT KNEE REMOVAL/REPLACEMENT LOCKING PIN ;  Surgeon: Pablito Claudio MD;  Location: [facility] MAIN OR;  Service:     MAMMO FINE NEEDLE ASPIRATION UNILATERAL      RIGHT THYROID NODULE, 2009 BENIGN     NC ARTHRP KNE CONDYLE&PLATU MEDIAL&LAT COMPARTMENTS Left 12/24/2016    Procedure: LEFT KNEE WASHOUT WITH POLY CHANGE;  Surgeon: Christiano Cesar MD;  Location: [facility] MAIN OR;  Service: Orthopedics    NC REVJ TOTAL KNEE ARTHRP W/WO ALGRFT 1 COMPONENT Left 2/20/2017    Procedure: LT KNEE REMOVAL ANTIBIOTIC SPACER AND KNEE REVISION;  Surgeon: Christiano Cesar MD;  Location: [facility] MAIN OR;  Service: Orthopedics    REPLACEMENT TOTAL KNEE Left     THYROIDECTOMY, PARTIAL      1979 LEFT LOBECTOMY AND ISTHMECTOMY     TOTAL ABDOMINAL HYSTERECTOMY WITH SALPINGO OOPHORECTOMY      TOTAL HIP ARTHROPLASTY Left     TOTAL HIP ARTHROPLASTY Right 9/14/2019    Procedure: TOTAL HIP ARTHROPLASTY ANTERIOR WITH HANA TABLE;  Surgeon: Christiano Cesar II, MD;  Location: [facility] MAIN OR;  Service: Orthopedics    TOTAL HIP ARTHROPLASTY REVISION Left 3/9/2021    Procedure: LEFT TOTAL HIP ARTHROPLASTY REVISION POSTERIOR;  Surgeon: Christiano Cesar II, MD;  Location: [facility] MAIN OR;  Service: Orthopedics;  Laterality: Left;    TOTAL KNEE  PROSTHESIS REMOVAL W/ SPACER INSERTION Left 12/29/2016    Procedure: LT KNEE IMPLANT REMOVAL WITH INSERTION OF SPACER ;  Surgeon: Christiano Cesar MD;  Location: [facility] MAIN OR;  Service:       FAMILY HISTORY:   Family History   Problem Relation Age of Onset    Heart disease Father     Diabetes Sister     Hypertension Sister     Hyperlipidemia Sister     Obesity Sister     Malig Hyperthermia Neg Hx       SOCIAL HISTORY:   Social History     Tobacco Use    Smoking status: Never     Passive exposure:  Never    Smokeless tobacco: Never   Vaping Use    Vaping status: Never Used   Substance Use Topics    Alcohol use: No    Drug use: No      MEDICATIONS:   Current Outpatient Medications on File Prior to Visit   Medication Sig Dispense Refill    aspirin 81 MG EC tablet Take 1 tablet by mouth Daily.      cholecalciferol (VITAMIN D3) 1000 units tablet Take 1 tablet by mouth Daily. (Patient taking differently: Take 4 tablets by mouth Daily.)      Cyanocobalamin (Vitamin B-12) 1000 MCG sublingual tablet 1 tablet daily 90 tablet 2    pramipexole (MIRAPEX) 0.5 MG tablet TAKE 5 TABLETS BY MOUTH ONCE DAILY 450 tablet 1    pravastatin (PRAVACHOL) 40 MG tablet TAKE 1 TABLET BY MOUTH EVERY NIGHT FOR HIGH AMOUNT OF FATS IN THE BLOOD 90 tablet 2    Synthroid 50 MCG tablet Take 1 tablet by mouth Every Morning. Indications: Underactive Thyroid 90 tablet 2    PHENobarbital 64.8 MG tablet Take 2 tablets by mouth Every Night for 7 days. 14 tablet 0    PHENobarbital 64.8 MG tablet Take 1 tablet by mouth Daily for 7 days. 7 tablet 0    [DISCONTINUED] acetaminophen (TYLENOL) 500 MG tablet Take 2 tablets by mouth Every 8 (Eight) Hours As Needed for Mild Pain or Moderate Pain.      [DISCONTINUED] alendronate (Fosamax) 70 MG tablet Take 1 tablet by mouth Every 7 (Seven) Days. Taken on the stomach with 1 glass of water.  No food until 1/2-hour later. 4 tablet 11    [DISCONTINUED] Cyanocobalamin (Vitamin B-12) 1000 MCG sublingual tablet 1 tablet daily 90 tablet 2    [DISCONTINUED] doxycycline (MONODOX) 100 MG capsule Take 1 capsule by mouth 2 (Two) Times a Day. 6 capsule 0    [DISCONTINUED] furosemide (LASIX) 20 MG tablet Take 1 tablet by mouth Daily. 14 tablet 0    [DISCONTINUED] penicillin v potassium (VEETID) 500 MG tablet take 1 tablet by mouth three times daily until gone      [DISCONTINUED] polyethylene glycol 17 g packet Take 17 g by mouth Daily As Needed (Constipation).      [DISCONTINUED] sennosides-docusate (PERICOLACE) 8.6-50 MG per  "tablet Take 2 tablets by mouth 2 (Two) Times a Day As Needed for Constipation.       No current facility-administered medications on file prior to visit.       ALLERGIES: Clindamycin/lincomycin, Duricef [cefadroxil], and Sulfa antibiotics       Objective   Vitals:    02/13/25 1142   BP: 128/70   Weight: 90.7 kg (199 lb 14.4 oz)   Height: 170.2 cm (67\")     Body mass index is 31.31 kg/m².  BMI is >= 30 and <35. (Class 1 Obesity). The following options were offered after discussion;: weight loss educational material (shared in after visit summary), exercise counseling/recommendations, and Information on healthy weight added to patient's after visit summary.      PHYSICAL EXAM:   Physical Exam  Constitutional:       Appearance: Normal appearance.   HENT:      Head: Normocephalic and atraumatic.      Nose: Nose normal.   Eyes:      Extraocular Movements: Extraocular movements intact.      Pupils: Pupils are equal, round, and reactive to light.   Cardiovascular:      Rate and Rhythm: Normal rate.      Pulses: Normal pulses.      Heart sounds: Normal heart sounds.      Comments: Severe lower extremity swelling left slightly worse than right with stasis injury to the shins when this compression is off.  With compression on is very well-controlled.  She is on small scabs likely from microtrauma and placing her stockings to the knees.  No cellulitis.  Pulmonary:      Effort: Pulmonary effort is normal.      Breath sounds: Normal breath sounds.   Abdominal:      General: Abdomen is flat. Bowel sounds are normal.      Palpations: Abdomen is soft.   Musculoskeletal:         General: Normal range of motion.      Cervical back: Normal range of motion and neck supple.      Right lower leg: 3+ Edema present.      Left lower leg: 3+ Edema present.   Skin:     General: Skin is warm and dry.   Neurological:      General: No focal deficit present.      Mental Status: She is alert and oriented to person, place, and time. Mental status " is at baseline.   Psychiatric:         Mood and Affect: Mood normal.         Thought Content: Thought content normal.          Result Review   LABS:    CBC          11/6/2024    05:43 11/22/2024    16:19 12/30/2024    01:38   CBC   WBC 5.15  5.6     4.58    RBC 3.54  4.1     3.65    Hemoglobin 10.5  12.4     11.0    Hematocrit 31.5  38.2     33.0    MCV 89.0  92.2     90.4    MCH 29.7  29.9     30.1    MCHC 33.3  32.4     33.3    RDW 11.8  14.8     12.4    Platelets 271  299     235       Details          This result is from an external source.             BMP          11/6/2024    05:42 11/22/2024    16:19 12/30/2024    01:38   BMP   BUN 16  14     16    Creatinine 0.86  0.94     0.81    Sodium 140  143     138    Potassium 3.6  4.3     4.1    Chloride 104   106    CO2 25.2  24     24.0    Calcium 9.4  9.7     9.0       Details          This result is from an external source.             Lipid Panel          7/3/2024    14:41   Lipid Panel   Total Cholesterol 187    Triglycerides 65    HDL Cholesterol 93    VLDL Cholesterol 12    LDL Cholesterol  82               Results Review:       I reviewed the patient's new clinical results.    The following radiologic or non-invasive studies have been reviewed by me: No recent venous scan available.  CAT scan negative for malignancy in October 2024.  I reviewed the CAT scan report and images.  Adenopathy noted.  No results found for this or any previous visit from the past 365 days.     No radiology results for the last 30 days.                ASSESSMENT/PLAN:   Diagnoses and all orders for this visit:    1. Chronic venous insufficiency (Primary)  -     Duplex Venous Lower Extremity - Bilateral CAR; Future    2. Lymphedema  -     Duplex Venous Lower Extremity - Bilateral CAR; Future    3. Venous (peripheral) insufficiency    4. Venous stasis    5. Chronic venous hypertension with inflammation involving both sides       80 y.o. female with likely primary lymphedema has no  secondary source has been seen but I do think we need to repeat her venous scan as she has had a recent worsening of the swelling despite the use of compression.  She is in treatment by lymphedema therapist at [facility] and they are getting things back under control and she is still wearing her custom made stockings.  She will continue to do so and I went over with her the need to control this on a daily basis.  Overall she is actually doing a pretty good job but we will check her vein scan to make sure there is nothing new happening.  CT scan from October is negative for any malignancy so I think that rules out that problem.    I discussed the plan with the patient who is agreeable to the plan of care at this point. Thank you for this consult.   Follow Up  Return in about 3 months (around 5/13/2025).    Obey Richardson MD   02/13/25

## 2025-02-13 NOTE — LETTER
February 13, 2025     Nereyda Abdalla MD  [address]  [facility] [address]    Patient: Mariela Ruiz   YOB: 1945   Date of Visit: 2/13/2025     Dear Nereyda Abdalla MD:       Thank you for referring Mariela Ruiz to me for evaluation. Below are the relevant portions of my assessment and plan of care.    If you have questions, please do not hesitate to call me. I look forward to following Mariela along with you.         Sincerely,        Duglas Rock MD        CC: MD Amador Jeffery II, DO Thomas Matthew Gabriel, MD Yson, Duglas MENDENHALL MD  02/13/25 0937  Sign when Signing Visit  Subjective  Mariela Ruiz is a 80 y.o. female.     History of Present Illness     She has hypothyroidism and is on Synthroid 50 mcg/day.  She had a previous partial thyroidectomy for benign disease.     She has hyperlipidemia and is on pravastatin 40 mg/day.  She denies myalgia.  Her last meal was last night.     She has sleep apnea and is unable to tolerate a CPAP.  She has not made a follow-up with a sleep specialist.  She wakes up rested.     She has history of seizure disorder and is on phenobarbital.  She denies any recent seizures.    She has restless leg syndrome which is improved with Mirapex.  She follows with Dr. Steven Liu.     She has degenerative joint disease.  She is no longer having hip or knee joint pain.  She had revision left total hip arthroplasty done by Dr. Ed Chun in March 2021     Serum chromium normalized after she had left hip replacement.  Serum cobalt is coming down.     She has Dupuytren contracture on both hands and has not followed up with Dr. Mota.  She had previous surgery on the right hand done by Dr. Thayer.     She has osteoporosis on bone density done at [place] by her gynecologist Dr. Cabrera in 2017.  She has not followed up with her gynecologist.       Bone density done at [facility] in April 2024 showed osteoporosis.  She  is on vitamin D3 4000 units/day.  She does not want to use the prescription drug for osteoporosis in the past because of fear of side effect..  She denies heartburn.  She denies alcohol or tobacco use.     She does not have a personal history of diabetes mellitus.  Her sister has diabetes mellitus.     She has chronic venous insufficiency and is being followed at the lymphedema clinic.  She will follow-up with Dr. Richardson today     She has history of vitamin B12 deficiency with elevated intrinsic factor antibodies.  She never started on SL vitamin B12 supplements.     She does not have a PCP.    The following portions of the patient's history were reviewed and updated as appropriate: allergies, current medications, past family history, past medical history, past social history, past surgical history, and problem list.    Review of Systems   HENT: Negative.     Eyes:  Negative for visual disturbance.   Respiratory:  Negative for shortness of breath and wheezing.    Cardiovascular:  Positive for leg swelling. Negative for chest pain and palpitations.   Gastrointestinal: Negative.    Genitourinary: Negative.    Musculoskeletal:  Negative for myalgias.     Vitals:    02/13/25 0812   BP: 128/70   Pulse: 102   Temp: 98.1 °F (36.7 °C)   SpO2: 98%   Weight: 90.6 kg (199 lb 12.8 oz)      Objective  Physical Exam  Constitutional:       General: She is not in acute distress.     Appearance: Normal appearance. She is not ill-appearing, toxic-appearing or diaphoretic.   Eyes:      General: No scleral icterus.        Right eye: No discharge.         Left eye: No discharge.      Extraocular Movements: Extraocular movements intact.   Neck:      Vascular: No carotid bruit.   Cardiovascular:      Rate and Rhythm: Normal rate and regular rhythm.      Heart sounds: Normal heart sounds. No murmur heard.     No gallop.   Pulmonary:      Breath sounds: No rales.   Chest:      Chest wall: No tenderness.   Abdominal:      General: Bowel sounds  are normal.      Palpations: Abdomen is soft.      Tenderness: There is no abdominal tenderness.   Musculoskeletal:      Right lower leg: Edema present.      Left lower leg: Edema present.      Comments: Compression stockings on both legs.  Dupuytren contracture on both hands worse on the left   Lymphadenopathy:      Cervical: No cervical adenopathy.   Neurological:      Mental Status: She is alert and oriented to person, place, and time.       Admission on 12/30/2024, Discharged on 12/30/2024   Component Date Value Ref Range Status   • Glucose 12/30/2024 96  65 - 99 mg/dL Final   • BUN 12/30/2024 16  8 - 23 mg/dL Final   • Creatinine 12/30/2024 0.81  0.57 - 1.00 mg/dL Final   • Sodium 12/30/2024 138  136 - 145 mmol/L Final   • Potassium 12/30/2024 4.1  3.5 - 5.2 mmol/L Final   • Chloride 12/30/2024 106  98 - 107 mmol/L Final   • CO2 12/30/2024 24.0  22.0 - 29.0 mmol/L Final   • Calcium 12/30/2024 9.0  8.6 - 10.5 mg/dL Final   • Total Protein 12/30/2024 5.9 (L)  6.0 - 8.5 g/dL Final   • Albumin 12/30/2024 3.4 (L)  3.5 - 5.2 g/dL Final   • ALT (SGPT) 12/30/2024 12  1 - 33 U/L Final   • AST (SGOT) 12/30/2024 17  1 - 32 U/L Final   • Alkaline Phosphatase 12/30/2024 132 (H)  39 - 117 U/L Final   • Total Bilirubin 12/30/2024 0.2  0.0 - 1.2 mg/dL Final   • Globulin 12/30/2024 2.5  gm/dL Final   • A/G Ratio 12/30/2024 1.4  g/dL Final   • BUN/Creatinine Ratio 12/30/2024 19.8  7.0 - 25.0 Final   • Anion Gap 12/30/2024 8.0  5.0 - 15.0 mmol/L Final   • eGFR 12/30/2024 74.9  >60.0 mL/min/1.73 Final   • WBC 12/30/2024 4.58  3.40 - 10.80 10*3/mm3 Final   • RBC 12/30/2024 3.65 (L)  3.77 - 5.28 10*6/mm3 Final   • Hemoglobin 12/30/2024 11.0 (L)  12.0 - 15.9 g/dL Final   • Hematocrit 12/30/2024 33.0 (L)  34.0 - 46.6 % Final   • MCV 12/30/2024 90.4  79.0 - 97.0 fL Final   • MCH 12/30/2024 30.1  26.6 - 33.0 pg Final   • MCHC 12/30/2024 33.3  31.5 - 35.7 g/dL Final   • RDW 12/30/2024 12.4  12.3 - 15.4 % Final   • RDW-SD 12/30/2024 41.0   37.0 - 54.0 fl Final   • MPV 12/30/2024 10.1  6.0 - 12.0 fL Final   • Platelets 12/30/2024 235  140 - 450 10*3/mm3 Final   • Neutrophil % 12/30/2024 52.3  42.7 - 76.0 % Final   • Lymphocyte % 12/30/2024 35.2  19.6 - 45.3 % Final   • Monocyte % 12/30/2024 11.4  5.0 - 12.0 % Final   • Eosinophil % 12/30/2024 0.0 (L)  0.3 - 6.2 % Final   • Basophil % 12/30/2024 0.9  0.0 - 1.5 % Final   • Immature Grans % 12/30/2024 0.2  0.0 - 0.5 % Final   • Neutrophils, Absolute 12/30/2024 2.40  1.70 - 7.00 10*3/mm3 Final   • Lymphocytes, Absolute 12/30/2024 1.61  0.70 - 3.10 10*3/mm3 Final   • Monocytes, Absolute 12/30/2024 0.52  0.10 - 0.90 10*3/mm3 Final   • Eosinophils, Absolute 12/30/2024 0.00  0.00 - 0.40 10*3/mm3 Final   • Basophils, Absolute 12/30/2024 0.04  0.00 - 0.20 10*3/mm3 Final   • Immature Grans, Absolute 12/30/2024 0.01  0.00 - 0.05 10*3/mm3 Final   • nRBC 12/30/2024 0.0  0.0 - 0.2 /100 WBC Final   • proBNP 12/30/2024 430.0  0.0 - 1,800.0 pg/mL Final     Assessment & Plan  Diagnoses and all orders for this visit:    1. Postsurgical hypothyroidism (Primary)  -     TSH Rfx On Abnormal To Free T4    2. Hyperlipidemia, unspecified hyperlipidemia type  -     Lipid Panel    3. Osteoarthritis, unspecified osteoarthritis type, unspecified site    4. Dupuytren's contracture    5. Age-related osteoporosis without current pathological fracture  -     Comprehensive Metabolic Panel  -     Vitamin D,25-Hydroxy    6. Vitamin B12 deficiency  -     CBC & Differential  -     Vitamin B12    7. Pernicious anemia  -     CBC & Differential  -     Vitamin B12    8. Chronic venous insufficiency  -     Comprehensive Metabolic Panel    9. Abnormal blood level of chromium  -     Chromium Level    10. Abnormal blood level of cobalt  -     Cobalt      Continue Synthroid 50 mcg/day.    Continue pravastatin 40 mg/day.    Advised to see a sleep specialist.  Treating sleep apnea may partially help the leg edema.    Follow-up with Dr. Steven Liu as  scheduled.    Patient had elevated serum chromium and cobalt most likely due to previously worn out left hip prosthesis which has been changed.  Serum chromium has normalized.  Recheck serum cobalt.    Patient advised to follow-up with Dr. Mota with regards to the Dupuytren contracture.    Patient has known osteoporosis and did not fill the prescription for Fosamax due to fear of side effects.  Printed information on osteoporosis prevention and treatment was given to the patient.  Repeat bone density in April 2026.    Follow-up with Dr. Richardson with regards to the chronic venous insufficiency.    Patient has untreated pernicious anemia.  Check vitamin B12 levels.  Prescription for sublingual vitamin B12 was sent to the pharmacy.  Rationale for treatment was discussed with patient.    Copy of my note sent to Dr. Abdalla, Dr. Richardson, Dr. Steven Liu and Dr. Nehemiah Mota.    RTC 6 mos.

## 2025-02-13 NOTE — TELEPHONE ENCOUNTER
She has to use a sublingual preparation because oral vitamin B 12 may be poorly absorbed in her small bowel.  If sublingual preparation is not affordable, she will have to get vitamin B12 intramuscularly.

## 2025-02-13 NOTE — PROGRESS NOTES
Subjective   Mariela Ruiz is a 80 y.o. female.     History of Present Illness     She has hypothyroidism and is on Synthroid 50 mcg/day.  She had a previous partial thyroidectomy for benign disease.     She has hyperlipidemia and is on pravastatin 40 mg/day.  She denies myalgia.  Her last meal was last night.     She has sleep apnea and is unable to tolerate a CPAP.  She has not made a follow-up with a sleep specialist.  She wakes up rested.     She has history of seizure disorder and is on phenobarbital.  She denies any recent seizures.    She has restless leg syndrome which is improved with Mirapex.  She follows with Dr. Steven Liu.     She has degenerative joint disease.  She is no longer having hip or knee joint pain.  She had revision left total hip arthroplasty done by Dr. Ed Chun in March 2021     Serum chromium normalized after she had left hip replacement.  Serum cobalt is coming down.     She has Dupuytren contracture on both hands and has not followed up with Dr. Mota.  She had previous surgery on the right hand done by Dr. Thayer.     She has osteoporosis on bone density done at [facility] by her gynecologist Dr. Cabrera in 2017.  She has not followed up with her gynecologist.       Bone density done at [facility] in April 2024 showed osteoporosis.  She is on vitamin D3 4000 units/day.  She does not want to use the prescription drug for osteoporosis in the past because of fear of side effect..  She denies heartburn.  She denies alcohol or tobacco use.     She does not have a personal history of diabetes mellitus.  Her sister has diabetes mellitus.     She has chronic venous insufficiency and is being followed at the lymphedema clinic.  She will follow-up with Dr. Richardson today     She has history of vitamin B12 deficiency with elevated intrinsic factor antibodies.  She never started on SL vitamin B12 supplements.     She does not have a PCP.    The following portions of the patient's  history were reviewed and updated as appropriate: allergies, current medications, past family history, past medical history, past social history, past surgical history, and problem list.    Review of Systems   HENT: Negative.     Eyes:  Negative for visual disturbance.   Respiratory:  Negative for shortness of breath and wheezing.    Cardiovascular:  Positive for leg swelling. Negative for chest pain and palpitations.   Gastrointestinal: Negative.    Genitourinary: Negative.    Musculoskeletal:  Negative for myalgias.     Vitals:    02/13/25 0812   BP: 128/70   Pulse: 102   Temp: 98.1 °F (36.7 °C)   SpO2: 98%   Weight: 90.6 kg (199 lb 12.8 oz)      Objective   Physical Exam  Constitutional:       General: She is not in acute distress.     Appearance: Normal appearance. She is not ill-appearing, toxic-appearing or diaphoretic.   Eyes:      General: No scleral icterus.        Right eye: No discharge.         Left eye: No discharge.      Extraocular Movements: Extraocular movements intact.   Neck:      Vascular: No carotid bruit.   Cardiovascular:      Rate and Rhythm: Normal rate and regular rhythm.      Heart sounds: Normal heart sounds. No murmur heard.     No gallop.   Pulmonary:      Breath sounds: No rales.   Chest:      Chest wall: No tenderness.   Abdominal:      General: Bowel sounds are normal.      Palpations: Abdomen is soft.      Tenderness: There is no abdominal tenderness.   Musculoskeletal:      Right lower leg: Edema present.      Left lower leg: Edema present.      Comments: Compression stockings on both legs.  Dupuytren contracture on both hands worse on the left   Lymphadenopathy:      Cervical: No cervical adenopathy.   Neurological:      Mental Status: She is alert and oriented to person, place, and time.       Admission on 12/30/2024, Discharged on 12/30/2024   Component Date Value Ref Range Status    Glucose 12/30/2024 96  65 - 99 mg/dL Final    BUN 12/30/2024 16  8 - 23 mg/dL Final     Creatinine 12/30/2024 0.81  0.57 - 1.00 mg/dL Final    Sodium 12/30/2024 138  136 - 145 mmol/L Final    Potassium 12/30/2024 4.1  3.5 - 5.2 mmol/L Final    Chloride 12/30/2024 106  98 - 107 mmol/L Final    CO2 12/30/2024 24.0  22.0 - 29.0 mmol/L Final    Calcium 12/30/2024 9.0  8.6 - 10.5 mg/dL Final    Total Protein 12/30/2024 5.9 (L)  6.0 - 8.5 g/dL Final    Albumin 12/30/2024 3.4 (L)  3.5 - 5.2 g/dL Final    ALT (SGPT) 12/30/2024 12  1 - 33 U/L Final    AST (SGOT) 12/30/2024 17  1 - 32 U/L Final    Alkaline Phosphatase 12/30/2024 132 (H)  39 - 117 U/L Final    Total Bilirubin 12/30/2024 0.2  0.0 - 1.2 mg/dL Final    Globulin 12/30/2024 2.5  gm/dL Final    A/G Ratio 12/30/2024 1.4  g/dL Final    BUN/Creatinine Ratio 12/30/2024 19.8  7.0 - 25.0 Final    Anion Gap 12/30/2024 8.0  5.0 - 15.0 mmol/L Final    eGFR 12/30/2024 74.9  >60.0 mL/min/1.73 Final    WBC 12/30/2024 4.58  3.40 - 10.80 10*3/mm3 Final    RBC 12/30/2024 3.65 (L)  3.77 - 5.28 10*6/mm3 Final    Hemoglobin 12/30/2024 11.0 (L)  12.0 - 15.9 g/dL Final    Hematocrit 12/30/2024 33.0 (L)  34.0 - 46.6 % Final    MCV 12/30/2024 90.4  79.0 - 97.0 fL Final    MCH 12/30/2024 30.1  26.6 - 33.0 pg Final    MCHC 12/30/2024 33.3  31.5 - 35.7 g/dL Final    RDW 12/30/2024 12.4  12.3 - 15.4 % Final    RDW-SD 12/30/2024 41.0  37.0 - 54.0 fl Final    MPV 12/30/2024 10.1  6.0 - 12.0 fL Final    Platelets 12/30/2024 235  140 - 450 10*3/mm3 Final    Neutrophil % 12/30/2024 52.3  42.7 - 76.0 % Final    Lymphocyte % 12/30/2024 35.2  19.6 - 45.3 % Final    Monocyte % 12/30/2024 11.4  5.0 - 12.0 % Final    Eosinophil % 12/30/2024 0.0 (L)  0.3 - 6.2 % Final    Basophil % 12/30/2024 0.9  0.0 - 1.5 % Final    Immature Grans % 12/30/2024 0.2  0.0 - 0.5 % Final    Neutrophils, Absolute 12/30/2024 2.40  1.70 - 7.00 10*3/mm3 Final    Lymphocytes, Absolute 12/30/2024 1.61  0.70 - 3.10 10*3/mm3 Final    Monocytes, Absolute 12/30/2024 0.52  0.10 - 0.90 10*3/mm3 Final    Eosinophils,  Absolute 12/30/2024 0.00  0.00 - 0.40 10*3/mm3 Final    Basophils, Absolute 12/30/2024 0.04  0.00 - 0.20 10*3/mm3 Final    Immature Grans, Absolute 12/30/2024 0.01  0.00 - 0.05 10*3/mm3 Final    nRBC 12/30/2024 0.0  0.0 - 0.2 /100 WBC Final    proBNP 12/30/2024 430.0  0.0 - 1,800.0 pg/mL Final     Assessment & Plan   Diagnoses and all orders for this visit:    1. Postsurgical hypothyroidism (Primary)  -     TSH Rfx On Abnormal To Free T4    2. Hyperlipidemia, unspecified hyperlipidemia type  -     Lipid Panel    3. Osteoarthritis, unspecified osteoarthritis type, unspecified site    4. Dupuytren's contracture    5. Age-related osteoporosis without current pathological fracture  -     Comprehensive Metabolic Panel  -     Vitamin D,25-Hydroxy    6. Vitamin B12 deficiency  -     CBC & Differential  -     Vitamin B12    7. Pernicious anemia  -     CBC & Differential  -     Vitamin B12    8. Chronic venous insufficiency  -     Comprehensive Metabolic Panel    9. Abnormal blood level of chromium  -     Chromium Level    10. Abnormal blood level of cobalt  -     Cobalt      Continue Synthroid 50 mcg/day.    Continue pravastatin 40 mg/day.    Advised to see a sleep specialist.  Treating sleep apnea may partially help the leg edema.    Follow-up with Dr. Steven Liu as scheduled.    Patient had elevated serum chromium and cobalt most likely due to previously worn out left hip prosthesis which has been changed.  Serum chromium has normalized.  Recheck serum cobalt.    Patient advised to follow-up with Dr. Mota with regards to the Dupuytren contracture.    Patient has known osteoporosis and did not fill the prescription for Fosamax due to fear of side effects.  Printed information on osteoporosis prevention and treatment was given to the patient.  Repeat bone density in April 2026.    Follow-up with Dr. Richardson with regards to the chronic venous insufficiency.    Patient has untreated pernicious anemia.  Check vitamin B12  levels.  Prescription for sublingual vitamin B12 was sent to the pharmacy.  Rationale for treatment was discussed with patient.    Copy of my note sent to Dr. Abdalla, Dr. Richardson, Dr. Steven Liu and Dr. Nehemiah Mota.    RTC 6 mos.

## 2025-02-13 NOTE — TELEPHONE ENCOUNTER
Caller: Mariela Ruiz    Relationship to patient: Self    Best call back number: [phone]     Patient is needing: PT IS HAVING TROUBLE FILLING B-12 RX WITH PHARMACY, THEY ARE HESITANT TO FILL RX DUE TO IT NOT BEING COVERED BY HER INSURANCE DUE TO AN OTC ALTERNATIVE BEING AVAILABLE. PLEASE REVIEW AND ADVISE.

## 2025-02-14 ENCOUNTER — TELEPHONE (OUTPATIENT)
Dept: ENDOCRINOLOGY | Age: 80
End: 2025-02-14
Payer: MEDICARE

## 2025-02-25 LAB
25(OH)D3+25(OH)D2 SERPL-MCNC: 27.7 NG/ML (ref 30–100)
ALBUMIN SERPL-MCNC: 4.3 G/DL (ref 3.8–4.8)
ALP SERPL-CCNC: 146 IU/L (ref 44–121)
ALT SERPL-CCNC: 11 IU/L (ref 0–32)
AST SERPL-CCNC: 19 IU/L (ref 0–40)
BASOPHILS # BLD AUTO: 0 X10E3/UL (ref 0–0.2)
BASOPHILS NFR BLD AUTO: 1 %
BILIRUB SERPL-MCNC: 0.3 MG/DL (ref 0–1.2)
BUN SERPL-MCNC: 19 MG/DL (ref 8–27)
BUN/CREAT SERPL: 20 (ref 12–28)
CALCIUM SERPL-MCNC: 10 MG/DL (ref 8.7–10.3)
CHLORIDE SERPL-SCNC: 100 MMOL/L (ref 96–106)
CHOLEST SERPL-MCNC: 203 MG/DL (ref 100–199)
CO2 SERPL-SCNC: 28 MMOL/L (ref 20–29)
COBALT SERPL-MCNC: 2 UG/L (ref 0–0.9)
CR SERPL-MCNC: 1.6 UG/L (ref 0.1–2.1)
CREAT SERPL-MCNC: 0.94 MG/DL (ref 0.57–1)
EGFRCR SERPLBLD CKD-EPI 2021: 62 ML/MIN/1.73
EOSINOPHIL # BLD AUTO: 0 X10E3/UL (ref 0–0.4)
EOSINOPHIL NFR BLD AUTO: 0 %
ERYTHROCYTE [DISTWIDTH] IN BLOOD BY AUTOMATED COUNT: 12.4 % (ref 11.7–15.4)
GLOBULIN SER CALC-MCNC: 2.7 G/DL (ref 1.5–4.5)
GLUCOSE SERPL-MCNC: 92 MG/DL (ref 70–99)
HCT VFR BLD AUTO: 36.4 % (ref 34–46.6)
HDLC SERPL-MCNC: 97 MG/DL
HGB BLD-MCNC: 11.8 G/DL (ref 11.1–15.9)
IMM GRANULOCYTES # BLD AUTO: 0 X10E3/UL (ref 0–0.1)
IMM GRANULOCYTES NFR BLD AUTO: 0 %
IMP & REVIEW OF LAB RESULTS: NORMAL
LDLC SERPL CALC-MCNC: 92 MG/DL (ref 0–99)
LYMPHOCYTES # BLD AUTO: 1.5 X10E3/UL (ref 0.7–3.1)
LYMPHOCYTES NFR BLD AUTO: 32 %
MCH RBC QN AUTO: 29.5 PG (ref 26.6–33)
MCHC RBC AUTO-ENTMCNC: 32.4 G/DL (ref 31.5–35.7)
MCV RBC AUTO: 91 FL (ref 79–97)
MONOCYTES # BLD AUTO: 0.5 X10E3/UL (ref 0.1–0.9)
MONOCYTES NFR BLD AUTO: 10 %
NEUTROPHILS # BLD AUTO: 2.6 X10E3/UL (ref 1.4–7)
NEUTROPHILS NFR BLD AUTO: 57 %
PLATELET # BLD AUTO: 264 X10E3/UL (ref 150–450)
POTASSIUM SERPL-SCNC: 3.9 MMOL/L (ref 3.5–5.2)
PROT SERPL-MCNC: 7 G/DL (ref 6–8.5)
RBC # BLD AUTO: 4 X10E6/UL (ref 3.77–5.28)
SODIUM SERPL-SCNC: 140 MMOL/L (ref 134–144)
TRIGL SERPL-MCNC: 79 MG/DL (ref 0–149)
TSH SERPL DL<=0.005 MIU/L-ACNC: 1.61 UIU/ML (ref 0.45–4.5)
VIT B12 SERPL-MCNC: 246 PG/ML (ref 232–1245)
VLDLC SERPL CALC-MCNC: 14 MG/DL (ref 5–40)
WBC # BLD AUTO: 4.6 X10E3/UL (ref 3.4–10.8)

## 2025-02-26 ENCOUNTER — TELEPHONE (OUTPATIENT)
Dept: ENDOCRINOLOGY | Age: 80
End: 2025-02-26
Payer: MEDICARE

## 2025-02-26 NOTE — TELEPHONE ENCOUNTER
2/26 called and lm for pt to call reg her labs   Ok for hub to read to patient   Please schedule labs if needed or follow ups  Ok for  to read to patient   Please schedule labs if needed or follow ups       ----- Message from Duglas Rock sent at 2/25/2025  6:05 PM EST -----  LDL 92.  HDL 97.  Triglycerides 79.  Continue pravastatin 40 mg/day.  25-hydroxy vitamin D 27.7 ng/mL.  Take vitamin D3 4000 units daily regularly.  Jackson is elevated most likely due to worn-out prosthesis.  Normal chromium.  Normal thyroid function test.  Continue Synthroid 50 mcg a day.  Normal hemoglobin and hematocrit.  Normal vitamin B12.  Continue vitamin B12 1000 mcg under the tongue daily.

## 2025-03-04 ENCOUNTER — HOSPITAL ENCOUNTER (INPATIENT)
Facility: HOSPITAL | Age: 80
LOS: 9 days | Discharge: NURSING, REHAB OR LONG-TERM CARE | End: 2025-03-13
Attending: EMERGENCY MEDICINE | Admitting: STUDENT IN AN ORGANIZED HEALTH CARE EDUCATION/TRAINING PROGRAM
Payer: MEDICARE

## 2025-03-04 ENCOUNTER — APPOINTMENT (OUTPATIENT)
Dept: CT IMAGING | Facility: HOSPITAL | Age: 80
End: 2025-03-04
Payer: MEDICARE

## 2025-03-04 ENCOUNTER — APPOINTMENT (OUTPATIENT)
Dept: GENERAL RADIOLOGY | Facility: HOSPITAL | Age: 80
End: 2025-03-04
Payer: MEDICARE

## 2025-03-04 DIAGNOSIS — Z96.649 PERIPROSTHETIC FRACTURE OF HIP, INITIAL ENCOUNTER: Primary | ICD-10-CM

## 2025-03-04 DIAGNOSIS — N20.1 URETERAL STONE: ICD-10-CM

## 2025-03-04 DIAGNOSIS — E04.1 THYROID NODULE: ICD-10-CM
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
## 2025-03-04 DIAGNOSIS — R73.9 HYPERGLYCEMIA: ICD-10-CM

## 2025-03-04 DIAGNOSIS — D64.9 ANEMIA, UNSPECIFIED TYPE: ICD-10-CM

## 2025-03-04 DIAGNOSIS — M97.8XXA PERIPROSTHETIC FRACTURE OF HIP, INITIAL ENCOUNTER: Primary | ICD-10-CM

## 2025-03-04 PROBLEM — N13.30 HYDRONEPHROSIS: Status: ACTIVE | Noted: 2025-03-04

## 2025-03-04 LAB
ALBUMIN SERPL-MCNC: 4 G/DL (ref 3.5–5.2)
ALBUMIN/GLOB SERPL: 1.3 G/DL
ALP SERPL-CCNC: 154 U/L (ref 39–117)
ALT SERPL W P-5'-P-CCNC: 11 U/L (ref 1–33)
ANION GAP SERPL CALCULATED.3IONS-SCNC: 11.3 MMOL/L (ref 5–15)
AST SERPL-CCNC: 20 U/L (ref 1–32)
BASOPHILS # BLD AUTO: 0.01 10*3/MM3 (ref 0–0.2)
BASOPHILS NFR BLD AUTO: 0.1 % (ref 0–1.5)
BILIRUB SERPL-MCNC: 0.4 MG/DL (ref 0–1.2)
BILIRUB UR QL STRIP: NEGATIVE
BUN SERPL-MCNC: 15 MG/DL (ref 8–23)
BUN/CREAT SERPL: 20.5 (ref 7–25)
CALCIUM SPEC-SCNC: 9.8 MG/DL (ref 8.6–10.5)
CHLORIDE SERPL-SCNC: 104 MMOL/L (ref 98–107)
CLARITY UR: CLEAR
CO2 SERPL-SCNC: 24.7 MMOL/L (ref 22–29)
COLOR UR: YELLOW
CREAT SERPL-MCNC: 0.73 MG/DL (ref 0.57–1)
DEPRECATED RDW RBC AUTO: 38.9 FL (ref 37–54)
EGFRCR SERPLBLD CKD-EPI 2021: 84.3 ML/MIN/1.73
EOSINOPHIL # BLD AUTO: 0 10*3/MM3 (ref 0–0.4)
EOSINOPHIL NFR BLD AUTO: 0 % (ref 0.3–6.2)
ERYTHROCYTE [DISTWIDTH] IN BLOOD BY AUTOMATED COUNT: 12.1 % (ref 12.3–15.4)
GEN 5 1HR TROPONIN T REFLEX: 13 NG/L
GLOBULIN UR ELPH-MCNC: 3.2 GM/DL
GLUCOSE SERPL-MCNC: 112 MG/DL (ref 65–99)
GLUCOSE UR STRIP-MCNC: NEGATIVE MG/DL
HCT VFR BLD AUTO: 35.1 % (ref 34–46.6)
HGB BLD-MCNC: 11.5 G/DL (ref 12–15.9)
HGB UR QL STRIP.AUTO: NEGATIVE
IMM GRANULOCYTES # BLD AUTO: 0.02 10*3/MM3 (ref 0–0.05)
IMM GRANULOCYTES NFR BLD AUTO: 0.3 % (ref 0–0.5)
KETONES UR QL STRIP: ABNORMAL
LEUKOCYTE ESTERASE UR QL STRIP.AUTO: NEGATIVE
LYMPHOCYTES # BLD AUTO: 1.2 10*3/MM3 (ref 0.7–3.1)
LYMPHOCYTES NFR BLD AUTO: 16.3 % (ref 19.6–45.3)
MAGNESIUM SERPL-MCNC: 1.7 MG/DL (ref 1.6–2.4)
MAGNESIUM SERPL-MCNC: 1.8 MG/DL (ref 1.6–2.4)
MCH RBC QN AUTO: 29.2 PG (ref 26.6–33)
MCHC RBC AUTO-ENTMCNC: 32.8 G/DL (ref 31.5–35.7)
MCV RBC AUTO: 89.1 FL (ref 79–97)
MONOCYTES # BLD AUTO: 0.52 10*3/MM3 (ref 0.1–0.9)
MONOCYTES NFR BLD AUTO: 7.1 % (ref 5–12)
NEUTROPHILS NFR BLD AUTO: 5.6 10*3/MM3 (ref 1.7–7)
NEUTROPHILS NFR BLD AUTO: 76.2 % (ref 42.7–76)
NITRITE UR QL STRIP: NEGATIVE
NRBC BLD AUTO-RTO: 0 /100 WBC (ref 0–0.2)
NT-PROBNP SERPL-MCNC: 243 PG/ML (ref 0–1800)
PH UR STRIP.AUTO: 6.5 [PH] (ref 5–8)
PLATELET # BLD AUTO: 221 10*3/MM3 (ref 140–450)
PMV BLD AUTO: 10.1 FL (ref 6–12)
POTASSIUM SERPL-SCNC: 3.6 MMOL/L (ref 3.5–5.2)
POTASSIUM SERPL-SCNC: 4.6 MMOL/L (ref 3.5–5.2)
PROT SERPL-MCNC: 7.2 G/DL (ref 6–8.5)
PROT UR QL STRIP: NEGATIVE
RBC # BLD AUTO: 3.94 10*6/MM3 (ref 3.77–5.28)
SODIUM SERPL-SCNC: 140 MMOL/L (ref 136–145)
SP GR UR STRIP: 1.02 (ref 1–1.03)
TROPONIN T NUMERIC DELTA: 0 NG/L
TROPONIN T SERPL HS-MCNC: 12 NG/L
TROPONIN T SERPL HS-MCNC: 13 NG/L
UROBILINOGEN UR QL STRIP: ABNORMAL
WBC NRBC COR # BLD AUTO: 7.35 10*3/MM3 (ref 3.4–10.8)

## 2025-03-04 PROCEDURE — 93005 ELECTROCARDIOGRAM TRACING: CPT | Performed by: STUDENT IN AN ORGANIZED HEALTH CARE EDUCATION/TRAINING PROGRAM

## 2025-03-04 PROCEDURE — 83735 ASSAY OF MAGNESIUM: CPT | Performed by: EMERGENCY MEDICINE

## 2025-03-04 PROCEDURE — 99285 EMERGENCY DEPT VISIT HI MDM: CPT

## 2025-03-04 PROCEDURE — 84484 ASSAY OF TROPONIN QUANT: CPT | Performed by: EMERGENCY MEDICINE

## 2025-03-04 PROCEDURE — 25010000002 HYDROMORPHONE 1 MG/ML SOLUTION: Performed by: EMERGENCY MEDICINE

## 2025-03-04 PROCEDURE — 36415 COLL VENOUS BLD VENIPUNCTURE: CPT | Performed by: EMERGENCY MEDICINE

## 2025-03-04 PROCEDURE — 25010000002 HYDROMORPHONE PER 4 MG: Performed by: STUDENT IN AN ORGANIZED HEALTH CARE EDUCATION/TRAINING PROGRAM

## 2025-03-04 PROCEDURE — 93005 ELECTROCARDIOGRAM TRACING: CPT | Performed by: EMERGENCY MEDICINE

## 2025-03-04 PROCEDURE — 70450 CT HEAD/BRAIN W/O DYE: CPT

## 2025-03-04 PROCEDURE — 83880 ASSAY OF NATRIURETIC PEPTIDE: CPT | Performed by: EMERGENCY MEDICINE

## 2025-03-04 PROCEDURE — 84484 ASSAY OF TROPONIN QUANT: CPT | Performed by: STUDENT IN AN ORGANIZED HEALTH CARE EDUCATION/TRAINING PROGRAM

## 2025-03-04 PROCEDURE — 73552 X-RAY EXAM OF FEMUR 2/>: CPT

## 2025-03-04 PROCEDURE — 81003 URINALYSIS AUTO W/O SCOPE: CPT

## 2025-03-04 PROCEDURE — 71045 X-RAY EXAM CHEST 1 VIEW: CPT

## 2025-03-04 PROCEDURE — 84132 ASSAY OF SERUM POTASSIUM: CPT | Performed by: STUDENT IN AN ORGANIZED HEALTH CARE EDUCATION/TRAINING PROGRAM

## 2025-03-04 PROCEDURE — 83735 ASSAY OF MAGNESIUM: CPT | Performed by: STUDENT IN AN ORGANIZED HEALTH CARE EDUCATION/TRAINING PROGRAM

## 2025-03-04 PROCEDURE — 85025 COMPLETE CBC W/AUTO DIFF WBC: CPT | Performed by: PHYSICIAN ASSISTANT

## 2025-03-04 PROCEDURE — 74176 CT ABD & PELVIS W/O CONTRAST: CPT

## 2025-03-04 PROCEDURE — 71250 CT THORAX DX C-: CPT

## 2025-03-04 PROCEDURE — 93010 ELECTROCARDIOGRAM REPORT: CPT | Performed by: INTERNAL MEDICINE

## 2025-03-04 PROCEDURE — 80053 COMPREHEN METABOLIC PANEL: CPT | Performed by: PHYSICIAN ASSISTANT

## 2025-03-04 PROCEDURE — 76376 3D RENDER W/INTRP POSTPROCES: CPT

## 2025-03-04 RX ORDER — POLYETHYLENE GLYCOL 3350 17 G/17G
17 POWDER, FOR SOLUTION ORAL DAILY PRN
Status: DISCONTINUED | OUTPATIENT
Start: 2025-03-04 | End: 2025-03-07

## 2025-03-04 RX ORDER — SODIUM CHLORIDE 0.9 % (FLUSH) 0.9 %
10 SYRINGE (ML) INJECTION AS NEEDED
Status: DISCONTINUED | OUTPATIENT
Start: 2025-03-04 | End: 2025-03-13 | Stop reason: HOSPADM

## 2025-03-04 RX ORDER — LEVOTHYROXINE SODIUM 50 UG/1
50 TABLET ORAL EVERY MORNING
Status: DISCONTINUED | OUTPATIENT
Start: 2025-03-05 | End: 2025-03-13 | Stop reason: HOSPADM

## 2025-03-04 RX ORDER — BISACODYL 5 MG/1
5 TABLET, DELAYED RELEASE ORAL DAILY PRN
Status: DISCONTINUED | OUTPATIENT
Start: 2025-03-04 | End: 2025-03-13 | Stop reason: HOSPADM

## 2025-03-04 RX ORDER — NITROGLYCERIN 0.4 MG/1
0.4 TABLET SUBLINGUAL
Status: DISCONTINUED | OUTPATIENT
Start: 2025-03-04 | End: 2025-03-13 | Stop reason: HOSPADM

## 2025-03-04 RX ORDER — HYDROCODONE BITARTRATE AND ACETAMINOPHEN 5; 325 MG/1; MG/1
1 TABLET ORAL EVERY 4 HOURS PRN
Status: DISCONTINUED | OUTPATIENT
Start: 2025-03-04 | End: 2025-03-05

## 2025-03-04 RX ORDER — ONDANSETRON 4 MG/1
4 TABLET, ORALLY DISINTEGRATING ORAL EVERY 6 HOURS PRN
Status: DISCONTINUED | OUTPATIENT
Start: 2025-03-04 | End: 2025-03-13 | Stop reason: HOSPADM

## 2025-03-04 RX ORDER — BISACODYL 10 MG
10 SUPPOSITORY, RECTAL RECTAL DAILY PRN
Status: DISCONTINUED | OUTPATIENT
Start: 2025-03-04 | End: 2025-03-07

## 2025-03-04 RX ORDER — NALOXONE HCL 0.4 MG/ML
0.4 VIAL (ML) INJECTION
Status: DISCONTINUED | OUTPATIENT
Start: 2025-03-04 | End: 2025-03-13 | Stop reason: HOSPADM

## 2025-03-04 RX ORDER — TAMSULOSIN HYDROCHLORIDE 0.4 MG/1
0.4 CAPSULE ORAL DAILY
Status: DISCONTINUED | OUTPATIENT
Start: 2025-03-04 | End: 2025-03-08

## 2025-03-04 RX ORDER — PHENOBARBITAL 32.4 MG/1
129.6 TABLET ORAL NIGHTLY
Status: DISCONTINUED | OUTPATIENT
Start: 2025-03-04 | End: 2025-03-13 | Stop reason: HOSPADM

## 2025-03-04 RX ORDER — HYDROMORPHONE HYDROCHLORIDE 1 MG/ML
0.5 INJECTION, SOLUTION INTRAMUSCULAR; INTRAVENOUS; SUBCUTANEOUS
Status: DISPENSED | OUTPATIENT
Start: 2025-03-04 | End: 2025-03-09

## 2025-03-04 RX ORDER — ONDANSETRON 2 MG/ML
4 INJECTION INTRAMUSCULAR; INTRAVENOUS EVERY 6 HOURS PRN
Status: DISCONTINUED | OUTPATIENT
Start: 2025-03-04 | End: 2025-03-13 | Stop reason: HOSPADM

## 2025-03-04 RX ORDER — SODIUM CHLORIDE 9 MG/ML
75 INJECTION, SOLUTION INTRAVENOUS CONTINUOUS
Status: DISCONTINUED | OUTPATIENT
Start: 2025-03-04 | End: 2025-03-06

## 2025-03-04 RX ORDER — CHOLECALCIFEROL (VITAMIN D3) 25 MCG
4000 TABLET ORAL DAILY
Status: DISCONTINUED | OUTPATIENT
Start: 2025-03-04 | End: 2025-03-13 | Stop reason: HOSPADM

## 2025-03-04 RX ORDER — MORPHINE SULFATE 2 MG/ML
2 INJECTION, SOLUTION INTRAMUSCULAR; INTRAVENOUS
Status: DISCONTINUED | OUTPATIENT
Start: 2025-03-04 | End: 2025-03-04

## 2025-03-04 RX ORDER — AMOXICILLIN 250 MG
2 CAPSULE ORAL 2 TIMES DAILY
Status: DISCONTINUED | OUTPATIENT
Start: 2025-03-04 | End: 2025-03-13 | Stop reason: HOSPADM

## 2025-03-04 RX ORDER — PRAVASTATIN SODIUM 20 MG
40 TABLET ORAL DAILY
Status: DISCONTINUED | OUTPATIENT
Start: 2025-03-04 | End: 2025-03-13 | Stop reason: HOSPADM

## 2025-03-04 RX ADMIN — PRAVASTATIN SODIUM 40 MG: 20 TABLET ORAL at 16:56

## 2025-03-04 RX ADMIN — PRAMIPEXOLE DIHYDROCHLORIDE 2.5 MG: 1.5 TABLET ORAL at 20:04

## 2025-03-04 RX ADMIN — Medication 10 ML: at 13:32

## 2025-03-04 RX ADMIN — HYDROMORPHONE HYDROCHLORIDE 0.5 MG: 1 INJECTION, SOLUTION INTRAMUSCULAR; INTRAVENOUS; SUBCUTANEOUS at 13:32

## 2025-03-04 RX ADMIN — PHENOBARBITAL 129.6 MG: 32.4 TABLET ORAL at 22:01

## 2025-03-04 RX ADMIN — HYDROCODONE BITARTRATE AND ACETAMINOPHEN 1 TABLET: 5; 325 TABLET ORAL at 16:55

## 2025-03-04 RX ADMIN — HYDROMORPHONE HYDROCHLORIDE 1 MG: 1 INJECTION, SOLUTION INTRAMUSCULAR; INTRAVENOUS; SUBCUTANEOUS at 10:22

## 2025-03-04 RX ADMIN — HYDROMORPHONE HYDROCHLORIDE 0.5 MG: 1 INJECTION, SOLUTION INTRAMUSCULAR; INTRAVENOUS; SUBCUTANEOUS at 16:56

## 2025-03-04 RX ADMIN — SENNOSIDES AND DOCUSATE SODIUM 2 TABLET: 50; 8.6 TABLET ORAL at 16:56

## 2025-03-04 RX ADMIN — HYDROMORPHONE HYDROCHLORIDE 0.5 MG: 1 INJECTION, SOLUTION INTRAMUSCULAR; INTRAVENOUS; SUBCUTANEOUS at 20:05

## 2025-03-04 RX ADMIN — Medication 4000 UNITS: at 16:55

## 2025-03-04 RX ADMIN — TAMSULOSIN HYDROCHLORIDE 0.4 MG: 0.4 CAPSULE ORAL at 16:55

## 2025-03-04 RX ADMIN — SODIUM CHLORIDE 75 ML/HR: 9 INJECTION, SOLUTION INTRAVENOUS at 14:00

## 2025-03-04 RX ADMIN — HYDROCODONE BITARTRATE AND ACETAMINOPHEN 1 TABLET: 5; 325 TABLET ORAL at 22:01

## 2025-03-04 NOTE — CONSULTS
FIRST UROLOGY CONSULT      Patient Identification:  NAME:  Mariela Ruiz  Age:  80 y.o.   Sex:  female   :  1945   MRN:  [id number]     Chief complaint: Mechanical fall, hip pain     History of present illness:      Pt is a 80 y.o. female  with a history of kidney stones who presented to the ED on 3/4/25 with after sustaining a mechanical fall with complaints of left hip and leg pain. X-ray in ED showed a meaghan-prosthetic left hip fracture and ortho was consulted. A CT of the abdomen was also performed for abdominal discomfort which revealed a left ureteral stone.     Urology consulted for evaluation and treatment of a left ureteral stone. Pt reports no associated flank pain. Denies fever, chills, nausea, or vomiting. Her only complaint as of now is her left hip. She denies dysuria, urinary frequency/urgency.     In hospital:  -AVSS, no measured UOP, no complaints  -WBC - 7.35  -Creat - 0.73    -CT - 7 mm stone within the mid left ureteral stone with associated mild hydroureteronephrosis; 3 mm nonobstructing stone left nephrolithiasis       Asked to see    Past medical history:  Past Medical History:   Diagnosis Date    Arthritis     Chronic venous insufficiency     Dupuytren's contracture     History of staph infection     S/P KNEE DEC 2016    History of transfusion     Hyperlipidemia     Lymphedema     LEFT LEG    Nontoxic multinodular goiter     Osteoporosis     Dr. Cabrera    Pelvic joint pain, left     Postsurgical hypothyroidism     Presence of left artificial hip joint     METAL ON METAL AS NOTED PER DR CLEMENT NOTE    Restless leg syndrome     Right bundle branch block     Seizure disorder     Dr. Whitman, HX  LAST SEIZURE    Sleep apnea     DOES NOT HAVE MACHINE    Vitamin D insufficiency        Past surgical history:  Past Surgical History:   Procedure Laterality Date    APPENDECTOMY      CARDIAC CATHETERIZATION      NORMAL     CHOLECYSTECTOMY N/A 2024    Procedure: CHOLECYSTECTOMY  LAPAROSCOPIC WITH DAVINCI ROBOT with cholangiogram, possible open;  Surgeon: Bin Heaton MD;  [facility] MA[facility];  Service: Robotics - DaVinci;  Laterality: N/A;    COLONOSCOPY  08/17/2017    Normal except for anal fissure.  Dr. Brandt.  [facility].    KNEE MINI REVISION Left 11/15/2016    Procedure: LEFT KNEE POLY CHANGE WITH HI POST STABILIZER;  Surgeon: Pablito Claudio MD;  [facility];  Service:     KNEE MINI REVISION Left 12/13/2016    Procedure: LT KNEE REMOVAL/REPLACEMENT LOCKING PIN ;  Surgeon: Pablito Claudio MD;  Location: [facility] MAIN OR;  Service:     MAMMO FINE NEEDLE ASPIRATION UNILATERAL      RIGHT THYROID NODULE, 2009 BENIGN     DE ARTHRP KNE CONDYLE&PLATU MEDIAL&LAT COMPARTMENTS Left 12/24/2016    Procedure: LEFT KNEE WASHOUT WITH POLY CHANGE;  Surgeon: Christiano Cesar MD;  Location: [facility] MAIN OR;  Service: Orthopedics    DE REVJ TOTAL KNEE ARTHRP W/WO ALGRFT 1 COMPONENT Left 2/20/2017    Procedure: LT KNEE REMOVAL ANTIBIOTIC SPACER AND KNEE REVISION;  Surgeon: Christiano Cesar MD;  Location: [facility] MAIN OR;  Service: Orthopedics    REPLACEMENT TOTAL KNEE Left     THYROIDECTOMY, PARTIAL      1979 LEFT LOBECTOMY AND ISTHMECTOMY     TOTAL ABDOMINAL HYSTERECTOMY WITH SALPINGO OOPHORECTOMY      TOTAL HIP ARTHROPLASTY Left     TOTAL HIP ARTHROPLASTY Right 9/14/2019    Procedure: TOTAL HIP ARTHROPLASTY ANTERIOR WITH HANA TABLE;  Surgeon: Christiano Cesar II, MD;  Location: [facility] MAIN OR;  Service: Orthopedics    TOTAL HIP ARTHROPLASTY REVISION Left 3/9/2021    Procedure: LEFT TOTAL HIP ARTHROPLASTY REVISION POSTERIOR;  Surgeon: Christiano Cesar II, MD;  Location: [facility] MAIN OR;  Service: Orthopedics;  Laterality: Left;    TOTAL KNEE  PROSTHESIS REMOVAL W/ SPACER INSERTION Left 12/29/2016    Procedure: LT KNEE IMPLANT REMOVAL WITH INSERTION OF SPACER ;  Surgeon: Christiano Cesar MD;  Location: [facility] MAIN OR;  Service:         Allergies:  Clindamycin/lincomycin, Duricef [cefadroxil], and Sulfa antibiotics    Home medications:  Medications Prior to Admission   Medication Sig Dispense Refill Last Dose/Taking    cholecalciferol (VITAMIN D3) 1000 units tablet Take 1 tablet by mouth Daily. (Patient taking differently: Take 4 tablets by mouth Daily.)   3/3/2025    Cyanocobalamin (Vitamin B-12) 1000 MCG sublingual tablet 1 tablet daily 90 tablet 2 3/3/2025    PHENobarbital 64.8 MG tablet Take 2 tablets by mouth Every Night for 7 days. 14 tablet 0 3/3/2025    pramipexole (MIRAPEX) 0.5 MG tablet TAKE 5 TABLETS BY MOUTH ONCE DAILY (Patient taking differently: Take 3 tablets by mouth Daily. TAKE 5 TABLETS BY MOUTH ONCE DAILY) 450 tablet 1 3/3/2025    pravastatin (PRAVACHOL) 40 MG tablet TAKE 1 TABLET BY MOUTH EVERY NIGHT FOR HIGH AMOUNT OF FATS IN THE BLOOD (Patient taking differently: Take 1 tablet by mouth Daily.) 90 tablet 2 3/3/2025    Synthroid 50 MCG tablet Take 1 tablet by mouth Every Morning. Indications: Underactive Thyroid 90 tablet 2 3/3/2025    PHENobarbital 64.8 MG tablet Take 1 tablet by mouth Daily for 7 days. (Patient not taking: Reported on 3/4/2025) 7 tablet 0 More than a month        Hospital medications:  cholecalciferol, 4,000 Units, Oral, Daily  [START ON 3/5/2025] levothyroxine, 50 mcg, Oral, QAM  PHENobarbital, 129.6 mg, Oral, Nightly  pramipexole, 2.5 mg, Oral, Nightly  pravastatin, 40 mg, Oral, Daily  senna-docusate sodium, 2 tablet, Oral, BID  tamsulosin, 0.4 mg, Oral, Daily      sodium chloride, 75 mL/hr        senna-docusate sodium **AND** polyethylene glycol **AND** bisacodyl **AND** bisacodyl    HYDROcodone-acetaminophen    HYDROmorphone **AND** naloxone    melatonin    ondansetron ODT **OR** ondansetron    [COMPLETED] Insert Peripheral IV **AND** sodium chloride    Family history:  Family History   Problem Relation Age of Onset    Heart disease Father     Diabetes Sister     Hypertension Sister      Hyperlipidemia Sister     Obesity Sister     Malig Hyperthermia Neg Hx        Social history:  Social History     Tobacco Use    Smoking status: Never     Passive exposure: Never    Smokeless tobacco: Never   Vaping Use    Vaping status: Never Used   Substance Use Topics    Alcohol use: No    Drug use: No       Review of systems:      12 point negative except as in HPI    Objective:  TMax 24 hours:   Temp (24hrs), Av.4 °F (36.9 °C), Min:98.4 °F (36.9 °C), Max:98.4 °F (36.9 °C)      Vitals Ranges:   Temp:  [98.4 °F (36.9 °C)] 98.4 °F (36.9 °C)  Heart Rate:  [71-97] 86  Resp:  [18-20] 18  BP: (117-150)/(60-78) 126/69    Intake/Output Last 3 shifts:  No intake/output data recorded.     Physical Exam:    General Appearance:    Alert, NAD, appears uncomfortable   Back:     No CVA tenderness   Abdomen:     Soft, NDNT   :    Deferred   Neuro/Psych:   Orientation intact, mood/affect pleasant       Results review:   I reviewed the patient's new clinical results.    Data review:  Lab Results (last 24 hours)       Procedure Component Value Units Date/Time    High Sensitivity Troponin T 1Hr [[id number]]  (Normal) Collected: 25 1412    Specimen: Blood Updated: 25 1454     HS Troponin T 13 ng/L      Troponin T Numeric Delta 0 ng/L     Narrative:      High Sensitive Troponin T Reference Range:  <14.0 ng/L- Negative Female for AMI  <22.0 ng/L- Negative Male for AMI  >=14 - Abnormal Female indicating possible myocardial injury.  >=22 - Abnormal Male indicating possible myocardial injury.   Clinicians would have to utilize clinical acumen, EKG, Troponin, and serial changes to determine if it is an Acute Myocardial Infarction or myocardial injury due to an underlying chronic condition.         High Sensitivity Troponin T [[id number]]  (Normal) Collected: 25 1017    Specimen: Blood Updated: 25 1308     HS Troponin T 13 ng/L     Narrative:      High Sensitive Troponin T Reference Range:  <14.0 ng/L- Negative  Female for AMI  <22.0 ng/L- Negative Male for AMI  >=14 - Abnormal Female indicating possible myocardial injury.  >=22 - Abnormal Male indicating possible myocardial injury.   Clinicians would have to utilize clinical acumen, EKG, Troponin, and serial changes to determine if it is an Acute Myocardial Infarction or myocardial injury due to an underlying chronic condition.         BNP [[id number]]  (Normal) Collected: 03/04/25 1017    Specimen: Blood Updated: 03/04/25 1308     proBNP 243.0 pg/mL     Narrative:      This assay is used as an aid in the diagnosis of individuals suspected of having heart failure. It can be used as an aid in the diagnosis of acute decompensated heart failure (ADHF) in patients presenting with signs and symptoms of ADHF to the emergency department (ED). In addition, NT-proBNP of <300 pg/mL indicates ADHF is not likely.    Age Range Result Interpretation  NT-proBNP Concentration (pg/mL:      <50             Positive            >450                   Gray                 300-450                    Negative             <300    50-75           Positive            >900                  Gray                300-900                  Negative            <300      >75             Positive            >1800                  Gray                300-1800                  Negative            <300    Magnesium [[id number]]  (Normal) Collected: 03/04/25 1017    Specimen: Blood Updated: 03/04/25 1305     Magnesium 1.8 mg/dL     Comprehensive Metabolic Panel [[id number]]  (Abnormal) Collected: 03/04/25 1017    Specimen: Blood Updated: 03/04/25 1053     Glucose 112 mg/dL      BUN 15 mg/dL      Creatinine 0.73 mg/dL      Sodium 140 mmol/L      Potassium 3.6 mmol/L      Chloride 104 mmol/L      CO2 24.7 mmol/L      Calcium 9.8 mg/dL      Total Protein 7.2 g/dL      Albumin 4.0 g/dL      ALT (SGPT) 11 U/L      AST (SGOT) 20 U/L      Alkaline Phosphatase 154 U/L      Total Bilirubin 0.4 mg/dL      Globulin 3.2 gm/dL       A/G Ratio 1.3 g/dL      BUN/Creatinine Ratio 20.5     Anion Gap 11.3 mmol/L      eGFR 84.3 mL/min/1.73     Narrative:      GFR Categories in Chronic Kidney Disease (CKD)      GFR Category          GFR (mL/min/1.73)    Interpretation  G1                     90 or greater         Normal or high (1)  G2                      60-89                Mild decrease (1)  G3a                   45-59                Mild to moderate decrease  G3b                   30-44                Moderate to severe decrease  G4                    15-29                Severe decrease  G5                    14 or less           Kidney failure          (1)In the absence of evidence of kidney disease, neither GFR category G1 or G2 fulfill the criteria for CKD.    eGFR calculation 2021 CKD-EPI creatinine equation, which does not include race as a factor    CBC & Differential [[id number]]  (Abnormal) Collected: 03/04/25 1017    Specimen: Blood Updated: 03/04/25 1028    Narrative:      The following orders were created for panel order CBC & Differential.  Procedure                               Abnormality         Status                     ---------                               -----------         ------                     CBC Auto Differential[[id number]]        Abnormal            Final result                 Please view results for these tests on the individual orders.    CBC Auto Differential [[id number]]  (Abnormal) Collected: 03/04/25 1017    Specimen: Blood Updated: 03/04/25 1028     WBC 7.35 10*3/mm3      RBC 3.94 10*6/mm3      Hemoglobin 11.5 g/dL      Hematocrit 35.1 %      MCV 89.1 fL      MCH 29.2 pg      MCHC 32.8 g/dL      RDW 12.1 %      RDW-SD 38.9 fl      MPV 10.1 fL      Platelets 221 10*3/mm3      Neutrophil % 76.2 %      Lymphocyte % 16.3 %      Monocyte % 7.1 %      Eosinophil % 0.0 %      Basophil % 0.1 %      Immature Grans % 0.3 %      Neutrophils, Absolute 5.60 10*3/mm3      Lymphocytes, Absolute 1.20 10*3/mm3       Monocytes, Absolute 0.52 10*3/mm3      Eosinophils, Absolute 0.00 10*3/mm3      Basophils, Absolute 0.01 10*3/mm3      Immature Grans, Absolute 0.02 10*3/mm3      nRBC 0.0 /100 WBC              Imaging:  Imaging Results (Last 24 Hours)       Procedure Component Value Units Date/Time    CT Head Without Contrast [[id number]] Collected: 03/04/25 1156     Updated: 03/04/25 1301    Narrative:      EMERGENCY CT SCAN OF THE HEAD WITHOUT CONTRAST 03/04/2025     CLINICAL HISTORY: This is a 80-year-old female patient who fell and had  possible head trauma.     TECHNIQUE: Spiral CT images were obtained from the base of the skull to  the vertex without intravenous contrast. The images were reformatted and  are submitted in 3 mm thick axial, sagittal and coronal CT sections with  brain algorithm and 2 mm thick axial CT sections with high-resolution  bone algorithm.     This is correlated to a prior head CT from [facility] on  09/08/2022.      FINDINGS: There is some patchy low density extending from the  periventricular into the subcortical white matter of the cerebral  hemispheres consistent with mild small vessel disease, unchanged. There  are tiny old lacunar infarcts in the anterior limbs of the internal  capsules. This is unchanged. The remainder of the brain parenchyma is  normal in attenuation. The ventricles are normal in size. I see no focal  mass effect. There is no midline shift. No extra-axial fluid collections  are identified. There is no evidence of acute intracranial hemorrhage.  No acute skull fracture is identified. The calvarium and skull base are  normal in appearance. The paranasal sinuses and the mastoid air cells  and middle ear cavities are clear. The orbits are normal in appearance.        Impression:      1. No acute intracranial abnormality is identified with no significant  change when compared to the patient's prior head CT on 09/08/2022.     2. There is mild small vessel disease in the  cerebral white matter and  tiny old lacunar infarcts in the anterior limbs of the internal  capsules, unchanged since 09/08/2022. The remainder of the head CT is  normal with no acute skull fracture or intracranial hemorrhage  identified.     Radiation dose reduction techniques were utilized, including automated  exposure control and exposure modulation based on body size.        This report was finalized on 3/4/2025 12:58 PM by Dr. Tyson Bolanos M.D  on Workstation: [id number]       CT Chest Without Contrast Diagnostic [[id number]] Collected: 03/04/25 1147     Updated: 03/04/25 1147    Narrative:      CT CHEST, ABDOMEN, AND PELVIS WITHOUT CONTRAST     HISTORY: 80-year-old female with left-sided chest pain and abdominal  pain following a fall. No external signs of trauma. Cholecystectomy,  appendectomy, thyroidectomy, hysterectomy and bilateral  salpingo-oophorectomy in the past.     TECHNIQUE: Radiation dose reduction techniques were utilized, including  automated exposure control and exposure modulation based on body size.   3 mm images were obtained through the chest, abdomen, and pelvis without  the administration of IV contrast. Compared with CT abdomen/pelvis  10/31/2024. No prior chest CT for comparison.     FINDINGS:  CHEST:  1. There are mild atelectatic changes. There is no consolidation  suspicious for pneumonia or pulmonary contusion. There are no pleural or  pericardial effusions. No acute fractures are seen.     2. There is no lymphadenopathy at the chest. There are a few coronary  artery calcifications. There is a 3.5 cm low-attenuation right thyroid  lobe nodule. A nonemergent outpatient thyroid ultrasound is recommended  for follow-up.     ABDOMEN/PELVIS:  1. There is a 7 mm stone within the mid left ureter, just below the  level of the iliac crests, with mild-moderate hydroureteronephrosis,  image 146. The stone was present previously at the lower pole of the  left kidney. There is one other 3 mm  nonobstructing stone at the left  kidney. There are no right renal stones.     2. Noncontrasted spleen appears unremarkable and there is no perisplenic  fluid. Noncontrasted liver, pancreas, and adrenals appear unremarkable.  There is no acute bowel abnormality. There is no bowel ileus or  obstruction. There is no colitis. No free fluid or fluid collection. No  acute fractures are seen.          CT Abdomen Pelvis Without Contrast [[id number]] Collected: 03/04/25 1147     Updated: 03/04/25 1147    Narrative:      CT CHEST, ABDOMEN, AND PELVIS WITHOUT CONTRAST     HISTORY: 80-year-old female with left-sided chest pain and abdominal  pain following a fall. No external signs of trauma. Cholecystectomy,  appendectomy, thyroidectomy, hysterectomy and bilateral  salpingo-oophorectomy in the past.     TECHNIQUE: Radiation dose reduction techniques were utilized, including  automated exposure control and exposure modulation based on body size.   3 mm images were obtained through the chest, abdomen, and pelvis without  the administration of IV contrast. Compared with CT abdomen/pelvis  10/31/2024. No prior chest CT for comparison.     FINDINGS:  CHEST:  1. There are mild atelectatic changes. There is no consolidation  suspicious for pneumonia or pulmonary contusion. There are no pleural or  pericardial effusions. No acute fractures are seen.     2. There is no lymphadenopathy at the chest. There are a few coronary  artery calcifications. There is a 3.5 cm low-attenuation right thyroid  lobe nodule. A nonemergent outpatient thyroid ultrasound is recommended  for follow-up.     ABDOMEN/PELVIS:  1. There is a 7 mm stone within the mid left ureter, just below the  level of the iliac crests, with mild-moderate hydroureteronephrosis,  image 146. The stone was present previously at the lower pole of the  left kidney. There is one other 3 mm nonobstructing stone at the left  kidney. There are no right renal stones.     2.  Noncontrasted spleen appears unremarkable and there is no perisplenic  fluid. Noncontrasted liver, pancreas, and adrenals appear unremarkable.  There is no acute bowel abnormality. There is no bowel ileus or  obstruction. There is no colitis. No free fluid or fluid collection. No  acute fractures are seen.          XR Chest 1 View [[id number]] Collected: 03/04/25 1127     Updated: 03/04/25 1134    Narrative:      XR CHEST 1 VW-     DATE OF EXAM: 3/4/2025 10:39 AM     INDICATION: Preoperative study for hip fracture fixation.     COMPARISON: Radiographs 11/5/2024 and 3/2/2021. CT 3/4/2025.     TECHNIQUE: A single portable AP view of the chest was obtained.     FINDINGS:  Mild patient rotation. Low lung volumes. Ill-defined bilateral perihilar  and left greater than right bibasilar opacities, which could represent  atelectasis and/or scarring. No pneumothorax. Stable cardiomegaly,  likely accentuated by technique. Incomplete evaluated chronic changes in  both shoulders. No acute osseous abnormality is identified.       Impression:         1. Low lung volumes with ill-defined bilateral perihilar and left  greater than right bibasilar opacities that could represent atelectasis  and/or scarring.  2. Stable cardiomegaly, likely accentuated by technique.     This report was finalized on 3/4/2025 11:31 AM by Thor Werner MD on  Workstation: [id number]       XR Femur 2 View Left [[id number]] Collected: 03/04/25 1113     Updated: 03/04/25 1121    Narrative:      XR FEMUR 2 VW LEFT-     DATE OF EXAM: 3/4/2025 10:39 AM     INDICATION: Left hip pain and upper leg.     COMPARISON: CT abdomen pelvis 3/4/2025 and 10/31/2024. CT pelvis  9/8/2022. Radiographs 9/8/2022 and 3/2/2022.     TECHNIQUE: AP and lateral views of the left femur were obtained.     FINDINGS:  The crosstable lateral views are mildly limited by overlying artifacts.  Left MAXIMO with comminuted displaced periprosthetic fracture of the  subtrochanteric proximal femoral  diaphysis. The approximately 2.5 cm  medial displacement of the medial fracture fragment and 1.5 cm lateral  displacement of the lateral fracture fragment. The femoral stem extends  approximately 4 cm anterior and lateral to the medullary space into the  adjacent soft tissues. Partially imaged custom TKA. Nonspecific lucency  at the anterior and lateral bone-prosthesis interface measuring up to  approximately 5 mm in width that could represent loosening or infection.  Diffuse soft tissue swelling, greatest laterally.       Impression:         1. Left MAXIMO with comminuted displaced periprosthetic fracture of the  subtrochanteric proximal femoral diaphysis.  2. Left TKA with nonspecific lucency at the anterior and lateral  bone-prosthesis interface that could represent loosening or infection.     This report was finalized on 3/4/2025 11:18 AM by Thor Werner MD on  Workstation: [id number]                Assessment     Left ureteral calculus  2.   Left hydronephrosis, mild      Plan:   - No acute urologic intervention planned for today. CT reviewed.  Left mid-ureteral stone with associated mild hydronephrosis. Otherwise, patient asymptomatic. No c/o flank pain. Creatinine stable. Discussed potential treatment options for stone including: trial of passage vs ureteroscopy, laser lithotripsy. At this time patient is more symptomatic from her hip than her kidney stone.   - Will order UA  - NPO MN.   - Continue Tamsulosin 0.4 mg QD   - Urology following    REMY Burch  03/04/25    Plan reviewed and discussed with Dr. Reyes

## 2025-03-04 NOTE — ED TRIAGE NOTES
Patient to ED via EMS from [facility]. Patient had a mechanical fall and c/o left femur and hip pain. Patient denies hitting head or LOC.

## 2025-03-04 NOTE — ED PROVIDER NOTES
" EMERGENCY DEPARTMENT ENCOUNTER      Room Number:  P876/1  PCP: Nereyda Abdalla MD  Independent Historians: Patient  Patient Care Team:  Nereyda Abdalla MD as PCP - General (Internal Medicine)  Anuj Brandt MD as Consulting Physician (General Surgery)  Ed Chun MD as Surgeon (Orthopedic Surgery)  Amador Richardson DO (Vascular Surgery)  Christiano Cesar II, MD as Consulting Physician (Orthopedic Surgery)  Steven Liu II, MD as Consulting Physician (Neurology)       HPI:  Chief Complaint: Left leg pain, left hip pain    A complete HPI/ROS/PMH/PSH/SH/FH are unobtainable due to: None    Chronic or social conditions impacting patient care (Social Determinants of Health): None      Context: Mariela Ruiz is a 80 y.o. female with a PMH significant for seizure disorder, hyperlipidemia, venous insufficiency, restless leg syndrome, lymphedema who presents to the ED c/o acute fall that occurred while leaving her wound clinic today.  Patient is currently not anticoagulated. Pt states that while she was leaving her clinic, she slipped on a rug and fell down on her left side.  Patient states that she remembers falling down, but is unaware if she fell and hit her head.  The patient denies dizziness or weakness that would have attributed to her fall.  Patient denies headache, but states that her head feels \"woozy\".  There is no noticeable ecchymosis, erythema or visible abrasions to the patients left side.     Upon review of prior external notes (non-ED) -and- Review of prior external test results outside of this encounter it appears the patient was evaluated in the office with vascular surgery for venous insufficiency on 2/13/2025.  The patient had a normal BNP on 12/30/2024 and a normal CBC on 11/22/2024.      PAST MEDICAL HISTORY  Active Ambulatory Problems     Diagnosis Date Noted    Seizure disorder     Hyperlipidemia     Vitamin D insufficiency     Age-related osteoporosis without current " pathological fracture     Sleep apnea     Venous (peripheral) insufficiency     Postsurgical hypothyroidism     Chronic fatigue syndrome 11/07/2016    Gastroesophageal reflux disease 11/07/2016    Dupuytren's contracture 11/07/2016    History of biliary T-tube placement 11/07/2016    History of partial thyroidectomy 10/04/2012    Multinodular goiter 11/07/2016    Restless legs syndrome 11/07/2016    History of cardiac catheterization 11/07/2016    Urge incontinence of urine 11/07/2016    Left knee pain 11/15/2016    Ligamentous laxity of knee 12/02/2016    DJD (degenerative joint disease) of knee 12/24/2016    Adverse drug effect, subsequent encounter 11/07/2017    Abnormal blood level of chromium 04/05/2018    Abnormal blood level of cobalt 04/05/2018    Family history of diabetes mellitus 09/13/2018    Thrombocytopenia 09/16/2019    . 09/16/2019    S/P revision of total hip 03/09/2021    Enlarged thyroid gland 06/29/2017    Palmar fascial fibromatosis (dupuytren) 01/10/2024    Hypothyroidism, unspecified 01/10/2024    Hyperlipidemia, unspecified 01/10/2024    Acute cholecystitis 10/31/2024    Elevated troponin 10/31/2024    Vitamin B12 deficiency 02/13/2025    Pernicious anemia 02/13/2025    Lymphedema 02/13/2025    Venous stasis 02/13/2025    Chronic venous hypertension with inflammation involving both sides 02/13/2025     Resolved Ambulatory Problems     Diagnosis Date Noted    Mitral valve prolapse 11/07/2016    Right fascicular block 11/07/2016    Pruritic rash 10/18/2017    Wasp sting 08/16/2019    Cellulitis of right upper extremity 08/17/2019    Closed fracture of neck of right femur 09/13/2019    Fever 09/16/2019    Urine retention 09/16/2019     Past Medical History:   Diagnosis Date    Arthritis     Chronic venous insufficiency     History of staph infection     History of transfusion     Nontoxic multinodular goiter     Osteoporosis     Pelvic joint pain, left     Presence of left artificial hip joint      Restless leg syndrome     Right bundle branch block          PAST SURGICAL HISTORY  Past Surgical History:   Procedure Laterality Date    APPENDECTOMY      CARDIAC CATHETERIZATION      NORMAL     CHOLECYSTECTOMY N/A 11/2/2024    Procedure: CHOLECYSTECTOMY LAPAROSCOPIC WITH DAVINCI ROBOT with cholangiogram, possible open;  Surgeon: Bin Heaton MD;  Location: [facility] MAIN OR;  Service: Robotics - DaVinci;  Laterality: N/A;    COLONOSCOPY  08/17/2017    Normal except for anal fissure.  Dr. Brandt.  [facility].    KNEE MINI REVISION Left 11/15/2016    Procedure: LEFT KNEE POLY CHANGE WITH HI POST STABILIZER;  Surgeon: Pablito Claudio MD;  Location: [facility] MAIN OR;  Service:     KNEE MINI REVISION Left 12/13/2016    Procedure: LT KNEE REMOVAL/REPLACEMENT LOCKING PIN ;  Surgeon: Pablito Claudio MD;  Location: [facility] OR;  Service:     MAMMO FINE NEEDLE ASPIRATION UNILATERAL      RIGHT THYROID NODULE, 2009 BENIGN     NY ARTHRP KNE CONDYLE&PLATU MEDIAL&LAT COMPARTMENTS Left 12/24/2016    Procedure: LEFT KNEE WASHOUT WITH POLY CHANGE;  Surgeon: Christiano Cesar MD;  Location: [facility] MAIN OR;  Service: Orthopedics    NY REVJ TOTAL KNEE ARTHRP W/WO ALGRFT 1 COMPONENT Left 2/20/2017    Procedure: LT KNEE REMOVAL ANTIBIOTIC SPACER AND KNEE REVISION;  Surgeon: Christiano Cesar MD;  Location: [facility] OR;  Service: Orthopedics    REPLACEMENT TOTAL KNEE Left     THYROIDECTOMY, PARTIAL      1979 LEFT LOBECTOMY AND ISTHMECTOMY     TOTAL ABDOMINAL HYSTERECTOMY WITH SALPINGO OOPHORECTOMY      TOTAL HIP ARTHROPLASTY Left     TOTAL HIP ARTHROPLASTY Right 9/14/2019    Procedure: TOTAL HIP ARTHROPLASTY ANTERIOR WITH HANA TABLE;  Surgeon: Christiano Cesar II, MD;  Location: [facility] OR;  Service: Orthopedics    TOTAL HIP ARTHROPLASTY REVISION Left 3/9/2021    Procedure: LEFT TOTAL HIP ARTHROPLASTY REVISION POSTERIOR;  Surgeon: Christiano Cesar II, MD;  Location: [facility] MAIN OR;  Service:  Orthopedics;  Laterality: Left;    TOTAL KNEE  PROSTHESIS REMOVAL W/ SPACER INSERTION Left 12/29/2016    Procedure: LT KNEE IMPLANT REMOVAL WITH INSERTION OF SPACER ;  Surgeon: Christiano Ceasr MD;  Location: [facility] OR;  Service:          FAMILY HISTORY  Family History   Problem Relation Age of Onset    Heart disease Father     Diabetes Sister     Hypertension Sister     Hyperlipidemia Sister     Obesity Sister     Malig Hyperthermia Neg Hx          SOCIAL HISTORY  Social History     Socioeconomic History    Marital status:    Tobacco Use    Smoking status: Never     Passive exposure: Never    Smokeless tobacco: Never   Vaping Use    Vaping status: Never Used   Substance and Sexual Activity    Alcohol use: No    Drug use: No    Sexual activity: Defer         ALLERGIES  Clindamycin/lincomycin, Duricef [cefadroxil], and Sulfa antibiotics      REVIEW OF SYSTEMS  Included in HPI  All systems reviewed and negative except for those discussed in HPI.      PHYSICAL EXAM    I have reviewed the triage vital signs and nursing notes.    ED Triage Vitals [03/04/25 0952]   Temp Heart Rate Resp BP SpO2   98.4 °F (36.9 °C) 81 20 150/78 98 %      Temp src Heart Rate Source Patient Position BP Location FiO2 (%)   -- -- -- -- --       Physical Exam  Constitutional:       General: She is in acute distress.      Appearance: She is well-developed.   HENT:      Head: Normocephalic and atraumatic.   Eyes:      General: No scleral icterus.     Conjunctiva/sclera: Conjunctivae normal.   Neck:      Trachea: No tracheal deviation.   Cardiovascular:      Rate and Rhythm: Normal rate and regular rhythm.      Pulses:           Dorsalis pedis pulses are 2+ on the right side and 2+ on the left side.   Pulmonary:      Effort: Pulmonary effort is normal.      Breath sounds: Normal breath sounds.   Abdominal:      Palpations: Abdomen is soft.      Tenderness: There is no abdominal tenderness.   Musculoskeletal:         General: No  deformity.      Cervical back: Normal range of motion.      Left hip: Tenderness present. No deformity. Decreased range of motion.   Lymphadenopathy:      Cervical: No cervical adenopathy.   Skin:     General: Skin is warm and dry.   Neurological:      Mental Status: She is alert and oriented to person, place, and time.      Sensory: Sensation is intact.      Motor: Motor function is intact.   Psychiatric:         Behavior: Behavior normal.         Vital signs and nursing notes reviewed.      PPE: I wore a surgical mask throughout my encounters with the pt. I performed hand hygiene on entry into the pt room and upon exit.     LAB RESULTS  Recent Results (from the past 24 hours)   Comprehensive Metabolic Panel    Collection Time: 03/04/25 10:17 AM    Specimen: Blood   Result Value Ref Range    Glucose 112 (H) 65 - 99 mg/dL    BUN 15 8 - 23 mg/dL    Creatinine 0.73 0.57 - 1.00 mg/dL    Sodium 140 136 - 145 mmol/L    Potassium 3.6 3.5 - 5.2 mmol/L    Chloride 104 98 - 107 mmol/L    CO2 24.7 22.0 - 29.0 mmol/L    Calcium 9.8 8.6 - 10.5 mg/dL    Total Protein 7.2 6.0 - 8.5 g/dL    Albumin 4.0 3.5 - 5.2 g/dL    ALT (SGPT) 11 1 - 33 U/L    AST (SGOT) 20 1 - 32 U/L    Alkaline Phosphatase 154 (H) 39 - 117 U/L    Total Bilirubin 0.4 0.0 - 1.2 mg/dL    Globulin 3.2 gm/dL    A/G Ratio 1.3 g/dL    BUN/Creatinine Ratio 20.5 7.0 - 25.0    Anion Gap 11.3 5.0 - 15.0 mmol/L    eGFR 84.3 >60.0 mL/min/1.73   CBC Auto Differential    Collection Time: 03/04/25 10:17 AM    Specimen: Blood   Result Value Ref Range    WBC 7.35 3.40 - 10.80 10*3/mm3    RBC 3.94 3.77 - 5.28 10*6/mm3    Hemoglobin 11.5 (L) 12.0 - 15.9 g/dL    Hematocrit 35.1 34.0 - 46.6 %    MCV 89.1 79.0 - 97.0 fL    MCH 29.2 26.6 - 33.0 pg    MCHC 32.8 31.5 - 35.7 g/dL    RDW 12.1 (L) 12.3 - 15.4 %    RDW-SD 38.9 37.0 - 54.0 fl    MPV 10.1 6.0 - 12.0 fL    Platelets 221 140 - 450 10*3/mm3    Neutrophil % 76.2 (H) 42.7 - 76.0 %    Lymphocyte % 16.3 (L) 19.6 - 45.3 %     Monocyte % 7.1 5.0 - 12.0 %    Eosinophil % 0.0 (L) 0.3 - 6.2 %    Basophil % 0.1 0.0 - 1.5 %    Immature Grans % 0.3 0.0 - 0.5 %    Neutrophils, Absolute 5.60 1.70 - 7.00 10*3/mm3    Lymphocytes, Absolute 1.20 0.70 - 3.10 10*3/mm3    Monocytes, Absolute 0.52 0.10 - 0.90 10*3/mm3    Eosinophils, Absolute 0.00 0.00 - 0.40 10*3/mm3    Basophils, Absolute 0.01 0.00 - 0.20 10*3/mm3    Immature Grans, Absolute 0.02 0.00 - 0.05 10*3/mm3    nRBC 0.0 0.0 - 0.2 /100 WBC   High Sensitivity Troponin T    Collection Time: 03/04/25 10:17 AM    Specimen: Blood   Result Value Ref Range    HS Troponin T 13 <14 ng/L   BNP    Collection Time: 03/04/25 10:17 AM    Specimen: Blood   Result Value Ref Range    proBNP 243.0 0.0 - 1,800.0 pg/mL   Magnesium    Collection Time: 03/04/25 10:17 AM    Specimen: Blood   Result Value Ref Range    Magnesium 1.8 1.6 - 2.4 mg/dL   ECG 12 Lead Other; fall    Collection Time: 03/04/25 12:05 PM   Result Value Ref Range    QT Interval 437 ms    QTC Interval 473 ms   High Sensitivity Troponin T 1Hr    Collection Time: 03/04/25  2:12 PM    Specimen: Blood   Result Value Ref Range    HS Troponin T 13 <14 ng/L    Troponin T Numeric Delta 0 Abnormal if >/=3 ng/L   ECG 12 Lead Chest Pain    Collection Time: 03/04/25  8:02 PM   Result Value Ref Range    QT Interval 382 ms    QTC Interval 463 ms   Urinalysis With Microscopic If Indicated (No Culture) - Urine, Clean Catch    Collection Time: 03/04/25  8:47 PM    Specimen: Urine, Clean Catch   Result Value Ref Range    Color, UA Yellow Yellow, Straw    Appearance, UA Clear Clear    pH, UA 6.5 5.0 - 8.0    Specific Gravity, UA 1.019 1.005 - 1.030    Glucose, UA Negative Negative    Ketones, UA Trace (A) Negative    Bilirubin, UA Negative Negative    Blood, UA Negative Negative    Protein, UA Negative Negative    Leuk Esterase, UA Negative Negative    Nitrite, UA Negative Negative    Urobilinogen, UA 1.0 E.U./dL 0.2 - 1.0 E.U./dL   High Sensitivity Troponin T     Collection Time: 03/04/25  9:26 PM    Specimen: Blood   Result Value Ref Range    HS Troponin T 12 <14 ng/L   Magnesium    Collection Time: 03/04/25  9:26 PM    Specimen: Blood   Result Value Ref Range    Magnesium 1.7 1.6 - 2.4 mg/dL   Potassium    Collection Time: 03/04/25  9:26 PM    Specimen: Blood   Result Value Ref Range    Potassium 4.6 3.5 - 5.2 mmol/L   CBC (No Diff)    Collection Time: 03/05/25  3:24 AM    Specimen: Blood   Result Value Ref Range    WBC 5.17 3.40 - 10.80 10*3/mm3    RBC 3.63 (L) 3.77 - 5.28 10*6/mm3    Hemoglobin 10.5 (L) 12.0 - 15.9 g/dL    Hematocrit 33.3 (L) 34.0 - 46.6 %    MCV 91.7 79.0 - 97.0 fL    MCH 28.9 26.6 - 33.0 pg    MCHC 31.5 31.5 - 35.7 g/dL    RDW 11.8 (L) 12.3 - 15.4 %    RDW-SD 39.4 37.0 - 54.0 fl    MPV 11.0 6.0 - 12.0 fL    Platelets 192 140 - 450 10*3/mm3   Basic Metabolic Panel    Collection Time: 03/05/25  6:34 AM    Specimen: Blood   Result Value Ref Range    Glucose 110 (H) 65 - 99 mg/dL    BUN 14 8 - 23 mg/dL    Creatinine 0.59 0.57 - 1.00 mg/dL    Sodium 137 136 - 145 mmol/L    Potassium 4.4 3.5 - 5.2 mmol/L    Chloride 106 98 - 107 mmol/L    CO2 26.6 22.0 - 29.0 mmol/L    Calcium 8.6 8.6 - 10.5 mg/dL    BUN/Creatinine Ratio 23.7 7.0 - 25.0    Anion Gap 4.4 (L) 5.0 - 15.0 mmol/L    eGFR 92.4 >60.0 mL/min/1.73         RADIOLOGY  CT Head Without Contrast    Result Date: 3/4/2025  EMERGENCY CT SCAN OF THE HEAD WITHOUT CONTRAST 03/04/2025  CLINICAL HISTORY: This is a 80-year-old female patient who fell and had possible head trauma.  TECHNIQUE: Spiral CT images were obtained from the base of the skull to the vertex without intravenous contrast. The images were reformatted and are submitted in 3 mm thick axial, sagittal and coronal CT sections with brain algorithm and 2 mm thick axial CT sections with high-resolution bone algorithm.  This is correlated to a prior head CT from [facility] on 09/08/2022.  FINDINGS: There is some patchy low density  extending from the periventricular into the subcortical white matter of the cerebral hemispheres consistent with mild small vessel disease, unchanged. There are tiny old lacunar infarcts in the anterior limbs of the internal capsules. This is unchanged. The remainder of the brain parenchyma is normal in attenuation. The ventricles are normal in size. I see no focal mass effect. There is no midline shift. No extra-axial fluid collections are identified. There is no evidence of acute intracranial hemorrhage. No acute skull fracture is identified. The calvarium and skull base are normal in appearance. The paranasal sinuses and the mastoid air cells and middle ear cavities are clear. The orbits are normal in appearance.      1. No acute intracranial abnormality is identified with no significant change when compared to the patient's prior head CT on 09/08/2022.  2. There is mild small vessel disease in the cerebral white matter and tiny old lacunar infarcts in the anterior limbs of the internal capsules, unchanged since 09/08/2022. The remainder of the head CT is normal with no acute skull fracture or intracranial hemorrhage identified.  Radiation dose reduction techniques were utilized, including automated exposure control and exposure modulation based on body size.   This report was finalized on 3/4/2025 12:58 PM by Dr. Tyson Bolanos M.D on Workstation: [id number]      CT Chest Without Contrast Diagnostic, CT Abdomen Pelvis Without Contrast    Result Date: 3/4/2025  CT CHEST, ABDOMEN, AND PELVIS WITHOUT CONTRAST  HISTORY: 80-year-old female with left-sided chest pain and abdominal pain following a fall. No external signs of trauma. Cholecystectomy, appendectomy, thyroidectomy, hysterectomy and bilateral salpingo-oophorectomy in the past.  TECHNIQUE: Radiation dose reduction techniques were utilized, including automated exposure control and exposure modulation based on body size. 3 mm images were obtained through the  chest, abdomen, and pelvis without the administration of IV contrast. Compared with CT abdomen/pelvis 10/31/2024. No prior chest CT for comparison.  FINDINGS: CHEST: 1. There are mild atelectatic changes. There is no consolidation suspicious for pneumonia or pulmonary contusion. There are no pleural or pericardial effusions. No acute fractures are seen.  2. There is no lymphadenopathy at the chest. There are a few coronary artery calcifications. There is a 3.5 cm low-attenuation right thyroid lobe nodule. A nonemergent outpatient thyroid ultrasound is recommended for follow-up.  ABDOMEN/PELVIS: 1. There is a 7 mm stone within the mid left ureter, just below the level of the iliac crests, with mild-moderate hydroureteronephrosis, image 146. The stone was present previously at the lower pole of the left kidney. There is one other 3 mm nonobstructing stone at the left kidney. There are no right renal stones.  2. Noncontrasted spleen appears unremarkable and there is no perisplenic fluid. Noncontrasted liver, pancreas, and adrenals appear unremarkable. There is no acute bowel abnormality. There is no bowel ileus or obstruction. There is no colitis. No free fluid or fluid collection. No acute fractures are seen.       XR Chest 1 View    Result Date: 3/4/2025  XR CHEST 1 VW-  DATE OF EXAM: 3/4/2025 10:39 AM  INDICATION: Preoperative study for hip fracture fixation.  COMPARISON: Radiographs 11/5/2024 and 3/2/2021. CT 3/4/2025.  TECHNIQUE: A single portable AP view of the chest was obtained.  FINDINGS: Mild patient rotation. Low lung volumes. Ill-defined bilateral perihilar and left greater than right bibasilar opacities, which could represent atelectasis and/or scarring. No pneumothorax. Stable cardiomegaly, likely accentuated by technique. Incomplete evaluated chronic changes in both shoulders. No acute osseous abnormality is identified.       1. Low lung volumes with ill-defined bilateral perihilar and left greater than  right bibasilar opacities that could represent atelectasis and/or scarring. 2. Stable cardiomegaly, likely accentuated by technique.  This report was finalized on 3/4/2025 11:31 AM by Thor Werner MD on Workstation: [id number]      XR Femur 2 View Left    Result Date: 3/4/2025  XR FEMUR 2 VW LEFT-  DATE OF EXAM: 3/4/2025 10:39 AM  INDICATION: Left hip pain and upper leg.  COMPARISON: CT abdomen pelvis 3/4/2025 and 10/31/2024. CT pelvis 9/8/2022. Radiographs 9/8/2022 and 3/2/2022.  TECHNIQUE: AP and lateral views of the left femur were obtained.  FINDINGS: The crosstable lateral views are mildly limited by overlying artifacts. Left MAXIMO with comminuted displaced periprosthetic fracture of the subtrochanteric proximal femoral diaphysis. The approximately 2.5 cm medial displacement of the medial fracture fragment and 1.5 cm lateral displacement of the lateral fracture fragment. The femoral stem extends approximately 4 cm anterior and lateral to the medullary space into the adjacent soft tissues. Partially imaged custom TKA. Nonspecific lucency at the anterior and lateral bone-prosthesis interface measuring up to approximately 5 mm in width that could represent loosening or infection. Diffuse soft tissue swelling, greatest laterally.       1. Left MAXIMO with comminuted displaced periprosthetic fracture of the subtrochanteric proximal femoral diaphysis. 2. Left TKA with nonspecific lucency at the anterior and lateral bone-prosthesis interface that could represent loosening or infection.  This report was finalized on 3/4/2025 11:18 AM by Thor Werner MD on Workstation: [id number]         MEDICATIONS GIVEN IN ER  Medications   sodium chloride 0.9 % flush 10 mL (10 mL Intravenous Given 3/4/25 1332)   tamsulosin (FLOMAX) 24 hr capsule 0.4 mg (0.4 mg Oral Given 3/4/25 1655)   ondansetron ODT (ZOFRAN-ODT) disintegrating tablet 4 mg ( Oral Not Given:  See Alt 3/5/25 0109)     Or   ondansetron (ZOFRAN) injection 4 mg (4 mg  Intravenous Given 3/5/25 0109)   melatonin tablet 2.5 mg (has no administration in time range)   sodium chloride 0.9 % infusion (75 mL/hr Intravenous New Bag 3/5/25 0344)   HYDROmorphone (DILAUDID) injection 0.5 mg (0.5 mg Intravenous Given 3/5/25 0109)     And   naloxone (NARCAN) injection 0.4 mg (has no administration in time range)   sennosides-docusate (PERICOLACE) 8.6-50 MG per tablet 2 tablet (2 tablets Oral Not Given 3/4/25 2201)     And   polyethylene glycol (MIRALAX) packet 17 g (has no administration in time range)     And   bisacodyl (DULCOLAX) EC tablet 5 mg (has no administration in time range)     And   bisacodyl (DULCOLAX) suppository 10 mg (has no administration in time range)   cholecalciferol (VITAMIN D3) tablet 4,000 Units (4,000 Units Oral Given 3/4/25 1655)   PHENobarbital tablet 129.6 mg (129.6 mg Oral Given 3/4/25 2201)   pramipexole (MIRAPEX) tablet 2.5 mg (2.5 mg Oral Given 3/4/25 2004)   pravastatin (PRAVACHOL) tablet 40 mg (40 mg Oral Given 3/4/25 1656)   levothyroxine (SYNTHROID, LEVOTHROID) tablet 50 mcg (has no administration in time range)   nitroglycerin (NITROSTAT) SL tablet 0.4 mg (has no administration in time range)   HYDROcodone-acetaminophen (NORCO) 5-325 MG per tablet 2 tablet (has no administration in time range)   enoxaparin sodium (LOVENOX) syringe 40 mg (has no administration in time range)   HYDROmorphone (DILAUDID) injection 1 mg (1 mg Intravenous Given 3/4/25 1022)         ORDERS PLACED DURING THIS VISIT:  Orders Placed This Encounter   Procedures    CT Head Without Contrast    CT Chest Without Contrast Diagnostic    CT Abdomen Pelvis Without Contrast    XR Femur 2 View Left    XR Chest 1 View    Comprehensive Metabolic Panel    CBC Auto Differential    High Sensitivity Troponin T    BNP    Magnesium    Basic Metabolic Panel    CBC (No Diff)    High Sensitivity Troponin T 1Hr    Urinalysis With Microscopic If Indicated (No Culture) - Urine, Clean Catch    High  Sensitivity Troponin T    Magnesium    Potassium    Basic Metabolic Panel    CBC (No Diff)    Diet: Regular/House; Fluid Consistency: Thin (IDDSI 0)    Vital Signs    Up with assistance    Daily Weights    Strict Intake & Output    Oral Care    Place Sequential Compression Device    Maintain Sequential Compression Device    Opioid Administration - Document EtCO2 and / or SpO2 With Each Set of Vitals & Any Change in Patient Status    Opioid Administration - Notify Provider Hypercapnic Monitoring    Opioid Administration - Document EtCO2 and / or SpO2 With Each Set of Vitals & Any Change in Patient Status    Opioid Administration - Notify Provider Hypercapnic Monitoring    Opioid Administration - Continuous Pulse Oximetry (SpO2)    Maintain IV Access    Telemetry - Place Orders & Notify Provider of Results When Patient Experiences Acute Chest Pain, Dysrhythmia or Respiratory Distress    May Be Off Telemetry for Tests    Code Status and Medical Interventions: CPR (Attempt to Resuscitate); Full Support    [facility] (on-call MD unless specified) Details    Ortho (on-call MD unless specified)    Inpatient Urology Consult    Inpatient Spiritual Care Consult    PT Consult: Eval & Treat as tolerated    ECG 12 Lead Other; fall    Telemetry Scan    ECG 12 Lead Chest Pain    Insert Peripheral IV    Inpatient Admission    CBC & Differential         OUTPATIENT MEDICATION MANAGEMENT:  Current Facility-Administered Medications Ordered in Epic   Medication Dose Route Frequency Provider Last Rate Last Admin    sennosides-docusate (PERICOLACE) 8.6-50 MG per tablet 2 tablet  2 tablet Oral BID Jd Gupta MD   2 tablet at 03/04/25 1656    And    polyethylene glycol (MIRALAX) packet 17 g  17 g Oral Daily PRN Jd Gupta MD        And    bisacodyl (DULCOLAX) EC tablet 5 mg  5 mg Oral Daily PRN Jd Gupta MD        And    bisacodyl (DULCOLAX) suppository 10 mg  10 mg Rectal Daily PRN Jd Gupta MD        cholecalciferol (VITAMIN  D3) tablet 4,000 Units  4,000 Units Oral Daily Jd Gupta MD   4,000 Units at 03/04/25 1655    enoxaparin sodium (LOVENOX) syringe 40 mg  40 mg Subcutaneous Nightly Jd Gupta MD        HYDROcodone-acetaminophen (NORCO) 5-325 MG per tablet 2 tablet  2 tablet Oral Q4H PRN Jd Gupta MD        HYDROmorphone (DILAUDID) injection 0.5 mg  0.5 mg Intravenous Q2H PRN Jd Gupta MD   0.5 mg at 03/05/25 0109    And    naloxone (NARCAN) injection 0.4 mg  0.4 mg Intravenous Q5 Min PRN Jd Gupta MD        levothyroxine (SYNTHROID, LEVOTHROID) tablet 50 mcg  50 mcg Oral QAM Jd Gupta MD        melatonin tablet 2.5 mg  2.5 mg Oral Nightly PRN Jd Gupta MD        nitroglycerin (NITROSTAT) SL tablet 0.4 mg  0.4 mg Sublingual Q5 Min PRN Jd Gupta MD        ondansetron ODT (ZOFRAN-ODT) disintegrating tablet 4 mg  4 mg Oral Q6H PRN Jd Gupta MD        Or    ondansetron (ZOFRAN) injection 4 mg  4 mg Intravenous Q6H PRN Jd Gupta MD   4 mg at 03/05/25 0109    PHENobarbital tablet 129.6 mg  129.6 mg Oral Nightly Jd Gupta MD   129.6 mg at 03/04/25 2201    pramipexole (MIRAPEX) tablet 2.5 mg  2.5 mg Oral Nightly Jd Gupta MD   2.5 mg at 03/04/25 2004    pravastatin (PRAVACHOL) tablet 40 mg  40 mg Oral Daily Jd Gupta MD   40 mg at 03/04/25 1656    sodium chloride 0.9 % flush 10 mL  10 mL Intravenous PRN Dixon Jewell MD   10 mL at 03/04/25 1332    sodium chloride 0.9 % infusion  75 mL/hr Intravenous Continuous Jd Gupta MD 75 mL/hr at 03/05/25 0344 75 mL/hr at 03/05/25 0344    tamsulosin (FLOMAX) 24 hr capsule 0.4 mg  0.4 mg Oral Daily dJ Gupta MD   0.4 mg at 03/04/25 1655     No current [facility]-ordered outpatient medications on file.            PROGRESS, DATA ANALYSIS, CONSULTS, AND MEDICAL DECISION MAKING  All labs have been independently interpreted by me.  All radiology studies have been reviewed by me. All EKG's have been independently viewed and interpreted by  me.  Discussion below represents my analysis of pertinent findings related to patient's condition, differential diagnosis, treatment plan and final disposition.      DIFFERENTIAL DIAGNOSIS INCLUDE BUT NOT LIMITED TO:     Hip fracture, hip dislocation, hip contusion    Clinical Scores: N/A      ED Course as of 03/05/25 0946   Tue Mar 04, 2025   1147 I reviewed left femur x-rays in PACS, patient has a periprosthetic left hip fracture per my read. [JG]   1147 I reviewed chest x-ray in PACS, no pneumothorax per my read. [JG]   1157 Patient informed of incidental finding of thyroid nodule and need for further evaluation as outpatient. [JG]   1158 Patient reassessed.  Discussed ED findings, differential diagnosis, and the need for admission for evaluation/treatment.  They are agreeable to admission and all questions were answered.     [JG]   1158 Review of records shows patient had hip replaced by Dr. Danny Cesar.  Consulting him for orthopedics care, plan for admission to the hospitalist. [JG]   1217 Phone call with Dr. Gupta [facility].  Discussed the patient, relevant history, exam, diagnostics, ED findings/progress, and concerns. They agree to admit the patient to telemetry. Care assumed by the admitting physician at this time.     [JG]   1236 EKG independently viewed and contemporaneously interpreted by ED physician. Time: 12:05 PM.  Rate 71.  Interpretation: Normal sinus rhythm, left axis deviation, right bundle branch block, LVH, inferior Q waves, no acute ST changes. [JG]   1236 Phone call with Dr. Danny Cesar, orthopedics.  Discussed the patient, relevant history, exam, diagnostics, ED findings/progress, and concerns.  They agree to consult.    [JG]      ED Course User Index  [JG] Dixon Jewell MD           AS OF 09:46 EST VITALS:    BP - 91/51  HR - 83  TEMP - 96.7 °F (35.9 °C) (Oral)  O2 SATS - 100%    COMPLEXITY OF CARE  Admission was considered but after careful review of the patient's presentation, physical  examination, diagnostic results, and response to treatment the patient may be safely discharged with outpatient follow-up.      DIAGNOSIS  Final diagnoses:   Periprosthetic fracture of hip, initial encounter   Fall, initial encounter   Anemia, unspecified type   Hyperglycemia   Thyroid nodule   Ureteral stone         DISPOSITION  ED Disposition       ED Disposition   Decision to Admit    Condition   --    Comment   Level of Care: Telemetry [5]   Diagnosis: Periprosthetic hip fracture [[id number]]   Admitting Physician: TORY TOWNSEND [[id number]]   Attending Physician: TORY TOWNSEND [[id number]]   Certification: I Certify That Inpatient Hospital Services Are Medically Necessary For Greater Than 2 Midnights                  Please note that portions of this document were completed with a voice recognition program.    Note Disclaimer: At [facility], we believe that sharing information builds trust and better relationships. You are receiving this note because you recently visited [facility]. It is possible you will see health information before a provider has talked with you about it. This kind of information can be easy to misunderstand. To help you fully understand what it means for your health, we urge you to discuss this note with your provider.         Cristopher Arechiga PA  03/05/25 0956

## 2025-03-04 NOTE — H&P
Patient Name:  Mariela Ruiz  YOB: 1945  MRN:  [id number]  Admit Date:  3/4/2025  Patient Care Team:  Nereyda Abdalla MD as PCP - General (Internal Medicine)  Anuj Brandt MD as Consulting Physician (General Surgery)  Ed Chun MD as Surgeon (Orthopedic Surgery)  Amador Richardson DO (Vascular Surgery)  Christiano Cesar II, MD as Consulting Physician (Orthopedic Surgery)  Steven Liu II, MD as Consulting Physician (Neurology)      Subjective   History Present Illness     Chief Complaint   Patient presents with    Fall    Leg Pain    Hip Pain       Ms. Ruiz is a 80 y.o. woman with hypothyroidism, epilepsy, hyperlipidemia, chronic lymphedema who presents with left hip pain after slipping on a rug while leaving wound care clinic today. She denies any loss of consciousness, but it sounds like she had a more difficult time with recounting the events earlier and so she underwent extensive imaging in the ED which revealed a left comminuted displaced periprosthetic fracture and a 7 mm stone in the mid left ureter with associated mild to moderate hydroureteronephrosis. She endorses some left groin pain since the fall, but denies any flank pain, dysuria, abdominal pain, hematuria.     History of Present Illness  Review of Systems   Constitutional:  Negative for chills, diaphoresis and fever.   HENT:  Negative for postnasal drip and rhinorrhea.    Eyes: Negative.    Respiratory:  Negative for cough and shortness of breath.    Cardiovascular:  Positive for leg swelling. Negative for chest pain and palpitations.   Gastrointestinal:  Negative for abdominal pain, diarrhea, nausea and vomiting.   Endocrine: Negative.    Genitourinary:  Negative for dysuria, flank pain, frequency and urgency.   Musculoskeletal:  Negative for back pain.        Left hip/groin pain   Skin:  Negative for rash and wound.   Allergic/Immunologic: Negative.    Neurological:  Negative for  light-headedness and headaches.   Hematological: Negative.    Psychiatric/Behavioral:  Negative for confusion and decreased concentration.         Personal History     Past Medical History:   Diagnosis Date    Arthritis     Chronic venous insufficiency     Dupuytren's contracture     History of staph infection     S/P KNEE DEC 2016    History of transfusion     Hyperlipidemia     Lymphedema     LEFT LEG    Nontoxic multinodular goiter     Osteoporosis     Dr. Cabrera    Pelvic joint pain, left     Postsurgical hypothyroidism     Presence of left artificial hip joint     METAL ON METAL AS NOTED PER DR CLEMENT NOTE    Restless leg syndrome     Right bundle branch block     Seizure disorder     Dr. Whitman, HX 1980 LAST SEIZURE    Sleep apnea     DOES NOT HAVE MACHINE    Vitamin D insufficiency      Past Surgical History:   Procedure Laterality Date    APPENDECTOMY      CARDIAC CATHETERIZATION      NORMAL     CHOLECYSTECTOMY N/A 11/2/2024    Procedure: CHOLECYSTECTOMY LAPAROSCOPIC WITH [employer] ROBOT with cholangiogram, possible open;  Surgeon: Bin Heaton MD;  Location: [facility] OR;  Service: Robotics - DaVinci;  Laterality: N/A;    COLONOSCOPY  08/17/2017    Normal except for anal fissure.  Dr. Brandt.  [facility].    KNEE MINI REVISION Left 11/15/2016    Procedure: LEFT KNEE POLY CHANGE WITH HI POST STABILIZER;  Surgeon: Pablito Claudio MD;  Location: [facility] OR;  Service:     KNEE MINI REVISION Left 12/13/2016    Procedure: LT KNEE REMOVAL/REPLACEMENT LOCKING PIN ;  Surgeon: Pablito Claudio MD;  Location: [facility] OR;  Service:     MAMMO FINE NEEDLE ASPIRATION UNILATERAL      RIGHT THYROID NODULE, 2009 BENIGN     TX ARTHRP KNE CONDYLE&PLATU MEDIAL&LAT COMPARTMENTS Left 12/24/2016    Procedure: LEFT KNEE WASHOUT WITH POLY CHANGE;  Surgeon: Christiano Clement MD;  Location: [facility] OR;  Service: Orthopedics    TX REVJ TOTAL KNEE ARTHRP W/WO ALGRFT 1 COMPONENT Left 2/20/2017    Procedure: LT KNEE  REMOVAL ANTIBIOTIC SPACER AND KNEE REVISION;  Surgeon: Christiano Cesar MD;  Location: [facility] MAIN OR;  Service: Orthopedics    REPLACEMENT TOTAL KNEE Left     THYROIDECTOMY, PARTIAL      1979 LEFT LOBECTOMY AND ISTHMECTOMY     TOTAL ABDOMINAL HYSTERECTOMY WITH SALPINGO OOPHORECTOMY      TOTAL HIP ARTHROPLASTY Left     TOTAL HIP ARTHROPLASTY Right 9/14/2019    Procedure: TOTAL HIP ARTHROPLASTY ANTERIOR WITH HANA TABLE;  Surgeon: Christiano Cesar II, MD;  Location: [facility] MAIN OR;  Service: Orthopedics    TOTAL HIP ARTHROPLASTY REVISION Left 3/9/2021    Procedure: LEFT TOTAL HIP ARTHROPLASTY REVISION POSTERIOR;  Surgeon: Christiano Cesar II, MD;  Location: [facility] MAIN OR;  Service: Orthopedics;  Laterality: Left;    TOTAL KNEE  PROSTHESIS REMOVAL W/ SPACER INSERTION Left 12/29/2016    Procedure: LT KNEE IMPLANT REMOVAL WITH INSERTION OF SPACER ;  Surgeon: Christiano Cesar MD;  Location: [facility] MAIN OR;  Service:      Family History   Problem Relation Age of Onset    Heart disease Father     Diabetes Sister     Hypertension Sister     Hyperlipidemia Sister     Obesity Sister     Malig Hyperthermia Neg Hx      Social History     Tobacco Use    Smoking status: Never     Passive exposure: Never    Smokeless tobacco: Never   Vaping Use    Vaping status: Never Used   Substance Use Topics    Alcohol use: No    Drug use: No     No current facility-administered medications on file prior to encounter.     Current Outpatient Medications on File Prior to Encounter   Medication Sig Dispense Refill    aspirin 81 MG EC tablet Take 1 tablet by mouth Daily.      cholecalciferol (VITAMIN D3) 1000 units tablet Take 1 tablet by mouth Daily. (Patient taking differently: Take 4 tablets by mouth Daily.)      Cyanocobalamin (Vitamin B-12) 1000 MCG sublingual tablet 1 tablet daily (Patient taking differently: Take 1 tablet by mouth Daily.) 90 tablet 2    pramipexole (MIRAPEX) 0.5 MG tablet TAKE 5 TABLETS BY MOUTH ONCE DAILY  (Patient taking differently: Take 5 tablets by mouth Daily. TAKE 5 TABLETS BY MOUTH ONCE DAILY) 450 tablet 1    pravastatin (PRAVACHOL) 40 MG tablet TAKE 1 TABLET BY MOUTH EVERY NIGHT FOR HIGH AMOUNT OF FATS IN THE BLOOD (Patient taking differently: Take 1 tablet by mouth Daily.) 90 tablet 2    Synthroid 50 MCG tablet Take 1 tablet by mouth Every Morning. Indications: Underactive Thyroid 90 tablet 2    PHENobarbital 64.8 MG tablet Take 2 tablets by mouth Every Night for 7 days. 14 tablet 0    PHENobarbital 64.8 MG tablet Take 1 tablet by mouth Daily for 7 days. 7 tablet 0     Allergies   Allergen Reactions    Clindamycin/Lincomycin Itching    Duricef [Cefadroxil] Hives and Rash    Sulfa Antibiotics Rash     RASH       Objective    Objective     Vital Signs  Temp:  [98.4 °F (36.9 °C)] 98.4 °F (36.9 °C)  Heart Rate:  [71-97] 71  Resp:  [20] 20  BP: (117-150)/(60-78) 120/65  SpO2:  [93 %-100 %] 98 %  on   ;   Device (Oxygen Therapy): room air  Body mass index is 30.41 kg/m².    Physical Exam  Vitals and nursing note reviewed.   Constitutional:       General: She is not in acute distress.     Appearance: She is not toxic-appearing.   HENT:      Head: Normocephalic and atraumatic.      Mouth/Throat:      Mouth: Mucous membranes are moist.      Pharynx: Oropharynx is clear. No oropharyngeal exudate.   Eyes:      Extraocular Movements: Extraocular movements intact.      Conjunctiva/sclera: Conjunctivae normal.      Pupils: Pupils are equal, round, and reactive to light.   Cardiovascular:      Rate and Rhythm: Normal rate and regular rhythm.      Heart sounds: Normal heart sounds.   Pulmonary:      Effort: Pulmonary effort is normal. No respiratory distress.      Breath sounds: Normal breath sounds. No wheezing, rhonchi or rales.   Abdominal:      General: Bowel sounds are normal. There is no distension.      Palpations: Abdomen is soft.      Tenderness: There is no abdominal tenderness. There is no guarding or rebound.    Musculoskeletal:         General: Tenderness and signs of injury present.      Cervical back: Normal range of motion and neck supple.      Right lower leg: Edema present.      Left lower leg: Edema present.      Comments: Leg wrapped  Left leg externally rotated and shortened    Skin:     General: Skin is warm and dry.      Findings: No erythema or rash.   Neurological:      General: No focal deficit present.      Mental Status: She is alert and oriented to person, place, and time.   Psychiatric:         Mood and Affect: Mood normal.         Behavior: Behavior normal.         Results Review:  I reviewed the patient's new clinical results.  I reviewed the patient's new imaging results and agree with the interpretation.  I reviewed the patient's other test results and agree with the interpretation  I personally viewed and interpreted the patient's EKG/Telemetry data  Discussed with ED provider.    Lab Results (last 24 hours)       Procedure Component Value Units Date/Time    CBC & Differential [[id number]]  (Abnormal) Collected: 03/04/25 1017    Specimen: Blood Updated: 03/04/25 1028    Narrative:      The following orders were created for panel order CBC & Differential.  Procedure                               Abnormality         Status                     ---------                               -----------         ------                     CBC Auto Differential[[id number]]        Abnormal            Final result                 Please view results for these tests on the individual orders.    Comprehensive Metabolic Panel [[id number]]  (Abnormal) Collected: 03/04/25 1017    Specimen: Blood Updated: 03/04/25 1053     Glucose 112 mg/dL      BUN 15 mg/dL      Creatinine 0.73 mg/dL      Sodium 140 mmol/L      Potassium 3.6 mmol/L      Chloride 104 mmol/L      CO2 24.7 mmol/L      Calcium 9.8 mg/dL      Total Protein 7.2 g/dL      Albumin 4.0 g/dL      ALT (SGPT) 11 U/L      AST (SGOT) 20 U/L      Alkaline Phosphatase 154  U/L      Total Bilirubin 0.4 mg/dL      Globulin 3.2 gm/dL      A/G Ratio 1.3 g/dL      BUN/Creatinine Ratio 20.5     Anion Gap 11.3 mmol/L      eGFR 84.3 mL/min/1.73     Narrative:      GFR Categories in Chronic Kidney Disease (CKD)      GFR Category          GFR (mL/min/1.73)    Interpretation  G1                     90 or greater         Normal or high (1)  G2                      60-89                Mild decrease (1)  G3a                   45-59                Mild to moderate decrease  G3b                   30-44                Moderate to severe decrease  G4                    15-29                Severe decrease  G5                    14 or less           Kidney failure          (1)In the absence of evidence of kidney disease, neither GFR category G1 or G2 fulfill the criteria for CKD.    eGFR calculation 2021 CKD-EPI creatinine equation, which does not include race as a factor    CBC Auto Differential [[id number]]  (Abnormal) Collected: 03/04/25 1017    Specimen: Blood Updated: 03/04/25 1028     WBC 7.35 10*3/mm3      RBC 3.94 10*6/mm3      Hemoglobin 11.5 g/dL      Hematocrit 35.1 %      MCV 89.1 fL      MCH 29.2 pg      MCHC 32.8 g/dL      RDW 12.1 %      RDW-SD 38.9 fl      MPV 10.1 fL      Platelets 221 10*3/mm3      Neutrophil % 76.2 %      Lymphocyte % 16.3 %      Monocyte % 7.1 %      Eosinophil % 0.0 %      Basophil % 0.1 %      Immature Grans % 0.3 %      Neutrophils, Absolute 5.60 10*3/mm3      Lymphocytes, Absolute 1.20 10*3/mm3      Monocytes, Absolute 0.52 10*3/mm3      Eosinophils, Absolute 0.00 10*3/mm3      Basophils, Absolute 0.01 10*3/mm3      Immature Grans, Absolute 0.02 10*3/mm3      nRBC 0.0 /100 WBC     High Sensitivity Troponin T [[id number]]  (Normal) Collected: 03/04/25 1017    Specimen: Blood Updated: 03/04/25 1308     HS Troponin T 13 ng/L     Narrative:      High Sensitive Troponin T Reference Range:  <14.0 ng/L- Negative Female for AMI  <22.0 ng/L- Negative Male for AMI  >=14 -  Abnormal Female indicating possible myocardial injury.  >=22 - Abnormal Male indicating possible myocardial injury.   Clinicians would have to utilize clinical acumen, EKG, Troponin, and serial changes to determine if it is an Acute Myocardial Infarction or myocardial injury due to an underlying chronic condition.         BNP [[id number]]  (Normal) Collected: 03/04/25 1017    Specimen: Blood Updated: 03/04/25 1308     proBNP 243.0 pg/mL     Narrative:      This assay is used as an aid in the diagnosis of individuals suspected of having heart failure. It can be used as an aid in the diagnosis of acute decompensated heart failure (ADHF) in patients presenting with signs and symptoms of ADHF to the emergency department (ED). In addition, NT-proBNP of <300 pg/mL indicates ADHF is not likely.    Age Range Result Interpretation  NT-proBNP Concentration (pg/mL:      <50             Positive            >450                   Gray                 300-450                    Negative             <300    50-75           Positive            >900                  Gray                300-900                  Negative            <300      >75             Positive            >1800                  Gray                300-1800                  Negative            <300    Magnesium [[id number]]  (Normal) Collected: 03/04/25 1017    Specimen: Blood Updated: 03/04/25 1305     Magnesium 1.8 mg/dL             Imaging Results (Last 24 Hours)       Procedure Component Value Units Date/Time    CT Head Without Contrast [[id number]] Collected: 03/04/25 1156     Updated: 03/04/25 1301    Narrative:      EMERGENCY CT SCAN OF THE HEAD WITHOUT CONTRAST 03/04/2025     CLINICAL HISTORY: This is a 80-year-old female patient who fell and had  possible head trauma.     TECHNIQUE: Spiral CT images were obtained from the base of the skull to  the vertex without intravenous contrast. The images were reformatted and  are submitted in 3 mm thick axial,  sagittal and coronal CT sections with  brain algorithm and 2 mm thick axial CT sections with high-resolution  bone algorithm.     This is correlated to a prior head CT from [facility] on  09/08/2022.      FINDINGS: There is some patchy low density extending from the  periventricular into the subcortical white matter of the cerebral  hemispheres consistent with mild small vessel disease, unchanged. There  are tiny old lacunar infarcts in the anterior limbs of the internal  capsules. This is unchanged. The remainder of the brain parenchyma is  normal in attenuation. The ventricles are normal in size. I see no focal  mass effect. There is no midline shift. No extra-axial fluid collections  are identified. There is no evidence of acute intracranial hemorrhage.  No acute skull fracture is identified. The calvarium and skull base are  normal in appearance. The paranasal sinuses and the mastoid air cells  and middle ear cavities are clear. The orbits are normal in appearance.        Impression:      1. No acute intracranial abnormality is identified with no significant  change when compared to the patient's prior head CT on 09/08/2022.     2. There is mild small vessel disease in the cerebral white matter and  tiny old lacunar infarcts in the anterior limbs of the internal  capsules, unchanged since 09/08/2022. The remainder of the head CT is  normal with no acute skull fracture or intracranial hemorrhage  identified.     Radiation dose reduction techniques were utilized, including automated  exposure control and exposure modulation based on body size.        This report was finalized on 3/4/2025 12:58 PM by Dr. Tyson Bolanos M.D  on Workstation: [id number]       CT Chest Without Contrast Diagnostic [[id number]] Collected: 03/04/25 1147     Updated: 03/04/25 1147    Narrative:      CT CHEST, ABDOMEN, AND PELVIS WITHOUT CONTRAST     HISTORY: 80-year-old female with left-sided chest pain and abdominal  pain  following a fall. No external signs of trauma. Cholecystectomy,  appendectomy, thyroidectomy, hysterectomy and bilateral  salpingo-oophorectomy in the past.     TECHNIQUE: Radiation dose reduction techniques were utilized, including  automated exposure control and exposure modulation based on body size.   3 mm images were obtained through the chest, abdomen, and pelvis without  the administration of IV contrast. Compared with CT abdomen/pelvis  10/31/2024. No prior chest CT for comparison.     FINDINGS:  CHEST:  1. There are mild atelectatic changes. There is no consolidation  suspicious for pneumonia or pulmonary contusion. There are no pleural or  pericardial effusions. No acute fractures are seen.     2. There is no lymphadenopathy at the chest. There are a few coronary  artery calcifications. There is a 3.5 cm low-attenuation right thyroid  lobe nodule. A nonemergent outpatient thyroid ultrasound is recommended  for follow-up.     ABDOMEN/PELVIS:  1. There is a 7 mm stone within the mid left ureter, just below the  level of the iliac crests, with mild-moderate hydroureteronephrosis,  image 146. The stone was present previously at the lower pole of the  left kidney. There is one other 3 mm nonobstructing stone at the left  kidney. There are no right renal stones.     2. Noncontrasted spleen appears unremarkable and there is no perisplenic  fluid. Noncontrasted liver, pancreas, and adrenals appear unremarkable.  There is no acute bowel abnormality. There is no bowel ileus or  obstruction. There is no colitis. No free fluid or fluid collection. No  acute fractures are seen.          CT Abdomen Pelvis Without Contrast [[id number]] Collected: 03/04/25 1147     Updated: 03/04/25 1147    Narrative:      CT CHEST, ABDOMEN, AND PELVIS WITHOUT CONTRAST     HISTORY: 80-year-old female with left-sided chest pain and abdominal  pain following a fall. No external signs of trauma. Cholecystectomy,  appendectomy, thyroidectomy,  hysterectomy and bilateral  salpingo-oophorectomy in the past.     TECHNIQUE: Radiation dose reduction techniques were utilized, including  automated exposure control and exposure modulation based on body size.   3 mm images were obtained through the chest, abdomen, and pelvis without  the administration of IV contrast. Compared with CT abdomen/pelvis  10/31/2024. No prior chest CT for comparison.     FINDINGS:  CHEST:  1. There are mild atelectatic changes. There is no consolidation  suspicious for pneumonia or pulmonary contusion. There are no pleural or  pericardial effusions. No acute fractures are seen.     2. There is no lymphadenopathy at the chest. There are a few coronary  artery calcifications. There is a 3.5 cm low-attenuation right thyroid  lobe nodule. A nonemergent outpatient thyroid ultrasound is recommended  for follow-up.     ABDOMEN/PELVIS:  1. There is a 7 mm stone within the mid left ureter, just below the  level of the iliac crests, with mild-moderate hydroureteronephrosis,  image 146. The stone was present previously at the lower pole of the  left kidney. There is one other 3 mm nonobstructing stone at the left  kidney. There are no right renal stones.     2. Noncontrasted spleen appears unremarkable and there is no perisplenic  fluid. Noncontrasted liver, pancreas, and adrenals appear unremarkable.  There is no acute bowel abnormality. There is no bowel ileus or  obstruction. There is no colitis. No free fluid or fluid collection. No  acute fractures are seen.          XR Chest 1 View [[id number]] Collected: 03/04/25 1127     Updated: 03/04/25 1134    Narrative:      XR CHEST 1 VW-     DATE OF EXAM: 3/4/2025 10:39 AM     INDICATION: Preoperative study for hip fracture fixation.     COMPARISON: Radiographs 11/5/2024 and 3/2/2021. CT 3/4/2025.     TECHNIQUE: A single portable AP view of the chest was obtained.     FINDINGS:  Mild patient rotation. Low lung volumes. Ill-defined bilateral  perihilar  and left greater than right bibasilar opacities, which could represent  atelectasis and/or scarring. No pneumothorax. Stable cardiomegaly,  likely accentuated by technique. Incomplete evaluated chronic changes in  both shoulders. No acute osseous abnormality is identified.       Impression:         1. Low lung volumes with ill-defined bilateral perihilar and left  greater than right bibasilar opacities that could represent atelectasis  and/or scarring.  2. Stable cardiomegaly, likely accentuated by technique.     This report was finalized on 3/4/2025 11:31 AM by Thor Werner MD on  Workstation: [id number]       XR Femur 2 View Left [[id number]] Collected: 03/04/25 1113     Updated: 03/04/25 1121    Narrative:      XR FEMUR 2 VW LEFT-     DATE OF EXAM: 3/4/2025 10:39 AM     INDICATION: Left hip pain and upper leg.     COMPARISON: CT abdomen pelvis 3/4/2025 and 10/31/2024. CT pelvis  9/8/2022. Radiographs 9/8/2022 and 3/2/2022.     TECHNIQUE: AP and lateral views of the left femur were obtained.     FINDINGS:  The crosstable lateral views are mildly limited by overlying artifacts.  Left MAXIMO with comminuted displaced periprosthetic fracture of the  subtrochanteric proximal femoral diaphysis. The approximately 2.5 cm  medial displacement of the medial fracture fragment and 1.5 cm lateral  displacement of the lateral fracture fragment. The femoral stem extends  approximately 4 cm anterior and lateral to the medullary space into the  adjacent soft tissues. Partially imaged custom TKA. Nonspecific lucency  at the anterior and lateral bone-prosthesis interface measuring up to  approximately 5 mm in width that could represent loosening or infection.  Diffuse soft tissue swelling, greatest laterally.       Impression:         1. Left MAXIMO with comminuted displaced periprosthetic fracture of the  subtrochanteric proximal femoral diaphysis.  2. Left TKA with nonspecific lucency at the anterior and  lateral  bone-prosthesis interface that could represent loosening or infection.     This report was finalized on 3/4/2025 11:18 AM by Thor Werner MD on  Workstation: [id number]                   ECG 12 Lead Other; fall   Preliminary Result   HEART RATE=71  bpm   RR Lkvslhhk=087  ms   NE Nfqtgycu=059  ms   P Horizontal Axis=6  deg   P Front Axis=38  deg   QRSD Xaqcfyfd=700  ms   QT Rwllrnmf=708  ms   NTlN=407  ms   QRS Axis=-54  deg   T Wave Axis=13  deg   - ABNORMAL ECG -   Sinus rhythm   Right bundle branch block   Left ventricular hypertrophy   Probable inferior infarct, age indeterminate   Date and Time of Study:2025-03-04 12:05:28           Assessment/Plan     Active Hospital Problems    Diagnosis  POA    **Periprosthetic hip fracture [M97.8XXA, Z96.649]  Not Applicable    Ureterolithiasis [N20.1]  Yes    Hydronephrosis [N13.30]  Yes    Seizure disorder [G40.909]  Yes    Hyperlipidemia [E78.5]  Yes    Venous (peripheral) insufficiency [I87.2]  Yes    Postsurgical hypothyroidism [E89.0]  Yes      Resolved Hospital Problems   No resolved problems to display.       Ms. Ruiz is a 80 y.o. woman with hypothyroidism, epilepsy, hyperlipidemia, chronic lymphedema who presents with left hip pain after slipping on a rug while leaving wound care clinic today. She denies any loss of consciousness, but it sounds like she had a more difficult time with recounting the events earlier and so she underwent extensive imaging in the ED which revealed a left comminuted displaced periprosthetic fracture and a 7 mm stone in the mid left ureter with associated mild to moderate hydroureteronephrosis.     Left MAXIMO periprosthetic fracture and possible left TKA loosening-orthopedic surgery is consulted. RCRI is zero. Pain control, bowel regimen. It doesn't appear that any procedures are planned for tonight. Will order a regular diet and then make her NPO after midnight.  Left ureterolithiasis causing  hydroureteronephrosis-asymptomatic and no renal compromise on labs. Start some tamsulosin and fluids and ask urology to see  Epilepsy-phenobarbital  Hypothyroidism-synthroid  Hyperlipidemia-statin  Chronic lymphedema-continue leg wraps. Going to lymphedema clinic as an outpatient  Right thyroid nodule seen on CT-needs an outpatient ultrasound to evaluate further   I discussed the patient's findings and my recommendations with patient, family, and ED provider.    VTE Prophylaxis - SCDs.  Code Status - Full code.       Jd Gupta MD  [facility]  03/04/25  13:09 EST

## 2025-03-04 NOTE — NURSING NOTE
"Nursing report ED to floor  Mariela DELA CRUZ Sara  80 y.o.  female    HPI :  HPI  Stated Reason for Visit: fall, left leg pain, left hip pain    Chief Complaint  Chief Complaint   Patient presents with    Fall    Leg Pain    Hip Pain       Admitting doctor:   Jd Gupta MD    Admitting diagnosis:   The primary encounter diagnosis was Periprosthetic fracture of hip, initial encounter. Diagnoses of Fall, initial encounter, Anemia, unspecified type, Hyperglycemia, Thyroid nodule, and Ureteral stone were also pertinent to this visit.    Code status:   Current Code Status       Date Active Code Status Order ID Comments User Context       Prior            Allergies:   Clindamycin/lincomycin, Duricef [cefadroxil], and Sulfa antibiotics    Isolation:   No active isolations    Intake and Output  No intake or output data in the 24 hours ending 03/04/25 1226    Weight:       03/04/25  1023   Weight: 90.7 kg (200 lb)       Most recent vitals:   Vitals:    03/04/25 1023 03/04/25 1033 03/04/25 1147 03/04/25 1201   BP:  130/66  117/60   Pulse:  86 82 71   Resp:       Temp:       SpO2: 97% 93% 100% 93%   Weight: 90.7 kg (200 lb)      Height: 172.7 cm (68\")          Active LDAs/IV Access:   Lines, Drains & Airways       Active LDAs       Name Placement date Placement time Site Days    Peripheral IV 03/04/25 1017 Right Antecubital 03/04/25  1017  Antecubital  less than 1                    Labs (abnormal labs have a star):   Labs Reviewed   COMPREHENSIVE METABOLIC PANEL - Abnormal; Notable for the following components:       Result Value    Glucose 112 (*)     Alkaline Phosphatase 154 (*)     All other components within normal limits    Narrative:     GFR Categories in Chronic Kidney Disease (CKD)      GFR Category          GFR (mL/min/1.73)    Interpretation  G1                     90 or greater         Normal or high (1)  G2                      60-89                Mild decrease (1)  G3a                   45-59                Mild to " moderate decrease  G3b                   30-44                Moderate to severe decrease  G4                    15-29                Severe decrease  G5                    14 or less           Kidney failure          (1)In the absence of evidence of kidney disease, neither GFR category G1 or G2 fulfill the criteria for CKD.    eGFR calculation 2021 CKD-EPI creatinine equation, which does not include race as a factor   CBC WITH AUTO DIFFERENTIAL - Abnormal; Notable for the following components:    Hemoglobin 11.5 (*)     RDW 12.1 (*)     Neutrophil % 76.2 (*)     Lymphocyte % 16.3 (*)     Eosinophil % 0.0 (*)     All other components within normal limits   TROPONIN   BNP (IN-HOUSE)   MAGNESIUM   CBC AND DIFFERENTIAL    Narrative:     The following orders were created for panel order CBC & Differential.  Procedure                               Abnormality         Status                     ---------                               -----------         ------                     CBC Auto Differential[[id number]]        Abnormal            Final result                 Please view results for these tests on the individual orders.       EKG:   ECG 12 Lead Other; fall   Preliminary Result   HEART RATE=71  bpm   RR Ownscfgo=200  ms   VA Qioxhdtr=535  ms   P Horizontal Axis=6  deg   P Front Axis=38  deg   QRSD Tlzvnekb=858  ms   QT Ekvtwgsh=547  ms   EVzL=055  ms   QRS Axis=-54  deg   T Wave Axis=13  deg   - ABNORMAL ECG -   Sinus rhythm   Right bundle branch block   Left ventricular hypertrophy   Probable inferior infarct, age indeterminate   Date and Time of Study:2025-03-04 12:05:28          Meds given in ED:   Medications   sodium chloride 0.9 % flush 10 mL (has no administration in time range)   morphine injection 2 mg (has no administration in time range)   HYDROmorphone (DILAUDID) injection 1 mg (1 mg Intravenous Given 3/4/25 1022)       Imaging results:  CT Head Without Contrast    Result Date: 3/4/2025  1. No acute  intracranial abnormality is identified with no significant change when compared to the patient's prior head CT on 09/08/2022.  2. There is mild small vessel disease in the cerebral white matter and tiny old lacunar infarcts in the anterior limbs of the internal capsules, unchanged since 09/08/2022. The remainder of the head CT is normal with no acute skull fracture or intracranial hemorrhage identified.  Radiation dose reduction techniques were utilized, including automated exposure control and exposure modulation based on body size.       XR Chest 1 View    Result Date: 3/4/2025   1. Low lung volumes with ill-defined bilateral perihilar and left greater than right bibasilar opacities that could represent atelectasis and/or scarring. 2. Stable cardiomegaly, likely accentuated by technique.  This report was finalized on 3/4/2025 11:31 AM by Thor Werner MD on Workstation: [id number]      XR Femur 2 View Left    Result Date: 3/4/2025   1. Left MAXIMO with comminuted displaced periprosthetic fracture of the subtrochanteric proximal femoral diaphysis. 2. Left TKA with nonspecific lucency at the anterior and lateral bone-prosthesis interface that could represent loosening or infection.  This report was finalized on 3/4/2025 11:18 AM by Thor Werner MD on Workstation: [id number]       Ambulatory status:   - br    Social issues:   Social History     Socioeconomic History    Marital status:    Tobacco Use    Smoking status: Never     Passive exposure: Never    Smokeless tobacco: Never   Vaping Use    Vaping status: Never Used   Substance and Sexual Activity    Alcohol use: No    Drug use: No    Sexual activity: Defer       Peripheral Neurovascular  Peripheral Neurovascular (Adult)  Peripheral Neurovascular WDL: .WDL except  Additional Documentation: Edema (Group)  Edema  Edema: leg, left, leg, right  Leg, Left Edema: 2+ (Mild)  Leg, Right Edema: 1+ (Trace)    Neuro Cognitive  Neuro Cognitive  (Adult)  Cognitive/Neuro/Behavioral WDL: .WDL except, mood/behavior  Mood/Behavior: anxious  Pupils  Pupil PERRLA: yes    Learning       Respiratory  Respiratory WDL  Respiratory WDL: WDL    Abdominal Pain       Pain Assessments  Pain (Adult)  (0-10) Pain Rating: Rest: 10  (0-10) Pain Rating: Activity: 10  Patient requested Medication Prescribed for Lower Pain Scale: No    NIH Stroke Scale       Emmanuel Bal RN  03/04/25 12:26 EST

## 2025-03-04 NOTE — ED PROVIDER NOTES
MD ATTESTATION NOTE  I supervised care provided by the midlevel provider. We have discussed this patient's history, physical exam, and treatment plan. I have reviewed the midlevel provider's note and I agree with the midlevel provider's findings and plan of care.   SHARED VISIT: This visit was performed by BOTH a physician and an APC. The substantive portion of the medical decision making was performed by this attesting physician who made or approved the management plan and takes responsibility for patient management. All studies in the APC note (if performed) were independently interpreted by me.   I have personally had a face to face encounter with the patient.     PCP: Nereyda Abdalla MD  Patient Care Team:  Nereyda Abdalla MD as PCP - General (Internal Medicine)  Anuj Brandt MD as Consulting Physician (General Surgery)  Ed Chun MD as Surgeon (Orthopedic Surgery)  Amador Richardson DO (Vascular Surgery)  Christiano Cesar II, MD as Consulting Physician (Orthopedic Surgery)  Steven Liu II, MD as Consulting Physician (Neurology)     Mariela Ruiz is a 80 y.o. female who presents to the ED c/o left hip pain.  Patient reports that she fell while leaving wound clinic, landed on her left side.  Patient is unsure if she struck her head, unsure if she lost consciousness, reports that she was somewhat dazed and confused afterwards, no visual disturbances, no nausea vomiting.  Patient denies any neck or back pain.  Patient complains of sharp pain in her left hip, was unable to stand or ambulate afterwards.  Patient reports that she was at baseline health prior to fall.    On exam:  General: NAD.  Head: NCAT.  ENT: nares patent, no scleral icterus  Neck: Supple, trachea midline.  Cardiac: regular rate and rhythm.  Lungs: normal effort, clear to auscultation bilaterally  Abdomen: Soft, nondistended, NTTP, no rebound tenderness, no guarding or rigidity.   Extremities: Moves all extremities  well.pelvis stable.  Left lower extremity: Tenderness and swelling of the left hip, skin intact, no tenderness at the knee, Ankle/toes up/down, sensation intact light touch all peripheral nerve distributions, 2+ dorsalis pedis and posterior tibial pulses, brisk cap refill all digits.  Neuro: alert, MAEW, follows commands  Psych: calm, cooperative  Skin: Warm, dry.  Swelling bilateral lower extremities with wrappings in place.    Medical Decision Making:  After the initial H&P, I discussed pertinent information from history and physical exam with patient/family.  Discussed differential diagnosis.  Discussed plan for ED evaluation/work-up/treatment.  All questions answered.  Patient/family is agreeable with plan.    ED Course as of 03/04/25 1856   Tue Mar 04, 2025   1147 I reviewed left femur x-rays in PACS, patient has a periprosthetic left hip fracture per my read. [JG]   1147 I reviewed chest x-ray in PACS, no pneumothorax per my read. [JG]   1157 Patient informed of incidental finding of thyroid nodule and need for further evaluation as outpatient. [JG]   1158 Patient reassessed.  Discussed ED findings, differential diagnosis, and the need for admission for evaluation/treatment.  They are agreeable to admission and all questions were answered.     [JG]   1158 Review of records shows patient had hip replaced by Dr. Danny Cesar.  Consulting him for orthopedics care, plan for admission to the hospitalist. [JG]   1217 Phone call with Dr. Gupta JOSE DE JESUS.  Discussed the patient, relevant history, exam, diagnostics, ED findings/progress, and concerns. They agree to admit the patient to telemetry. Care assumed by the admitting physician at this time.     [JG]   1236 EKG independently viewed and contemporaneously interpreted by ED physician. Time: 12:05 PM.  Rate 71.  Interpretation: Normal sinus rhythm, left axis deviation, right bundle branch block, LVH, inferior Q waves, no acute ST changes. [JG]   1236 Phone call with   Danny Cesar, orthopedics.  Discussed the patient, relevant history, exam, diagnostics, ED findings/progress, and concerns.  They agree to consult.    [JG]      ED Course User Index  [JG] Dixon Jewell MD       Diagnosis  Final diagnoses:   Periprosthetic fracture of hip, initial encounter   Fall, initial encounter   Anemia, unspecified type   Hyperglycemia   Thyroid nodule   Ureteral stone          Dixon Jewell MD  03/04/25 5468

## 2025-03-04 NOTE — PLAN OF CARE
Goal Outcome Evaluation:New admit from ED. Ortho and urology consulted. Pt NPO at midnight. Pt refuses to turn. Purwick in place. Call light in reach, bed alarm set.

## 2025-03-05 LAB
ANION GAP SERPL CALCULATED.3IONS-SCNC: 4.4 MMOL/L (ref 5–15)
BUN SERPL-MCNC: 14 MG/DL (ref 8–23)
BUN/CREAT SERPL: 23.7 (ref 7–25)
CALCIUM SPEC-SCNC: 8.6 MG/DL (ref 8.6–10.5)
CHLORIDE SERPL-SCNC: 106 MMOL/L (ref 98–107)
CO2 SERPL-SCNC: 26.6 MMOL/L (ref 22–29)
CREAT SERPL-MCNC: 0.59 MG/DL (ref 0.57–1)
DEPRECATED RDW RBC AUTO: 39.4 FL (ref 37–54)
EGFRCR SERPLBLD CKD-EPI 2021: 92.4 ML/MIN/1.73
ERYTHROCYTE [DISTWIDTH] IN BLOOD BY AUTOMATED COUNT: 11.8 % (ref 12.3–15.4)
GLUCOSE SERPL-MCNC: 110 MG/DL (ref 65–99)
HCT VFR BLD AUTO: 33.3 % (ref 34–46.6)
HGB BLD-MCNC: 10.5 G/DL (ref 12–15.9)
MCH RBC QN AUTO: 28.9 PG (ref 26.6–33)
MCHC RBC AUTO-ENTMCNC: 31.5 G/DL (ref 31.5–35.7)
MCV RBC AUTO: 91.7 FL (ref 79–97)
PLATELET # BLD AUTO: 192 10*3/MM3 (ref 140–450)
PMV BLD AUTO: 11 FL (ref 6–12)
POTASSIUM SERPL-SCNC: 4.4 MMOL/L (ref 3.5–5.2)
QT INTERVAL: 382 MS
QT INTERVAL: 437 MS
QTC INTERVAL: 463 MS
QTC INTERVAL: 473 MS
RBC # BLD AUTO: 3.63 10*6/MM3 (ref 3.77–5.28)
SODIUM SERPL-SCNC: 137 MMOL/L (ref 136–145)
WBC NRBC COR # BLD AUTO: 5.17 10*3/MM3 (ref 3.4–10.8)

## 2025-03-05 PROCEDURE — 25010000002 ONDANSETRON PER 1 MG: Performed by: STUDENT IN AN ORGANIZED HEALTH CARE EDUCATION/TRAINING PROGRAM

## 2025-03-05 PROCEDURE — 25010000002 ENOXAPARIN PER 10 MG: Performed by: STUDENT IN AN ORGANIZED HEALTH CARE EDUCATION/TRAINING PROGRAM

## 2025-03-05 PROCEDURE — 25810000003 SODIUM CHLORIDE 0.9 % SOLUTION: Performed by: STUDENT IN AN ORGANIZED HEALTH CARE EDUCATION/TRAINING PROGRAM

## 2025-03-05 PROCEDURE — 85027 COMPLETE CBC AUTOMATED: CPT | Performed by: STUDENT IN AN ORGANIZED HEALTH CARE EDUCATION/TRAINING PROGRAM

## 2025-03-05 PROCEDURE — 80048 BASIC METABOLIC PNL TOTAL CA: CPT | Performed by: STUDENT IN AN ORGANIZED HEALTH CARE EDUCATION/TRAINING PROGRAM

## 2025-03-05 PROCEDURE — 25010000002 HYDROMORPHONE PER 4 MG: Performed by: STUDENT IN AN ORGANIZED HEALTH CARE EDUCATION/TRAINING PROGRAM

## 2025-03-05 RX ORDER — HYDROCODONE BITARTRATE AND ACETAMINOPHEN 5; 325 MG/1; MG/1
2 TABLET ORAL EVERY 4 HOURS PRN
Status: DISCONTINUED | OUTPATIENT
Start: 2025-03-05 | End: 2025-03-09

## 2025-03-05 RX ORDER — ENOXAPARIN SODIUM 100 MG/ML
40 INJECTION SUBCUTANEOUS NIGHTLY
Status: DISCONTINUED | OUTPATIENT
Start: 2025-03-05 | End: 2025-03-13 | Stop reason: HOSPADM

## 2025-03-05 RX ADMIN — PRAVASTATIN SODIUM 40 MG: 20 TABLET ORAL at 10:00

## 2025-03-05 RX ADMIN — PHENOBARBITAL 129.6 MG: 32.4 TABLET ORAL at 20:55

## 2025-03-05 RX ADMIN — TAMSULOSIN HYDROCHLORIDE 0.4 MG: 0.4 CAPSULE ORAL at 09:59

## 2025-03-05 RX ADMIN — HYDROMORPHONE HYDROCHLORIDE 0.5 MG: 1 INJECTION, SOLUTION INTRAMUSCULAR; INTRAVENOUS; SUBCUTANEOUS at 15:03

## 2025-03-05 RX ADMIN — SODIUM CHLORIDE 75 ML/HR: 9 INJECTION, SOLUTION INTRAVENOUS at 16:52

## 2025-03-05 RX ADMIN — LEVOTHYROXINE SODIUM 50 MCG: 50 TABLET ORAL at 09:59

## 2025-03-05 RX ADMIN — HYDROCODONE BITARTRATE AND ACETAMINOPHEN 2 TABLET: 5; 325 TABLET ORAL at 12:14

## 2025-03-05 RX ADMIN — HYDROCODONE BITARTRATE AND ACETAMINOPHEN 2 TABLET: 5; 325 TABLET ORAL at 17:25

## 2025-03-05 RX ADMIN — HYDROMORPHONE HYDROCHLORIDE 0.5 MG: 1 INJECTION, SOLUTION INTRAMUSCULAR; INTRAVENOUS; SUBCUTANEOUS at 20:55

## 2025-03-05 RX ADMIN — HYDROMORPHONE HYDROCHLORIDE 0.5 MG: 1 INJECTION, SOLUTION INTRAMUSCULAR; INTRAVENOUS; SUBCUTANEOUS at 01:09

## 2025-03-05 RX ADMIN — ONDANSETRON 4 MG: 2 INJECTION, SOLUTION INTRAMUSCULAR; INTRAVENOUS at 01:09

## 2025-03-05 RX ADMIN — PRAMIPEXOLE DIHYDROCHLORIDE 2.5 MG: 1.5 TABLET ORAL at 20:55

## 2025-03-05 RX ADMIN — SODIUM CHLORIDE 75 ML/HR: 9 INJECTION, SOLUTION INTRAVENOUS at 03:44

## 2025-03-05 RX ADMIN — HYDROMORPHONE HYDROCHLORIDE 0.5 MG: 1 INJECTION, SOLUTION INTRAMUSCULAR; INTRAVENOUS; SUBCUTANEOUS at 10:00

## 2025-03-05 RX ADMIN — ENOXAPARIN SODIUM 40 MG: 100 INJECTION SUBCUTANEOUS at 20:55

## 2025-03-05 RX ADMIN — HYDROCODONE BITARTRATE AND ACETAMINOPHEN 1 TABLET: 5; 325 TABLET ORAL at 03:44

## 2025-03-05 RX ADMIN — HYDROCODONE BITARTRATE AND ACETAMINOPHEN 2 TABLET: 5; 325 TABLET ORAL at 22:27

## 2025-03-05 RX ADMIN — Medication 4000 UNITS: at 10:00

## 2025-03-05 NOTE — PROGRESS NOTES
"  [facility] Progress Note    Chief Complaint: Left hip pain    She is very stable with very minimal flank pain all her pain is centered around her hip and pelvis.  No fevers no chills.    Review of Systems:    The following systems were reviewed and negative;  respiratory and cardiovascular          Vital Signs  BP 91/51 (BP Location: Right arm, Patient Position: Lying)   Pulse 83   Temp 96.7 °F (35.9 °C) (Oral)   Resp 16   Ht 172.7 cm (68\")   Wt 90.7 kg (200 lb)   SpO2 100%   BMI 30.41 kg/m²     Physical Exam:     General Appearance:    Alert, cooperative, NAD   HEENT:    No trauma, pupils reactive, hearing intact   Back:     No CVA tenderness   Lungs:     Respirations unlabored, no wheezing    Heart:    RRR, intact peripheral pulses   Abdomen:   Soft benign   :  External genitalia normal   Extremities:   No edema, no deformity   Lymphatic:   No neck or groin LAD   Skin:   No bleeding, bruising or rashes   Neuro/Psych:   Orientation intact, mood/affect pleasant, no focal findings        Results Review:     I reviewed the patient's new clinical results.  Lab Results (last 24 hours)       Procedure Component Value Units Date/Time    Basic Metabolic Panel [[id number]]  (Abnormal) Collected: 03/05/25 0634    Specimen: Blood Updated: 03/05/25 0717     Glucose 110 mg/dL      BUN 14 mg/dL      Creatinine 0.59 mg/dL      Sodium 137 mmol/L      Potassium 4.4 mmol/L      Chloride 106 mmol/L      CO2 26.6 mmol/L      Calcium 8.6 mg/dL      BUN/Creatinine Ratio 23.7     Anion Gap 4.4 mmol/L      eGFR 92.4 mL/min/1.73     Narrative:      GFR Categories in Chronic Kidney Disease (CKD)      GFR Category          GFR (mL/min/1.73)    Interpretation  G1                     90 or greater         Normal or high (1)  G2                      60-89                Mild decrease (1)  G3a                   45-59                Mild to moderate decrease  G3b                   30-44                Moderate to severe decrease  G4  "                   15-29                Severe decrease  G5                    14 or less           Kidney failure          (1)In the absence of evidence of kidney disease, neither GFR category G1 or G2 fulfill the criteria for CKD.    eGFR calculation 2021 CKD-EPI creatinine equation, which does not include race as a factor    CBC (No Diff) [[id number]]  (Abnormal) Collected: 03/05/25 0324    Specimen: Blood Updated: 03/05/25 0419     WBC 5.17 10*3/mm3      RBC 3.63 10*6/mm3      Hemoglobin 10.5 g/dL      Hematocrit 33.3 %      MCV 91.7 fL      MCH 28.9 pg      MCHC 31.5 g/dL      RDW 11.8 %      RDW-SD 39.4 fl      MPV 11.0 fL      Platelets 192 10*3/mm3     Urinalysis With Microscopic If Indicated (No Culture) - Urine, Clean Catch [[id number]]  (Abnormal) Collected: 03/04/25 2047    Specimen: Urine, Clean Catch Updated: 03/04/25 2307     Color, UA Yellow     Appearance, UA Clear     pH, UA 6.5     Specific Gravity, UA 1.019     Glucose, UA Negative     Ketones, UA Trace     Bilirubin, UA Negative     Blood, UA Negative     Protein, UA Negative     Leuk Esterase, UA Negative     Nitrite, UA Negative     Urobilinogen, UA 1.0 E.U./dL    Narrative:      Urine microscopic not indicated.    High Sensitivity Troponin T [[id number]]  (Normal) Collected: 03/04/25 2126    Specimen: Blood Updated: 03/04/25 2203     HS Troponin T 12 ng/L     Narrative:      High Sensitive Troponin T Reference Range:  <14.0 ng/L- Negative Female for AMI  <22.0 ng/L- Negative Male for AMI  >=14 - Abnormal Female indicating possible myocardial injury.  >=22 - Abnormal Male indicating possible myocardial injury.   Clinicians would have to utilize clinical acumen, EKG, Troponin, and serial changes to determine if it is an Acute Myocardial Infarction or myocardial injury due to an underlying chronic condition.         Magnesium [[id number]]  (Normal) Collected: 03/04/25 2126    Specimen: Blood Updated: 03/04/25 2201     Magnesium 1.7 mg/dL      Potassium [[id number]]  (Normal) Collected: 03/04/25 2126    Specimen: Blood Updated: 03/04/25 2201     Potassium 4.6 mmol/L     High Sensitivity Troponin T 1Hr [[id number]]  (Normal) Collected: 03/04/25 1412    Specimen: Blood Updated: 03/04/25 1454     HS Troponin T 13 ng/L      Troponin T Numeric Delta 0 ng/L     Narrative:      High Sensitive Troponin T Reference Range:  <14.0 ng/L- Negative Female for AMI  <22.0 ng/L- Negative Male for AMI  >=14 - Abnormal Female indicating possible myocardial injury.  >=22 - Abnormal Male indicating possible myocardial injury.   Clinicians would have to utilize clinical acumen, EKG, Troponin, and serial changes to determine if it is an Acute Myocardial Infarction or myocardial injury due to an underlying chronic condition.         High Sensitivity Troponin T [[id number]]  (Normal) Collected: 03/04/25 1017    Specimen: Blood Updated: 03/04/25 1308     HS Troponin T 13 ng/L     Narrative:      High Sensitive Troponin T Reference Range:  <14.0 ng/L- Negative Female for AMI  <22.0 ng/L- Negative Male for AMI  >=14 - Abnormal Female indicating possible myocardial injury.  >=22 - Abnormal Male indicating possible myocardial injury.   Clinicians would have to utilize clinical acumen, EKG, Troponin, and serial changes to determine if it is an Acute Myocardial Infarction or myocardial injury due to an underlying chronic condition.         BNP [[id number]]  (Normal) Collected: 03/04/25 1017    Specimen: Blood Updated: 03/04/25 1308     proBNP 243.0 pg/mL     Narrative:      This assay is used as an aid in the diagnosis of individuals suspected of having heart failure. It can be used as an aid in the diagnosis of acute decompensated heart failure (ADHF) in patients presenting with signs and symptoms of ADHF to the emergency department (ED). In addition, NT-proBNP of <300 pg/mL indicates ADHF is not likely.    Age Range Result Interpretation  NT-proBNP Concentration (pg/mL:      <50              Positive            >450                   Gray                 300-450                    Negative             <300    50-75           Positive            >900                  Gray                300-900                  Negative            <300      >75             Positive            >1800                  Gray                300-1800                  Negative            <300    Magnesium [[id number]]  (Normal) Collected: 03/04/25 1017    Specimen: Blood Updated: 03/04/25 1305     Magnesium 1.8 mg/dL           Imaging Results (Last 24 Hours)       Procedure Component Value Units Date/Time    CT Chest Without Contrast Diagnostic [[id number]] Collected: 03/04/25 1147     Updated: 03/05/25 1003    Narrative:      CT CHEST, ABDOMEN, AND PELVIS WITHOUT CONTRAST     HISTORY: 80-year-old female with left-sided chest pain and abdominal  pain following a fall. No external signs of trauma. Cholecystectomy,  appendectomy, thyroidectomy, hysterectomy and bilateral  salpingo-oophorectomy in the past.     TECHNIQUE: Radiation dose reduction techniques were utilized, including  automated exposure control and exposure modulation based on body size.   3 mm images were obtained through the chest, abdomen, and pelvis without  the administration of IV contrast. Compared with CT abdomen/pelvis  10/31/2024. No prior chest CT for comparison.     FINDINGS:  CHEST:  1. There are mild atelectatic changes. There is no consolidation  suspicious for pneumonia or pulmonary contusion. There are no pleural or  pericardial effusions. No acute fractures are seen.     2. There is no lymphadenopathy at the chest. There are a few coronary  artery calcifications. There is a 3.5 cm low-attenuation right thyroid  lobe nodule. A nonemergent outpatient thyroid ultrasound is recommended  for follow-up.     ABDOMEN/PELVIS:  1. There is a 7 mm stone within the mid left ureter, just below the  level of the iliac crests, with mild-moderate  hydroureteronephrosis,  image 146. There is one other 3 mm nonobstructing stone at the left  kidney. There are no right renal stones.     2. Noncontrasted spleen appears unremarkable and there is no perisplenic  fluid. Noncontrasted liver, pancreas, and adrenals appear unremarkable.  There is no acute bowel abnormality. There is no bowel ileus or  obstruction. There is no colitis. No free fluid or fluid collection. No  acute fractures are seen.        This report was finalized on 3/5/2025 9:59 AM by Dr. Lisa Lucas M.D on  Workstation: [id number]       CT Abdomen Pelvis Without Contrast [[id number]] Collected: 03/04/25 1147     Updated: 03/05/25 1003    Narrative:      CT CHEST, ABDOMEN, AND PELVIS WITHOUT CONTRAST     HISTORY: 80-year-old female with left-sided chest pain and abdominal  pain following a fall. No external signs of trauma. Cholecystectomy,  appendectomy, thyroidectomy, hysterectomy and bilateral  salpingo-oophorectomy in the past.     TECHNIQUE: Radiation dose reduction techniques were utilized, including  automated exposure control and exposure modulation based on body size.   3 mm images were obtained through the chest, abdomen, and pelvis without  the administration of IV contrast. Compared with CT abdomen/pelvis  10/31/2024. No prior chest CT for comparison.     FINDINGS:  CHEST:  1. There are mild atelectatic changes. There is no consolidation  suspicious for pneumonia or pulmonary contusion. There are no pleural or  pericardial effusions. No acute fractures are seen.     2. There is no lymphadenopathy at the chest. There are a few coronary  artery calcifications. There is a 3.5 cm low-attenuation right thyroid  lobe nodule. A nonemergent outpatient thyroid ultrasound is recommended  for follow-up.     ABDOMEN/PELVIS:  1. There is a 7 mm stone within the mid left ureter, just below the  level of the iliac crests, with mild-moderate hydroureteronephrosis,  image 146. There is one other 3 mm  nonobstructing stone at the left  kidney. There are no right renal stones.     2. Noncontrasted spleen appears unremarkable and there is no perisplenic  fluid. Noncontrasted liver, pancreas, and adrenals appear unremarkable.  There is no acute bowel abnormality. There is no bowel ileus or  obstruction. There is no colitis. No free fluid or fluid collection. No  acute fractures are seen.        This report was finalized on 3/5/2025 9:59 AM by Dr. Lisa Lucas M.D on  Workstation: [id number]       CT Head Without Contrast [[id number]] Collected: 03/04/25 1156     Updated: 03/04/25 1301    Narrative:      EMERGENCY CT SCAN OF THE HEAD WITHOUT CONTRAST 03/04/2025     CLINICAL HISTORY: This is a 80-year-old female patient who fell and had  possible head trauma.     TECHNIQUE: Spiral CT images were obtained from the base of the skull to  the vertex without intravenous contrast. The images were reformatted and  are submitted in 3 mm thick axial, sagittal and coronal CT sections with  brain algorithm and 2 mm thick axial CT sections with high-resolution  bone algorithm.     This is correlated to a prior head CT from [facility] on  09/08/2022.      FINDINGS: There is some patchy low density extending from the  periventricular into the subcortical white matter of the cerebral  hemispheres consistent with mild small vessel disease, unchanged. There  are tiny old lacunar infarcts in the anterior limbs of the internal  capsules. This is unchanged. The remainder of the brain parenchyma is  normal in attenuation. The ventricles are normal in size. I see no focal  mass effect. There is no midline shift. No extra-axial fluid collections  are identified. There is no evidence of acute intracranial hemorrhage.  No acute skull fracture is identified. The calvarium and skull base are  normal in appearance. The paranasal sinuses and the mastoid air cells  and middle ear cavities are clear. The orbits are normal in  appearance.        Impression:      1. No acute intracranial abnormality is identified with no significant  change when compared to the patient's prior head CT on 09/08/2022.     2. There is mild small vessel disease in the cerebral white matter and  tiny old lacunar infarcts in the anterior limbs of the internal  capsules, unchanged since 09/08/2022. The remainder of the head CT is  normal with no acute skull fracture or intracranial hemorrhage  identified.     Radiation dose reduction techniques were utilized, including automated  exposure control and exposure modulation based on body size.        This report was finalized on 3/4/2025 12:58 PM by Dr. Tyson Bolanos M.D  on Workstation: [id number]       XR Chest 1 View [[id number]] Collected: 03/04/25 1127     Updated: 03/04/25 1134    Narrative:      XR CHEST 1 VW-     DATE OF EXAM: 3/4/2025 10:39 AM     INDICATION: Preoperative study for hip fracture fixation.     COMPARISON: Radiographs 11/5/2024 and 3/2/2021. CT 3/4/2025.     TECHNIQUE: A single portable AP view of the chest was obtained.     FINDINGS:  Mild patient rotation. Low lung volumes. Ill-defined bilateral perihilar  and left greater than right bibasilar opacities, which could represent  atelectasis and/or scarring. No pneumothorax. Stable cardiomegaly,  likely accentuated by technique. Incomplete evaluated chronic changes in  both shoulders. No acute osseous abnormality is identified.       Impression:         1. Low lung volumes with ill-defined bilateral perihilar and left  greater than right bibasilar opacities that could represent atelectasis  and/or scarring.  2. Stable cardiomegaly, likely accentuated by technique.     This report was finalized on 3/4/2025 11:31 AM by Thor Werner MD on  Workstation: [id number]       XR Femur 2 View Left [[id number]] Collected: 03/04/25 1113     Updated: 03/04/25 1121    Narrative:      XR FEMUR 2 VW LEFT-     DATE OF EXAM: 3/4/2025 10:39 AM     INDICATION:  Left hip pain and upper leg.     COMPARISON: CT abdomen pelvis 3/4/2025 and 10/31/2024. CT pelvis  9/8/2022. Radiographs 9/8/2022 and 3/2/2022.     TECHNIQUE: AP and lateral views of the left femur were obtained.     FINDINGS:  The crosstable lateral views are mildly limited by overlying artifacts.  Left MAXIMO with comminuted displaced periprosthetic fracture of the  subtrochanteric proximal femoral diaphysis. The approximately 2.5 cm  medial displacement of the medial fracture fragment and 1.5 cm lateral  displacement of the lateral fracture fragment. The femoral stem extends  approximately 4 cm anterior and lateral to the medullary space into the  adjacent soft tissues. Partially imaged custom TKA. Nonspecific lucency  at the anterior and lateral bone-prosthesis interface measuring up to  approximately 5 mm in width that could represent loosening or infection.  Diffuse soft tissue swelling, greatest laterally.       Impression:         1. Left MAXIMO with comminuted displaced periprosthetic fracture of the  subtrochanteric proximal femoral diaphysis.  2. Left TKA with nonspecific lucency at the anterior and lateral  bone-prosthesis interface that could represent loosening or infection.     This report was finalized on 3/4/2025 11:18 AM by Thor Werner MD on  Workstation: [id number]               Medication Review:   I have personally reviewed    Current Facility-Administered Medications:     sennosides-docusate (PERICOLACE) 8.6-50 MG per tablet 2 tablet, 2 tablet, Oral, BID, 2 tablet at 03/04/25 1656 **AND** polyethylene glycol (MIRALAX) packet 17 g, 17 g, Oral, Daily PRN **AND** bisacodyl (DULCOLAX) EC tablet 5 mg, 5 mg, Oral, Daily PRN **AND** bisacodyl (DULCOLAX) suppository 10 mg, 10 mg, Rectal, Daily PRN, Jd Gupta MD    cholecalciferol (VITAMIN D3) tablet 4,000 Units, 4,000 Units, Oral, Daily, Jd Gupta MD, 4,000 Units at 03/05/25 1000    enoxaparin sodium (LOVENOX) syringe 40 mg, 40 mg,  Subcutaneous, Nightly, Jd Gupta MD    HYDROcodone-acetaminophen (NORCO) 5-325 MG per tablet 2 tablet, 2 tablet, Oral, Q4H PRN, Jd Gupta MD    HYDROmorphone (DILAUDID) injection 0.5 mg, 0.5 mg, Intravenous, Q2H PRN, 0.5 mg at 03/05/25 1000 **AND** naloxone (NARCAN) injection 0.4 mg, 0.4 mg, Intravenous, Q5 Min PRN, Jd Gupta MD    levothyroxine (SYNTHROID, LEVOTHROID) tablet 50 mcg, 50 mcg, Oral, QAM, Jd Gupta MD, 50 mcg at 03/05/25 0959    melatonin tablet 2.5 mg, 2.5 mg, Oral, Nightly PRN, Jd Gupta MD    nitroglycerin (NITROSTAT) SL tablet 0.4 mg, 0.4 mg, Sublingual, Q5 Min PRN, Jd Gupta MD    ondansetron ODT (ZOFRAN-ODT) disintegrating tablet 4 mg, 4 mg, Oral, Q6H PRN **OR** ondansetron (ZOFRAN) injection 4 mg, 4 mg, Intravenous, Q6H PRN, Jd Gupta MD, 4 mg at 03/05/25 0109    PHENobarbital tablet 129.6 mg, 129.6 mg, Oral, Nightly, Jd Gupta MD, 129.6 mg at 03/04/25 2201    pramipexole (MIRAPEX) tablet 2.5 mg, 2.5 mg, Oral, Nightly, Jd Gupta MD, 2.5 mg at 03/04/25 2004    pravastatin (PRAVACHOL) tablet 40 mg, 40 mg, Oral, Daily, Jd Gupta MD, 40 mg at 03/05/25 1000    [COMPLETED] Insert Peripheral IV, , , Once **AND** sodium chloride 0.9 % flush 10 mL, 10 mL, Intravenous, PRN, Dixon Jewell MD, 10 mL at 03/04/25 1332    sodium chloride 0.9 % infusion, 75 mL/hr, Intravenous, Continuous, Jd Gupta MD, Last Rate: 75 mL/hr at 03/05/25 0344, 75 mL/hr at 03/05/25 0344    tamsulosin (FLOMAX) 24 hr capsule 0.4 mg, 0.4 mg, Oral, Daily, Jd Gupta MD, 0.4 mg at 03/05/25 0959    Allergies:    Clindamycin/lincomycin, Duricef [cefadroxil], and Sulfa antibiotics    Assessment:    Active Problems:    Periprosthetic hip fracture    Seizure disorder    Hyperlipidemia    Venous (peripheral) insufficiency    Postsurgical hypothyroidism    Ureterolithiasis    Hydronephrosis       Left mid ureteral calculus with mild hydronephrosis.  Very little pain from the stone.   Most of her pain is all centered around her hip fracture with displaced prosthesis.  She does not want to do any better stone [place] and wants to center most appropriately on her left hip fracture.    Plan:    She will have to get her stone addressed at some point I made it pretty clear to her.  Since she has no evidence of any infection and her renal function is stable and her pain is pretty minimal I think we can watch this for now and address the stone at some point.  She will have to be up in stirrups and at this point it probably be very difficult to put her up in stirrups without potentially exacerbating her fracture.  Will hold off on intervention until we can see what orthopedic does and they give us the clearance to put her in lithotomy.      Nabil Elizondo MD    3/5/2025  11:11 EST

## 2025-03-05 NOTE — PLAN OF CARE
Goal Outcome Evaluation:  Plan of Care Reviewed With: patient        Progress: no change  Outcome Evaluation: VSS on RA. AOx4. Pt continues to c/o severe pain with any activity or movement. Refusing any turns or position changes, education provided. PRN Norco x2, PRN dilaudid x2. Unable to void independently, straight cath x1. IVF. Plan surgery Friday or Saturday.

## 2025-03-05 NOTE — PLAN OF CARE
Goal Outcome Evaluation:  Plan of Care Reviewed With: patient        Progress: no change  Outcome Evaluation: Pain meds per MAR. Continues to complain of severe pain with any activity. Refusing turns and position changes, education provided. Unable to void independently this shift, straight cath x 1 with 600cc urine returned. Repeat bladder scans 160 and 268. Still due to void. NPO for possible hip surgery today. VSS. IVF infusing per order. Plan of care updated. Continue safety measures and progress towards goals.         Problem: Adult Inpatient Plan of Care  Goal: Plan of Care Review  Outcome: Progressing  Flowsheets (Taken 3/5/2025 0621)  Progress: no change  Outcome Evaluation: Pain meds per MAR. Continues to complain of severe pain with any activity. Refusing turns and position changes, education provided. Unable to void independently this shift, straight cath x 1 with 600cc urine returned. Repeat bladder scans 160 and 268. Still due to void. NPO for possible hip surgery today. VSS. IVF infusing per order. Plan of care updated. Continue safety measures and progress towards goals.  Plan of Care Reviewed With: patient  Goal: Patient-Specific Goal (Individualized)  Outcome: Progressing  Goal: Absence of Hospital-Acquired Illness or Injury  Outcome: Progressing  Intervention: Identify and Manage Fall Risk  Recent Flowsheet Documentation  Taken 3/5/2025 0600 by Danielle Schuster, RN  Safety Promotion/Fall Prevention:   safety round/check completed   activity supervised   assistive device/personal items within reach   fall prevention program maintained   nonskid shoes/slippers when out of bed  Taken 3/5/2025 0400 by Danielle Schuster, RN  Safety Promotion/Fall Prevention:   safety round/check completed   activity supervised   assistive device/personal items within reach   fall prevention program maintained   nonskid shoes/slippers when out of bed  Taken 3/5/2025 0200 by Danielle Schuster, RN  Safety Promotion/Fall  Prevention:   safety round/check completed   activity supervised   assistive device/personal items within [facility]   fall prevention program maintained   nonskid shoes/slippers when out of bed  Taken 3/5/2025 0010 by Danielle Schuster RN  Safety Promotion/Fall Prevention:   safety round/check completed   activity supervised   assistive device/personal items within reach   fall prevention program maintained   nonskid shoes/slippers when out of bed  Taken 3/4/2025 2200 by Danielle Schuster RN  Safety Promotion/Fall Prevention:   safety round/check completed   activity supervised   assistive device/personal items within [facility]   fall prevention program maintained   nonskid shoes/slippers when out of bed  Taken 3/4/2025 2030 by Danielle Schuster RN  Safety Promotion/Fall Prevention:   safety round/check completed   activity supervised   assistive device/personal items within reach   fall prevention program maintained   nonskid shoes/slippers when out of bed  Intervention: Prevent Skin Injury  Recent Flowsheet Documentation  Taken 3/5/2025 0600 by Danielle Schuster RN  Body Position: patient/family refused  Taken 3/5/2025 0400 by Danielle Schuster RN  Body Position: (pulled up in bed, would not allow for position changes) weight shifting  Taken 3/5/2025 0010 by Danielle Schuster RN  Body Position: patient/family refused  Taken 3/4/2025 2200 by Danielle Schuster RN  Body Position: (refused position changes due to pain)   supine   patient/family refused  Taken 3/4/2025 2030 by Danielle Schuster RN  Body Position:   supine   patient/family refused  Intervention: Prevent Infection  Recent Flowsheet Documentation  Taken 3/5/2025 0600 by Danielle Schuster RN  Infection Prevention:   rest/sleep promoted   personal protective equipment utilized  Taken 3/5/2025 0400 by Danielle Schuster RN  Infection Prevention:   rest/sleep promoted   personal protective equipment utilized  Taken 3/5/2025 0200 by Danielle Schuster RN  Infection Prevention:    rest/sleep promoted   personal protective equipment utilized  Taken 3/5/2025 0010 by Danielle Schuster RN  Infection Prevention:   rest/sleep promoted   personal protective equipment utilized  Taken 3/4/2025 2200 by Danielle Schuster RN  Infection Prevention:   rest/sleep promoted   personal protective equipment utilized  Taken 3/4/2025 2030 by Danielle Schuster RN  Infection Prevention:   rest/sleep promoted   personal protective equipment utilized  Goal: Optimal Comfort and Wellbeing  Outcome: Progressing  Intervention: Monitor Pain and Promote Comfort  Recent Flowsheet Documentation  Taken 3/4/2025 2200 by Danielle Schuster RN  Pain Management Interventions: pain medication given  Taken 3/4/2025 2030 by Danielle Schuster RN  Pain Management Interventions: pain medication given  Intervention: Provide Person-Centered Care  Recent Flowsheet Documentation  Taken 3/5/2025 0200 by Danielle Schuster RN  Trust Relationship/Rapport:   care explained   choices provided   questions answered   reassurance provided   thoughts/feelings acknowledged  Taken 3/4/2025 2030 by Danielle Schuster RN  Trust Relationship/Rapport:   care explained   choices provided   questions answered   reassurance provided   thoughts/feelings acknowledged  Goal: Readiness for Transition of Care  Outcome: Progressing

## 2025-03-05 NOTE — CASE MANAGEMENT/SOCIAL WORK
Discharge Planning Assessment  [facility]     Patient Name: Mariela Ruiz  MRN: [id number]  Today's Date: 3/5/2025    Admit Date: 3/4/2025    Plan: SNF referral pending   Discharge Needs Assessment       Row Name 03/05/25 1312       Living Environment    People in Home child(aneudy), adult    Current Living Arrangements home    Potentially Unsafe Housing Conditions none    Primary Care Provided by self    Provides Primary Care For no one    Family Caregiver if Needed child(aneudy), adult    Quality of Family Relationships helpful;involved;supportive    Able to Return to Prior Arrangements yes       Resource/Environmental Concerns    Resource/Environmental Concerns none    Transportation Concerns none       Transition Planning    Patient/Family Anticipates Transition to home    Patient/Family Anticipated Services at Transition none    Transportation Anticipated family or friend will provide       Discharge Needs Assessment    Equipment Currently Used at Home walker, rolling;walker, standard    Anticipated Changes Related to Illness none    Equipment Needed After Discharge none                   Discharge Plan       Row Name 03/05/25 1311       Plan    Plan SNF referral pending    Patient/Family in Agreement with Plan yes    Provided Post Acute Provider List? N/A    Provided Post Acute Provider Quality & Resource List? N/A    Plan Comments CCP met with patient at daughter at bedside. Introduced self and explained role of CCP. Patient confirmed the information on her face sheet is accurate. Patients PCP is Nereyda Abdalla. Patient is enrolled into [facility]. Patient lives at home with her daughter. Patient has had [facility] in the past. Has been to [facility], [place], and [facility]. Patient does not want to go to [place] or [facility] again. Patient wants a referral to [facility]. Asked for backups but patient and daughter do not have any. CCP made the referral. PT and OT still pending. Patient to  have hip procedure Friday or Saturday this week. CCP following                  Continued Care and Services - Admitted Since 3/4/2025       Destination       Service Provider Request Status Services Address Phone Fax Patient Preferred    [facility] Pending - Request Sent -- [address] [place] [id number] [phone] --                  Expected Discharge Date and Time       Expected Discharge Date Expected Discharge Time    Mar 8, 2025            Demographic Summary       Row Name 03/05/25 1312       General Information    Admission Type inpatient    Arrived From emergency department    Referral Source admission list    Reason for Consult discharge planning    Preferred Language English                   Functional Status       Row Name 03/05/25 1312       Functional Status    Usual Activity Tolerance moderate    Current Activity Tolerance moderate       Functional Status, IADL    Medications assistive person    Meal Preparation assistive person    Housekeeping assistive person    Laundry assistive person    Shopping assistive person       Mental Status    General Appearance WDL WDL       Mental Status Summary    Recent Changes in Mental Status/Cognitive Functioning no changes       Employment/    Employment Status retired                   Psychosocial    No documentation.                  Abuse/Neglect    No documentation.                  Legal    No documentation.                  Substance Abuse    No documentation.                  Patient Forms    No documentation.

## 2025-03-05 NOTE — DISCHARGE PLACEMENT REQUEST
"Marsha Mares (80 y.o. Female)       Date of Birth   1945    Social Security Number       Address   [address] [place] [address]    Home Phone   [phone]    MRN   [id number]       [facility]    Marital Status                               Admission Date   3/4/25    Admission Type   Emergency    Admitting Provider   Jd Gupta MD    Attending Provider   Jd Gupta MD    Department, Room/Bed   [address], P876/1       Discharge Date       Discharge Disposition       Discharge Destination                                 Attending Provider: Jd Gupta MD    Allergies: Clindamycin/lincomycin, Duricef [Cefadroxil], Sulfa Antibiotics    Isolation: None   Infection: None   Code Status: CPR    Ht: 172.7 cm (68\")   Wt: 90.7 kg (200 lb)    Admission Cmt: None   Principal Problem: Periprosthetic hip fracture [M97.8XXA,Z96.649]                   Active Insurance as of 3/4/2025       Primary Coverage       Payor Plan Insurance Group Employer/Plan Group    MEDICARE RAILROAD MEDICARE        Payor Plan Address Payor Plan Phone Number Payor Plan Fax Number Effective Dates    [address] [phone]  2/1/2012 - None Entered    [facility] [id number]         Subscriber Name Subscriber Birth Date Member ID       MARSHA MARES 1945 [id number]               Secondary Coverage       Payor Plan Insurance Group Employer/Plan Group    [employer] [place] [employer] [place] [place]       Payor Plan Address Payor Plan Phone Number Payor Plan Fax Number Effective Dates    [address] [place] [address] [phone]  2/1/2012 - None Entered    [place] [address]         Subscriber Name Subscriber Birth Date Member ID       MARSHA MARES 1945 [id number]                     Emergency Contacts        (Rel.) Home Phone Work Phone Mobile Phone    Ronda Keita (Daughter) [phone] -- [phone]    Justo Collier (Son) [phone] -- --          "

## 2025-03-05 NOTE — CONSULTS
Orthopaedic Surgery  Consult Note  Machelle Tinajero PA-C  [phone]    HPI:  Patient is a 80 y.o. Not  or  female who presents with Complaints of left hip pain after a fall.  She presented to the emergency room.  X-rays were obtained which showed a periprosthetic fracture.  She is in excruciating pain.  She is unable to bear weight.    MEDICAL HISTORY  Past Medical History:   Diagnosis Date    Arthritis     Chronic venous insufficiency     Dupuytren's contracture     History of staph infection     S/P KNEE DEC 2016    History of transfusion     Hyperlipidemia     Lymphedema     LEFT LEG    Nontoxic multinodular goiter     Osteoporosis     Dr. Cabrera    Pelvic joint pain, left     Postsurgical hypothyroidism     Presence of left artificial hip joint     METAL ON METAL AS NOTED PER DR CLEMENT NOTE    Restless leg syndrome     Right bundle branch block     Seizure disorder     Dr. Whitman, HX 1980 LAST SEIZURE    Sleep apnea     DOES NOT HAVE MACHINE    Vitamin D insufficiency      Past Surgical History:   Procedure Laterality Date    APPENDECTOMY      CARDIAC CATHETERIZATION      NORMAL     CHOLECYSTECTOMY N/A 11/2/2024    Procedure: CHOLECYSTECTOMY LAPAROSCOPIC WITH DAVINCI ROBOT with cholangiogram, possible open;  Surgeon: Bin Heaton MD;  Location: [facility];  Service: Robotics - DaVinci;  Laterality: N/A;    COLONOSCOPY  08/17/2017    Normal except for anal fissure.  Dr. Brandt.  [facility].    KNEE MINI REVISION Left 11/15/2016    Procedure: LEFT KNEE POLY CHANGE WITH HI POST STABILIZER;  Surgeon: Pablito Claudio MD;  Location: [facility] OR;  Service:     KNEE MINI REVISION Left 12/13/2016    Procedure: LT KNEE REMOVAL/REPLACEMENT LOCKING PIN ;  Surgeon: Pablito Claudio MD;  Location: [facility] OR;  Service:     MAMMO FINE NEEDLE ASPIRATION UNILATERAL      RIGHT THYROID NODULE, 2009 BENIGN     NH ARTHRP KNE CONDYLE&PLATU MEDIAL&LAT COMPARTMENTS Left 12/24/2016     Procedure: LEFT KNEE WASHOUT WITH POLY CHANGE;  Surgeon: Christiano Cesar MD;  Location: [facility] MAIN OR;  Service: Orthopedics    RI REVJ TOTAL KNEE ARTHRP W/WO ALGRFT 1 COMPONENT Left 2/20/2017    Procedure: LT KNEE REMOVAL ANTIBIOTIC SPACER AND KNEE REVISION;  Surgeon: Christiano Cesar MD;  Location: [facility] MAIN OR;  Service: Orthopedics    REPLACEMENT TOTAL KNEE Left     THYROIDECTOMY, PARTIAL      1979 LEFT LOBECTOMY AND ISTHMECTOMY     TOTAL ABDOMINAL HYSTERECTOMY WITH SALPINGO OOPHORECTOMY      TOTAL HIP ARTHROPLASTY Left     TOTAL HIP ARTHROPLASTY Right 9/14/2019    Procedure: TOTAL HIP ARTHROPLASTY ANTERIOR WITH HANA TABLE;  Surgeon: Christiano Cesar II, MD;  Location: [facility] MA[facility] OR;  Service: Orthopedics    TOTAL HIP ARTHROPLASTY REVISION Left 3/9/2021    Procedure: LEFT TOTAL HIP ARTHROPLASTY REVISION POSTERIOR;  Surgeon: Christiano Cesar II, MD;  Location: [facility] MAIN OR;  Service: Orthopedics;  Laterality: Left;    TOTAL KNEE  PROSTHESIS REMOVAL W/ SPACER INSERTION Left 12/29/2016    Procedure: LT KNEE IMPLANT REMOVAL WITH INSERTION OF SPACER ;  Surgeon: Christiano Cesar MD;  Location: [facility] MAIN OR;  Service:      Prior to Admission medications    Medication Sig Start Date End Date Taking? Authorizing Provider   cholecalciferol (VITAMIN D3) 1000 units tablet Take 1 tablet by mouth Daily.  Patient taking differently: Take 4 tablets by mouth Daily. 4/16/18  Yes Duglas Rock MD   Cyanocobalamin (Vitamin B-12) 1000 MCG sublingual tablet 1 tablet daily 2/13/25  Yes Duglas Rock MD   PHENobarbital 64.8 MG tablet Take 2 tablets by mouth Every Night for 7 days. 11/7/24 3/4/25 Yes Jenn Venegas APRN   pramipexole (MIRAPEX) 0.5 MG tablet TAKE 5 TABLETS BY MOUTH ONCE DAILY  Patient taking differently: Take 3 tablets by mouth Daily. TAKE 5 TABLETS BY MOUTH ONCE DAILY 1/20/25  Yes Steven Liu II, MD   pravastatin (PRAVACHOL) 40 MG tablet TAKE 1 TABLET BY MOUTH EVERY NIGHT FOR  "HIGH AMOUNT OF FATS IN THE BLOOD  Patient taking differently: Take 1 tablet by mouth Daily. 7/31/24  Yes Duglas Rock MD   Synthroid 50 MCG tablet Take 1 tablet by mouth Every Morning. Indications: Underactive Thyroid 8/16/24  Yes Duglas Rock MD   PHENobarbital 64.8 MG tablet Take 1 tablet by mouth Daily for 7 days.  Patient not taking: Reported on 3/4/2025 11/7/24 11/14/24  Jenn Venegas APRN     Allergies   Allergen Reactions    Clindamycin/Lincomycin Itching    Duricef [Cefadroxil] Hives and Rash    Sulfa Antibiotics Rash     RASH     Most Recent Immunizations   Administered Date(s) Administered    COVID-19 (PFIZER) Purple Cap Monovalent 04/21/2021    Tdap 12/06/2017     Social History     Tobacco Use    Smoking status: Never     Passive exposure: Never    Smokeless tobacco: Never   Substance Use Topics    Alcohol use: No      Social History     Substance and Sexual Activity   Drug Use No       VITALS: BP 91/51 (BP Location: Right arm, Patient Position: Lying)   Pulse 83   Temp 96.7 °F (35.9 °C) (Oral)   Resp 16   Ht 172.7 cm (68\")   Wt 90.7 kg (200 lb)   SpO2 100%   BMI 30.41 kg/m²  Body mass index is 30.41 kg/m².    PHYSICAL EXAM:   CONSTITUTIONAL: No acute distress  LUNGS: Equal chest rise, no shortness of air  CARDIOVASCULAR: palpable peripheral pulses  SKIN: no skin lesions in the area examined  LYMPH: no lymphadenopathy in the area examined  Musculoskeletal:     She is very difficult to examine due to increased pain.  left leg is lying in external rotation.  Pedal pulses palpated.    RADIOLOGY REVIEW:   CT Head Without Contrast    Result Date: 3/4/2025  1. No acute intracranial abnormality is identified with no significant change when compared to the patient's prior head CT on 09/08/2022.  2. There is mild small vessel disease in the cerebral white matter and tiny old lacunar infarcts in the anterior limbs of the internal capsules, unchanged since 09/08/2022. The remainder of the " head CT is normal with no acute skull fracture or intracranial hemorrhage identified.  Radiation dose reduction techniques were utilized, including automated exposure control and exposure modulation based on body size.   This report was finalized on 3/4/2025 12:58 PM by Dr. Tyson Bolanos M.D on Workstation: [id number]      XR Chest 1 View    Result Date: 3/4/2025   1. Low lung volumes with ill-defined bilateral perihilar and left greater than right bibasilar opacities that could represent atelectasis and/or scarring. 2. Stable cardiomegaly, likely accentuated by technique.  This report was finalized on 3/4/2025 11:31 AM by Thor Werner MD on Workstation: [id number]      XR Femur 2 View Left    Result Date: 3/4/2025   1. Left MAXIMO with comminuted displaced periprosthetic fracture of the subtrochanteric proximal femoral diaphysis. 2. Left TKA with nonspecific lucency at the anterior and lateral bone-prosthesis interface that could represent loosening or infection.  This report was finalized on 3/4/2025 11:18 AM by Thor Werner MD on Workstation: [id number]       LABS:   Results for the past 24 hours:   Recent Results (from the past 24 hours)   Comprehensive Metabolic Panel    Collection Time: 03/04/25 10:17 AM    Specimen: Blood   Result Value Ref Range    Glucose 112 (H) 65 - 99 mg/dL    BUN 15 8 - 23 mg/dL    Creatinine 0.73 0.57 - 1.00 mg/dL    Sodium 140 136 - 145 mmol/L    Potassium 3.6 3.5 - 5.2 mmol/L    Chloride 104 98 - 107 mmol/L    CO2 24.7 22.0 - 29.0 mmol/L    Calcium 9.8 8.6 - 10.5 mg/dL    Total Protein 7.2 6.0 - 8.5 g/dL    Albumin 4.0 3.5 - 5.2 g/dL    ALT (SGPT) 11 1 - 33 U/L    AST (SGOT) 20 1 - 32 U/L    Alkaline Phosphatase 154 (H) 39 - 117 U/L    Total Bilirubin 0.4 0.0 - 1.2 mg/dL    Globulin 3.2 gm/dL    A/G Ratio 1.3 g/dL    BUN/Creatinine Ratio 20.5 7.0 - 25.0    Anion Gap 11.3 5.0 - 15.0 mmol/L    eGFR 84.3 >60.0 mL/min/1.73   CBC Auto Differential    Collection Time: 03/04/25 10:17  AM    Specimen: Blood   Result Value Ref Range    WBC 7.35 3.40 - 10.80 10*3/mm3    RBC 3.94 3.77 - 5.28 10*6/mm3    Hemoglobin 11.5 (L) 12.0 - 15.9 g/dL    Hematocrit 35.1 34.0 - 46.6 %    MCV 89.1 79.0 - 97.0 fL    MCH 29.2 26.6 - 33.0 pg    MCHC 32.8 31.5 - 35.7 g/dL    RDW 12.1 (L) 12.3 - 15.4 %    RDW-SD 38.9 37.0 - 54.0 fl    MPV 10.1 6.0 - 12.0 fL    Platelets 221 140 - 450 10*3/mm3    Neutrophil % 76.2 (H) 42.7 - 76.0 %    Lymphocyte % 16.3 (L) 19.6 - 45.3 %    Monocyte % 7.1 5.0 - 12.0 %    Eosinophil % 0.0 (L) 0.3 - 6.2 %    Basophil % 0.1 0.0 - 1.5 %    Immature Grans % 0.3 0.0 - 0.5 %    Neutrophils, Absolute 5.60 1.70 - 7.00 10*3/mm3    Lymphocytes, Absolute 1.20 0.70 - 3.10 10*3/mm3    Monocytes, Absolute 0.52 0.10 - 0.90 10*3/mm3    Eosinophils, Absolute 0.00 0.00 - 0.40 10*3/mm3    Basophils, Absolute 0.01 0.00 - 0.20 10*3/mm3    Immature Grans, Absolute 0.02 0.00 - 0.05 10*3/mm3    nRBC 0.0 0.0 - 0.2 /100 WBC   High Sensitivity Troponin T    Collection Time: 03/04/25 10:17 AM    Specimen: Blood   Result Value Ref Range    HS Troponin T 13 <14 ng/L   BNP    Collection Time: 03/04/25 10:17 AM    Specimen: Blood   Result Value Ref Range    proBNP 243.0 0.0 - 1,800.0 pg/mL   Magnesium    Collection Time: 03/04/25 10:17 AM    Specimen: Blood   Result Value Ref Range    Magnesium 1.8 1.6 - 2.4 mg/dL   ECG 12 Lead Other; fall    Collection Time: 03/04/25 12:05 PM   Result Value Ref Range    QT Interval 437 ms    QTC Interval 473 ms   High Sensitivity Troponin T 1Hr    Collection Time: 03/04/25  2:12 PM    Specimen: Blood   Result Value Ref Range    HS Troponin T 13 <14 ng/L    Troponin T Numeric Delta 0 Abnormal if >/=3 ng/L   ECG 12 Lead Chest Pain    Collection Time: 03/04/25  8:02 PM   Result Value Ref Range    QT Interval 382 ms    QTC Interval 463 ms   Urinalysis With Microscopic If Indicated (No Culture) - Urine, Clean Catch    Collection Time: 03/04/25  8:47 PM    Specimen: Urine, Clean Catch    Result Value Ref Range    Color, UA Yellow Yellow, Straw    Appearance, UA Clear Clear    pH, UA 6.5 5.0 - 8.0    Specific Gravity, UA 1.019 1.005 - 1.030    Glucose, UA Negative Negative    Ketones, UA Trace (A) Negative    Bilirubin, UA Negative Negative    Blood, UA Negative Negative    Protein, UA Negative Negative    Leuk Esterase, UA Negative Negative    Nitrite, UA Negative Negative    Urobilinogen, UA 1.0 E.U./dL 0.2 - 1.0 E.U./dL   High Sensitivity Troponin T    Collection Time: 03/04/25  9:26 PM    Specimen: Blood   Result Value Ref Range    HS Troponin T 12 <14 ng/L   Magnesium    Collection Time: 03/04/25  9:26 PM    Specimen: Blood   Result Value Ref Range    Magnesium 1.7 1.6 - 2.4 mg/dL   Potassium    Collection Time: 03/04/25  9:26 PM    Specimen: Blood   Result Value Ref Range    Potassium 4.6 3.5 - 5.2 mmol/L   CBC (No Diff)    Collection Time: 03/05/25  3:24 AM    Specimen: Blood   Result Value Ref Range    WBC 5.17 3.40 - 10.80 10*3/mm3    RBC 3.63 (L) 3.77 - 5.28 10*6/mm3    Hemoglobin 10.5 (L) 12.0 - 15.9 g/dL    Hematocrit 33.3 (L) 34.0 - 46.6 %    MCV 91.7 79.0 - 97.0 fL    MCH 28.9 26.6 - 33.0 pg    MCHC 31.5 31.5 - 35.7 g/dL    RDW 11.8 (L) 12.3 - 15.4 %    RDW-SD 39.4 37.0 - 54.0 fl    MPV 11.0 6.0 - 12.0 fL    Platelets 192 140 - 450 10*3/mm3   Basic Metabolic Panel    Collection Time: 03/05/25  6:34 AM    Specimen: Blood   Result Value Ref Range    Glucose 110 (H) 65 - 99 mg/dL    BUN 14 8 - 23 mg/dL    Creatinine 0.59 0.57 - 1.00 mg/dL    Sodium 137 136 - 145 mmol/L    Potassium 4.4 3.5 - 5.2 mmol/L    Chloride 106 98 - 107 mmol/L    CO2 26.6 22.0 - 29.0 mmol/L    Calcium 8.6 8.6 - 10.5 mg/dL    BUN/Creatinine Ratio 23.7 7.0 - 25.0    Anion Gap 4.4 (L) 5.0 - 15.0 mmol/L    eGFR 92.4 >60.0 mL/min/1.73       IMPRESSION:  Patient is a 80 y.o. Not  or  female with  a left periprosthetic hip fracture. this is a very complicated fracture due to there being a knee replacement  in place as well.  Surgical intervention is  indicated.    PLAN:   Admited to: Jd Gupta MD   Surgery is planned for either Friday or Saturday of this week.  This is a very complicated fracture.     Machelle Tinajero PA-C.  Orthopaedic Surgery  [facility]  [phone] - [place] Office  [phone] - [facility]

## 2025-03-05 NOTE — PROGRESS NOTES
Name: Mariela Ruiz ADMIT: 3/4/2025   : 1945  PCP: Nereyda Abdalla MD    MRN: [id number] LOS: 1 days   AGE/SEX: 80 y.o. female  ROOM: [facility]     Subjective   Subjective     No events overnight.  She reports that her pain is not well-controlled.  She tells me that her surgery has been delayed because parts need to be ordered.       Objective   Objective   Vital Signs  Temp:  [96.6 °F (35.9 °C)-98.4 °F (36.9 °C)] 96.7 °F (35.9 °C)  Heart Rate:  [71-97] 83  Resp:  [16-20] 16  BP: ()/(43-78) 91/51  SpO2:  [93 %-100 %] 100 %  on  Flow (L/min) (Oxygen Therapy):  [2] 2;   Device (Oxygen Therapy): nasal cannula  Body mass index is 30.41 kg/m².  Physical Exam  Constitutional:       General: She is not in acute distress.     Appearance: She is not toxic-appearing.   Cardiovascular:      Rate and Rhythm: Normal rate and regular rhythm.      Heart sounds: Normal heart sounds.   Pulmonary:      Effort: Pulmonary effort is normal.      Breath sounds: Normal breath sounds.   Abdominal:      General: Bowel sounds are normal.      Palpations: Abdomen is soft.   Musculoskeletal:         General: Tenderness present.      Right lower leg: Edema present.      Left lower leg: Edema present.   Neurological:      Mental Status: She is alert.   Psychiatric:         Mood and Affect: Mood normal.         Behavior: Behavior normal.         Results Review     I reviewed the patient's new clinical results.  Results from last 7 days   Lab Units 25  0324 25  1017   WBC 10*3/mm3 5.17 7.35   HEMOGLOBIN g/dL 10.5* 11.5*   PLATELETS 10*3/mm3 192 221     Results from last 7 days   Lab Units 25  0634 25  2126 25  1017   SODIUM mmol/L 137  --  140   POTASSIUM mmol/L 4.4 4.6 3.6   CHLORIDE mmol/L 106  --  104   CO2 mmol/L 26.6  --  24.7   BUN mg/dL 14  --  15   CREATININE mg/dL 0.59  --  0.73   GLUCOSE mg/dL 110*  --  112*   Estimated Creatinine Clearance: 92.5 mL/min (by C-G formula based on SCr of 0.59  "mg/dL).  Results from last 7 days   Lab Units 03/04/25  1017   ALBUMIN g/dL 4.0   BILIRUBIN mg/dL 0.4   ALK PHOS U/L 154*   AST (SGOT) U/L 20   ALT (SGPT) U/L 11     Results from last 7 days   Lab Units 03/05/25  0634 03/04/25  2126 03/04/25  1017   CALCIUM mg/dL 8.6  --  9.8   ALBUMIN g/dL  --   --  4.0   MAGNESIUM mg/dL  --  1.7 1.8       SARS-CoV-2 PCR   Date Value Ref Range Status   03/06/2021 Not Detected Not Detected Final     Comment:     Nucleic acid specific to SARS-CoV-2 (COVID-19) virus was not detected in  this sample by the TaqPath (TM) COVID-19 Combo Kit.          SARS-CoV-2 (COVID-19) nucleic acid testing performed using [employer] Aptima (R) SARS-CoV-2 Assay or [employer] TaqPath (TM)  COVID-19 Combo Kit as indicated above under Emergency Use Authorization (EUA) from the FDA. Aptima (R) and TaqPath (TM) test performance  characteristics verified by [facility] in accordance with the FDAs Guidance Document (Policy for Diagnostic Tests for Coronavirus Disease-2019  during the Public Health Emergency) issued on March 16, 2020. The laboratory is regulated under CLIA as qualified to perform high-complexity testing  Unless otherwise noted, all testing was performed at [facility], CLIA No. [id number], [place] State Licensee No. [id number]     No results found for: \"HGBA1C\", \"POCGLU\"    CT Head Without Contrast  Narrative: EMERGENCY CT SCAN OF THE HEAD WITHOUT CONTRAST 03/04/2025     CLINICAL HISTORY: This is a 80-year-old female patient who fell and had  possible head trauma.     TECHNIQUE: Spiral CT images were obtained from the base of the skull to  the vertex without intravenous contrast. The images were reformatted and  are submitted in 3 mm thick axial, sagittal and coronal CT sections with  brain algorithm and 2 mm thick axial CT sections with high-resolution  bone algorithm.     This is correlated to a prior head CT from [facility] on  09/08/2022.      FINDINGS: There is some " patchy low density extending from the  periventricular into the subcortical white matter of the cerebral  hemispheres consistent with mild small vessel disease, unchanged. There  are tiny old lacunar infarcts in the anterior limbs of the internal  capsules. This is unchanged. The remainder of the brain parenchyma is  normal in attenuation. The ventricles are normal in size. I see no focal  mass effect. There is no midline shift. No extra-axial fluid collections  are identified. There is no evidence of acute intracranial hemorrhage.  No acute skull fracture is identified. The calvarium and skull base are  normal in appearance. The paranasal sinuses and the mastoid air cells  and middle ear cavities are clear. The orbits are normal in appearance.      Impression: 1. No acute intracranial abnormality is identified with no significant  change when compared to the patient's prior head CT on 09/08/2022.     2. There is mild small vessel disease in the cerebral white matter and  tiny old lacunar infarcts in the anterior limbs of the internal  capsules, unchanged since 09/08/2022. The remainder of the head CT is  normal with no acute skull fracture or intracranial hemorrhage  identified.     Radiation dose reduction techniques were utilized, including automated  exposure control and exposure modulation based on body size.        This report was finalized on 3/4/2025 12:58 PM by Dr. Tyson Bolanos M.D  on Workstation: [id number]     CT Chest Without Contrast Diagnostic, CT Abdomen Pelvis Without Contrast  CT CHEST, ABDOMEN, AND PELVIS WITHOUT CONTRAST     HISTORY: 80-year-old female with left-sided chest pain and abdominal  pain following a fall. No external signs of trauma. Cholecystectomy,  appendectomy, thyroidectomy, hysterectomy and bilateral  salpingo-oophorectomy in the past.     TECHNIQUE: Radiation dose reduction techniques were utilized, including  automated exposure control and exposure modulation based on body  size.   3 mm images were obtained through the chest, abdomen, and pelvis without  the administration of IV contrast. Compared with CT abdomen/pelvis  10/31/2024. No prior chest CT for comparison.     FINDINGS:  CHEST:  1. There are mild atelectatic changes. There is no consolidation  suspicious for pneumonia or pulmonary contusion. There are no pleural or  pericardial effusions. No acute fractures are seen.     2. There is no lymphadenopathy at the chest. There are a few coronary  artery calcifications. There is a 3.5 cm low-attenuation right thyroid  lobe nodule. A nonemergent outpatient thyroid ultrasound is recommended  for follow-up.     ABDOMEN/PELVIS:  1. There is a 7 mm stone within the mid left ureter, just below the  level of the iliac crests, with mild-moderate hydroureteronephrosis,  image 146. The stone was present previously at the lower pole of the  left kidney. There is one other 3 mm nonobstructing stone at the left  kidney. There are no right renal stones.     2. Noncontrasted spleen appears unremarkable and there is no perisplenic  fluid. Noncontrasted liver, pancreas, and adrenals appear unremarkable.  There is no acute bowel abnormality. There is no bowel ileus or  obstruction. There is no colitis. No free fluid or fluid collection. No  acute fractures are seen.        XR Chest 1 View  Narrative: XR CHEST 1 VW-     DATE OF EXAM: 3/4/2025 10:39 AM     INDICATION: Preoperative study for hip fracture fixation.     COMPARISON: Radiographs 11/5/2024 and 3/2/2021. CT 3/4/2025.     TECHNIQUE: A single portable AP view of the chest was obtained.     FINDINGS:  Mild patient rotation. Low lung volumes. Ill-defined bilateral perihilar  and left greater than right bibasilar opacities, which could represent  atelectasis and/or scarring. No pneumothorax. Stable cardiomegaly,  likely accentuated by technique. Incomplete evaluated chronic changes in  both shoulders. No acute osseous abnormality is identified.      Impression:    1. Low lung volumes with ill-defined bilateral perihilar and left  greater than right bibasilar opacities that could represent atelectasis  and/or scarring.  2. Stable cardiomegaly, likely accentuated by technique.     This report was finalized on 3/4/2025 11:31 AM by Thor Werner MD on  Workstation: [id number]     XR Femur 2 View Left  Narrative: XR FEMUR 2 VW LEFT-     DATE OF EXAM: 3/4/2025 10:39 AM     INDICATION: Left hip pain and upper leg.     COMPARISON: CT abdomen pelvis 3/4/2025 and 10/31/2024. CT pelvis  9/8/2022. Radiographs 9/8/2022 and 3/2/2022.     TECHNIQUE: AP and lateral views of the left femur were obtained.     FINDINGS:  The crosstable lateral views are mildly limited by overlying artifacts.  Left MAXIMO with comminuted displaced periprosthetic fracture of the  subtrochanteric proximal femoral diaphysis. The approximately 2.5 cm  medial displacement of the medial fracture fragment and 1.5 cm lateral  displacement of the lateral fracture fragment. The femoral stem extends  approximately 4 cm anterior and lateral to the medullary space into the  adjacent soft tissues. Partially imaged custom TKA. Nonspecific lucency  at the anterior and lateral bone-prosthesis interface measuring up to  approximately 5 mm in width that could represent loosening or infection.  Diffuse soft tissue swelling, greatest laterally.     Impression:    1. Left MAXIMO with comminuted displaced periprosthetic fracture of the  subtrochanteric proximal femoral diaphysis.  2. Left TKA with nonspecific lucency at the anterior and lateral  bone-prosthesis interface that could represent loosening or infection.     This report was finalized on 3/4/2025 11:18 AM by Thor Werner MD on  Workstation: [id number]       Scheduled Medications  cholecalciferol, 4,000 Units, Oral, Daily  levothyroxine, 50 mcg, Oral, QAM  PHENobarbital, 129.6 mg, Oral, Nightly  pramipexole, 2.5 mg, Oral, Nightly  pravastatin, 40 mg, Oral,  Daily  senna-docusate sodium, 2 tablet, Oral, BID  tamsulosin, 0.4 mg, Oral, Daily    Infusions  sodium chloride, 75 mL/hr, Last Rate: 75 mL/hr (03/05/25 0344)    Diet  Diet: Regular/House; Fluid Consistency: Thin (IDDSI 0)       Assessment/Plan     Active Hospital Problems    Diagnosis  POA    **Periprosthetic hip fracture [M97.8XXA, Z96.649]  Not Applicable    Ureterolithiasis [N20.1]  Yes    Hydronephrosis [N13.30]  Yes    Seizure disorder [G40.909]  Yes    Hyperlipidemia [E78.5]  Yes    Venous (peripheral) insufficiency [I87.2]  Yes    Postsurgical hypothyroidism [E89.0]  Yes      Resolved Hospital Problems   No resolved problems to display.       80 y.o. female admitted with Periprosthetic hip fracture.    Ms. Ruiz is a 80 y.o. woman with hypothyroidism, epilepsy, hyperlipidemia, chronic lymphedema who presents with left hip pain after slipping on a rug while leaving wound care clinic. She denies any loss of consciousness, but it sounds like she had a more difficult time with recounting the events earlier and so she underwent extensive imaging in the ED which revealed a left comminuted displaced periprosthetic fracture and a 7 mm stone in the mid left ureter with associated mild to moderate hydroureteronephrosis.      Left MAXIMO periprosthetic fracture and possible left TKA loosening-orthopedic surgery is consulted. RCRI is zero. It sounds like her surgery has been delayed because parts need to be ordered. Earliest surgery would tentatively be Friday. Titrate her norco. Continue prn dilaudid and continue bowel regimen  Left ureterolithiasis causing hydroureteronephrosis-asymptomatic and no renal compromise on labs. On tamsulosin and IVF for trial of passage. Urology is following, but it doesn't sound like there are any immediate plans for surgical intervention.  Epilepsy-phenobarbital  Hypothyroidism-synthroid  Hyperlipidemia-statin  Chronic lymphedema-continue leg wraps. Going to lymphedema clinic as an  outpatient  Right thyroid nodule seen on CT-needs an outpatient ultrasound to evaluate further   Lovenox 40 mg SC daily for DVT prophylaxis.  Full code.  Discussed with patient and nursing staff.  Anticipate discharge to SNU facility timing yet to be determined.      Jd Gupta MD  [facility]  03/05/25  08:40 EST

## 2025-03-05 NOTE — CONSULTS
Pt resting but did acknowledge discomfort (RN working to provide relief). Daughter at bedside. Chp offered supportive presence and prayer. Pt/Daughter expressed thanks. No additional needs identified during visit. Chp remains available for additional support as needed/requested.

## 2025-03-06 LAB
ANION GAP SERPL CALCULATED.3IONS-SCNC: 5 MMOL/L (ref 5–15)
BUN SERPL-MCNC: 14 MG/DL (ref 8–23)
BUN/CREAT SERPL: 22.6 (ref 7–25)
CALCIUM SPEC-SCNC: 8.5 MG/DL (ref 8.6–10.5)
CHLORIDE SERPL-SCNC: 108 MMOL/L (ref 98–107)
CO2 SERPL-SCNC: 25 MMOL/L (ref 22–29)
CREAT SERPL-MCNC: 0.62 MG/DL (ref 0.57–1)
DEPRECATED RDW RBC AUTO: 39.5 FL (ref 37–54)
EGFRCR SERPLBLD CKD-EPI 2021: 91.3 ML/MIN/1.73
ERYTHROCYTE [DISTWIDTH] IN BLOOD BY AUTOMATED COUNT: 11.9 % (ref 12.3–15.4)
FERRITIN SERPL-MCNC: 54.6 NG/ML (ref 13–150)
FOLATE SERPL-MCNC: 11.8 NG/ML (ref 4.78–24.2)
GLUCOSE SERPL-MCNC: 102 MG/DL (ref 65–99)
HAPTOGLOB SERPL-MCNC: 136 MG/DL (ref 30–200)
HCT VFR BLD AUTO: 26.7 % (ref 34–46.6)
HGB BLD-MCNC: 8.5 G/DL (ref 12–15.9)
IRON 24H UR-MRATE: 25 MCG/DL (ref 37–145)
IRON SATN MFR SERPL: 8 % (ref 20–50)
LDH SERPL-CCNC: 148 U/L (ref 135–214)
MCH RBC QN AUTO: 28.7 PG (ref 26.6–33)
MCHC RBC AUTO-ENTMCNC: 31.8 G/DL (ref 31.5–35.7)
MCV RBC AUTO: 90.2 FL (ref 79–97)
PLATELET # BLD AUTO: 169 10*3/MM3 (ref 140–450)
PMV BLD AUTO: 11.2 FL (ref 6–12)
POTASSIUM SERPL-SCNC: 4.3 MMOL/L (ref 3.5–5.2)
RBC # BLD AUTO: 2.96 10*6/MM3 (ref 3.77–5.28)
RETICS # AUTO: 0.04 10*6/MM3 (ref 0.02–0.13)
RETICS/RBC NFR AUTO: 1.39 % (ref 0.7–1.9)
SODIUM SERPL-SCNC: 138 MMOL/L (ref 136–145)
T4 FREE SERPL-MCNC: 1.01 NG/DL (ref 0.92–1.68)
TIBC SERPL-MCNC: 332 MCG/DL (ref 298–536)
TRANSFERRIN SERPL-MCNC: 223 MG/DL (ref 200–360)
TSH SERPL DL<=0.05 MIU/L-ACNC: 2.51 UIU/ML (ref 0.27–4.2)
VIT B12 BLD-MCNC: 293 PG/ML (ref 211–946)
WBC NRBC COR # BLD AUTO: 5.24 10*3/MM3 (ref 3.4–10.8)

## 2025-03-06 PROCEDURE — 84439 ASSAY OF FREE THYROXINE: CPT | Performed by: INTERNAL MEDICINE

## 2025-03-06 PROCEDURE — 82607 VITAMIN B-12: CPT | Performed by: INTERNAL MEDICINE

## 2025-03-06 PROCEDURE — 85027 COMPLETE CBC AUTOMATED: CPT | Performed by: STUDENT IN AN ORGANIZED HEALTH CARE EDUCATION/TRAINING PROGRAM

## 2025-03-06 PROCEDURE — 80048 BASIC METABOLIC PNL TOTAL CA: CPT | Performed by: STUDENT IN AN ORGANIZED HEALTH CARE EDUCATION/TRAINING PROGRAM

## 2025-03-06 PROCEDURE — 25010000002 HYDROMORPHONE PER 4 MG: Performed by: STUDENT IN AN ORGANIZED HEALTH CARE EDUCATION/TRAINING PROGRAM

## 2025-03-06 PROCEDURE — 82746 ASSAY OF FOLIC ACID SERUM: CPT | Performed by: INTERNAL MEDICINE

## 2025-03-06 PROCEDURE — 83615 LACTATE (LD) (LDH) ENZYME: CPT | Performed by: INTERNAL MEDICINE

## 2025-03-06 PROCEDURE — 84481 FREE ASSAY (FT-3): CPT | Performed by: INTERNAL MEDICINE

## 2025-03-06 PROCEDURE — 83010 ASSAY OF HAPTOGLOBIN QUANT: CPT | Performed by: INTERNAL MEDICINE

## 2025-03-06 PROCEDURE — 84443 ASSAY THYROID STIM HORMONE: CPT | Performed by: INTERNAL MEDICINE

## 2025-03-06 PROCEDURE — 25010000002 ENOXAPARIN PER 10 MG: Performed by: STUDENT IN AN ORGANIZED HEALTH CARE EDUCATION/TRAINING PROGRAM

## 2025-03-06 PROCEDURE — 84466 ASSAY OF TRANSFERRIN: CPT | Performed by: INTERNAL MEDICINE

## 2025-03-06 PROCEDURE — 82728 ASSAY OF FERRITIN: CPT | Performed by: INTERNAL MEDICINE

## 2025-03-06 PROCEDURE — 85045 AUTOMATED RETICULOCYTE COUNT: CPT | Performed by: INTERNAL MEDICINE

## 2025-03-06 PROCEDURE — 83540 ASSAY OF IRON: CPT | Performed by: INTERNAL MEDICINE

## 2025-03-06 RX ORDER — FAMOTIDINE 20 MG/1
20 TABLET, FILM COATED ORAL
Status: DISCONTINUED | OUTPATIENT
Start: 2025-03-06 | End: 2025-03-13 | Stop reason: HOSPADM

## 2025-03-06 RX ORDER — ASPIRIN 81 MG/1
81 TABLET ORAL DAILY
Status: DISCONTINUED | OUTPATIENT
Start: 2025-03-06 | End: 2025-03-13 | Stop reason: HOSPADM

## 2025-03-06 RX ADMIN — SENNOSIDES AND DOCUSATE SODIUM 2 TABLET: 50; 8.6 TABLET ORAL at 21:28

## 2025-03-06 RX ADMIN — HYDROMORPHONE HYDROCHLORIDE 0.5 MG: 1 INJECTION, SOLUTION INTRAMUSCULAR; INTRAVENOUS; SUBCUTANEOUS at 12:22

## 2025-03-06 RX ADMIN — ENOXAPARIN SODIUM 40 MG: 100 INJECTION SUBCUTANEOUS at 21:31

## 2025-03-06 RX ADMIN — ASPIRIN 81 MG: 81 TABLET, COATED ORAL at 16:55

## 2025-03-06 RX ADMIN — PRAVASTATIN SODIUM 40 MG: 20 TABLET ORAL at 15:41

## 2025-03-06 RX ADMIN — TAMSULOSIN HYDROCHLORIDE 0.4 MG: 0.4 CAPSULE ORAL at 08:57

## 2025-03-06 RX ADMIN — HYDROCODONE BITARTRATE AND ACETAMINOPHEN 2 TABLET: 5; 325 TABLET ORAL at 21:28

## 2025-03-06 RX ADMIN — HYDROCODONE BITARTRATE AND ACETAMINOPHEN 2 TABLET: 5; 325 TABLET ORAL at 08:57

## 2025-03-06 RX ADMIN — HYDROMORPHONE HYDROCHLORIDE 0.5 MG: 1 INJECTION, SOLUTION INTRAMUSCULAR; INTRAVENOUS; SUBCUTANEOUS at 15:38

## 2025-03-06 RX ADMIN — LEVOTHYROXINE SODIUM 50 MCG: 50 TABLET ORAL at 06:09

## 2025-03-06 RX ADMIN — Medication 4000 UNITS: at 08:57

## 2025-03-06 RX ADMIN — PRAMIPEXOLE DIHYDROCHLORIDE 2.5 MG: 1.5 TABLET ORAL at 21:28

## 2025-03-06 RX ADMIN — HYDROMORPHONE HYDROCHLORIDE 0.5 MG: 1 INJECTION, SOLUTION INTRAMUSCULAR; INTRAVENOUS; SUBCUTANEOUS at 06:05

## 2025-03-06 RX ADMIN — HYDROCODONE BITARTRATE AND ACETAMINOPHEN 2 TABLET: 5; 325 TABLET ORAL at 16:56

## 2025-03-06 RX ADMIN — PHENOBARBITAL 129.6 MG: 32.4 TABLET ORAL at 21:29

## 2025-03-06 RX ADMIN — FAMOTIDINE 20 MG: 20 TABLET, FILM COATED ORAL at 16:56

## 2025-03-06 NOTE — PLAN OF CARE
Problem: Adult Inpatient Plan of Care  Goal: Plan of Care Review  Outcome: Progressing  Flowsheets (Taken 3/6/2025 0649)  Progress: improving  Outcome Evaluation: Pain meds per MAR. VSS. Uncontrolled pain with activity, refuses turns. Still unable to void independently. Straight cath x 1. Still awaiting surgery plan. Care plan updated. Continue safety measures and progress towards goals.  Plan of Care Reviewed With: patient  Goal: Patient-Specific Goal (Individualized)  Outcome: Progressing  Goal: Absence of Hospital-Acquired Illness or Injury  Outcome: Progressing  Intervention: Identify and Manage Fall Risk  Recent Flowsheet Documentation  Taken 3/6/2025 0637 by Danielle Schuster RN  Safety Promotion/Fall Prevention:   safety round/check completed   activity supervised   assistive device/personal items within reach   fall prevention program maintained   nonskid shoes/slippers when out of bed  Taken 3/6/2025 0400 by Danielle Schuster, RN  Safety Promotion/Fall Prevention:   safety round/check completed   activity supervised   assistive device/personal items within reach   fall prevention program maintained   nonskid shoes/slippers when out of bed  Taken 3/6/2025 0200 by Danielle Schuster RN  Safety Promotion/Fall Prevention:   safety round/check completed   activity supervised   assistive device/personal items within reach   fall prevention program maintained   nonskid shoes/slippers when out of bed  Taken 3/6/2025 0030 by Danielle Schuster, RN  Safety Promotion/Fall Prevention:   safety round/check completed   activity supervised   assistive device/personal items within reach   fall prevention program maintained   nonskid shoes/slippers when out of bed  Taken 3/5/2025 2230 by Danielle Schuster, RN  Safety Promotion/Fall Prevention:   safety round/check completed   activity supervised   assistive device/personal items within reach   fall prevention program maintained   nonskid shoes/slippers when out of bed  Taken  3/5/2025 2045 by Danielle Schuster RN  Safety Promotion/Fall Prevention:   safety round/check completed   activity supervised   assistive device/personal items within reach   fall prevention program maintained   nonskid shoes/slippers when out of bed  Intervention: Prevent Skin Injury  Recent Flowsheet Documentation  Taken 3/5/2025 2230 by Danielle Schuster RN  Body Position: patient/family refused  Taken 3/5/2025 2045 by Danielle Schuster RN  Body Position: patient/family refused  Intervention: Prevent and Manage VTE (Venous Thromboembolism) Risk  Recent Flowsheet Documentation  Taken 3/5/2025 2045 by Danielle Schuster RN  VTE Prevention/Management: (lovenox) other (see comments)  Intervention: Prevent Infection  Recent Flowsheet Documentation  Taken 3/6/2025 0637 by Danielle Schuster RN  Infection Prevention:   rest/sleep promoted   personal protective equipment utilized  Taken 3/6/2025 0400 by Danielle Schuster RN  Infection Prevention:   rest/sleep promoted   personal protective equipment utilized  Taken 3/6/2025 0200 by Danielle Schuster RN  Infection Prevention:   rest/sleep promoted   personal protective equipment utilized  Taken 3/6/2025 0030 by Danielle Schuster RN  Infection Prevention:   rest/sleep promoted   personal protective equipment utilized  Taken 3/5/2025 2230 by Danielle Schuster RN  Infection Prevention:   rest/sleep promoted   personal protective equipment utilized  Taken 3/5/2025 2045 by Danielle Schuster RN  Infection Prevention:   rest/sleep promoted   personal protective equipment utilized  Goal: Optimal Comfort and Wellbeing  Outcome: Progressing  Intervention: Monitor Pain and Promote Comfort  Recent Flowsheet Documentation  Taken 3/6/2025 0030 by Danielle Schuster RN  Pain Management Interventions:   pillow support provided   pain management plan reviewed with patient/caregiver  Intervention: Provide Person-Centered Care  Recent Flowsheet Documentation  Taken 3/6/2025 0030 by Danielle Schuster  RN  Trust Relationship/Rapport:   care explained   choices provided   questions answered   reassurance provided   thoughts/feelings acknowledged  Taken 3/5/2025 2045 by Danielle Schuster RN  Trust Relationship/Rapport:   care explained   choices provided   questions answered   reassurance provided   thoughts/feelings acknowledged  Goal: Readiness for Transition of Care  Outcome: Progressing   Goal Outcome Evaluation:  Plan of Care Reviewed With: patient        Progress: improving  Outcome Evaluation: Pain meds per MAR. VSS. Uncontrolled pain with activity, refuses turns. Still unable to void independently. Straight cath x 1. Still awaiting surgery plan. Care plan updated. Continue safety measures and progress towards goals.

## 2025-03-06 NOTE — PLAN OF CARE
Goal Outcome Evaluation:  Plan of Care Reviewed With: patient        Progress: no change  Outcome Evaluation: VSS on 2L NC. AOx4. Pt continues to c/o severe pain with any activity or movement. Pt agreeable to turns after speaking with nurse manager, RN continued to provide encouragement and education frequently regarding turns. PRN Norco x2, PRN dilaudid x2. Still unable to void independently, childress placed per order. Plan surgery Saturday.

## 2025-03-06 NOTE — PROGRESS NOTES
Name: Mariela Ruiz ADMIT: 3/4/2025   : 1945  PCP: Nereyda Abdalla MD    MRN: [id number] LOS: 2 days   AGE/SEX: 80 y.o. female  ROOM: [facility]     Subjective   Subjective   Patient reports that her left lower extremity pain is stable.  No feet tingling or weakness.  She is unable to pass urine but had urgency (nursing staff is performing in and out cath).  No fever or chills.    Review of Systems  Cardiovascular/respiratory.  No chest pain/no palpitations/no shortness of breath/no cough  GI.  Positive constipation.  No abdominal pain.  No nausea or vomiting  .  No headache.  No focal neurological symptoms     Objective   Objective   Vital Signs  Temp:  [96.6 °F (35.9 °C)-98.2 °F (36.8 °C)] 98.2 °F (36.8 °C)  Heart Rate:  [87] 87  Resp:  [16-18] 16  BP: ()/(44-69) 101/51  SpO2:  [99 %-100 %] 99 %  on  Flow (L/min) (Oxygen Therapy):  [2] 2;   Device (Oxygen Therapy): nasal cannula    Intake/Output Summary (Last 24 hours) at 3/6/2025 1533  Last data filed at 3/6/2025 1435  Gross per 24 hour   Intake 780 ml   Output 675 ml   Net 105 ml     Body mass index is 30.41 kg/m².      25  1023   Weight: 90.7 kg (200 lb)     Physical Exam  General.  Elderly female.  Alert and oriented x 4.  In no apparent pain/distress/diaphoresis.  Normal mood and affect.  Eyes.  Pupils equal round and reactive.  Intact extraocular musculature.  No pallor or jaundice  Oral cavity.  Mildly dry mucous membrane  Neck.  Supple.  No JVD.  No lymphadenopathy or thyromegaly  Cardiovascular.  Regular rate and rhythm with grade 2 systolic murmur  Chest.  Poor but clear to auscultation bilaterally with no added sounds  Abdomen.  Soft lax.  No tenderness.  No organomegaly.  No guarding or rebound  Extremities.  No clubbing or cyanosis.  +1 to +2 bilateral pitting and nonpitting edema of both lower extremities.  Dressed left leg.  No lower extremity neurodeficits  CNS.  No acute focal neurological deficits    Results Review:   "    Results from last 7 days   Lab Units 03/06/25 0335 03/05/25  0634 03/04/25 2126 03/04/25  1017   SODIUM mmol/L 138 137  --  140   POTASSIUM mmol/L 4.3 4.4 4.6 3.6   CHLORIDE mmol/L 108* 106  --  104   CO2 mmol/L 25.0 26.6  --  24.7   BUN mg/dL 14 14  --  15   CREATININE mg/dL 0.62 0.59  --  0.73   GLUCOSE mg/dL 102* 110*  --  112*   CALCIUM mg/dL 8.5* 8.6  --  9.8   AST (SGOT) U/L  --   --   --  20   ALT (SGPT) U/L  --   --   --  11     Estimated Creatinine Clearance: 88.1 mL/min (by C-G formula based on SCr of 0.62 mg/dL).          Results from last 7 days   Lab Units 03/04/25 2126 03/04/25  1412 03/04/25  1017   HSTROP T ng/L 12 13 13     Results from last 7 days   Lab Units 03/04/25  1017   PROBNP pg/mL 243.0         Results from last 7 days   Lab Units 03/04/25 2126 03/04/25  1017   MAGNESIUM mg/dL 1.7 1.8           Invalid input(s): \"LDLCALC\"  Results from last 7 days   Lab Units 03/06/25 0335 03/05/25 0324 03/04/25  1017   WBC 10*3/mm3 5.24 5.17 7.35   HEMOGLOBIN g/dL 8.5* 10.5* 11.5*   HEMATOCRIT % 26.7* 33.3* 35.1   PLATELETS 10*3/mm3 169 192 221   MCV fL 90.2 91.7 89.1   MCH pg 28.7 28.9 29.2   MCHC g/dL 31.8 31.5 32.8   RDW % 11.9* 11.8* 12.1*   RDW-SD fl 39.5 39.4 38.9   MPV fL 11.2 11.0 10.1   NEUTROPHIL % %  --   --  76.2*   LYMPHOCYTE % %  --   --  16.3*   MONOCYTES % %  --   --  7.1   EOSINOPHIL % %  --   --  0.0*   BASOPHIL % %  --   --  0.1   IMM GRAN % %  --   --  0.3   NEUTROS ABS 10*3/mm3  --   --  5.60   LYMPHS ABS 10*3/mm3  --   --  1.20   MONOS ABS 10*3/mm3  --   --  0.52   EOS ABS 10*3/mm3  --   --  0.00   BASOS ABS 10*3/mm3  --   --  0.01   IMMATURE GRANS (ABS) 10*3/mm3  --   --  0.02   NRBC /100 WBC  --   --  0.0                                 Results from last 7 days   Lab Units 03/04/25  2047   NITRITE UA  Negative           Imaging:  Imaging Results (Last 24 Hours)       ** No results found for the last 24 hours. **               I reviewed the patient's new clinical results / " labs / tests / procedures      Assessment/Plan     Active Hospital Problems    Diagnosis  POA    **Periprosthetic hip fracture [M97.8XXA, Z96.649]  Not Applicable    Ureterolithiasis [N20.1]  Yes    Hydronephrosis [N13.30]  Yes    Seizure disorder [G40.909]  Yes    Hyperlipidemia [E78.5]  Yes    Venous (peripheral) insufficiency [I87.2]  Yes    Postsurgical hypothyroidism [E89.0]  Yes      Resolved Hospital Problems   No resolved problems to display.           Mechanical fall leading to left MAXIMO periprosthetic fracture and possible left TKA loosening.  Currently nonweightbearing.  Orthopedic on board and planning surgery on Saturday.  CT scan of the brain without contrast revealed no acute disease with evidence of old infarction.  CT scan of the chest without contrast with no pulmonary abnormalities.  CT scan of the abdomen pelvis revealed no acute intraabdominal traumatic injury  Incidental right thyroid nodule in a patient with a history of hypothyroidism..  Check thyroid ultrasound and thyroid function test  Urinary retention/obstructing 7 mm left ureteric stone with hydronephrosis.  No evidence of UTI.  Will place a Malin catheter.  S/p urology consultations with outpatient follow-up recommendation and no recommendations for surgical intervention at this time  History of old CVA by CT.  Continue Pravachol.  Add aspirin.  Negative CNS examination for focal deficits  Anemia.  Baseline hemoglobin between 10 and 12.4.  Hemoglobin worse than baseline.  Probably secondary to blood loss from the fracture.  Will workup anemia and monitor CBC.  Epilepsy.  No seizures.  Negative CNS examination.  Continue phenobarbital.  Hyperlipidemia.  Continue statin.  Chronic lymphedema.  Has lymphedema clinic as an outpatient.  Continue leg wrapping  VTE prophylaxis.  Lovenox.              Discussed my findings and plan of treatment with the patient/nurse   Disposition.  To be determined based on clinical course but most likely with  SNF.        Janna Gonzales MD  [facility]  03/06/25  15:33 EST

## 2025-03-06 NOTE — PROGRESS NOTES
"   LOS: 2 days   Patient Care Team:  Nereyda Abdalla MD as PCP - General (Internal Medicine)  Anuj Brandt MD as Consulting Physician (General Surgery)  Ed Chun MD as Surgeon (Orthopedic Surgery)  Amador Richardson DO (Vascular Surgery)  Christiano Cesar II, MD as Consulting Physician (Orthopedic Surgery)  Steven Liu II, MD as Consulting Physician (Neurology)      Subjective   Interval History: Patient reports no flank pain at this time. She would not be interested in any procedure to treat her stone.    Objective     ROS   12 POINT NEG ROS PERTINENT IN HPI      Vital Signs  Temp:  [96.6 °F (35.9 °C)-98.2 °F (36.8 °C)] 98.2 °F (36.8 °C)  Heart Rate:  [83-87] 87  Resp:  [16-18] 16  BP: ()/(44-69) 101/51      Intake/Output Summary (Last 24 hours) at 3/6/2025 1231  Last data filed at 3/6/2025 0857  Gross per 24 hour   Intake 900 ml   Output 675 ml   Net 225 ml       Flowsheet Rows      Flowsheet Row First Filed Value   Admission Height 172.7 cm (68\") Documented at 03/04/2025 1023   Admission Weight 90.7 kg (200 lb) Documented at 03/04/2025 1023            Physical Exam:     General appearance: alert, cooperative, oriented        Lab Results (all)       Procedure Component Value Units Date/Time    Basic Metabolic Panel [[id number]]  (Abnormal) Collected: 03/06/25 0335    Specimen: Blood Updated: 03/06/25 0528     Glucose 102 mg/dL      BUN 14 mg/dL      Creatinine 0.62 mg/dL      Sodium 138 mmol/L      Potassium 4.3 mmol/L      Comment: Slight hemolysis detected by analyzer. Result may be falsely elevated.        Chloride 108 mmol/L      CO2 25.0 mmol/L      Calcium 8.5 mg/dL      BUN/Creatinine Ratio 22.6     Anion Gap 5.0 mmol/L      eGFR 91.3 mL/min/1.73     Narrative:      GFR Categories in Chronic Kidney Disease (CKD)      GFR Category          GFR (mL/min/1.73)    Interpretation  G1                     90 or greater         Normal or high (1)  G2                      60-89      "           Mild decrease (1)  G3a                   45-59                Mild to moderate decrease  G3b                   30-44                Moderate to severe decrease  G4                    15-29                Severe decrease  G5                    14 or less           Kidney failure          (1)In the absence of evidence of kidney disease, neither GFR category G1 or G2 fulfill the criteria for CKD.    eGFR calculation 2021 CKD-EPI creatinine equation, which does not include race as a factor    CBC (No Diff) [[id number]]  (Abnormal) Collected: 03/06/25 0335    Specimen: Blood Updated: 03/06/25 0507     WBC 5.24 10*3/mm3      RBC 2.96 10*6/mm3      Hemoglobin 8.5 g/dL      Hematocrit 26.7 %      MCV 90.2 fL      MCH 28.7 pg      MCHC 31.8 g/dL      RDW 11.9 %      RDW-SD 39.5 fl      MPV 11.2 fL      Platelets 169 10*3/mm3     Basic Metabolic Panel [[id number]]  (Abnormal) Collected: 03/05/25 0634    Specimen: Blood Updated: 03/05/25 0717     Glucose 110 mg/dL      BUN 14 mg/dL      Creatinine 0.59 mg/dL      Sodium 137 mmol/L      Potassium 4.4 mmol/L      Chloride 106 mmol/L      CO2 26.6 mmol/L      Calcium 8.6 mg/dL      BUN/Creatinine Ratio 23.7     Anion Gap 4.4 mmol/L      eGFR 92.4 mL/min/1.73     Narrative:      GFR Categories in Chronic Kidney Disease (CKD)      GFR Category          GFR (mL/min/1.73)    Interpretation  G1                     90 or greater         Normal or high (1)  G2                      60-89                Mild decrease (1)  G3a                   45-59                Mild to moderate decrease  G3b                   30-44                Moderate to severe decrease  G4                    15-29                Severe decrease  G5                    14 or less           Kidney failure          (1)In the absence of evidence of kidney disease, neither GFR category G1 or G2 fulfill the criteria for CKD.    eGFR calculation 2021 CKD-EPI creatinine equation, which does not include race as a  factor    CBC (No Diff) [[id number]]  (Abnormal) Collected: 03/05/25 0324    Specimen: Blood Updated: 03/05/25 0419     WBC 5.17 10*3/mm3      RBC 3.63 10*6/mm3      Hemoglobin 10.5 g/dL      Hematocrit 33.3 %      MCV 91.7 fL      MCH 28.9 pg      MCHC 31.5 g/dL      RDW 11.8 %      RDW-SD 39.4 fl      MPV 11.0 fL      Platelets 192 10*3/mm3     Urinalysis With Microscopic If Indicated (No Culture) - Urine, Clean Catch [[id number]]  (Abnormal) Collected: 03/04/25 2047    Specimen: Urine, Clean Catch Updated: 03/04/25 2307     Color, UA Yellow     Appearance, UA Clear     pH, UA 6.5     Specific Gravity, UA 1.019     Glucose, UA Negative     Ketones, UA Trace     Bilirubin, UA Negative     Blood, UA Negative     Protein, UA Negative     Leuk Esterase, UA Negative     Nitrite, UA Negative     Urobilinogen, UA 1.0 E.U./dL    Narrative:      Urine microscopic not indicated.    High Sensitivity Troponin T [[id number]]  (Normal) Collected: 03/04/25 2126    Specimen: Blood Updated: 03/04/25 2203     HS Troponin T 12 ng/L     Narrative:      High Sensitive Troponin T Reference Range:  <14.0 ng/L- Negative Female for AMI  <22.0 ng/L- Negative Male for AMI  >=14 - Abnormal Female indicating possible myocardial injury.  >=22 - Abnormal Male indicating possible myocardial injury.   Clinicians would have to utilize clinical acumen, EKG, Troponin, and serial changes to determine if it is an Acute Myocardial Infarction or myocardial injury due to an underlying chronic condition.         Magnesium [[id number]]  (Normal) Collected: 03/04/25 2126    Specimen: Blood Updated: 03/04/25 2201     Magnesium 1.7 mg/dL     Potassium [[id number]]  (Normal) Collected: 03/04/25 2126    Specimen: Blood Updated: 03/04/25 2201     Potassium 4.6 mmol/L     High Sensitivity Troponin T 1Hr [[id number]]  (Normal) Collected: 03/04/25 1412    Specimen: Blood Updated: 03/04/25 1454     HS Troponin T 13 ng/L      Troponin T Numeric Delta 0 ng/L     Narrative:       High Sensitive Troponin T Reference Range:  <14.0 ng/L- Negative Female for AMI  <22.0 ng/L- Negative Male for AMI  >=14 - Abnormal Female indicating possible myocardial injury.  >=22 - Abnormal Male indicating possible myocardial injury.   Clinicians would have to utilize clinical acumen, EKG, Troponin, and serial changes to determine if it is an Acute Myocardial Infarction or myocardial injury due to an underlying chronic condition.         High Sensitivity Troponin T [[id number]]  (Normal) Collected: 03/04/25 1017    Specimen: Blood Updated: 03/04/25 1308     HS Troponin T 13 ng/L     Narrative:      High Sensitive Troponin T Reference Range:  <14.0 ng/L- Negative Female for AMI  <22.0 ng/L- Negative Male for AMI  >=14 - Abnormal Female indicating possible myocardial injury.  >=22 - Abnormal Male indicating possible myocardial injury.   Clinicians would have to utilize clinical acumen, EKG, Troponin, and serial changes to determine if it is an Acute Myocardial Infarction or myocardial injury due to an underlying chronic condition.         BNP [[id number]]  (Normal) Collected: 03/04/25 1017    Specimen: Blood Updated: 03/04/25 1308     proBNP 243.0 pg/mL     Narrative:      This assay is used as an aid in the diagnosis of individuals suspected of having heart failure. It can be used as an aid in the diagnosis of acute decompensated heart failure (ADHF) in patients presenting with signs and symptoms of ADHF to the emergency department (ED). In addition, NT-proBNP of <300 pg/mL indicates ADHF is not likely.    Age Range Result Interpretation  NT-proBNP Concentration (pg/mL:      <50             Positive            >450                   Gray                 300-450                    Negative             <300    50-75           Positive            >900                  Gray                300-900                  Negative            <300      >75             Positive            >1800                  Darnell                 300-1800                  Negative            <300    Magnesium [[id number]]  (Normal) Collected: 03/04/25 1017    Specimen: Blood Updated: 03/04/25 1305     Magnesium 1.8 mg/dL     Comprehensive Metabolic Panel [[id number]]  (Abnormal) Collected: 03/04/25 1017    Specimen: Blood Updated: 03/04/25 1053     Glucose 112 mg/dL      BUN 15 mg/dL      Creatinine 0.73 mg/dL      Sodium 140 mmol/L      Potassium 3.6 mmol/L      Chloride 104 mmol/L      CO2 24.7 mmol/L      Calcium 9.8 mg/dL      Total Protein 7.2 g/dL      Albumin 4.0 g/dL      ALT (SGPT) 11 U/L      AST (SGOT) 20 U/L      Alkaline Phosphatase 154 U/L      Total Bilirubin 0.4 mg/dL      Globulin 3.2 gm/dL      A/G Ratio 1.3 g/dL      BUN/Creatinine Ratio 20.5     Anion Gap 11.3 mmol/L      eGFR 84.3 mL/min/1.73     Narrative:      GFR Categories in Chronic Kidney Disease (CKD)      GFR Category          GFR (mL/min/1.73)    Interpretation  G1                     90 or greater         Normal or high (1)  G2                      60-89                Mild decrease (1)  G3a                   45-59                Mild to moderate decrease  G3b                   30-44                Moderate to severe decrease  G4                    15-29                Severe decrease  G5                    14 or less           Kidney failure          (1)In the absence of evidence of kidney disease, neither GFR category G1 or G2 fulfill the criteria for CKD.    eGFR calculation 2021 CKD-EPI creatinine equation, which does not include race as a factor    CBC & Differential [[id number]]  (Abnormal) Collected: 03/04/25 1017    Specimen: Blood Updated: 03/04/25 1028    Narrative:      The following orders were created for panel order CBC & Differential.  Procedure                               Abnormality         Status                     ---------                               -----------         ------                     CBC Auto Differential[[id number]]        Abnormal             Final result                 Please view results for these tests on the individual orders.    CBC Auto Differential [[id number]]  (Abnormal) Collected: 03/04/25 1017    Specimen: Blood Updated: 03/04/25 1028     WBC 7.35 10*3/mm3      RBC 3.94 10*6/mm3      Hemoglobin 11.5 g/dL      Hematocrit 35.1 %      MCV 89.1 fL      MCH 29.2 pg      MCHC 32.8 g/dL      RDW 12.1 %      RDW-SD 38.9 fl      MPV 10.1 fL      Platelets 221 10*3/mm3      Neutrophil % 76.2 %      Lymphocyte % 16.3 %      Monocyte % 7.1 %      Eosinophil % 0.0 %      Basophil % 0.1 %      Immature Grans % 0.3 %      Neutrophils, Absolute 5.60 10*3/mm3      Lymphocytes, Absolute 1.20 10*3/mm3      Monocytes, Absolute 0.52 10*3/mm3      Eosinophils, Absolute 0.00 10*3/mm3      Basophils, Absolute 0.01 10*3/mm3      Immature Grans, Absolute 0.02 10*3/mm3      nRBC 0.0 /100 WBC             Imaging Results (All)       Procedure Component Value Units Date/Time    CT Chest Without Contrast Diagnostic [[id number]] Collected: 03/04/25 1147     Updated: 03/05/25 1003    Narrative:      CT CHEST, ABDOMEN, AND PELVIS WITHOUT CONTRAST     HISTORY: 80-year-old female with left-sided chest pain and abdominal  pain following a fall. No external signs of trauma. Cholecystectomy,  appendectomy, thyroidectomy, hysterectomy and bilateral  salpingo-oophorectomy in the past.     TECHNIQUE: Radiation dose reduction techniques were utilized, including  automated exposure control and exposure modulation based on body size.   3 mm images were obtained through the chest, abdomen, and pelvis without  the administration of IV contrast. Compared with CT abdomen/pelvis  10/31/2024. No prior chest CT for comparison.     FINDINGS:  CHEST:  1. There are mild atelectatic changes. There is no consolidation  suspicious for pneumonia or pulmonary contusion. There are no pleural or  pericardial effusions. No acute fractures are seen.     2. There is no lymphadenopathy at the chest.  There are a few coronary  artery calcifications. There is a 3.5 cm low-attenuation right thyroid  lobe nodule. A nonemergent outpatient thyroid ultrasound is recommended  for follow-up.     ABDOMEN/PELVIS:  1. There is a 7 mm stone within the mid left ureter, just below the  level of the iliac crests, with mild-moderate hydroureteronephrosis,  image 146. There is one other 3 mm nonobstructing stone at the left  kidney. There are no right renal stones.     2. Noncontrasted spleen appears unremarkable and there is no perisplenic  fluid. Noncontrasted liver, pancreas, and adrenals appear unremarkable.  There is no acute bowel abnormality. There is no bowel ileus or  obstruction. There is no colitis. No free fluid or fluid collection. No  acute fractures are seen.        This report was finalized on 3/5/2025 9:59 AM by Dr. Lisa Lucas M.D on  Workstation: [id number]       CT Abdomen Pelvis Without Contrast [[id number]] Collected: 03/04/25 1147     Updated: 03/05/25 1003    Narrative:      CT CHEST, ABDOMEN, AND PELVIS WITHOUT CONTRAST     HISTORY: 80-year-old female with left-sided chest pain and abdominal  pain following a fall. No external signs of trauma. Cholecystectomy,  appendectomy, thyroidectomy, hysterectomy and bilateral  salpingo-oophorectomy in the past.     TECHNIQUE: Radiation dose reduction techniques were utilized, including  automated exposure control and exposure modulation based on body size.   3 mm images were obtained through the chest, abdomen, and pelvis without  the administration of IV contrast. Compared with CT abdomen/pelvis  10/31/2024. No prior chest CT for comparison.     FINDINGS:  CHEST:  1. There are mild atelectatic changes. There is no consolidation  suspicious for pneumonia or pulmonary contusion. There are no pleural or  pericardial effusions. No acute fractures are seen.     2. There is no lymphadenopathy at the chest. There are a few coronary  artery calcifications. There is a  3.5 cm low-attenuation right thyroid  lobe nodule. A nonemergent outpatient thyroid ultrasound is recommended  for follow-up.     ABDOMEN/PELVIS:  1. There is a 7 mm stone within the mid left ureter, just below the  level of the iliac crests, with mild-moderate hydroureteronephrosis,  image 146. There is one other 3 mm nonobstructing stone at the left  kidney. There are no right renal stones.     2. Noncontrasted spleen appears unremarkable and there is no perisplenic  fluid. Noncontrasted liver, pancreas, and adrenals appear unremarkable.  There is no acute bowel abnormality. There is no bowel ileus or  obstruction. There is no colitis. No free fluid or fluid collection. No  acute fractures are seen.        This report was finalized on 3/5/2025 9:59 AM by Dr. Lisa Lucas M.D on  Workstation: [id number]       CT Head Without Contrast [[id number]] Collected: 03/04/25 1156     Updated: 03/04/25 1301    Narrative:      EMERGENCY CT SCAN OF THE HEAD WITHOUT CONTRAST 03/04/2025     CLINICAL HISTORY: This is a 80-year-old female patient who fell and had  possible head trauma.     TECHNIQUE: Spiral CT images were obtained from the base of the skull to  the vertex without intravenous contrast. The images were reformatted and  are submitted in 3 mm thick axial, sagittal and coronal CT sections with  brain algorithm and 2 mm thick axial CT sections with high-resolution  bone algorithm.     This is correlated to a prior head CT from [facility] on  09/08/2022.      FINDINGS: There is some patchy low density extending from the  periventricular into the subcortical white matter of the cerebral  hemispheres consistent with mild small vessel disease, unchanged. There  are tiny old lacunar infarcts in the anterior limbs of the internal  capsules. This is unchanged. The remainder of the brain parenchyma is  normal in attenuation. The ventricles are normal in size. I see no focal  mass effect. There is no midline  shift. No extra-axial fluid collections  are identified. There is no evidence of acute intracranial hemorrhage.  No acute skull fracture is identified. The calvarium and skull base are  normal in appearance. The paranasal sinuses and the mastoid air cells  and middle ear cavities are clear. The orbits are normal in appearance.        Impression:      1. No acute intracranial abnormality is identified with no significant  change when compared to the patient's prior head CT on 09/08/2022.     2. There is mild small vessel disease in the cerebral white matter and  tiny old lacunar infarcts in the anterior limbs of the internal  capsules, unchanged since 09/08/2022. The remainder of the head CT is  normal with no acute skull fracture or intracranial hemorrhage  identified.     Radiation dose reduction techniques were utilized, including automated  exposure control and exposure modulation based on body size.        This report was finalized on 3/4/2025 12:58 PM by Dr. Tyson Bolanos M.D  on Workstation: [id number]       XR Chest 1 View [[id number]] Collected: 03/04/25 1127     Updated: 03/04/25 1134    Narrative:      XR CHEST 1 VW-     DATE OF EXAM: 3/4/2025 10:39 AM     INDICATION: Preoperative study for hip fracture fixation.     COMPARISON: Radiographs 11/5/2024 and 3/2/2021. CT 3/4/2025.     TECHNIQUE: A single portable AP view of the chest was obtained.     FINDINGS:  Mild patient rotation. Low lung volumes. Ill-defined bilateral perihilar  and left greater than right bibasilar opacities, which could represent  atelectasis and/or scarring. No pneumothorax. Stable cardiomegaly,  likely accentuated by technique. Incomplete evaluated chronic changes in  both shoulders. No acute osseous abnormality is identified.       Impression:         1. Low lung volumes with ill-defined bilateral perihilar and left  greater than right bibasilar opacities that could represent atelectasis  and/or scarring.  2. Stable cardiomegaly,  likely accentuated by technique.     This report was finalized on 3/4/2025 11:31 AM by Thor Werner MD on  Workstation: [id number]       XR Femur 2 View Left [[id number]] Collected: 03/04/25 1113     Updated: 03/04/25 1121    Narrative:      XR FEMUR 2 VW LEFT-     DATE OF EXAM: 3/4/2025 10:39 AM     INDICATION: Left hip pain and upper leg.     COMPARISON: CT abdomen pelvis 3/4/2025 and 10/31/2024. CT pelvis  9/8/2022. Radiographs 9/8/2022 and 3/2/2022.     TECHNIQUE: AP and lateral views of the left femur were obtained.     FINDINGS:  The crosstable lateral views are mildly limited by overlying artifacts.  Left MAXIMO with comminuted displaced periprosthetic fracture of the  subtrochanteric proximal femoral diaphysis. The approximately 2.5 cm  medial displacement of the medial fracture fragment and 1.5 cm lateral  displacement of the lateral fracture fragment. The femoral stem extends  approximately 4 cm anterior and lateral to the medullary space into the  adjacent soft tissues. Partially imaged custom TKA. Nonspecific lucency  at the anterior and lateral bone-prosthesis interface measuring up to  approximately 5 mm in width that could represent loosening or infection.  Diffuse soft tissue swelling, greatest laterally.       Impression:         1. Left MAXIMO with comminuted displaced periprosthetic fracture of the  subtrochanteric proximal femoral diaphysis.  2. Left TKA with nonspecific lucency at the anterior and lateral  bone-prosthesis interface that could represent loosening or infection.     This report was finalized on 3/4/2025 11:18 AM by Thor Werner MD on  Workstation: [id number]               Medication Review:   Current Facility-Administered Medications   Medication Dose Route Frequency Provider Last Rate Last Admin    sennosides-docusate (PERICOLACE) 8.6-50 MG per tablet 2 tablet  2 tablet Oral BID Jd Gupta MD   2 tablet at 03/04/25 7746    And    polyethylene glycol (MIRALAX) packet 17 g  17 g  Oral Daily PRN Jd Gupta MD        And    bisacodyl (DULCOLAX) EC tablet 5 mg  5 mg Oral Daily PRN Jd Gupta MD        And    bisacodyl (DULCOLAX) suppository 10 mg  10 mg Rectal Daily PRN Jd Gupta MD        cholecalciferol (VITAMIN D3) tablet 4,000 Units  4,000 Units Oral Daily Jd Gupta MD   4,000 Units at 03/06/25 0857    enoxaparin sodium (LOVENOX) syringe 40 mg  40 mg Subcutaneous Nightly Jd Gupta MD   40 mg at 03/05/25 2055    HYDROcodone-acetaminophen (NORCO) 5-325 MG per tablet 2 tablet  2 tablet Oral Q4H PRN Jd Gupta MD   2 tablet at 03/06/25 0857    HYDROmorphone (DILAUDID) injection 0.5 mg  0.5 mg Intravenous Q2H PRN Jd Gupta MD   0.5 mg at 03/06/25 1222    And    naloxone (NARCAN) injection 0.4 mg  0.4 mg Intravenous Q5 Min PRN Jd Gupta MD        levothyroxine (SYNTHROID, LEVOTHROID) tablet 50 mcg  50 mcg Oral QAM Jd Gupta MD   50 mcg at 03/06/25 0609    melatonin tablet 2.5 mg  2.5 mg Oral Nightly PRN Jd Gupta MD        nitroglycerin (NITROSTAT) SL tablet 0.4 mg  0.4 mg Sublingual Q5 Min PRN Jd Gupta MD        ondansetron ODT (ZOFRAN-ODT) disintegrating tablet 4 mg  4 mg Oral Q6H PRN Jd Gupta MD        Or    ondansetron (ZOFRAN) injection 4 mg  4 mg Intravenous Q6H PRN Jd Gupta MD   4 mg at 03/05/25 0109    PHENobarbital tablet 129.6 mg  129.6 mg Oral Nightly Jd Gupta MD   129.6 mg at 03/05/25 2055    pramipexole (MIRAPEX) tablet 2.5 mg  2.5 mg Oral Nightly Jd Gupta MD   2.5 mg at 03/05/25 2055    pravastatin (PRAVACHOL) tablet 40 mg  40 mg Oral Daily Jd Gupta MD   40 mg at 03/05/25 1000    sodium chloride 0.9 % flush 10 mL  10 mL Intravenous PRN Dixon Jewell MD   10 mL at 03/04/25 1332    sodium chloride 0.9 % infusion  75 mL/hr Intravenous Continuous Jd Gupta MD 75 mL/hr at 03/05/25 1652 75 mL/hr at 03/05/25 1652    tamsulosin (FLOMAX) 24 hr capsule 0.4 mg  0.4 mg Oral Daily Jd Gupta MD   0.4  mg at 03/06/25 0857       Current Facility-Administered Medications:     sennosides-docusate (PERICOLACE) 8.6-50 MG per tablet 2 tablet, 2 tablet, Oral, BID, 2 tablet at 03/04/25 1656 **AND** polyethylene glycol (MIRALAX) packet 17 g, 17 g, Oral, Daily PRN **AND** bisacodyl (DULCOLAX) EC tablet 5 mg, 5 mg, Oral, Daily PRN **AND** bisacodyl (DULCOLAX) suppository 10 mg, 10 mg, Rectal, Daily PRN, Jd Gupta MD    cholecalciferol (VITAMIN D3) tablet 4,000 Units, 4,000 Units, Oral, Daily, Jd Gupta MD, 4,000 Units at 03/06/25 0857    enoxaparin sodium (LOVENOX) syringe 40 mg, 40 mg, Subcutaneous, Nightly, Jd Gupta MD, 40 mg at 03/05/25 2055    HYDROcodone-acetaminophen (NORCO) 5-325 MG per tablet 2 tablet, 2 tablet, Oral, Q4H PRN, Jd Gupta MD, 2 tablet at 03/06/25 0857    HYDROmorphone (DILAUDID) injection 0.5 mg, 0.5 mg, Intravenous, Q2H PRN, 0.5 mg at 03/06/25 1222 **AND** naloxone (NARCAN) injection 0.4 mg, 0.4 mg, Intravenous, Q5 Min PRN, Jd Gupta MD    levothyroxine (SYNTHROID, LEVOTHROID) tablet 50 mcg, 50 mcg, Oral, QAM, Jd Gupta MD, 50 mcg at 03/06/25 0609    melatonin tablet 2.5 mg, 2.5 mg, Oral, Nightly PRN, Jd Gupta MD    nitroglycerin (NITROSTAT) SL tablet 0.4 mg, 0.4 mg, Sublingual, Q5 Min PRN, Jd Gupta MD    ondansetron ODT (ZOFRAN-ODT) disintegrating tablet 4 mg, 4 mg, Oral, Q6H PRN **OR** ondansetron (ZOFRAN) injection 4 mg, 4 mg, Intravenous, Q6H PRN, Jd Gupta MD, 4 mg at 03/05/25 0109    PHENobarbital tablet 129.6 mg, 129.6 mg, Oral, Nightly, Jd Gupta MD, 129.6 mg at 03/05/25 2055    pramipexole (MIRAPEX) tablet 2.5 mg, 2.5 mg, Oral, Nightly, Jd Gupta MD, 2.5 mg at 03/05/25 2055    pravastatin (PRAVACHOL) tablet 40 mg, 40 mg, Oral, Daily, Jd Gupta MD, 40 mg at 03/05/25 1000    [COMPLETED] Insert Peripheral IV, , , Once **AND** sodium chloride 0.9 % flush 10 mL, 10 mL, Intravenous, PRN, Dixon Jewell MD, 10 mL at 03/04/25 1332     sodium chloride 0.9 % infusion, 75 mL/hr, Intravenous, Continuous, Jd Gupta MD, Last Rate: 75 mL/hr at 03/05/25 1652, 75 mL/hr at 03/05/25 1652    tamsulosin (FLOMAX) 24 hr capsule 0.4 mg, 0.4 mg, Oral, Daily, Jd Gupta MD, 0.4 mg at 03/06/25 0857  Medications Discontinued During This Encounter   Medication Reason    morphine injection 2 mg     aspirin 81 MG EC tablet *Error    HYDROcodone-acetaminophen (NORCO) 5-325 MG per tablet 1 tablet        Assessment & Plan   Ureteral stone      Periprosthetic hip fracture    Seizure disorder    Hyperlipidemia    Venous (peripheral) insufficiency    Postsurgical hypothyroidism    Ureterolithiasis    Hydronephrosis        Plan   - no urologic interventions planned during her hospitalization  - we will arrange follow up with Dr. Elizondo in 3-4 weeks.   - Please call with questions, concerns, or change in status.    Martín Key Jr., MD  03/06/25  12:31 EST

## 2025-03-07 ENCOUNTER — ANESTHESIA EVENT (OUTPATIENT)
Dept: PERIOP | Facility: HOSPITAL | Age: 80
End: 2025-03-07
Payer: MEDICARE

## 2025-03-07 ENCOUNTER — APPOINTMENT (OUTPATIENT)
Dept: ULTRASOUND IMAGING | Facility: HOSPITAL | Age: 80
End: 2025-03-07
Payer: MEDICARE

## 2025-03-07 LAB
ABO GROUP BLD: NORMAL
ANION GAP SERPL CALCULATED.3IONS-SCNC: 8 MMOL/L (ref 5–15)
ANTI-C: NORMAL
ANTI-D: NORMAL
ANTI-E: NORMAL
BASOPHILS # BLD AUTO: 0.01 10*3/MM3 (ref 0–0.2)
BASOPHILS NFR BLD AUTO: 0.2 % (ref 0–1.5)
BLD GP AB SCN SERPL QL: POSITIVE
BUN SERPL-MCNC: 13 MG/DL (ref 8–23)
BUN/CREAT SERPL: 22.8 (ref 7–25)
CALCIUM SPEC-SCNC: 8.7 MG/DL (ref 8.6–10.5)
CHLORIDE SERPL-SCNC: 108 MMOL/L (ref 98–107)
CO2 SERPL-SCNC: 25 MMOL/L (ref 22–29)
CREAT SERPL-MCNC: 0.57 MG/DL (ref 0.57–1)
DEPRECATED RDW RBC AUTO: 40.4 FL (ref 37–54)
EGFRCR SERPLBLD CKD-EPI 2021: 93.2 ML/MIN/1.73
EOSINOPHIL # BLD AUTO: 0 10*3/MM3 (ref 0–0.4)
EOSINOPHIL NFR BLD AUTO: 0 % (ref 0.3–6.2)
ERYTHROCYTE [DISTWIDTH] IN BLOOD BY AUTOMATED COUNT: 11.9 % (ref 12.3–15.4)
GLUCOSE SERPL-MCNC: 101 MG/DL (ref 65–99)
HCT VFR BLD AUTO: 25.6 % (ref 34–46.6)
HGB BLD-MCNC: 8.2 G/DL (ref 12–15.9)
IMM GRANULOCYTES # BLD AUTO: 0.01 10*3/MM3 (ref 0–0.05)
IMM GRANULOCYTES NFR BLD AUTO: 0.2 % (ref 0–0.5)
LYMPHOCYTES # BLD AUTO: 0.98 10*3/MM3 (ref 0.7–3.1)
LYMPHOCYTES NFR BLD AUTO: 21.8 % (ref 19.6–45.3)
MCH RBC QN AUTO: 29.4 PG (ref 26.6–33)
MCHC RBC AUTO-ENTMCNC: 32 G/DL (ref 31.5–35.7)
MCV RBC AUTO: 91.8 FL (ref 79–97)
MONOCYTES # BLD AUTO: 0.6 10*3/MM3 (ref 0.1–0.9)
MONOCYTES NFR BLD AUTO: 13.4 % (ref 5–12)
NEUTROPHILS NFR BLD AUTO: 2.89 10*3/MM3 (ref 1.7–7)
NEUTROPHILS NFR BLD AUTO: 64.4 % (ref 42.7–76)
NRBC BLD AUTO-RTO: 0 /100 WBC (ref 0–0.2)
PLATELET # BLD AUTO: 182 10*3/MM3 (ref 140–450)
PMV BLD AUTO: 10.7 FL (ref 6–12)
POTASSIUM SERPL-SCNC: 4.2 MMOL/L (ref 3.5–5.2)
RBC # BLD AUTO: 2.79 10*6/MM3 (ref 3.77–5.28)
RH BLD: NEGATIVE
SODIUM SERPL-SCNC: 141 MMOL/L (ref 136–145)
T&S EXPIRATION DATE: NORMAL
T3 SERPL-MCNC: 82 NG/DL (ref 71–180)
T3FREE SERPL-MCNC: 2.2 PG/ML (ref 2–4.4)
WBC NRBC COR # BLD AUTO: 4.49 10*3/MM3 (ref 3.4–10.8)

## 2025-03-07 PROCEDURE — 86870 RBC ANTIBODY IDENTIFICATION: CPT | Performed by: INTERNAL MEDICINE

## 2025-03-07 PROCEDURE — 80048 BASIC METABOLIC PNL TOTAL CA: CPT | Performed by: INTERNAL MEDICINE

## 2025-03-07 PROCEDURE — 86900 BLOOD TYPING SEROLOGIC ABO: CPT | Performed by: INTERNAL MEDICINE

## 2025-03-07 PROCEDURE — 86920 COMPATIBILITY TEST SPIN: CPT

## 2025-03-07 PROCEDURE — 86922 COMPATIBILITY TEST ANTIGLOB: CPT

## 2025-03-07 PROCEDURE — 85025 COMPLETE CBC W/AUTO DIFF WBC: CPT | Performed by: INTERNAL MEDICINE

## 2025-03-07 PROCEDURE — 86901 BLOOD TYPING SEROLOGIC RH(D): CPT | Performed by: INTERNAL MEDICINE

## 2025-03-07 PROCEDURE — 76536 US EXAM OF HEAD AND NECK: CPT

## 2025-03-07 PROCEDURE — 86850 RBC ANTIBODY SCREEN: CPT | Performed by: INTERNAL MEDICINE

## 2025-03-07 PROCEDURE — 25010000002 ENOXAPARIN PER 10 MG: Performed by: STUDENT IN AN ORGANIZED HEALTH CARE EDUCATION/TRAINING PROGRAM

## 2025-03-07 RX ORDER — BISACODYL 10 MG
10 SUPPOSITORY, RECTAL RECTAL DAILY
Status: DISCONTINUED | OUTPATIENT
Start: 2025-03-07 | End: 2025-03-11

## 2025-03-07 RX ORDER — POLYETHYLENE GLYCOL 3350 17 G/17G
17 POWDER, FOR SOLUTION ORAL 2 TIMES DAILY
Status: DISCONTINUED | OUTPATIENT
Start: 2025-03-07 | End: 2025-03-13 | Stop reason: HOSPADM

## 2025-03-07 RX ORDER — BUSPIRONE HYDROCHLORIDE 10 MG/1
5 TABLET ORAL 3 TIMES DAILY
Status: DISCONTINUED | OUTPATIENT
Start: 2025-03-07 | End: 2025-03-13 | Stop reason: HOSPADM

## 2025-03-07 RX ADMIN — PRAVASTATIN SODIUM 40 MG: 20 TABLET ORAL at 08:28

## 2025-03-07 RX ADMIN — LEVOTHYROXINE SODIUM 50 MCG: 50 TABLET ORAL at 06:22

## 2025-03-07 RX ADMIN — HYDROCODONE BITARTRATE AND ACETAMINOPHEN 2 TABLET: 5; 325 TABLET ORAL at 22:13

## 2025-03-07 RX ADMIN — SENNOSIDES AND DOCUSATE SODIUM 2 TABLET: 50; 8.6 TABLET ORAL at 20:41

## 2025-03-07 RX ADMIN — ASPIRIN 81 MG: 81 TABLET, COATED ORAL at 08:27

## 2025-03-07 RX ADMIN — ENOXAPARIN SODIUM 40 MG: 100 INJECTION SUBCUTANEOUS at 20:43

## 2025-03-07 RX ADMIN — PHENOBARBITAL 129.6 MG: 32.4 TABLET ORAL at 20:41

## 2025-03-07 RX ADMIN — TAMSULOSIN HYDROCHLORIDE 0.4 MG: 0.4 CAPSULE ORAL at 08:28

## 2025-03-07 RX ADMIN — PRAMIPEXOLE DIHYDROCHLORIDE 2.5 MG: 1.5 TABLET ORAL at 20:42

## 2025-03-07 RX ADMIN — Medication 4000 UNITS: at 08:28

## 2025-03-07 RX ADMIN — POLYETHYLENE GLYCOL 3350 17 G: 17 POWDER, FOR SOLUTION ORAL at 20:42

## 2025-03-07 RX ADMIN — FAMOTIDINE 20 MG: 20 TABLET, FILM COATED ORAL at 18:14

## 2025-03-07 RX ADMIN — HYDROCODONE BITARTRATE AND ACETAMINOPHEN 2 TABLET: 5; 325 TABLET ORAL at 03:51

## 2025-03-07 RX ADMIN — FAMOTIDINE 20 MG: 20 TABLET, FILM COATED ORAL at 06:22

## 2025-03-07 RX ADMIN — HYDROCODONE BITARTRATE AND ACETAMINOPHEN 2 TABLET: 5; 325 TABLET ORAL at 08:28

## 2025-03-07 RX ADMIN — BUSPIRONE HYDROCHLORIDE 5 MG: 10 TABLET ORAL at 18:15

## 2025-03-07 RX ADMIN — BUSPIRONE HYDROCHLORIDE 5 MG: 10 TABLET ORAL at 09:06

## 2025-03-07 RX ADMIN — BUSPIRONE HYDROCHLORIDE 5 MG: 10 TABLET ORAL at 22:14

## 2025-03-07 RX ADMIN — HYDROCODONE BITARTRATE AND ACETAMINOPHEN 2 TABLET: 5; 325 TABLET ORAL at 13:17

## 2025-03-07 RX ADMIN — HYDROCODONE BITARTRATE AND ACETAMINOPHEN 2 TABLET: 5; 325 TABLET ORAL at 18:15

## 2025-03-07 RX ADMIN — SENNOSIDES AND DOCUSATE SODIUM 2 TABLET: 50; 8.6 TABLET ORAL at 08:28

## 2025-03-07 NOTE — PLAN OF CARE
Pt aox4, pt refusing turns most of today. Pt refusing for RN to remove wrap around LLE, wound consult placed. Plan for surgey tomorrow

## 2025-03-07 NOTE — CASE MANAGEMENT/SOCIAL WORK
Continued Stay Note  [facility]     Patient Name: Mariela Ruiz  MRN: [id number]  Today's Date: 3/7/2025    Admit Date: 3/4/2025    Plan: [facility]oc[facility] Sebastian- accepted and bed available when medically ready   Discharge Plan       Row Name 03/07/25 1017       Plan    Plan Jocelin Sebastian- accepted and bed available when medically ready    Patient/Family in Agreement with Plan yes    Plan Comments Received message from [facility]/Jocelin Sebastian. They will have a bed available for patient when she is medically ready for dc.      Row Name 03/07/25 0947       Plan    Plan Jocelin Sebastian- accepted- CCP will follow up Monday    Patient/Family in Agreement with Plan yes    Plan Comments Plans for OR Saturday. [facility] Sebastian has accepted in [facility]. Attempted to update daughter, Ronda Keita [phone], but received an automated message stating that the call wouldn't go through due to calling restrictions. Transfer packet in [facility]P CCP office and pharmacy updated. CCP will follow up Monday.                   Discharge Codes    No documentation.                 Expected Discharge Date and Time       Expected Discharge Date Expected Discharge Time    Mar 10, 2025               Suzan Montero, RN

## 2025-03-07 NOTE — CASE MANAGEMENT/SOCIAL WORK
Continued Stay Note  [facility]     Patient Name: Mariela Ruiz  MRN: [id number]  Today's Date: 3/7/2025    Admit Date: 3/4/2025    Plan: Remer Romulo- accepted- CCP will follow up Monday   Discharge Plan       Row Name 03/07/25 0947       Plan    Plan Remernaveen Sebastian- accepted- CCP will follow up Monday    Patient/Family in Agreement with Plan yes    Plan Comments Plans for OR Saturday. Jocelin Sebastian has accepted in [facility]. Attempted to update daughter, Ronda Keita [phone], but received an automated message stating that the call wouldn't go through due to calling restrictions. Transfer packet in [facility]P CCP office and pharmacy updated. CCP will follow up Monday.                   Discharge Codes    No documentation.                 Expected Discharge Date and Time       Expected Discharge Date Expected Discharge Time    Mar 10, 2025               Suzan Montero RN

## 2025-03-07 NOTE — PROGRESS NOTES
Name: Mariela Ruiz ADMIT: 3/4/2025   : 1945  PCP: Nereyda Abdalla MD    MRN: [id number] LOS: 3 days   AGE/SEX: 80 y.o. female  ROOM: [facility]     Subjective   Subjective   Patient reports that her left lower extremity pain is about the same.  Positive bilateral feet numbness but no weakness.  Was in urinary retention yesterday and she is status post Malin catheter with clear urine in the bag.  No fever or chills.    Review of Systems  Cardiovascular/respiratory.  No chest pain/no palpitations/no shortness of breath/no cough  GI.  Constipation no bowel movement times few days..  No abdominal pain.  No nausea or vomiting  CNS.  No headache.  No seizures.  No focal neurological symptoms     Objective   Objective   Vital Signs  Temp:  [97.3 °F (36.3 °C)-98.2 °F (36.8 °C)] 98 °F (36.7 °C)  Heart Rate:  [84-94] 84  Resp:  [16] 16  BP: (107-120)/(46-57) 115/57  SpO2:  [96 %-99 %] 99 %  on  Flow (L/min) (Oxygen Therapy):  [2] 2;   Device (Oxygen Therapy): nasal cannula    Intake/Output Summary (Last 24 hours) at 3/7/2025 1558  Last data filed at 3/7/2025 0545  Gross per 24 hour   Intake 240 ml   Output 525 ml   Net -285 ml     Body mass index is 30.41 kg/m².      25  1023   Weight: 90.7 kg (200 lb)     Physical Exam  General.  Elderly female.  Alert and oriented x 4.  In no apparent pain/distress/diaphoresis.  Normal mood and affect.  Eyes.  Pupils equal round and reactive.  Intact extraocular musculature.  No pallor or jaundice  Oral cavity.  Mildly dry mucous membrane  Neck.  Supple.  No JVD.  No lymphadenopathy or thyromegaly  Cardiovascular.  Regular rate and rhythm with grade 2 systolic murmur  Chest.  Poor but clear to auscultation bilaterally with no added sounds  Abdomen.  Soft lax.  No tenderness.  No organomegaly.  No guarding or rebound  Extremities.  No clubbing or cyanosis.  +1 to +2 bilateral pitting and nonpitting edema of both lower extremities.  Dressed left leg.  No lower extremity  "neurodeficits  CNS.  No acute focal neurological deficits    Results Review:      Results from last 7 days   Lab Units 03/07/25  0501 03/06/25  0335 03/05/25  0634 03/04/25 2126 03/04/25  1017   SODIUM mmol/L 141 138 137  --  140   POTASSIUM mmol/L 4.2 4.3 4.4 4.6 3.6   CHLORIDE mmol/L 108* 108* 106  --  104   CO2 mmol/L 25.0 25.0 26.6  --  24.7   BUN mg/dL 13 14 14  --  15   CREATININE mg/dL 0.57 0.62 0.59  --  0.73   GLUCOSE mg/dL 101* 102* 110*  --  112*   CALCIUM mg/dL 8.7 8.5* 8.6  --  9.8   AST (SGOT) U/L  --   --   --   --  20   ALT (SGPT) U/L  --   --   --   --  11     Estimated Creatinine Clearance: 95.8 mL/min (by C-G formula based on SCr of 0.57 mg/dL).          Results from last 7 days   Lab Units 03/04/25 2126 03/04/25  1412 03/04/25  1017   HSTROP T ng/L 12 13 13     Results from last 7 days   Lab Units 03/04/25  1017   PROBNP pg/mL 243.0     Results from last 7 days   Lab Units 03/06/25  1709   TSH uIU/mL 2.510     Results from last 7 days   Lab Units 03/04/25 2126 03/04/25  1017   MAGNESIUM mg/dL 1.7 1.8           Invalid input(s): \"LDLCALC\"  Results from last 7 days   Lab Units 03/07/25  0501 03/06/25  0335 03/05/25  0324 03/04/25  1017   WBC 10*3/mm3 4.49 5.24 5.17 7.35   HEMOGLOBIN g/dL 8.2* 8.5* 10.5* 11.5*   HEMATOCRIT % 25.6* 26.7* 33.3* 35.1   PLATELETS 10*3/mm3 182 169 192 221   MCV fL 91.8 90.2 91.7 89.1   MCH pg 29.4 28.7 28.9 29.2   MCHC g/dL 32.0 31.8 31.5 32.8   RDW % 11.9* 11.9* 11.8* 12.1*   RDW-SD fl 40.4 39.5 39.4 38.9   MPV fL 10.7 11.2 11.0 10.1   NEUTROPHIL % % 64.4  --   --  76.2*   LYMPHOCYTE % % 21.8  --   --  16.3*   MONOCYTES % % 13.4*  --   --  7.1   EOSINOPHIL % % 0.0*  --   --  0.0*   BASOPHIL % % 0.2  --   --  0.1   IMM GRAN % % 0.2  --   --  0.3   NEUTROS ABS 10*3/mm3 2.89  --   --  5.60   LYMPHS ABS 10*3/mm3 0.98  --   --  1.20   MONOS ABS 10*3/mm3 0.60  --   --  0.52   EOS ABS 10*3/mm3 0.00  --   --  0.00   BASOS ABS 10*3/mm3 0.01  --   --  0.01   IMMATURE GRANS " (ABS) 10*3/mm3 0.01  --   --  0.02   NRBC /100 WBC 0.0  --   --  0.0                                 Results from last 7 days   Lab Units 03/04/25  2047   NITRITE UA  Negative           Imaging:  Imaging Results (Last 24 Hours)       Procedure Component Value Units Date/Time    US Thyroid [[id number]] Collected: 03/07/25 0653     Updated: 03/07/25 0702    Narrative:      Thyroid sonogram.     HISTORY: Thyroid nodule on CT     COMPARISON: CT chest 3/4/2025     TECHNIQUE: Grayscale and color Doppler images of the thyroid were  performed.     FINDINGS:  The right lobe of the thyroid measures 5.4 x 3.2 x 3.1 cm. The thyroid  isthmus measures 0.3 cm. Post left thyroidectomy. No soft tissue  nodularity within the left thyroidectomy bed.     The background thyroid has a homogenous echotexture. There are no  findings of abnormal vascularity. No regional adenopathy is visualized  by size criteria.     Thyroid nodules:  *  Right mid 3.6 x 2.5 x 2.6 cm solid, hypoechoic, wider than tall,  smoothly marginated without associated echogenic foci (4 points)  *  Right inferior 1.6 x 1.1 x 2 cm solid, hypoechoic, wider than tall,  smoothly marginated without associated echogenic foci (4 points)             Impression:      Post left thyroidectomy. Two adjacent TI-RADS 4 nodules  measuring 3.6 and 1.6 cm meet criteria for FNA.        This report was finalized on 3/7/2025 6:59 AM by Dr. Obey Nassar M.D on Workstation: [id number]                  I reviewed the patient's new clinical results / labs / tests / procedures      Assessment/Plan     Active Hospital Problems    Diagnosis  POA    **Periprosthetic hip fracture [M97.8XXA, Z96.649]  Not Applicable    Ureterolithiasis [N20.1]  Yes    Hydronephrosis [N13.30]  Yes    Seizure disorder [G40.909]  Yes    Hyperlipidemia [E78.5]  Yes    Venous (peripheral) insufficiency [I87.2]  Yes    Postsurgical hypothyroidism [E89.0]  Yes      Resolved Hospital Problems   No resolved problems to  display.           Mechanical fall leading to left MAXIMO periprosthetic fracture and possible left TKA loosening.  Currently nonweightbearing.  Orthopedic on board and planning surgery tomorrow.  CT scan of the brain without contrast revealed no acute disease with evidence of old infarction.  CT scan of the chest without contrast with no pulmonary abnormalities.  CT scan of the abdomen pelvis revealed no acute intraabdominal traumatic injury  Incidental right thyroid nodule in a patient with a history of hypothyroidism..  Normal thyroid function test.  Thyroid ultrasound with left thyroidectomy and 2 nodules on the right thyroid lobe that will need biopsy as an outpatient.    Urinary retention/obstructing 7 mm left ureteric stone with hydronephrosis.  No evidence of UTI.  Currently on Malin and will try voiding trial prior to discharge.  S/p urology consultations with outpatient follow-up recommendation and no recommendations for surgical intervention at this time  History of old CVA by CT.  Continue Pravachol/ aspirin.  Negative CNS examination for focal deficits  Iron deficiency anemia.  Baseline hemoglobin between 10 and 12.4.  Hemoglobin worse than baseline but stable..  Probably secondary to blood loss from the fracture.  Type and screen 2 units of packed RBCs for tomorrow surgery.   Epilepsy.  No seizures.  Negative CNS examination.  Continue phenobarbital.  Hyperlipidemia.  Continue statin.  Chronic lymphedema.  Has lymphedema clinic as an outpatient.  Continue leg wrapping  VTE prophylaxis.  Lovenox.              Discussed my findings and plan of treatment with the patient/nurse   Disposition.  To be determined based on clinical course but most likely with SNF.        Janna Gonzales MD  [facility]  03/07/25  15:58 EST

## 2025-03-07 NOTE — PLAN OF CARE
Goal Outcome Evaluation:  Plan of Care Reviewed With: patient           Outcome Evaluation: Pt is AOx4. Pt has childress catheter in place. Pt pain controlled with Narco. Pt plan to have surgery on Saturday.

## 2025-03-08 ENCOUNTER — APPOINTMENT (OUTPATIENT)
Dept: GENERAL RADIOLOGY | Facility: HOSPITAL | Age: 80
End: 2025-03-08
Payer: MEDICARE

## 2025-03-08 ENCOUNTER — ANESTHESIA (OUTPATIENT)
Dept: PERIOP | Facility: HOSPITAL | Age: 80
End: 2025-03-08
Payer: MEDICARE

## 2025-03-08 LAB
ANION GAP SERPL CALCULATED.3IONS-SCNC: 4 MMOL/L (ref 5–15)
BASOPHILS # BLD AUTO: 0.01 10*3/MM3 (ref 0–0.2)
BASOPHILS NFR BLD AUTO: 0.3 % (ref 0–1.5)
BUN SERPL-MCNC: 13 MG/DL (ref 8–23)
BUN/CREAT SERPL: 23.2 (ref 7–25)
CALCIUM SPEC-SCNC: 8.9 MG/DL (ref 8.6–10.5)
CHLORIDE SERPL-SCNC: 105 MMOL/L (ref 98–107)
CO2 SERPL-SCNC: 26 MMOL/L (ref 22–29)
CREAT SERPL-MCNC: 0.56 MG/DL (ref 0.57–1)
DEPRECATED RDW RBC AUTO: 40.2 FL (ref 37–54)
EGFRCR SERPLBLD CKD-EPI 2021: 93.5 ML/MIN/1.73
EOSINOPHIL # BLD AUTO: 0 10*3/MM3 (ref 0–0.4)
EOSINOPHIL NFR BLD AUTO: 0 % (ref 0.3–6.2)
ERYTHROCYTE [DISTWIDTH] IN BLOOD BY AUTOMATED COUNT: 12 % (ref 12.3–15.4)
GLUCOSE SERPL-MCNC: 91 MG/DL (ref 65–99)
HCT VFR BLD AUTO: 26 % (ref 34–46.6)
HCT VFR BLD AUTO: 31 % (ref 34–46.6)
HGB BLD-MCNC: 8.5 G/DL (ref 12–15.9)
HGB BLD-MCNC: 9.8 G/DL (ref 12–15.9)
IMM GRANULOCYTES # BLD AUTO: 0.01 10*3/MM3 (ref 0–0.05)
IMM GRANULOCYTES NFR BLD AUTO: 0.3 % (ref 0–0.5)
LYMPHOCYTES # BLD AUTO: 1.14 10*3/MM3 (ref 0.7–3.1)
LYMPHOCYTES NFR BLD AUTO: 29.4 % (ref 19.6–45.3)
MCH RBC QN AUTO: 29.7 PG (ref 26.6–33)
MCHC RBC AUTO-ENTMCNC: 32.7 G/DL (ref 31.5–35.7)
MCV RBC AUTO: 90.9 FL (ref 79–97)
MONOCYTES # BLD AUTO: 0.48 10*3/MM3 (ref 0.1–0.9)
MONOCYTES NFR BLD AUTO: 12.4 % (ref 5–12)
NEUTROPHILS NFR BLD AUTO: 2.24 10*3/MM3 (ref 1.7–7)
NEUTROPHILS NFR BLD AUTO: 57.6 % (ref 42.7–76)
NRBC BLD AUTO-RTO: 0 /100 WBC (ref 0–0.2)
PLATELET # BLD AUTO: 205 10*3/MM3 (ref 140–450)
PMV BLD AUTO: 10.1 FL (ref 6–12)
POTASSIUM SERPL-SCNC: 4.1 MMOL/L (ref 3.5–5.2)
RBC # BLD AUTO: 2.86 10*6/MM3 (ref 3.77–5.28)
SODIUM SERPL-SCNC: 135 MMOL/L (ref 136–145)
WBC NRBC COR # BLD AUTO: 3.88 10*3/MM3 (ref 3.4–10.8)

## 2025-03-08 PROCEDURE — 25010000002 GLYCOPYRROLATE 0.2 MG/ML SOLUTION: Performed by: NURSE ANESTHETIST, CERTIFIED REGISTERED

## 2025-03-08 PROCEDURE — 25010000002 TOBRAMYCIN PER 80 MG: Performed by: ORTHOPAEDIC SURGERY

## 2025-03-08 PROCEDURE — C1713 ANCHOR/SCREW BN/BN,TIS/BN: HCPCS | Performed by: ORTHOPAEDIC SURGERY

## 2025-03-08 PROCEDURE — 25010000002 DEXAMETHASONE PER 1 MG: Performed by: NURSE ANESTHETIST, CERTIFIED REGISTERED

## 2025-03-08 PROCEDURE — C1769 GUIDE WIRE: HCPCS | Performed by: ORTHOPAEDIC SURGERY

## 2025-03-08 PROCEDURE — 25010000002 ONDANSETRON PER 1 MG: Performed by: NURSE ANESTHETIST, CERTIFIED REGISTERED

## 2025-03-08 PROCEDURE — 25810000003 SODIUM CHLORIDE 0.9 % SOLUTION 250 ML FLEX CONT: Performed by: ORTHOPAEDIC SURGERY

## 2025-03-08 PROCEDURE — 25810000003 LACTATED RINGERS PER 1000 ML: Performed by: NURSE ANESTHETIST, CERTIFIED REGISTERED

## 2025-03-08 PROCEDURE — 25010000002 KETOROLAC TROMETHAMINE PER 15 MG: Performed by: ORTHOPAEDIC SURGERY

## 2025-03-08 PROCEDURE — 36430 TRANSFUSION BLD/BLD COMPNT: CPT

## 2025-03-08 PROCEDURE — 85018 HEMOGLOBIN: CPT | Performed by: ANESTHESIOLOGY

## 2025-03-08 PROCEDURE — 25810000003 LACTATED RINGERS PER 1000 ML: Performed by: ANESTHESIOLOGY

## 2025-03-08 PROCEDURE — 85014 HEMATOCRIT: CPT | Performed by: ANESTHESIOLOGY

## 2025-03-08 PROCEDURE — 25010000002 ROPIVACAINE PER 1 MG: Performed by: ORTHOPAEDIC SURGERY

## 2025-03-08 PROCEDURE — 25010000002 EPINEPHRINE 1 MG/ML SOLUTION 1 ML VIAL: Performed by: ORTHOPAEDIC SURGERY

## 2025-03-08 PROCEDURE — C1776 JOINT DEVICE (IMPLANTABLE): HCPCS | Performed by: ORTHOPAEDIC SURGERY

## 2025-03-08 PROCEDURE — 25010000002 HYDROMORPHONE PER 4 MG: Performed by: NURSE ANESTHETIST, CERTIFIED REGISTERED

## 2025-03-08 PROCEDURE — 25010000002 CLONIDINE PER 1 MG: Performed by: ORTHOPAEDIC SURGERY

## 2025-03-08 PROCEDURE — 86900 BLOOD TYPING SEROLOGIC ABO: CPT

## 2025-03-08 PROCEDURE — 25010000002 CEFAZOLIN PER 500 MG: Performed by: ORTHOPAEDIC SURGERY

## 2025-03-08 PROCEDURE — 25010000002 HYDROMORPHONE PER 4 MG: Performed by: STUDENT IN AN ORGANIZED HEALTH CARE EDUCATION/TRAINING PROGRAM

## 2025-03-08 PROCEDURE — 25010000002 PROPOFOL 10 MG/ML EMULSION: Performed by: NURSE ANESTHETIST, CERTIFIED REGISTERED

## 2025-03-08 PROCEDURE — 25010000002 LIDOCAINE 2% SOLUTION: Performed by: NURSE ANESTHETIST, CERTIFIED REGISTERED

## 2025-03-08 PROCEDURE — 25010000002 VANCOMYCIN 1 G RECONSTITUTED SOLUTION 1 EACH VIAL: Performed by: ORTHOPAEDIC SURGERY

## 2025-03-08 PROCEDURE — 25010000002 PHENYLEPHRINE 10 MG/ML SOLUTION 5 ML VIAL: Performed by: NURSE ANESTHETIST, CERTIFIED REGISTERED

## 2025-03-08 PROCEDURE — 25010000002 SUGAMMADEX 200 MG/2ML SOLUTION: Performed by: NURSE ANESTHETIST, CERTIFIED REGISTERED

## 2025-03-08 PROCEDURE — 0SRB0JA REPLACEMENT OF LEFT HIP JOINT WITH SYNTHETIC SUBSTITUTE, UNCEMENTED, OPEN APPROACH: ICD-10-PCS | Performed by: ORTHOPAEDIC SURGERY

## 2025-03-08 PROCEDURE — 76000 FLUOROSCOPY <1 HR PHYS/QHP: CPT

## 2025-03-08 PROCEDURE — P9016 RBC LEUKOCYTES REDUCED: HCPCS

## 2025-03-08 PROCEDURE — 25010000002 ENOXAPARIN PER 10 MG: Performed by: ORTHOPAEDIC SURGERY

## 2025-03-08 PROCEDURE — 85025 COMPLETE CBC W/AUTO DIFF WBC: CPT | Performed by: INTERNAL MEDICINE

## 2025-03-08 PROCEDURE — 73552 X-RAY EXAM OF FEMUR 2/>: CPT

## 2025-03-08 PROCEDURE — 25810000003 SODIUM CHLORIDE 0.9 % SOLUTION 250 ML FLEX CONT: Performed by: NURSE ANESTHETIST, CERTIFIED REGISTERED

## 2025-03-08 PROCEDURE — 25010000002 FENTANYL CITRATE (PF) 50 MCG/ML SOLUTION: Performed by: NURSE ANESTHETIST, CERTIFIED REGISTERED

## 2025-03-08 PROCEDURE — 0QSC04Z REPOSITION LEFT LOWER FEMUR WITH INTERNAL FIXATION DEVICE, OPEN APPROACH: ICD-10-PCS | Performed by: ORTHOPAEDIC SURGERY

## 2025-03-08 PROCEDURE — 25010000002 VANCOMYCIN 1 G RECONSTITUTED SOLUTION: Performed by: ORTHOPAEDIC SURGERY

## 2025-03-08 PROCEDURE — 72170 X-RAY EXAM OF PELVIS: CPT

## 2025-03-08 PROCEDURE — 80048 BASIC METABOLIC PNL TOTAL CA: CPT | Performed by: INTERNAL MEDICINE

## 2025-03-08 RX ORDER — LABETALOL HYDROCHLORIDE 5 MG/ML
5 INJECTION, SOLUTION INTRAVENOUS
Status: DISCONTINUED | OUTPATIENT
Start: 2025-03-08 | End: 2025-03-08 | Stop reason: HOSPADM

## 2025-03-08 RX ORDER — HYDRALAZINE HYDROCHLORIDE 20 MG/ML
5 INJECTION INTRAMUSCULAR; INTRAVENOUS
Status: DISCONTINUED | OUTPATIENT
Start: 2025-03-08 | End: 2025-03-08 | Stop reason: HOSPADM

## 2025-03-08 RX ORDER — ONDANSETRON 2 MG/ML
INJECTION INTRAMUSCULAR; INTRAVENOUS AS NEEDED
Status: DISCONTINUED | OUTPATIENT
Start: 2025-03-08 | End: 2025-03-08 | Stop reason: SURG

## 2025-03-08 RX ORDER — ACETAMINOPHEN 160 MG
TABLET,DISINTEGRATING ORAL AS NEEDED
Status: DISCONTINUED | OUTPATIENT
Start: 2025-03-08 | End: 2025-03-08 | Stop reason: HOSPADM

## 2025-03-08 RX ORDER — HYDROMORPHONE HYDROCHLORIDE 1 MG/ML
0.25 INJECTION, SOLUTION INTRAMUSCULAR; INTRAVENOUS; SUBCUTANEOUS
Status: DISCONTINUED | OUTPATIENT
Start: 2025-03-08 | End: 2025-03-08 | Stop reason: HOSPADM

## 2025-03-08 RX ORDER — TRANEXAMIC ACID 100 MG/ML
INJECTION, SOLUTION INTRAVENOUS AS NEEDED
Status: DISCONTINUED | OUTPATIENT
Start: 2025-03-08 | End: 2025-03-08 | Stop reason: SURG

## 2025-03-08 RX ORDER — FLUMAZENIL 0.1 MG/ML
0.2 INJECTION INTRAVENOUS AS NEEDED
Status: DISCONTINUED | OUTPATIENT
Start: 2025-03-08 | End: 2025-03-08 | Stop reason: HOSPADM

## 2025-03-08 RX ORDER — PROMETHAZINE HYDROCHLORIDE 25 MG/1
25 TABLET ORAL ONCE AS NEEDED
Status: DISCONTINUED | OUTPATIENT
Start: 2025-03-08 | End: 2025-03-08 | Stop reason: HOSPADM

## 2025-03-08 RX ORDER — FENTANYL CITRATE 50 UG/ML
25 INJECTION, SOLUTION INTRAMUSCULAR; INTRAVENOUS
Status: DISCONTINUED | OUTPATIENT
Start: 2025-03-08 | End: 2025-03-08 | Stop reason: HOSPADM

## 2025-03-08 RX ORDER — SODIUM CHLORIDE, SODIUM LACTATE, POTASSIUM CHLORIDE, CALCIUM CHLORIDE 600; 310; 30; 20 MG/100ML; MG/100ML; MG/100ML; MG/100ML
75 INJECTION, SOLUTION INTRAVENOUS CONTINUOUS
Status: ACTIVE | OUTPATIENT
Start: 2025-03-08 | End: 2025-03-09

## 2025-03-08 RX ORDER — TOBRAMYCIN 1.2 G/30ML
INJECTION, POWDER, LYOPHILIZED, FOR SOLUTION INTRAVENOUS AS NEEDED
Status: DISCONTINUED | OUTPATIENT
Start: 2025-03-08 | End: 2025-03-08 | Stop reason: HOSPADM

## 2025-03-08 RX ORDER — ACETAMINOPHEN 650 MG
TABLET, EXTENDED RELEASE ORAL AS NEEDED
Status: DISCONTINUED | OUTPATIENT
Start: 2025-03-08 | End: 2025-03-08 | Stop reason: HOSPADM

## 2025-03-08 RX ORDER — ROCURONIUM BROMIDE 10 MG/ML
INJECTION, SOLUTION INTRAVENOUS AS NEEDED
Status: DISCONTINUED | OUTPATIENT
Start: 2025-03-08 | End: 2025-03-08 | Stop reason: SURG

## 2025-03-08 RX ORDER — ONDANSETRON 2 MG/ML
4 INJECTION INTRAMUSCULAR; INTRAVENOUS ONCE AS NEEDED
Status: DISCONTINUED | OUTPATIENT
Start: 2025-03-08 | End: 2025-03-08 | Stop reason: HOSPADM

## 2025-03-08 RX ORDER — SODIUM CHLORIDE 0.9 % (FLUSH) 0.9 %
3-10 SYRINGE (ML) INJECTION AS NEEDED
Status: DISCONTINUED | OUTPATIENT
Start: 2025-03-08 | End: 2025-03-08 | Stop reason: HOSPADM

## 2025-03-08 RX ORDER — HYDROCODONE BITARTRATE AND ACETAMINOPHEN 7.5; 325 MG/1; MG/1
1 TABLET ORAL EVERY 4 HOURS PRN
Status: DISCONTINUED | OUTPATIENT
Start: 2025-03-08 | End: 2025-03-08 | Stop reason: HOSPADM

## 2025-03-08 RX ORDER — FENTANYL CITRATE 50 UG/ML
50 INJECTION, SOLUTION INTRAMUSCULAR; INTRAVENOUS ONCE AS NEEDED
Status: DISCONTINUED | OUTPATIENT
Start: 2025-03-08 | End: 2025-03-08 | Stop reason: HOSPADM

## 2025-03-08 RX ORDER — DEXAMETHASONE SODIUM PHOSPHATE 4 MG/ML
INJECTION, SOLUTION INTRA-ARTICULAR; INTRALESIONAL; INTRAMUSCULAR; INTRAVENOUS; SOFT TISSUE AS NEEDED
Status: DISCONTINUED | OUTPATIENT
Start: 2025-03-08 | End: 2025-03-08 | Stop reason: SURG

## 2025-03-08 RX ORDER — FENTANYL CITRATE 50 UG/ML
INJECTION, SOLUTION INTRAMUSCULAR; INTRAVENOUS AS NEEDED
Status: DISCONTINUED | OUTPATIENT
Start: 2025-03-08 | End: 2025-03-08 | Stop reason: SURG

## 2025-03-08 RX ORDER — VANCOMYCIN HYDROCHLORIDE 1 G/20ML
INJECTION, POWDER, LYOPHILIZED, FOR SOLUTION INTRAVENOUS AS NEEDED
Status: DISCONTINUED | OUTPATIENT
Start: 2025-03-08 | End: 2025-03-08 | Stop reason: HOSPADM

## 2025-03-08 RX ORDER — LIDOCAINE HYDROCHLORIDE 20 MG/ML
INJECTION, SOLUTION INFILTRATION; PERINEURAL AS NEEDED
Status: DISCONTINUED | OUTPATIENT
Start: 2025-03-08 | End: 2025-03-08 | Stop reason: SURG

## 2025-03-08 RX ORDER — SODIUM CHLORIDE 0.9 % (FLUSH) 0.9 %
3 SYRINGE (ML) INJECTION EVERY 12 HOURS SCHEDULED
Status: DISCONTINUED | OUTPATIENT
Start: 2025-03-08 | End: 2025-03-08 | Stop reason: HOSPADM

## 2025-03-08 RX ORDER — CEFAZOLIN SODIUM 1 G/3ML
INJECTION, POWDER, FOR SOLUTION INTRAMUSCULAR; INTRAVENOUS AS NEEDED
Status: DISCONTINUED | OUTPATIENT
Start: 2025-03-08 | End: 2025-03-08 | Stop reason: HOSPADM

## 2025-03-08 RX ORDER — PROMETHAZINE HYDROCHLORIDE 25 MG/1
25 SUPPOSITORY RECTAL ONCE AS NEEDED
Status: DISCONTINUED | OUTPATIENT
Start: 2025-03-08 | End: 2025-03-08 | Stop reason: HOSPADM

## 2025-03-08 RX ORDER — SODIUM CHLORIDE, SODIUM LACTATE, POTASSIUM CHLORIDE, CALCIUM CHLORIDE 600; 310; 30; 20 MG/100ML; MG/100ML; MG/100ML; MG/100ML
INJECTION, SOLUTION INTRAVENOUS CONTINUOUS PRN
Status: DISCONTINUED | OUTPATIENT
Start: 2025-03-08 | End: 2025-03-08 | Stop reason: SURG

## 2025-03-08 RX ORDER — MAGNESIUM HYDROXIDE 1200 MG/15ML
LIQUID ORAL AS NEEDED
Status: DISCONTINUED | OUTPATIENT
Start: 2025-03-08 | End: 2025-03-08 | Stop reason: HOSPADM

## 2025-03-08 RX ORDER — LIDOCAINE HYDROCHLORIDE 10 MG/ML
0.5 INJECTION, SOLUTION INFILTRATION; PERINEURAL ONCE AS NEEDED
Status: DISCONTINUED | OUTPATIENT
Start: 2025-03-08 | End: 2025-03-08 | Stop reason: HOSPADM

## 2025-03-08 RX ORDER — HYDROCODONE BITARTRATE AND ACETAMINOPHEN 5; 325 MG/1; MG/1
1 TABLET ORAL ONCE AS NEEDED
Status: COMPLETED | OUTPATIENT
Start: 2025-03-08 | End: 2025-03-08

## 2025-03-08 RX ORDER — DIPHENHYDRAMINE HYDROCHLORIDE 50 MG/ML
12.5 INJECTION INTRAMUSCULAR; INTRAVENOUS
Status: DISCONTINUED | OUTPATIENT
Start: 2025-03-08 | End: 2025-03-08 | Stop reason: HOSPADM

## 2025-03-08 RX ORDER — TRANEXAMIC ACID 10 MG/ML
1000 INJECTION, SOLUTION INTRAVENOUS ONCE
Status: DISCONTINUED | OUTPATIENT
Start: 2025-03-08 | End: 2025-03-08 | Stop reason: HOSPADM

## 2025-03-08 RX ORDER — NALOXONE HCL 0.4 MG/ML
0.2 VIAL (ML) INJECTION AS NEEDED
Status: DISCONTINUED | OUTPATIENT
Start: 2025-03-08 | End: 2025-03-08 | Stop reason: HOSPADM

## 2025-03-08 RX ORDER — PROPOFOL 10 MG/ML
VIAL (ML) INTRAVENOUS AS NEEDED
Status: DISCONTINUED | OUTPATIENT
Start: 2025-03-08 | End: 2025-03-08 | Stop reason: SURG

## 2025-03-08 RX ORDER — ATROPINE SULFATE 0.4 MG/ML
0.4 INJECTION, SOLUTION INTRAMUSCULAR; INTRAVENOUS; SUBCUTANEOUS ONCE AS NEEDED
Status: DISCONTINUED | OUTPATIENT
Start: 2025-03-08 | End: 2025-03-08 | Stop reason: HOSPADM

## 2025-03-08 RX ORDER — EPHEDRINE SULFATE 50 MG/ML
5 INJECTION, SOLUTION INTRAVENOUS ONCE AS NEEDED
Status: DISCONTINUED | OUTPATIENT
Start: 2025-03-08 | End: 2025-03-08 | Stop reason: HOSPADM

## 2025-03-08 RX ORDER — IPRATROPIUM BROMIDE AND ALBUTEROL SULFATE 2.5; .5 MG/3ML; MG/3ML
3 SOLUTION RESPIRATORY (INHALATION) ONCE AS NEEDED
Status: DISCONTINUED | OUTPATIENT
Start: 2025-03-08 | End: 2025-03-08 | Stop reason: HOSPADM

## 2025-03-08 RX ORDER — BUPIVACAINE HCL/0.9 % NACL/PF 0.125 %
PLASTIC BAG, INJECTION (ML) EPIDURAL AS NEEDED
Status: DISCONTINUED | OUTPATIENT
Start: 2025-03-08 | End: 2025-03-08 | Stop reason: SURG

## 2025-03-08 RX ORDER — GLYCOPYRROLATE 0.2 MG/ML
INJECTION INTRAMUSCULAR; INTRAVENOUS AS NEEDED
Status: DISCONTINUED | OUTPATIENT
Start: 2025-03-08 | End: 2025-03-08 | Stop reason: SURG

## 2025-03-08 RX ADMIN — Medication 200 MCG: at 08:41

## 2025-03-08 RX ADMIN — CEFAZOLIN 2000 MG: 2 INJECTION, POWDER, FOR SOLUTION INTRAMUSCULAR; INTRAVENOUS at 07:35

## 2025-03-08 RX ADMIN — ROCURONIUM BROMIDE 40 MG: 10 INJECTION, SOLUTION INTRAVENOUS at 07:49

## 2025-03-08 RX ADMIN — SENNOSIDES AND DOCUSATE SODIUM 2 TABLET: 50; 8.6 TABLET ORAL at 20:08

## 2025-03-08 RX ADMIN — SODIUM CHLORIDE, SODIUM LACTATE, POTASSIUM CHLORIDE, AND CALCIUM CHLORIDE: 600; 310; 30; 20 INJECTION, SOLUTION INTRAVENOUS at 07:41

## 2025-03-08 RX ADMIN — PHENYLEPHRINE HYDROCHLORIDE 0.5 MCG/KG/MIN: 10 INJECTION, SOLUTION INTRAVENOUS at 09:26

## 2025-03-08 RX ADMIN — HYDROMORPHONE HYDROCHLORIDE 0.5 MG: 1 INJECTION, SOLUTION INTRAMUSCULAR; INTRAVENOUS; SUBCUTANEOUS at 04:14

## 2025-03-08 RX ADMIN — HYDROMORPHONE HYDROCHLORIDE 0.25 MG: 1 INJECTION, SOLUTION INTRAMUSCULAR; INTRAVENOUS; SUBCUTANEOUS at 13:30

## 2025-03-08 RX ADMIN — SODIUM CHLORIDE 2000 MG: 9 INJECTION, SOLUTION INTRAVENOUS at 18:08

## 2025-03-08 RX ADMIN — ROCURONIUM BROMIDE 20 MG: 10 INJECTION, SOLUTION INTRAVENOUS at 09:37

## 2025-03-08 RX ADMIN — FAMOTIDINE 20 MG: 20 TABLET, FILM COATED ORAL at 18:08

## 2025-03-08 RX ADMIN — ROCURONIUM BROMIDE 20 MG: 10 INJECTION, SOLUTION INTRAVENOUS at 08:51

## 2025-03-08 RX ADMIN — DEXAMETHASONE SODIUM PHOSPHATE 4 MG: 4 INJECTION, SOLUTION INTRA-ARTICULAR; INTRALESIONAL; INTRAMUSCULAR; INTRAVENOUS; SOFT TISSUE at 08:01

## 2025-03-08 RX ADMIN — TRANEXAMIC ACID 1000 MG: 100 INJECTION, SOLUTION INTRAVENOUS at 08:18

## 2025-03-08 RX ADMIN — ENOXAPARIN SODIUM 40 MG: 100 INJECTION SUBCUTANEOUS at 20:17

## 2025-03-08 RX ADMIN — ROCURONIUM BROMIDE 10 MG: 10 INJECTION, SOLUTION INTRAVENOUS at 08:47

## 2025-03-08 RX ADMIN — TRANEXAMIC ACID 1000 MG: 100 INJECTION, SOLUTION INTRAVENOUS at 10:33

## 2025-03-08 RX ADMIN — GLYCOPYRROLATE 0.1 MG: 0.2 INJECTION INTRAMUSCULAR; INTRAVENOUS at 08:01

## 2025-03-08 RX ADMIN — FENTANYL CITRATE 25 MCG: 50 INJECTION, SOLUTION INTRAMUSCULAR; INTRAVENOUS at 08:59

## 2025-03-08 RX ADMIN — FENTANYL CITRATE 25 MCG: 50 INJECTION, SOLUTION INTRAMUSCULAR; INTRAVENOUS at 10:33

## 2025-03-08 RX ADMIN — HYDROCODONE BITARTRATE AND ACETAMINOPHEN 1 TABLET: 5; 325 TABLET ORAL at 13:33

## 2025-03-08 RX ADMIN — Medication 200 MCG: at 08:44

## 2025-03-08 RX ADMIN — Medication 200 MCG: at 09:02

## 2025-03-08 RX ADMIN — SUGAMMADEX 200 MG: 100 INJECTION, SOLUTION INTRAVENOUS at 10:33

## 2025-03-08 RX ADMIN — BUSPIRONE HYDROCHLORIDE 5 MG: 10 TABLET ORAL at 18:08

## 2025-03-08 RX ADMIN — PRAMIPEXOLE DIHYDROCHLORIDE 2.5 MG: 1.5 TABLET ORAL at 20:08

## 2025-03-08 RX ADMIN — PHENOBARBITAL 129.6 MG: 32.4 TABLET ORAL at 20:08

## 2025-03-08 RX ADMIN — Medication 200 MCG: at 09:20

## 2025-03-08 RX ADMIN — Medication 200 MCG: at 09:08

## 2025-03-08 RX ADMIN — LIDOCAINE HYDROCHLORIDE 40 MG: 20 INJECTION, SOLUTION INFILTRATION; PERINEURAL at 07:48

## 2025-03-08 RX ADMIN — PROPOFOL 90 MG: 10 INJECTION, EMULSION INTRAVENOUS at 07:49

## 2025-03-08 RX ADMIN — HYDROMORPHONE HYDROCHLORIDE 0.25 MG: 1 INJECTION, SOLUTION INTRAMUSCULAR; INTRAVENOUS; SUBCUTANEOUS at 11:24

## 2025-03-08 RX ADMIN — ONDANSETRON 4 MG: 2 INJECTION, SOLUTION INTRAMUSCULAR; INTRAVENOUS at 10:33

## 2025-03-08 RX ADMIN — SODIUM CHLORIDE 1000 MG: 9 INJECTION, SOLUTION INTRAVENOUS at 18:30

## 2025-03-08 RX ADMIN — SODIUM CHLORIDE, SODIUM LACTATE, POTASSIUM CHLORIDE, AND CALCIUM CHLORIDE 9 ML/HR: 600; 310; 30; 20 INJECTION, SOLUTION INTRAVENOUS at 07:37

## 2025-03-08 RX ADMIN — POLYETHYLENE GLYCOL 3350 17 G: 17 POWDER, FOR SOLUTION ORAL at 20:08

## 2025-03-08 NOTE — PLAN OF CARE
Goal Outcome Evaluation:           Progress: improving  Outcome Evaluation: RTH revision today, remains drowsy, wound vac, f/c, abduction pillow in place, NWB, PT will eval tomorrow, on monitor, rehab at d/c, educated on padded rails for seizure precautions, CTM

## 2025-03-08 NOTE — ANESTHESIA PROCEDURE NOTES
Airway  Reason: elective    Date/Time: 3/8/2025 7:52 AM  Airway not difficult    General Information and Staff    Patient location during procedure: OR    Indications and Patient Condition  Indications for airway management: airway protection    Preoxygenated: yes    Mask difficulty assessment: 2 - vent by mask + OA or adjuvant +/- NMBA    Final Airway Details    Final airway type: endotracheal airway      Successful airway: ETT  Cuffed: yes   Successful intubation technique: video laryngoscopy  Adjuncts used in placement: intubating stylet  Endotracheal tube insertion site: oral  Blade: CMAC  Blade size: D  ETT size (mm): 7.0  Cormack-Lehane Classification: grade I - full view of glottis  Placement verified by: chest auscultation and capnometry   Measured from: lips  ETT/EBT  to lips (cm): 21  Number of attempts at approach: 1  Assessment: lips, teeth, and gum same as pre-op and atraumatic intubation

## 2025-03-08 NOTE — PLAN OF CARE
Goal Outcome Evaluation:  Plan of Care Reviewed With: patient           Outcome Evaluation: Pt is AOx4. Pt has a childress catheter in place. Pt pain controlled with Narco. Pt is due to have surgury today.

## 2025-03-08 NOTE — PROGRESS NOTES
Name: Mariela Ruiz ADMIT: 3/4/2025   : 1945  PCP: Nereyda Abdalla MD    MRN: [id number] LOS: 4 days   AGE/SEX: 80 y.o. female  ROOM: [facility]     Subjective   Subjective   Under went left total hip arthroplasty revision with ORIF earlier today.  Positive left lower extremity pain.  Positive bilateral feet numbness but no weakness (old)..  Decreased urine output in the [place] bag without hematuria.  No fever or chills.    Review of Systems  Cardiovascular/respiratory.  No chest pain/no palpitations/no shortness of breath/no cough  GI.  No abdominal pain.  No nausea or vomiting.  Cannot remember if she had a bowel movement in the last 24 hours or not  CNS.  No headache.  No seizures.  No focal neurological symptoms     Objective   Objective   Vital Signs  Temp:  [97 °F (36.1 °C)-97.9 °F (36.6 °C)] 97.5 °F (36.4 °C)  Heart Rate:  [73-91] 80  Resp:  [14-19] 14  BP: ()/(41-59) 92/50  SpO2:  [83 %-100 %] 100 %  on  Flow (L/min) (Oxygen Therapy):  [2-4] 2;   Device (Oxygen Therapy): nasal cannula    Intake/Output Summary (Last 24 hours) at 3/8/2025 1621  Last data filed at 3/8/2025 1300  Gross per 24 hour   Intake 2600 ml   Output 2075 ml   Net 525 ml     Body mass index is 30.41 kg/m².      25  1023   Weight: 90.7 kg (200 lb)     Physical Exam  General.  Elderly female.  Somnolent but easily arousable.  And oriented x 3.  In no apparent pain/distress/diaphoresis.  Normal mood and affect.  Eyes.  Pupils equal round and reactive.  Intact extraocular musculature.  No pallor or jaundice  Oral cavity.  Mildly dry mucous membrane  Neck.  Supple.  No JVD.  No lymphadenopathy or thyromegaly  Cardiovascular.  Regular rate and rhythm with grade 2 systolic murmur  Chest.  Poor but clear to auscultation bilaterally with no added sounds  Abdomen.  Soft lax.  No tenderness.  No organomegaly.  No guarding or rebound  Extremities.  No clubbing or cyanosis.  +1 to +2 bilateral pitting and nonpitting edema of both  "lower extremities.  Sequential compression device on both legs.  No lower extremity neurodeficits   CNS.  No acute focal neurological deficits    Results Review:      Results from last 7 days   Lab Units 03/08/25  0443 03/07/25  0501 03/06/25  0335 03/05/25  0634 03/04/25 2126 03/04/25  1017   SODIUM mmol/L 135* 141 138 137  --  140   POTASSIUM mmol/L 4.1 4.2 4.3 4.4 4.6 3.6   CHLORIDE mmol/L 105 108* 108* 106  --  104   CO2 mmol/L 26.0 25.0 25.0 26.6  --  24.7   BUN mg/dL 13 13 14 14  --  15   CREATININE mg/dL 0.56* 0.57 0.62 0.59  --  0.73   GLUCOSE mg/dL 91 101* 102* 110*  --  112*   CALCIUM mg/dL 8.9 8.7 8.5* 8.6  --  9.8   AST (SGOT) U/L  --   --   --   --   --  20   ALT (SGPT) U/L  --   --   --   --   --  11     Estimated Creatinine Clearance: 97.5 mL/min (A) (by C-G formula based on SCr of 0.56 mg/dL (L)).          Results from last 7 days   Lab Units 03/04/25 2126 03/04/25  1412 03/04/25  1017   HSTROP T ng/L 12 13 13     Results from last 7 days   Lab Units 03/04/25  1017   PROBNP pg/mL 243.0     Results from last 7 days   Lab Units 03/06/25  1709   TSH uIU/mL 2.510     Results from last 7 days   Lab Units 03/04/25 2126 03/04/25  1017   MAGNESIUM mg/dL 1.7 1.8           Invalid input(s): \"LDLCALC\"  Results from last 7 days   Lab Units 03/08/25  1243 03/08/25  0443 03/07/25  0501 03/06/25  0335 03/05/25  0324 03/04/25  1017   WBC 10*3/mm3  --  3.88 4.49 5.24 5.17 7.35   HEMOGLOBIN g/dL 9.8* 8.5* 8.2* 8.5* 10.5* 11.5*   HEMATOCRIT % 31.0* 26.0* 25.6* 26.7* 33.3* 35.1   PLATELETS 10*3/mm3  --  205 182 169 192 221   MCV fL  --  90.9 91.8 90.2 91.7 89.1   MCH pg  --  29.7 29.4 28.7 28.9 29.2   MCHC g/dL  --  32.7 32.0 31.8 31.5 32.8   RDW %  --  12.0* 11.9* 11.9* 11.8* 12.1*   RDW-SD fl  --  40.2 40.4 39.5 39.4 38.9   MPV fL  --  10.1 10.7 11.2 11.0 10.1   NEUTROPHIL % %  --  57.6 64.4  --   --  76.2*   LYMPHOCYTE % %  --  29.4 21.8  --   --  16.3*   MONOCYTES % %  --  12.4* 13.4*  --   --  7.1   EOSINOPHIL % " %  --  0.0* 0.0*  --   --  0.0*   BASOPHIL % %  --  0.3 0.2  --   --  0.1   IMM GRAN % %  --  0.3 0.2  --   --  0.3   NEUTROS ABS 10*3/mm3  --  2.24 2.89  --   --  5.60   LYMPHS ABS 10*3/mm3  --  1.14 0.98  --   --  1.20   MONOS ABS 10*3/mm3  --  0.48 0.60  --   --  0.52   EOS ABS 10*3/mm3  --  0.00 0.00  --   --  0.00   BASOS ABS 10*3/mm3  --  0.01 0.01  --   --  0.01   IMMATURE GRANS (ABS) 10*3/mm3  --  0.01 0.01  --   --  0.02   NRBC /100 WBC  --  0.0 0.0  --   --  0.0                                 Results from last 7 days   Lab Units 03/04/25  2047   NITRITE UA  Negative           Imaging:  Imaging Results (Last 24 Hours)       Procedure Component Value Units Date/Time    XR Femur 2 View Left [[id number]] Collected: 03/08/25 1237     Updated: 03/08/25 1250    Narrative:      XR PELVIS 1 OR 2 VW-, XR FEMUR 2 VW LEFT-     DATE OF EXAM: 3/8/2025 11:17 AM     INDICATION: Postop fracture fixation.     COMPARISON: Radiographs 3/8/2025, 3/4/2025, and 9/8/2022. CT 3/4/2025.     TECHNIQUE: An AP view of the pelvis was obtained with additional AP and  frog-leg lateral views of the left femur.     FINDINGS:  Pelvis: The upper pelvis is not included in the field-of-view. Partially  imaged revision left MAXIMO with longstem femoral component and fixation of  the now nondisplaced fracture of the proximal left femur with partially  imaged femoral , plate and screw fixation hardware, and cerclage  wire fixation hardware. The hardware is in its expected position.  Expected postoperative air in the soft tissues. Right MAXIMO and proximal  femoral cerclage wire with evidence of hardware complications. Old  healed fracture deformities of the left superior and inferior pubic  rami. Phleboliths in the pelvis.     Left femur: Postoperative changes from revision MAXIMO placement with  longstem femoral component and fixation of the now nondisplaced fracture  of the proximal left femur with femoral graft, plate and screw fixation  hardware,  and cerclage wire fixation hardware. The proximal portion of  the distal plate and screw projects mildly anterior to the femoral  cortex. Partially imaged custom TKA with longstem femoral component. The  most distal cerclage wire results in a periprosthetic impaction fracture  of the distal femoral diaphysis with up to approximately 4 mm impaction.  Partially obscured lucency at the lateral distal femoral bone-cement  interface. Expected postoperative air in the soft tissues. Anterolateral  skin staples.       Impression:      Postoperative changes from revision left MAXIMO placement with longstem  femoral component and internal fixation of the now nondisplaced fracture  of the proximal femur. The most distal cerclage wire results in a  periprosthetic fracture of the distal femoral diaphysis.     This report was finalized on 3/8/2025 12:47 PM by Thor Werner MD on  Workstation: [id number]       XR Pelvis 1 or 2 View [[id number]] Collected: 03/08/25 1237     Updated: 03/08/25 1250    Narrative:      XR PELVIS 1 OR 2 VW-, XR FEMUR 2 VW LEFT-     DATE OF EXAM: 3/8/2025 11:17 AM     INDICATION: Postop fracture fixation.     COMPARISON: Radiographs 3/8/2025, 3/4/2025, and 9/8/2022. CT 3/4/2025.     TECHNIQUE: An AP view of the pelvis was obtained with additional AP and  frog-leg lateral views of the left femur.     FINDINGS:  Pelvis: The upper pelvis is not included in the field-of-view. Partially  imaged revision left MAXIMO with longstem femoral component and fixation of  the now nondisplaced fracture of the proximal left femur with partially  imaged femoral , plate and screw fixation hardware, and cerclage  wire fixation hardware. The hardware is in its expected position.  Expected postoperative air in the soft tissues. Right MAXIMO and proximal  femoral cerclage wire with evidence of hardware complications. Old  healed fracture deformities of the left superior and inferior pubic  rami. Phleboliths in the pelvis.      Left femur: Postoperative changes from revision MAXIMO placement with  longstem femoral component and fixation of the now nondisplaced fracture  of the proximal left femur with femoral graft, plate and screw fixation  hardware, and cerclage wire fixation hardware. The proximal portion of  the distal plate and screw projects mildly anterior to the femoral  cortex. Partially imaged custom TKA with longstem femoral component. The  most distal cerclage wire results in a periprosthetic impaction fracture  of the distal femoral diaphysis with up to approximately 4 mm impaction.  Partially obscured lucency at the lateral distal femoral bone-cement  interface. Expected postoperative air in the soft tissues. Anterolateral  skin staples.       Impression:      Postoperative changes from revision left MAXIMO placement with longstem  femoral component and internal fixation of the now nondisplaced fracture  of the proximal femur. The most distal cerclage wire results in a  periprosthetic fracture of the distal femoral diaphysis.     This report was finalized on 3/8/2025 12:47 PM by Thor Werner MD on  Workstation: [id number]       FL C Arm During Surgery [[id number]] Resulted: 03/08/25 0957     Updated: 03/08/25 0957    Narrative:      This procedure was auto-finalized with no dictation required.               I reviewed the patient's new clinical results / labs / tests / procedures      Assessment/Plan     Active Hospital Problems    Diagnosis  POA    **Periprosthetic hip fracture [M97.8XXA, Z96.649]  Not Applicable    Ureterolithiasis [N20.1]  Yes    Hydronephrosis [N13.30]  Yes    Seizure disorder [G40.909]  Yes    Hyperlipidemia [E78.5]  Yes    Venous (peripheral) insufficiency [I87.2]  Yes    Postsurgical hypothyroidism [E89.0]  Yes      Resolved Hospital Problems   No resolved problems to display.           Mechanical fall leading to left MAXIMO periprosthetic fracture and possible left TKA loosening.  Patient is s/p left MAXIMO  revision and ORIF earlier today.    CT scan of the brain without contrast revealed no acute disease with evidence of old infarction.  CT scan of the chest without contrast with no pulmonary abnormalities.  CT scan of the abdomen pelvis revealed no acute intraabdominal traumatic injury.  Orthopedic has the patient on 3 days of vancomycin.  Incidental right thyroid nodule in a patient with a history of hypothyroidism..  Normal thyroid function test.  Thyroid ultrasound with left thyroidectomy and 2 nodules on the right thyroid lobe that will need biopsy as an outpatient.    Urinary retention/obstructing 7 mm left ureteric stone with hydronephrosis.  No evidence of UTI.  Currently on Malin and will try voiding trial prior to discharge.  S/p urology consultations with outpatient follow-up recommendation and no recommendations for surgical intervention at this time.  Normal renal function.  Mild dehydration.  Short course of IV fluid.  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  Continue Pravachol/ aspirin.  Negative CNS examination for focal deficits  Iron deficiency anemia.  Baseline hemoglobin between 10 and 12.4.  Hemoglobin worse than baseline but stable..  Probably secondary to blood loss from the fracture.    Transfuse if hemoglobin less than 8  Hypotension.  Mostly secondary to dehydration and anesthesia.  Plan IV fluid and stop Flomax.  Epilepsy.  No seizures.  Negative CNS examination.  Continue phenobarbital.  Hyperlipidemia.  Continue statin.  Chronic lymphedema.  Has lymphedema clinic as an outpatient.  Continue leg wrapping  VTE prophylaxis.  Lovenox subcu.              Discussed my findings and plan of treatment with the patient/nurse   Disposition.  To be determined based on clinical course but most likely with SNF.        Janna Gonzales MD  [facility]  03/08/25  16:21 EST

## 2025-03-08 NOTE — ANESTHESIA POSTPROCEDURE EVALUATION
"Patient: Mariela Ruiz    Procedure Summary       Date: 03/08/25 Room / Location: [facility] OR  / [facility] MAIN OR    Anesthesia Start: 0740 Anesthesia Stop: 1059    Procedure: TOTAL HIP ARTHROPLASTY REVISION (Left: Hip) Diagnosis:       Periprosthetic fracture of hip, initial encounter      (Periprosthetic fracture of hip, initial encounter [M97.8XXA, Z96.649])    Surgeons: Christiano Cesar II, MD Provider: Pieter Gonzalez MD    Anesthesia Type: general ASA Status: 3            Anesthesia Type: general    Vitals  Vitals Value Taken Time   BP 92/45 03/08/25 14:09   Temp 36.3 °C (97.3 °F) 03/08/25 13:00   Pulse 81 03/08/25 14:10   Resp 14 03/08/25 12:45   SpO2 94 % 03/08/25 14:10   Vitals shown include unfiled device data.        Post Anesthesia Care and Evaluation    Patient location during evaluation: bedside  Patient participation: complete - patient participated  Level of consciousness: awake and alert  Pain management: adequate    Airway patency: patent  Anesthetic complications: No anesthetic complications    Cardiovascular status: acceptable  Respiratory status: acceptable  Hydration status: acceptable    Comments: BP (!) 87/51   Pulse 78   Temp 36.3 °C (97.3 °F) (Oral)   Resp 14   Ht 172.7 cm (68\")   Wt 90.7 kg (200 lb)   SpO2 (!) 84%   BMI 30.41 kg/m²     "

## 2025-03-08 NOTE — OP NOTE
Operative Note  Dr. SARATH Cesar II  [phone]    PATIENT NAME: Mariela Ruiz  MRN: [id number]  : 1945 AGE: 80 y.o. GENDER: female  DATE OF OPERATION: 3/8/2025  PREOPERATIVE DIAGNOSIS: Left hip periprosthetic fracture  POSTOPERATIVE DIAGNOSIS: Same  OPERATION PERFORMED: Left total hip revision with open reduction internal fixation of proximal, midshaft, and distal femur fracture  SURGEON: Christiano Cesar MD  Circulator: Julia Gramajo RN  Scrub Person: Phyllis Calvo  Assistant: Trace Marie CSA  ANESTHESIA: General  ASSISTANT: Lucio Marie. This case would not have been possible without another set of skilled surgical hands for retraction, use of instrumentation, and general assistance.  This assistance was vital to the success of the case.   ESTIMATED BLOOD LOSS:  600 cc  SPONGE AND NEEDLE COUNT: Correct  INDICATIONS: This patient suffered a fall and had a severely comminuted periprosthetic fracture with poor bone stock quality.  She was indicated for revision surgery.  Of note she also had a previous knee revision with a femoral stem which complicated matters for the hip revision  COMPONENTS:   Readapt high offset size 20 x 300 mm with a +8 dual mobility head ball, 46 mm outer diameter as well as an accord 8 hole trochanteric  plate and a 13 hole distal femur plate.  A total of 13 cables were used.    CPT For Billing:   Total Hip Revision Femoral Only: 77315  Prophylactic Wiring Of Femur: 14382  Femur Distal Ex-Artic: [id number]    PERTINENT FINDINGS: Comminuted proximal femur fracture along with a supracondylar femur fracture distally    DETAILS OF PROCEDURE:  The patient was met in the preoperative area. The site was marked. The consent and H&P were reviewed. The patient was then wheeled back to the operative suite and underwent anesthesia.     The patient was positioned pegboard lateral.  The hip was prepped and draped in normal sterile fashion.  After a surgical timeout, a  standard posterior approach to the hip was performed and dissection was carried down to the joint.  I was able to make an arthrotomy after removing some scar tissue around the acetabulum, the hip was dislocated.  The head ball was extracted using an osteotome and mallet.  There was very little bone attaching the femoral stem and I was able to loosen this using an osteotome and mallet and then easily removed the stem which was grossly loose.  At that point, I inspected the femur.  There is a comminuted fracture involving the proximal half to two thirds of the femoral shaft and peritrochanteric region.  There was some intact shaft below this and I used a ball-tipped guidewire to measure the remaining femoral shaft which was 12 cm.  I then utilized the reamers for the readapt stem to determine the length of the stem as well as a diameter.  I ended up deciding that I could fit a 300 mm stem which was the longest available.  Prior to passing the reamer, I passed the cable distal to the most distal aspect of the fracture along the remaining femoral shaft.  This cable act as a prophylactic cable to prevent catastrophic femur fracture during reaming, implantation, and early weightbearing.  Failure to pass this cable could have been disastrous for the hip.  After tightening and cutting that cable, I gently reamed the bone on hand, essentially just finding the size of reamer that would fit the remaining shell of bone of her femur shaft.  We then trialed the hip off of this and noted that leg length was reasonable and stability was also pretty good.  That was even before repairing any of the proximal bony fragments.  A real femoral stem was then opened along with a head ball which was dual mobility and this was assembled on the back table.  Stem was then inserted into that remaining piece of femur shaft at the same amount of version as the trial.  I then began the tedious procedure of reconstructing her femur.  I decided that  placing a plate more distally first would be prudent.  The incision was extended through the knee joint and at that point I found that there was a supracondylar femur fracture.  The knee revision stem still appeared fixated, but I did need to fix the fracture.  I would previously plan to use a straight 4.5 mm plate but elected to open the distal femur plates instead to gain more adequate fixation.  After exposing the bone adequately, the plate was slid distally into proper position and I initially passed a cortical screw to hold it in place.  I then passed some cables more proximally around the plate to secure it to bone as I really could not pass bicortical screws due to the stems from both the hip and the knee.  I then passed multiple locking screws distally and was able to get a few unicortical screws into the shaft.  I then passed a few more circumferential cables to help secure the plate even better.  I next measured the length of a proximal femur trochanteric  plate.  I wanted the 2 plates to slightly overlap for better fixation.  The plate was opened along with the necessary cables and these were passed around the femur.  I began with the greater trochanter and tightened this down first ensuring it was in reasonable position.  I then tightened a couple cables distally to make sure that the plate was well opposed to the bone.  All the rest of the cables were then tightened in sequence.  At that point, I obtained an x-ray of the femur to ensure that the reduction was reasonable and the hardware was in expected position.  I was really only able to get an AP x-ray.  Since the x-ray looked appropriate we accepted the reduction. We then irrigated the wound and injected an analgesic cocktail. The wound was meticulously closed in layers using both running and interrupted sutures at every level. An incisional wound vac was then placed.     The patient was awoken from anesthesia and taken to the recovery room in  "stable condition. There were no complications and the patient tolerated the procedure well.    R \"Danny\" Tali ALLEN MD  Orthopaedic Surgery  [facility]  [phone]  "

## 2025-03-08 NOTE — PROGRESS NOTES
"[facility] Clinical Pharmacy Services: Vancomycin Monitoring Note    Mariela Ruiz is a 80 y.o. female who is on day 1/3 of pharmacy to dose vancomycin for surgical ppx .    Previous Vancomycin Dose:     Updated Cultures and Sensitivities:       Vitals/Labs  Ht: 172.7 cm (68\"); Wt: 90.7 kg (200 lb)   Temp Readings from Last 1 Encounters:   03/08/25 97.3 °F (36.3 °C) (Oral)     Estimated Creatinine Clearance: 97.5 mL/min (A) (by C-G formula based on SCr of 0.56 mg/dL (L)).       Results from last 7 days   Lab Units 03/08/25  0443 03/07/25  0501 03/06/25  0335   CREATININE mg/dL 0.56* 0.57 0.62   WBC 10*3/mm3 3.88 4.49 5.24     Assessment/Plan    Current Vancomycin Dose: 1000 mg IV every  12  hours; provides a predicted  mg/L.hr   Next Level Date and Time: not needed based on 3d duration   We will continue to monitor patient changes and renal function     Thank you for involving pharmacy in this patient's care. Please contact pharmacy with any questions or concerns.       Becka Collado [facility]  Clinical Pharmacist          "

## 2025-03-08 NOTE — ANESTHESIA PREPROCEDURE EVALUATION
Anesthesia Evaluation     NPO Solid Status: > 8 hours             Airway   Mallampati: II  TM distance: >3 FB  Neck ROM: full  No difficulty expected  Dental - normal exam   (+) upper dentures    Pulmonary    (+) ,sleep apnea  (-) wheezes  Cardiovascular     Rhythm: regular    (+) dysrhythmias, hyperlipidemia      Neuro/Psych  (+) seizures  GI/Hepatic/Renal/Endo    (+) GERD, thyroid problem     Musculoskeletal     Abdominal    Substance History      OB/GYN          Other                    Anesthesia Plan    ASA 3     general     (  D/W R&B of GA including but not limited to: heart, lung, liver, kidney, neurologic problems, positioning injuries, dental damage, corneal abrasion and TMJ.  .)  intravenous induction     Anesthetic plan, risks, benefits, and alternatives have been provided, discussed and informed consent has been obtained with: patient.    CODE STATUS:    Code Status (Patient has no pulse and is not breathing): CPR (Attempt to Resuscitate)  Medical Interventions (Patient has pulse or is breathing): Full Support

## 2025-03-08 NOTE — SIGNIFICANT NOTE
03/08/25 1531   OTHER   Discipline physical therapist   Rehab Time/Intention   Session Not Performed other (see comments)  (pt arrived back to room at 3:20p; checked on and pt was sleeping soundly; will check first tomorrow; nsg agreed)   Recommendation   PT - Next Appointment 03/09/25

## 2025-03-09 LAB
ANION GAP SERPL CALCULATED.3IONS-SCNC: 5.3 MMOL/L (ref 5–15)
BASOPHILS # BLD AUTO: 0.01 10*3/MM3 (ref 0–0.2)
BASOPHILS NFR BLD AUTO: 0.2 % (ref 0–1.5)
BH BB BLOOD EXPIRATION DATE: NORMAL
BH BB BLOOD EXPIRATION DATE: NORMAL
BH BB BLOOD TYPE BARCODE: 600
BH BB BLOOD TYPE BARCODE: 600
BH BB DISPENSE STATUS: NORMAL
BH BB DISPENSE STATUS: NORMAL
BH BB PRODUCT CODE: NORMAL
BH BB PRODUCT CODE: NORMAL
BH BB UNIT NUMBER: NORMAL
BH BB UNIT NUMBER: NORMAL
BUN SERPL-MCNC: 17 MG/DL (ref 8–23)
BUN/CREAT SERPL: 23 (ref 7–25)
CALCIUM SPEC-SCNC: 8.7 MG/DL (ref 8.6–10.5)
CHLORIDE SERPL-SCNC: 103 MMOL/L (ref 98–107)
CO2 SERPL-SCNC: 24.7 MMOL/L (ref 22–29)
CREAT SERPL-MCNC: 0.74 MG/DL (ref 0.57–1)
CROSSMATCH INTERPRETATION: NORMAL
CROSSMATCH INTERPRETATION: NORMAL
DEPRECATED RDW RBC AUTO: 40.5 FL (ref 37–54)
EGFRCR SERPLBLD CKD-EPI 2021: 82.9 ML/MIN/1.73
EOSINOPHIL # BLD AUTO: 0 10*3/MM3 (ref 0–0.4)
EOSINOPHIL NFR BLD AUTO: 0 % (ref 0.3–6.2)
ERYTHROCYTE [DISTWIDTH] IN BLOOD BY AUTOMATED COUNT: 12.6 % (ref 12.3–15.4)
GLUCOSE SERPL-MCNC: 100 MG/DL (ref 65–99)
HCT VFR BLD AUTO: 24.6 % (ref 34–46.6)
HGB BLD-MCNC: 8.2 G/DL (ref 12–15.9)
IMM GRANULOCYTES # BLD AUTO: 0.01 10*3/MM3 (ref 0–0.05)
IMM GRANULOCYTES NFR BLD AUTO: 0.2 % (ref 0–0.5)
LYMPHOCYTES # BLD AUTO: 0.53 10*3/MM3 (ref 0.7–3.1)
LYMPHOCYTES NFR BLD AUTO: 11.9 % (ref 19.6–45.3)
MCH RBC QN AUTO: 29.4 PG (ref 26.6–33)
MCHC RBC AUTO-ENTMCNC: 33.3 G/DL (ref 31.5–35.7)
MCV RBC AUTO: 88.2 FL (ref 79–97)
MONOCYTES # BLD AUTO: 0.47 10*3/MM3 (ref 0.1–0.9)
MONOCYTES NFR BLD AUTO: 10.5 % (ref 5–12)
NEUTROPHILS NFR BLD AUTO: 3.44 10*3/MM3 (ref 1.7–7)
NEUTROPHILS NFR BLD AUTO: 77.2 % (ref 42.7–76)
NRBC BLD AUTO-RTO: 0 /100 WBC (ref 0–0.2)
PLATELET # BLD AUTO: 217 10*3/MM3 (ref 140–450)
PMV BLD AUTO: 10.4 FL (ref 6–12)
POTASSIUM SERPL-SCNC: 4.6 MMOL/L (ref 3.5–5.2)
RBC # BLD AUTO: 2.79 10*6/MM3 (ref 3.77–5.28)
SODIUM SERPL-SCNC: 133 MMOL/L (ref 136–145)
UNIT  ABO: NORMAL
UNIT  ABO: NORMAL
UNIT  RH: NORMAL
UNIT  RH: NORMAL
WBC NRBC COR # BLD AUTO: 4.46 10*3/MM3 (ref 3.4–10.8)

## 2025-03-09 PROCEDURE — 25010000002 HYDROMORPHONE PER 4 MG: Performed by: ORTHOPAEDIC SURGERY

## 2025-03-09 PROCEDURE — 25810000003 SODIUM CHLORIDE 0.9 % SOLUTION 250 ML FLEX CONT: Performed by: ORTHOPAEDIC SURGERY

## 2025-03-09 PROCEDURE — 97161 PT EVAL LOW COMPLEX 20 MIN: CPT

## 2025-03-09 PROCEDURE — 80048 BASIC METABOLIC PNL TOTAL CA: CPT | Performed by: ORTHOPAEDIC SURGERY

## 2025-03-09 PROCEDURE — 97110 THERAPEUTIC EXERCISES: CPT

## 2025-03-09 PROCEDURE — 25010000002 ENOXAPARIN PER 10 MG: Performed by: ORTHOPAEDIC SURGERY

## 2025-03-09 PROCEDURE — 85025 COMPLETE CBC W/AUTO DIFF WBC: CPT | Performed by: ORTHOPAEDIC SURGERY

## 2025-03-09 PROCEDURE — 25010000002 VANCOMYCIN 1 G RECONSTITUTED SOLUTION 1 EACH VIAL: Performed by: ORTHOPAEDIC SURGERY

## 2025-03-09 PROCEDURE — 25010000002 CEFAZOLIN PER 500 MG: Performed by: ORTHOPAEDIC SURGERY

## 2025-03-09 RX ORDER — OXYCODONE HYDROCHLORIDE 5 MG/1
5 TABLET ORAL EVERY 4 HOURS PRN
Refills: 0 | Status: DISCONTINUED | OUTPATIENT
Start: 2025-03-09 | End: 2025-03-11

## 2025-03-09 RX ORDER — OXYCODONE HYDROCHLORIDE 5 MG/1
10 TABLET ORAL EVERY 4 HOURS PRN
Refills: 0 | Status: DISCONTINUED | OUTPATIENT
Start: 2025-03-09 | End: 2025-03-11

## 2025-03-09 RX ADMIN — HYDROMORPHONE HYDROCHLORIDE 0.5 MG: 1 INJECTION, SOLUTION INTRAMUSCULAR; INTRAVENOUS; SUBCUTANEOUS at 03:54

## 2025-03-09 RX ADMIN — OXYCODONE HYDROCHLORIDE 10 MG: 5 TABLET ORAL at 21:39

## 2025-03-09 RX ADMIN — PRAMIPEXOLE DIHYDROCHLORIDE 2.5 MG: 1.5 TABLET ORAL at 20:27

## 2025-03-09 RX ADMIN — HYDROCODONE BITARTRATE AND ACETAMINOPHEN 2 TABLET: 5; 325 TABLET ORAL at 08:28

## 2025-03-09 RX ADMIN — BUSPIRONE HYDROCHLORIDE 5 MG: 10 TABLET ORAL at 11:17

## 2025-03-09 RX ADMIN — LEVOTHYROXINE SODIUM 50 MCG: 50 TABLET ORAL at 06:20

## 2025-03-09 RX ADMIN — SENNOSIDES AND DOCUSATE SODIUM 2 TABLET: 50; 8.6 TABLET ORAL at 08:27

## 2025-03-09 RX ADMIN — FAMOTIDINE 20 MG: 20 TABLET, FILM COATED ORAL at 06:19

## 2025-03-09 RX ADMIN — SODIUM CHLORIDE 2000 MG: 9 INJECTION, SOLUTION INTRAVENOUS at 21:07

## 2025-03-09 RX ADMIN — SODIUM CHLORIDE 1000 MG: 9 INJECTION, SOLUTION INTRAVENOUS at 06:20

## 2025-03-09 RX ADMIN — PHENOBARBITAL 129.6 MG: 32.4 TABLET ORAL at 20:27

## 2025-03-09 RX ADMIN — OXYCODONE HYDROCHLORIDE 10 MG: 5 TABLET ORAL at 17:23

## 2025-03-09 RX ADMIN — HYDROMORPHONE HYDROCHLORIDE 0.5 MG: 1 INJECTION, SOLUTION INTRAMUSCULAR; INTRAVENOUS; SUBCUTANEOUS at 11:16

## 2025-03-09 RX ADMIN — PRAVASTATIN SODIUM 40 MG: 20 TABLET ORAL at 08:27

## 2025-03-09 RX ADMIN — SODIUM CHLORIDE 1000 MG: 9 INJECTION, SOLUTION INTRAVENOUS at 17:35

## 2025-03-09 RX ADMIN — SENNOSIDES AND DOCUSATE SODIUM 2 TABLET: 50; 8.6 TABLET ORAL at 20:27

## 2025-03-09 RX ADMIN — Medication 4000 UNITS: at 08:27

## 2025-03-09 RX ADMIN — SODIUM CHLORIDE 2000 MG: 9 INJECTION, SOLUTION INTRAVENOUS at 03:04

## 2025-03-09 RX ADMIN — Medication 2.5 MG: at 21:40

## 2025-03-09 RX ADMIN — SODIUM CHLORIDE 2000 MG: 9 INJECTION, SOLUTION INTRAVENOUS at 11:16

## 2025-03-09 RX ADMIN — POLYETHYLENE GLYCOL 3350 17 G: 17 POWDER, FOR SOLUTION ORAL at 20:27

## 2025-03-09 RX ADMIN — BUSPIRONE HYDROCHLORIDE 5 MG: 10 TABLET ORAL at 17:23

## 2025-03-09 RX ADMIN — ENOXAPARIN SODIUM 40 MG: 100 INJECTION SUBCUTANEOUS at 20:27

## 2025-03-09 RX ADMIN — ASPIRIN 81 MG: 81 TABLET, COATED ORAL at 08:27

## 2025-03-09 RX ADMIN — FAMOTIDINE 20 MG: 20 TABLET, FILM COATED ORAL at 17:23

## 2025-03-09 RX ADMIN — POLYETHYLENE GLYCOL 3350 17 G: 17 POWDER, FOR SOLUTION ORAL at 08:27

## 2025-03-09 RX ADMIN — BISACODYL 10 MG: 10 SUPPOSITORY RECTAL at 17:23

## 2025-03-09 NOTE — PROGRESS NOTES
Wound VAC in place and functioning well.  Dressing with minimal drainage in the wound VAC container.  Hemoglobin okay after transfusion yesterday postop.  Nonweightbearing left lower extremity.  She will likely be nonweightbearing for 8 to 12 weeks.  Eventual discharge to rehab when deemed appropriate.  Follow-up in the office in 3 weeks for x-ray and wound check

## 2025-03-09 NOTE — PLAN OF CARE
Goal Outcome Evaluation:  Plan of Care Reviewed With: patient        Progress: no change  Outcome Evaluation: Pt is 81 yo female POD 1 L femur ORIF, L Hip revision. She is NWB LLE. She was previously mod I for ambulation using rollator. She has 3 RONNIE with handrail. She has no internal stairs. She required max A x 2 to perform supine to sit. Initially dependent transfer and pt demonstrates dec RLE strength as well with difficulty at first moving RLE for bed mobility. Participation inc as transfer progressed. Pt sat EOB with supervision x 8 minutes and performed RLE and UE exercises. Max A x 2 to return to supine and dependent for bed positioning. She will benefit from continued skilled PT services to progress mobility and attempt stand during inpatient stay. Recommend [facility] at d/c.    Anticipated Discharge Disposition (PT): skilled nursing facility

## 2025-03-09 NOTE — THERAPY EVALUATION
Patient Name: Mariela Ruiz  : 1945    MRN: [id number]                              Today's Date: 3/9/2025       Admit Date: 3/4/2025    Visit Dx:     ICD-10-CM ICD-9-CM   1. Periprosthetic fracture of hip, initial encounter  M97.8XXA 996.44    Z96.649 V43.64   2. Fall, initial encounter  W19.XXXA E888.9   3. Anemia, unspecified type  D64.9 285.9   4. Hyperglycemia  R73.9 790.29   5. Thyroid nodule  E04.1 241.0   6. Ureteral stone  N20.1 592.1     Patient Active Problem List   Diagnosis    Seizure disorder    Hyperlipidemia    Vitamin D insufficiency    Age-related osteoporosis without current pathological fracture    Sleep apnea    Venous (peripheral) insufficiency    Postsurgical hypothyroidism    Chronic fatigue syndrome    Gastroesophageal reflux disease    Dupuytren's contracture    History of biliary T-tube placement    History of partial thyroidectomy    Multinodular goiter    Restless legs syndrome    History of cardiac catheterization    Urge incontinence of urine    Left knee pain    Ligamentous laxity of knee    DJD (degenerative joint disease) of knee    Adverse drug effect, subsequent encounter    Abnormal blood level of chromium    Abnormal blood level of cobalt    Family history of diabetes mellitus    Thrombocytopenia    .    S/P revision of total hip    Enlarged thyroid gland    Palmar fascial fibromatosis (dupuytren)    Hypothyroidism, unspecified    Hyperlipidemia, unspecified    Acute cholecystitis    Elevated troponin    Vitamin B12 deficiency    Pernicious anemia    Lymphedema    Venous stasis    Chronic venous hypertension with inflammation involving both sides    Periprosthetic hip fracture    Ureterolithiasis    Hydronephrosis     Past Medical History:   Diagnosis Date    Arthritis     Chronic venous insufficiency     Dupuytren's contracture     History of staph infection     S/P KNEE DEC 2016    History of transfusion     Hyperlipidemia     Lymphedema     LEFT LEG    Nontoxic  multinodular goiter     Osteoporosis     Dr. Cabrera    Pelvic joint pain, left     Postsurgical hypothyroidism     Presence of left artificial hip joint     METAL ON METAL AS NOTED PER DR CLEMENT NOTE    Restless leg syndrome     Right bundle branch block     Seizure disorder     Dr. Whitman, HX 1980 LAST SEIZURE    Sleep apnea     DOES NOT HAVE MACHINE    Vitamin D insufficiency      Past Surgical History:   Procedure Laterality Date    APPENDECTOMY      CARDIAC CATHETERIZATION      NORMAL     CHOLECYSTECTOMY N/A 11/2/2024    Procedure: CHOLECYSTECTOMY LAPAROSCOPIC WITH DAVINCI ROBOT with cholangiogram, possible open;  Surgeon: Bin Hetaon MD;  Location: [facility] MAIN OR;  Service: Robotics - DaVinci;  Laterality: N/A;    COLONOSCOPY  08/17/2017    Normal except for anal fissure.  Dr. Brandt.  [facility].    KNEE MINI REVISION Left 11/15/2016    Procedure: LEFT KNEE POLY CHANGE WITH HI POST STABILIZER;  Surgeon: Pablito Claudio MD;  Location: [facility] MAIN OR;  Service:     KNEE MINI REVISION Left 12/13/2016    Procedure: LT KNEE REMOVAL/REPLACEMENT LOCKING PIN ;  Surgeon: Pablito Claudio MD;  Location: [facility] OR;  Service:     MAMMO FINE NEEDLE ASPIRATION UNILATERAL      RIGHT THYROID NODULE, 2009 BENIGN     IN ARTHRP KNE CONDYLE&PLATU MEDIAL&LAT COMPARTMENTS Left 12/24/2016    Procedure: LEFT KNEE WASHOUT WITH POLY CHANGE;  Surgeon: Christiano Clement MD;  Location: [facility] OR;  Service: Orthopedics    IN REVJ TOTAL KNEE ARTHRP W/WO ALGRFT 1 COMPONENT Left 2/20/2017    Procedure: LT KNEE REMOVAL ANTIBIOTIC SPACER AND KNEE REVISION;  Surgeon: Christiano Clement MD;  Location: [facility] MAIN OR;  Service: Orthopedics    REPLACEMENT TOTAL KNEE Left     THYROIDECTOMY, PARTIAL      1979 LEFT LOBECTOMY AND ISTHMECTOMY     TOTAL ABDOMINAL HYSTERECTOMY WITH SALPINGO OOPHORECTOMY      TOTAL HIP ARTHROPLASTY Left     TOTAL HIP ARTHROPLASTY Right 9/14/2019    Procedure: TOTAL HIP ARTHROPLASTY ANTERIOR WITH HANA  TABLE;  Surgeon: Christiano Cesar II, MD;  Location: [facility] MAIN OR;  Service: Orthopedics    TOTAL HIP ARTHROPLASTY REVISION Left 3/9/2021    Procedure: LEFT TOTAL HIP ARTHROPLASTY REVISION POSTERIOR;  Surgeon: Christiano Cesar II, MD;  Location: [facility] MAIN OR;  Service: Orthopedics;  Laterality: Left;    TOTAL KNEE  PROSTHESIS REMOVAL W/ SPACER INSERTION Left 12/29/2016    Procedure: LT KNEE IMPLANT REMOVAL WITH INSERTION OF SPACER ;  Surgeon: Christiano Cesar MD;  Location: [facility] OR;  Service:       General Information       Row Name 03/09/25 1228          Physical Therapy Time and Intention    Document Type evaluation  -LB     Mode of Treatment physical therapy  -LB       Row Name 03/09/25 1228          General Information    Patient Profile Reviewed yes  -LB     Prior Level of Function independent:  mod I using rollator  -LB     Existing Precautions/Restrictions hip;weight bearing   LLE NWB  -LB     Barriers to Rehab previous functional deficit;medically complex  -LB       Row Name 03/09/25 1228          Living Environment    Current Living Arrangements home  -LB     People in Home --  -LB       Row Name 03/09/25 1228          Home Main Entrance    Number of Stairs, Main Entrance three  -LB     Stair Railings, Main Entrance railings safe and in good condition  -LB       Row Name 03/09/25 1228          Stairs Within Home, Primary    Number of Stairs, Within Home, Primary none  -LB       Row Name 03/09/25 1228          Cognition    Orientation Status (Cognition) oriented x 4  -LB       Row Name 03/09/25 1228          Safety Issues/Impairments Affecting Functional Mobility    Impairments Affecting Function (Mobility) pain;strength;range of motion (ROM)  -LB     Comment, Safety Issues/Impairments (Mobility) non-skid socks donned throughout  -LB               User Key  (r) = Recorded By, (t) = Taken By, (c) = Cosigned By      Initials Name Provider Type    Candace Giraldo PT Physical  Therapist                   Mobility       Row Name 03/09/25 1231          Bed Mobility    Bed Mobility bed mobility (all) activities  -LB     All Activities, Louisa (Bed Mobility) maximum assist (25% patient effort);2 person assist  -LB     Assistive Device (Bed Mobility) repositioning sheet;bed rails  -LB       Row Name 03/09/25 1231          Bed-Chair Transfer    Bed-Chair Louisa (Transfers) not tested  -LB       Row Name 03/09/25 1231          Sit-Stand Transfer    Sit-Stand Louisa (Transfers) not tested  -LB               User Key  (r) = Recorded By, (t) = Taken By, (c) = Cosigned By      Initials Name Provider Type    Candace Giraldo PT Physical Therapist                   Obj/Interventions       Row Name 03/09/25 1232          Range of Motion Comprehensive    Comment, General Range of Motion LLE limited by sx; RLE limited by dec strength; PROM WFL RLE  -LB       Row Name 03/09/25 1232          Strength Comprehensive (MMT)    Comment, General Manual Muscle Testing (MMT) Assessment pt complains of pain with attempted testing, RLE 3+/5 grossly; able to perform LAQ in dec ROM x 10  -LB       Row Name 03/09/25 1232          Motor Skills    Therapeutic Exercise --  RLE AP, supine knee flexion in dec ROM, QS, glute set, LAQ x 10  -LB               User Key  (r) = Recorded By, (t) = Taken By, (c) = Cosigned By      Initials Name Provider Type    Candace Giraldo PT Physical Therapist                   Goals/Plan       Row Name 03/09/25 1236          Bed Mobility Goal 1 (PT)    Activity/Assistive Device (Bed Mobility Goal 1, PT) bed mobility activities, all  -LB     Louisa Level/Cues Needed (Bed Mobility Goal 1, PT) moderate assist (50-74% patient effort)  -LB     Time Frame (Bed Mobility Goal 1, PT) 1 week  -LB       Row Name 03/09/25 1236          Transfer Goal 1 (PT)    Activity/Assistive Device (Transfer Goal 1, PT) transfers, all  -LB     Louisa Level/Cues Needed (Transfer Goal 1,  PT) moderate assist (50-74% patient effort);other (see comments)  2 person assist  -LB     Time Frame (Transfer Goal 1, PT) 1 week  -LB               User Key  (r) = Recorded By, (t) = Taken By, (c) = Cosigned By      Initials Name Provider Type    LB Candace Preciado, FARHANA Physical Therapist                   Clinical Impression       Row Name 03/09/25 1233          Pain    Pretreatment Pain Rating 8/10  -LB     Posttreatment Pain Rating 8/10  -LB     Pain Location hip;knee  -LB     Pain Side/Orientation left;right  -LB     Pain Management Interventions exercise or physical activity utilized  -LB     Response to Pain Interventions activity participation with increased pain  -LB       Row Name 03/09/25 1233          Plan of Care Review    Plan of Care Reviewed With patient  -LB     Progress no change  -LB     Outcome Evaluation Pt is 79 yo female POD 1 L femur ORIF, L Hip revision. She is NWB LLE. She was previously mod I for ambulation using rollator. She has 3 RONNIE with handrail. She has no internal stairs. She required max A x 2 to perform supine to sit. Initially dependent transfer and pt demonstrates dec RLE strength as well with difficulty at first moving RLE for bed mobility. Participation inc as transfer progressed. Pt sat EOB with supervision x 8 minutes and performed RLE and UE exercises. Max A x 2 to return to supine and dependent for bed positioning. She will benefit from continued skilled PT services to progress mobility and attempt stand during inpatient stay. Recommend LEILANI at d/c.  -LB       Row Name 03/09/25 1233          Therapy Assessment/Plan (PT)    Rehab Potential (PT) fair  -LB     Criteria for Skilled Interventions Met (PT) yes  -LB     Therapy Frequency (PT) 6 times/wk  -LB       Row Name 03/09/25 1233          Vital Signs    O2 Delivery Pre Treatment supplemental O2  -LB     O2 Delivery Intra Treatment supplemental O2  -LB     O2 Delivery Post Treatment supplemental O2  -LB     Pre Patient  Position Supine  -LB     Intra Patient Position Sitting  -LB     Post Patient Position Supine  -LB       Row Name 03/09/25 1233          Positioning and Restraints    Pre-Treatment Position in bed  -LB     Post Treatment Position bed  -LB     In Bed supine;call light within reach;encouraged to call for assist;exit alarm on;with nsg  -LB               User Key  (r) = Recorded By, (t) = Taken By, (c) = Cosigned By      Initials Name Provider Type    Candace Giraldo PT Physical Therapist                   Outcome Measures       Row Name 03/09/25 1237          How much help from another person do you currently need...    Turning from your back to your side while in flat bed without using bedrails? 2  -LB     Moving from lying on back to sitting on the side of a flat bed without bedrails? 2  -LB     Moving to and from a bed to a chair (including a wheelchair)? 1  -LB     Standing up from a chair using your arms (e.g., wheelchair, bedside chair)? 1  -LB     Climbing 3-5 steps with a railing? 1  -LB     To walk in hospital room? 1  -LB     AM-PAC 6 Clicks Score (PT) 8  -LB     Highest Level of Mobility Goal 3 --> Sit at edge of bed  -LB       Row Name 03/09/25 1237          Functional Assessment    Outcome Measure Options AM-PAC 6 Clicks Basic Mobility (PT)  -LB               User Key  (r) = Recorded By, (t) = Taken By, (c) = Cosigned By      Initials Name Provider Type    Candace Giraldo PT Physical Therapist                                 Physical Therapy Education       Title: PT OT SLP Therapies (Done)       Topic: Physical Therapy (Done)       Point: Mobility training (Done)       Learning Progress Summary            Patient Acceptance, E,TB, VU,DU by YASMIN at 3/9/2025 1238                      Point: Home exercise program (Done)       Learning Progress Summary            Patient Acceptance, E,TB, VU,DU by YASMIN at 3/9/2025 1238                      Point: Body mechanics (Done)       Learning Progress Summary             Patient Acceptance, E,TB, VU,DU by LB at 3/9/2025 1238                      Point: Precautions (Done)       Learning Progress Summary            Patient Acceptance, E,TB, VU,DU by LB at 3/9/2025 1238                                      User Key       Initials Effective Dates Name Provider Type Discipline     08/09/20 -  Candace Preciado, PT Physical Therapist PT                  PT Recommendation and Plan     Progress: no change  Outcome Evaluation: Pt is 79 yo female POD 1 L femur ORIF, L Hip revision. She is NWB LLE. She was previously mod I for ambulation using rollator. She has 3 RONNIE with handrail. She has no internal stairs. She required max A x 2 to perform supine to sit. Initially dependent transfer and pt demonstrates dec RLE strength as well with difficulty at first moving RLE for bed mobility. Participation inc as transfer progressed. Pt sat EOB with supervision x 8 minutes and performed RLE and UE exercises. Max A x 2 to return to supine and dependent for bed positioning. She will benefit from continued skilled PT services to progress mobility and attempt stand during inpatient stay. Recommend LEILANI at d/c.     Time Calculation:   PT Evaluation Complexity  History, PT Evaluation Complexity: 1-2 personal factors and/or comorbidities  Examination of Body Systems (PT Eval Complexity): 1-2 elements  Clinical Presentation (PT Evaluation Complexity): evolving  Clinical Decision Making (PT Evaluation Complexity): moderate complexity  Overall Complexity (PT Evaluation Complexity): low complexity     PT Charges       Row Name 03/09/25 1239             Time Calculation    Start Time 1030  -LB      Stop Time 1110  -LB      Time Calculation (min) 40 min  -LB      PT Received On 03/09/25  -LB      PT - Next Appointment 03/10/25  -LB      PT Goal Re-Cert Due Date 03/16/25  -LB         Time Calculation- PT    Total Timed Code Minutes- PT 25 minute(s)  -LB                User Key  (r) = Recorded By, (t) = Taken By, (c) =  Cosigned By      Initials Name Provider Type    LB Candace Preciado, FARHANA Physical Therapist                  Therapy Charges for Today       Code Description Service Date Service Provider Modifiers Qty    90027088516 HC PT EVAL LOW COMPLEXITY 2 3/9/2025 Candace Preciado, PT GP 1    22303753670 HC PT THER PROC EA 15 MIN 3/9/2025 Candace Preciado, PT GP 2            PT G-Codes  Outcome Measure Options: AM-PAC 6 Clicks Basic Mobility (PT)  AM-PAC 6 Clicks Score (PT): 8  PT Discharge Summary  Anticipated Discharge Disposition (PT): skilled nursing facility    Candace Preciado PT  3/9/2025

## 2025-03-09 NOTE — PLAN OF CARE
Goal Outcome Evaluation:  Plan of Care Reviewed With: patient        Progress: no change  Outcome Evaluation: Pt remains stable. Resting alot of shift but easy to arouse. Very anxious at times and constant reassurance needed. Dilaudid given for pain. Wound vac in place. Voiding per fc. NSR on the monitor. Titrated to 1L O2. BP soft but stable. PT to eval, pt must be NWB to LLE. DC to rehab when ready.

## 2025-03-09 NOTE — PROGRESS NOTES
Name: Mariela Ruiz ADMIT: 3/4/2025   : 1945  PCP: Nereyda Abdalla MD    MRN: [id number] LOS: 5 days   AGE/SEX: 80 y.o. female  ROOM: [facility]     Subjective   Subjective   Complaining of pain of the left lower extremity.  Under went left total hip arthroplasty revision with ORIF yesterday.  Positive left lower extremity pain.  Positive bilateral feet numbness but no weakness (old)..  Good urine output  in the Malin bag without hematuria.  No fever or chills.    Review of Systems  Cardiovascular/respiratory.  No chest pain/no palpitations/no shortness of breath/no cough  GI.  No abdominal pain.  No nausea or vomiting.  She stated that she has not had a bowel movement for 6 days now.  CNS.  No headache.  No seizures.  No focal neurological symptoms     Objective   Objective   Vital Signs  Temp:  [97.7 °F (36.5 °C)-99.7 °F (37.6 °C)] 98.4 °F (36.9 °C)  Heart Rate:  [89-96] 96  Resp:  [16-18] 18  BP: ()/(42-62) 123/54  SpO2:  [92 %-99 %] 96 %  on  Flow (L/min) (Oxygen Therapy):  [1-2] 1;   Device (Oxygen Therapy): nasal cannula    Intake/Output Summary (Last 24 hours) at 3/9/2025 1852  Last data filed at 3/9/2025 1821  Gross per 24 hour   Intake 970 ml   Output 1050 ml   Net -80 ml     Body mass index is 30.41 kg/m².      25  1023   Weight: 90.7 kg (200 lb)     Physical Exam  General.  Elderly female.  Alert and oriented x 3.  In no apparent pain/distress/diaphoresis.  Normal mood and affect.  Eyes.  Pupils equal round and reactive.  Intact extraocular musculature.  No pallor or jaundice  Oral cavity.  Moist mucous membrane  Neck.  Supple.  No JVD.  No lymphadenopathy or thyromegaly  Cardiovascular.  Regular rate and rhythm with grade 2 systolic murmur  Chest.  Poor but clear to auscultation bilaterally with no added sounds  Abdomen.  Soft lax.  No tenderness.  No organomegaly.  No guarding or rebound  Extremities.  No clubbing or cyanosis.  +1 to +2 bilateral pitting and nonpitting edema of  "both lower extremities.  Sequential compression device on both legs.  Dressed left lower extremity no lower extremity neurodeficits   CNS.  No acute focal neurological deficits    Results Review:      Results from last 7 days   Lab Units 03/09/25  0421 03/08/25  0443 03/07/25  0501 03/06/25  0335 03/05/25  0634 03/04/25 2126 03/04/25  1017   SODIUM mmol/L 133* 135* 141 138 137  --  140   POTASSIUM mmol/L 4.6 4.1 4.2 4.3 4.4 4.6 3.6   CHLORIDE mmol/L 103 105 108* 108* 106  --  104   CO2 mmol/L 24.7 26.0 25.0 25.0 26.6  --  24.7   BUN mg/dL 17 13 13 14 14  --  15   CREATININE mg/dL 0.74 0.56* 0.57 0.62 0.59  --  0.73   GLUCOSE mg/dL 100* 91 101* 102* 110*  --  112*   CALCIUM mg/dL 8.7 8.9 8.7 8.5* 8.6  --  9.8   AST (SGOT) U/L  --   --   --   --   --   --  20   ALT (SGPT) U/L  --   --   --   --   --   --  11     Estimated Creatinine Clearance: 73.8 mL/min (by C-G formula based on SCr of 0.74 mg/dL).          Results from last 7 days   Lab Units 03/04/25 2126 03/04/25  1412 03/04/25  1017   HSTROP T ng/L 12 13 13     Results from last 7 days   Lab Units 03/04/25  1017   PROBNP pg/mL 243.0     Results from last 7 days   Lab Units 03/06/25  1709   TSH uIU/mL 2.510     Results from last 7 days   Lab Units 03/04/25 2126 03/04/25  1017   MAGNESIUM mg/dL 1.7 1.8           Invalid input(s): \"LDLCALC\"  Results from last 7 days   Lab Units 03/09/25  0421 03/08/25  1243 03/08/25  0443 03/07/25  0501 03/06/25  0335 03/05/25  0324 03/04/25  1017   WBC 10*3/mm3 4.46  --  3.88 4.49 5.24 5.17 7.35   HEMOGLOBIN g/dL 8.2* 9.8* 8.5* 8.2* 8.5* 10.5* 11.5*   HEMATOCRIT % 24.6* 31.0* 26.0* 25.6* 26.7* 33.3* 35.1   PLATELETS 10*3/mm3 217  --  205 182 169 192 221   MCV fL 88.2  --  90.9 91.8 90.2 91.7 89.1   MCH pg 29.4  --  29.7 29.4 28.7 28.9 29.2   MCHC g/dL 33.3  --  32.7 32.0 31.8 31.5 32.8   RDW % 12.6  --  12.0* 11.9* 11.9* 11.8* 12.1*   RDW-SD fl 40.5  --  40.2 40.4 39.5 39.4 38.9   MPV fL 10.4  --  10.1 10.7 11.2 11.0 10.1 "   NEUTROPHIL % % 77.2*  --  57.6 64.4  --   --  76.2*   LYMPHOCYTE % % 11.9*  --  29.4 21.8  --   --  16.3*   MONOCYTES % % 10.5  --  12.4* 13.4*  --   --  7.1   EOSINOPHIL % % 0.0*  --  0.0* 0.0*  --   --  0.0*   BASOPHIL % % 0.2  --  0.3 0.2  --   --  0.1   IMM GRAN % % 0.2  --  0.3 0.2  --   --  0.3   NEUTROS ABS 10*3/mm3 3.44  --  2.24 2.89  --   --  5.60   LYMPHS ABS 10*3/mm3 0.53*  --  1.14 0.98  --   --  1.20   MONOS ABS 10*3/mm3 0.47  --  0.48 0.60  --   --  0.52   EOS ABS 10*3/mm3 0.00  --  0.00 0.00  --   --  0.00   BASOS ABS 10*3/mm3 0.01  --  0.01 0.01  --   --  0.01   IMMATURE GRANS (ABS) 10*3/mm3 0.01  --  0.01 0.01  --   --  0.02   NRBC /100 WBC 0.0  --  0.0 0.0  --   --  0.0                                 Results from last 7 days   Lab Units 03/04/25  2047   NITRITE UA  Negative           Imaging:  Imaging Results (Last 24 Hours)       ** No results found for the last 24 hours. **               I reviewed the patient's new clinical results / labs / tests / procedures      Assessment/Plan     Active Hospital Problems    Diagnosis  POA    **Periprosthetic hip fracture [M97.8XXA, Z96.649]  Not Applicable    Ureterolithiasis [N20.1]  Yes    Hydronephrosis [N13.30]  Yes    Seizure disorder [G40.909]  Yes    Hyperlipidemia [E78.5]  Yes    Venous (peripheral) insufficiency [I87.2]  Yes    Postsurgical hypothyroidism [E89.0]  Yes      Resolved Hospital Problems   No resolved problems to display.           Mechanical fall leading to left MAXIMO periprosthetic fracture and possible left TKA loosening.  Patient is s/p left MAXIMO revision and ORIF earlier today.    CT scan of the brain without contrast revealed no acute disease with evidence of old infarction.  CT scan of the chest without contrast with no pulmonary abnormalities.  CT scan of the abdomen pelvis revealed no acute intraabdominal traumatic injury.  Orthopedic has the patient on 3 days of vancomycin.  Orthopedic indicates that wound VAC in place and  functioning well with minimal wound drainage.  Orthopedic indicates that the patient needs to be nonweightbearing for 8 to 12 weeks and recommends follow-up in 3 weeks for x-ray and wound check.  Incidental right thyroid nodule in a patient with a history of hypothyroidism..  Normal thyroid function test.  Thyroid ultrasound with left thyroidectomy and 2 nodules on the right thyroid lobe that will need biopsy as an outpatient.    Urinary retention/obstructing 7 mm left ureteric stone with hydronephrosis.  No evidence of UTI.  Currently on Malin and will try voiding trial prior to discharge.  S/p urology consultations with outpatient follow-up recommendation and no recommendations for surgical intervention at this time.  Normal renal function.  Will leave Malin catheter in at this time in view of none mobility.  Mild dehydration.  Short course of IV fluid.  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  Continue Pravachol/ aspirin.  Negative CNS examination for focal deficits  Iron deficiency anemia.  Baseline hemoglobin between 10 and 12.4.  Hemoglobin worse than baseline and has dropped after surgery.  Patient received a single unit of packed RBC yesterday while in the operating room..  Monitor CBC and    transfuse if hemoglobin less than 8  Hypotension.  Mostly secondary to dehydration and anesthesia.  Plan IV fluid and stop Flomax.  Epilepsy.  No seizures.  Negative CNS examination.  Continue phenobarbital.  Hyperlipidemia.  Continue statin.  Chronic lymphedema.  Has lymphedema clinic as an outpatient.  Continue leg wrapping  VTE prophylaxis.  Lovenox subcu.              Discussed my findings and plan of treatment with the patient/nurse   Disposition.  To SNF in the next few days.  Will involve .      Janna Gonzales MD  [facility]  03/09/25  18:52 EDT

## 2025-03-09 NOTE — PLAN OF CARE
Goal Outcome Evaluation:           Progress: improving  Outcome Evaluation: POD 1 LTH revision, A&O, anxious, pills whole, wound vac, f/c, NWB, worked w/PT-max assist to sit on edge of bed, hoa for pain, accumax, abduction pillow & scds in place, SNF at d/c, educated on bp monitoring, CTM

## 2025-03-10 LAB
ANION GAP SERPL CALCULATED.3IONS-SCNC: 7.9 MMOL/L (ref 5–15)
BASOPHILS # BLD AUTO: 0.01 10*3/MM3 (ref 0–0.2)
BASOPHILS NFR BLD AUTO: 0.2 % (ref 0–1.5)
BUN SERPL-MCNC: 15 MG/DL (ref 8–23)
BUN/CREAT SERPL: 20.5 (ref 7–25)
CALCIUM SPEC-SCNC: 8.4 MG/DL (ref 8.6–10.5)
CHLORIDE SERPL-SCNC: 104 MMOL/L (ref 98–107)
CO2 SERPL-SCNC: 24.1 MMOL/L (ref 22–29)
CREAT SERPL-MCNC: 0.73 MG/DL (ref 0.57–1)
DEPRECATED RDW RBC AUTO: 41.9 FL (ref 37–54)
EGFRCR SERPLBLD CKD-EPI 2021: 84.3 ML/MIN/1.73
EOSINOPHIL # BLD AUTO: 0 10*3/MM3 (ref 0–0.4)
EOSINOPHIL NFR BLD AUTO: 0 % (ref 0.3–6.2)
ERYTHROCYTE [DISTWIDTH] IN BLOOD BY AUTOMATED COUNT: 12.7 % (ref 12.3–15.4)
GLUCOSE SERPL-MCNC: 101 MG/DL (ref 65–99)
HCT VFR BLD AUTO: 23.2 % (ref 34–46.6)
HGB BLD-MCNC: 7.7 G/DL (ref 12–15.9)
IMM GRANULOCYTES # BLD AUTO: 0.02 10*3/MM3 (ref 0–0.05)
IMM GRANULOCYTES NFR BLD AUTO: 0.4 % (ref 0–0.5)
LYMPHOCYTES # BLD AUTO: 0.62 10*3/MM3 (ref 0.7–3.1)
LYMPHOCYTES NFR BLD AUTO: 13 % (ref 19.6–45.3)
MCH RBC QN AUTO: 29.7 PG (ref 26.6–33)
MCHC RBC AUTO-ENTMCNC: 33.2 G/DL (ref 31.5–35.7)
MCV RBC AUTO: 89.6 FL (ref 79–97)
MONOCYTES # BLD AUTO: 0.5 10*3/MM3 (ref 0.1–0.9)
MONOCYTES NFR BLD AUTO: 10.5 % (ref 5–12)
NEUTROPHILS NFR BLD AUTO: 3.62 10*3/MM3 (ref 1.7–7)
NEUTROPHILS NFR BLD AUTO: 75.9 % (ref 42.7–76)
NRBC BLD AUTO-RTO: 0 /100 WBC (ref 0–0.2)
PLATELET # BLD AUTO: 207 10*3/MM3 (ref 140–450)
PMV BLD AUTO: 10.2 FL (ref 6–12)
POTASSIUM SERPL-SCNC: 4.8 MMOL/L (ref 3.5–5.2)
RBC # BLD AUTO: 2.59 10*6/MM3 (ref 3.77–5.28)
SODIUM SERPL-SCNC: 136 MMOL/L (ref 136–145)
WBC NRBC COR # BLD AUTO: 4.77 10*3/MM3 (ref 3.4–10.8)

## 2025-03-10 PROCEDURE — 85025 COMPLETE CBC W/AUTO DIFF WBC: CPT | Performed by: ORTHOPAEDIC SURGERY

## 2025-03-10 PROCEDURE — 25010000002 VANCOMYCIN 1 G RECONSTITUTED SOLUTION 1 EACH VIAL: Performed by: ORTHOPAEDIC SURGERY

## 2025-03-10 PROCEDURE — 25010000002 ENOXAPARIN PER 10 MG: Performed by: ORTHOPAEDIC SURGERY

## 2025-03-10 PROCEDURE — 36430 TRANSFUSION BLD/BLD COMPNT: CPT

## 2025-03-10 PROCEDURE — 86900 BLOOD TYPING SEROLOGIC ABO: CPT

## 2025-03-10 PROCEDURE — 80048 BASIC METABOLIC PNL TOTAL CA: CPT | Performed by: ORTHOPAEDIC SURGERY

## 2025-03-10 PROCEDURE — 25010000002 CEFAZOLIN PER 500 MG: Performed by: ORTHOPAEDIC SURGERY

## 2025-03-10 PROCEDURE — 25010000002 FUROSEMIDE PER 20 MG: Performed by: INTERNAL MEDICINE

## 2025-03-10 PROCEDURE — 25810000003 SODIUM CHLORIDE 0.9 % SOLUTION 250 ML FLEX CONT: Performed by: ORTHOPAEDIC SURGERY

## 2025-03-10 PROCEDURE — P9016 RBC LEUKOCYTES REDUCED: HCPCS

## 2025-03-10 RX ORDER — FUROSEMIDE 10 MG/ML
20 INJECTION INTRAMUSCULAR; INTRAVENOUS ONCE
Status: COMPLETED | OUTPATIENT
Start: 2025-03-10 | End: 2025-03-10

## 2025-03-10 RX ADMIN — BUSPIRONE HYDROCHLORIDE 5 MG: 10 TABLET ORAL at 10:41

## 2025-03-10 RX ADMIN — BUSPIRONE HYDROCHLORIDE 5 MG: 10 TABLET ORAL at 23:30

## 2025-03-10 RX ADMIN — FAMOTIDINE 20 MG: 20 TABLET, FILM COATED ORAL at 16:14

## 2025-03-10 RX ADMIN — ASPIRIN 81 MG: 81 TABLET, COATED ORAL at 08:00

## 2025-03-10 RX ADMIN — SODIUM CHLORIDE 1000 MG: 9 INJECTION, SOLUTION INTRAVENOUS at 06:45

## 2025-03-10 RX ADMIN — OXYCODONE HYDROCHLORIDE 10 MG: 5 TABLET ORAL at 04:20

## 2025-03-10 RX ADMIN — PRAVASTATIN SODIUM 40 MG: 20 TABLET ORAL at 08:00

## 2025-03-10 RX ADMIN — SODIUM CHLORIDE 1000 MG: 9 INJECTION, SOLUTION INTRAVENOUS at 17:48

## 2025-03-10 RX ADMIN — SODIUM CHLORIDE 2000 MG: 9 INJECTION, SOLUTION INTRAVENOUS at 04:00

## 2025-03-10 RX ADMIN — SENNOSIDES AND DOCUSATE SODIUM 2 TABLET: 50; 8.6 TABLET ORAL at 20:47

## 2025-03-10 RX ADMIN — ENOXAPARIN SODIUM 40 MG: 100 INJECTION SUBCUTANEOUS at 20:47

## 2025-03-10 RX ADMIN — OXYCODONE HYDROCHLORIDE 10 MG: 5 TABLET ORAL at 20:47

## 2025-03-10 RX ADMIN — OXYCODONE HYDROCHLORIDE 10 MG: 5 TABLET ORAL at 08:00

## 2025-03-10 RX ADMIN — POLYETHYLENE GLYCOL 3350 17 G: 17 POWDER, FOR SOLUTION ORAL at 20:46

## 2025-03-10 RX ADMIN — BUSPIRONE HYDROCHLORIDE 5 MG: 10 TABLET ORAL at 17:48

## 2025-03-10 RX ADMIN — PRAMIPEXOLE DIHYDROCHLORIDE 2.5 MG: 1.5 TABLET ORAL at 20:46

## 2025-03-10 RX ADMIN — SODIUM CHLORIDE 2000 MG: 9 INJECTION, SOLUTION INTRAVENOUS at 19:13

## 2025-03-10 RX ADMIN — FAMOTIDINE 20 MG: 20 TABLET, FILM COATED ORAL at 08:00

## 2025-03-10 RX ADMIN — FUROSEMIDE 20 MG: 10 INJECTION, SOLUTION INTRAVENOUS at 23:29

## 2025-03-10 RX ADMIN — SODIUM CHLORIDE 2000 MG: 9 INJECTION, SOLUTION INTRAVENOUS at 10:41

## 2025-03-10 RX ADMIN — Medication 2.5 MG: at 20:46

## 2025-03-10 RX ADMIN — Medication 4000 UNITS: at 08:00

## 2025-03-10 RX ADMIN — OXYCODONE HYDROCHLORIDE 10 MG: 5 TABLET ORAL at 12:08

## 2025-03-10 RX ADMIN — LEVOTHYROXINE SODIUM 50 MCG: 50 TABLET ORAL at 07:18

## 2025-03-10 RX ADMIN — OXYCODONE HYDROCHLORIDE 10 MG: 5 TABLET ORAL at 16:14

## 2025-03-10 RX ADMIN — PHENOBARBITAL 129.6 MG: 32.4 TABLET ORAL at 20:47

## 2025-03-10 NOTE — CASE MANAGEMENT/SOCIAL WORK
Continued Stay Note  [facility]     Patient Name: Mariela Ruiz  MRN: [id number]  Today's Date: 3/10/2025    Admit Date: 3/4/2025    Plan: [facility] when medically stable   Discharge Plan       Row Name 03/10/25 0819       [facility]    [facility]    Plan [facility] and has a bed for patient once she is medically stable. CCP will continue to follow for discharge planning needs. Patient will likely need transport. MASOUD LEYVA                   Discharge Codes    No documentation.                 Expected Discharge Date and Time       Expected Discharge Date Expected Discharge Time    Mar 10, 2025               MASOUD Kay

## 2025-03-10 NOTE — NURSING NOTE
Wound/ostomy - consult received regarding lymphedema wraps that were on patient's legs upon admission. Patient is here with a hip fracture, she fell while leaving her outpatient center [facility] for treatment of lymphedema. She explains that she just resumed going back to them, it was planned for her to go M-W-F to have her wraps changed, there are no wounds, lymphedema is chronic and family member present states edema improves when her legs are elevated like they are in bed. Patient has an abductor pillow in place and SCD's to BLE and legs do not appear excessively edematous. Discussed that is edema needs to be managed ace wraps can be applied, if they insist on lymphedema wraps applied OT can be consulted.

## 2025-03-10 NOTE — PROGRESS NOTES
Name: Mariela Ruiz ADMIT: 3/4/2025   : 1945  PCP: Nereyda Abdalla MD    MRN: [id number] LOS: 6 days   AGE/SEX: 80 y.o. female  ROOM: [facility]     Subjective   Subjective     Positive left lower extremity pain that is controlled with pain medications..  Positive bilateral feet numbness but no weakness (old)..  Good urine output  in the Malin bag without hematuria.  No fever or chills.    Review of Systems  Cardiovascular/respiratory.  No chest pain/no palpitations/no shortness of breath/no cough  GI.  No abdominal pain.  No nausea or vomiting.  Normal bowel movement today without fresh bright blood per rectum or melena.  CNS.  No headache.  No seizures.  No focal neurological symptoms     Objective   Objective   Vital Signs  Temp:  [98.6 °F (37 °C)-99.3 °F (37.4 °C)] 99.1 °F (37.3 °C)  Heart Rate:  [73] 73  Resp:  [16-18] 16  BP: (111-135)/(52-73) 111/60  SpO2:  [94 %-95 %] 94 %  on  Flow (L/min) (Oxygen Therapy):  [1-2] 1;   Device (Oxygen Therapy): nasal cannula    Intake/Output Summary (Last 24 hours) at 3/10/2025 1748  Last data filed at 3/10/2025 1500  Gross per 24 hour   Intake 600 ml   Output 2850 ml   Net -2250 ml     Body mass index is 30.17 kg/m².      25  1023 03/10/25  0318   Weight: 90.7 kg (200 lb) 90 kg (198 lb 6.6 oz)     Physical Exam  General.  Elderly female.  Alert and oriented x 3.  In no apparent pain/distress/diaphoresis.  Normal mood and affect.  Eyes.  Pupils equal round and reactive.  Intact extraocular musculature.  No pallor or jaundice  Oral cavity.  Moist mucous membrane  Neck.  Supple.  No JVD.  No lymphadenopathy or thyromegaly  Cardiovascular.  Regular rate and rhythm with grade 2 systolic murmur  Chest.  Poor but clear to auscultation bilaterally with no added sounds  Abdomen.  Soft lax.  No tenderness.  No organomegaly.  No guarding or rebound  Extremities.  No clubbing or cyanosis.  +1 to +2 bilateral pitting and nonpitting edema of both lower extremities.   "Sequential compression device on both legs.  Dressed left lower extremity no lower extremity neurodeficits   CNS.  No acute focal neurological deficits    Results Review:      Results from last 7 days   Lab Units 03/10/25  0343 03/09/25  0421 03/08/25  0443 03/07/25  0501 03/06/25  0335 03/05/25  0634 03/04/25 2126 03/04/25  1017   SODIUM mmol/L 136 133* 135* 141 138 137  --  140   POTASSIUM mmol/L 4.8 4.6 4.1 4.2 4.3 4.4 4.6 3.6   CHLORIDE mmol/L 104 103 105 108* 108* 106  --  104   CO2 mmol/L 24.1 24.7 26.0 25.0 25.0 26.6  --  24.7   BUN mg/dL 15 17 13 13 14 14  --  15   CREATININE mg/dL 0.73 0.74 0.56* 0.57 0.62 0.59  --  0.73   GLUCOSE mg/dL 101* 100* 91 101* 102* 110*  --  112*   CALCIUM mg/dL 8.4* 8.7 8.9 8.7 8.5* 8.6  --  9.8   AST (SGOT) U/L  --   --   --   --   --   --   --  20   ALT (SGPT) U/L  --   --   --   --   --   --   --  11     Estimated Creatinine Clearance: 74.5 mL/min (by C-G formula based on SCr of 0.73 mg/dL).          Results from last 7 days   Lab Units 03/04/25 2126 03/04/25  1412 03/04/25  1017   HSTROP T ng/L 12 13 13     Results from last 7 days   Lab Units 03/04/25  1017   PROBNP pg/mL 243.0     Results from last 7 days   Lab Units 03/06/25  1709   TSH uIU/mL 2.510     Results from last 7 days   Lab Units 03/04/25 2126 03/04/25  1017   MAGNESIUM mg/dL 1.7 1.8           Invalid input(s): \"LDLCALC\"  Results from last 7 days   Lab Units 03/10/25  1012 03/09/25  0421 03/08/25  1243 03/08/25  0443 03/07/25  0501 03/06/25  0335 03/05/25  0324 03/04/25  1017   WBC 10*3/mm3 4.77 4.46  --  3.88 4.49 5.24 5.17 7.35   HEMOGLOBIN g/dL 7.7* 8.2* 9.8* 8.5* 8.2* 8.5* 10.5* 11.5*   HEMATOCRIT % 23.2* 24.6* 31.0* 26.0* 25.6* 26.7* 33.3* 35.1   PLATELETS 10*3/mm3 207 217  --  205 182 169 192 221   MCV fL 89.6 88.2  --  90.9 91.8 90.2 91.7 89.1   MCH pg 29.7 29.4  --  29.7 29.4 28.7 28.9 29.2   MCHC g/dL 33.2 33.3  --  32.7 32.0 31.8 31.5 32.8   RDW % 12.7 12.6  --  12.0* 11.9* 11.9* 11.8* 12.1* "   RDW-SD fl 41.9 40.5  --  40.2 40.4 39.5 39.4 38.9   MPV fL 10.2 10.4  --  10.1 10.7 11.2 11.0 10.1   NEUTROPHIL % % 75.9 77.2*  --  57.6 64.4  --   --  76.2*   LYMPHOCYTE % % 13.0* 11.9*  --  29.4 21.8  --   --  16.3*   MONOCYTES % % 10.5 10.5  --  12.4* 13.4*  --   --  7.1   EOSINOPHIL % % 0.0* 0.0*  --  0.0* 0.0*  --   --  0.0*   BASOPHIL % % 0.2 0.2  --  0.3 0.2  --   --  0.1   IMM GRAN % % 0.4 0.2  --  0.3 0.2  --   --  0.3   NEUTROS ABS 10*3/mm3 3.62 3.44  --  2.24 2.89  --   --  5.60   LYMPHS ABS 10*3/mm3 0.62* 0.53*  --  1.14 0.98  --   --  1.20   MONOS ABS 10*3/mm3 0.50 0.47  --  0.48 0.60  --   --  0.52   EOS ABS 10*3/mm3 0.00 0.00  --  0.00 0.00  --   --  0.00   BASOS ABS 10*3/mm3 0.01 0.01  --  0.01 0.01  --   --  0.01   IMMATURE GRANS (ABS) 10*3/mm3 0.02 0.01  --  0.01 0.01  --   --  0.02   NRBC /100 WBC 0.0 0.0  --  0.0 0.0  --   --  0.0                                 Results from last 7 days   Lab Units 03/04/25  2047   NITRITE UA  Negative           Imaging:  Imaging Results (Last 24 Hours)       ** No results found for the last 24 hours. **               I reviewed the patient's new clinical results / labs / tests / procedures      Assessment/Plan     Active Hospital Problems    Diagnosis  POA    **Periprosthetic hip fracture [M97.8XXA, Z96.649]  Not Applicable    Ureterolithiasis [N20.1]  Yes    Hydronephrosis [N13.30]  Yes    Seizure disorder [G40.909]  Yes    Hyperlipidemia [E78.5]  Yes    Venous (peripheral) insufficiency [I87.2]  Yes    Postsurgical hypothyroidism [E89.0]  Yes      Resolved Hospital Problems   No resolved problems to display.           Mechanical fall leading to left MAXIMO periprosthetic fracture and possible left TKA loosening.  Patient is s/p left MAXIMO revision and ORIF earlier today.    CT scan of the brain without contrast revealed no acute disease with evidence of old infarction.  CT scan of the chest without contrast with no pulmonary abnormalities.  CT scan of the abdomen  pelvis revealed no acute intraabdominal traumatic injury.  Orthopedic has the patient on 3 days of vancomycin and cefazolin.  (will finish today)..  Orthopedic indicates that wound VAC in place and functioning well with minimal wound drainage.  Orthopedic indicates that the patient needs to be nonweightbearing for 8 to 12 weeks and recommends follow-up in 3 weeks for x-ray and wound check.    Incidental right thyroid nodule in a patient with a history of hypothyroidism..  Normal thyroid function test.  Thyroid ultrasound with left thyroidectomy and 2 nodules on the right thyroid lobe that will need biopsy as an outpatient.    Urinary retention/obstructing 7 mm left ureteric stone with hydronephrosis.  No evidence of UTI.  Currently on Malin and will try voiding trial prior to discharge.  S/p urology consultations with outpatient follow-up recommendation and no recommendations for surgical intervention at this time.  Normal renal function.  Will leave Malin catheter in at this time in view of none mobility.  Mild dehydration.  Resolved after IV fluid.  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  Continue Pravachol/ aspirin.  Negative CNS examination for focal deficits  Iron deficiency anemia/postoperative anemia..  Baseline hemoglobin between 10 and 12.4.  Hemoglobin worse than baseline and has dropped after surgery.  Patient received a single unit of packed RBC yesterday while in the operating room..  Hemoglobin less than 8 transfuse a single unit of packed RBC.  Hypotension.  Mostly secondary to dehydration and anesthesia.  Plan IV fluid and stop Flomax.  Epilepsy.  No seizures.  Negative CNS examination.  Continue phenobarbital.  Hyperlipidemia.  Continue statin.  Chronic lymphedema.  Has lymphedema clinic as an outpatient.  Continue leg wrapping  VTE prophylaxis.  Lovenox subcu.              Discussed my findings and plan of treatment with the patient/nurse   Disposition.  To SNF in the next 2 days days.  If okay with  orthopedic  Janna Gonzales MD  [facility]  03/10/25  17:48 EDT

## 2025-03-11 LAB
ANION GAP SERPL CALCULATED.3IONS-SCNC: 10 MMOL/L (ref 5–15)
BASOPHILS # BLD AUTO: 0.01 10*3/MM3 (ref 0–0.2)
BASOPHILS NFR BLD AUTO: 0.2 % (ref 0–1.5)
BH BB BLOOD EXPIRATION DATE: NORMAL
BH BB BLOOD EXPIRATION DATE: NORMAL
BH BB BLOOD TYPE BARCODE: 600
BH BB BLOOD TYPE BARCODE: 600
BH BB DISPENSE STATUS: NORMAL
BH BB DISPENSE STATUS: NORMAL
BH BB PRODUCT CODE: NORMAL
BH BB PRODUCT CODE: NORMAL
BH BB UNIT NUMBER: NORMAL
BH BB UNIT NUMBER: NORMAL
BUN SERPL-MCNC: 14 MG/DL (ref 8–23)
BUN/CREAT SERPL: 19.2 (ref 7–25)
CALCIUM SPEC-SCNC: 8.6 MG/DL (ref 8.6–10.5)
CHLORIDE SERPL-SCNC: 100 MMOL/L (ref 98–107)
CO2 SERPL-SCNC: 26 MMOL/L (ref 22–29)
CREAT SERPL-MCNC: 0.73 MG/DL (ref 0.57–1)
CROSSMATCH INTERPRETATION: NORMAL
CROSSMATCH INTERPRETATION: NORMAL
DEPRECATED RDW RBC AUTO: 41.4 FL (ref 37–54)
EGFRCR SERPLBLD CKD-EPI 2021: 84.3 ML/MIN/1.73
EOSINOPHIL # BLD AUTO: 0 10*3/MM3 (ref 0–0.4)
EOSINOPHIL NFR BLD AUTO: 0 % (ref 0.3–6.2)
ERYTHROCYTE [DISTWIDTH] IN BLOOD BY AUTOMATED COUNT: 13.1 % (ref 12.3–15.4)
GLUCOSE SERPL-MCNC: 104 MG/DL (ref 65–99)
HCT VFR BLD AUTO: 25.3 % (ref 34–46.6)
HGB BLD-MCNC: 8.3 G/DL (ref 12–15.9)
IMM GRANULOCYTES # BLD AUTO: 0.04 10*3/MM3 (ref 0–0.05)
IMM GRANULOCYTES NFR BLD AUTO: 0.9 % (ref 0–0.5)
LYMPHOCYTES # BLD AUTO: 0.56 10*3/MM3 (ref 0.7–3.1)
LYMPHOCYTES NFR BLD AUTO: 12.2 % (ref 19.6–45.3)
MCH RBC QN AUTO: 28.9 PG (ref 26.6–33)
MCHC RBC AUTO-ENTMCNC: 32.8 G/DL (ref 31.5–35.7)
MCV RBC AUTO: 88.2 FL (ref 79–97)
MONOCYTES # BLD AUTO: 0.6 10*3/MM3 (ref 0.1–0.9)
MONOCYTES NFR BLD AUTO: 13 % (ref 5–12)
NEUTROPHILS NFR BLD AUTO: 3.39 10*3/MM3 (ref 1.7–7)
NEUTROPHILS NFR BLD AUTO: 73.7 % (ref 42.7–76)
NRBC BLD AUTO-RTO: 0 /100 WBC (ref 0–0.2)
PLATELET # BLD AUTO: 224 10*3/MM3 (ref 140–450)
PMV BLD AUTO: 10.1 FL (ref 6–12)
POTASSIUM SERPL-SCNC: 4.5 MMOL/L (ref 3.5–5.2)
RBC # BLD AUTO: 2.87 10*6/MM3 (ref 3.77–5.28)
SODIUM SERPL-SCNC: 136 MMOL/L (ref 136–145)
UNIT  ABO: NORMAL
UNIT  ABO: NORMAL
UNIT  RH: NORMAL
UNIT  RH: NORMAL
WBC NRBC COR # BLD AUTO: 4.6 10*3/MM3 (ref 3.4–10.8)

## 2025-03-11 PROCEDURE — 97530 THERAPEUTIC ACTIVITIES: CPT

## 2025-03-11 PROCEDURE — 85025 COMPLETE CBC W/AUTO DIFF WBC: CPT | Performed by: ORTHOPAEDIC SURGERY

## 2025-03-11 PROCEDURE — 25010000002 VANCOMYCIN 1 G RECONSTITUTED SOLUTION 1 EACH VIAL: Performed by: ORTHOPAEDIC SURGERY

## 2025-03-11 PROCEDURE — 97110 THERAPEUTIC EXERCISES: CPT

## 2025-03-11 PROCEDURE — 80048 BASIC METABOLIC PNL TOTAL CA: CPT | Performed by: ORTHOPAEDIC SURGERY

## 2025-03-11 PROCEDURE — 25010000002 CEFAZOLIN PER 500 MG: Performed by: ORTHOPAEDIC SURGERY

## 2025-03-11 PROCEDURE — 25810000003 SODIUM CHLORIDE 0.9 % SOLUTION 250 ML FLEX CONT: Performed by: ORTHOPAEDIC SURGERY

## 2025-03-11 PROCEDURE — 25010000002 ENOXAPARIN PER 10 MG: Performed by: ORTHOPAEDIC SURGERY

## 2025-03-11 RX ORDER — OXYCODONE HYDROCHLORIDE 5 MG/1
10 TABLET ORAL EVERY 4 HOURS PRN
Refills: 0 | Status: DISCONTINUED | OUTPATIENT
Start: 2025-03-11 | End: 2025-03-13 | Stop reason: HOSPADM

## 2025-03-11 RX ADMIN — OXYCODONE HYDROCHLORIDE 20 MG: 15 TABLET ORAL at 16:40

## 2025-03-11 RX ADMIN — POLYETHYLENE GLYCOL 3350 17 G: 17 POWDER, FOR SOLUTION ORAL at 09:29

## 2025-03-11 RX ADMIN — Medication 2.5 MG: at 20:47

## 2025-03-11 RX ADMIN — SENNOSIDES AND DOCUSATE SODIUM 2 TABLET: 50; 8.6 TABLET ORAL at 09:29

## 2025-03-11 RX ADMIN — BUSPIRONE HYDROCHLORIDE 5 MG: 10 TABLET ORAL at 09:28

## 2025-03-11 RX ADMIN — PHENOBARBITAL 129.6 MG: 32.4 TABLET ORAL at 20:47

## 2025-03-11 RX ADMIN — OXYCODONE HYDROCHLORIDE 10 MG: 5 TABLET ORAL at 02:21

## 2025-03-11 RX ADMIN — FAMOTIDINE 20 MG: 20 TABLET, FILM COATED ORAL at 16:40

## 2025-03-11 RX ADMIN — SODIUM CHLORIDE 1000 MG: 9 INJECTION, SOLUTION INTRAVENOUS at 06:24

## 2025-03-11 RX ADMIN — LEVOTHYROXINE SODIUM 50 MCG: 50 TABLET ORAL at 06:24

## 2025-03-11 RX ADMIN — OXYCODONE HYDROCHLORIDE 10 MG: 5 TABLET ORAL at 12:24

## 2025-03-11 RX ADMIN — FAMOTIDINE 20 MG: 20 TABLET, FILM COATED ORAL at 09:29

## 2025-03-11 RX ADMIN — PRAVASTATIN SODIUM 40 MG: 20 TABLET ORAL at 09:28

## 2025-03-11 RX ADMIN — OXYCODONE HYDROCHLORIDE 20 MG: 15 TABLET ORAL at 20:46

## 2025-03-11 RX ADMIN — SODIUM CHLORIDE 2000 MG: 9 INJECTION, SOLUTION INTRAVENOUS at 02:22

## 2025-03-11 RX ADMIN — SODIUM CHLORIDE 2000 MG: 9 INJECTION, SOLUTION INTRAVENOUS at 12:24

## 2025-03-11 RX ADMIN — PRAMIPEXOLE DIHYDROCHLORIDE 2.5 MG: 1.5 TABLET ORAL at 20:46

## 2025-03-11 RX ADMIN — ASPIRIN 81 MG: 81 TABLET, COATED ORAL at 09:29

## 2025-03-11 RX ADMIN — OXYCODONE HYDROCHLORIDE 10 MG: 5 TABLET ORAL at 07:23

## 2025-03-11 RX ADMIN — BUSPIRONE HYDROCHLORIDE 5 MG: 10 TABLET ORAL at 17:00

## 2025-03-11 RX ADMIN — ENOXAPARIN SODIUM 40 MG: 100 INJECTION SUBCUTANEOUS at 20:47

## 2025-03-11 RX ADMIN — Medication 4000 UNITS: at 09:29

## 2025-03-11 NOTE — PLAN OF CARE
Goal Outcome Evaluation:  Plan of Care Reviewed With: patient        Progress: no change  Outcome Evaluation: PT seen for PT this AM, max A of 2 to reach EOB with significant posterior lean in unsupported sitting. increased time sitting EOB today compared to last session, but poor pain tolerance at this time, unable to progress to transfers safety. Participates in gentle AROM at EOB. Fatigued with activity, returned to supine at end of session.    Anticipated Discharge Disposition (PT): skilled nursing facility

## 2025-03-11 NOTE — PLAN OF CARE
Goal Outcome Evaluation:           Progress: improving  Outcome Evaluation: POD 4 LTH revision, A&O, NWB, only able to sit on EOB w/PT, f/c, very anxious at times, hoa for pain, reg diet, wound vac, scds, SNF at d/c, educated on bp monitoring, CTM

## 2025-03-11 NOTE — PROGRESS NOTES
Minimal drainage noted.  Hemoglobin stable.  Patient resting comfortably this morning.  Plan discharge to rehab when appropriate

## 2025-03-11 NOTE — PLAN OF CARE
Goal Outcome Evaluation:           Progress: improving                      Pt is a post op day 3 of a left total hip revision. Dressing is clean dry and intact. Pt continues with the wound vac in place. Minimal amount of drainage noted. Pt received a unit of blood this shift, tolerated well. Pt continues with PO pain meds. Seizure precautions in place. NWB to LLE. Malin in place. Pt need encouragement to do the minimal amount of activity. Pt refusing turns. Pt resting at this time, will continue to monitor.

## 2025-03-11 NOTE — PROGRESS NOTES
Name: Mariela Ruiz ADMIT: 3/4/2025   : 1945  PCP: Nereyda Abdalla MD    MRN: [id number] LOS: 7 days   AGE/SEX: 80 y.o. female  ROOM: [facility]     Subjective   Subjective     Positive left lower extremity pain that is controlled with pain medications..  Positive bilateral feet numbness but no weakness (old)..  Good urine output  in the Malin bag without hematuria.  No fever or chills.    Review of Systems  Cardiovascular/respiratory.  No chest pain/no palpitations/no shortness of breath/no cough  GI.  No abdominal pain.  No nausea or vomiting.  Normal bowel movement last 2 days without fresh bright blood per rectum or melena.  CNS.  No headache.  No seizures.  No focal neurological symptoms     Objective   Objective   Vital Signs  Temp:  [97.7 °F (36.5 °C)-99.7 °F (37.6 °C)] 98.4 °F (36.9 °C)  Heart Rate:  [90-97] 90  Resp:  [16-18] 16  BP: (104-134)/(47-63) 114/63  SpO2:  [95 %-100 %] 99 %  on  Flow (L/min) (Oxygen Therapy):  [1] 1;   Device (Oxygen Therapy): nasal cannula    Intake/Output Summary (Last 24 hours) at 3/11/2025 1837  Last data filed at 3/11/2025 1749  Gross per 24 hour   Intake 1282.92 ml   Output 2800 ml   Net -1517.08 ml     Body mass index is 30.5 kg/m².      25  1023 03/10/25  0318 25  0353   Weight: 90.7 kg (200 lb) 90 kg (198 lb 6.6 oz) 91 kg (200 lb 9.9 oz)     Physical Exam  General.  Elderly female.  Alert and oriented x 3.  In no apparent pain/distress/diaphoresis.  Normal mood and affect.  Eyes.  Pupils equal round and reactive.  Intact extraocular musculature.  No pallor or jaundice  Oral cavity.  Moist mucous membrane  Neck.  Supple.  No JVD.  No lymphadenopathy or thyromegaly  Cardiovascular.  Regular rate and rhythm with grade 2 systolic murmur  Chest.  Poor but clear to auscultation bilaterally with no added sounds  Abdomen.  Soft lax.  No tenderness.  No organomegaly.  No guarding or rebound  Extremities.  No clubbing or cyanosis.  +1 to +2 bilateral pitting  "and nonpitting edema of both lower extremities.  Sequential compression device on both legs.  Dressed left lower extremity no lower extremity neurodeficits   CNS.  No acute focal neurological deficits    Results Review:      Results from last 7 days   Lab Units 03/11/25  0325 03/10/25  0343 03/09/25  0421 03/08/25  0443 03/07/25  0501 03/06/25  0335 03/05/25  0634   SODIUM mmol/L 136 136 133* 135* 141 138 137   POTASSIUM mmol/L 4.5 4.8 4.6 4.1 4.2 4.3 4.4   CHLORIDE mmol/L 100 104 103 105 108* 108* 106   CO2 mmol/L 26.0 24.1 24.7 26.0 25.0 25.0 26.6   BUN mg/dL 14 15 17 13 13 14 14   CREATININE mg/dL 0.73 0.73 0.74 0.56* 0.57 0.62 0.59   GLUCOSE mg/dL 104* 101* 100* 91 101* 102* 110*   CALCIUM mg/dL 8.6 8.4* 8.7 8.9 8.7 8.5* 8.6     Estimated Creatinine Clearance: 74.9 mL/min (by C-G formula based on SCr of 0.73 mg/dL).          Results from last 7 days   Lab Units 03/04/25  2126   HSTROP T ng/L 12           Results from last 7 days   Lab Units 03/06/25  1709   TSH uIU/mL 2.510     Results from last 7 days   Lab Units 03/04/25  2126   MAGNESIUM mg/dL 1.7           Invalid input(s): \"LDLCALC\"  Results from last 7 days   Lab Units 03/11/25  0325 03/10/25  1012 03/09/25  0421 03/08/25  1243 03/08/25  0443 03/07/25  0501 03/06/25  0335 03/06/25  0335 03/05/25  0324   WBC 10*3/mm3 4.60 4.77 4.46  --  3.88 4.49  --  5.24 5.17   HEMOGLOBIN g/dL 8.3* 7.7* 8.2* 9.8* 8.5* 8.2*  --  8.5* 10.5*   HEMATOCRIT % 25.3* 23.2* 24.6* 31.0* 26.0* 25.6*  --  26.7* 33.3*   PLATELETS 10*3/mm3 224 207 217  --  205 182  --  169 192   MCV fL 88.2 89.6 88.2  --  90.9 91.8  --  90.2 91.7   MCH pg 28.9 29.7 29.4  --  29.7 29.4  --  28.7 28.9   MCHC g/dL 32.8 33.2 33.3  --  32.7 32.0  --  31.8 31.5   RDW % 13.1 12.7 12.6  --  12.0* 11.9*  --  11.9* 11.8*   RDW-SD fl 41.4 41.9 40.5  --  40.2 40.4  --  39.5 39.4   MPV fL 10.1 10.2 10.4  --  10.1 10.7  --  11.2 11.0   NEUTROPHIL % % 73.7 75.9 77.2*  --  57.6 64.4   < >  --   --    LYMPHOCYTE % % " 12.2* 13.0* 11.9*  --  29.4 21.8   < >  --   --    MONOCYTES % % 13.0* 10.5 10.5  --  12.4* 13.4*   < >  --   --    EOSINOPHIL % % 0.0* 0.0* 0.0*  --  0.0* 0.0*   < >  --   --    BASOPHIL % % 0.2 0.2 0.2  --  0.3 0.2   < >  --   --    IMM GRAN % % 0.9* 0.4 0.2  --  0.3 0.2   < >  --   --    NEUTROS ABS 10*3/mm3 3.39 3.62 3.44  --  2.24 2.89   < >  --   --    LYMPHS ABS 10*3/mm3 0.56* 0.62* 0.53*  --  1.14 0.98   < >  --   --    MONOS ABS 10*3/mm3 0.60 0.50 0.47  --  0.48 0.60   < >  --   --    EOS ABS 10*3/mm3 0.00 0.00 0.00  --  0.00 0.00   < >  --   --    BASOS ABS 10*3/mm3 0.01 0.01 0.01  --  0.01 0.01   < >  --   --    IMMATURE GRANS (ABS) 10*3/mm3 0.04 0.02 0.01  --  0.01 0.01   < >  --   --    NRBC /100 WBC 0.0 0.0 0.0  --  0.0 0.0   < >  --   --     < > = values in this interval not displayed.                                 Results from last 7 days   Lab Units 03/04/25  2047   NITRITE UA  Negative           Imaging:  Imaging Results (Last 24 Hours)       ** No results found for the last 24 hours. **               I reviewed the patient's new clinical results / labs / tests / procedures      Assessment/Plan     Active Hospital Problems    Diagnosis  POA    **Periprosthetic hip fracture [M97.8XXA, Z96.649]  Not Applicable    Ureterolithiasis [N20.1]  Yes    Hydronephrosis [N13.30]  Yes    Seizure disorder [G40.909]  Yes    Hyperlipidemia [E78.5]  Yes    Venous (peripheral) insufficiency [I87.2]  Yes    Postsurgical hypothyroidism [E89.0]  Yes      Resolved Hospital Problems   No resolved problems to display.           Mechanical fall leading to left MAXIMO periprosthetic fracture and possible left TKA loosening.  Patient is s/p left MAXIMO revision and ORIF earlier today.    CT scan of the brain without contrast revealed no acute disease with evidence of old infarction.  CT scan of the chest without contrast with no pulmonary abnormalities.  CT scan of the abdomen pelvis revealed no acute intraabdominal traumatic  injury.  Orthopedic has the patient on 3 days of vancomycin and cefazolin.  (will finish today)..  Orthopedic indicates that wound VAC in place and functioning well with minimal wound drainage.  Orthopedic indicates that the patient needs to be nonweightbearing for 8 to 12 weeks and recommends follow-up in 3 weeks for x-ray and wound check.    Incidental right thyroid nodule in a patient with a history of hypothyroidism..  Normal thyroid function test.  Thyroid ultrasound with left thyroidectomy and 2 nodules on the right thyroid lobe that will need biopsy as an outpatient.    Urinary retention/obstructing 7 mm left ureteric stone with hydronephrosis.  No evidence of UTI.  Currently on Malin and will try voiding trial prior to discharge.  S/p urology consultations with outpatient follow-up recommendation and no recommendations for surgical intervention at this time.  Normal renal function.  Will leave Malin catheter in at this time in view of none mobility.  Mild dehydration.  Resolved after IV fluid.  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  Continue Pravachol/ aspirin.  Negative CNS examination for focal deficits  Iron deficiency anemia/postoperative anemia..  Baseline hemoglobin between 10 and 12.4.  Hemoglobin worse than baseline and has dropped after surgery.  Patient received 2 units of packed RBCs during and after surgery.  Appropriate hemoglobin response.  Continue to monitor.  Pending less than 8.   Hypotension.  Mostly secondary to dehydration and anesthesia.  Resolved with IV fluids and stop Flomax   epilepsy.  No seizures.  Negative CNS examination.  Continue phenobarbital.  Hyperlipidemia.  Continue statin.  Chronic lymphedema.  Has lymphedema clinic as an outpatient.  Continue leg wrapping  VTE prophylaxis.  Lovenox subcu.              Discussed my findings and plan of treatment with the patient/nurse   Disposition.  To SNF in the next 1-2 days days.  If okay with orthopedic  Janna Gonzales MD  [facility]  03/11/25  18:37 EDT

## 2025-03-11 NOTE — THERAPY TREATMENT NOTE
Patient Name: Mariela Ruiz  : 1945    MRN: [id number]                              Today's Date: 3/11/2025       Admit Date: 3/4/2025    Visit Dx:     ICD-10-CM ICD-9-CM   1. Periprosthetic fracture of hip, initial encounter  M97.8XXA 996.44    Z96.649 V43.64   2. Fall, initial encounter  W19.XXXA E888.9   3. Anemia, unspecified type  D64.9 285.9   4. Hyperglycemia  R73.9 790.29   5. Thyroid nodule  E04.1 241.0   6. Ureteral stone  N20.1 592.1     Patient Active Problem List   Diagnosis    Seizure disorder    Hyperlipidemia    Vitamin D insufficiency    Age-related osteoporosis without current pathological fracture    Sleep apnea    Venous (peripheral) insufficiency    Postsurgical hypothyroidism    Chronic fatigue syndrome    Gastroesophageal reflux disease    Dupuytren's contracture    History of biliary T-tube placement    History of partial thyroidectomy    Multinodular goiter    Restless legs syndrome    History of cardiac catheterization    Urge incontinence of urine    Left knee pain    Ligamentous laxity of knee    DJD (degenerative joint disease) of knee    Adverse drug effect, subsequent encounter    Abnormal blood level of chromium    Abnormal blood level of cobalt    Family history of diabetes mellitus    Thrombocytopenia    .    S/P revision of total hip    Enlarged thyroid gland    Palmar fascial fibromatosis (dupuytren)    Hypothyroidism, unspecified    Hyperlipidemia, unspecified    Acute cholecystitis    Elevated troponin    Vitamin B12 deficiency    Pernicious anemia    Lymphedema    Venous stasis    Chronic venous hypertension with inflammation involving both sides    Periprosthetic hip fracture    Ureterolithiasis    Hydronephrosis     Past Medical History:   Diagnosis Date    Arthritis     Chronic venous insufficiency     Dupuytren's contracture     History of staph infection     S/P KNEE DEC 2016    History of transfusion     Hyperlipidemia     Lymphedema     LEFT LEG    Nontoxic  multinodular goiter     Osteoporosis     Dr. Cabrera    Pelvic joint pain, left     Postsurgical hypothyroidism     Presence of left artificial hip joint     METAL ON METAL AS NOTED PER DR CLEMENT NOTE    Restless leg syndrome     Right bundle branch block     Seizure disorder     Dr. Whitman, HX 1980 LAST SEIZURE    Sleep apnea     DOES NOT HAVE MACHINE    Vitamin D insufficiency      Past Surgical History:   Procedure Laterality Date    APPENDECTOMY      CARDIAC CATHETERIZATION      NORMAL     CHOLECYSTECTOMY N/A 11/2/2024    Procedure: CHOLECYSTECTOMY LAPAROSCOPIC WITH DAVINCI ROBOT with cholangiogram, possible open;  Surgeon: Bin Heaton MD;  Location: [facility] MAIN OR;  Service: Robotics - DaVinci;  Laterality: N/A;    COLONOSCOPY  08/17/2017    Normal except for anal fissure.  Dr. Brandt.  [facility].    KNEE MINI REVISION Left 11/15/2016    Procedure: LEFT KNEE POLY CHANGE WITH HI POST STABILIZER;  Surgeon: Pablito Claudio MD;  Location: [facility] MAIN OR;  Service:     KNEE MINI REVISION Left 12/13/2016    Procedure: LT KNEE REMOVAL/REPLACEMENT LOCKING PIN ;  Surgeon: Pablito Claudio MD;  Location: [facility] OR;  Service:     MAMMO FINE NEEDLE ASPIRATION UNILATERAL      RIGHT THYROID NODULE, 2009 BENIGN     ME ARTHRP KNE CONDYLE&PLATU MEDIAL&LAT COMPARTMENTS Left 12/24/2016    Procedure: LEFT KNEE WASHOUT WITH POLY CHANGE;  Surgeon: Christiano Clement MD;  Location: [facility] OR;  Service: Orthopedics    ME REVJ TOTAL KNEE ARTHRP W/WO ALGRFT 1 COMPONENT Left 2/20/2017    Procedure: LT KNEE REMOVAL ANTIBIOTIC SPACER AND KNEE REVISION;  Surgeon: Christiano Clement MD;  Location: [facility] MAIN OR;  Service: Orthopedics    REPLACEMENT TOTAL KNEE Left     THYROIDECTOMY, PARTIAL      1979 LEFT LOBECTOMY AND ISTHMECTOMY     TOTAL ABDOMINAL HYSTERECTOMY WITH SALPINGO OOPHORECTOMY      TOTAL HIP ARTHROPLASTY Left     TOTAL HIP ARTHROPLASTY Right 9/14/2019    Procedure: TOTAL HIP ARTHROPLASTY ANTERIOR WITH HANA  TABLE;  Surgeon: Christiano Cesar II, MD;  Location: [facility] OR;  Service: Orthopedics    TOTAL HIP ARTHROPLASTY REVISION Left 3/9/2021    Procedure: LEFT TOTAL HIP ARTHROPLASTY REVISION POSTERIOR;  Surgeon: Christiano Cesar II, MD;  Location: [facility] OR;  Service: Orthopedics;  Laterality: Left;    TOTAL KNEE  PROSTHESIS REMOVAL W/ SPACER INSERTION Left 12/29/2016    Procedure: LT KNEE IMPLANT REMOVAL WITH INSERTION OF SPACER ;  Surgeon: Christiano Cesar MD;  Location: [facility] OR;  Service:       General Information       Row Name 03/11/25 1138          Physical Therapy Time and Intention    Document Type therapy note (daily note)  -     Mode of Treatment physical therapy  -       Row Name 03/11/25 1138          General Information    Existing Precautions/Restrictions hip;non-weight bearing;left;hip, posterior  -       Row Name 03/11/25 1138          Cognition    Orientation Status (Cognition) oriented x 4  -       Row Name 03/11/25 1138          Safety Issues/Impairments Affecting Functional Mobility    Impairments Affecting Function (Mobility) pain;strength;range of motion (ROM)  -     Comment, Safety Issues/Impairments (Mobility) nonskid socks donned  -               User Key  (r) = Recorded By, (t) = Taken By, (c) = Cosigned By      Initials Name Provider Type    ST Viktoriya Banerjee, PT Physical Therapist                   Mobility       Row Name 03/11/25 1142          Bed Mobility    Bed Mobility bed mobility (all) activities  -ST     All Activities, [place] (Bed Mobility) maximum assist (25% patient effort);2 person assist  -ST     Assistive Device (Bed Mobility) repositioning sheet;bed rails  -ST     Comment, (Bed Mobility) very slow and pt anxious throughout. significant posterior lean with poor tolerance for sitting unsupported, unable to progress to transfers  -       Row Name 03/11/25 1142          Bed-Chair Transfer    Bed-Chair [place] (Transfers)  not tested  -ST       Row Name 03/11/25 1142          Sit-Stand Transfer    Sit-Stand [place] (Transfers) not tested  -ST       Row Name 03/11/25 1142          Gait/Stairs (Locomotion)    [place] Level (Gait) not tested  -ST               User Key  (r) = Recorded By, (t) = Taken By, (c) = Cosigned By      Initials Name Provider Type    ST Viktoriya Banerjee, PT Physical Therapist                   Obj/Interventions       Row Name 03/11/25 1145          Motor Skills    Therapeutic Exercise --  seated AP, LAQ bilat 2 x 10 - small amplitude L LE. R hip marches mall in amplitude 2 x 10  -ST       Row Name 03/11/25 1145          Balance    Comment, Balance mod A for unsupported sitting initially but improves to close SBA/CGA with improved position, posterior lean throughout.  -ST               User Key  (r) = Recorded By, (t) = Taken By, (c) = Cosigned By      Initials Name Provider Type    ST Viktoriya Banerjee, PT Physical Therapist                   Goals/Plan    No documentation.                  Clinical Impression       [facility] Name 03/11/25 1146          Pain    Pretreatment Pain Rating 10/10  -ST     Posttreatment Pain Rating 10/10  -ST     Pain Location hip;knee  -ST     Pain Side/Orientation left  -ST     Pain Management Interventions exercise or physical activity utilized  -ST     Response to Pain Interventions activity participation with increased pain  -ST     Pre/Posttreatment Pain Comment pt appears anxious with pain, frequent moaning and groaning with all movement  -ST       Row Name 03/11/25 1146          Plan of Care Review    Plan of Care Reviewed With patient  -ST     Progress no change  -ST     Outcome Evaluation PT seen for PT this AM, max A of 2 to reach EOB with significant posterior lean in unsupported sitting. increased time sitting EOB today compared to last session, but poor pain tolerance at this time, unable to progress to transfers safety. Participates in gentle AROM at EOB.  Fatigued with activity, returned to supine at end of session.  -       Row Name 03/11/25 1146          Therapy Assessment/Plan (PT)    Rehab Potential (PT) fair  -ST     Criteria for Skilled Interventions Met (PT) yes  -ST     Therapy Frequency (PT) 6 times/wk  -       Row Name 03/11/25 1146          Positioning and Restraints    Pre-Treatment Position in bed  -ST     Post Treatment Position bed  -ST     In Bed notified nsg;supine;call light within reach;encouraged to call for assist;exit alarm on  -ST               User Key  (r) = Recorded By, (t) = Taken By, (c) = Cosigned By      Initials Name Provider Type    Viktoriya Capone PT Physical Therapist                   Outcome Measures       Row Name 03/11/25 1148 03/11/25 0353       How much help from another person do you currently need...    Turning from your back to your side while in flat bed without using bedrails? 2  -ST 2  -JF    Moving from lying on back to sitting on the side of a flat bed without bedrails? 2  -ST 2  -JF    Moving to and from a bed to a chair (including a wheelchair)? 1  -ST 1  -JF    Standing up from a chair using your arms (e.g., wheelchair, bedside chair)? 1  -ST 1  -JF    Climbing 3-5 steps with a railing? 1  -ST 1  -JF    To walk in hospital room? 1  -ST 1  -JF    AM-PAC 6 Clicks Score (PT) 8  -ST 8  -JF    Highest Level of Mobility Goal 3 --> Sit at edge of bed  -ST 3 --> Sit at edge of bed  -JF              User Key  (r) = Recorded By, (t) = Taken By, (c) = Cosigned By      Initials Name Provider Type    Lachelle Morales, RN Registered Nurse    Viktoriya Capone PT Physical Therapist                                 Physical Therapy Education       Title: PT OT SLP Therapies (In Progress)       Topic: Physical Therapy (In Progress)       Point: Mobility training (In Progress)       Learning Progress Summary            Patient Acceptance, E,TB, NR by  at 3/11/2025 1148    Acceptance, E,TB, VU,DU by  at  3/9/2025 1238                      Point: Home exercise program (Done)       Learning Progress Summary            Patient Acceptance, E,TB, VU,DU by LB at 3/9/2025 1238                      Point: Body mechanics (In Progress)       Learning Progress Summary            Patient Acceptance, E,TB, NR by ST at 3/11/2025 1148    Acceptance, E,TB, VU,DU by LB at 3/9/2025 1238                      Point: Precautions (Done)       Learning Progress Summary            Patient Acceptance, E,TB, VU,DU by LB at 3/9/2025 1238                                      User Key       Initials Effective Dates Name Provider Type Discipline    LB 08/09/20 -  Candace Preciado, PT Physical Therapist PT    ST 09/22/22 -  Viktoriya Banerjee PT Physical Therapist PT                  PT Recommendation and Plan     Progress: no change  Outcome Evaluation: PT seen for PT this AM, max A of 2 to reach EOB with significant posterior lean in unsupported sitting. increased time sitting EOB today compared to last session, but poor pain tolerance at this time, unable to progress to transfers safety. Participates in gentle AROM at EOB. Fatigued with activity, returned to supine at end of session.     Time Calculation:         PT Charges       Row Name 03/11/25 1148             Time Calculation    Start Time 1009  -ST      Stop Time 1035  -ST      Time Calculation (min) 26 min  -ST      PT Received On 03/11/25  -ST      PT - Next Appointment 03/12/25  -ST         Time Calculation- PT    Total Timed Code Minutes- PT 26 minute(s)  -ST         Timed Charges    45221 - PT Therapeutic Exercise Minutes 8  -ST      57904 - PT Therapeutic Activity Minutes 18  -ST         Total Minutes    Timed Charges Total Minutes 26  -ST       Total Minutes 26  -ST                User Key  (r) = Recorded By, (t) = Taken By, (c) = Cosigned By      Initials Name Provider Type    ST Viktoriya Banerjee, PT Physical Therapist                  Therapy Charges for Today       Code  Description Service Date Service Provider Modifiers Qty    83674087594 HC PT THER PROC EA 15 MIN 3/11/2025 Viktoriya Banerjee, PT GP 1    20181427052 HC PT THERAPEUTIC ACT EA 15 MIN 3/11/2025 Viktoriya Banerjee, PT GP 1    03633692625 HC PT THER SUPP EA 15 MIN 3/11/2025 Viktoriya Banerjee, PT GP 2            PT G-Codes  Outcome Measure Options: AM-PAC 6 Clicks Basic Mobility (PT)  AM-PAC 6 Clicks Score (PT): 8  PT Discharge Summary  Anticipated Discharge Disposition (PT): skilled nursing facility    Viktoriya Banerjee, PT  3/11/2025

## 2025-03-12 PROBLEM — E04.2 MULTIPLE THYROID NODULES: Status: ACTIVE | Noted: 2025-03-12

## 2025-03-12 LAB
ANION GAP SERPL CALCULATED.3IONS-SCNC: 9.6 MMOL/L (ref 5–15)
BASOPHILS # BLD AUTO: 0.01 10*3/MM3 (ref 0–0.2)
BASOPHILS NFR BLD AUTO: 0.3 % (ref 0–1.5)
BUN SERPL-MCNC: 14 MG/DL (ref 8–23)
BUN/CREAT SERPL: 18.9 (ref 7–25)
CALCIUM SPEC-SCNC: 8.9 MG/DL (ref 8.6–10.5)
CHLORIDE SERPL-SCNC: 98 MMOL/L (ref 98–107)
CO2 SERPL-SCNC: 26.4 MMOL/L (ref 22–29)
CREAT SERPL-MCNC: 0.74 MG/DL (ref 0.57–1)
DEPRECATED RDW RBC AUTO: 42.4 FL (ref 37–54)
EGFRCR SERPLBLD CKD-EPI 2021: 82.9 ML/MIN/1.73
EOSINOPHIL # BLD AUTO: 0 10*3/MM3 (ref 0–0.4)
EOSINOPHIL NFR BLD AUTO: 0 % (ref 0.3–6.2)
ERYTHROCYTE [DISTWIDTH] IN BLOOD BY AUTOMATED COUNT: 12.8 % (ref 12.3–15.4)
GLUCOSE SERPL-MCNC: 97 MG/DL (ref 65–99)
HCT VFR BLD AUTO: 25.8 % (ref 34–46.6)
HGB BLD-MCNC: 8.3 G/DL (ref 12–15.9)
IMM GRANULOCYTES # BLD AUTO: 0.04 10*3/MM3 (ref 0–0.05)
IMM GRANULOCYTES NFR BLD AUTO: 1.2 % (ref 0–0.5)
LYMPHOCYTES # BLD AUTO: 0.46 10*3/MM3 (ref 0.7–3.1)
LYMPHOCYTES NFR BLD AUTO: 13.3 % (ref 19.6–45.3)
MCH RBC QN AUTO: 28.9 PG (ref 26.6–33)
MCHC RBC AUTO-ENTMCNC: 32.2 G/DL (ref 31.5–35.7)
MCV RBC AUTO: 89.9 FL (ref 79–97)
MONOCYTES # BLD AUTO: 0.57 10*3/MM3 (ref 0.1–0.9)
MONOCYTES NFR BLD AUTO: 16.5 % (ref 5–12)
NEUTROPHILS NFR BLD AUTO: 2.38 10*3/MM3 (ref 1.7–7)
NEUTROPHILS NFR BLD AUTO: 68.7 % (ref 42.7–76)
NRBC BLD AUTO-RTO: 0 /100 WBC (ref 0–0.2)
PLATELET # BLD AUTO: 256 10*3/MM3 (ref 140–450)
PMV BLD AUTO: 10.2 FL (ref 6–12)
POTASSIUM SERPL-SCNC: 4.3 MMOL/L (ref 3.5–5.2)
RBC # BLD AUTO: 2.87 10*6/MM3 (ref 3.77–5.28)
SODIUM SERPL-SCNC: 134 MMOL/L (ref 136–145)
WBC NRBC COR # BLD AUTO: 3.46 10*3/MM3 (ref 3.4–10.8)

## 2025-03-12 PROCEDURE — 85025 COMPLETE CBC W/AUTO DIFF WBC: CPT | Performed by: ORTHOPAEDIC SURGERY

## 2025-03-12 PROCEDURE — 80048 BASIC METABOLIC PNL TOTAL CA: CPT | Performed by: ORTHOPAEDIC SURGERY

## 2025-03-12 PROCEDURE — 25010000002 ENOXAPARIN PER 10 MG: Performed by: ORTHOPAEDIC SURGERY

## 2025-03-12 RX ORDER — ENOXAPARIN SODIUM 100 MG/ML
40 INJECTION SUBCUTANEOUS NIGHTLY
Start: 2025-03-12

## 2025-03-12 RX ORDER — BUSPIRONE HYDROCHLORIDE 5 MG/1
5 TABLET ORAL 3 TIMES DAILY
Start: 2025-03-12

## 2025-03-12 RX ORDER — CHOLECALCIFEROL (VITAMIN D3) 25 MCG
4000 TABLET ORAL DAILY
Start: 2025-03-12

## 2025-03-12 RX ORDER — ASPIRIN 81 MG/1
81 TABLET ORAL DAILY
Start: 2025-03-12

## 2025-03-12 RX ORDER — BISACODYL 5 MG/1
5 TABLET, DELAYED RELEASE ORAL DAILY PRN
Start: 2025-03-12

## 2025-03-12 RX ORDER — POLYETHYLENE GLYCOL 3350 17 G/17G
17 POWDER, FOR SOLUTION ORAL 2 TIMES DAILY
Start: 2025-03-12

## 2025-03-12 RX ORDER — FAMOTIDINE 20 MG/1
20 TABLET, FILM COATED ORAL
Start: 2025-03-12

## 2025-03-12 RX ORDER — AMOXICILLIN 250 MG
2 CAPSULE ORAL 2 TIMES DAILY
Start: 2025-03-12

## 2025-03-12 RX ORDER — PRAMIPEXOLE DIHYDROCHLORIDE 0.5 MG/1
2.5 TABLET ORAL NIGHTLY
Start: 2025-03-12

## 2025-03-12 RX ORDER — OXYCODONE HYDROCHLORIDE 10 MG/1
10 TABLET ORAL EVERY 4 HOURS PRN
Qty: 12 TABLET | Refills: 0 | Status: SHIPPED | OUTPATIENT
Start: 2025-03-12 | End: 2025-03-16

## 2025-03-12 RX ADMIN — OXYCODONE HYDROCHLORIDE 10 MG: 5 TABLET ORAL at 12:36

## 2025-03-12 RX ADMIN — PHENOBARBITAL 129.6 MG: 32.4 TABLET ORAL at 20:28

## 2025-03-12 RX ADMIN — POLYETHYLENE GLYCOL 3350 17 G: 17 POWDER, FOR SOLUTION ORAL at 20:28

## 2025-03-12 RX ADMIN — OXYCODONE HYDROCHLORIDE 10 MG: 5 TABLET ORAL at 08:25

## 2025-03-12 RX ADMIN — PRAMIPEXOLE DIHYDROCHLORIDE 2.5 MG: 1.5 TABLET ORAL at 20:28

## 2025-03-12 RX ADMIN — OXYCODONE HYDROCHLORIDE 10 MG: 5 TABLET ORAL at 16:39

## 2025-03-12 RX ADMIN — ENOXAPARIN SODIUM 40 MG: 100 INJECTION SUBCUTANEOUS at 20:28

## 2025-03-12 RX ADMIN — FAMOTIDINE 20 MG: 20 TABLET, FILM COATED ORAL at 09:25

## 2025-03-12 RX ADMIN — BUSPIRONE HYDROCHLORIDE 5 MG: 10 TABLET ORAL at 18:48

## 2025-03-12 RX ADMIN — Medication 4000 UNITS: at 09:25

## 2025-03-12 RX ADMIN — BUSPIRONE HYDROCHLORIDE 5 MG: 10 TABLET ORAL at 12:31

## 2025-03-12 RX ADMIN — BUSPIRONE HYDROCHLORIDE 5 MG: 10 TABLET ORAL at 22:09

## 2025-03-12 RX ADMIN — OXYCODONE HYDROCHLORIDE 10 MG: 5 TABLET ORAL at 20:28

## 2025-03-12 RX ADMIN — ASPIRIN 81 MG: 81 TABLET, COATED ORAL at 09:26

## 2025-03-12 RX ADMIN — OXYCODONE HYDROCHLORIDE 20 MG: 15 TABLET ORAL at 03:56

## 2025-03-12 RX ADMIN — LEVOTHYROXINE SODIUM 50 MCG: 50 TABLET ORAL at 06:26

## 2025-03-12 RX ADMIN — FAMOTIDINE 20 MG: 20 TABLET, FILM COATED ORAL at 18:47

## 2025-03-12 RX ADMIN — PRAVASTATIN SODIUM 40 MG: 20 TABLET ORAL at 09:24

## 2025-03-12 RX ADMIN — SENNOSIDES AND DOCUSATE SODIUM 2 TABLET: 50; 8.6 TABLET ORAL at 20:28

## 2025-03-12 NOTE — CASE MANAGEMENT/SOCIAL WORK
Continued Stay Note  [facility]     Patient Name: Mariela Ruiz  MRN: [id number]  Today's Date: 3/12/2025    Admit Date: 3/4/2025    [facility] -[facility] 3/13 at 11am   Discharge Plan       Row Name 03/12/25 1237       Plan    Plan [facility] tomorrow when wound vac gets set up -[facility] 3/13 at 11am    Plan Comments Per RN, patient has wound vac here at [facility] and will need it at facility. [facility] notified [facility] who states they will not be able to get the wound vac by 3pm today, they could possibly get it on by 5pm today. [facility] is not able to do the transport after 5pm today. [facility] has been moved to tomorrow 3/13 at 11am. MD, RN, and facility notified. CCP updated patient and daughter at bedside. GORDON, CSW                   Discharge Codes    No documentation.                 Expected Discharge Date and Time       Expected Discharge Date Expected Discharge Time    Mar 12, 2025               MASOUD Kay

## 2025-03-12 NOTE — PLAN OF CARE
Problem: Adult Inpatient Plan of Care  Goal: Plan of Care Review  Outcome: Progressing  Goal: Patient-Specific Goal (Individualized)  Outcome: Progressing  Goal: Absence of Hospital-Acquired Illness or Injury  Outcome: Progressing  Intervention: Identify and Manage Fall Risk  Recent Flowsheet Documentation  Taken 3/12/2025 1000 by Sultana Sauer RN  Safety Promotion/Fall Prevention:   fall prevention program maintained   assistive device/personal items within reach   clutter free environment maintained   safety round/check completed   room organization consistent   nonskid shoes/slippers when out of bed  Taken 3/12/2025 0800 by Sultana Sauer RN  Safety Promotion/Fall Prevention:   fall prevention program maintained   assistive device/personal items within reach   clutter free environment maintained   safety round/check completed   room organization consistent   nonskid shoes/slippers when out of bed  Intervention: Prevent Skin Injury  Recent Flowsheet Documentation  Taken 3/12/2025 1000 by Sultana Sauer RN  Body Position: (boosted up in bed)   weight shifting   sitting up in bed  Taken 3/12/2025 0800 by Sultana Sauer RN  Body Position:   position changed independently   heels elevated   legs elevated   sitting up in bed   weight shifting  Intervention: Prevent and Manage VTE (Venous Thromboembolism) Risk  Recent Flowsheet Documentation  Taken 3/12/2025 0800 by Sultana Sauer RN  VTE Prevention/Management:   bilateral   SCDs (sequential compression devices) on  Goal: Optimal Comfort and Wellbeing  Outcome: Progressing  Goal: Readiness for Transition of Care  Outcome: Progressing     Problem: Skin Injury Risk Increased  Goal: Skin Health and Integrity  Outcome: Progressing     Problem: Fall Injury Risk  Goal: Absence of Fall and Fall-Related Injury  Outcome: Progressing  Intervention: Promote Injury-Free Environment  Recent Flowsheet Documentation  Taken 3/12/2025 1000 by Sultana Sauer RN  Safety Promotion/Fall  Prevention:   fall prevention program maintained   assistive device/personal items within reach   clutter free environment maintained   safety round/check completed   room organization consistent   nonskid shoes/slippers when out of bed  Taken 3/12/2025 0800 by Sultana Sauer RN  Safety Promotion/Fall Prevention:   fall prevention program maintained   assistive device/personal items within reach   clutter free environment maintained   safety round/check completed   room organization consistent   nonskid shoes/slippers when out of bed     Problem: Hip Fracture Medical Management  Goal: Optimal Coping with Change in Health Status  Outcome: Progressing  Goal: Fracture Stability  Outcome: Progressing  Goal: Pain Control and Function  Outcome: Progressing  Goal: Effective Urinary Elimination  Outcome: Progressing   Goal Outcome Evaluation:

## 2025-03-12 NOTE — CASE MANAGEMENT/SOCIAL WORK
"Physicians Statement of Medical Necessity for  Ambulance Transportation    GENERAL INFORMATION     Name: Mariela Ruiz  YOB: 1945  Medicare #: [id number]  Transport Date: 3/13/2025 (Valid for round trips this date, or for scheduled repetitive trips for 60 days from the date signed below.)  Origin: [facility]  Destination: [facility] ([address] [place] [address])  Is the Patient's stay covered under Medicare Part A (PPS/DRG?)No   Closest appropriate facility? Yes  If this a hosp-hosp transfer? No  Is this a hospice patient? No    MEDICAL NECESSITY QUESTIONAIRE    Ambulance Transportation is medically necessary only if other means of transportation are contraindicated or would be potentially harmful to the patient.  To meet this requirement, the patient must be either \"bed confined\" or suffer from a condition such that transport by means other than an ambulance is contraindicated by the patient's condition.  The following questions must be answered by the healthcare professional signing below for this form to be valid:     1) Describe the MEDICAL CONDITION (physical and/or mental) of this patient AT THE TIME OF AMBULANCE TRANSPORT that requires the patient to be transported in an ambulance, and why transport by other means is contraindicated by the patient's condition:   Past Medical History:   Diagnosis Date    Arthritis     Chronic venous insufficiency     Dupuytren's contracture     History of staph infection     S/P KNEE DEC 2016    History of transfusion     Hyperlipidemia     Lymphedema     LEFT LEG    Nontoxic multinodular goiter     Osteoporosis     Dr. Cabrera    Pelvic joint pain, left     Postsurgical hypothyroidism     Presence of left artificial hip joint     METAL ON METAL AS NOTED PER DR CLEMENT NOTE    Restless leg syndrome     Right bundle branch block     Seizure disorder     Dr. Whitman, HX 1980 LAST SEIZURE    Sleep apnea     DOES NOT HAVE MACHINE    Vitamin " D insufficiency       Past Surgical History:   Procedure Laterality Date    APPENDECTOMY      CARDIAC CATHETERIZATION      NORMAL     CHOLECYSTECTOMY N/A 11/2/2024    Procedure: CHOLECYSTECTOMY LAPAROSCOPIC WITH DAVINCI ROBOT with cholangiogram, possible open;  Surgeon: Bin Heaton MD;  Location: [facility] MAIN OR;  Service: Robotics - DaVinci;  Laterality: N/A;    COLONOSCOPY  08/17/2017    Normal except for anal fissure.  Dr. Brandt.  [facility].    KNEE MINI REVISION Left 11/15/2016    Procedure: LEFT KNEE POLY CHANGE WITH HI POST STABILIZER;  Surgeon: Pablito Claudio MD;  Location: [facility] MAIN OR;  Service:     KNEE MINI REVISION Left 12/13/2016    Procedure: LT KNEE REMOVAL/REPLACEMENT LOCKING PIN ;  Surgeon: Pablito Claudio MD;  Location: [facility] MAIN OR;  Service:     MAMMO FINE NEEDLE ASPIRATION UNILATERAL      RIGHT THYROID NODULE, 2009 BENIGN     CO ARTHRP KNE CONDYLE&PLATU MEDIAL&LAT COMPARTMENTS Left 12/24/2016    Procedure: LEFT KNEE WASHOUT WITH POLY CHANGE;  Surgeon: Christiano Cesar MD;  Location: [facility] MAIN OR;  Service: Orthopedics    CO REVJ TOTAL KNEE ARTHRP W/WO ALGRFT 1 COMPONENT Left 2/20/2017    Procedure: LT KNEE REMOVAL ANTIBIOTIC SPACER AND KNEE REVISION;  Surgeon: Christiano Cesar MD;  Location: [facility] MAIN OR;  Service: Orthopedics    REPLACEMENT TOTAL KNEE Left     THYROIDECTOMY, PARTIAL      1979 LEFT LOBECTOMY AND ISTHMECTOMY     TOTAL ABDOMINAL HYSTERECTOMY WITH SALPINGO OOPHORECTOMY      TOTAL HIP ARTHROPLASTY Left     TOTAL HIP ARTHROPLASTY Right 9/14/2019    Procedure: TOTAL HIP ARTHROPLASTY ANTERIOR WITH HANA TABLE;  Surgeon: Christiano Cesar II, MD;  Location: [facility] MAIN OR;  Service: Orthopedics    TOTAL HIP ARTHROPLASTY REVISION Left 3/9/2021    Procedure: LEFT TOTAL HIP ARTHROPLASTY REVISION POSTERIOR;  Surgeon: Christiano Cesar II, MD;  Location: [facility] MAIN OR;  Service: Orthopedics;  Laterality: Left;    TOTAL KNEE  PROSTHESIS REMOVAL W/ SPACER  "INSERTION Left 12/29/2016    Procedure: LT KNEE IMPLANT REMOVAL WITH INSERTION OF SPACER ;  Surgeon: Christiano Cesar MD;  Location: [facility] MAIN OR;  Service:       2) Is this patient \"bed confined\" as defined below?Yes   To be \"bed confined\" the patient must satisfy all three of the following criteria:  (1) unable to get up from bed without assistance; AND (2) unable to ambulate;  AND (3) unable to sit in a chair or wheelchair.  3) Can this patient safely be transported by car or wheelchair van (I.e., may safely sit during transport, without an attendant or monitoring?)No   4. In addition to completing questions 1-3 above, please check any of the following conditions that apply*:          *Note: supporting documentation for any boxes checked must be maintained in the patient's medical records Patient is confused, Moderate/severe pain on movement, Medical attendant required, Requires oxygen - unable to self administer, Unable to tolerate seated position for time needed to transport, and Other non-ambulatory, max assist of 2, lean while sitting - unsafe to be unsupported while seated      SIGNATURE OF PHYSICIAN OR OTHER AUTHORIZED HEALTHCARE PROFESSIONAL    I certify that the above information is true and correct based on my evaluation of this patient, and represent that the patient requires transport by ambulance and that other forms of transport are contraindicated.  I understand that this information will be used by the Centers for Medicare and Medicaid Services (CMS) to support the determiniation of medical necessity for ambulance services, and I represent that I have personal knowledge of the patient's condition at the time of transport.       If this box is checked, I also certify that the patient is physically or mentally incapable of signing the ambulance service's claim form and that the institution with which I am affiliated has furnished care, services or assistance to the patient.  My signature below is " made on behalf of the patient pursuant to 42 .36(b)(4). In accordance with 42 .37, the specific reason(s) that the patient is physically or mentally incapable of signing the claim for is as follows:     Signature of Physician or Healthcare Professional  Date/Time:        (For Scheduled repetitive transport, this form is not valid for transports performed more than 60 days after this date).                                                                                                                                            --------------------------------------------------------------------------------------------  Printed Name and Credentials of Physician or Authorized Healthcare Professional     *Form must be signed by patient's attending physician for scheduled, repetitive transports,.  For non-repetitive ambulance transports, if unable to obtain the signature of the attending physician, any of the following may sign (please select below):     Physician  Clinical Nurse Specialist  Registered Nurse     Physician Assistant  Discharge Planner  Licensed Practical Nurse     Nurse Practitioner

## 2025-03-12 NOTE — NURSING NOTE
Pt will be D/C tomorrow 3/13 at 1100. Wound care consulted for wound vac change per Dr. Cesar verbal order.     Spoke with wound care said they will come by tomorrow morning before D/C to change dressing to a Prevena wound vac

## 2025-03-12 NOTE — CASE MANAGEMENT/SOCIAL WORK
Continued Stay Note  [facility]     Patient Name: Mariela Ruiz  MRN: [id number]  Today's Date: 3/12/2025    Admit Date: 3/4/2025    Plan: [facility] today via [facility] at 3pm   Discharge Plan       Row Name 03/12/25 0839       Plan    Plan [facility] today via [facility] at 3pm    Plan Comments Per [facility] MD, patient is ready for dc. [facility] spoke with [facility] who states they have a bed at [facility] today. [facility] scheduled for today at 3pm. MD, facility, and RN notified. CCP left VMM for patient's son. CCP attempted to call daughter jw but call does not go through. GORDON, CSW                   Discharge Codes    No documentation.                 Expected Discharge Date and Time       Expected Discharge Date Expected Discharge Time    Mar 12, 2025               MASOUD Kay     OK with 11 refills

## 2025-03-12 NOTE — DISCHARGE SUMMARY
Patient Name: Mariela Ruiz  : 1945  MRN: [id number]    Date of Admission: 3/4/2025  Date of Discharge:  3/13/2025  Primary Care Physician: Nereyda Abdalla MD      Chief Complaint:   Fall, Leg Pain, and Hip Pain      Discharge Diagnoses     Active Hospital Problems    Diagnosis  POA    **Periprosthetic hip fracture [M97.8XXA, Z96.649]  Not Applicable    Multiple thyroid nodules [E04.2]  Yes    Ureterolithiasis [N20.1]  Yes    Hydronephrosis [N13.30]  Yes    Seizure disorder [G40.909]  Yes    Hyperlipidemia [E78.5]  Yes    Venous (peripheral) insufficiency [I87.2]  Yes    Postsurgical hypothyroidism [E89.0]  Yes      Resolved Hospital Problems   No resolved problems to display.        Hospital Course     Pleasant 80 y.o. female admitted with complicated periprosthetic left femur fracture (proximal and distal). Please see below for details of admission by problem:      Mechanical fall leading to left MAXIMO periprosthetic fracture and left TKA loosening.  Patient is s/p left MAXIMO revision and ORIF of distal femoral condyle fracture on 3/8 by Dr. Cesar. Orthopedic recommends non-weightbearing LLE for 8 to 12 weeks and recommends follow-up in 3 weeks for x-ray and wound check. Continue wound vac in the interim. Lovenox for DVT ppx.     Incidental finding right thyroid nodules in a patient with a history of hypothyroidism.  Normal thyroid function tests. Thyroid ultrasound with left thyroidectomy and 2 nodules on the right thyroid lobe that will need biopsy as an outpatient.       Urinary retention  Obstructing 7 mm left ureteric stone with hydronephrosis.  No evidence of UTI.  S/p Urology consultation with outpatient follow-up recommended--no recommendations for surgical intervention at this time.  Normal renal function.  Will leave Malin catheter in at this time in view of poor mobility. Recommend voiding trial in a week at facility.     Mild dehydration.  Resolved after IV fluid.     History of old CVA by  "CT.  Continued Pravachol and aspirin.  Negative CNS examination for focal deficits     Iron deficiency anemia  Postoperative anemia.  Baseline hemoglobin between 10 and 12.4.  Hemoglobin worse than baseline on presentation and dropped after surgery.  Patient received 2 units of packed RBCs during and after surgery, and another unit yesterday.  Hgb stable this AM at 8.3.  Will need to be monitored at rehab facility.     Hypotension.  Most likely due to dehydration and anesthesia.  Resolved with IV fluids and stopping Flomax.     Epilepsy.  No seizures here.  Negative CNS examination.  Continued phenobarbital.     Hyperlipidemia.  Continued statin.     Chronic lymphedema.  Followed by lymphedema clinic as an outpatient.         Lovenox 40 mg SC daily for DVT prophylaxis.  Full code confirmed.  Discussed with patient, nursing staff, and CCP.  Anticipate discharge to SNU facility later this afternoon.    ADDENDUM 3/13/25:  Pt's dc was delayed until this AM as facility was unable to obtain a wound vac until today  Pt has some c/o aches in low back and both ankles but o/w doing fine  No change in exam today, back exam is completely benign and she is NVI in distal BLEs w/o edema, lungs are CTAB with good air mvmt  Labs look good  VSS  Ready for dc to [facility] later this AM  D/w pt and RN  Electronically signed by Martín Heredia MD, 03/13/25, 7:08 AM EDT.      Day of Discharge     Subjective:  Feeling \"rough\" today. No specific complaints. Pain controlled. Tolerating diet. Making urine. No SOA or CP or palp. Large BM yesterday.     Physical Exam:  Temp:  [98.2 °F (36.8 °C)-98.6 °F (37 °C)] 98.6 °F (37 °C)  Heart Rate:  [83-91] 83  Resp:  [16-19] 18  BP: (101-124)/(44-63) 101/44  Body mass index is 36.03 kg/m².  Physical Exam  Vitals and nursing note reviewed. Exam conducted with a chaperone present (RN).   Constitutional:       General: She is not in acute distress.     Appearance: She is ill-appearing (chronically). She " is not toxic-appearing or diaphoretic.   Cardiovascular:      Rate and Rhythm: Normal rate and regular rhythm.      Pulses: Normal pulses.   Pulmonary:      Effort: Pulmonary effort is normal. No respiratory distress.      Breath sounds: Normal breath sounds. No wheezing or rales.   Abdominal:      General: Bowel sounds are normal. There is no distension.      Palpations: Abdomen is soft.      Tenderness: There is no abdominal tenderness.   Genitourinary:     Comments: Malin in place  Musculoskeletal:         General: No swelling.      Cervical back: Neck supple.      Comments: NVI in distal BLEs   Skin:     General: Skin is warm and dry.      Capillary Refill: Capillary refill takes less than 2 seconds.   Neurological:      General: No focal deficit present.      Mental Status: She is alert. Mental status is at baseline.   Psychiatric:         Mood and Affect: Mood normal.         Behavior: Behavior normal.         Consultants     Consult Orders (all) (From admission, onward)       Start     Ordered    03/11/25 1831  Inpatient Case Management  Consult  Once        Provider:  (Not yet assigned)    03/11/25 1831    03/09/25 1858  Inpatient Case Management  Consult  Once        Provider:  (Not yet assigned)    03/09/25 1857    03/04/25 1448  Inpatient Spiritual Care Consult  Once        Provider:  (Not yet assigned)    03/04/25 1447    03/04/25 1242  Inpatient Urology Consult  Once        Specialty:  Urology  Provider:  Gladis Higuera APRN    03/04/25 1243    03/04/25 1154  [facility] (on-call MD unless specified) Details  Once        Specialty:  Hospitalist  Provider:  dJ Gupta MD    03/04/25 1154    03/04/25 1154  Ortho (on-call MD unless specified)  Once        Comments: Left periprosthetic hip fracture   Specialty:  Orthopedic Surgery  Provider:  Christiano Cesar II, MD    03/04/25 1154                  Procedures     TOTAL HIP ARTHROPLASTY REVISION    Imaging Results (All)        Procedure Component Value Units Date/Time    XR Femur 2 View Left [[id number]] Collected: 03/08/25 1237     Updated: 03/08/25 1250    Narrative:      XR PELVIS 1 OR 2 VW-, XR FEMUR 2 VW LEFT-     DATE OF EXAM: 3/8/2025 11:17 AM     INDICATION: Postop fracture fixation.     COMPARISON: Radiographs 3/8/2025, 3/4/2025, and 9/8/2022. CT 3/4/2025.     TECHNIQUE: An AP view of the pelvis was obtained with additional AP and  frog-leg lateral views of the left femur.     FINDINGS:  Pelvis: The upper pelvis is not included in the field-of-view. Partially  imaged revision left MAXIMO with longstem femoral component and fixation of  the now nondisplaced fracture of the proximal left femur with partially  imaged femoral , plate and screw fixation hardware, and cerclage  wire fixation hardware. The hardware is in its expected position.  Expected postoperative air in the soft tissues. Right MAXIMO and proximal  femoral cerclage wire with evidence of hardware complications. Old  healed fracture deformities of the left superior and inferior pubic  rami. Phleboliths in the pelvis.     Left femur: Postoperative changes from revision MAXIMO placement with  longstem femoral component and fixation of the now nondisplaced fracture  of the proximal left femur with femoral graft, plate and screw fixation  hardware, and cerclage wire fixation hardware. The proximal portion of  the distal plate and screw projects mildly anterior to the femoral  cortex. Partially imaged custom TKA with longstem femoral component. The  most distal cerclage wire results in a periprosthetic impaction fracture  of the distal femoral diaphysis with up to approximately 4 mm impaction.  Partially obscured lucency at the lateral distal femoral bone-cement  interface. Expected postoperative air in the soft tissues. Anterolateral  skin staples.       Impression:      Postoperative changes from revision left MAXIMO placement with longstem  femoral component and internal  fixation of the now nondisplaced fracture  of the proximal femur. The most distal cerclage wire results in a  periprosthetic fracture of the distal femoral diaphysis.     This report was finalized on 3/8/2025 12:47 PM by Thor Werner MD on  Workstation: [id number]       XR Pelvis 1 or 2 View [[id number]] Collected: 03/08/25 1237     Updated: 03/08/25 1250    Narrative:      XR PELVIS 1 OR 2 VW-, XR FEMUR 2 VW LEFT-     DATE OF EXAM: 3/8/2025 11:17 AM     INDICATION: Postop fracture fixation.     COMPARISON: Radiographs 3/8/2025, 3/4/2025, and 9/8/2022. CT 3/4/2025.     TECHNIQUE: An AP view of the pelvis was obtained with additional AP and  frog-leg lateral views of the left femur.     FINDINGS:  Pelvis: The upper pelvis is not included in the field-of-view. Partially  imaged revision left MAXIMO with longstem femoral component and fixation of  the now nondisplaced fracture of the proximal left femur with partially  imaged femoral , plate and screw fixation hardware, and cerclage  wire fixation hardware. The hardware is in its expected position.  Expected postoperative air in the soft tissues. Right MAXIMO and proximal  femoral cerclage wire with evidence of hardware complications. Old  healed fracture deformities of the left superior and inferior pubic  rami. Phleboliths in the pelvis.     Left femur: Postoperative changes from revision MAXIMO placement with  longstem femoral component and fixation of the now nondisplaced fracture  of the proximal left femur with femoral graft, plate and screw fixation  hardware, and cerclage wire fixation hardware. The proximal portion of  the distal plate and screw projects mildly anterior to the femoral  cortex. Partially imaged custom TKA with longstem femoral component. The  most distal cerclage wire results in a periprosthetic impaction fracture  of the distal femoral diaphysis with up to approximately 4 mm impaction.  Partially obscured lucency at the lateral distal femoral  bone-cement  interface. Expected postoperative air in the soft tissues. Anterolateral  skin staples.       Impression:      Postoperative changes from revision left MAXIMO placement with longstem  femoral component and internal fixation of the now nondisplaced fracture  of the proximal femur. The most distal cerclage wire results in a  periprosthetic fracture of the distal femoral diaphysis.     This report was finalized on 3/8/2025 12:47 PM by Thor Werner MD on  Workstation: [id number]       FL C Arm During Surgery [[id number]] Resulted: 03/08/25 0957     Updated: 03/08/25 0957    Narrative:      This procedure was auto-finalized with no dictation required.    US Thyroid [[id number]] Collected: 03/07/25 0653     Updated: 03/07/25 0702    Narrative:      Thyroid sonogram.     HISTORY: Thyroid nodule on CT     COMPARISON: CT chest 3/4/2025     TECHNIQUE: Grayscale and color Doppler images of the thyroid were  performed.     FINDINGS:  The right lobe of the thyroid measures 5.4 x 3.2 x 3.1 cm. The thyroid  isthmus measures 0.3 cm. Post left thyroidectomy. No soft tissue  nodularity within the left thyroidectomy bed.     The background thyroid has a homogenous echotexture. There are no  findings of abnormal vascularity. No regional adenopathy is visualized  by size criteria.     Thyroid nodules:  *  Right mid 3.6 x 2.5 x 2.6 cm solid, hypoechoic, wider than tall,  smoothly marginated without associated echogenic foci (4 points)  *  Right inferior 1.6 x 1.1 x 2 cm solid, hypoechoic, wider than tall,  smoothly marginated without associated echogenic foci (4 points)             Impression:      Post left thyroidectomy. Two adjacent TI-RADS 4 nodules  measuring 3.6 and 1.6 cm meet criteria for FNA.        This report was finalized on 3/7/2025 6:59 AM by Dr. Obey Nassar M.D on Workstation: [id number]       CT Chest Without Contrast Diagnostic [[id number]] Collected: 03/04/25 1147     Updated: 03/05/25 1003    Narrative:       CT CHEST, ABDOMEN, AND PELVIS WITHOUT CONTRAST     HISTORY: 80-year-old female with left-sided chest pain and abdominal  pain following a fall. No external signs of trauma. Cholecystectomy,  appendectomy, thyroidectomy, hysterectomy and bilateral  salpingo-oophorectomy in the past.     TECHNIQUE: Radiation dose reduction techniques were utilized, including  automated exposure control and exposure modulation based on body size.   3 mm images were obtained through the chest, abdomen, and pelvis without  the administration of IV contrast. Compared with CT abdomen/pelvis  10/31/2024. No prior chest CT for comparison.     FINDINGS:  CHEST:  1. There are mild atelectatic changes. There is no consolidation  suspicious for pneumonia or pulmonary contusion. There are no pleural or  pericardial effusions. No acute fractures are seen.     2. There is no lymphadenopathy at the chest. There are a few coronary  artery calcifications. There is a 3.5 cm low-attenuation right thyroid  lobe nodule. A nonemergent outpatient thyroid ultrasound is recommended  for follow-up.     ABDOMEN/PELVIS:  1. There is a 7 mm stone within the mid left ureter, just below the  level of the iliac crests, with mild-moderate hydroureteronephrosis,  image 146. There is one other 3 mm nonobstructing stone at the left  kidney. There are no right renal stones.     2. Noncontrasted spleen appears unremarkable and there is no perisplenic  fluid. Noncontrasted liver, pancreas, and adrenals appear unremarkable.  There is no acute bowel abnormality. There is no bowel ileus or  obstruction. There is no colitis. No free fluid or fluid collection. No  acute fractures are seen.        This report was finalized on 3/5/2025 9:59 AM by Dr. Lisa Lucas M.D on  Workstation: [id number]       CT Abdomen Pelvis Without Contrast [[id number]] Collected: 03/04/25 1147     Updated: 03/05/25 1003    Narrative:      CT CHEST, ABDOMEN, AND PELVIS WITHOUT CONTRAST     HISTORY:  80-year-old female with left-sided chest pain and abdominal  pain following a fall. No external signs of trauma. Cholecystectomy,  appendectomy, thyroidectomy, hysterectomy and bilateral  salpingo-oophorectomy in the past.     TECHNIQUE: Radiation dose reduction techniques were utilized, including  automated exposure control and exposure modulation based on body size.   3 mm images were obtained through the chest, abdomen, and pelvis without  the administration of IV contrast. Compared with CT abdomen/pelvis  10/31/2024. No prior chest CT for comparison.     FINDINGS:  CHEST:  1. There are mild atelectatic changes. There is no consolidation  suspicious for pneumonia or pulmonary contusion. There are no pleural or  pericardial effusions. No acute fractures are seen.     2. There is no lymphadenopathy at the chest. There are a few coronary  artery calcifications. There is a 3.5 cm low-attenuation right thyroid  lobe nodule. A nonemergent outpatient thyroid ultrasound is recommended  for follow-up.     ABDOMEN/PELVIS:  1. There is a 7 mm stone within the mid left ureter, just below the  level of the iliac crests, with mild-moderate hydroureteronephrosis,  image 146. There is one other 3 mm nonobstructing stone at the left  kidney. There are no right renal stones.     2. Noncontrasted spleen appears unremarkable and there is no perisplenic  fluid. Noncontrasted liver, pancreas, and adrenals appear unremarkable.  There is no acute bowel abnormality. There is no bowel ileus or  obstruction. There is no colitis. No free fluid or fluid collection. No  acute fractures are seen.        This report was finalized on 3/5/2025 9:59 AM by Dr. Lisa Lucas M.D on  Workstation: [id number]       CT Head Without Contrast [[id number]] Collected: 03/04/25 1156     Updated: 03/04/25 1301    Narrative:      EMERGENCY CT SCAN OF THE HEAD WITHOUT CONTRAST 03/04/2025     CLINICAL HISTORY: This is a 80-year-old female patient who fell and  had  possible head trauma.     TECHNIQUE: Spiral CT images were obtained from the base of the skull to  the vertex without intravenous contrast. The images were reformatted and  are submitted in 3 mm thick axial, sagittal and coronal CT sections with  brain algorithm and 2 mm thick axial CT sections with high-resolution  bone algorithm.     This is correlated to a prior head CT from [facility] on  09/08/2022.      FINDINGS: There is some patchy low density extending from the  periventricular into the subcortical white matter of the cerebral  hemispheres consistent with mild small vessel disease, unchanged. There  are tiny old lacunar infarcts in the anterior limbs of the internal  capsules. This is unchanged. The remainder of the brain parenchyma is  normal in attenuation. The ventricles are normal in size. I see no focal  mass effect. There is no midline shift. No extra-axial fluid collections  are identified. There is no evidence of acute intracranial hemorrhage.  No acute skull fracture is identified. The calvarium and skull base are  normal in appearance. The paranasal sinuses and the mastoid air cells  and middle ear cavities are clear. The orbits are normal in appearance.        Impression:      1. No acute intracranial abnormality is identified with no significant  change when compared to the patient's prior head CT on 09/08/2022.     2. There is mild small vessel disease in the cerebral white matter and  tiny old lacunar infarcts in the anterior limbs of the internal  capsules, unchanged since 09/08/2022. The remainder of the head CT is  normal with no acute skull fracture or intracranial hemorrhage  identified.     Radiation dose reduction techniques were utilized, including automated  exposure control and exposure modulation based on body size.        This report was finalized on 3/4/2025 12:58 PM by Dr. Tyson Bolanos M.D  on Workstation: [id number]       XR Chest 1 View [[id number]]  Collected: 03/04/25 1127     Updated: 03/04/25 1134    Narrative:      XR CHEST 1 VW-     DATE OF EXAM: 3/4/2025 10:39 AM     INDICATION: Preoperative study for hip fracture fixation.     COMPARISON: Radiographs 11/5/2024 and 3/2/2021. CT 3/4/2025.     TECHNIQUE: A single portable AP view of the chest was obtained.     FINDINGS:  Mild patient rotation. Low lung volumes. Ill-defined bilateral perihilar  and left greater than right bibasilar opacities, which could represent  atelectasis and/or scarring. No pneumothorax. Stable cardiomegaly,  likely accentuated by technique. Incomplete evaluated chronic changes in  both shoulders. No acute osseous abnormality is identified.       Impression:         1. Low lung volumes with ill-defined bilateral perihilar and left  greater than right bibasilar opacities that could represent atelectasis  and/or scarring.  2. Stable cardiomegaly, likely accentuated by technique.     This report was finalized on 3/4/2025 11:31 AM by Thor Werner MD on  Workstation: [id number]       XR Femur 2 View Left [[id number]] Collected: 03/04/25 1113     Updated: 03/04/25 1121    Narrative:      XR FEMUR 2 VW LEFT-     DATE OF EXAM: 3/4/2025 10:39 AM     INDICATION: Left hip pain and upper leg.     COMPARISON: CT abdomen pelvis 3/4/2025 and 10/31/2024. CT pelvis  9/8/2022. Radiographs 9/8/2022 and 3/2/2022.     TECHNIQUE: AP and lateral views of the left femur were obtained.     FINDINGS:  The crosstable lateral views are mildly limited by overlying artifacts.  Left MAXIMO with comminuted displaced periprosthetic fracture of the  subtrochanteric proximal femoral diaphysis. The approximately 2.5 cm  medial displacement of the medial fracture fragment and 1.5 cm lateral  displacement of the lateral fracture fragment. The femoral stem extends  approximately 4 cm anterior and lateral to the medullary space into the  adjacent soft tissues. Partially imaged custom TKA. Nonspecific lucency  at the anterior  "and lateral bone-prosthesis interface measuring up to  approximately 5 mm in width that could represent loosening or infection.  Diffuse soft tissue swelling, greatest laterally.       Impression:         1. Left MAXIMO with comminuted displaced periprosthetic fracture of the  subtrochanteric proximal femoral diaphysis.  2. Left TKA with nonspecific lucency at the anterior and lateral  bone-prosthesis interface that could represent loosening or infection.     This report was finalized on 3/4/2025 11:18 AM by Thor Werner MD on  Workstation: [id number]             Results for orders placed in visit on 08/15/19    SCANNED VASCULAR STUDIES      Pertinent Labs     Results from last 7 days   Lab Units 03/12/25  0339 03/11/25  0325 03/10/25  1012 03/09/25  0421   WBC 10*3/mm3 3.46 4.60 4.77 4.46   HEMOGLOBIN g/dL 8.3* 8.3* 7.7* 8.2*   PLATELETS 10*3/mm3 256 224 207 217     Results from last 7 days   Lab Units 03/12/25  0339 03/11/25  0325 03/10/25  0343 03/09/25  0421   SODIUM mmol/L 134* 136 136 133*   POTASSIUM mmol/L 4.3 4.5 4.8 4.6   CHLORIDE mmol/L 98 100 104 103   CO2 mmol/L 26.4 26.0 24.1 24.7   BUN mg/dL 14 14 15 17   CREATININE mg/dL 0.74 0.73 0.73 0.74   GLUCOSE mg/dL 97 104* 101* 100*   EGFR mL/min/1.73 82.9 84.3 84.3 82.9       Results from last 7 days   Lab Units 03/12/25  0339 03/11/25  0325 03/10/25  0343 03/09/25  0421   CALCIUM mg/dL 8.9 8.6 8.4* 8.7               Invalid input(s): \"LDLCALC\"          Test Results Pending at Discharge     Pending Results       Procedure [Order ID] Specimen - Date/Time    Occult Blood X 1, Stool - Stool, Per Rectum [[id number]]     Specimen: Stool from Per Rectum               Discharge Details        Discharge Medications        New Medications        Instructions Start Date   aspirin 81 MG EC tablet   81 mg, Oral, Daily      bisacodyl 5 MG EC tablet  Commonly known as: DULCOLAX   5 mg, Oral, Daily PRN      busPIRone 5 MG tablet  Commonly known as: BUSPAR   5 mg, Oral, 3 " Times Daily      enoxaparin sodium 40 MG/0.4ML solution prefilled syringe syringe  Commonly known as: LOVENOX   40 mg, Subcutaneous, Nightly      famotidine 20 MG tablet  Commonly known as: PEPCID   20 mg, Oral, 2 Times Daily Before Meals      melatonin 5 MG tablet tablet   2.5 mg, Oral, Nightly PRN      oxyCODONE 10 MG tablet  Commonly known as: ROXICODONE   10 mg, Oral, Every 4 Hours PRN      polyethylene glycol 17 g packet  Commonly known as: MIRALAX   17 g, Oral, 2 Times Daily      sennosides-docusate 8.6-50 MG per tablet  Commonly known as: PERICOLACE   2 tablets, Oral, 2 Times Daily             Changes to Medications        Instructions Start Date   PHENobarbital 64.8 MG tablet  What changed: Another medication with the same name was removed. Continue taking this medication, and follow the directions you see here.   129.6 mg, Oral, Nightly      pramipexole 0.5 MG tablet  Commonly known as: MIRAPEX  What changed: when to take this   2.5 mg, Oral, Nightly      pravastatin 40 MG tablet  Commonly known as: PRAVACHOL  What changed: See the new instructions.   TAKE 1 TABLET BY MOUTH EVERY NIGHT FOR HIGH AMOUNT OF FATS IN THE BLOOD             Continue These Medications        Instructions Start Date   cholecalciferol 25 MCG (1000 UT) tablet  Commonly known as: VITAMIN D3   4,000 Units, Oral, Daily      Synthroid 50 MCG tablet  Generic drug: levothyroxine   50 mcg, Oral, Every Morning      Vitamin B-12 1000 MCG sublingual tablet   1 tablet daily               Allergies   Allergen Reactions    Clindamycin/Lincomycin Itching    Duricef [Cefadroxil] Hives and Rash    Sulfa Antibiotics Rash     RASH       Discharge Disposition:  Skilled Nursing Facility (DC - External)      Discharge Diet:  Diet Order   Procedures    Diet: Regular/House; Fluid Consistency: Thin (IDDSI 0)       Discharge Activity:   NWB LLE until cleared by Ortho    CODE STATUS:    Code Status and Medical Interventions: CPR (Attempt to Resuscitate); Full  Support   Ordered at: 03/04/25 1243     Code Status (Patient has no pulse and is not breathing):    CPR (Attempt to Resuscitate)     Medical Interventions (Patient has pulse or is breathing):    Full Support       Future Appointments   Date Time Provider Department Center   5/1/2025  1:30 PM [facility] 1 [facility]   5/1/2025  2:30 PM Obey Richardson MD [facility]   8/5/2025 11:20 AM Steven Liu II, MD [facility]   8/14/2025  8:30 AM Duglas Rock MD [facility]  [facility]     Additional Instructions for the Follow-ups that You Need to Schedule       Discharge Follow-up with PCP   As directed       Currently Documented PCP:    Nereyda Abdalla MD    PCP Phone Number:    [phone]     Follow Up Details: Dr. Abdalla (PCP) after dc from facility        Discharge Follow-up with Specified Provider: Dr. Elizondo (); 3 Weeks   As directed      To: Dr. Elizondo ()   Follow Up: 3 Weeks        Discharge Follow-up with Specified Provider: Dr. Cesar (Ortho); 3 Weeks   As directed      To: Dr. Cesar (Ortho)   Follow Up: 3 Weeks        Discharge Follow-up with Specified Provider: [facility] at next regularly scheduled appt   As directed      To: [facility] at next regularly scheduled appt               Contact information for follow-up providers       Nereyda Abdalla MD .    Specialty: Internal Medicine  Why: Dr. Abdalla (PCP) after dc from facility  Contact information:  [address] [facility], [facility] [address]  [place] [address] [phone]                       Contact information for after-discharge care       Destination       [facility] .    Service: Skilled Nursing  Contact information:  [address] [facility] [address] [phone]                                   Additional Instructions for the Follow-ups that You Need to Schedule       Discharge Follow-up with PCP   As directed       Currently Documented PCP:    Nereyda Abdalla MD    PCP Phone Number:    [phone]      Follow Up Details: Dr. Abdalla (PCP) after dc from facility        Discharge Follow-up with Specified Provider: Dr. Elizondo (); 3 Weeks   As directed      To: Dr. Elizondo ()   Follow Up: 3 Weeks        Discharge Follow-up with Specified Provider: Dr. Cesar (Ortho); 3 Weeks   As directed      To: Dr. Cesar (Ortho)   Follow Up: 3 Weeks        Discharge Follow-up with Specified Provider: [facility] at next regularly scheduled appt   As directed      To: [facility] at next regularly scheduled appt            Time Spent on Discharge:  Greater than 30 minutes      Martín Heredia MD  [facility]  03/12/25  08:42 EDT

## 2025-03-12 NOTE — PROGRESS NOTES
"    Name: Mariela Ruiz ADMIT: 3/4/2025   : 1945  PCP: Nereyda Abdalla MD    MRN: [id number] LOS: 8 days   AGE/SEX: 80 y.o. female  ROOM: [facility]     Subjective   Subjective   Feeling \"rough\" today. No specific complaints. Pain controlled. Tolerating diet. Making urine. No SOA or CP or palp.        Objective   Objective   Vital Signs  Temp:  [98.2 °F (36.8 °C)-98.6 °F (37 °C)] 98.6 °F (37 °C)  Heart Rate:  [83-91] 83  Resp:  [16-19] 18  BP: (101-124)/(44-63) 101/44  SpO2:  [98 %-100 %] 98 %  on  Flow (L/min) (Oxygen Therapy):  [1-2] 2;   Device (Oxygen Therapy): nasal cannula  Body mass index is 36.03 kg/m².  Physical Exam  Vitals and nursing note reviewed. Exam conducted with a chaperone present (RN).   Constitutional:       General: She is not in acute distress.     Appearance: She is ill-appearing (chronically). She is not toxic-appearing or diaphoretic.   HENT:      Head: Normocephalic.      Mouth/Throat:      Mouth: Mucous membranes are moist.      Pharynx: Oropharynx is clear.   Eyes:      General: No scleral icterus.        Right eye: No discharge.         Left eye: No discharge.      Conjunctiva/sclera: Conjunctivae normal.   Cardiovascular:      Rate and Rhythm: Normal rate and regular rhythm.      Pulses: Normal pulses.   Pulmonary:      Effort: Pulmonary effort is normal. No respiratory distress.      Breath sounds: Normal breath sounds. No wheezing or rales.   Abdominal:      General: Bowel sounds are normal. There is no distension.      Palpations: Abdomen is soft.      Tenderness: There is no abdominal tenderness.   Genitourinary:     Comments: Malin in place  Musculoskeletal:         General: No swelling.      Cervical back: Neck supple.      Comments: NVI in distal BLEs   Skin:     General: Skin is warm and dry.      Capillary Refill: Capillary refill takes less than 2 seconds.   Neurological:      General: No focal deficit present.      Mental Status: She is alert. Mental status is at " "baseline.   Psychiatric:         Mood and Affect: Mood normal.         Behavior: Behavior normal.       Results Review     I reviewed the patient's new clinical results.  Results from last 7 days   Lab Units 03/12/25  0339 03/11/25  0325 03/10/25  1012 03/09/25  0421   WBC 10*3/mm3 3.46 4.60 4.77 4.46   HEMOGLOBIN g/dL 8.3* 8.3* 7.7* 8.2*   PLATELETS 10*3/mm3 256 224 207 217     Results from last 7 days   Lab Units 03/12/25  0339 03/11/25  0325 03/10/25  0343 03/09/25  0421   SODIUM mmol/L 134* 136 136 133*   POTASSIUM mmol/L 4.3 4.5 4.8 4.6   CHLORIDE mmol/L 98 100 104 103   CO2 mmol/L 26.4 26.0 24.1 24.7   BUN mg/dL 14 14 15 17   CREATININE mg/dL 0.74 0.73 0.73 0.74   GLUCOSE mg/dL 97 104* 101* 100*   EGFR mL/min/1.73 82.9 84.3 84.3 82.9       Results from last 7 days   Lab Units 03/12/25  0339 03/11/25  0325 03/10/25  0343 03/09/25  0421   CALCIUM mg/dL 8.9 8.6 8.4* 8.7       No results found for: \"HGBA1C\", \"POCGLU\"    No radiology results for the last day    I have personally reviewed all medications:  Scheduled Medications  aspirin, 81 mg, Oral, Daily  busPIRone, 5 mg, Oral, TID  cholecalciferol, 4,000 Units, Oral, Daily  enoxaparin sodium, 40 mg, Subcutaneous, Nightly  famotidine, 20 mg, Oral, BID AC  levothyroxine, 50 mcg, Oral, QAM  PHENobarbital, 129.6 mg, Oral, Nightly  polyethylene glycol, 17 g, Oral, BID  pramipexole, 2.5 mg, Oral, Nightly  pravastatin, 40 mg, Oral, Daily  senna-docusate sodium, 2 tablet, Oral, BID    Infusions   Diet  Diet: Regular/House; Fluid Consistency: Thin (IDDSI 0)    I have personally reviewed:  [x]  Laboratory   []  Microbiology   []  Radiology   []  EKG/Telemetry  []  Cardiology/Vascular   []  Pathology    [x]  Records       Assessment/Plan     Active Hospital Problems    Diagnosis  POA    **Periprosthetic hip fracture [M97.8XXA, Z96.649]  Not Applicable    Multiple thyroid nodules [E04.2]  Yes    Ureterolithiasis [N20.1]  Yes    Hydronephrosis [N13.30]  Yes    Seizure " disorder [G40.909]  Yes    Hyperlipidemia [E78.5]  Yes    Venous (peripheral) insufficiency [I87.2]  Yes    Postsurgical hypothyroidism [E89.0]  Yes      Resolved Hospital Problems   No resolved problems to display.       80 y.o. female admitted with complicated periprosthetic left femur fracture (proximal and distal).    Mechanical fall leading to left MAXIMO periprosthetic fracture and left TKA loosening.  Patient is s/p left MAXIMO revision and ORIF of distal femoral condyle fracture on 3/8 by Dr. Cesar. Orthopedic recommends non-weightbearing LLE for 8 to 12 weeks and recommends follow-up in 3 weeks for x-ray and wound check. Continue wound vac in the interim.    Incidental right thyroid nodules in a patient with a history of hypothyroidism.  Normal thyroid function tests. Thyroid ultrasound with left thyroidectomy and 2 nodules on the right thyroid lobe that will need biopsy as an outpatient.      Urinary retention  Obstructing 7 mm left ureteric stone with hydronephrosis.  No evidence of UTI.  S/p Urology consultation with outpatient follow-up recommended--no recommendations for surgical intervention at this time.  Normal renal function.  Will leave Malin catheter in at this time in view of poor mobility. Recommend voiding trial in a week at facility.    Mild dehydration.  Resolved after IV fluid.    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  Continued Pravachol and aspirin.  Negative CNS examination for focal deficits    Iron deficiency anemia  Postoperative anemia.  Baseline hemoglobin between 10 and 12.4.  Hemoglobin worse than baseline on presentation and dropped after surgery.  Patient received 2 units of packed RBCs during and after surgery, and another unit yesterday.  Hgb stable this AM at 8.3.  Will need to be monitored at rehab facility.    Hypotension.  Most likely due to dehydration and anesthesia.  Resolved with IV fluids and stopping Flomax.    Epilepsy.  No seizures here.  Negative CNS examination.  Continued  phenobarbital.    Hyperlipidemia.  Continued statin.    Chronic lymphedema.  Followed by lymphedema clinic as an outpatient.       Lovenox 40 mg SC daily for DVT prophylaxis.  Full code.  Discussed with patient, nursing staff, and CCP.  Anticipate discharge to SNU facility once arrangements have been made.  Expected Discharge Date: 3/12/2025; Expected Discharge Time:       Martín Heredia MD  [facility]  03/12/25  08:34 EDT

## 2025-03-13 VITALS
DIASTOLIC BLOOD PRESSURE: 50 MMHG | HEART RATE: 83 BPM | RESPIRATION RATE: 18 BRPM | OXYGEN SATURATION: 96 % | WEIGHT: 236.55 LBS | HEIGHT: 68 IN | BODY MASS INDEX: 35.85 KG/M2 | TEMPERATURE: 97.9 F | SYSTOLIC BLOOD PRESSURE: 106 MMHG

## 2025-03-13 LAB
ANION GAP SERPL CALCULATED.3IONS-SCNC: 6.5 MMOL/L (ref 5–15)
BASOPHILS # BLD AUTO: 0.01 10*3/MM3 (ref 0–0.2)
BASOPHILS NFR BLD AUTO: 0.3 % (ref 0–1.5)
BUN SERPL-MCNC: 13 MG/DL (ref 8–23)
BUN/CREAT SERPL: 18.3 (ref 7–25)
CALCIUM SPEC-SCNC: 9.1 MG/DL (ref 8.6–10.5)
CHLORIDE SERPL-SCNC: 99 MMOL/L (ref 98–107)
CO2 SERPL-SCNC: 29.5 MMOL/L (ref 22–29)
CREAT SERPL-MCNC: 0.71 MG/DL (ref 0.57–1)
DEPRECATED RDW RBC AUTO: 40.1 FL (ref 37–54)
EGFRCR SERPLBLD CKD-EPI 2021: 87.2 ML/MIN/1.73
EOSINOPHIL # BLD AUTO: 0 10*3/MM3 (ref 0–0.4)
EOSINOPHIL NFR BLD AUTO: 0 % (ref 0.3–6.2)
ERYTHROCYTE [DISTWIDTH] IN BLOOD BY AUTOMATED COUNT: 12.5 % (ref 12.3–15.4)
GLUCOSE SERPL-MCNC: 95 MG/DL (ref 65–99)
HCT VFR BLD AUTO: 25 % (ref 34–46.6)
HGB BLD-MCNC: 8.2 G/DL (ref 12–15.9)
IMM GRANULOCYTES # BLD AUTO: 0.05 10*3/MM3 (ref 0–0.05)
IMM GRANULOCYTES NFR BLD AUTO: 1.5 % (ref 0–0.5)
LYMPHOCYTES # BLD AUTO: 0.45 10*3/MM3 (ref 0.7–3.1)
LYMPHOCYTES NFR BLD AUTO: 13.3 % (ref 19.6–45.3)
MCH RBC QN AUTO: 29.2 PG (ref 26.6–33)
MCHC RBC AUTO-ENTMCNC: 32.8 G/DL (ref 31.5–35.7)
MCV RBC AUTO: 89 FL (ref 79–97)
MONOCYTES # BLD AUTO: 0.56 10*3/MM3 (ref 0.1–0.9)
MONOCYTES NFR BLD AUTO: 16.6 % (ref 5–12)
NEUTROPHILS NFR BLD AUTO: 2.31 10*3/MM3 (ref 1.7–7)
NEUTROPHILS NFR BLD AUTO: 68.3 % (ref 42.7–76)
NRBC BLD AUTO-RTO: 0 /100 WBC (ref 0–0.2)
PLATELET # BLD AUTO: 281 10*3/MM3 (ref 140–450)
PMV BLD AUTO: 10.3 FL (ref 6–12)
POTASSIUM SERPL-SCNC: 4.3 MMOL/L (ref 3.5–5.2)
RBC # BLD AUTO: 2.81 10*6/MM3 (ref 3.77–5.28)
SODIUM SERPL-SCNC: 135 MMOL/L (ref 136–145)
WBC NRBC COR # BLD AUTO: 3.38 10*3/MM3 (ref 3.4–10.8)

## 2025-03-13 PROCEDURE — 85025 COMPLETE CBC W/AUTO DIFF WBC: CPT | Performed by: ORTHOPAEDIC SURGERY

## 2025-03-13 PROCEDURE — 80048 BASIC METABOLIC PNL TOTAL CA: CPT | Performed by: ORTHOPAEDIC SURGERY

## 2025-03-13 RX ADMIN — OXYCODONE HYDROCHLORIDE 10 MG: 5 TABLET ORAL at 01:51

## 2025-03-13 RX ADMIN — FAMOTIDINE 20 MG: 20 TABLET, FILM COATED ORAL at 08:22

## 2025-03-13 RX ADMIN — PRAVASTATIN SODIUM 40 MG: 20 TABLET ORAL at 08:21

## 2025-03-13 RX ADMIN — Medication 4000 UNITS: at 08:21

## 2025-03-13 RX ADMIN — LEVOTHYROXINE SODIUM 50 MCG: 50 TABLET ORAL at 08:21

## 2025-03-13 RX ADMIN — OXYCODONE HYDROCHLORIDE 10 MG: 5 TABLET ORAL at 08:21

## 2025-03-13 RX ADMIN — ASPIRIN 81 MG: 81 TABLET, COATED ORAL at 08:21

## 2025-03-13 RX ADMIN — BUSPIRONE HYDROCHLORIDE 5 MG: 10 TABLET ORAL at 09:19

## 2025-03-13 NOTE — PLAN OF CARE
Problem: Adult Inpatient Plan of Care  Goal: Absence of Hospital-Acquired Illness or Injury  Intervention: Identify and Manage Fall Risk  Recent Flowsheet Documentation  Taken 3/13/2025 0417 by Viviane Rosales RN  Safety Promotion/Fall Prevention:   assistive device/personal items within reach   safety round/check completed  Taken 3/13/2025 0221 by Viviane Rosales RN  Safety Promotion/Fall Prevention:   assistive device/personal items within reach   safety round/check completed  Taken 3/13/2025 0030 by Viviane Rosales RN  Safety Promotion/Fall Prevention:   assistive device/personal items within reach   safety round/check completed  Taken 3/12/2025 2214 by Viviane Rosales RN  Safety Promotion/Fall Prevention:   assistive device/personal items within reach   safety round/check completed  Taken 3/12/2025 2028 by Viviane Rosales RN  Safety Promotion/Fall Prevention:   activity supervised   assistive device/personal items within reach   clutter free environment maintained   fall prevention program maintained   gait belt   lighting adjusted   mobility aid in reach   nonskid shoes/slippers when out of bed   safety round/check completed  Intervention: Prevent Skin Injury  Recent Flowsheet Documentation  Taken 3/13/2025 0417 by Viviane Rosales RN  Body Position:   sitting up in bed   head facing, left  Taken 3/13/2025 0221 by Viviane Rosales RN  Body Position:   sitting up in bed   supine   head facing, right  Taken 3/13/2025 0030 by Viviane Rosales RN  Body Position:   supine   sitting up in bed  Taken 3/12/2025 2214 by Viviane Rosales RN  Body Position: supine  Taken 3/12/2025 2028 by Viviane Rosales RN  Body Position:   sitting up in bed   supine  Skin Protection:   drying agents applied   incontinence pads utilized   protective footwear used  Intervention: Prevent and Manage VTE (Venous Thromboembolism) Risk  Recent Flowsheet Documentation  Taken 3/12/2025 2028 by Viviane Rosales RN  VTE Prevention/Management:   bilateral   SCDs (sequential  compression devices) on  Goal: Optimal Comfort and Wellbeing  Intervention: Monitor Pain and Promote Comfort  Recent Flowsheet Documentation  Taken 3/13/2025 0151 by Viviane Rosales RN  Pain Management Interventions: pain medication given  Taken 3/12/2025 2028 by Viviane Rosales RN  Pain Management Interventions:   care clustered   pain medication given  Intervention: Provide Person-Centered Care  Recent Flowsheet Documentation  Taken 3/12/2025 2028 by Viviane Rosales RN  Trust Relationship/Rapport:   care explained   thoughts/feelings acknowledged     Problem: Skin Injury Risk Increased  Goal: Skin Health and Integrity  Intervention: Optimize Skin Protection  Recent Flowsheet Documentation  Taken 3/13/2025 0417 by Viviane Rosales RN  Head of Bed (HOB) Positioning: HOB elevated  Taken 3/13/2025 0221 by Viviane Rosales RN  Head of Bed (HOB) Positioning: HOB elevated  Taken 3/13/2025 0030 by Viviane Rosales RN  Head of Bed (HOB) Positioning: HOB elevated  Taken 3/12/2025 2214 by Viviane Rosales RN  Head of Bed (HOB) Positioning: HOB at 30-45 degrees  Taken 3/12/2025 2028 by Viviane Rosales RN  Activity Management:   dorsiflexion/plantar flexion performed   activity encouraged  Pressure Reduction Techniques:   frequent weight shift encouraged   weight shift assistance provided  Head of Bed (HOB) Positioning: HOB elevated  Pressure Reduction Devices:   alternating pressure pump (TORSTEN)   pressure-redistributing mattress utilized  Skin Protection:   drying agents applied   incontinence pads utilized   protective footwear used     Problem: Fall Injury Risk  Goal: Absence of Fall and Fall-Related Injury  Intervention: Identify and Manage Contributors  Recent Flowsheet Documentation  Taken 3/13/2025 0417 by Viviane Rosales RN  Medication Review/Management: medications reviewed  Taken 3/13/2025 0221 by Viviane Rosales RN  Medication Review/Management: medications reviewed  Taken 3/13/2025 0030 by Viviane Rosales RN  Medication Review/Management: medications  reviewed  Taken 3/12/2025 2214 by Viviane Rosales RN  Medication Review/Management: medications reviewed  Taken 3/12/2025 2028 by Viviane Rosales RN  Medication Review/Management: medications reviewed  Self-Care Promotion:   BADL personal objects within reach   independence encouraged  Intervention: Promote Injury-Free Environment  Recent Flowsheet Documentation  Taken 3/13/2025 0417 by Viviane Rosales RN  Safety Promotion/Fall Prevention:   assistive device/personal items within reach   safety round/check completed  Taken 3/13/2025 0221 by Viviane Rosales RN  Safety Promotion/Fall Prevention:   assistive device/personal items within reach   safety round/check completed  Taken 3/13/2025 0030 by Viviane Rosales RN  Safety Promotion/Fall Prevention:   assistive device/personal items within reach   safety round/check completed  Taken 3/12/2025 2214 by Viviane Rosales RN  Safety Promotion/Fall Prevention:   assistive device/personal items within reach   safety round/check completed  Taken 3/12/2025 2028 by Viviane Rosales RN  Safety Promotion/Fall Prevention:   activity supervised   assistive device/personal items within reach   clutter free environment maintained   fall prevention program maintained   gait belt   lighting adjusted   mobility aid in reach   nonskid shoes/slippers when out of bed   safety round/check completed     Problem: Hip Fracture Medical Management  Goal: Optimal Coping with Change in Health Status  Intervention: Support Psychosocial Response to Injury  Recent Flowsheet Documentation  Taken 3/12/2025 2028 by Viviane Rosales RN  Supportive Measures:   active listening utilized   self-care encouraged  Goal: Pain Control and Function  Intervention: Manage Acute Orthopaedic-Related Pain  Recent Flowsheet Documentation  Taken 3/13/2025 0151 by Viviane Rosales RN  Pain Management Interventions: pain medication given  Taken 3/12/2025 2028 by Viviane Rosales RN  Compartment Syndrome Management: active flexion/extension encouraged  Pain  Management Interventions:   care clustered   pain medication given   Goal Outcome Evaluation:

## 2025-03-13 NOTE — NURSING NOTE
Pt D/C today at 1100. Wound care to come before and change wound vac to prevena wound vac. Report called to Kavita.Packet completed and in pt erin. PT changed and belongings all gathered.

## 2025-03-13 NOTE — CASE MANAGEMENT/SOCIAL WORK
Case Management Discharge Note      Final Note: [facility]    Provided Post Acute Provider List?: N/A  Provided Post Acute Provider Quality & Resource List?: N/A    Selected Continued Care - Discharged on 3/13/2025 Admission date: 3/4/2025 - Discharge disposition: Skilled Nursing Facility (DC - External)      Destination Coordination complete.      Service Provider Services Address Phone Fax Patient Preferred    [facility] [address] [place] [address] [phone] --              Durable Medical Equipment    No services have been selected for the patient.                Dialysis/Infusion    No services have been selected for the patient.                Home Medical Care    No services have been selected for the patient.                Therapy    No services have been selected for the patient.                Community Resources    No services have been selected for the patient.                Community & DME    No services have been selected for the patient.                         Final Discharge Disposition Code: 03 - skilled nursing facility (SNF)

## 2025-03-13 NOTE — NURSING NOTE
CWOCN- received order from Dr Cesar for Prevena placement. Removed VAC and placed prior to discharge. Prevena pump should stay in place for 7 days and then be removed. Pump will continue NPWT for incisional healing and moisture management.     Replaced without issue. Educated patient that it has battery powder but to keep it plugged in to charge. Educated that this dressing should not need to be changed or placed to another pump at the facility.        03/13/25 1015   Wound 03/08/25 0815 Left posterior hip Surgical   Placement Date/Time: 03/08/25 0815   Side: Left  Orientation: posterior  Location: hip  Primary Wound Type: Surgical   Dressing Appearance intact   Closure Staples   Periwound pink   Periwound Temperature warm   Periwound Skin Turgor soft   Edges   (approximated)   Wound Length (cm) 50 cm   Drainage Characteristics/Odor serosanguineous   Drainage Amount scant   Care, Wound cleansed with;sterile normal saline;negative pressure wound therapy   Dressing Care dressing changed   Periwound Care dry periwound area maintained   Wound Output (mL) 250   NPWT (Negative Pressure Wound Therapy) 03/08/25 1022 Left hip   Placement Date/Time: 03/08/25 1022   Location: Left hip   Therapy Setting continuous therapy  (placed to Prevena pump)

## 2025-03-13 NOTE — PLAN OF CARE
Problem: Adult Inpatient Plan of Care  Goal: Plan of Care Review  Outcome: Adequate for Care Transition  Goal: Patient-Specific Goal (Individualized)  Outcome: Adequate for Care Transition  Goal: Absence of Hospital-Acquired Illness or Injury  Outcome: Adequate for Care Transition  Intervention: Identify and Manage Fall Risk  Recent Flowsheet Documentation  Taken 3/12/2025 1857 by Sultana Sauer RN  Safety Promotion/Fall Prevention:   fall prevention program maintained   assistive device/personal items within reach   clutter free environment maintained   safety round/check completed   room organization consistent   nonskid shoes/slippers when out of bed  Intervention: Prevent Skin Injury  Recent Flowsheet Documentation  Taken 3/12/2025 1857 by Sultana Sauer RN  Body Position:   sitting up in bed   patient/family refused  Goal: Optimal Comfort and Wellbeing  Outcome: Adequate for Care Transition  Goal: Readiness for Transition of Care  Outcome: Adequate for Care Transition     Problem: Skin Injury Risk Increased  Goal: Skin Health and Integrity  Outcome: Adequate for Care Transition     Problem: Fall Injury Risk  Goal: Absence of Fall and Fall-Related Injury  Outcome: Adequate for Care Transition  Intervention: Promote Injury-Free Environment  Recent Flowsheet Documentation  Taken 3/12/2025 1857 by Sultana Sauer RN  Safety Promotion/Fall Prevention:   fall prevention program maintained   assistive device/personal items within reach   clutter free environment maintained   safety round/check completed   room organization consistent   nonskid shoes/slippers when out of bed     Problem: Hip Fracture Medical Management  Goal: Optimal Coping with Change in Health Status  Outcome: Adequate for Care Transition  Goal: Fracture Stability  Outcome: Adequate for Care Transition  Goal: Pain Control and Function  Outcome: Adequate for Care Transition  Goal: Effective Urinary Elimination  Outcome: Adequate for Care Transition   Goal  Outcome Evaluation:

## 2025-04-23 ENCOUNTER — HOSPITAL ENCOUNTER (INPATIENT)
Facility: HOSPITAL | Age: 80
LOS: 5 days | Discharge: NURSING, REHAB OR LONG-TERM CARE | End: 2025-04-29
Attending: STUDENT IN AN ORGANIZED HEALTH CARE EDUCATION/TRAINING PROGRAM | Admitting: INTERNAL MEDICINE
Payer: MEDICARE

## 2025-04-23 ENCOUNTER — APPOINTMENT (OUTPATIENT)
Dept: GENERAL RADIOLOGY | Facility: HOSPITAL | Age: 80
End: 2025-04-23
Payer: MEDICARE

## 2025-04-23 DIAGNOSIS — N39.0 ACUTE UTI: ICD-10-CM

## 2025-04-23 DIAGNOSIS — R57.9 SHOCK: ICD-10-CM

## 2025-04-23 DIAGNOSIS — I26.02 ACUTE SADDLE PULMONARY EMBOLISM WITH ACUTE COR PULMONALE: Primary | ICD-10-CM

## 2025-04-23 LAB
ALBUMIN SERPL-MCNC: 3.2 G/DL (ref 3.5–5.2)
ALBUMIN/GLOB SERPL: 0.9 G/DL
ALP SERPL-CCNC: 169 U/L (ref 39–117)
ALT SERPL W P-5'-P-CCNC: 12 U/L (ref 1–33)
ANION GAP SERPL CALCULATED.3IONS-SCNC: 10 MMOL/L (ref 5–15)
APTT PPP: 30.9 SECONDS (ref 22.7–35.4)
AST SERPL-CCNC: 29 U/L (ref 1–32)
BASOPHILS # BLD AUTO: 0.03 10*3/MM3 (ref 0–0.2)
BASOPHILS NFR BLD AUTO: 0.2 % (ref 0–1.5)
BILIRUB SERPL-MCNC: 0.8 MG/DL (ref 0–1.2)
BILIRUB UR QL STRIP: NEGATIVE
BUN SERPL-MCNC: 21 MG/DL (ref 8–23)
BUN/CREAT SERPL: 14.7 (ref 7–25)
CALCIUM SPEC-SCNC: 9.2 MG/DL (ref 8.6–10.5)
CHLORIDE SERPL-SCNC: 100 MMOL/L (ref 98–107)
CLARITY UR: ABNORMAL
CO2 SERPL-SCNC: 25 MMOL/L (ref 22–29)
COLOR UR: YELLOW
CREAT SERPL-MCNC: 1.43 MG/DL (ref 0.57–1)
D-LACTATE SERPL-SCNC: 1.9 MMOL/L (ref 0.5–2)
DEPRECATED RDW RBC AUTO: 41.9 FL (ref 37–54)
EGFRCR SERPLBLD CKD-EPI 2021: 37.6 ML/MIN/1.73
EOSINOPHIL # BLD AUTO: 0 10*3/MM3 (ref 0–0.4)
EOSINOPHIL NFR BLD AUTO: 0 % (ref 0.3–6.2)
ERYTHROCYTE [DISTWIDTH] IN BLOOD BY AUTOMATED COUNT: 13 % (ref 12.3–15.4)
GLOBULIN UR ELPH-MCNC: 3.4 GM/DL
GLUCOSE SERPL-MCNC: 120 MG/DL (ref 65–99)
GLUCOSE UR STRIP-MCNC: NEGATIVE MG/DL
HCT VFR BLD AUTO: 33.3 % (ref 34–46.6)
HGB BLD-MCNC: 11 G/DL (ref 12–15.9)
HGB UR QL STRIP.AUTO: ABNORMAL
IMM GRANULOCYTES # BLD AUTO: 0.12 10*3/MM3 (ref 0–0.05)
IMM GRANULOCYTES NFR BLD AUTO: 0.8 % (ref 0–0.5)
INR PPP: 1.45 (ref 0.9–1.1)
KETONES UR QL STRIP: NEGATIVE
LEUKOCYTE ESTERASE UR QL STRIP.AUTO: ABNORMAL
LYMPHOCYTES # BLD AUTO: 0.47 10*3/MM3 (ref 0.7–3.1)
LYMPHOCYTES NFR BLD AUTO: 3.3 % (ref 19.6–45.3)
MCH RBC QN AUTO: 29.5 PG (ref 26.6–33)
MCHC RBC AUTO-ENTMCNC: 33 G/DL (ref 31.5–35.7)
MCV RBC AUTO: 89.3 FL (ref 79–97)
MONOCYTES # BLD AUTO: 0.26 10*3/MM3 (ref 0.1–0.9)
MONOCYTES NFR BLD AUTO: 1.8 % (ref 5–12)
NEUTROPHILS NFR BLD AUTO: 13.48 10*3/MM3 (ref 1.7–7)
NEUTROPHILS NFR BLD AUTO: 93.9 % (ref 42.7–76)
NITRITE UR QL STRIP: NEGATIVE
NRBC BLD AUTO-RTO: 0 /100 WBC (ref 0–0.2)
NT-PROBNP SERPL-MCNC: 2265 PG/ML (ref 0–1800)
PH UR STRIP.AUTO: 6.5 [PH] (ref 5–8)
PHENOBARB SERPL-MCNC: 21.1 MCG/ML (ref 10–30)
PLATELET # BLD AUTO: 191 10*3/MM3 (ref 140–450)
PMV BLD AUTO: 11 FL (ref 6–12)
POTASSIUM SERPL-SCNC: 3.5 MMOL/L (ref 3.5–5.2)
PROCALCITONIN SERPL-MCNC: 45.7 NG/ML (ref 0–0.25)
PROT SERPL-MCNC: 6.6 G/DL (ref 6–8.5)
PROT UR QL STRIP: ABNORMAL
PROTHROMBIN TIME: 17.6 SECONDS (ref 11.7–14.2)
RBC # BLD AUTO: 3.73 10*6/MM3 (ref 3.77–5.28)
SODIUM SERPL-SCNC: 135 MMOL/L (ref 136–145)
SP GR UR STRIP: 1.01 (ref 1–1.03)
TROPONIN T SERPL HS-MCNC: 49 NG/L
UROBILINOGEN UR QL STRIP: ABNORMAL
WBC NRBC COR # BLD AUTO: 14.36 10*3/MM3 (ref 3.4–10.8)

## 2025-04-23 PROCEDURE — 93005 ELECTROCARDIOGRAM TRACING: CPT | Performed by: STUDENT IN AN ORGANIZED HEALTH CARE EDUCATION/TRAINING PROGRAM

## 2025-04-23 PROCEDURE — 85610 PROTHROMBIN TIME: CPT | Performed by: STUDENT IN AN ORGANIZED HEALTH CARE EDUCATION/TRAINING PROGRAM

## 2025-04-23 PROCEDURE — 84484 ASSAY OF TROPONIN QUANT: CPT | Performed by: STUDENT IN AN ORGANIZED HEALTH CARE EDUCATION/TRAINING PROGRAM

## 2025-04-23 PROCEDURE — 71045 X-RAY EXAM CHEST 1 VIEW: CPT

## 2025-04-23 PROCEDURE — 82948 REAGENT STRIP/BLOOD GLUCOSE: CPT

## 2025-04-23 PROCEDURE — 25810000003 SODIUM CHLORIDE 0.9 % SOLUTION: Performed by: STUDENT IN AN ORGANIZED HEALTH CARE EDUCATION/TRAINING PROGRAM

## 2025-04-23 PROCEDURE — 80053 COMPREHEN METABOLIC PANEL: CPT | Performed by: STUDENT IN AN ORGANIZED HEALTH CARE EDUCATION/TRAINING PROGRAM

## 2025-04-23 PROCEDURE — 85025 COMPLETE CBC W/AUTO DIFF WBC: CPT | Performed by: STUDENT IN AN ORGANIZED HEALTH CARE EDUCATION/TRAINING PROGRAM

## 2025-04-23 PROCEDURE — 83605 ASSAY OF LACTIC ACID: CPT | Performed by: STUDENT IN AN ORGANIZED HEALTH CARE EDUCATION/TRAINING PROGRAM

## 2025-04-23 PROCEDURE — P9612 CATHETERIZE FOR URINE SPEC: HCPCS

## 2025-04-23 PROCEDURE — 84145 PROCALCITONIN (PCT): CPT | Performed by: STUDENT IN AN ORGANIZED HEALTH CARE EDUCATION/TRAINING PROGRAM

## 2025-04-23 PROCEDURE — 81001 URINALYSIS AUTO W/SCOPE: CPT | Performed by: STUDENT IN AN ORGANIZED HEALTH CARE EDUCATION/TRAINING PROGRAM

## 2025-04-23 PROCEDURE — 80184 ASSAY OF PHENOBARBITAL: CPT | Performed by: STUDENT IN AN ORGANIZED HEALTH CARE EDUCATION/TRAINING PROGRAM

## 2025-04-23 PROCEDURE — 99291 CRITICAL CARE FIRST HOUR: CPT

## 2025-04-23 PROCEDURE — 83880 ASSAY OF NATRIURETIC PEPTIDE: CPT | Performed by: STUDENT IN AN ORGANIZED HEALTH CARE EDUCATION/TRAINING PROGRAM

## 2025-04-23 PROCEDURE — 93010 ELECTROCARDIOGRAM REPORT: CPT | Performed by: INTERNAL MEDICINE

## 2025-04-23 PROCEDURE — 85730 THROMBOPLASTIN TIME PARTIAL: CPT | Performed by: STUDENT IN AN ORGANIZED HEALTH CARE EDUCATION/TRAINING PROGRAM

## 2025-04-23 RX ORDER — NOREPINEPHRINE BITARTRATE 0.03 MG/ML
INJECTION, SOLUTION INTRAVENOUS
Status: COMPLETED
Start: 2025-04-23 | End: 2025-04-23

## 2025-04-23 RX ORDER — NOREPINEPHRINE BITARTRATE 0.03 MG/ML
.02-.3 INJECTION, SOLUTION INTRAVENOUS
Status: DISCONTINUED | OUTPATIENT
Start: 2025-04-23 | End: 2025-04-27

## 2025-04-23 RX ADMIN — NOREPINEPHRINE BITARTRATE 0.1 MCG/KG/MIN: 0.03 INJECTION, SOLUTION INTRAVENOUS at 23:10

## 2025-04-23 RX ADMIN — SODIUM CHLORIDE 1000 ML: 9 INJECTION, SOLUTION INTRAVENOUS at 22:36

## 2025-04-23 RX ADMIN — SODIUM CHLORIDE 1000 ML: 9 INJECTION, SOLUTION INTRAVENOUS at 23:14

## 2025-04-23 NOTE — Clinical Note
Hemostasis started on the right femoral vein. Perclose was used in achieving hemostasis. Closure device deployed in the vessel. Hemostasis achieved successfully. Closure device additional comment: Jack x2

## 2025-04-23 NOTE — Clinical Note
A 8 fr sheath was successfully inserted using micropuncture technique with ultrasound guidance into the right femoral vein.

## 2025-04-24 ENCOUNTER — APPOINTMENT (OUTPATIENT)
Dept: CARDIOLOGY | Facility: HOSPITAL | Age: 80
End: 2025-04-24
Payer: MEDICARE

## 2025-04-24 ENCOUNTER — APPOINTMENT (OUTPATIENT)
Dept: CT IMAGING | Facility: HOSPITAL | Age: 80
End: 2025-04-24
Payer: MEDICARE

## 2025-04-24 PROBLEM — I26.92 SADDLE PULMONARY EMBOLUS: Status: ACTIVE | Noted: 2025-04-24

## 2025-04-24 LAB
ANION GAP SERPL CALCULATED.3IONS-SCNC: 11.8 MMOL/L (ref 5–15)
AORTIC DIMENSIONLESS INDEX: 0.63 (DI)
APTT PPP: 45.9 SECONDS (ref 22.7–35.4)
AV MEAN PRESS GRAD SYS DOP V1V2: 9.7 MMHG
AV VMAX SYS DOP: 217.2 CM/SEC
BACTERIA BLD CULT: ABNORMAL
BACTERIA UR QL AUTO: ABNORMAL /HPF
BASOPHILS # BLD MANUAL: 0 10*3/MM3 (ref 0–0.2)
BASOPHILS NFR BLD MANUAL: 0 % (ref 0–1.5)
BH CV ECHO MEAS - ACS: 1.82 CM
BH CV ECHO MEAS - AO MAX PG: 18.9 MMHG
BH CV ECHO MEAS - AO ROOT DIAM: 2.9 CM
BH CV ECHO MEAS - AO V2 VTI: 36.9 CM
BH CV ECHO MEAS - AVA(I,D): 1.85 CM2
BH CV ECHO MEAS - EDV(CUBED): 68.3 ML
BH CV ECHO MEAS - EDV(MOD-SP2): 66 ML
BH CV ECHO MEAS - EDV(MOD-SP4): 69 ML
BH CV ECHO MEAS - EF(MOD-SP2): 19.7 %
BH CV ECHO MEAS - EF(MOD-SP4): 30.4 %
BH CV ECHO MEAS - ESV(CUBED): 18.3 ML
BH CV ECHO MEAS - ESV(MOD-SP2): 53 ML
BH CV ECHO MEAS - ESV(MOD-SP4): 48 ML
BH CV ECHO MEAS - FS: 35.5 %
BH CV ECHO MEAS - IVS/LVPW: 1.06 CM
BH CV ECHO MEAS - IVSD: 0.94 CM
BH CV ECHO MEAS - LV DIASTOLIC VOL/BSA (35-75): 34.9 CM2
BH CV ECHO MEAS - LV MASS(C)D: 115.6 GRAMS
BH CV ECHO MEAS - LV MAX PG: 7.4 MMHG
BH CV ECHO MEAS - LV MEAN PG: 4.8 MMHG
BH CV ECHO MEAS - LV SYSTOLIC VOL/BSA (12-30): 24.3 CM2
BH CV ECHO MEAS - LV V1 MAX: 136.1 CM/SEC
BH CV ECHO MEAS - LV V1 VTI: 23.4 CM
BH CV ECHO MEAS - LVIDD: 4.1 CM
BH CV ECHO MEAS - LVIDS: 2.6 CM
BH CV ECHO MEAS - LVOT AREA: 2.9 CM2
BH CV ECHO MEAS - LVOT DIAM: 1.93 CM
BH CV ECHO MEAS - LVPWD: 0.88 CM
BH CV ECHO MEAS - PA ACC TIME: 0.08 SEC
BH CV ECHO MEAS - PA V2 MAX: 115.4 CM/SEC
BH CV ECHO MEAS - RAP SYSTOLE: 8 MMHG
BH CV ECHO MEAS - RV MAX PG: 5.5 MMHG
BH CV ECHO MEAS - RV V1 MAX: 117.6 CM/SEC
BH CV ECHO MEAS - RV V1 VTI: 19.6 CM
BH CV ECHO MEAS - RVSP: 51 MMHG
BH CV ECHO MEAS - SV(LVOT): 68.4 ML
BH CV ECHO MEAS - SV(MOD-SP2): 13 ML
BH CV ECHO MEAS - SV(MOD-SP4): 21 ML
BH CV ECHO MEAS - SVI(LVOT): 34.6 ML/M2
BH CV ECHO MEAS - SVI(MOD-SP2): 6.6 ML/M2
BH CV ECHO MEAS - SVI(MOD-SP4): 10.6 ML/M2
BH CV ECHO MEAS - TAPSE (>1.6): 2.48 CM
BH CV ECHO MEAS - TR MAX PG: 43.8 MMHG
BH CV ECHO MEAS - TR MAX VEL: 330.8 CM/SEC
BOTTLE TYPE: ABNORMAL
BUN SERPL-MCNC: 22 MG/DL (ref 8–23)
BUN/CREAT SERPL: 15.3 (ref 7–25)
CALCIUM SPEC-SCNC: 8.8 MG/DL (ref 8.6–10.5)
CHLORIDE SERPL-SCNC: 102 MMOL/L (ref 98–107)
CO2 SERPL-SCNC: 23.2 MMOL/L (ref 22–29)
CREAT SERPL-MCNC: 1.44 MG/DL (ref 0.57–1)
DEPRECATED RDW RBC AUTO: 42.6 FL (ref 37–54)
EGFRCR SERPLBLD CKD-EPI 2021: 37.3 ML/MIN/1.73
EOSINOPHIL # BLD MANUAL: 0 10*3/MM3 (ref 0–0.4)
EOSINOPHIL NFR BLD MANUAL: 0 % (ref 0.3–6.2)
ERYTHROCYTE [DISTWIDTH] IN BLOOD BY AUTOMATED COUNT: 13 % (ref 12.3–15.4)
GEN 5 1HR TROPONIN T REFLEX: 44 NG/L
GLUCOSE BLDC GLUCOMTR-MCNC: 106 MG/DL (ref 70–130)
GLUCOSE BLDC GLUCOMTR-MCNC: 119 MG/DL (ref 70–130)
GLUCOSE BLDC GLUCOMTR-MCNC: 124 MG/DL (ref 70–130)
GLUCOSE BLDC GLUCOMTR-MCNC: 134 MG/DL (ref 70–130)
GLUCOSE SERPL-MCNC: 135 MG/DL (ref 65–99)
HCT VFR BLD AUTO: 36.3 % (ref 34–46.6)
HGB BLD-MCNC: 12.1 G/DL (ref 12–15.9)
HYALINE CASTS UR QL AUTO: ABNORMAL /LPF
LEFT ATRIUM VOLUME INDEX: 23 ML/M2
LV EF BIPLANE MOD: 25.3 %
LYMPHOCYTES # BLD MANUAL: 0 10*3/MM3 (ref 0.7–3.1)
LYMPHOCYTES NFR BLD MANUAL: 3 % (ref 5–12)
MCH RBC QN AUTO: 30 PG (ref 26.6–33)
MCHC RBC AUTO-ENTMCNC: 33.3 G/DL (ref 31.5–35.7)
MCV RBC AUTO: 90.1 FL (ref 79–97)
METAMYELOCYTES NFR BLD MANUAL: 1 % (ref 0–0)
MONOCYTES # BLD: 1.26 10*3/MM3 (ref 0.1–0.9)
NEUTROPHILS # BLD AUTO: 40.42 10*3/MM3 (ref 1.7–7)
NEUTROPHILS NFR BLD MANUAL: 96 % (ref 42.7–76)
PLAT MORPH BLD: NORMAL
PLATELET # BLD AUTO: 250 10*3/MM3 (ref 140–450)
PMV BLD AUTO: 11.1 FL (ref 6–12)
POTASSIUM SERPL-SCNC: 3.3 MMOL/L (ref 3.5–5.2)
QT INTERVAL: 365 MS
QTC INTERVAL: 486 MS
RBC # BLD AUTO: 4.03 10*6/MM3 (ref 3.77–5.28)
RBC # UR STRIP: ABNORMAL /HPF
RBC MORPH BLD: NORMAL
REF LAB TEST METHOD: ABNORMAL
SINUS: 3 CM
SODIUM SERPL-SCNC: 137 MMOL/L (ref 136–145)
SQUAMOUS #/AREA URNS HPF: ABNORMAL /HPF
STJ: 3 CM
TROPONIN T % DELTA: -10
TROPONIN T NUMERIC DELTA: -5 NG/L
VARIANT LYMPHS NFR BLD MANUAL: 0 % (ref 19.6–45.3)
WBC # UR STRIP: ABNORMAL /HPF
WBC MORPH BLD: NORMAL
WBC NRBC COR # BLD AUTO: 42.1 10*3/MM3 (ref 3.4–10.8)

## 2025-04-24 PROCEDURE — 99223 1ST HOSP IP/OBS HIGH 75: CPT | Performed by: INTERNAL MEDICINE

## 2025-04-24 PROCEDURE — 25510000001 PERFLUTREN 6.52 MG/ML SUSPENSION 2 ML VIAL: Performed by: STUDENT IN AN ORGANIZED HEALTH CARE EDUCATION/TRAINING PROGRAM

## 2025-04-24 PROCEDURE — 25010000002 LIDOCAINE 2% SOLUTION: Performed by: INTERNAL MEDICINE

## 2025-04-24 PROCEDURE — 36415 COLL VENOUS BLD VENIPUNCTURE: CPT

## 2025-04-24 PROCEDURE — C1769 GUIDE WIRE: HCPCS | Performed by: INTERNAL MEDICINE

## 2025-04-24 PROCEDURE — 85018 HEMOGLOBIN: CPT

## 2025-04-24 PROCEDURE — 87086 URINE CULTURE/COLONY COUNT: CPT | Performed by: STUDENT IN AN ORGANIZED HEALTH CARE EDUCATION/TRAINING PROGRAM

## 2025-04-24 PROCEDURE — 87186 SC STD MICRODIL/AGAR DIL: CPT | Performed by: STUDENT IN AN ORGANIZED HEALTH CARE EDUCATION/TRAINING PROGRAM

## 2025-04-24 PROCEDURE — 25010000002 HEPARIN (PORCINE) PER 1000 UNITS: Performed by: STUDENT IN AN ORGANIZED HEALTH CARE EDUCATION/TRAINING PROGRAM

## 2025-04-24 PROCEDURE — C1894 INTRO/SHEATH, NON-LASER: HCPCS | Performed by: INTERNAL MEDICINE

## 2025-04-24 PROCEDURE — 25010000002 HEPARIN (PORCINE) 25000-0.45 UT/250ML-% SOLUTION: Performed by: STUDENT IN AN ORGANIZED HEALTH CARE EDUCATION/TRAINING PROGRAM

## 2025-04-24 PROCEDURE — 87154 CUL TYP ID BLD PTHGN 6+ TRGT: CPT | Performed by: STUDENT IN AN ORGANIZED HEALTH CARE EDUCATION/TRAINING PROGRAM

## 2025-04-24 PROCEDURE — C1757 CATH, THROMBECTOMY/EMBOLECT: HCPCS | Performed by: INTERNAL MEDICINE

## 2025-04-24 PROCEDURE — 87077 CULTURE AEROBIC IDENTIFY: CPT | Performed by: STUDENT IN AN ORGANIZED HEALTH CARE EDUCATION/TRAINING PROGRAM

## 2025-04-24 PROCEDURE — 25010000002 PIPERACILLIN SOD-TAZOBACTAM PER 1 G: Performed by: INTERNAL MEDICINE

## 2025-04-24 PROCEDURE — B31S1ZZ FLUOROSCOPY OF RIGHT PULMONARY ARTERY USING LOW OSMOLAR CONTRAST: ICD-10-PCS | Performed by: INTERNAL MEDICINE

## 2025-04-24 PROCEDURE — 85347 COAGULATION TIME ACTIVATED: CPT

## 2025-04-24 PROCEDURE — 37184 PRIM ART M-THRMBC 1ST VSL: CPT | Performed by: INTERNAL MEDICINE

## 2025-04-24 PROCEDURE — 25010000002 MIDAZOLAM PER 1 MG: Performed by: INTERNAL MEDICINE

## 2025-04-24 PROCEDURE — 87040 BLOOD CULTURE FOR BACTERIA: CPT | Performed by: STUDENT IN AN ORGANIZED HEALTH CARE EDUCATION/TRAINING PROGRAM

## 2025-04-24 PROCEDURE — 85007 BL SMEAR W/DIFF WBC COUNT: CPT

## 2025-04-24 PROCEDURE — 85025 COMPLETE CBC W/AUTO DIFF WBC: CPT

## 2025-04-24 PROCEDURE — 82948 REAGENT STRIP/BLOOD GLUCOSE: CPT

## 2025-04-24 PROCEDURE — 36014 PLACE CATHETER IN ARTERY: CPT | Performed by: INTERNAL MEDICINE

## 2025-04-24 PROCEDURE — 02CQ3ZZ EXTIRPATION OF MATTER FROM RIGHT PULMONARY ARTERY, PERCUTANEOUS APPROACH: ICD-10-PCS | Performed by: INTERNAL MEDICINE

## 2025-04-24 PROCEDURE — 25510000001 IOPAMIDOL PER 1 ML: Performed by: STUDENT IN AN ORGANIZED HEALTH CARE EDUCATION/TRAINING PROGRAM

## 2025-04-24 PROCEDURE — 25010000002 FENTANYL CITRATE (PF) 50 MCG/ML SOLUTION: Performed by: INTERNAL MEDICINE

## 2025-04-24 PROCEDURE — 80048 BASIC METABOLIC PNL TOTAL CA: CPT

## 2025-04-24 PROCEDURE — 85730 THROMBOPLASTIN TIME PARTIAL: CPT | Performed by: INTERNAL MEDICINE

## 2025-04-24 PROCEDURE — 25010000002 HEPARIN (PORCINE) 25000-0.45 UT/250ML-% SOLUTION: Performed by: INTERNAL MEDICINE

## 2025-04-24 PROCEDURE — 25010000002 HEPARIN (PORCINE) PER 1000 UNITS: Performed by: INTERNAL MEDICINE

## 2025-04-24 PROCEDURE — 82810 BLOOD GASES O2 SAT ONLY: CPT

## 2025-04-24 PROCEDURE — 82803 BLOOD GASES ANY COMBINATION: CPT

## 2025-04-24 PROCEDURE — 93306 TTE W/DOPPLER COMPLETE: CPT | Performed by: INTERNAL MEDICINE

## 2025-04-24 PROCEDURE — C1760 CLOSURE DEV, VASC: HCPCS | Performed by: INTERNAL MEDICINE

## 2025-04-24 PROCEDURE — 93306 TTE W/DOPPLER COMPLETE: CPT

## 2025-04-24 PROCEDURE — 71275 CT ANGIOGRAPHY CHEST: CPT

## 2025-04-24 PROCEDURE — 85014 HEMATOCRIT: CPT

## 2025-04-24 PROCEDURE — 4A023N6 MEASUREMENT OF CARDIAC SAMPLING AND PRESSURE, RIGHT HEART, PERCUTANEOUS APPROACH: ICD-10-PCS | Performed by: INTERNAL MEDICINE

## 2025-04-24 PROCEDURE — 25010000002 PIPERACILLIN SOD-TAZOBACTAM PER 1 G: Performed by: STUDENT IN AN ORGANIZED HEALTH CARE EDUCATION/TRAINING PROGRAM

## 2025-04-24 PROCEDURE — 84484 ASSAY OF TROPONIN QUANT: CPT | Performed by: STUDENT IN AN ORGANIZED HEALTH CARE EDUCATION/TRAINING PROGRAM

## 2025-04-24 RX ORDER — SODIUM CHLORIDE 9 MG/ML
INJECTION, SOLUTION INTRAVENOUS
Status: COMPLETED | OUTPATIENT
Start: 2025-04-24 | End: 2025-04-24

## 2025-04-24 RX ORDER — PHENOBARBITAL 32.4 MG/1
129.6 TABLET ORAL NIGHTLY
Status: DISCONTINUED | OUTPATIENT
Start: 2025-04-24 | End: 2025-04-29 | Stop reason: HOSPADM

## 2025-04-24 RX ORDER — BISACODYL 5 MG/1
5 TABLET, DELAYED RELEASE ORAL DAILY PRN
Status: DISCONTINUED | OUTPATIENT
Start: 2025-04-24 | End: 2025-04-29 | Stop reason: HOSPADM

## 2025-04-24 RX ORDER — POLYETHYLENE GLYCOL 3350 17 G/17G
17 POWDER, FOR SOLUTION ORAL DAILY PRN
Status: DISCONTINUED | OUTPATIENT
Start: 2025-04-24 | End: 2025-04-29 | Stop reason: HOSPADM

## 2025-04-24 RX ORDER — SODIUM CHLORIDE 0.9 % (FLUSH) 0.9 %
10 SYRINGE (ML) INJECTION AS NEEDED
Status: DISCONTINUED | OUTPATIENT
Start: 2025-04-24 | End: 2025-04-29 | Stop reason: HOSPADM

## 2025-04-24 RX ORDER — HEPARIN SODIUM 1000 [USP'U]/ML
INJECTION, SOLUTION INTRAVENOUS; SUBCUTANEOUS
Status: DISCONTINUED | OUTPATIENT
Start: 2025-04-24 | End: 2025-04-24 | Stop reason: HOSPADM

## 2025-04-24 RX ORDER — NITROGLYCERIN 0.4 MG/1
0.4 TABLET SUBLINGUAL
Status: DISCONTINUED | OUTPATIENT
Start: 2025-04-24 | End: 2025-04-29 | Stop reason: HOSPADM

## 2025-04-24 RX ORDER — HEPARIN SODIUM 5000 [USP'U]/ML
80 INJECTION, SOLUTION INTRAVENOUS; SUBCUTANEOUS ONCE
Status: COMPLETED | OUTPATIENT
Start: 2025-04-24 | End: 2025-04-24

## 2025-04-24 RX ORDER — ONDANSETRON 4 MG/1
4 TABLET, ORALLY DISINTEGRATING ORAL EVERY 6 HOURS PRN
Status: DISCONTINUED | OUTPATIENT
Start: 2025-04-24 | End: 2025-04-29 | Stop reason: HOSPADM

## 2025-04-24 RX ORDER — ONDANSETRON 2 MG/ML
4 INJECTION INTRAMUSCULAR; INTRAVENOUS EVERY 6 HOURS PRN
Status: DISCONTINUED | OUTPATIENT
Start: 2025-04-24 | End: 2025-04-29 | Stop reason: HOSPADM

## 2025-04-24 RX ORDER — IOPAMIDOL 755 MG/ML
100 INJECTION, SOLUTION INTRAVASCULAR
Status: COMPLETED | OUTPATIENT
Start: 2025-04-24 | End: 2025-04-24

## 2025-04-24 RX ORDER — MORPHINE SULFATE 2 MG/ML
1 INJECTION, SOLUTION INTRAMUSCULAR; INTRAVENOUS EVERY 4 HOURS PRN
Status: ACTIVE | OUTPATIENT
Start: 2025-04-24 | End: 2025-04-29

## 2025-04-24 RX ORDER — SODIUM CHLORIDE 9 MG/ML
75 INJECTION, SOLUTION INTRAVENOUS CONTINUOUS
Status: ACTIVE | OUTPATIENT
Start: 2025-04-24 | End: 2025-04-24

## 2025-04-24 RX ORDER — HYDROCODONE BITARTRATE AND ACETAMINOPHEN 5; 325 MG/1; MG/1
1 TABLET ORAL EVERY 4 HOURS PRN
Refills: 0 | Status: DISCONTINUED | OUTPATIENT
Start: 2025-04-24 | End: 2025-04-29 | Stop reason: HOSPADM

## 2025-04-24 RX ORDER — HEPARIN SODIUM 5000 [USP'U]/ML
40-80 INJECTION, SOLUTION INTRAVENOUS; SUBCUTANEOUS EVERY 6 HOURS PRN
Status: DISPENSED | OUTPATIENT
Start: 2025-04-24 | End: 2025-04-27

## 2025-04-24 RX ORDER — PANTOPRAZOLE SODIUM 40 MG/10ML
40 INJECTION, POWDER, LYOPHILIZED, FOR SOLUTION INTRAVENOUS
Status: DISCONTINUED | OUTPATIENT
Start: 2025-04-24 | End: 2025-04-26

## 2025-04-24 RX ORDER — HEPARIN SODIUM 10000 [USP'U]/100ML
17.9 INJECTION, SOLUTION INTRAVENOUS
Status: DISPENSED | OUTPATIENT
Start: 2025-04-24 | End: 2025-04-27

## 2025-04-24 RX ORDER — POTASSIUM CHLORIDE 1500 MG/1
40 TABLET, EXTENDED RELEASE ORAL EVERY 4 HOURS
Status: COMPLETED | OUTPATIENT
Start: 2025-04-24 | End: 2025-04-24

## 2025-04-24 RX ORDER — FENTANYL CITRATE 50 UG/ML
INJECTION, SOLUTION INTRAMUSCULAR; INTRAVENOUS
Status: DISCONTINUED | OUTPATIENT
Start: 2025-04-24 | End: 2025-04-24 | Stop reason: HOSPADM

## 2025-04-24 RX ORDER — AMOXICILLIN 250 MG
2 CAPSULE ORAL 2 TIMES DAILY
Status: DISCONTINUED | OUTPATIENT
Start: 2025-04-24 | End: 2025-04-29 | Stop reason: HOSPADM

## 2025-04-24 RX ORDER — LIDOCAINE HYDROCHLORIDE 20 MG/ML
INJECTION, SOLUTION INFILTRATION; PERINEURAL
Status: DISCONTINUED | OUTPATIENT
Start: 2025-04-24 | End: 2025-04-24 | Stop reason: HOSPADM

## 2025-04-24 RX ORDER — MIDAZOLAM HYDROCHLORIDE 1 MG/ML
INJECTION, SOLUTION INTRAMUSCULAR; INTRAVENOUS
Status: DISCONTINUED | OUTPATIENT
Start: 2025-04-24 | End: 2025-04-24 | Stop reason: HOSPADM

## 2025-04-24 RX ORDER — SODIUM CHLORIDE 0.9 % (FLUSH) 0.9 %
10 SYRINGE (ML) INJECTION EVERY 12 HOURS SCHEDULED
Status: DISCONTINUED | OUTPATIENT
Start: 2025-04-24 | End: 2025-04-29 | Stop reason: HOSPADM

## 2025-04-24 RX ORDER — NALOXONE HCL 0.4 MG/ML
0.4 VIAL (ML) INJECTION
Status: DISCONTINUED | OUTPATIENT
Start: 2025-04-24 | End: 2025-04-29 | Stop reason: HOSPADM

## 2025-04-24 RX ORDER — BISACODYL 10 MG
10 SUPPOSITORY, RECTAL RECTAL DAILY PRN
Status: DISCONTINUED | OUTPATIENT
Start: 2025-04-24 | End: 2025-04-29 | Stop reason: HOSPADM

## 2025-04-24 RX ORDER — SODIUM CHLORIDE 9 MG/ML
40 INJECTION, SOLUTION INTRAVENOUS AS NEEDED
Status: DISCONTINUED | OUTPATIENT
Start: 2025-04-24 | End: 2025-04-29 | Stop reason: HOSPADM

## 2025-04-24 RX ORDER — ACETAMINOPHEN 650 MG/1
650 SUPPOSITORY RECTAL EVERY 4 HOURS PRN
Status: DISCONTINUED | OUTPATIENT
Start: 2025-04-24 | End: 2025-04-29 | Stop reason: HOSPADM

## 2025-04-24 RX ORDER — ACETAMINOPHEN 325 MG/1
650 TABLET ORAL EVERY 4 HOURS PRN
Status: DISCONTINUED | OUTPATIENT
Start: 2025-04-24 | End: 2025-04-29 | Stop reason: HOSPADM

## 2025-04-24 RX ADMIN — HEPARIN SODIUM 6700 UNITS: 5000 INJECTION INTRAVENOUS; SUBCUTANEOUS at 01:15

## 2025-04-24 RX ADMIN — PANTOPRAZOLE SODIUM 40 MG: 40 INJECTION, POWDER, FOR SOLUTION INTRAVENOUS at 13:33

## 2025-04-24 RX ADMIN — HEPARIN SODIUM 6700 UNITS: 5000 INJECTION INTRAVENOUS; SUBCUTANEOUS at 22:36

## 2025-04-24 RX ADMIN — SODIUM CHLORIDE 75 ML/HR: 9 INJECTION, SOLUTION INTRAVENOUS at 13:51

## 2025-04-24 RX ADMIN — NOREPINEPHRINE BITARTRATE 0.26 MCG/KG/MIN: 0.03 INJECTION, SOLUTION INTRAVENOUS at 15:24

## 2025-04-24 RX ADMIN — MUPIROCIN 1 APPLICATION: 20 OINTMENT TOPICAL at 20:07

## 2025-04-24 RX ADMIN — Medication 10 ML: at 13:36

## 2025-04-24 RX ADMIN — NOREPINEPHRINE BITARTRATE 0.24 MCG/KG/MIN: 0.03 INJECTION, SOLUTION INTRAVENOUS at 09:48

## 2025-04-24 RX ADMIN — NOREPINEPHRINE BITARTRATE 0.26 MCG/KG/MIN: 0.03 INJECTION, SOLUTION INTRAVENOUS at 01:43

## 2025-04-24 RX ADMIN — MUPIROCIN 1 APPLICATION: 20 OINTMENT TOPICAL at 04:23

## 2025-04-24 RX ADMIN — HEPARIN SODIUM 14.9 UNITS/KG/HR: 10000 INJECTION, SOLUTION INTRAVENOUS at 15:37

## 2025-04-24 RX ADMIN — PIPERACILLIN AND TAZOBACTAM 3.38 G: 3; .375 INJECTION, POWDER, FOR SOLUTION INTRAVENOUS at 00:42

## 2025-04-24 RX ADMIN — Medication 10 ML: at 20:12

## 2025-04-24 RX ADMIN — POTASSIUM CHLORIDE 40 MEQ: 1500 TABLET, EXTENDED RELEASE ORAL at 22:24

## 2025-04-24 RX ADMIN — PIPERACILLIN AND TAZOBACTAM 3.38 G: 3; .375 INJECTION, POWDER, FOR SOLUTION INTRAVENOUS at 18:51

## 2025-04-24 RX ADMIN — IOPAMIDOL 95 ML: 755 INJECTION, SOLUTION INTRAVENOUS at 00:59

## 2025-04-24 RX ADMIN — PERFLUTREN 2 ML: 6.52 INJECTION, SUSPENSION INTRAVENOUS at 05:01

## 2025-04-24 RX ADMIN — NOREPINEPHRINE BITARTRATE 0.2 MCG/KG/MIN: 0.03 INJECTION, SOLUTION INTRAVENOUS at 13:32

## 2025-04-24 RX ADMIN — NOREPINEPHRINE BITARTRATE 0.28 MCG/KG/MIN: 0.03 INJECTION, SOLUTION INTRAVENOUS at 06:26

## 2025-04-24 RX ADMIN — NOREPINEPHRINE BITARTRATE 0.28 MCG/KG/MIN: 0.03 INJECTION, SOLUTION INTRAVENOUS at 20:03

## 2025-04-24 RX ADMIN — HEPARIN SODIUM 17.9 UNITS/KG/HR: 10000 INJECTION, SOLUTION INTRAVENOUS at 01:24

## 2025-04-24 RX ADMIN — NOREPINEPHRINE BITARTRATE 0.24 MCG/KG/MIN: 0.03 INJECTION, SOLUTION INTRAVENOUS at 08:15

## 2025-04-24 RX ADMIN — PHENOBARBITAL 129.6 MG: 32.4 TABLET ORAL at 20:59

## 2025-04-24 RX ADMIN — PIPERACILLIN AND TAZOBACTAM 3.38 G: 3; .375 INJECTION, POWDER, FOR SOLUTION INTRAVENOUS at 13:34

## 2025-04-24 RX ADMIN — POTASSIUM CHLORIDE 40 MEQ: 1500 TABLET, EXTENDED RELEASE ORAL at 18:51

## 2025-04-24 NOTE — ED PROVIDER NOTES
EMERGENCY DEPARTMENT ENCOUNTER  Room Number:  22/22  PCP: Edgar Husain MD  Independent Historians: Patient and EMS      HPI:  Chief Complaint: had concerns including Hypotension.     A complete HPI/ROS/PMH/PSH/SH/FH are unobtainable due to: Altered Mental Status    Chronic or social conditions impacting patient care (Social Determinants of Health): None      Context: Mariela Ruiz is a 80 y.o. female with a medical history of osteoporosis with pathologic fracture, hyperlipidemia, lymphedema, who presents to the ED c/o acute hypotension.  Patient states that nothing is wrong with her, and she would be fine if we would leave her alone.  She denies any chest pain, difficulty breathing, urinary symptoms.    EMS placed patient on oxygen, no other interventions.  Nursing home called out for hypotension.  Nursing home provided call ahead, reports patient recently had hip surgery.  She was hypotensive 60s/40s, 70s to 80s on room air, heart rate 120s.      Review of prior external notes (non-ED) -and- Review of prior external test results outside of this encounter:  I reviewed discharge summary from 1 month ago.  3/12/2025.  Patient with periprosthetic hip fracture.  Ureterolithiasis and hydronephrosis.  Patient had MAXIMO revision and ORIF of distal femoral condyle fracture on 3/8 with Dr. Cesar.      Prescription drug monitoring program review:         PAST MEDICAL HISTORY  Active Ambulatory Problems     Diagnosis Date Noted    Seizure disorder     Hyperlipidemia     Vitamin D insufficiency     Age-related osteoporosis without current pathological fracture     Sleep apnea     Venous (peripheral) insufficiency     Postsurgical hypothyroidism     Chronic fatigue syndrome 11/07/2016    Gastroesophageal reflux disease 11/07/2016    Dupuytren's contracture 11/07/2016    History of biliary T-tube placement 11/07/2016    History of partial thyroidectomy 10/04/2012    Multinodular goiter 11/07/2016    Restless legs  syndrome 11/07/2016    History of cardiac catheterization 11/07/2016    Urge incontinence of urine 11/07/2016    Left knee pain 11/15/2016    Ligamentous laxity of knee 12/02/2016    DJD (degenerative joint disease) of knee 12/24/2016    Adverse drug effect, subsequent encounter 11/07/2017    Abnormal blood level of chromium 04/05/2018    Abnormal blood level of cobalt 04/05/2018    Family history of diabetes mellitus 09/13/2018    Thrombocytopenia 09/16/2019    . 09/16/2019    S/P revision of total hip 03/09/2021    Enlarged thyroid gland 06/29/2017    Palmar fascial fibromatosis (dupuytren) 01/10/2024    Hypothyroidism, unspecified 01/10/2024    Hyperlipidemia, unspecified 01/10/2024    Acute cholecystitis 10/31/2024    Elevated troponin 10/31/2024    Vitamin B12 deficiency 02/13/2025    Pernicious anemia 02/13/2025    Lymphedema 02/13/2025    Venous stasis 02/13/2025    Chronic venous hypertension with inflammation involving both sides 02/13/2025    Periprosthetic hip fracture 03/04/2025    Ureterolithiasis 03/04/2025    Hydronephrosis 03/04/2025    Multiple thyroid nodules 03/12/2025     Resolved Ambulatory Problems     Diagnosis Date Noted    Mitral valve prolapse 11/07/2016    Right fascicular block 11/07/2016    Pruritic rash 10/18/2017    Wasp sting 08/16/2019    Cellulitis of right upper extremity 08/17/2019    Closed fracture of neck of right femur 09/13/2019    Fever 09/16/2019    Urine retention 09/16/2019     Past Medical History:   Diagnosis Date    Arthritis     Chronic venous insufficiency     History of staph infection     History of transfusion     Nontoxic multinodular goiter     Osteoporosis     Pelvic joint pain, left     Presence of left artificial hip joint     Restless leg syndrome     Right bundle branch block          PAST SURGICAL HISTORY  Past Surgical History:   Procedure Laterality Date    APPENDECTOMY      CARDIAC CATHETERIZATION      NORMAL     CHOLECYSTECTOMY N/A 11/2/2024     Procedure: CHOLECYSTECTOMY LAPAROSCOPIC WITH DAVINCI ROBOT with cholangiogram, possible open;  Surgeon: Bin Heaton MD;  Location: [facility] MAIN OR;  Service: Robotics - DaVinci;  Laterality: N/A;    COLONOSCOPY  08/17/2017    Normal except for anal fissure.  Dr. Brandt.  [facility].    KNEE MINI REVISION Left 11/15/2016    Procedure: LEFT KNEE POLY CHANGE WITH HI POST STABILIZER;  Surgeon: Pablito Claudio MD;  Location: [facility];  Service:     KNEE MINI REVISION Left 12/13/2016    Procedure: LT KNEE REMOVAL/REPLACEMENT LOCKING PIN ;  Surgeon: Pablito Claudio MD;  Location: [facility] MAIN OR;  Service:     MAMMO FINE NEEDLE ASPIRATION UNILATERAL      RIGHT THYROID NODULE, 2009 BENIGN     MI ARTHRP KNE CONDYLE&PLATU MEDIAL&LAT COMPARTMENTS Left 12/24/2016    Procedure: LEFT KNEE WASHOUT WITH POLY CHANGE;  Surgeon: Christiano Cesar MD;  Location: [facility] MAIN OR;  Service: Orthopedics    MI REVJ TOTAL KNEE ARTHRP W/WO ALGRFT 1 COMPONENT Left 2/20/2017    Procedure: LT KNEE REMOVAL ANTIBIOTIC SPACER AND KNEE REVISION;  Surgeon: Christiano Cesar MD;  Location: [facility] MAIN OR;  Service: Orthopedics    REPLACEMENT TOTAL KNEE Left     THYROIDECTOMY, PARTIAL      1979 LEFT LOBECTOMY AND ISTHMECTOMY     TOTAL ABDOMINAL HYSTERECTOMY WITH SALPINGO OOPHORECTOMY      TOTAL HIP ARTHROPLASTY Left     TOTAL HIP ARTHROPLASTY Right 9/14/2019    Procedure: TOTAL HIP ARTHROPLASTY ANTERIOR WITH HANA TABLE;  Surgeon: Christiano Cesar II, MD;  Location: [facility] MAIN OR;  Service: Orthopedics    TOTAL HIP ARTHROPLASTY REVISION Left 3/9/2021    Procedure: LEFT TOTAL HIP ARTHROPLASTY REVISION POSTERIOR;  Surgeon: Christiano Cesar II, MD;  Location: [facility] MAIN OR;  Service: Orthopedics;  Laterality: Left;    TOTAL HIP ARTHROPLASTY REVISION Left 3/8/2025    Procedure: TOTAL HIP ARTHROPLASTY REVISION;  Surgeon: Christiano Cesar II, MD;  Location: [facility] MAIN OR;  Service: Orthopedics;  Laterality: Left;     TOTAL KNEE  PROSTHESIS REMOVAL W/ SPACER INSERTION Left 12/29/2016    Procedure: LT KNEE IMPLANT REMOVAL WITH INSERTION OF SPACER ;  Surgeon: Christiano Cesar MD;  Location: [facility] OR;  Service:          FAMILY HISTORY  Family History   Problem Relation Age of Onset    Heart disease Father     Diabetes Sister     Hypertension Sister     Hyperlipidemia Sister     Obesity Sister     Malig Hyperthermia Neg Hx          SOCIAL HISTORY  Social History     Socioeconomic History    Marital status:    Tobacco Use    Smoking status: Never     Passive exposure: Never    Smokeless tobacco: Never   Vaping Use    Vaping status: Never Used   Substance and Sexual Activity    Alcohol use: No    Drug use: No    Sexual activity: Defer       ALLERGIES  Clindamycin/lincomycin, Duricef [cefadroxil], and Sulfa antibiotics      PHYSICAL EXAM    I have reviewed the triage vital signs and nursing notes.    ED Triage Vitals   Temp Heart Rate Resp BP SpO2   03/02/25 1448 03/02/25 1448 03/02/25 1448 03/02/25 1450 03/02/25 1448   97.4 °F (36.3 °C) 95 16 141/84 97 %      Temp src Heart Rate Source Patient Position BP Location FiO2 (%)   -- -- -- -- --              Physical Exam  GENERAL: alert, no acute distress, elderly, chronically ill-appearing  SKIN: Warm, dry, surgical incision lateral aspect from left hip to knee, well-healing without erythema or drainage  HENT: Normocephalic, atraumatic  EYES: no scleral icterus  CV: regular rhythm, tachycardia  RESPIRATORY: normal effort, lungs clear, on 4 L nasal cannula  ABDOMEN: soft, nondistended, nontender  MUSCULOSKELETAL: no deformity, compression stockings bilateral lower extremities without pitting edema  NEURO: alert, follows commands        LAB RESULTS  Recent Results (from the past 24 hours)   ECG 12 Lead Tachycardia    Collection Time: 04/23/25 10:27 PM   Result Value Ref Range    QT Interval 365 ms    QTC Interval 486 ms   Comprehensive Metabolic Panel    Collection Time:  04/23/25 10:32 PM    Specimen: Blood   Result Value Ref Range    Glucose 120 (H) 65 - 99 mg/dL    BUN 21 8 - 23 mg/dL    Creatinine 1.43 (H) 0.57 - 1.00 mg/dL    Sodium 135 (L) 136 - 145 mmol/L    Potassium 3.5 3.5 - 5.2 mmol/L    Chloride 100 98 - 107 mmol/L    CO2 25.0 22.0 - 29.0 mmol/L    Calcium 9.2 8.6 - 10.5 mg/dL    Total Protein 6.6 6.0 - 8.5 g/dL    Albumin 3.2 (L) 3.5 - 5.2 g/dL    ALT (SGPT) 12 1 - 33 U/L    AST (SGOT) 29 1 - 32 U/L    Alkaline Phosphatase 169 (H) 39 - 117 U/L    Total Bilirubin 0.8 0.0 - 1.2 mg/dL    Globulin 3.4 gm/dL    A/G Ratio 0.9 g/dL    BUN/Creatinine Ratio 14.7 7.0 - 25.0    Anion Gap 10.0 5.0 - 15.0 mmol/L    eGFR 37.6 (L) >60.0 mL/min/1.73   Protime-INR    Collection Time: 04/23/25 10:32 PM    Specimen: Blood   Result Value Ref Range    Protime 17.6 (H) 11.7 - 14.2 Seconds    INR 1.45 (H) 0.90 - 1.10   aPTT    Collection Time: 04/23/25 10:32 PM    Specimen: Blood   Result Value Ref Range    PTT 30.9 22.7 - 35.4 seconds   BNP    Collection Time: 04/23/25 10:32 PM    Specimen: Blood   Result Value Ref Range    proBNP 2,265.0 (H) 0.0 - 1,800.0 pg/mL   High Sensitivity Troponin T    Collection Time: 04/23/25 10:32 PM    Specimen: Blood   Result Value Ref Range    HS Troponin T 49 (H) <14 ng/L   Procalcitonin    Collection Time: 04/23/25 10:32 PM    Specimen: Blood   Result Value Ref Range    Procalcitonin 45.70 (H) 0.00 - 0.25 ng/mL   Lactic Acid, Plasma    Collection Time: 04/23/25 10:32 PM    Specimen: Blood   Result Value Ref Range    Lactate 1.9 0.5 - 2.0 mmol/L   Phenobarbital Level    Collection Time: 04/23/25 10:32 PM    Specimen: Blood   Result Value Ref Range    Phenobarbital 21.1 10.0 - 30.0 mcg/mL   CBC Auto Differential    Collection Time: 04/23/25 10:32 PM    Specimen: Blood   Result Value Ref Range    WBC 14.36 (H) 3.40 - 10.80 10*3/mm3    RBC 3.73 (L) 3.77 - 5.28 10*6/mm3    Hemoglobin 11.0 (L) 12.0 - 15.9 g/dL    Hematocrit 33.3 (L) 34.0 - 46.6 %    MCV 89.3 79.0 -  97.0 fL    MCH 29.5 26.6 - 33.0 pg    MCHC 33.0 31.5 - 35.7 g/dL    RDW 13.0 12.3 - 15.4 %    RDW-SD 41.9 37.0 - 54.0 fl    MPV 11.0 6.0 - 12.0 fL    Platelets 191 140 - 450 10*3/mm3    Neutrophil % 93.9 (H) 42.7 - 76.0 %    Lymphocyte % 3.3 (L) 19.6 - 45.3 %    Monocyte % 1.8 (L) 5.0 - 12.0 %    Eosinophil % 0.0 (L) 0.3 - 6.2 %    Basophil % 0.2 0.0 - 1.5 %    Immature Grans % 0.8 (H) 0.0 - 0.5 %    Neutrophils, Absolute 13.48 (H) 1.70 - 7.00 10*3/mm3    Lymphocytes, Absolute 0.47 (L) 0.70 - 3.10 10*3/mm3    Monocytes, Absolute 0.26 0.10 - 0.90 10*3/mm3    Eosinophils, Absolute 0.00 0.00 - 0.40 10*3/mm3    Basophils, Absolute 0.03 0.00 - 0.20 10*3/mm3    Immature Grans, Absolute 0.12 (H) 0.00 - 0.05 10*3/mm3    nRBC 0.0 0.0 - 0.2 /100 WBC   Urinalysis With Culture If Indicated - Urine, Catheter    Collection Time: 04/23/25 10:48 PM    Specimen: Urine, Catheter   Result Value Ref Range    Color, UA Yellow Yellow, Straw    Appearance, UA Turbid (A) Clear    pH, UA 6.5 5.0 - 8.0    Specific Gravity, UA 1.015 1.005 - 1.030    Glucose, UA Negative Negative    Ketones, UA Negative Negative    Bilirubin, UA Negative Negative    Blood, UA Small (1+) (A) Negative    Protein, UA 30 mg/dL (1+) (A) Negative    Leuk Esterase, UA Moderate (2+) (A) Negative    Nitrite, UA Negative Negative    Urobilinogen, UA 1.0 E.U./dL 0.2 - 1.0 E.U./dL   Urinalysis, Microscopic Only - Urine, Catheter    Collection Time: 04/23/25 10:48 PM    Specimen: Urine, Catheter   Result Value Ref Range    RBC, UA 3-5 (A) None Seen, 0-2 /HPF    WBC, UA 21-50 (A) None Seen, 0-2 /HPF    Bacteria, UA 4+ (A) None Seen /HPF    Squamous Epithelial Cells, UA 0-2 None Seen, 0-2 /HPF    Hyaline Casts, UA 0-2 None Seen /LPF    Methodology Manual Light Microscopy    High Sensitivity Troponin T 1Hr    Collection Time: 04/24/25 12:31 AM    Specimen: Arm, Right; Blood   Result Value Ref Range    HS Troponin T 44 (H) <14 ng/L    Troponin T Numeric Delta -5 ng/L     Troponin T % Delta -10 Abnormal if >/= 20%       RADIOLOGY  CT Angiogram Chest Pulmonary Embolism  Result Date: 4/24/2025  CTA CHEST WITH IV CONTRAST  HISTORY: post op, hypotension, hypoxia, r/o PE; A41.9-Sepsis, unspecified organism; R65.21-Severe sepsis with septic shock; N39.0-Urinary tract infection, site not specified  COMPARISON: Chest CT 3/4/2025  TECHNIQUE: CT angiography was performed of the chest with axial images as well as coronal and sagittal reformatted MIP images provided following administration of IV contrast. 3-D surface rendered reformats were obtained of the pulmonary arteries and aorta. Radiation dose reduction techniques were utilized, including automated exposure control, and exposure modulation based on body size.  FINDINGS:  There is right greater than left posterior dependent infiltrate or atelectasis without central airway obstruction.  Thoracic aorta is normal in caliber.  There are coronary atherosclerotic vascular calcifications.  There is no suspicious mediastinal adenopathy, but there is a right thyroid nodule at least 3.6 cm in size.  Images of the upper abdomen show no acute abnormality.  There is no acute bony abnormality.  Bolus timing is good. The examination is positive for pulmonary emboli, with large overall embolus burden, right greater than left. Emboli are seen in the left main pulmonary artery, left upper lobe and left lower lobe branches. Larger embolus burden seen involving the right main pulmonary artery, right upper lobe and right lower lobe and right middle lobe pulmonary branches. RV to LV ratio about 1.33.       Exam is extensively positive for pulmonary embolism, right greater than left, RV to LV ratio 1.33, possible RV strain.  Right greater than left bilateral lower lobe mostly dependent pulmonary parenchymal opacity, infiltrate versus atelectasis.  CALL REPORT :  Results of the exam were discussed with Physicians caring for the patient in the emergency room at  the time of this dictation.  This report was finalized on 4/24/2025 1:17 AM by Dr. Nehemiah Bridges M.D on Workstation: [id number]      XR Chest 1 View  Result Date: 4/23/2025  CXR ONE VIEW  HISTORY: hypoxia  COMPARISON: 3/4/2025  TECHNIQUE: single portable AP       Redemonstrated borderline to mild cardiomegaly.  Bilateral vascular congestion and perihilar interstitial infiltrate, question congestive failure and/or hypervolemia.  No consolidation, no apparent effusion or pneumothorax.  This report was finalized on 4/23/2025 11:59 PM by Dr. Nehemiah Bridges M.D on Workstation: [id number]          MEDICATIONS GIVEN IN ER  Medications   norepinephrine (LEVOPHED) 8 mg in 250 mL NS infusion (premix) (0.26 mcg/kg/min × 84 kg Intravenous New Bag 4/24/25 0143)   heparin 58393 units/250 mL (100 units/mL) in 0.45 % NaCl infusion (17.9 Units/kg/hr × 84 kg Intravenous New Bag 4/24/25 0124)   heparin (porcine) 5000 UNIT/ML injection 3,400-6,700 Units (has no administration in time range)   sodium chloride 0.9 % bolus 1,000 mL (0 mL Intravenous Stopped 4/23/25 2242)   piperacillin-tazobactam (ZOSYN) 3.375 g IVPB in 100 mL NS MBP (CD) (3.375 g Intravenous New Bag 4/24/25 0042)   sodium chloride 0.9 % bolus 1,000 mL (0 mL Intravenous Stopped 4/23/25 2328)   iopamidol (ISOVUE-370) 76 % injection 100 mL (95 mL Intravenous Given 4/24/25 0059)   heparin (porcine) 5000 UNIT/ML injection 6,700 Units (6,700 Units Intravenous Given 4/24/25 0115)         ORDERS PLACED DURING THIS VISIT:  Orders Placed This Encounter   Procedures    Blood Culture - Blood,    Blood Culture - Blood,    Urine Culture - Urine,    XR Chest 1 View    CT Angiogram Chest Pulmonary Embolism    Comprehensive Metabolic Panel    Protime-INR    aPTT    Urinalysis With Culture If Indicated - Urine, Catheter    BNP    High Sensitivity Troponin T    Procalcitonin    Lactic Acid, Plasma    Phenobarbital Level    CBC Auto Differential    Urinalysis, Microscopic Only -  Urine, Clean Catch    High Sensitivity Troponin T 1Hr    aPTT    aPTT    Adjust Heparin Rate Based on aPTT Using Nomogram    Verify All Anticoagulant Orders Are Discontinued Upon Initiation of Heparin Protocol (eg Enoxaparin, Fondaparinux, Apixaban, Dabigatran, Edoxaban, or Rivaroxaban)    RN to release aPTT order 6 hours after Heparin infusion INITIATION & 6 hours after EACH infusion change until 2 consecutive therapeutic aPTTs are achieved. Then switch to daily aPTT orders. If aPTT is outside target range, resume every 6 hour aPTT order schedule until therapeutic x 2    Pulmonology (on-call MD unless specified)    Interventional Cardiology (on-call MD unless specified)    ECG 12 Lead Tachycardia    Adult Transthoracic Echo Complete w/ Color, Spectral and Contrast if Necessary Per Protocol    Inpatient Admission    CBC & Differential    CBC & Differential         OUTPATIENT MEDICATION MANAGEMENT:  Current Facility-Administered Medications Ordered in Epic   Medication Dose Route Frequency Provider Last Rate Last Admin    heparin (porcine) 5000 UNIT/ML injection 3,400-6,700 Units  40-80 Units/kg Intravenous Q6H PRN Liam Abdalla MD        heparin 62886 units/250 mL (100 units/mL) in 0.45 % NaCl infusion  17.9 Units/kg/hr Intravenous Titrated Liam Abdalla MD 15.03 mL/hr at 04/24/25 0124 17.9 Units/kg/hr at 04/24/25 0124    norepinephrine (LEVOPHED) 8 mg in 250 mL NS infusion (premix)  0.02-0.3 mcg/kg/min Intravenous Titrated Liam Abdalla MD 41 mL/hr at 04/24/25 0143 0.26 mcg/kg/min at 04/24/25 0143     Current Outpatient Medications Ordered in Epic   Medication Sig Dispense Refill    aspirin 81 MG EC tablet Take 1 tablet by mouth Daily.      bisacodyl (DULCOLAX) 5 MG EC tablet Take 1 tablet by mouth Daily As Needed for Constipation (Use if polyethylene glycol is ineffective).      busPIRone (BUSPAR) 5 MG tablet Take 1 tablet by mouth 3 (Three) Times a Day.      cholecalciferol (VITAMIN D3) 25 MCG (1000  UT) tablet Take 4 tablets by mouth Daily.      Cyanocobalamin (Vitamin B-12) 1000 MCG sublingual tablet 1 tablet daily 90 tablet 2    enoxaparin sodium (LOVENOX) 40 MG/0.4ML solution prefilled syringe syringe Inject 0.4 mL under the skin into the appropriate area as directed Every Night. Indications: Post Surgical - Hip, Prevention of Unwanted Clot in Veins      famotidine (PEPCID) 20 MG tablet Take 1 tablet by mouth 2 (Two) Times a Day Before Meals.      melatonin 5 MG tablet tablet Take 0.5 tablets by mouth At Night As Needed (sleep).      PHENobarbital 64.8 MG tablet Take 2 tablets by mouth Every Night for 7 days. 14 tablet 0    polyethylene glycol 17 g packet Take 17 g by mouth 2 (Two) Times a Day.      pramipexole (MIRAPEX) 0.5 MG tablet Take 5 tablets by mouth Every Night.      pravastatin (PRAVACHOL) 40 MG tablet TAKE 1 TABLET BY MOUTH EVERY NIGHT FOR HIGH AMOUNT OF FATS IN THE BLOOD (Patient taking differently: Take 1 tablet by mouth Daily.) 90 tablet 2    sennosides-docusate (PERICOLACE) 8.6-50 MG per tablet Take 2 tablets by mouth 2 (Two) Times a Day.      Synthroid 50 MCG tablet Take 1 tablet by mouth Every Morning. Indications: Underactive Thyroid 90 tablet 2         PROCEDURES  Procedures      Critical care provider statement:    Critical care time (minutes): 80.   Critical care time was exclusive of:  Separately billable procedures and treating other patients   Critical care was necessary to treat or prevent imminent or life-threatening deterioration of the following conditions:  Circulatory Failure, Sepsis, Respiratory Failure, and Severe Shock   Critical care was time spent personally by me on the following activities:  Development of treatment plan with patient or surrogate, discussions with consultants, evaluation of patient's response to treatment, examination of patient, obtaining history from patient or surrogate, ordering and performing treatments and interventions, ordering and review of  laboratory studies, ordering and review of radiographic studies, pulse oximetry, re-evaluation of patient's condition and review of old charts. Critical Care indicators: Hypoxia / hypoxemia, Pulmonary edema or embolism, Sepsis / septicemia, Shock, unresponsive patient, and Unstable Vital signs Pressors: dobutamine, dopamine, epinephrine, levophed, lopressor, et al. and Others: aminophylline, diazepam, glucagon, heparin, lovenox, morphine, sodium bicarbonate, et al.      PROGRESS, DATA ANALYSIS, CONSULTS, AND MEDICAL DECISION MAKING  All labs have been independently interpreted by me.  All radiology studies have been reviewed by me. All EKG's have been independently viewed and interpreted by me.  Discussion below represents my analysis of pertinent findings related to patient's condition, differential diagnosis, treatment plan and final disposition.    Differential diagnosis includes but is not limited to sepsis, hypotension, shock, hypoxia, acute respiratory failure, pneumonia, PE, ACS, STEMI, NSTEMI, dysrhythmia, electrolyte or metabolic derangements, UTI.    Clinical Scores:                                       ED Course as of 04/24/25 0145   Wed Apr 23, 2025 2228 Patient presents the ED via EMS for evaluation.  EMS placed patient on oxygen, no other interventions.  Nursing home called out for hypotension.  Nursing home provided call ahead, reports patient recently had hip surgery.  She was hypotensive 60s/40s, 70s to 80s on room air, heart rate 120s.    On exam patient is elderly.  She has surgical incision from left hip to left knee on lateral aspect, well-appearing.  No significant pretibial edema, no respiratory distress, on nasal cannula    Will obtain sepsis workup, cardiopulmonary workup, plan for CT PE [DN]   2235 ECG 12 Lead Tachycardia  EKG reviewed, sinus rhythm, rate 106, LAD, RBBB and LAFB, trace ST elevation in lead III and depression in aVL potentially related to high voltage, ST depression in  V2, inferior Q waves, no STEMI     I compared EKG to prior on 3/4/2025.  Trace ST changes in leads III and aVL were present at that time.  Right bundle branch morphology in leads V1 and V2 have increased, and ST depression in V2 is new.  EKGs interpreted by self [DN]   2235 SpO2: 91 %  4L NC [DN]   2235 Heart Rate: 106 [DN]   2235 Temp: 97.8 °F (36.6 °C) [DN]   2237 BP(!): 64/35  1L NS pressure bag [DN]   2247 WBC(!): 14.36  Up from 3 on labs 1 month ago [DN]   2247 Hemoglobin(!): 11.0  Up from 8 on labs 1 month ago [DN]   2247 Patient with significant leukocytosis/neutrophilia, will empirically cover with antibiotics [DN]   2254 Blood pressure 50 systolic, Levophed ordered [DN]   2303 Patient has received 2 L IV fluid, blood pressure has increased from 50/40, to 70/40, now sitting at 80/40 [DN]   2309 XR Chest 1 View  I reviewed patient's xray image(s), patient appears to have increased pulmonary markings bilaterally, these were present on prior chest x-ray 3/4/2025, interpreted by self  I reviewed the radiologist's interpretation of above image(s)   [DN]   2318 Procalcitonin(!): 45.70 [DN]   2318 HS Troponin T(!): 49 [DN]   2318 proBNP(!): 2,265.0 [DN]   2319 Lactate: 1.9 [DN]   2323 Creatinine(!): 1.43  JACIEL, doubled from 0.7 on labs 1 month ago [DN]   Thu Apr 24, 2025   0038 WBC, UA(!): 21-50 [DN]   0038 Bacteria, UA(!): 4+  Urosepsis [DN]   0039 BP: 104/50  Blood pressure has improved with Levophed [DN]   0105 CT Angiogram Chest Pulmonary Embolism  I reviewed patient's CT image(s), patient with saddle PE, evidence of right heart strain with RV:LV of 1.7:1, interpreted by self  I reviewed the radiologist's interpretation of above image(s)   [DN]   0107 Patient and family updated regarding saddle pulmonary embolism.      Sepsis tissue perfusion reassessment.  Patient's extremities are warm, however she does have delayed capillary refill.  Approximately 4 to 6 seconds.  Blood pressure 95/44, heart rate 105, 98% on  4 L nasal cannula    Heparin is being started, discussion with interventional cardiology pending [DN]   0110 Troponin T Numeric Delta: -5 [DN]   0111 BP: 95/44  Concern for right heart strain/PE as cause of hypotension.  Confounding sepsis at this time. [DN]   0117 I discussed patient with radiologist.  Acute PEs, RV to LV ratio 1.33:1 [DN]   0121 I discussed patient with on-call TORSTEN for ICU.  Agreeable with plan to admit to Dr. Camilo.  Cardiology discussion pending for saddle PE [DN]   0124 BP(!): 80/40  Levophed increased to 0.2 with improvement in blood pressure [DN]   0131 I discussed patient with Dr. Tsang, interventional cardiology.  He has reviewed images.  Patient has recently started heparin.  Recommends close monitoring at this time.  Call back for any change in patient's status [DN]      ED Course User Index  [DN] Liam Abdalla MD             AS OF 01:45 EDT VITALS:    BP - 98/42  HR - 108  TEMP - 97.8 °F (36.6 °C) (Oral)  O2 SATS - 97%    COMPLEXITY OF CARE  The patient requires admission.      DIAGNOSIS  Final diagnoses:   Acute UTI   Acute saddle pulmonary embolism with acute cor pulmonale   Shock         DISPOSITION  ED Disposition       ED Disposition   Decision to Admit    Condition   --    Comment   Level of Care: Critical Care [6]   Diagnosis: Saddle pulmonary embolus [519176]   Admitting Physician: FELTON CAMILO [[id number]]   Attending Physician: FELTON CAMILO [[id number]]   Certification: I Certify That Inpatient Hospital Services Are Medically Necessary For Greater Than 2 Midnights                 New Medications Ordered This Visit   Medications    sodium chloride 0.9 % bolus 1,000 mL    piperacillin-tazobactam (ZOSYN) 3.375 g IVPB in 100 mL NS MBP (CD)    norepinephrine (LEVOPHED) 8 mg in 250 mL NS infusion (premix)    norepinephrine (LEVOPHED) 8 mg in 250 mL NS infusion (premix)     Created by cabinet override    sodium chloride 0.9 % bolus 1,000 mL    iopamidol (ISOVUE-370) 76 % injection  100 mL    heparin (porcine) 5000 UNIT/ML injection 6,700 Units    heparin 64261 units/250 mL (100 units/mL) in 0.45 % NaCl infusion    heparin (porcine) 5000 UNIT/ML injection 3,400-6,700 Units       Please note that portions of this document were completed with a voice recognition program.    Note Disclaimer: At [facility], we believe that sharing information builds trust and better relationships. You are receiving this note because you recently visited [facility]. It is possible you will see health information before a provider has talked with you about it. This kind of information can be easy to misunderstand. To help you fully understand what it means for your health, we urge you to discuss this note with your provider.         Liam Abdalla MD  04/23/25 4876       Liam Abdalla MD  04/24/25 9795

## 2025-04-24 NOTE — ED NOTES
Patient to ED via EMS from [facility] fore the call of hypotension and hypoxic readings (85% spo2 room air). When EMS arrived patient was pale, diaphoretic, and lethargic. Patient is oriented, but per nursing home staff she is not herself. Patient is alert and oriented x4 with no complaints at this time.

## 2025-04-24 NOTE — CASE MANAGEMENT/SOCIAL WORK
Discharge Planning Assessment  [facility]     Patient Name: Mariela Ruiz  MRN: [id number]  Today's Date: 4/24/2025    Admit Date: 4/23/2025    Plan: Return to [facility]; pending bed availability   Discharge Needs Assessment       Row Name 04/24/25 1050       Living Environment    People in Home child(aneudy), adult    Current Living Arrangements home    Potentially Unsafe Housing Conditions none    Primary Care Provided by self;child(aneudy)    Provides Primary Care For no one    Family Caregiver if Needed child(aneudy), adult    Quality of Family Relationships involved;helpful;supportive       Resource/Environmental Concerns    Resource/Environmental Concerns none       Transition Planning    Patient/Family Anticipates Transition to inpatient rehabilitation facility       Discharge Needs Assessment    Readmission Within the Last 30 Days no previous admission in last 30 days    Current Outpatient/Agency/Support Group skilled nursing facility    Equipment Currently Used at Home walker, rolling    Concerns to be Addressed no discharge needs identified;denies needs/concerns at this time    Equipment Needed After Discharge none    Outpatient/Agency/Support Group Needs skilled nursing facility    Discharge Facility/Level of Care Needs nursing facility, skilled                   Discharge Plan       Row Name 04/24/25 1051       Plan    Plan Return to [facility]; pending bed availability    Plan Comments CCP met with patient's daughterRonda at bedside. CCP role explained and discharge planning discussed. Face sheet verified and IMM noted. Patient lives with her daughter but has been at rehab at [facility]. Patient's daughter states she is just now starting to use a walker. Patient's daughter plans for patient to return to [facility] once medically stable. CCP spoke with [facility]; patient is from skilled bed, no bed hold but can return pending bed availability. Pharmacy  updated. CCP will follow and assist as needed. Lachelle MEREDITH                  Selected Continued Care - Prior Encounters Includes continued care and service providers with selected services from prior encounters from 1/23/2025 to 4/24/2025      Discharged on 3/13/2025 Admission date: 3/4/2025 - Discharge disposition: Skilled Nursing Facility (DC - External)      Destination       Service Provider Services Address Phone Fax Patient Preferred    [facility] [address] [place] [id number] [phone] --                             Demographic Summary       Row Name 04/24/25 1049       General Information    Admission Type inpatient    Arrived From emergency department    Required Notices Provided Important Message from Medicare    Referral Source admission list    Reason for Consult discharge planning    Preferred Language English                   Functional Status       Row Name 04/24/25 1050       Functional Status    Usual Activity Tolerance moderate    Current Activity Tolerance moderate       Functional Status, IADL    Medications assistive equipment and person    Meal Preparation assistive equipment and person    Housekeeping assistive equipment and person    Laundry assistive equipment and person    Shopping assistive equipment and person                   Psychosocial    No documentation.                  Abuse/Neglect    No documentation.                  Legal    No documentation.                  Substance Abuse    No documentation.                  Patient Forms    No documentation.                     MASOUD Fletcher

## 2025-04-24 NOTE — PROGRESS NOTES
LOS: 0 days   Patient Care Team:  Edgar Husain MD as PCP - General (Family Medicine)  Anuj Brandt MD as Consulting Physician (General Surgery)  Ed Chun MD as Surgeon (Orthopedic Surgery)  Amador Richardson DO (Vascular Surgery)  Christiano Cesar II, MD as Consulting Physician (Orthopedic Surgery)  Steven Liu II, MD as Consulting Physician (Neurology)    Subjective     Pulmonary critical care coverage from Dr. Mora reviewed his another records.  Have also spoken with Dr. Kennedy 80-year-old presents the emergency department from nursing [place] on [address] with diaphoresis hypotension and tachycardia hypotensive and significantly hypoxemic.  Patient with a recent left total hip arthroplasty revision ORIF of distal femoral condyle fracture on 3/8 with Dr. Navarrete she has been on Lovenox for DVT prophylaxis at discharge patient has been at bedrest with plans for to be nonweightbearing for 8 to 12 weeks postoperatively.  CTA of the chest showed extensive pulmonary emboli and elevated RV L ratio of 1.33 patient not heparin drip and supplemental O2.  Other history includes history of chronic venous insufficiency chronic right bundle branch block, hypothyroidism and osteoporosis  Patient denies any pain or shortness of breath she is on 4 L O2.  Patient seems to be a little memory difficulties  Review of Systems:          Objective     Vital Signs  Vital Sign Min/Max for last 24 hours  Temp  Min: 97.8 °F (36.6 °C)  Max: 98.6 °F (37 °C)   BP  Min: 51/47  Max: 151/66   Pulse  Min: 87  Max: 110   Resp  Min: 14  Max: 15   SpO2  Min: 86 %  Max: 100 %   Flow (L/min) (Oxygen Therapy)  Min: 4  Max: 4   Weight  Min: 83.9 kg (185 lb)  Max: 84 kg (185 lb 3 oz)        Ventilator/Non-Invasive Ventilation Settings (From admission, onward)      None                         Body mass index is 28.13 kg/m².  I/O last 3 completed shifts:  In: 2100 [IV Piggyback:2100]  Out: 200 [Urine:200]  No  intake/output data recorded.        Physical Exam:  General Appearance: Well-developed older white female she is lying in bed she is on 4 L O2 oxygen saturation 100% she is on 0.24 mics per kilogram per minute of norepinephrine with blood pressure of 146/82 she does not look in acute distress  Eyes: Conjunctiva are clear and anicteric pupils are equal  ENT: Mucous membranes are moist no erythema no exudates Mallampati type II airway  Neck: Prominent jugular venous distention trachea midline no palpable adenopathy  Lungs: Clear nonlabored symmetric expansion  Cardiac: Tachycardic heart rate right around the 100 regular rhythm soft murmur in the left upper sternal border  Abdomen: Soft nontender I do not palpate hepatosplenomegaly or masses  : Not examined  Musculoskeletal: She has postop changes right hip both lower extremities are in compression type stockings there is no real difference in the legs there is no palpable cords  Skin: Warm and dry no jaundice no petechiae  Neuro: Patient talks a lot she seems to have some memory difficulties she is following commands  Extremities/P Vascular: No clubbing no cyanosis no edema palpable radial and dorsalis pedis pulses bilaterally  MSE: She is pleasant and cooperative       Labs:  Results from last 7 days   Lab Units 04/24/25  0458 04/23/25  2232   GLUCOSE mg/dL 135* 120*   SODIUM mmol/L 137 135*   POTASSIUM mmol/L 3.3* 3.5   CO2 mmol/L 23.2 25.0   CHLORIDE mmol/L 102 100   ANION GAP mmol/L 11.8 10.0   CREATININE mg/dL 1.44* 1.43*   BUN mg/dL 22 21   BUN / CREAT RATIO  15.3 14.7   CALCIUM mg/dL 8.8 9.2   ALK PHOS U/L  --  169*   TOTAL PROTEIN g/dL  --  6.6   ALT (SGPT) U/L  --  12   AST (SGOT) U/L  --  29   BILIRUBIN mg/dL  --  0.8   ALBUMIN g/dL  --  3.2*   GLOBULIN gm/dL  --  3.4     Estimated Creatinine Clearance: 36.5 mL/min (A) (by C-G formula based on SCr of 1.44 mg/dL (H)).      Results from last 7 days   Lab Units 04/24/25  0458 04/23/25  2232   WBC 10*3/mm3  42.10* 14.36*   RBC 10*6/mm3 4.03 3.73*   HEMOGLOBIN g/dL 12.1 11.0*   HEMATOCRIT % 36.3 33.3*   MCV fL 90.1 89.3   MCH pg 30.0 29.5   MCHC g/dL 33.3 33.0   RDW % 13.0 13.0   RDW-SD fl 42.6 41.9   MPV fL 11.1 11.0   PLATELETS 10*3/mm3 250 191   NEUTROPHIL % %  --  93.9*   LYMPHOCYTE % %  --  3.3*   MONOCYTES % %  --  1.8*   EOSINOPHIL % %  --  0.0*   BASOPHIL % %  --  0.2   IMM GRAN % %  --  0.8*   NEUTROS ABS 10*3/mm3 40.42* 13.48*   LYMPHS ABS 10*3/mm3  --  0.47*   MONOS ABS 10*3/mm3  --  0.26   EOS ABS 10*3/mm3 0.00 0.00   BASOS ABS 10*3/mm3 0.00 0.03   IMMATURE GRANS (ABS) 10*3/mm3  --  0.12*   NRBC /100 WBC  --  0.0         Results from last 7 days   Lab Units 04/24/25  0031 04/23/25  2232   HSTROP T ng/L 44* 49*     Results from last 7 days   Lab Units 04/23/25  2232   PROBNP pg/mL 2,265.0*         Results from last 7 days   Lab Units 04/23/25  2232   LACTATE mmol/L 1.9   PROCALCITONIN ng/mL 45.70*     Results from last 7 days   Lab Units 04/23/25  2232   INR  1.45*     Microbiology Results (last 10 days)       ** No results found for the last 240 hours. **                mupirocin, 1 Application, Each Nare, BID  PHENobarbital, 129.6 mg, Oral, Nightly  piperacillin-tazobactam, 3.375 g, Intravenous, Q8H  senna-docusate sodium, 2 tablet, Oral, BID  sodium chloride, 10 mL, Intravenous, Q12H      heparin, 17.9 Units/kg/hr, Last Rate: 17.9 Units/kg/hr (04/24/25 0124)  norepinephrine, 0.02-0.3 mcg/kg/min, Last Rate: 0.28 mcg/kg/min (04/24/25 0626)        Diagnostics:  Adult Transthoracic Echo Complete w/ Color, Spectral and Contrast if Necessary Per Protocol  Result Date: 4/24/2025    Left ventricular systolic function is mildly decreased. Left ventricular ejection fraction appears to be 41 - 45% in the setting of tachycardia (appears worse on apical contrast images).   Left ventricular diastolic function was indeterminate.   RV borderline dilated with grossly normal function.   Mild-moderate tricuspid  regurgitation.   Estimated right ventricular systolic pressure from tricuspid regurgitation is moderately elevated (45-55 mmHg).     CT Angiogram Chest Pulmonary Embolism  Result Date: 4/24/2025  CTA CHEST WITH IV CONTRAST  HISTORY: post op, hypotension, hypoxia, r/o PE; A41.9-Sepsis, unspecified organism; R65.21-Severe sepsis with septic shock; N39.0-Urinary tract infection, site not specified  COMPARISON: Chest CT 3/4/2025  TECHNIQUE: CT angiography was performed of the chest with axial images as well as coronal and sagittal reformatted MIP images provided following administration of IV contrast. 3-D surface rendered reformats were obtained of the pulmonary arteries and aorta. Radiation dose reduction techniques were utilized, including automated exposure control, and exposure modulation based on body size.  FINDINGS:  There is right greater than left posterior dependent infiltrate or atelectasis without central airway obstruction.  Thoracic aorta is normal in caliber.  There are coronary atherosclerotic vascular calcifications.  There is no suspicious mediastinal adenopathy, but there is a right thyroid nodule at least 3.6 cm in size.  Images of the upper abdomen show no acute abnormality.  There is no acute bony abnormality.  Bolus timing is good. The examination is positive for pulmonary emboli, with large overall embolus burden, right greater than left. Emboli are seen in the left main pulmonary artery, left upper lobe and left lower lobe branches. Larger embolus burden seen involving the right main pulmonary artery, right upper lobe and right lower lobe and right middle lobe pulmonary branches. RV to LV ratio about 1.33.       Exam is extensively positive for pulmonary embolism, right greater than left, RV to LV ratio 1.33, possible RV strain.  Right greater than left bilateral lower lobe mostly dependent pulmonary parenchymal opacity, infiltrate versus atelectasis.  CALL REPORT :  Results of the exam were  discussed with Physicians caring for the patient in the emergency room at the time of this dictation.  This report was finalized on 4/24/2025 1:17 AM by Dr. Nehemiah Bridges M.D on Workstation: [id number]      XR Chest 1 View  Result Date: 4/23/2025  CXR ONE VIEW  HISTORY: hypoxia  COMPARISON: 3/4/2025  TECHNIQUE: single portable AP       Redemonstrated borderline to mild cardiomegaly.  Bilateral vascular congestion and perihilar interstitial infiltrate, question congestive failure and/or hypervolemia.  No consolidation, no apparent effusion or pneumothorax.  This report was finalized on 4/23/2025 11:59 PM by Dr. Nehemiah Bridges M.D on Workstation: [id number]      Results for orders placed during the hospital encounter of 04/23/25    Adult Transthoracic Echo Complete w/ Color, Spectral and Contrast if Necessary Per Protocol    Interpretation Summary    Left ventricular systolic function is mildly decreased. Left ventricular ejection fraction appears to be 41 - 45% in the setting of tachycardia (appears worse on apical contrast images).    Left ventricular diastolic function was indeterminate.    RV borderline dilated with grossly normal function.    Mild-moderate tricuspid regurgitation.    Estimated right ventricular systolic pressure from tricuspid regurgitation is moderately elevated (45-55 mmHg).      Dependently reviewed the CT angiogram of the chest     Active Hospital Problems    Diagnosis  POA    **Saddle pulmonary embolus [I26.92]  Yes      Resolved Hospital Problems   No resolved problems to display.         Assessment & Plan     Massive bilateral pulmonary emboli right greater than left with RV strain and moderate pulmonary hypertension on echocardiogram.  With hypoxia and shock.  Discussed with Dr. Kennedy patient to go for pulmonary thrombectomy today.  Continue heparin drip.  Lower extremity Dopplers pending to look for suspected source of embolus and evaluate clot load  Shock believe this is  probably related to Stober was above blood pressure better wean Levophed  Acute hypoxic respiratory failure secondary #1 supplemental O2  History of epilepsy continue home phenobarbital  Elevated procalcitonin and white blood count both markedly elevated white count is gone up this certainly could be a stress response she does have some mild renal dysfunction and that may be part of the reason the procalcitonin is up but extremely high I do not see any definite infection on the CT scan or exam she is not having any urinary difficulties although she does have 21-50 white cells in her urine with only 3-5 red cells.  She is on Zosyn will follow cultures follow white count and procalcitonin  Iron-deficiency anemia has a history of this hemoglobin is better than her recent level we will have to monitor her closely.  With this history and her being on blood thinners I am going to put her on a proton pump inhibitor for right now.  Her hemoglobin is going need to be followed closely for a while with her to be on anticoagulation.  Right thyroid nodules she is scheduled to have outpatient biopsy of this that may need to be held up some as she probably is getting need to stay on her anticoagulation and not be offered biopsy unless surgery feels this is a high risk lesion that needs to be biopsied in the near future.  Acute kidney injury creatinine is up significantly may be from hypotension will follow renal function adjust medicines appropriately  Memory difficulties patient asked me my name no less than 4 times I spelled it for each time and she could not remember see any focal neurologic deficits, she seems to be oriented and following commands will monitor.    Plan for disposition:    Lucho Mcknight Jr, MD  04/24/25  07:02 EDT    Time: Additional critical care time I have spent about 34 minutes thus far

## 2025-04-24 NOTE — DISCHARGE PLACEMENT REQUEST
"Marsha Ruiz (80 y.o. Female)       Date of Birth   1945    Social Security Number       Address   [address] [place] [address] [employer] [place] [address]    Home Phone   [phone]    MRN   [id number]       [facility]    Marital Status                               Admission Date   4/23/2025    Admission Type   Emergency    Admitting Provider   Lucho Mcknight Jr., MD    Attending Provider   Lucho Mcknight Jr., MD    Department, Room/Bed   [facility] CATH LAB, Pool/CATH       Discharge Date       Discharge Disposition       Discharge Destination                                 Attending Provider: Lucho Mcknight Jr., MD    Allergies: Clindamycin/lincomycin, Duricef [Cefadroxil], Sulfa Antibiotics    Isolation: None   Infection: None   Code Status: CPR    Ht: 172.7 cm (68\")   Wt: 83.9 kg (185 lb)    Admission Cmt: None   Principal Problem: Saddle pulmonary embolus [I26.92]                   Active Insurance as of 4/23/2025       Primary Coverage       Payor Plan Insurance Group Employer/Plan Group    MEDICARE RAILROAD MEDICARE        Payor Plan Address Payor Plan Phone Number Payor Plan Fax Number Effective Dates    [address] [phone]  2/1/2012 - None Entered    [facility] [id number]         Subscriber Name Subscriber Birth Date Member ID       MARSHA RUIZ 1945 [id number]               Secondary Coverage       Payor Plan Insurance Group Employer/Plan Group    [employer] [place] [employer] [place] [place]       Payor Plan Address Payor Plan Phone Number Payor Plan Fax Number Effective Dates    [address] [place] [address] [phone]  2/1/2012 - None Entered    [place] [address]         Subscriber Name Subscriber Birth Date Member ID       MARSHA RUIZ 1945 [id number]                     Emergency Contacts        (Rel.) Home Phone Work Phone Mobile Phone    Ronda Keita (Daughter) [phone] -- [phone]    " Justo Collier (Son) [phone] -- --

## 2025-04-24 NOTE — CONSULTS
Date of Hospital Visit: 25  Encounter Provider: Sourav Kennedy MD  Place of Service: [facility] CARDIOLOGY  Patient Name: Mariela Ruiz  :1945  [id number]  Referral Provider: Noe Mora MD    Chief complaint: Shortness of breath, hypotension    History of Present Illness: 80-year-old woman had a terrible fall and fracture about 6 weeks ago had extensive orthopedic surgery but is essentially been at bedrest since then yesterday was short of breath and hypoxic and hypotensive brought to the hospital found to have a massive pulmonary embolus.  Has been tachycardic is on pressors.  On 4 L of oxygen.  Never has had clotting issues before but there is a history of venous insufficiency.  In talking with her daughter she may have a little bit of dementia also it sounds like.  She currently is not having any pain she feels comfortable she does not seem to be particularly air hungry.      Past Medical History:   Diagnosis Date    Arthritis     Chronic venous insufficiency     Dupuytren's contracture     History of staph infection     S/P KNEE DEC 2016    History of transfusion     Hyperlipidemia     Lymphedema     LEFT LEG    Nontoxic multinodular goiter     Osteoporosis     Dr. Cabrera    Pelvic joint pain, left     Postsurgical hypothyroidism     Presence of left artificial hip joint     METAL ON METAL AS NOTED PER DR CLEMENT NOTE    Restless leg syndrome     Right bundle branch block     Seizure disorder     Dr. Whtiman, HX  LAST SEIZURE    Sleep apnea     DOES NOT HAVE MACHINE    Vitamin D insufficiency        Past Surgical History:   Procedure Laterality Date    APPENDECTOMY      CARDIAC CATHETERIZATION      NORMAL     CHOLECYSTECTOMY N/A 2024    Procedure: CHOLECYSTECTOMY LAPAROSCOPIC WITH DAVINCI ROBOT with cholangiogram, possible open;  Surgeon: Bin Heaton MD;  Location: [facility];  Service: Robotics - DaVinci;  Laterality: N/A;    COLONOSCOPY   08/17/2017    Normal except for anal fissure.  Dr. Brandt.  [facility].    KNEE MINI REVISION Left 11/15/2016    Procedure: LEFT KNEE POLY CHANGE WITH HI POST STABILIZER;  Surgeon: Pablito Claudio MD;  Location: [facility] MAIN OR;  Service:     KNEE MINI REVISION Left 12/13/2016    Procedure: LT KNEE REMOVAL/REPLACEMENT LOCKING PIN ;  Surgeon: Pablito Claudio MD;  Location: [facility] MAIN OR;  Service:     MAMMO FINE NEEDLE ASPIRATION UNILATERAL      RIGHT THYROID NODULE, 2009 BENIGN     WV ARTHRP KNE CONDYLE&PLATU MEDIAL&LAT COMPARTMENTS Left 12/24/2016    Procedure: LEFT KNEE WASHOUT WITH POLY CHANGE;  Surgeon: Christiano Cesar MD;  Location: [facility] MAIN OR;  Service: Orthopedics    WV REVJ TOTAL KNEE ARTHRP W/WO ALGRFT 1 COMPONENT Left 2/20/2017    Procedure: LT KNEE REMOVAL ANTIBIOTIC SPACER AND KNEE REVISION;  Surgeon: Christiano Cesar MD;  Location: [facility] MAIN OR;  Service: Orthopedics    REPLACEMENT TOTAL KNEE Left     THYROIDECTOMY, PARTIAL      1979 LEFT LOBECTOMY AND ISTHMECTOMY     TOTAL ABDOMINAL HYSTERECTOMY WITH SALPINGO OOPHORECTOMY      TOTAL HIP ARTHROPLASTY Left     TOTAL HIP ARTHROPLASTY Right 9/14/2019    Procedure: TOTAL HIP ARTHROPLASTY ANTERIOR WITH HANA TABLE;  Surgeon: Christiano Cesar II, MD;  Location: [facility] MAIN OR;  Service: Orthopedics    TOTAL HIP ARTHROPLASTY REVISION Left 3/9/2021    Procedure: LEFT TOTAL HIP ARTHROPLASTY REVISION POSTERIOR;  Surgeon: Christiano Cesar II, MD;  Location: [facility] MAIN OR;  Service: Orthopedics;  Laterality: Left;    TOTAL HIP ARTHROPLASTY REVISION Left 3/8/2025    Procedure: TOTAL HIP ARTHROPLASTY REVISION;  Surgeon: Christiano Cesar II, MD;  Location: [facility] MAIN OR;  Service: Orthopedics;  Laterality: Left;    TOTAL KNEE  PROSTHESIS REMOVAL W/ SPACER INSERTION Left 12/29/2016    Procedure: LT KNEE IMPLANT REMOVAL WITH INSERTION OF SPACER ;  Surgeon: Christiano Cesar MD;  Location: [facility] MAIN OR;  Service:        Medications  Prior to Admission   Medication Sig Dispense Refill Last Dose/Taking    aspirin 81 MG EC tablet Take 1 tablet by mouth Daily.       bisacodyl (DULCOLAX) 5 MG EC tablet Take 1 tablet by mouth Daily As Needed for Constipation (Use if polyethylene glycol is ineffective).       busPIRone (BUSPAR) 5 MG tablet Take 1 tablet by mouth 3 (Three) Times a Day.       cholecalciferol (VITAMIN D3) 25 MCG (1000 UT) tablet Take 4 tablets by mouth Daily.       Cyanocobalamin (Vitamin B-12) 1000 MCG sublingual tablet 1 tablet daily 90 tablet 2     enoxaparin sodium (LOVENOX) 40 MG/0.4ML solution prefilled syringe syringe Inject 0.4 mL under the skin into the appropriate area as directed Every Night. Indications: Post Surgical - Hip, Prevention of Unwanted Clot in Veins       famotidine (PEPCID) 20 MG tablet Take 1 tablet by mouth 2 (Two) Times a Day Before Meals.       melatonin 5 MG tablet tablet Take 0.5 tablets by mouth At Night As Needed (sleep).       PHENobarbital 64.8 MG tablet Take 2 tablets by mouth Every Night for 7 days. 14 tablet 0     polyethylene glycol 17 g packet Take 17 g by mouth 2 (Two) Times a Day.       pramipexole (MIRAPEX) 0.5 MG tablet Take 5 tablets by mouth Every Night.       pravastatin (PRAVACHOL) 40 MG tablet TAKE 1 TABLET BY MOUTH EVERY NIGHT FOR HIGH AMOUNT OF FATS IN THE BLOOD (Patient taking differently: Take 1 tablet by mouth Daily.) 90 tablet 2     sennosides-docusate (PERICOLACE) 8.6-50 MG per tablet Take 2 tablets by mouth 2 (Two) Times a Day.       Synthroid 50 MCG tablet Take 1 tablet by mouth Every Morning. Indications: Underactive Thyroid 90 tablet 2        Current Meds  Scheduled Meds:mupirocin, 1 Application, Each Nare, BID  pantoprazole, 40 mg, Intravenous, Q AM  PHENobarbital, 129.6 mg, Oral, Nightly  piperacillin-tazobactam, 3.375 g, Intravenous, Q8H  senna-docusate sodium, 2 tablet, Oral, BID  sodium chloride, 10 mL, Intravenous, Q12H      Continuous Infusions:heparin, 17.9 Units/kg/hr,  Last Rate: 17.9 Units/kg/hr (04/24/25 0124)  norepinephrine, 0.02-0.3 mcg/kg/min, Last Rate: 0.24 mcg/kg/min (04/24/25 0815)      PRN Meds:.  acetaminophen **OR** acetaminophen    senna-docusate sodium **AND** polyethylene glycol **AND** bisacodyl **AND** bisacodyl    heparin (porcine)    nitroglycerin    ondansetron    sodium chloride    sodium chloride    Allergies as of 04/23/2025 - Reviewed 04/23/2025   Allergen Reaction Noted    Clindamycin/lincomycin Itching 12/29/2016    Duricef [cefadroxil] Hives and Rash 03/18/2016    Sulfa antibiotics Rash 03/18/2016       Social History     Socioeconomic History    Marital status:    Tobacco Use    Smoking status: Never     Passive exposure: Never    Smokeless tobacco: Never   Vaping Use    Vaping status: Never Used   Substance and Sexual Activity    Alcohol use: No    Drug use: No    Sexual activity: Defer       Family History   Problem Relation Age of Onset    Heart disease Father     Diabetes Sister     Hypertension Sister     Hyperlipidemia Sister     Obesity Sister     Malig Hyperthermia Neg Hx        REVIEW OF SYSTEMS:   ROS was performed and is negative except as outlined in HPI     REVIEW OF SYSTEMS:   CONSTITUTIONAL: No weight loss, fever, chills, weakness or fatigue.   HEENT: Eyes: No visual loss, blurred vision, double vision or yellow sclerae. Ears, Nose, Throat: No hearing loss, sneezing, congestion, runny nose or sore throat.   SKIN: No rash or itching.     RESPIRATORY: No shortness of breath, hemoptysis, cough or sputum.   GASTROINTESTINAL: No anorexia, nausea, vomiting or diarrhea. No abdominal pain, bright red blood per rectum or melena.  GENITOURINARY: No burning on urination, hematuria or increased frequency.  NEUROLOGICAL: No headache, dizziness, syncope, paralysis, ataxia, numbness or tingling in the extremities. No change in bowel or bladder control.   MUSCULOSKELETAL: No muscle, back pain, joint pain or stiffness.   HEMATOLOGIC: No anemia,  "bleeding or bruising.   LYMPHATICS: No enlarged nodes. No history of splenectomy.   PSYCHIATRIC: No history of depression, anxiety, hallucinations.   ENDOCRINOLOGIC: No reports of sweating, cold or heat intolerance. No polyuria or polydipsia.       Objective:   Temp:  [97.8 °F (36.6 °C)-98.6 °F (37 °C)] 98.6 °F (37 °C)  Heart Rate:  [] 94  Resp:  [14-15] 15  BP: ()/(32-96) 143/57  Body mass index is 28.13 kg/m².  Flowsheet Rows      Flowsheet Row First Filed Value   Admission Height 172.7 cm (68\") Documented at 04/24/2025 0501   Admission Weight 84 kg (185 lb 3 oz) Documented at 04/23/2025 2302          Vitals:    04/24/25 0700   BP: 143/57   Pulse: 94   Resp:    Temp:    SpO2: 100%       Head:    Normocephalic, without obvious abnormality, atraumatic   Eyes:            Lids and lashes normal, conjunctivae and sclerae normal, no   icterus, no pallor   Ears:    Ears appear intact with no abnormalities noted   Throat:   No oral lesions, dentition good   Neck:   No adenopathy, supple, trachea midline, no thyromegaly, no   carotid bruit, no JVD   Lungs:     Breath sounds are equal and clear to auscultation    Heart:    Normal S1 and S2, RRR, No M/G/R   Abdomen:     Normal bowel sounds, no masses, no organomegaly, soft        non-tender, non-distended, no guarding   Extremities:   Moves all extremities well, no edema, no cyanosis, no redness   Pulses:   Pulses palpable and equal bilaterally.    Skin:  Psychiatric:   No bleeding, bruising or rash    Awake, alert and oriented x 3, normal mood and affect             I personally viewed and interpreted the patient's EKG/Telemetry data    Assessment:  Active Hospital Problems    Diagnosis  POA    **Saddle pulmonary embolus [I26.92]  Yes      Resolved Hospital Problems   No resolved problems to display.       Plan: She is got an extensive massive to submassive pulmonary embolus her right lung is essentially occluded she is got a saddle PE going over to her left " lung she is on Levophed.  There is right heart strain on CT but also on her echo her LV function is normal.  I disagree with the reading on her LV function on the echo and she is Pulmonary hypertension elevated troponin elevated proBNP.  Even though she is got some mental status changes I think we need to probably take her to the Cath Lab early this morning for an Inari thrombectomy.  I have talked with her as well as her daughter and explained the risks and benefits further decisions will be based on the findings at the time of her procedure.  This is obviously a life-threatening situation    Sourav Kennedy MD  04/24/25  08:26 EDT.

## 2025-04-24 NOTE — ED NOTES
This RN spoke w/ [facility] @ this time regarding pending transport of pt to ER. Per [facility] they were waiting for ems to arrive due to pt being diaphoretic, BP 60/45, 's, & was 79% on ra (85% on 2L). [facility] reports pt was d/c'd from [facility] 3/13 following a L hip surgery.

## 2025-04-24 NOTE — H&P
"Group: [facility]        HISTORY AND PHYSICAL    Patient Identification:  Mariela Ruiz  80 y.o.  female  1945  [id number]            CC: hypotension, diaphoretic    History of Present Illness:  Mariela Ruiz is a 80 year old female with a past medical history including: chronic venous insufficiency, hypothyroidism, RBBB, and osteoporosis with a history of pathological fracture.    Patient recently was hospitalized from 3/4 to 3/13 after a mechanical fall that resulted into a left MAXIMO periprosethetic fracture and left TKA loosening. She underwent left MAXIMO revision and ORIF of distal femoral condyle fracture on 3/8 with Dr. Cesar. Patient was placed on Lovenox for VTE prophylaxis at discharge. She was discharged to rehabilitation facility.    She presented to the ED from NH on 4/23 with complaints of diaphoresis, hypotension and tachycardia. BP 60/45, , and SpO2 79% on room air.  Upon arrival to the ED, patient denies any focal complaints. Family member at bedside denies any recent complaints by patient. No dysuria, chest pain, shortness of breath, leg swelling. Family member does report patient has been on bedrest since her hip surgery, plans to remain non-weight bearing for 8-12 weeks post-operatively. Unclear if patient has been taking her prescribed Lovenox.  ED evaluation: HS troponin 49, reflex 44, proBNP 2265.0, procalcitonin 45.70, WBC 14.36, lactic 1.9. UA 4+ bacteria, 2+ leukocytes.     Patient was initiated treated for sepsis with empiric antibiotics and sepsis boluses. Patient remained unresponsive to boluses, and CTA chest was performed to evaluate for obstructive causes of shock. CTA chest revealed \"extensively positive exam for pulmonary embolism, right greater than left, RV: LV ratio 1.33\". Patient initiated on heparin drip and interventional cardiology consulted. At time of assessment, patient is on norepinephrine (0.26 mcg/kg/min) and 4L nasal cannula.     Review of " Systems:  Limited by patient's mental status and willingness to cooperate in exam.   CONSTITUTIONAL:  Denies fevers or chills  EYE:  No new vision changes  EAR:  No change in hearing  CARDIAC:  No chest pain  PULMONARY:  No productive cough or shortness of breath  GI:  No diarrhea, hematemesis or hematochezia  RENAL:  No dysuria or urinary frequency  MUSCULOSKELETAL:  No musculoskeletal complaints  ENDOCRINE:  No heat or cold intolerance  INTEGUMENTARY: No skin rashes  NEUROLOGICAL:  No dizziness or confusion.  No seizure activity  PSYCHIATRIC:  No new anxiety or depression  12 system review of systems performed and all else negative     Past Medical History:  Past Medical History:   Diagnosis Date    Arthritis     Chronic venous insufficiency     Dupuytren's contracture     History of staph infection     S/P KNEE DEC 2016    History of transfusion     Hyperlipidemia     Lymphedema     LEFT LEG    Nontoxic multinodular goiter     Osteoporosis     Dr. Cabrera    Pelvic joint pain, left     Postsurgical hypothyroidism     Presence of left artificial hip joint     METAL ON METAL AS NOTED PER DR CLEMENT NOTE    Restless leg syndrome     Right bundle branch block     Seizure disorder     Dr. Whitman, HX 1980 LAST SEIZURE    Sleep apnea     DOES NOT HAVE MACHINE    Vitamin D insufficiency        Past Surgical History:  Past Surgical History:   Procedure Laterality Date    APPENDECTOMY      CARDIAC CATHETERIZATION      NORMAL     CHOLECYSTECTOMY N/A 11/2/2024    Procedure: CHOLECYSTECTOMY LAPAROSCOPIC WITH DAVINCI ROBOT with cholangiogram, possible open;  Surgeon: Bin Heaton MD;  Location: [facility] OR;  Service: Robotics - DaVinci;  Laterality: N/A;    COLONOSCOPY  08/17/2017    Normal except for anal fissure.  Dr. Brandt.  [facility].    KNEE MINI REVISION Left 11/15/2016    Procedure: LEFT KNEE POLY CHANGE WITH HI POST STABILIZER;  Surgeon: Pablito Claudio MD;  Location: [facility] OR;  Service:     KNEE  MINI REVISION Left 12/13/2016    Procedure: LT KNEE REMOVAL/REPLACEMENT LOCKING PIN ;  Surgeon: Pablito Claudio MD;  Location: [facility] MAIN OR;  Service:     MAMMO FINE NEEDLE ASPIRATION UNILATERAL      RIGHT THYROID NODULE, 2009 BENIGN     FL ARTHRP KNE CONDYLE&PLATU MEDIAL&LAT COMPARTMENTS Left 12/24/2016    Procedure: LEFT KNEE WASHOUT WITH POLY CHANGE;  Surgeon: Christiano Cesar MD;  Location: [facility] MAIN OR;  Service: Orthopedics    FL REVJ TOTAL KNEE ARTHRP W/WO ALGRFT 1 COMPONENT Left 2/20/2017    Procedure: LT KNEE REMOVAL ANTIBIOTIC SPACER AND KNEE REVISION;  Surgeon: Christiano Cesar MD;  Location: [facility] MAIN OR;  Service: Orthopedics    REPLACEMENT TOTAL KNEE Left     THYROIDECTOMY, PARTIAL      1979 LEFT LOBECTOMY AND ISTHMECTOMY     TOTAL ABDOMINAL HYSTERECTOMY WITH SALPINGO OOPHORECTOMY      TOTAL HIP ARTHROPLASTY Left     TOTAL HIP ARTHROPLASTY Right 9/14/2019    Procedure: TOTAL HIP ARTHROPLASTY ANTERIOR WITH HANA TABLE;  Surgeon: Christiano Cesar II, MD;  Location: [facility] MAIN OR;  Service: Orthopedics    TOTAL HIP ARTHROPLASTY REVISION Left 3/9/2021    Procedure: LEFT TOTAL HIP ARTHROPLASTY REVISION POSTERIOR;  Surgeon: Christiano Cesar II, MD;  Location: [facility] MAIN OR;  Service: Orthopedics;  Laterality: Left;    TOTAL HIP ARTHROPLASTY REVISION Left 3/8/2025    Procedure: TOTAL HIP ARTHROPLASTY REVISION;  Surgeon: Christiano Cesar II, MD;  Location: [facility] MAIN OR;  Service: Orthopedics;  Laterality: Left;    TOTAL KNEE  PROSTHESIS REMOVAL W/ SPACER INSERTION Left 12/29/2016    Procedure: LT KNEE IMPLANT REMOVAL WITH INSERTION OF SPACER ;  Surgeon: Christiano Cesar MD;  Location: [facility] MAIN OR;  Service:         Home Meds:  Medications Prior to Admission   Medication Sig Dispense Refill Last Dose/Taking    aspirin 81 MG EC tablet Take 1 tablet by mouth Daily.       bisacodyl (DULCOLAX) 5 MG EC tablet Take 1 tablet by mouth Daily As Needed for Constipation (Use if  polyethylene glycol is ineffective).       busPIRone (BUSPAR) 5 MG tablet Take 1 tablet by mouth 3 (Three) Times a Day.       cholecalciferol (VITAMIN D3) 25 MCG (1000 UT) tablet Take 4 tablets by mouth Daily.       Cyanocobalamin (Vitamin B-12) 1000 MCG sublingual tablet 1 tablet daily 90 tablet 2     enoxaparin sodium (LOVENOX) 40 MG/0.4ML solution prefilled syringe syringe Inject 0.4 mL under the skin into the appropriate area as directed Every Night. Indications: Post Surgical - Hip, Prevention of Unwanted Clot in Veins       famotidine (PEPCID) 20 MG tablet Take 1 tablet by mouth 2 (Two) Times a Day Before Meals.       melatonin 5 MG tablet tablet Take 0.5 tablets by mouth At Night As Needed (sleep).       PHENobarbital 64.8 MG tablet Take 2 tablets by mouth Every Night for 7 days. 14 tablet 0     polyethylene glycol 17 g packet Take 17 g by mouth 2 (Two) Times a Day.       pramipexole (MIRAPEX) 0.5 MG tablet Take 5 tablets by mouth Every Night.       pravastatin (PRAVACHOL) 40 MG tablet TAKE 1 TABLET BY MOUTH EVERY NIGHT FOR HIGH AMOUNT OF FATS IN THE BLOOD (Patient taking differently: Take 1 tablet by mouth Daily.) 90 tablet 2     sennosides-docusate (PERICOLACE) 8.6-50 MG per tablet Take 2 tablets by mouth 2 (Two) Times a Day.       Synthroid 50 MCG tablet Take 1 tablet by mouth Every Morning. Indications: Underactive Thyroid 90 tablet 2        Allergies:  Allergies   Allergen Reactions    Clindamycin/Lincomycin Itching    Duricef [Cefadroxil] Hives and Rash    Sulfa Antibiotics Rash     RASH       Social History:   Social History     Socioeconomic History    Marital status:    Tobacco Use    Smoking status: Never     Passive exposure: Never    Smokeless tobacco: Never   Vaping Use    Vaping status: Never Used   Substance and Sexual Activity    Alcohol use: No    Drug use: No    Sexual activity: Defer       Family History:  Family History   Problem Relation Age of Onset    Heart disease Father      Diabetes Sister     Hypertension Sister     Hyperlipidemia Sister     Obesity Sister     Malig Hyperthermia Neg Hx        Physical Exam:  /54   Pulse 108   Temp 97.8 °F (36.6 °C) (Oral)   Resp 14   Wt 84 kg (185 lb 3 oz)   SpO2 98%   BMI 28.16 kg/m²  Body mass index is 28.16 kg/m². 98% 84 kg (185 lb 3 oz)  Constitutional: Elderly female pt in bed, No acute respiratory distress, no accessory muscle use, SpO2 95% on 4L  Head: - NCAT  Eyes: No pallor, Anicteric conjunctiva, EOMI.  ENMT:  Mallampati 3, no oral thrush. Dry MM.   NECK: Trachea midline, No thyromegaly, no palpable cervical LNpathy, no JVD  Heart: RRR, no murmur. 1+BLE edema  Lungs: HARSHA +, No wheezes/ crackles heard    Abdomen: Soft. No tenderness, guarding or rigidity. No palpable masses  Extremities: Extremities warm and well perfused. No cyanosis/ clubbing- left hip with surgical incision- no swelling, redness noted  Neuro: Conscious,no gross focal neuro deficits, follows commands      PPE recommended per [facility] infectious disease Isolation protocol for the current clinical scenario(as mentioned below) was followed.      LABS:  SARS-CoV-2 PCR   Date Value Ref Range Status   03/06/2021 Not Detected Not Detected Final     Comment:     Nucleic acid specific to SARS-CoV-2 (COVID-19) virus was not detected in  this sample by the TaqPath (TM) COVID-19 Combo Kit.          SARS-CoV-2 (COVID-19) nucleic acid testing performed using [employer] Aptima (R) SARS-CoV-2 Assay or [employer] TaqPath (TM)  COVID-19 Combo Kit as indicated above under Emergency Use Authorization (EUA) from the FDA. Aptima (R) and TaqPath (TM) test performance  characteristics verified by [facility] in accordance with the FDAs Guidance Document (Policy for Diagnostic Tests for Coronavirus Disease-2019  during the Public Health Emergency) issued on March 16, 2020. The laboratory is regulated under CLIA as qualified to perform high-complexity testing  Unless  otherwise noted, all testing was performed at [facility], CLIA No. [id number], [place] State Licensee No. [id number]       Lab Results   Component Value Date    CALCIUM 9.2 04/23/2025    PHOS 3.4 11/06/2024     Results from last 7 days   Lab Units 04/23/25  2232   SODIUM mmol/L 135*   POTASSIUM mmol/L 3.5   CHLORIDE mmol/L 100   CO2 mmol/L 25.0   BUN mg/dL 21   CREATININE mg/dL 1.43*   GLUCOSE mg/dL 120*   CALCIUM mg/dL 9.2   WBC 10*3/mm3 14.36*   HEMOGLOBIN g/dL 11.0*   PLATELETS 10*3/mm3 191   ALT (SGPT) U/L 12   AST (SGOT) U/L 29   PROBNP pg/mL 2,265.0*   PROCALCITONIN ng/mL 45.70*     Lab Results   Component Value Date    CKTOTAL 106 11/03/2024    TROPONINT 44 (H) 04/24/2025     Results from last 7 days   Lab Units 04/24/25  0031 04/23/25  2232   HSTROP T ng/L 44* 49*         Results from last 7 days   Lab Units 04/23/25  2232   PROCALCITONIN ng/mL 45.70*   LACTATE mmol/L 1.9             Results from last 7 days   Lab Units 04/23/25  2232   INR  1.45*         Lab Results   Component Value Date    TSH 2.510 03/06/2025     Estimated Creatinine Clearance: 36.8 mL/min (A) (by C-G formula based on SCr of 1.43 mg/dL (H)).  Results from last 7 days   Lab Units 04/23/25  2248   NITRITE UA  Negative   WBC UA /HPF 21-50*   BACTERIA UA /HPF 4+*   SQUAM EPITHEL UA /HPF 0-2     Microbiology Results (last 10 days)       ** No results found for the last 240 hours. **               Imaging: I personally visualized the images of scans/x-rays performed within last 3 days.      Assessment:  Saddle pulmonary embolism  Obstructive shock  Right heart strain  Acute hypoxic respiratory failure  Acute kidney injury  Acute urinary tract infection  Complicated periprosthetic left femur fracture (sp repair 3/8)  History of stroke, no residual deficits  Epilepsy  Hyperlipidemia  Chronic lymphedema   Iron deficiency anemia  Right thyroid nodules      Recommendations:  Saddle pulmonary embolism  Obstructive shock  Right heart strain  Acute hypoxic  respiratory failure  CTA chest showing extensively positive for pulmonary embolism, right greater than left with RV to LV ratio of 1.33.   Troponin 49 >44.  proBNP 2265.  Requiring 4L nasal cannula to maintain SpO2 > 90%.  Norepinephrine to maintain MAP > 65.  Interventional cardiology consulted from ED- following.   Heparin drip per PE protocol.  ECHO  Venous duplex of BLE.  Given recent surgery, patient would not be the best candidate for TPA if hemodynamics continue to worsen.     Acute kidney injury  Creatinine 1.43, BUN 21.  (Labs 3/13) Creatinine 0.71, BUN 13.  Suspect prerenal from hypoperfusion with hypotension/ dehydration.  IVF boluses administered per sepsis protocol.   Avoid hypotension/ nephrotoxic agents.    Acute urinary tract infection  UA 4+ bacteria, 2+ leukocytes.   WBC 14.36, procalcitonin 45.70.  Lactic acid 1.9.  Hypotension unresponsive to sepsis boluses.   Documented history of allergy to cephalosporins- continue Zosyn for now.  Follow cultures.      Complicated periprosthetic left femur fracture (sp repair 3/8)      History of stroke, no residual deficits  Hyperlipidemia  Home medication: aspirin, pravastatin, hold for now.    Epilepsy  Home medication: phenobarbital (confirmed with most recent note from neurologist)- continued.    Iron deficiency anemia  Hemoglobin 11.0, improved from baseline.    Right thyroid nodules  Incidental finding during previous hospitalization, plan for thyroid biopsy in outpatient setting.     NPO  Full code  Heparin drip- PE protocol    Patient was placed in face mask upon entering room and kept mask on throughout our encounter. I wore full protective equipment throughout this patient encounter including a face mask, gown and gloves. Hand hygiene was performed before donning protective equipment and after removal when leaving the room.    Aisha Ocampo, APRN  4/24/2025  02:56 EDT      Much of this encounter note is an electronic transcription/translation of  spoken language to printed text using Dragon Software.

## 2025-04-24 NOTE — ED NOTES
Nursing report ED to floor  Mariela DELA CRUZ Sara  80 y.o.  female    HPI :  HPI  Stated Reason for Visit: Patient to ED via EMS from [facility] fore the call of hypotension and hypoxic readings (85% spo2 room air). When EMS arrived patient was pale, diaphoretic, and lethargic. Patient is oriented, but per nursing home staff she is not herself. Patient is alert and oriented x4 with no complaints at this time.  History Obtained From: EMS, patient    Chief Complaint  Chief Complaint   Patient presents with    Hypotension       Admitting doctor:   Noe Mora MD    Admitting diagnosis:   The primary encounter diagnosis was Acute saddle pulmonary embolism with acute cor pulmonale. Diagnoses of Acute UTI and Septic shock were also pertinent to this visit.    Code status:   Current Code Status       Date Active Code Status Order ID Comments User Context       Prior            Allergies:   Clindamycin/lincomycin, Duricef [cefadroxil], and Sulfa antibiotics    Isolation:   No active isolations    Intake and Output    Intake/Output Summary (Last 24 hours) at 4/24/2025 0144  Last data filed at 4/23/2025 2328  Gross per 24 hour   Intake 2000 ml   Output 200 ml   Net 1800 ml       Weight:       04/23/25  2302   Weight: 84 kg (185 lb 3 oz)       Most recent vitals:   Vitals:    04/23/25 2357 04/24/25 0039 04/24/25 0122 04/24/25 0128   BP: 104/50 95/44 (!) 80/40 103/46   Pulse: 105 106 105 107   Resp:       Temp:       TempSrc:       SpO2: 98% 98%  97%   Weight:           Active LDAs/IV Access:   Lines, Drains & Airways       Active LDAs       Name Placement date Placement time Site Days    Peripheral IV 04/23/25 2231 18 G Right Antecubital 04/23/25 2231  Antecubital  less than 1    Peripheral IV 04/23/25 2253 20 G Anterior;Left Forearm 04/23/25 2253  Forearm  less than 1    Urethral Catheter 16 Fr. 03/06/25  1620  -- 48                    Labs (abnormal labs have a star):   Labs Reviewed   COMPREHENSIVE METABOLIC PANEL -  Abnormal; Notable for the following components:       Result Value    Glucose 120 (*)     Creatinine 1.43 (*)     Sodium 135 (*)     Albumin 3.2 (*)     Alkaline Phosphatase 169 (*)     eGFR 37.6 (*)     All other components within normal limits    Narrative:     GFR Categories in Chronic Kidney Disease (CKD)      GFR Category          GFR (mL/min/1.73)    Interpretation  G1                     90 or greater         Normal or high (1)  G2                      60-89                Mild decrease (1)  G3a                   45-59                Mild to moderate decrease  G3b                   30-44                Moderate to severe decrease  G4                    15-29                Severe decrease  G5                    14 or less           Kidney failure          (1)In the absence of evidence of kidney disease, neither GFR category G1 or G2 fulfill the criteria for CKD.    eGFR calculation 2021 CKD-EPI creatinine equation, which does not include race as a factor   PROTIME-INR - Abnormal; Notable for the following components:    Protime 17.6 (*)     INR 1.45 (*)     All other components within normal limits   URINALYSIS W/ CULTURE IF INDICATED - Abnormal; Notable for the following components:    Appearance, UA Turbid (*)     Blood, UA Small (1+) (*)     Protein, UA 30 mg/dL (1+) (*)     Leuk Esterase, UA Moderate (2+) (*)     All other components within normal limits    Narrative:     In absence of clinical symptoms, the presence of pyuria, bacteria, and/or nitrites on the urinalysis result does not correlate with infection.   BNP (IN-HOUSE) - Abnormal; Notable for the following components:    proBNP 2,265.0 (*)     All other components within normal limits    Narrative:     This assay is used as an aid in the diagnosis of individuals suspected of having heart failure. It can be used as an aid in the diagnosis of acute decompensated heart failure (ADHF) in patients presenting with signs and symptoms of ADHF to the  "emergency department (ED). In addition, NT-proBNP of <300 pg/mL indicates ADHF is not likely.    Age Range Result Interpretation  NT-proBNP Concentration (pg/mL:      <50             Positive            >450                   Gray                 300-450                    Negative             <300    50-75           Positive            >900                  Gray                300-900                  Negative            <300      >75             Positive            >1800                  Gray                300-1800                  Negative            <300   TROPONIN - Abnormal; Notable for the following components:    HS Troponin T 49 (*)     All other components within normal limits    Narrative:     High Sensitive Troponin T Reference Range:  <14.0 ng/L- Negative Female for AMI  <22.0 ng/L- Negative Male for AMI  >=14 - Abnormal Female indicating possible myocardial injury.  >=22 - Abnormal Male indicating possible myocardial injury.   Clinicians would have to utilize clinical acumen, EKG, Troponin, and serial changes to determine if it is an Acute Myocardial Infarction or myocardial injury due to an underlying chronic condition.        PROCALCITONIN - Abnormal; Notable for the following components:    Procalcitonin 45.70 (*)     All other components within normal limits    Narrative:     As a Marker for Sepsis (Non-Neonates):    1. <0.5 ng/mL represents a low risk of severe sepsis and/or septic shock.  2. >2 ng/mL represents a high risk of severe sepsis and/or septic shock.    As a Marker for Lower Respiratory Tract Infections that require antibiotic therapy:    PCT on Admission    Antibiotic Therapy       6-12 Hrs later    >0.5                Strongly Recommended  >0.25 - <0.5        Recommended   0.1 - 0.25          Discouraged              Remeasure/reassess PCT  <0.1                Strongly Discouraged     Remeasure/reassess PCT    As 28 day mortality risk marker: \"Change in Procalcitonin Result\" (>80% or " <=80%) if Day 0 (or Day 1) and Day 4 values are available. Refer to [email or web address]    Change in PCT <=80%  A decrease of PCT levels below or equal to 80% defines a positive change in PCT test result representing a higher risk for 28-day all-cause mortality of patients diagnosed with severe sepsis for septic shock.    Change in PCT >80%  A decrease of PCT levels of more than 80% defines a negative change in PCT result representing a lower risk for 28-day all-cause mortality of patients diagnosed with severe sepsis or septic shock.      CBC WITH AUTO DIFFERENTIAL - Abnormal; Notable for the following components:    WBC 14.36 (*)     RBC 3.73 (*)     Hemoglobin 11.0 (*)     Hematocrit 33.3 (*)     Neutrophil % 93.9 (*)     Lymphocyte % 3.3 (*)     Monocyte % 1.8 (*)     Eosinophil % 0.0 (*)     Immature Grans % 0.8 (*)     Neutrophils, Absolute 13.48 (*)     Lymphocytes, Absolute 0.47 (*)     Immature Grans, Absolute 0.12 (*)     All other components within normal limits   URINALYSIS, MICROSCOPIC ONLY - Abnormal; Notable for the following components:    RBC, UA 3-5 (*)     WBC, UA 21-50 (*)     Bacteria, UA 4+ (*)     All other components within normal limits   HIGH SENSITIVITIY TROPONIN T 1HR - Abnormal; Notable for the following components:    HS Troponin T 44 (*)     All other components within normal limits    Narrative:     High Sensitive Troponin T Reference Range:  <14.0 ng/L- Negative Female for AMI  <22.0 ng/L- Negative Male for AMI  >=14 - Abnormal Female indicating possible myocardial injury.  >=22 - Abnormal Male indicating possible myocardial injury.   Clinicians would have to utilize clinical acumen, EKG, Troponin, and serial changes to determine if it is an Acute Myocardial Infarction or myocardial injury due to an underlying chronic condition.        APTT - Normal   LACTIC ACID, PLASMA - Normal   PHENOBARBITAL LEVEL - Normal   BLOOD CULTURE   BLOOD CULTURE   URINE CULTURE   CBC AND  DIFFERENTIAL    Narrative:     The following orders were created for panel order CBC & Differential.  Procedure                               Abnormality         Status                     ---------                               -----------         ------                     CBC Auto Differential[[id number]]        Abnormal            Final result                 Please view results for these tests on the individual orders.       EKG:   ECG 12 Lead Tachycardia   Preliminary Result   HEART MRDS=672  bpm   RR Lkvimukl=198  ms   AZ Eyrobyhd=426  ms   P Horizontal Axis=3  deg   P Front Axis=37  deg   QRSD Ccsetyfe=629  ms   QT Vgtskkiz=289  ms   MGmP=314  ms   QRS Axis=-55  deg   T Wave Axis=28  deg   - ABNORMAL ECG -   Sinus tachycardia   RBBB and LAFB   Left ventricular hypertrophy   ST elevation, consider inferior injury   Date and Time of Study:2025-04-23 22:27:16          Meds given in ED:   Medications   norepinephrine (LEVOPHED) 8 mg in 250 mL NS infusion (premix) (0.26 mcg/kg/min × 84 kg Intravenous New Bag 4/24/25 0143)   heparin 97111 units/250 mL (100 units/mL) in 0.45 % NaCl infusion (17.9 Units/kg/hr × 84 kg Intravenous New Bag 4/24/25 0124)   heparin (porcine) 5000 UNIT/ML injection 3,400-6,700 Units (has no administration in time range)   sodium chloride 0.9 % bolus 1,000 mL (0 mL Intravenous Stopped 4/23/25 2242)   piperacillin-tazobactam (ZOSYN) 3.375 g IVPB in 100 mL NS MBP (CD) (3.375 g Intravenous New Bag 4/24/25 0042)   sodium chloride 0.9 % bolus 1,000 mL (0 mL Intravenous Stopped 4/23/25 2328)   iopamidol (ISOVUE-370) 76 % injection 100 mL (95 mL Intravenous Given 4/24/25 0059)   heparin (porcine) 5000 UNIT/ML injection 6,700 Units (6,700 Units Intravenous Given 4/24/25 0115)       Imaging results:  CT Angiogram Chest Pulmonary Embolism  Result Date: 4/24/2025   Exam is extensively positive for pulmonary embolism, right greater than left, RV to LV ratio 1.33, possible RV strain.  Right greater than  left bilateral lower lobe mostly dependent pulmonary parenchymal opacity, infiltrate versus atelectasis.  CALL REPORT :  Results of the exam were discussed with Physicians caring for the patient in the emergency room at the time of this dictation.  This report was finalized on 4/24/2025 1:17 AM by Dr. Nehemiah Bridges M.D on Workstation: [id number]      XR Chest 1 View  Result Date: 4/23/2025   Redemonstrated borderline to mild cardiomegaly.  Bilateral vascular congestion and perihilar interstitial infiltrate, question congestive failure and/or hypervolemia.  No consolidation, no apparent effusion or pneumothorax.  This report was finalized on 4/23/2025 11:59 PM by Dr. Nehemiah Bridges M.D on Workstation: [id number]        Ambulatory status:   - bedrest    Social issues:   Social History     Socioeconomic History    Marital status:    Tobacco Use    Smoking status: Never     Passive exposure: Never    Smokeless tobacco: Never   Vaping Use    Vaping status: Never Used   Substance and Sexual Activity    Alcohol use: No    Drug use: No    Sexual activity: Defer       Peripheral Neurovascular  Peripheral Neurovascular (Adult)  Peripheral Neurovascular WDL: WDL    Neuro Cognitive  Neuro Cognitive (Adult)  Cognitive/Neuro/Behavioral WDL: orientation, .WDL except, level of consciousness  Level of Consciousness: (S)  (Lethargic)  Orientation: oriented x 4  Additional Documentation: Rehan Coma Scale (Group)  Rehan Coma Scale  Best Eye Response: 3-->(E3) to speech  Best Motor Response: 6-->(M6) obeys commands  Best Verbal Response: 5-->(V5) oriented  Rehan Coma Scale Score: 14    Learning  Learning Assessment  Learning Readiness and Ability: cognitive limitation noted  Education Provided  Person Taught: patient    Respiratory  Respiratory  Airway WDL: WDL  Respiratory WDL  Respiratory WDL: .WDL except, all  Rhythm/Pattern, Respiratory: (S) shortness of breath    Abdominal Pain       Pain Assessments  Pain  (Adult)  (0-10) Pain Rating: Rest: 0    NIH Stroke Scale       Jordyn Bond RN  04/24/25 01:44 EDT

## 2025-04-25 ENCOUNTER — APPOINTMENT (OUTPATIENT)
Dept: CARDIOLOGY | Facility: HOSPITAL | Age: 80
End: 2025-04-25
Payer: MEDICARE

## 2025-04-25 LAB
ACT BLD: 176 SECONDS (ref 82–152)
ACT BLD: 227 SECONDS (ref 82–152)
ANION GAP SERPL CALCULATED.3IONS-SCNC: 11 MMOL/L (ref 5–15)
APTT PPP: 57.8 SECONDS (ref 22.7–35.4)
APTT PPP: 91.3 SECONDS (ref 22.7–35.4)
BASOPHILS # BLD AUTO: 0.06 10*3/MM3 (ref 0–0.2)
BASOPHILS NFR BLD AUTO: 0.2 % (ref 0–1.5)
BH CV LOW VAS RIGHT DISTAL FEMORAL SPONT: 1
BH CV LOW VAS RIGHT MID FEMORAL SPONT: 1
BH CV LOW VAS RIGHT POPLITEAL SPONT: 1
BH CV LOW VAS RIGHT PROXIMAL FEMORAL SPONT: 1
BH CV LOW VAS RIGHT SOLEAL VESSEL: 1
BH CV LOWER VASCULAR LEFT COMMON FEMORAL AUGMENT: NORMAL
BH CV LOWER VASCULAR LEFT COMMON FEMORAL COMPETENT: NORMAL
BH CV LOWER VASCULAR LEFT COMMON FEMORAL COMPRESS: NORMAL
BH CV LOWER VASCULAR LEFT COMMON FEMORAL PHASIC: NORMAL
BH CV LOWER VASCULAR LEFT COMMON FEMORAL SPONT: NORMAL
BH CV LOWER VASCULAR LEFT DISTAL FEMORAL COMPRESS: NORMAL
BH CV LOWER VASCULAR LEFT GASTRONEMIUS COMPRESS: NORMAL
BH CV LOWER VASCULAR LEFT GREATER SAPH AK COMPRESS: NORMAL
BH CV LOWER VASCULAR LEFT GREATER SAPH BK COMPRESS: NORMAL
BH CV LOWER VASCULAR LEFT LESSER SAPH COMPRESS: NORMAL
BH CV LOWER VASCULAR LEFT MID FEMORAL AUGMENT: NORMAL
BH CV LOWER VASCULAR LEFT MID FEMORAL COMPETENT: NORMAL
BH CV LOWER VASCULAR LEFT MID FEMORAL COMPRESS: NORMAL
BH CV LOWER VASCULAR LEFT MID FEMORAL PHASIC: NORMAL
BH CV LOWER VASCULAR LEFT MID FEMORAL SPONT: NORMAL
BH CV LOWER VASCULAR LEFT POPLITEAL AUGMENT: NORMAL
BH CV LOWER VASCULAR LEFT POPLITEAL COMPETENT: NORMAL
BH CV LOWER VASCULAR LEFT POPLITEAL COMPRESS: NORMAL
BH CV LOWER VASCULAR LEFT POPLITEAL PHASIC: NORMAL
BH CV LOWER VASCULAR LEFT POPLITEAL SPONT: NORMAL
BH CV LOWER VASCULAR LEFT POSTERIOR TIBIAL COMPRESS: NORMAL
BH CV LOWER VASCULAR LEFT PROFUNDA FEMORAL COMPRESS: NORMAL
BH CV LOWER VASCULAR LEFT PROXIMAL FEMORAL COMPRESS: NORMAL
BH CV LOWER VASCULAR LEFT SAPHENOFEMORAL JUNCTION COMPRESS: NORMAL
BH CV LOWER VASCULAR RIGHT COMMON FEMORAL AUGMENT: NORMAL
BH CV LOWER VASCULAR RIGHT COMMON FEMORAL COMPETENT: NORMAL
BH CV LOWER VASCULAR RIGHT COMMON FEMORAL COMPRESS: NORMAL
BH CV LOWER VASCULAR RIGHT COMMON FEMORAL PHASIC: NORMAL
BH CV LOWER VASCULAR RIGHT COMMON FEMORAL SPONT: NORMAL
BH CV LOWER VASCULAR RIGHT DISTAL FEMORAL COMPRESS: NORMAL
BH CV LOWER VASCULAR RIGHT DISTAL FEMORAL PHASIC: NORMAL
BH CV LOWER VASCULAR RIGHT DISTAL FEMORAL SPONT: NORMAL
BH CV LOWER VASCULAR RIGHT DISTAL FEMORAL THROMBUS: NORMAL
BH CV LOWER VASCULAR RIGHT GASTRONEMIUS COMPRESS: NORMAL
BH CV LOWER VASCULAR RIGHT GREATER SAPH AK COMPRESS: NORMAL
BH CV LOWER VASCULAR RIGHT GREATER SAPH BK COMPRESS: NORMAL
BH CV LOWER VASCULAR RIGHT LESSER SAPH COMPRESS: NORMAL
BH CV LOWER VASCULAR RIGHT MID FEMORAL COMPRESS: NORMAL
BH CV LOWER VASCULAR RIGHT MID FEMORAL PHASIC: NORMAL
BH CV LOWER VASCULAR RIGHT MID FEMORAL SPONT: NORMAL
BH CV LOWER VASCULAR RIGHT MID FEMORAL THROMBUS: NORMAL
BH CV LOWER VASCULAR RIGHT POPLITEAL COMPRESS: NORMAL
BH CV LOWER VASCULAR RIGHT POPLITEAL PHASIC: NORMAL
BH CV LOWER VASCULAR RIGHT POPLITEAL SPONT: NORMAL
BH CV LOWER VASCULAR RIGHT POPLITEAL THROMBUS: NORMAL
BH CV LOWER VASCULAR RIGHT POSTERIOR TIBIAL COMPRESS: NORMAL
BH CV LOWER VASCULAR RIGHT PROFUNDA FEMORAL COMPRESS: NORMAL
BH CV LOWER VASCULAR RIGHT PROXIMAL FEMORAL COMPRESS: NORMAL
BH CV LOWER VASCULAR RIGHT PROXIMAL FEMORAL PHASIC: NORMAL
BH CV LOWER VASCULAR RIGHT PROXIMAL FEMORAL SPONT: NORMAL
BH CV LOWER VASCULAR RIGHT PROXIMAL FEMORAL THROMBUS: NORMAL
BH CV LOWER VASCULAR RIGHT SAPHENOFEMORAL JUNCTION COMPRESS: NORMAL
BH CV LOWER VASCULAR RIGHT SOLEAL COMPRESS: NORMAL
BH CV LOWER VASCULAR RIGHT SOLEAL THROMBUS: NORMAL
BH CV VAS PRELIMINARY FINDINGS SCRIPTING: 1
BUN SERPL-MCNC: 21 MG/DL (ref 8–23)
BUN/CREAT SERPL: 20 (ref 7–25)
CALCIUM SPEC-SCNC: 8.3 MG/DL (ref 8.6–10.5)
CHLORIDE SERPL-SCNC: 105 MMOL/L (ref 98–107)
CO2 SERPL-SCNC: 21 MMOL/L (ref 22–29)
CREAT SERPL-MCNC: 1.05 MG/DL (ref 0.57–1)
DEPRECATED RDW RBC AUTO: 43.2 FL (ref 37–54)
EGFRCR SERPLBLD CKD-EPI 2021: 54.5 ML/MIN/1.73
EOSINOPHIL # BLD AUTO: 0 10*3/MM3 (ref 0–0.4)
EOSINOPHIL NFR BLD AUTO: 0 % (ref 0.3–6.2)
ERYTHROCYTE [DISTWIDTH] IN BLOOD BY AUTOMATED COUNT: 13.4 % (ref 12.3–15.4)
GLUCOSE SERPL-MCNC: 101 MG/DL (ref 65–99)
HCO3 BLDA-SCNC: 24.2 MMOL/L (ref 21–28)
HCO3 BLDA-SCNC: 24.5 MMOL/L (ref 21–28)
HCT VFR BLD AUTO: 33.3 % (ref 34–46.6)
HCT VFR BLDA CALC: 32 % (ref 38–51)
HCT VFR BLDA CALC: 35 % (ref 38–51)
HGB BLD-MCNC: 11.3 G/DL (ref 12–15.9)
HGB BLDA-MCNC: 10.9 G/DL (ref 12–17)
HGB BLDA-MCNC: 11.9 G/DL (ref 12–17)
IMM GRANULOCYTES # BLD AUTO: 0.52 10*3/MM3 (ref 0–0.05)
IMM GRANULOCYTES NFR BLD AUTO: 2 % (ref 0–0.5)
LYMPHOCYTES # BLD AUTO: 0.92 10*3/MM3 (ref 0.7–3.1)
LYMPHOCYTES NFR BLD AUTO: 3.5 % (ref 19.6–45.3)
MCH RBC QN AUTO: 30 PG (ref 26.6–33)
MCHC RBC AUTO-ENTMCNC: 33.9 G/DL (ref 31.5–35.7)
MCV RBC AUTO: 88.3 FL (ref 79–97)
MONOCYTES # BLD AUTO: 1.26 10*3/MM3 (ref 0.1–0.9)
MONOCYTES NFR BLD AUTO: 4.8 % (ref 5–12)
NEUTROPHILS NFR BLD AUTO: 23.31 10*3/MM3 (ref 1.7–7)
NEUTROPHILS NFR BLD AUTO: 89.5 % (ref 42.7–76)
NRBC BLD AUTO-RTO: 0 /100 WBC (ref 0–0.2)
PCO2 BLDV: 52.7 MMHG (ref 41–51)
PCO2 BLDV: 53.1 MMHG (ref 41–51)
PH BLDA: 7.27 [PH] (ref 7.31–7.41)
PH BLDA: 7.28 [PH] (ref 7.31–7.41)
PLATELET # BLD AUTO: 236 10*3/MM3 (ref 140–450)
PMV BLD AUTO: 11.4 FL (ref 6–12)
PO2 BLDV: 42 MM HG (ref 35–42)
PO2 BLDV: 44 MM HG (ref 35–42)
POTASSIUM BLDA-SCNC: 3.6 MMOL/L (ref 3.5–4.9)
POTASSIUM BLDA-SCNC: 3.8 MMOL/L (ref 3.5–4.9)
POTASSIUM SERPL-SCNC: 4.3 MMOL/L (ref 3.5–5.2)
RBC # BLD AUTO: 3.77 10*6/MM3 (ref 3.77–5.28)
SAO2 % BLDCOA: 70 % (ref 45–75)
SAO2 % BLDCOA: 73 % (ref 45–75)
SODIUM SERPL-SCNC: 137 MMOL/L (ref 136–145)
WBC NRBC COR # BLD AUTO: 26.07 10*3/MM3 (ref 3.4–10.8)

## 2025-04-25 PROCEDURE — 25010000002 PIPERACILLIN SOD-TAZOBACTAM PER 1 G: Performed by: INTERNAL MEDICINE

## 2025-04-25 PROCEDURE — 25010000002 CEFTRIAXONE PER 250 MG: Performed by: INTERNAL MEDICINE

## 2025-04-25 PROCEDURE — 85730 THROMBOPLASTIN TIME PARTIAL: CPT | Performed by: INTERNAL MEDICINE

## 2025-04-25 PROCEDURE — 99232 SBSQ HOSP IP/OBS MODERATE 35: CPT | Performed by: INTERNAL MEDICINE

## 2025-04-25 PROCEDURE — 93970 EXTREMITY STUDY: CPT | Performed by: SURGERY

## 2025-04-25 PROCEDURE — 25010000002 HEPARIN (PORCINE) PER 1000 UNITS: Performed by: INTERNAL MEDICINE

## 2025-04-25 PROCEDURE — 85025 COMPLETE CBC W/AUTO DIFF WBC: CPT | Performed by: INTERNAL MEDICINE

## 2025-04-25 PROCEDURE — 80048 BASIC METABOLIC PNL TOTAL CA: CPT | Performed by: INTERNAL MEDICINE

## 2025-04-25 PROCEDURE — 25010000002 HEPARIN (PORCINE) 25000-0.45 UT/250ML-% SOLUTION: Performed by: INTERNAL MEDICINE

## 2025-04-25 PROCEDURE — 93970 EXTREMITY STUDY: CPT

## 2025-04-25 RX ORDER — PRAMIPEXOLE DIHYDROCHLORIDE 0.5 MG/1
0.5 TABLET ORAL NIGHTLY
Status: DISCONTINUED | OUTPATIENT
Start: 2025-04-25 | End: 2025-04-26

## 2025-04-25 RX ADMIN — PIPERACILLIN AND TAZOBACTAM 3.38 G: 3; .375 INJECTION, POWDER, FOR SOLUTION INTRAVENOUS at 01:53

## 2025-04-25 RX ADMIN — HYDROCODONE BITARTRATE AND ACETAMINOPHEN 1 TABLET: 5; 325 TABLET ORAL at 13:35

## 2025-04-25 RX ADMIN — Medication 10 ML: at 09:00

## 2025-04-25 RX ADMIN — MUPIROCIN 1 APPLICATION: 20 OINTMENT TOPICAL at 20:30

## 2025-04-25 RX ADMIN — HEPARIN SODIUM 18.9 UNITS/KG/HR: 10000 INJECTION, SOLUTION INTRAVENOUS at 08:45

## 2025-04-25 RX ADMIN — PRAMIPEXOLE DIHYDROCHLORIDE 0.5 MG: 0.5 TABLET ORAL at 20:28

## 2025-04-25 RX ADMIN — Medication 10 ML: at 20:30

## 2025-04-25 RX ADMIN — NOREPINEPHRINE BITARTRATE 0.28 MCG/KG/MIN: 0.03 INJECTION, SOLUTION INTRAVENOUS at 01:57

## 2025-04-25 RX ADMIN — PIPERACILLIN AND TAZOBACTAM 3.38 G: 3; .375 INJECTION, POWDER, FOR SOLUTION INTRAVENOUS at 09:18

## 2025-04-25 RX ADMIN — PANTOPRAZOLE SODIUM 40 MG: 40 INJECTION, POWDER, FOR SOLUTION INTRAVENOUS at 06:04

## 2025-04-25 RX ADMIN — PHENOBARBITAL 129.6 MG: 32.4 TABLET ORAL at 22:05

## 2025-04-25 RX ADMIN — HEPARIN SODIUM 6700 UNITS: 5000 INJECTION INTRAVENOUS; SUBCUTANEOUS at 21:53

## 2025-04-25 RX ADMIN — MUPIROCIN 1 APPLICATION: 20 OINTMENT TOPICAL at 08:29

## 2025-04-25 RX ADMIN — SODIUM CHLORIDE 2000 MG: 9 INJECTION, SOLUTION INTRAVENOUS at 17:59

## 2025-04-25 RX ADMIN — NOREPINEPHRINE BITARTRATE 0.05 MCG/KG/MIN: 0.03 INJECTION, SOLUTION INTRAVENOUS at 21:46

## 2025-04-25 RX ADMIN — NOREPINEPHRINE BITARTRATE 0.26 MCG/KG/MIN: 0.03 INJECTION, SOLUTION INTRAVENOUS at 08:28

## 2025-04-25 NOTE — PROGRESS NOTES
Patient with recent left femur surgery.  Readmitted for pulmonary embolism.  She is to maintain nonweightbearing to the left lower extremity and follow-up in the office as scheduled.  No need for further surgical intervention on her left femur at this time.

## 2025-04-25 NOTE — PROGRESS NOTES
LOS: 1 day   Patient Care Team:  Edgar Husain MD as PCP - General (Family Medicine)  Anuj Brandt MD as Consulting Physician (General Surgery)  Ed Chun MD as Surgeon (Orthopedic Surgery)  Amador Richardson DO (Vascular Surgery)  Christiano Cesar II, MD as Consulting Physician (Orthopedic Surgery)  Steven Liu II, MD as Consulting Physician (Neurology)    Subjective   80-year-old presents the emergency department from nursing home on [address] with diaphoresis hypotension and tachycardia hypotensive and significantly hypoxemic.  Patient with a recent left total hip arthroplasty revision ORIF of distal femoral condyle fracture on 3/8 with Dr. Navarrete she has been on Lovenox for DVT prophylaxis at discharge patient has been at bedrest with plans for to be nonweightbearing for 8 to 12 weeks postoperatively.  CTA of the chest showed extensive pulmonary emboli and elevated RV L ratio of 1.33 patient not heparin drip and supplemental O2.  Other history includes history of chronic venous insufficiency chronic right bundle branch block, hypothyroidism and osteoporosis  Denies shortness of breath on room air saturating 97% no chest pain no nausea  Review of Systems:          Objective     Vital Signs  Vital Sign Min/Max for last 24 hours  Temp  Min: 97.2 °F (36.2 °C)  Max: 98.4 °F (36.9 °C)   BP  Min: 93/83  Max: 155/54   Pulse  Min: 67  Max: 104   Resp  Min: 15  Max: 20   SpO2  Min: 94 %  Max: 100 %   Flow (L/min) (Oxygen Therapy)  Min: 2  Max: 4   No data recorded        Ventilator/Non-Invasive Ventilation Settings (From admission, onward)      None                         Body mass index is 28.13 kg/m².  I/O last 3 completed shifts:  In: 3506.1 [I.V.:1206.1; IV Piggyback:2300]  Out: 630 [Urine:630]  No intake/output data recorded.        Physical Exam:  General Appearance: Well-developed older white female she is lying in bed she is on saturating 97% she is on Levophed at 0.14 mics  per kilogram per minute and her blood pressure is 117/96, nurses are actively weaning.  Eyes: Conjunctiva are clear and anicteric pupils are equal  ENT: Mucous membranes are moist no erythema no exudates Mallampati type II airway  Neck: Full prominent jugular venous distention trachea midline no palpable adenopathy  Lungs: Clear nonlabored symmetric expansion  Cardiac: Tachycardia is resolved her heart rates in the 70s and regular now no murmur  Abdomen: Soft nontender I do not palpate hepatosplenomegaly or masses  : Not examined  Musculoskeletal: She has postop changes right hip both lower extremities are in compression type stockings there is no real difference in the legs there is no palpable cords  Skin: Warm and dry no jaundice no petechiae  Neuro: Patient talks a lot she seems to have some memory difficulties but she is oriented x 3 and she is following commands  Extremities/P Vascular: No clubbing no cyanosis no edema palpable radial and dorsalis pedis pulses bilaterally  MSE: She is pleasant and cooperative       Labs:  Results from last 7 days   Lab Units 04/25/25 0448 04/24/25 0458 04/23/25  2232   GLUCOSE mg/dL 101* 135* 120*   SODIUM mmol/L 137 137 135*   POTASSIUM mmol/L 4.3 3.3* 3.5   CO2 mmol/L 21.0* 23.2 25.0   CHLORIDE mmol/L 105 102 100   ANION GAP mmol/L 11.0 11.8 10.0   CREATININE mg/dL 1.05* 1.44* 1.43*   BUN mg/dL 21 22 21   BUN / CREAT RATIO  20.0 15.3 14.7   CALCIUM mg/dL 8.3* 8.8 9.2   ALK PHOS U/L  --   --  169*   TOTAL PROTEIN g/dL  --   --  6.6   ALT (SGPT) U/L  --   --  12   AST (SGOT) U/L  --   --  29   BILIRUBIN mg/dL  --   --  0.8   ALBUMIN g/dL  --   --  3.2*   GLOBULIN gm/dL  --   --  3.4     Estimated Creatinine Clearance: 50.1 mL/min (A) (by C-G formula based on SCr of 1.05 mg/dL (H)).      Results from last 7 days   Lab Units 04/25/25 0448 04/24/25 0458 04/23/25  2232   WBC 10*3/mm3 26.07* 42.10* 14.36*   RBC 10*6/mm3 3.77 4.03 3.73*   HEMOGLOBIN g/dL 11.3* 12.1 11.0*    HEMATOCRIT % 33.3* 36.3 33.3*   MCV fL 88.3 90.1 89.3   MCH pg 30.0 30.0 29.5   MCHC g/dL 33.9 33.3 33.0   RDW % 13.4 13.0 13.0   RDW-SD fl 43.2 42.6 41.9   MPV fL 11.4 11.1 11.0   PLATELETS 10*3/mm3 236 250 191   NEUTROPHIL % % 89.5*  --  93.9*   LYMPHOCYTE % % 3.5*  --  3.3*   MONOCYTES % % 4.8*  --  1.8*   EOSINOPHIL % % 0.0*  --  0.0*   BASOPHIL % % 0.2  --  0.2   IMM GRAN % % 2.0*  --  0.8*   NEUTROS ABS 10*3/mm3 23.31* 40.42* 13.48*   LYMPHS ABS 10*3/mm3 0.92  --  0.47*   MONOS ABS 10*3/mm3 1.26*  --  0.26   EOS ABS 10*3/mm3 0.00 0.00 0.00   BASOS ABS 10*3/mm3 0.06 0.00 0.03   IMMATURE GRANS (ABS) 10*3/mm3 0.52*  --  0.12*   NRBC /100 WBC 0.0  --  0.0         Results from last 7 days   Lab Units 04/24/25  0031 04/23/25  2232   HSTROP T ng/L 44* 49*     Results from last 7 days   Lab Units 04/23/25  2232   PROBNP pg/mL 2,265.0*         Results from last 7 days   Lab Units 04/23/25  2232   LACTATE mmol/L 1.9   PROCALCITONIN ng/mL 45.70*     Results from last 7 days   Lab Units 04/23/25  2232   INR  1.45*     Microbiology Results (last 10 days)       Procedure Component Value - Date/Time    Blood Culture - Blood, Arm, Right [[id number]]  (Abnormal) Collected: 04/24/25 0031    Lab Status: Preliminary result Specimen: Blood from Arm, Right Updated: 04/25/25 0632     Blood Culture Gram Negative Bacilli     Isolated from Anaerobic Bottle     Gram Stain Anaerobic Bottle Gram negative bacilli    Blood Culture ID, PCR - Blood, Arm, Right [[id number]]  (Abnormal) Collected: 04/24/25 0031    Lab Status: Final result Specimen: Blood from Arm, Right Updated: 04/24/25 1852     BCID, PCR Klebsiella pneumoniae group. Identification by BCID2 PCR.     BOTTLE TYPE Anaerobic Bottle    Narrative:      No resistance genes detected.    Blood Culture - Blood, Arm, Right [[id number]]  (Abnormal) Collected: 04/24/25 0010    Lab Status: Preliminary result Specimen: Blood from Arm, Right Updated: 04/25/25 0648     Blood Culture Gram  Negative Bacilli     Isolated from Aerobic and Anaerobic Bottles     Gram Stain Anaerobic Bottle Gram negative bacilli      Aerobic Bottle Gram negative bacilli                mupirocin, 1 Application, Each Nare, BID  pantoprazole, 40 mg, Intravenous, Q AM  PHENobarbital, 129.6 mg, Oral, Nightly  piperacillin-tazobactam, 3.375 g, Intravenous, Q8H  senna-docusate sodium, 2 tablet, Oral, BID  sodium chloride, 10 mL, Intravenous, Q12H      heparin, 17.9 Units/kg/hr, Last Rate: 18.9 Units/kg/hr (04/24/25 7248)  norepinephrine, 0.02-0.3 mcg/kg/min, Last Rate: 0.26 mcg/kg/min (04/25/25 5218)        Diagnostics:  Adult Transthoracic Echo Complete w/ Color, Spectral and Contrast if Necessary Per Protocol  Result Date: 4/24/2025    Left ventricular systolic function is mildly decreased. Left ventricular ejection fraction appears to be 41 - 45% in the setting of tachycardia (appears worse on apical contrast images).   Left ventricular diastolic function was indeterminate.   RV borderline dilated with grossly normal function.   Mild-moderate tricuspid regurgitation.   Estimated right ventricular systolic pressure from tricuspid regurgitation is moderately elevated (45-55 mmHg).     CT Angiogram Chest Pulmonary Embolism  Result Date: 4/24/2025  CTA CHEST WITH IV CONTRAST  HISTORY: post op, hypotension, hypoxia, r/o PE; A41.9-Sepsis, unspecified organism; R65.21-Severe sepsis with septic shock; N39.0-Urinary tract infection, site not specified  COMPARISON: Chest CT 3/4/2025  TECHNIQUE: CT angiography was performed of the chest with axial images as well as coronal and sagittal reformatted MIP images provided following administration of IV contrast. 3-D surface rendered reformats were obtained of the pulmonary arteries and aorta. Radiation dose reduction techniques were utilized, including automated exposure control, and exposure modulation based on body size.  FINDINGS:  There is right greater than left posterior dependent  infiltrate or atelectasis without central airway obstruction.  Thoracic aorta is normal in caliber.  There are coronary atherosclerotic vascular calcifications.  There is no suspicious mediastinal adenopathy, but there is a right thyroid nodule at least 3.6 cm in size.  Images of the upper abdomen show no acute abnormality.  There is no acute bony abnormality.  Bolus timing is good. The examination is positive for pulmonary emboli, with large overall embolus burden, right greater than left. Emboli are seen in the left main pulmonary artery, left upper lobe and left lower lobe branches. Larger embolus burden seen involving the right main pulmonary artery, right upper lobe and right lower lobe and right middle lobe pulmonary branches. RV to LV ratio about 1.33.       Exam is extensively positive for pulmonary embolism, right greater than left, RV to LV ratio 1.33, possible RV strain.  Right greater than left bilateral lower lobe mostly dependent pulmonary parenchymal opacity, infiltrate versus atelectasis.  CALL REPORT :  Results of the exam were discussed with Physicians caring for the patient in the emergency room at the time of this dictation.  This report was finalized on 4/24/2025 1:17 AM by Dr. Nehemiah Bridges M.D on Workstation: [id number]      XR Chest 1 View  Result Date: 4/23/2025  CXR ONE VIEW  HISTORY: hypoxia  COMPARISON: 3/4/2025  TECHNIQUE: single portable AP       Redemonstrated borderline to mild cardiomegaly.  Bilateral vascular congestion and perihilar interstitial infiltrate, question congestive failure and/or hypervolemia.  No consolidation, no apparent effusion or pneumothorax.  This report was finalized on 4/23/2025 11:59 PM by Dr. Nehemiah Bridges M.D on Workstation: [id number]      Results for orders placed during the hospital encounter of 04/23/25    Adult Transthoracic Echo Complete w/ Color, Spectral and Contrast if Necessary Per Protocol    Interpretation Summary    Left ventricular  systolic function is mildly decreased. Left ventricular ejection fraction appears to be 41 - 45% in the setting of tachycardia (appears worse on apical contrast images).    Left ventricular diastolic function was indeterminate.    RV borderline dilated with grossly normal function.    Mild-moderate tricuspid regurgitation.    Estimated right ventricular systolic pressure from tricuspid regurgitation is moderately elevated (45-55 mmHg).      Dependently reviewed the CT angiogram of the chest     Active Hospital Problems    Diagnosis  POA    **Saddle pulmonary embolus [I26.92]  Yes      Resolved Hospital Problems   No resolved problems to display.         Assessment & Plan     Massive bilateral pulmonary emboli right greater than left with RV strain and moderate pulmonary hypertension on echocardiogram.  With hypoxia and shock.  Patient underwent thrombectomy yesterday removed a large amount of clot.  Patient is still hypotensive some today we think that this is in part related to sepsis.  Discussed with Dr. Kennedy will transition over to Eliquis today  Shock multifactorial there are some component to her pulmonary emboli but large clot burden removed yesterday still hypotensive probably more septic shock thankfully her pressure is improving and were weaning off Levophed  Acute hypoxic respiratory failure secondary #1 supplemental O2  History of epilepsy continue home phenobarbital  Elevated procalcitonin and white blood count both markedly elevated white count is gone up this certainly could be a stress response she does have some mild renal dysfunction and that may be part of the reason the procalcitonin is up but extremely high I do not see any definite infection on the CT scan or exam she is not having any urinary difficulties although she does have 21-50 white cells in her urine with only 3-5 red cells.  Urine culture today is growing greater than 10-6 gram-negative rods she is on Zosyn will follow cultures  follow white count and procalcitonin.  Looks like her white count is improving I think we have to continue antibiotics at this time.  Iron-deficiency anemia has a history of this hemoglobin is better than her recent level we will have to monitor her closely.  With this history and her being on blood thinners I am going to put her on a proton pump inhibitor for right now.  Her hemoglobin is going need to be followed closely for a while with her  on anticoagulation.  Right thyroid nodules she is scheduled to have outpatient biopsy of this that may need to be held up some as she probably is getting need to stay on her anticoagulation and not be offered biopsy unless surgery feels this is a high risk lesion that needs to be biopsied in the near future.  Acute kidney injury creatinine is up significantly may be from hypotension, thankfully renal function is improving  Memory difficulties not sure how much memory she is oriented it just is maybe it is a personality thing   pulmonary hypertension by echocardiogram this is probably due to her pulmonary emboli although Dr. Kennedy said she did have a lot of improvement in her pressures after clot removal hopefully this will improve it probably needs follow-up echocardiogram in several months  Mild biventricular dysfunction/failure again she is in need follow-up echocardiogram in several months  Restless leg syndrome resume Mirapex 0.5 mg at at bedtime    Plan for disposition:    Lucho Mcknight Jr, MD  04/25/25  06:58 EDT    Time:  critical care time 38 minutes

## 2025-04-25 NOTE — PROGRESS NOTES
"Mariela Ruiz  1945 80 y.o.  [id number]      Patient Care Team:  Edgar Husain MD as PCP - General (Family Medicine)  Anuj Brandt MD as Consulting Physician (General Surgery)  Ed Chun MD as Surgeon (Orthopedic Surgery)  Amador Richardson DO (Vascular Surgery)  Christiano Cesar II, MD as Consulting Physician (Orthopedic Surgery)  Steven Liu II, MD as Consulting Physician (Neurology)    CC: Submassive pulmonary embolus.  Status post embolectomy    Interval History: Little bit of confusion today still hypotensive      Objective   Vital Signs  Temp:  [98.2 °F (36.8 °C)-98.4 °F (36.9 °C)] 98.2 °F (36.8 °C)  Heart Rate:  [] 82  Resp:  [15-20] 16  BP: ()/() 128/82    Intake/Output Summary (Last 24 hours) at 4/25/2025 0917  Last data filed at 4/25/2025 0408  Gross per 24 hour   Intake 1406.12 ml   Output 930 ml   Net 476.12 ml     Flowsheet Rows      Flowsheet Row First Filed Value   Admission Height 172.7 cm (68\") Documented at 04/24/2025 0501   Admission Weight 84 kg (185 lb 3 oz) Documented at 04/23/2025 2302            Physical Exam:   General Appearance:    Alert,oriented, in no acute distress   Lungs:     Clear to auscultation,BS are equal    Heart:    Normal S1 and S2, RRR without murmur, gallop or rub   HEENT:    Sclerae are clear, no JVD or adenopathy   Abdomen:     Normal bowel sounds, soft nontender, nondistended, no HSM   Extremities:   Moves all extremities well, no edema, no cyanosis, no             Redness, no rash     Medication Review:      mupirocin, 1 Application, Each Nare, BID  pantoprazole, 40 mg, Intravenous, Q AM  PHENobarbital, 129.6 mg, Oral, Nightly  piperacillin-tazobactam, 3.375 g, Intravenous, Q8H  senna-docusate sodium, 2 tablet, Oral, BID  sodium chloride, 10 mL, Intravenous, Q12H      heparin, 17.9 Units/kg/hr, Last Rate: 18.9 Units/kg/hr (04/25/25 0845)  norepinephrine, 0.02-0.3 mcg/kg/min, Last Rate: 0.24 mcg/kg/min (04/25/25 " 0911)          I reviewed the patient's new clinical results.  I personally viewed and interpreted the patient's EKG/Telemetry data    Assessment/Plan  Active Hospital Problems    Diagnosis  POA    **Saddle pulmonary embolus [I26.92]  Yes     Priority: High      Resolved Hospital Problems   No resolved problems to display.       Cardiac status has been stable.  We had a successful embolectomy yesterday although vitals did not really change much except she is not on any oxygen now.  She still is hypotensive and has a markedly elevated white count and had a very elevated procalcitonin on admission so it is suspicious she could have sepsis.  From our standpoint her access sites look great and she can be started on oral anticoagulation whenever it is felt to be safe from a pulmonary standpoint she likely should be on that forever.  In addition she has probably dementia and some discussion should be undertaken about advance directives this has been a very bad 2 months for her and I think her prognosis is guarded we will see her as needed she can follow-up with me in a month after she is discharged and we will get an echo and make sure that her RV function is recovered    Sourav Kennedy MD  04/25/25  09:17 EDT

## 2025-04-26 ENCOUNTER — APPOINTMENT (OUTPATIENT)
Dept: ULTRASOUND IMAGING | Facility: HOSPITAL | Age: 80
End: 2025-04-26
Payer: MEDICARE

## 2025-04-26 LAB
ANION GAP SERPL CALCULATED.3IONS-SCNC: 11.5 MMOL/L (ref 5–15)
APTT PPP: 80.4 SECONDS (ref 22.7–35.4)
APTT PPP: 94.5 SECONDS (ref 22.7–35.4)
APTT PPP: >200 SECONDS (ref 22.7–35.4)
BACTERIA SPEC AEROBE CULT: ABNORMAL
BASOPHILS # BLD AUTO: 0.03 10*3/MM3 (ref 0–0.2)
BASOPHILS NFR BLD AUTO: 0.2 % (ref 0–1.5)
BUN SERPL-MCNC: 16 MG/DL (ref 8–23)
BUN/CREAT SERPL: 23.2 (ref 7–25)
CALCIUM SPEC-SCNC: 8.8 MG/DL (ref 8.6–10.5)
CHLORIDE SERPL-SCNC: 107 MMOL/L (ref 98–107)
CO2 SERPL-SCNC: 22.5 MMOL/L (ref 22–29)
CREAT SERPL-MCNC: 0.69 MG/DL (ref 0.57–1)
DEPRECATED RDW RBC AUTO: 43.8 FL (ref 37–54)
DEPRECATED RDW RBC AUTO: 45.6 FL (ref 37–54)
EGFRCR SERPLBLD CKD-EPI 2021: 89 ML/MIN/1.73
EOSINOPHIL # BLD AUTO: 0 10*3/MM3 (ref 0–0.4)
EOSINOPHIL NFR BLD AUTO: 0 % (ref 0.3–6.2)
ERYTHROCYTE [DISTWIDTH] IN BLOOD BY AUTOMATED COUNT: 13.4 % (ref 12.3–15.4)
ERYTHROCYTE [DISTWIDTH] IN BLOOD BY AUTOMATED COUNT: 13.4 % (ref 12.3–15.4)
GLUCOSE SERPL-MCNC: 92 MG/DL (ref 65–99)
GRAM STN SPEC: ABNORMAL
HCT VFR BLD AUTO: 26.9 % (ref 34–46.6)
HCT VFR BLD AUTO: 27.4 % (ref 34–46.6)
HGB BLD-MCNC: 8.8 G/DL (ref 12–15.9)
HGB BLD-MCNC: 9.2 G/DL (ref 12–15.9)
IMM GRANULOCYTES # BLD AUTO: 0.11 10*3/MM3 (ref 0–0.05)
IMM GRANULOCYTES NFR BLD AUTO: 0.9 % (ref 0–0.5)
ISOLATED FROM: ABNORMAL
ISOLATED FROM: ABNORMAL
LYMPHOCYTES # BLD AUTO: 0.88 10*3/MM3 (ref 0.7–3.1)
LYMPHOCYTES NFR BLD AUTO: 7.1 % (ref 19.6–45.3)
MCH RBC QN AUTO: 30.1 PG (ref 26.6–33)
MCH RBC QN AUTO: 30.2 PG (ref 26.6–33)
MCHC RBC AUTO-ENTMCNC: 32.7 G/DL (ref 31.5–35.7)
MCHC RBC AUTO-ENTMCNC: 33.6 G/DL (ref 31.5–35.7)
MCV RBC AUTO: 89.5 FL (ref 79–97)
MCV RBC AUTO: 92.4 FL (ref 79–97)
MONOCYTES # BLD AUTO: 0.5 10*3/MM3 (ref 0.1–0.9)
MONOCYTES NFR BLD AUTO: 4 % (ref 5–12)
NEUTROPHILS NFR BLD AUTO: 10.93 10*3/MM3 (ref 1.7–7)
NEUTROPHILS NFR BLD AUTO: 87.8 % (ref 42.7–76)
NRBC BLD AUTO-RTO: 0 /100 WBC (ref 0–0.2)
PLATELET # BLD AUTO: 184 10*3/MM3 (ref 140–450)
PLATELET # BLD AUTO: 189 10*3/MM3 (ref 140–450)
PMV BLD AUTO: 11.2 FL (ref 6–12)
PMV BLD AUTO: 11.5 FL (ref 6–12)
POTASSIUM SERPL-SCNC: 4.5 MMOL/L (ref 3.5–5.2)
RBC # BLD AUTO: 2.91 10*6/MM3 (ref 3.77–5.28)
RBC # BLD AUTO: 3.06 10*6/MM3 (ref 3.77–5.28)
SODIUM SERPL-SCNC: 141 MMOL/L (ref 136–145)
WBC NRBC COR # BLD AUTO: 12.45 10*3/MM3 (ref 3.4–10.8)
WBC NRBC COR # BLD AUTO: 9.08 10*3/MM3 (ref 3.4–10.8)

## 2025-04-26 PROCEDURE — G0545 PR INHERENT VISIT TO INPT: HCPCS | Performed by: INTERNAL MEDICINE

## 2025-04-26 PROCEDURE — 87040 BLOOD CULTURE FOR BACTERIA: CPT | Performed by: INTERNAL MEDICINE

## 2025-04-26 PROCEDURE — 76775 US EXAM ABDO BACK WALL LIM: CPT

## 2025-04-26 PROCEDURE — 85730 THROMBOPLASTIN TIME PARTIAL: CPT | Performed by: INTERNAL MEDICINE

## 2025-04-26 PROCEDURE — 85025 COMPLETE CBC W/AUTO DIFF WBC: CPT | Performed by: INTERNAL MEDICINE

## 2025-04-26 PROCEDURE — 25010000002 HEPARIN (PORCINE) 25000-0.45 UT/250ML-% SOLUTION: Performed by: INTERNAL MEDICINE

## 2025-04-26 PROCEDURE — 25010000002 CEFTRIAXONE PER 250 MG: Performed by: INTERNAL MEDICINE

## 2025-04-26 PROCEDURE — 25810000003 SODIUM CHLORIDE 0.9 % SOLUTION: Performed by: INTERNAL MEDICINE

## 2025-04-26 PROCEDURE — 80048 BASIC METABOLIC PNL TOTAL CA: CPT | Performed by: INTERNAL MEDICINE

## 2025-04-26 PROCEDURE — 99223 1ST HOSP IP/OBS HIGH 75: CPT | Performed by: INTERNAL MEDICINE

## 2025-04-26 PROCEDURE — 85027 COMPLETE CBC AUTOMATED: CPT | Performed by: INTERNAL MEDICINE

## 2025-04-26 RX ORDER — LEVOTHYROXINE SODIUM 50 UG/1
50 TABLET ORAL EVERY MORNING
Status: DISCONTINUED | OUTPATIENT
Start: 2025-04-27 | End: 2025-04-29 | Stop reason: HOSPADM

## 2025-04-26 RX ORDER — PANTOPRAZOLE SODIUM 40 MG/1
40 TABLET, DELAYED RELEASE ORAL
Status: DISCONTINUED | OUTPATIENT
Start: 2025-04-27 | End: 2025-04-29 | Stop reason: HOSPADM

## 2025-04-26 RX ORDER — DEXTROSE MONOHYDRATE 25 G/50ML
INJECTION, SOLUTION INTRAVENOUS
Status: ACTIVE
Start: 2025-04-26 | End: 2025-04-27

## 2025-04-26 RX ORDER — SODIUM CHLORIDE 9 MG/ML
75 INJECTION, SOLUTION INTRAVENOUS CONTINUOUS
Status: DISCONTINUED | OUTPATIENT
Start: 2025-04-26 | End: 2025-04-27

## 2025-04-26 RX ORDER — BUSPIRONE HYDROCHLORIDE 5 MG/1
5 TABLET ORAL 3 TIMES DAILY
Status: DISCONTINUED | OUTPATIENT
Start: 2025-04-26 | End: 2025-04-29 | Stop reason: HOSPADM

## 2025-04-26 RX ORDER — PRAMIPEXOLE DIHYDROCHLORIDE 0.25 MG/1
0.25 TABLET ORAL EVERY 8 HOURS SCHEDULED
Status: DISCONTINUED | OUTPATIENT
Start: 2025-04-26 | End: 2025-04-29 | Stop reason: HOSPADM

## 2025-04-26 RX ADMIN — BUSPIRONE HYDROCHLORIDE 5 MG: 5 TABLET ORAL at 20:06

## 2025-04-26 RX ADMIN — HYDROCODONE BITARTRATE AND ACETAMINOPHEN 1 TABLET: 5; 325 TABLET ORAL at 00:23

## 2025-04-26 RX ADMIN — HEPARIN SODIUM 19.9 UNITS/KG/HR: 10000 INJECTION, SOLUTION INTRAVENOUS at 17:35

## 2025-04-26 RX ADMIN — SODIUM CHLORIDE 75 ML/HR: 9 INJECTION, SOLUTION INTRAVENOUS at 14:37

## 2025-04-26 RX ADMIN — MUPIROCIN 1 APPLICATION: 20 OINTMENT TOPICAL at 08:15

## 2025-04-26 RX ADMIN — PHENOBARBITAL 129.6 MG: 32.4 TABLET ORAL at 20:06

## 2025-04-26 RX ADMIN — PANTOPRAZOLE SODIUM 40 MG: 40 INJECTION, POWDER, FOR SOLUTION INTRAVENOUS at 05:38

## 2025-04-26 RX ADMIN — SODIUM CHLORIDE 75 ML/HR: 9 INJECTION, SOLUTION INTRAVENOUS at 21:30

## 2025-04-26 RX ADMIN — PRAMIPEXOLE DIHYDROCHLORIDE 0.25 MG: 0.25 TABLET ORAL at 21:30

## 2025-04-26 RX ADMIN — Medication 10 ML: at 20:06

## 2025-04-26 RX ADMIN — HEPARIN SODIUM 22.9 UNITS/KG/HR: 10000 INJECTION, SOLUTION INTRAVENOUS at 01:21

## 2025-04-26 RX ADMIN — Medication 10 ML: at 08:15

## 2025-04-26 RX ADMIN — PRAMIPEXOLE DIHYDROCHLORIDE 0.25 MG: 0.25 TABLET ORAL at 16:58

## 2025-04-26 RX ADMIN — MUPIROCIN 1 APPLICATION: 20 OINTMENT TOPICAL at 20:06

## 2025-04-26 RX ADMIN — SENNOSIDES AND DOCUSATE SODIUM 2 TABLET: 50; 8.6 TABLET ORAL at 20:06

## 2025-04-26 RX ADMIN — SODIUM CHLORIDE 2000 MG: 9 INJECTION, SOLUTION INTRAVENOUS at 16:49

## 2025-04-26 RX ADMIN — BUSPIRONE HYDROCHLORIDE 5 MG: 5 TABLET ORAL at 16:49

## 2025-04-26 NOTE — PROGRESS NOTES
[facility]  [phone]  Dr. Davi Douglas    Subjective:  LOS: 2    Chief Complaint: Hypotension    Awake alert and answers questions appropriately.  Her memory appears to be improved as well.  Not requiring supplemental O2.  Currently off all pressors.    Objective   Vital Signs past 24hrs  Temp range: Temp (24hrs), Av.8 °F (36.6 °C), Min:97.2 °F (36.2 °C), Max:98.1 °F (36.7 °C)    BP range: BP: ()/(47-98) 103/59  Pulse range: Heart Rate:  [65-94] 78  Resp rate range: Resp:  [16] 16  Device (Oxygen Therapy): room air   Oxygen range:SpO2:  [92 %-100 %] 92 %   Mechanical Ventilator:     Physical Exam  Eyes:      Pupils: Pupils are equal, round, and reactive to light.   Cardiovascular:      Rate and Rhythm: Normal rate and regular rhythm.      Heart sounds: No murmur heard.  Pulmonary:      Effort: Pulmonary effort is normal.      Breath sounds: Normal breath sounds.   Abdominal:      General: Bowel sounds are normal.      Palpations: Abdomen is soft. There is no mass.      Tenderness: There is no abdominal tenderness.   Musculoskeletal:         General: No swelling.   Neurological:      Mental Status: She is alert.       Results Review:    I have reviewed the laboratory and imaging data since the last note by [facility] physician.  My annotations are noted in assessment and plan.      Result Review:  I have personally reviewed the results from last note by [facility] physician to 2025 12:56 EDT and agree with these findings:  [x]  Laboratory list / accordion  [x]  Microbiology  [x]  Radiology  []  EKG/Telemetry   []  Cardiology/Vascular   []  Pathology  []  Old records  []  Other:      Medication Review:  I have reviewed the current MAR.  My annotations are noted in assessment and plan.    cefTRIAXone, 2,000 mg, Intravenous, Q24H  dextrose, , ,   mupirocin, 1 Application, Each Nare, BID  pantoprazole, 40 mg, Intravenous, Q AM  PHENobarbital, 129.6 mg, Oral, Nightly  pramipexole, 0.5 mg, Oral,  Nightly  senna-docusate sodium, 2 tablet, Oral, BID  sodium chloride, 10 mL, Intravenous, Q12H        heparin, 17.9 Units/kg/hr, Last Rate: 19.9 Units/kg/hr (04/26/25 0913)  norepinephrine, 0.02-0.3 mcg/kg/min, Last Rate: Stopped (04/26/25 1148)      Lines, Drains & Airways       Active LDAs       Name Placement date Placement time Site Days    Peripheral IV 04/23/25 2231 18 G Right Antecubital 04/23/25 2231  Antecubital  2    Peripheral IV 04/23/25 2253 20 G Anterior;Left Forearm 04/23/25 2253  Forearm  2    External Urinary Catheter 04/25/25  1900  --  less than 1                    PCCM Problems  Acute hypoxic respiratory failure  Hypotension, sepsis and acute PE  JACIEL  Acute submassive pulmonary embolism  Status post thrombectomy on 4/23/2025  Bilateral pulmonary infiltrates  UTI with Klebsiella pneumonia  Bacteremia with Klebsiella pneumonia  Recent left hip fracture repair  Iron deficiency anemia  Thyroid nodules  RLS  History CVA  Seizure disorder  Memory loss          THESE ARE NEW MEDICAL PROBLEMS TO ME.    Plan of Treatment    She no longer requires supplemental O2 and tolerating room air.    Hypotension likely due to sepsis and Klebsiella bacteremia.  Most likely urinary source.  Check renal ultrasound for hydronephrosis.  Consult ID for duration of antibiotics.    JACIEL and will monitor renal function.  Appears improved for now.    Submassive pulmonary embolism status post thrombectomy and tolerating heparin drip.  Plan to transition to NOAC if hemoglobin stable at 1800 hrs. today.    Drop in hemoglobin but more likely due to hemodilution.  No evidence of acute blood loss.    Recent left hip fracture and appreciate orthopedic input.    RLS send will adjust home Mirapex doses patient is presently immobile.    Continue home AEDs.    Plan transfer out of ICU if her blood pressure remains stable and hemoglobin does not drop any further.    Davi Douglas MD  04/26/25  12:56 EDT    Isolation status: No active  isolations    Dietary Orders (From admission, onward)       Start     Ordered    04/24/25 1131  Diet: Regular/House; Fluid Consistency: Thin (IDDSI 0)  Diet Effective Now        References:    Diet Order Definitions   Question Answer Comment   Diets: Regular/House    Fluid Consistency: Thin (IDDSI 0)        04/24/25 1131                    Part of this note may be an electronic transcription/translation of spoken language to printed text using the Dragon Dictation System.

## 2025-04-26 NOTE — PLAN OF CARE
Goal Outcome Evaluation:      Remain in ICU A&Ox4. Forgetful at times levo weaned off this morning. Remain on heparin drip vitals stable will continued to monitor.

## 2025-04-26 NOTE — CONSULTS
Referring Provider: Noe Mora MD  [id number]  [address] [address]  Reason for Consultation: Bacteremia    Subjective   History of present illness: This is a 80-year-old female with history of chronic venous insufficiency hyperlipidemia seizure disorder and a history of left prosthetic knee septic arthritis in 2016/2017 who was admitted on April 23 with hypotension and tachycardia.  The patient was last admitted to [facility] from March 4 through 13 with a complicated periprosthetic left femur fracture.  She underwent left MAXIMO revision and ORIF of the distal femoral condyle fracture on March 8.  She was discharged to rehab facility.  She presented to the emergency room in April 23 with complaints of diaphoresis hypotension and tachycardia.  Her blood pressure was found to be 60/45.  The patient denied any fevers or chills.  No abdominal pain nausea vomiting or diarrhea.  Admission blood work revealed a creatinine of 1.43 with a procalcitonin of 45 and a white blood cell count of 14,000.  He was empirically started on Zosyn.  CT angiogram of the chest revealed extensive PE and Doppler showed acute right lower extremity DVT.  She required pressor support and was admitted into the intensive care unit.  Admission blood cultures are now growing Klebsiella as is her urine.  She has been switched to ceftriaxone.    Past Medical History:   Diagnosis Date    Arthritis     Chronic venous insufficiency     Dupuytren's contracture     Hyperlipidemia     Lymphedema     Postsurgical hypothyroidism     Seizure disorder     Dr. Whitman, HX 1980 LAST SEIZURE    Sleep apnea     DOES NOT HAVE MACHINE    Vitamin D insufficiency        Past Surgical History:   Procedure Laterality Date    APPENDECTOMY      CHOLECYSTECTOMY N/A 11/2/2024    KNEE MINI REVISION Left 11/15/2016    Procedure: LEFT KNEE POLY CHANGE WITH HI POST STABILIZER;  Surgeon: Pablito Claudio MD;  Location: [facility];  Service:      KNEE MINI REVISION Left 12/13/2016    NY ARTHRP KNE CONDYLE&PLATU MEDIAL&LAT COMPARTMENTS Left 12/24/2016    Procedure: LEFT KNEE WASHOUT WITH POLY CHANGE;  Surgeon: Christiano Cesar MD;  Location: [facility] MAIN OR;  Service: Orthopedics    NY REVJ TOTAL KNEE ARTHRP W/WO ALGRFT 1 COMPONENT Left 2/20/2017    Procedure: LT KNEE REMOVAL ANTIBIOTIC SPACER AND KNEE REVISION;  Surgeon: Christiano Cesar MD;  Location: [facility] MAIN OR;  Service: Orthopedics    REPLACEMENT TOTAL KNEE Left     THYROIDECTOMY, PARTIAL      1979 LEFT LOBECTOMY AND ISTHMECTOMY     TOTAL ABDOMINAL HYSTERECTOMY WITH SALPINGO OOPHORECTOMY      TOTAL HIP ARTHROPLASTY Left     TOTAL HIP ARTHROPLASTY Right 9/14/2019    Procedure: TOTAL HIP ARTHROPLASTY ANTERIOR WITH HANA TABLE;  Surgeon: Christiano Cesar II, MD;  Location: [facility] MAIN OR;  Service: Orthopedics    TOTAL HIP ARTHROPLASTY REVISION Left 3/9/2021    Procedure: LEFT TOTAL HIP ARTHROPLASTY REVISION POSTERIOR;  Surgeon: Christiano Cesar II, MD;  Location: [facility] MAIN OR;  Service: Orthopedics;  Laterality: Left;    TOTAL HIP ARTHROPLASTY REVISION Left 3/8/2025    Procedure: TOTAL HIP ARTHROPLASTY REVISION;  Surgeon: Christiano Cesar II, MD;  Location: [facility] MAIN OR;  Service: Orthopedics;  Laterality: Left;    TOTAL KNEE  PROSTHESIS REMOVAL W/ SPACER INSERTION Left 12/29/2016    Procedure: LT KNEE IMPLANT REMOVAL WITH INSERTION OF SPACER ;  Surgeon: Christiano Cesar MD;  Location: [facility] MAIN OR;  Service:         reports that she has never smoked. She has never been exposed to tobacco smoke. She has never used smokeless tobacco. She reports that she does not drink alcohol and does not use drugs.    family history includes Diabetes in her sister; Heart disease in her father; Hyperlipidemia in her sister; Hypertension in her sister; Obesity in her sister.    Allergies   Allergen Reactions    Clindamycin/Lincomycin Itching    Duricef [Cefadroxil] Hives and Rash    Sulfa  Antibiotics Rash     RASH       Medication:  Antibiotics:  Ceftriaxone 2 g IV every 24 hours    Please refer to the medical record for a full medication list    Review of Systems  Pertinent items are noted in HPI, all other systems reviewed and negative    Objective   Vital Signs   Temp:  [97.2 °F (36.2 °C)-98.1 °F (36.7 °C)] 97.9 °F (36.6 °C)  Heart Rate:  [65-94] 86  Resp:  [16] 16  BP: ()/(47-77) 106/58    Physical Exam:   General: In no acute distress  HEENT: Normocephalic, atraumatic,  no scleral icterus.   Neck: Supple, trachea is midline  Cardiovascular: Normal rate, regular rhythm  Respiratory: Breathing comfortably on room air  GI: Abdomen is soft, nontender, nondistended, positive bowel sounds bilaterally  Musculoskeletal:  no edema, tenderness or deformity  Skin: No rashes   Extremities: No E/C/C  Neurological: Alert and oriented, moving all 4 extremities  Psychiatric: Normal mood and affect     Results Review:   I reviewed the patient's new clinical results.  I reviewed the patient's new imaging results and agree with the interpretation.    Lab Results   Component Value Date    WBC 12.45 (H) 04/26/2025    HGB 9.2 (L) 04/26/2025    HCT 27.4 (L) 04/26/2025    MCV 89.5 04/26/2025     04/26/2025       Lab Results   Component Value Date    GLUCOSE 92 04/26/2025    BUN 16 04/26/2025    CREATININE 0.69 04/26/2025    EGFRIFNONA 58 (L) 11/22/2024    EGFRIFAFRI 70 11/22/2024    BCR 23.2 04/26/2025    CO2 22.5 04/26/2025    CALCIUM 8.8 04/26/2025    ALBUMIN 3.2 (L) 04/23/2025    AST 29 04/23/2025    ALT 12 04/23/2025       Microbiology:  4/24 UCx >100K Klebsiella pneumonia  4/24 BCx Klebsiella pneumonia x 2    Radiology:  4/25 Dopplers positive for acute right lower extremity DVT    4/24 CT angiogram of the chest shows extensive pulmonary embolism     Assessment & Plan   Klebsiella bacteremia with presumed  source  Massive bilateral pulmonary emboli complicating above  Acute right lower extremity  DVT  Shock multifactorial  JACIEL -resolved    Continue ceftriaxone 2 g IV every 24 hours.  Will obtain repeat blood cultures x 2.  Leukocytosis is resolving.  Patient remains afebrile.  No evidence of a surgical site infection.  Anticipate a 10 to 14-day course of antibiotics given presence of recently placed hardware.  Patient will most likely be able to be discharged on oral antibiotic therapy    No evidence of resistant infection.  Continue standard precautions    I discussed the patient's findings and my recommendations with patient and nursing staff

## 2025-04-27 LAB
ALBUMIN SERPL-MCNC: 2.6 G/DL (ref 3.5–5.2)
ANION GAP SERPL CALCULATED.3IONS-SCNC: 6 MMOL/L (ref 5–15)
APTT PPP: 118 SECONDS (ref 22.7–35.4)
APTT PPP: 52.5 SECONDS (ref 22.7–35.4)
BASOPHILS # BLD AUTO: 0.02 10*3/MM3 (ref 0–0.2)
BASOPHILS NFR BLD AUTO: 0.3 % (ref 0–1.5)
BUN SERPL-MCNC: 10 MG/DL (ref 8–23)
BUN/CREAT SERPL: 19.2 (ref 7–25)
CALCIUM SPEC-SCNC: 9.2 MG/DL (ref 8.6–10.5)
CHLORIDE SERPL-SCNC: 109 MMOL/L (ref 98–107)
CO2 SERPL-SCNC: 21 MMOL/L (ref 22–29)
CREAT SERPL-MCNC: 0.52 MG/DL (ref 0.57–1)
DEPRECATED RDW RBC AUTO: 43.5 FL (ref 37–54)
EGFRCR SERPLBLD CKD-EPI 2021: 95.2 ML/MIN/1.73
EOSINOPHIL # BLD AUTO: 0 10*3/MM3 (ref 0–0.4)
EOSINOPHIL NFR BLD AUTO: 0 % (ref 0.3–6.2)
ERYTHROCYTE [DISTWIDTH] IN BLOOD BY AUTOMATED COUNT: 12.9 % (ref 12.3–15.4)
GLUCOSE SERPL-MCNC: 87 MG/DL (ref 65–99)
HCT VFR BLD AUTO: 26.4 % (ref 34–46.6)
HGB BLD-MCNC: 8.5 G/DL (ref 12–15.9)
IMM GRANULOCYTES # BLD AUTO: 0.04 10*3/MM3 (ref 0–0.05)
IMM GRANULOCYTES NFR BLD AUTO: 0.6 % (ref 0–0.5)
LYMPHOCYTES # BLD AUTO: 1.03 10*3/MM3 (ref 0.7–3.1)
LYMPHOCYTES NFR BLD AUTO: 15.1 % (ref 19.6–45.3)
MCH RBC QN AUTO: 29.4 PG (ref 26.6–33)
MCHC RBC AUTO-ENTMCNC: 32.2 G/DL (ref 31.5–35.7)
MCV RBC AUTO: 91.3 FL (ref 79–97)
MONOCYTES # BLD AUTO: 0.36 10*3/MM3 (ref 0.1–0.9)
MONOCYTES NFR BLD AUTO: 5.3 % (ref 5–12)
NEUTROPHILS NFR BLD AUTO: 5.39 10*3/MM3 (ref 1.7–7)
NEUTROPHILS NFR BLD AUTO: 78.7 % (ref 42.7–76)
NRBC BLD AUTO-RTO: 0 /100 WBC (ref 0–0.2)
PHOSPHATE SERPL-MCNC: 2 MG/DL (ref 2.5–4.5)
PHOSPHATE SERPL-MCNC: 2.7 MG/DL (ref 2.5–4.5)
PLATELET # BLD AUTO: 182 10*3/MM3 (ref 140–450)
PMV BLD AUTO: 11.5 FL (ref 6–12)
POTASSIUM SERPL-SCNC: 4.4 MMOL/L (ref 3.5–5.2)
RBC # BLD AUTO: 2.89 10*6/MM3 (ref 3.77–5.28)
SODIUM SERPL-SCNC: 136 MMOL/L (ref 136–145)
WBC NRBC COR # BLD AUTO: 6.84 10*3/MM3 (ref 3.4–10.8)

## 2025-04-27 PROCEDURE — 80069 RENAL FUNCTION PANEL: CPT | Performed by: INTERNAL MEDICINE

## 2025-04-27 PROCEDURE — 25010000002 HEPARIN (PORCINE) 25000-0.45 UT/250ML-% SOLUTION: Performed by: INTERNAL MEDICINE

## 2025-04-27 PROCEDURE — 25010000002 HEPARIN (PORCINE) PER 1000 UNITS: Performed by: INTERNAL MEDICINE

## 2025-04-27 PROCEDURE — 84100 ASSAY OF PHOSPHORUS: CPT | Performed by: INTERNAL MEDICINE

## 2025-04-27 PROCEDURE — 85730 THROMBOPLASTIN TIME PARTIAL: CPT | Performed by: INTERNAL MEDICINE

## 2025-04-27 PROCEDURE — 25810000003 SODIUM CHLORIDE 0.9 % SOLUTION: Performed by: INTERNAL MEDICINE

## 2025-04-27 PROCEDURE — G0545 PR INHERENT VISIT TO INPT: HCPCS | Performed by: INTERNAL MEDICINE

## 2025-04-27 PROCEDURE — 85025 COMPLETE CBC W/AUTO DIFF WBC: CPT | Performed by: INTERNAL MEDICINE

## 2025-04-27 PROCEDURE — 99232 SBSQ HOSP IP/OBS MODERATE 35: CPT | Performed by: INTERNAL MEDICINE

## 2025-04-27 PROCEDURE — 25010000002 CEFTRIAXONE PER 250 MG: Performed by: INTERNAL MEDICINE

## 2025-04-27 RX ORDER — IBUPROFEN 400 MG/1
400 TABLET, FILM COATED ORAL 3 TIMES DAILY
COMMUNITY

## 2025-04-27 RX ORDER — ACETAMINOPHEN 500 MG
500 TABLET ORAL EVERY 4 HOURS PRN
COMMUNITY

## 2025-04-27 RX ORDER — HYDROCODONE BITARTRATE AND ACETAMINOPHEN 7.5; 325 MG/1; MG/1
1 TABLET ORAL EVERY 4 HOURS PRN
COMMUNITY

## 2025-04-27 RX ORDER — HYDROXYZINE HYDROCHLORIDE 25 MG/1
25 TABLET, FILM COATED ORAL 3 TIMES DAILY PRN
COMMUNITY

## 2025-04-27 RX ADMIN — APIXABAN 10 MG: 5 TABLET, FILM COATED ORAL at 21:13

## 2025-04-27 RX ADMIN — MUPIROCIN 1 APPLICATION: 20 OINTMENT TOPICAL at 21:13

## 2025-04-27 RX ADMIN — LEVOTHYROXINE SODIUM 50 MCG: 50 TABLET ORAL at 06:10

## 2025-04-27 RX ADMIN — BUSPIRONE HYDROCHLORIDE 5 MG: 5 TABLET ORAL at 08:14

## 2025-04-27 RX ADMIN — SODIUM CHLORIDE 75 ML/HR: 9 INJECTION, SOLUTION INTRAVENOUS at 09:12

## 2025-04-27 RX ADMIN — PANTOPRAZOLE SODIUM 40 MG: 40 TABLET, DELAYED RELEASE ORAL at 06:10

## 2025-04-27 RX ADMIN — PRAMIPEXOLE DIHYDROCHLORIDE 0.25 MG: 0.25 TABLET ORAL at 13:37

## 2025-04-27 RX ADMIN — Medication 10 ML: at 08:15

## 2025-04-27 RX ADMIN — PHENOBARBITAL 64.8 MG: 32.4 TABLET ORAL at 21:12

## 2025-04-27 RX ADMIN — SODIUM PHOSPHATE, MONOBASIC, MONOHYDRATE AND SODIUM PHOSPHATE, DIBASIC, ANHYDROUS 15 MMOL: 142; 276 INJECTION, SOLUTION INTRAVENOUS at 08:14

## 2025-04-27 RX ADMIN — SODIUM CHLORIDE 2000 MG: 9 INJECTION, SOLUTION INTRAVENOUS at 17:01

## 2025-04-27 RX ADMIN — BUSPIRONE HYDROCHLORIDE 5 MG: 5 TABLET ORAL at 15:26

## 2025-04-27 RX ADMIN — BUSPIRONE HYDROCHLORIDE 5 MG: 5 TABLET ORAL at 21:11

## 2025-04-27 RX ADMIN — PRAMIPEXOLE DIHYDROCHLORIDE 0.25 MG: 0.25 TABLET ORAL at 06:12

## 2025-04-27 RX ADMIN — HEPARIN SODIUM 6700 UNITS: 5000 INJECTION INTRAVENOUS; SUBCUTANEOUS at 13:39

## 2025-04-27 RX ADMIN — Medication 10 ML: at 21:13

## 2025-04-27 RX ADMIN — HEPARIN SODIUM 16.9 UNITS/KG/HR: 10000 INJECTION, SOLUTION INTRAVENOUS at 09:52

## 2025-04-27 RX ADMIN — PRAMIPEXOLE DIHYDROCHLORIDE 0.25 MG: 0.25 TABLET ORAL at 21:11

## 2025-04-27 NOTE — PROGRESS NOTES
LOS: 3 days     Chief Complaint: Bacteremia    Interval History: Afebrile, no new complaints or events.  Tolerating antibiotics without vomiting diarrhea or rash    Vital Signs  Temp:  [97.3 °F (36.3 °C)-98 °F (36.7 °C)] 97.3 °F (36.3 °C)  Heart Rate:  [] 86  Resp:  [16-17] 17  BP: ()/(45-67) 95/51    Physical Exam:  General: In no acute distress  Cardiovascular: RRR  Respiratory: Bilateral crackles  GI: Soft, NT/ND,  Skin: No rashes     Antibiotics:  Ceftriaxone 2 g IV every 24 hours      Results Review:    Lab Results   Component Value Date    WBC 6.84 04/27/2025    HGB 8.5 (L) 04/27/2025    HCT 26.4 (L) 04/27/2025    MCV 91.3 04/27/2025     04/27/2025     Lab Results   Component Value Date    GLUCOSE 87 04/27/2025    BUN 10 04/27/2025    CREATININE 0.52 (L) 04/27/2025    EGFRIFNONA 58 (L) 11/22/2024    EGFRIFAFRI 70 11/22/2024    BCR 19.2 04/27/2025    CO2 21.0 (L) 04/27/2025    CALCIUM 9.2 04/27/2025    ALBUMIN 2.6 (L) 04/27/2025    AST 29 04/23/2025    ALT 12 04/23/2025       Microbiology:  4/26 BCx P x 2  4/24 UCx >100K Klebsiella pneumonia  4/24 BCx Klebsiella pneumonia x 2    Assessment & Plan   Klebsiella bacteremia with presumed  source  Massive bilateral pulmonary emboli complicating above  Acute right lower extremity DVT  Shock multifactorial  JACIEL -resolved    Repeat blood cultures are negative to date.  Continue ceftriaxone 2 g IV every 24 hours while in the hospital.  At discharge transition to Levaquin 750 mg p.o. daily to complete a 14-day course of antibiotics.  Longer course due to recently placed hardware and massive clot burden.  Antibiotic stop date May 6.    As long as repeat blood cultures are negative for 48 hours patient is okay for discharge from an ID standpoint.    ID will sign off.  Please do not hesitate to call us with further questions or concerns

## 2025-04-27 NOTE — PLAN OF CARE
Goal Outcome Evaluation:  Plan of Care Reviewed With: patient        Progress: improving  Outcome Evaluation: transfer from ICU, extensive PEs, heparin gtt to stop at 1900 and will start Eliquis tonight, iv abx for bacteremia, non-weightbearing on left leg, bed rest, oriented but can get forgetful, bp soft, vss, will continue to monitor

## 2025-04-28 LAB — GLUCOSE BLDC GLUCOMTR-MCNC: 118 MG/DL (ref 70–130)

## 2025-04-28 PROCEDURE — 25010000002 CEFTRIAXONE PER 250 MG: Performed by: INTERNAL MEDICINE

## 2025-04-28 RX ADMIN — Medication 10 ML: at 08:56

## 2025-04-28 RX ADMIN — Medication 10 ML: at 23:14

## 2025-04-28 RX ADMIN — PRAMIPEXOLE DIHYDROCHLORIDE 0.25 MG: 0.25 TABLET ORAL at 23:15

## 2025-04-28 RX ADMIN — APIXABAN 10 MG: 5 TABLET, FILM COATED ORAL at 23:15

## 2025-04-28 RX ADMIN — BUSPIRONE HYDROCHLORIDE 5 MG: 5 TABLET ORAL at 08:51

## 2025-04-28 RX ADMIN — APIXABAN 10 MG: 5 TABLET, FILM COATED ORAL at 08:51

## 2025-04-28 RX ADMIN — HYDROCODONE BITARTRATE AND ACETAMINOPHEN 1 TABLET: 5; 325 TABLET ORAL at 23:19

## 2025-04-28 RX ADMIN — BUSPIRONE HYDROCHLORIDE 5 MG: 5 TABLET ORAL at 23:15

## 2025-04-28 RX ADMIN — PRAMIPEXOLE DIHYDROCHLORIDE 0.25 MG: 0.25 TABLET ORAL at 06:39

## 2025-04-28 RX ADMIN — LEVOTHYROXINE SODIUM 50 MCG: 50 TABLET ORAL at 06:39

## 2025-04-28 RX ADMIN — PRAMIPEXOLE DIHYDROCHLORIDE 0.25 MG: 0.25 TABLET ORAL at 16:11

## 2025-04-28 RX ADMIN — PANTOPRAZOLE SODIUM 40 MG: 40 TABLET, DELAYED RELEASE ORAL at 06:39

## 2025-04-28 RX ADMIN — PHENOBARBITAL 64.8 MG: 32.4 TABLET ORAL at 23:15

## 2025-04-28 RX ADMIN — SODIUM CHLORIDE 2000 MG: 9 INJECTION, SOLUTION INTRAVENOUS at 16:11

## 2025-04-28 RX ADMIN — MUPIROCIN 1 APPLICATION: 20 OINTMENT TOPICAL at 08:51

## 2025-04-28 RX ADMIN — BUSPIRONE HYDROCHLORIDE 5 MG: 5 TABLET ORAL at 16:11

## 2025-04-28 NOTE — CASE MANAGEMENT/SOCIAL WORK
Continued Stay Note  [facility]     Patient Name: Mariela Ruiz  MRN: [id number]  Today's Date: 4/28/2025    Admit Date: 4/23/2025    Plan: [facility], bed available tomorrow   Discharge Plan       Row Name 04/28/25 1241       Plan    Plan [facility], bed available tomorrow    Patient/Family in Agreement with Plan yes    Plan Comments Pt discussed in AM huddle; pt on Eliquis and Heparin gtt DC'd. [facility] notified and bed available tomorrow. [facility] updated pt at the bedside and she is agreeable. CCP discussed transport to [facility]; pt is agreeable to [facility] transport. Partial packet in CCP office, pharmacy updated to [facility] in [facility]. Domenico PAULINO/CCP                   Discharge Codes    No documentation.                 Expected Discharge Date and Time       Expected Discharge Date Expected Discharge Time    Apr 29, 2025               Bernadette Swan RN

## 2025-04-28 NOTE — PROGRESS NOTES
Daily Progress Note  [facility]   04/28/25      Patient Name:  Mariela Ruiz  MRN:  [id number]   YOB: 1945  Age: 80 y.o.  Sex: female  LOS: 4    Reason for Consult:  Hypotension    Hospital Course:   80-year-old female who presented to the hospital with hypotension on 4/23 and found to have saddle pulmonary embolism with obstructive shock in addition to urinary tract infection.    Interval History:  No acute events overnight  Sitting in bed comfortably  Denies any shortness of breath  No chest pain or palpitations  No nausea or vomiting  No lower extremity pain    Physical Exam:  Vitals:    04/28/25 0745   BP: 129/71   Pulse: 100   Resp: 18   Temp: 97.9 °F (36.6 °C)   SpO2: 95%       Intake/Output    Intake/Output Summary (Last 24 hours) at 4/28/2025 1510  Last data filed at 4/28/2025 0500  Gross per 24 hour   Intake 1727 ml   Output 1150 ml   Net 577 ml       General: Alert, nontoxic, NAD  HEENT: NC/AT, EOMI, MMM  Neck: Supple, trachea midline  Cardiac: RRR, no murmur, gallops, rubs  Pulmonary: Clear to auscultation bilaterally, no adventitious breath sounds, normal respiratory effort  GI: Soft, non-tender, non-distended, normal bowel sounds  Extremities: Warm, well perfused, no LE edema  Skin: no visible rash  Neuro: CN II - XII grossly intact  Psychiatry: Normal mood and affect      Data Review:  Results from last 7 days   Lab Units 04/27/25  0353 04/26/25  1840 04/26/25  0536 04/25/25  0448 04/24/25  1121 04/24/25  1057 04/24/25  0458 04/23/25  2232   WBC 10*3/mm3 6.84 9.08 12.45* 26.07*  --   --  42.10* 14.36*   HEMOGLOBIN g/dL 8.5* 8.8* 9.2* 11.3*  --   --  12.1 11.0*   HEMOGLOBIN, POC g/dL  --   --   --   --  10.9* 11.9*  --   --    PLATELETS 10*3/mm3 182 189 184 236  --   --  250 191     Results from last 7 days   Lab Units 04/27/25  1530 04/27/25  0353 04/26/25  0536 04/25/25  0448 04/24/25  0458 04/23/25  2232   SODIUM mmol/L  --  136 141 137 137 135*   POTASSIUM mmol/L   --  4.4 4.5 4.3 3.3* 3.5   CHLORIDE mmol/L  --  109* 107 105 102 100   CO2 mmol/L  --  21.0* 22.5 21.0* 23.2 25.0   BUN mg/dL  --  10 16 21 22 21   CREATININE mg/dL  --  0.52* 0.69 1.05* 1.44* 1.43*   GLUCOSE mg/dL  --  87 92 101* 135* 120*   CALCIUM mg/dL  --  9.2 8.8 8.3* 8.8 9.2   PHOSPHORUS mg/dL 2.7 2.0*  --   --   --   --    Estimated Creatinine Clearance: 98.4 mL/min (A) (by C-G formula based on SCr of 0.52 mg/dL (L)).    Results from last 7 days   Lab Units 04/27/25  0353 04/26/25  1840 04/26/25  0536 04/24/25  0458 04/23/25  2232   AST (SGOT) U/L  --   --   --   --  29   ALT (SGPT) U/L  --   --   --   --  12   PROCALCITONIN ng/mL  --   --   --   --  45.70*   LACTATE mmol/L  --   --   --   --  1.9   PLATELETS 10*3/mm3 182 189 184   < > 191    < > = values in this interval not displayed.             Result Review:  I have personally reviewed the results from the time of this admission to 4/28/2025 15:10 EDT and agree with these findings:  [x]  Laboratory list / accordion  [x]  Microbiology  [x]  Radiology  []  EKG/Telemetry   [x]  Cardiology/Vascular   []  Pathology  [x]  Old records  []  Other:    Imaging:  Reviewed chest images personally from past 3 days    ASSESSMENT  /  PLAN:    Acute hypoxic respiratory failure  Shock, septic and obstructive from PE  Acute kidney injury  Acute submassive pulmonary embolism  Status post thrombectomy on 4/23/2025  Bilateral pulmonary infiltrates  UTI with Klebsiella pneumonia  Bacteremia with Klebsiella pneumonia  Recent left hip fracture repair  Iron deficiency anemia  Thyroid nodules  RLS  History CVA  Seizure disorder  Memory loss    -Patient presented to the hospital with hypotension and found to have saddle pulmonary embolism in addition to septic shock secondary to urinary tract infection.  Underwent thrombectomy on 4/23.  - Transferred out of the ICU, was able to be weaned to room air  - Hypotension resolved  - Transition from heparin to apixaban for  anticoagulation for PE  - On ceftriaxone therapy for urinary tract infection secondary to Klebsiella with bacteremia.  Plan is to complete 14-day course of antibiotics with transition to Levaquin on discharge.  - JACIEL resolved  -Hemoglobin stable  -Recent left hip fracture, will need to follow-up with Ortho as an outpatient   -Continue home antiepileptics  -Discussed with patient at bedside, no family present.  - Plan is for discharge to [facility] tomorrow.    All issues new to me today.  Prior hospital course, labs and imaging reviewed.    GI prophylaxis: Present  DVT prophylaxis: Apixaban  Malin catheter: No  Bowel regimen: Ordered  Nutrition: Regular    Disposition: Rehab pending    Dylan Piedra MD  [facility]  Pulmonary and Critical Care Medicine, Interventional Pulmonology    Parts of this note may be an electronic transcription/translation of spoken language to printed text using the Dragon dictation system.

## 2025-04-28 NOTE — PLAN OF CARE
Goal Outcome Evaluation:  Plan of Care Reviewed With: patient        Progress: improving    Pt is alert and oriented.  VSS.  IV antibiotics given.  3 BMs this shift.  Planning to d/c tomorrow.  Now on PO Eliquis.  Will continue to monitor.

## 2025-04-29 VITALS
DIASTOLIC BLOOD PRESSURE: 67 MMHG | SYSTOLIC BLOOD PRESSURE: 118 MMHG | WEIGHT: 173.94 LBS | OXYGEN SATURATION: 96 % | BODY MASS INDEX: 26.36 KG/M2 | TEMPERATURE: 98.2 F | RESPIRATION RATE: 18 BRPM | HEIGHT: 68 IN | HEART RATE: 101 BPM

## 2025-04-29 RX ORDER — LEVOFLOXACIN 750 MG/1
750 TABLET, FILM COATED ORAL DAILY
Qty: 8 TABLET | Refills: 0 | Status: SHIPPED | OUTPATIENT
Start: 2025-04-29 | End: 2025-05-07

## 2025-04-29 RX ADMIN — PRAMIPEXOLE DIHYDROCHLORIDE 0.25 MG: 0.25 TABLET ORAL at 06:27

## 2025-04-29 RX ADMIN — APIXABAN 10 MG: 5 TABLET, FILM COATED ORAL at 08:28

## 2025-04-29 RX ADMIN — BUSPIRONE HYDROCHLORIDE 5 MG: 5 TABLET ORAL at 08:28

## 2025-04-29 RX ADMIN — HYDROCODONE BITARTRATE AND ACETAMINOPHEN 1 TABLET: 5; 325 TABLET ORAL at 13:11

## 2025-04-29 RX ADMIN — LEVOTHYROXINE SODIUM 50 MCG: 50 TABLET ORAL at 06:27

## 2025-04-29 RX ADMIN — PANTOPRAZOLE SODIUM 40 MG: 40 TABLET, DELAYED RELEASE ORAL at 06:27

## 2025-04-29 RX ADMIN — Medication 10 ML: at 08:34

## 2025-04-29 NOTE — CASE MANAGEMENT/SOCIAL WORK
Continued Stay Note  [facility]     Patient Name: Mariela Ruiz  MRN: [id number]  Today's Date: 4/29/2025    Admit Date: 4/23/2025    Plan: [facility] via [facility] at 1:45pm   Discharge Plan       Row Name 04/29/25 1252       Plan    Plan [facility] via [facility] at 1:45pm    Patient/Family in Agreement with Plan yes    Plan Comments DC orders noted. [facility] transport arranged for 1:45pm today. MD, RN and [facility] notified. Packet given to RN. Domenico PAULINO/CCP    Final Discharge Disposition Code 03 - skilled nursing facility (SNF)    Final Note [facility] at 1:45pm. No additional CCP needs.                   Discharge Codes    No documentation.                 Expected Discharge Date and Time       Expected Discharge Date Expected Discharge Time    Apr 29, 2025               Bernadette Swan RN     normal...

## 2025-04-29 NOTE — DISCHARGE INSTRUCTIONS
You were found to have Klebsiella urinary tract infection complicated by bacteremia also.  You are to take levofloxacin 750 mg daily with last dose on 5/6.    You were also treated for pulmonary embolism in order to take apixaban therapy as prescribed.    You will need to follow-up with your primary care physician after discharge

## 2025-04-29 NOTE — DISCHARGE PLACEMENT REQUEST
"Marsha Ruiz (80 y.o. Female)       Date of Birth   1945    Social Security Number       Address   [address] [place] [address] [facility] [place] [address]    Home Phone   [phone]    MRN   [id number]       [facility]    Marital Status                               Admission Date   4/23/2025    Admission Type   Emergency    Admitting Provider   Lucho Mcknight Jr., MD    Attending Provider   Lucho Mcknight Jr., MD    Department, Room/Bed   [address], S416/1       Discharge Date       Discharge Disposition   Skilled Nursing Facility (DC - External)    Discharge Destination                                 Attending Provider: Lucho Mcknight Jr., MD    Allergies: Clindamycin/lincomycin, Duricef [Cefadroxil], Sulfa Antibiotics    Isolation: None   Infection: None   Code Status: CPR    Ht: 172.7 cm (68\")   Wt: 78.9 kg (173 lb 15.1 oz)    Admission Cmt: None   Principal Problem: Saddle pulmonary embolus [I26.92]                   Active Insurance as of 4/23/2025       Primary Coverage       Payor Plan Insurance Group Employer/Plan Group    MEDICARE RAILROAD MEDICARE        Payor Plan Address Payor Plan Phone Number Payor Plan Fax Number Effective Dates    [address] [phone]  2/1/2012 - None Entered    [facility] [id number]         Subscriber Name Subscriber Birth Date Member ID       MARSHA RUIZ 1945 [id number]               Secondary Coverage       Payor Plan Insurance Group Employer/Plan Group    [employer] [place] [employer] [place] [place]       Payor Plan Address Payor Plan Phone Number Payor Plan Fax Number Effective Dates    [address] [place] [address] [phone]  2/1/2012 - None Entered    [place] [address]         Subscriber Name Subscriber Birth Date Member ID       MARSHA RUIZ 1945 [id number]                     Emergency Contacts        (Rel.) Home Phone Work Phone Mobile Phone    Ronda Keita (Daughter) " [phone] -- [phone]    Justo Collier (Son) [phone] -- --                 Discharge Summary        Dylan Piedra MD at 04/29/25 1024                                                                             PHYSICIAN DISCHARGE SUMMARY                                                                        [facility]    Patient Identification:  Patient Name:  Mariela Ruiz  MRN:  [id number]   YOB: 1945  Age: 80 y.o.  Sex: female  Primary Care Physician: Edgar Husain MD    Admit date: 4/23/2025  Discharge date and time: No discharge date for patient encounter.   Discharged Condition: fair    Discharge Diagnoses:  Saddle pulmonary embolus       Hospital Course: Mariela Ruiz presented to [facility] on 4/23 for hypotension and diaphoresis.  She was recently hospitalized from March 4 to March 13 after mechanical fall for which she sustained a left MAXIMO periprosthetic fracture and left TKA loosening and underwent a left MAXIMO revision and ORIF of distal femoral condyle fracture on 3/8.  Was discharged to rehab facility at that time.  On presentation in April 23 had diaphoresis, hypotension, tachycardia and was found to have septic shock secondary to urinary source in addition to large bilateral pulmonary embolism for which she was started on heparin drip.  Ultimately underwent thrombectomy on 4/23.  Hospital course was complicated by acute kidney injury which is now resolved.  Found to have Klebsiella urinary tract infection and bacteremia for which she was treated with ceftriaxone and transition to levofloxacin upon discharge.  Transition to apixaban therapy for pulmonary embolism.  Plan is for discharge to [facility] today.    Consults:   IP CONSULT TO PULMONOLOGY  IP CONSULT TO INTERVENTIONAL CARDIOLOGY  IP CONSULT TO ORTHOPEDIC SURGERY  IP CONSULT TO INFECTIOUS DISEASES    Significant Diagnostic Studies:   Results from last 7 days    Lab Units 04/27/25 0353 04/26/25  1840 04/26/25 0536 04/25/25 0448 04/24/25  1121 04/24/25  1057 04/24/25 0458 04/23/25 2232   WBC 10*3/mm3 6.84 9.08 12.45* 26.07*  --   --  42.10* 14.36*   HEMOGLOBIN g/dL 8.5* 8.8* 9.2* 11.3*  --   --  12.1 11.0*   HEMOGLOBIN, POC g/dL  --   --   --   --  10.9* 11.9*  --   --    PLATELETS 10*3/mm3 182 189 184 236  --   --  250 191     Results from last 7 days   Lab Units 04/27/25  1530 04/27/25 0353 04/26/25 0536 04/25/25 0448 04/24/25 0458 04/23/25 2232   SODIUM mmol/L  --  136 141 137 137 135*   POTASSIUM mmol/L  --  4.4 4.5 4.3 3.3* 3.5   CHLORIDE mmol/L  --  109* 107 105 102 100   CO2 mmol/L  --  21.0* 22.5 21.0* 23.2 25.0   BUN mg/dL  --  10 16 21 22 21   CREATININE mg/dL  --  0.52* 0.69 1.05* 1.44* 1.43*   GLUCOSE mg/dL  --  87 92 101* 135* 120*   CALCIUM mg/dL  --  9.2 8.8 8.3* 8.8 9.2   PHOSPHORUS mg/dL 2.7 2.0*  --   --   --   --    Estimated Creatinine Clearance: 98.4 mL/min (A) (by C-G formula based on SCr of 0.52 mg/dL (L)).    Results from last 7 days   Lab Units 04/27/25 0353 04/26/25 1840 04/26/25 0536 04/24/25 0458 04/23/25 2232   AST (SGOT) U/L  --   --   --   --  29   ALT (SGPT) U/L  --   --   --   --  12   PROCALCITONIN ng/mL  --   --   --   --  45.70*   LACTATE mmol/L  --   --   --   --  1.9   PLATELETS 10*3/mm3 182 189 184   < > 191    < > = values in this interval not displayed.             Discharge Exam:  General: Alert, nontoxic, NAD  HEENT: NC/AT, EOMI, MMM  Neck: Supple, trachea midline  Cardiac: RRR, no murmur, gallops, rubs  Pulmonary: Clear to auscultation bilaterally, no adventitious breath sounds, normal respiratory effort  GI: Soft, non-tender, non-distended, normal bowel sounds  Extremities: Warm, well perfused, no LE edema  Skin: no visible rash  Neuro: CN II - XII grossly intact  Psychiatry: Normal mood and affect     Disposition:  Rehab    Patient Instructions:      Discharge Medications        New Medications         Instructions Start Date   apixaban 5 MG tablet tablet  Commonly known as: ELIQUIS   Take 2 tablets by mouth Every 12 (Twelve) Hours for 5 days, THEN 1 tablet Every 12 (Twelve) Hours for 30 days. Indications: DVT/PE (active thrombosis)   Start Date: April 29, 2025     levoFLOXacin 750 MG tablet  Commonly known as: Levaquin   750 mg, Oral, Daily             Changes to Medications        Instructions Start Date   bisacodyl 5 MG EC tablet  Commonly known as: DULCOLAX  What changed: how much to take   5 mg, Oral, Daily PRN      busPIRone 5 MG tablet  Commonly known as: BUSPAR  What changed: how much to take   5 mg, Oral, 3 Times Daily      pravastatin 40 MG tablet  Commonly known as: PRAVACHOL  What changed: See the new instructions.   TAKE 1 TABLET BY MOUTH EVERY NIGHT FOR HIGH AMOUNT OF FATS IN THE BLOOD             Continue These Medications        Instructions Start Date   acetaminophen 500 MG tablet  Commonly known as: TYLENOL   500 mg, Every 4 Hours PRN      aspirin 81 MG EC tablet   81 mg, Oral, Daily      cholecalciferol 25 MCG (1000 UT) tablet  Commonly known as: VITAMIN D3   4,000 Units, Oral, Daily      famotidine 20 MG tablet  Commonly known as: PEPCID   20 mg, Oral, 2 Times Daily Before Meals      HYDROcodone-acetaminophen 7.5-325 MG per tablet  Commonly known as: NORCO   1 tablet, Every 4 Hours PRN      hydrOXYzine 25 MG tablet  Commonly known as: ATARAX   25 mg, 3 Times Daily PRN      ibuprofen 400 MG tablet  Commonly known as: ADVIL,MOTRIN   400 mg, 3 Times Daily      melatonin 5 MG tablet tablet   2.5 mg, Oral, Nightly PRN      PHENobarbital 64.8 MG tablet   129.6 mg, Oral, Nightly      polyethylene glycol 17 g packet  Commonly known as: MIRALAX   17 g, Oral, 2 Times Daily      pramipexole 0.5 MG tablet  Commonly known as: MIRAPEX   2.5 mg, Oral, Nightly      sennosides-docusate 8.6-50 MG per tablet  Commonly known as: PERICOLACE   2 tablets, Oral, 2 Times Daily      Synthroid 50 MCG tablet  Generic  drug: levothyroxine   50 mcg, Oral, Every Morning      Vitamin B-12 1000 MCG sublingual tablet   1 tablet daily             Stop These Medications      enoxaparin sodium 40 MG/0.4ML solution prefilled syringe syringe  Commonly known as: LOVENOX             Contact information for follow-up providers       Edgar Husain MD .    Specialty: Family Medicine  Contact information:  [address] [facility] [place] [id number]  [phone]                       Contact information for after-discharge care       Destination       [facility] .    Service: Skilled Nursing  Contact information:  [address] [facility] [address] [phone]                                    Medication Reconciliation: Please see electronically completed Med Rec.    Total time spent discharging patient including evaluation, medication reconciliation, arranging follow up, and post hospitalization instructions and education total time exceeds 30 minutes.    Signed:  Dylan Piedra MD  4/29/2025  10:24 EDT      Electronically signed by Dylan Piedra MD at 04/29/25 1143

## 2025-04-29 NOTE — DISCHARGE SUMMARY
PHYSICIAN DISCHARGE SUMMARY                                                                        [facility]    Patient Identification:  Patient Name:  Mariela Ruiz  MRN:  [id number]   YOB: 1945  Age: 80 y.o.  Sex: female  Primary Care Physician: Edgar Husain MD    Admit date: 4/23/2025  Discharge date and time: No discharge date for patient encounter.   Discharged Condition: fair    Discharge Diagnoses:  Saddle pulmonary embolus       Hospital Course: Mariela Ruiz presented to [facility] on 4/23 for hypotension and diaphoresis.  She was recently hospitalized from March 4 to March 13 after mechanical fall for which she sustained a left MAXIMO periprosthetic fracture and left TKA loosening and underwent a left MAXIMO revision and ORIF of distal femoral condyle fracture on 3/8.  Was discharged to rehab facility at that time.  On presentation in April 23 had diaphoresis, hypotension, tachycardia and was found to have septic shock secondary to urinary source in addition to large bilateral pulmonary embolism for which she was started on heparin drip.  Ultimately underwent thrombectomy on 4/23.  Hospital course was complicated by acute kidney injury which is now resolved.  Found to have Klebsiella urinary tract infection and bacteremia for which she was treated with ceftriaxone and transition to levofloxacin upon discharge.  Transition to apixaban therapy for pulmonary embolism.  Plan is for discharge to [facility] today.    Consults:   IP CONSULT TO PULMONOLOGY  IP CONSULT TO INTERVENTIONAL CARDIOLOGY  IP CONSULT TO ORTHOPEDIC SURGERY  IP CONSULT TO INFECTIOUS DISEASES    Significant Diagnostic Studies:   Results from last 7 days   Lab Units 04/27/25  0353 04/26/25  1840 04/26/25  0536 04/25/25  0448 04/24/25  1121 04/24/25  1057 04/24/25  0458 04/23/25  2232   WBC 10*3/mm3 6.84 9.08 12.45*  26.07*  --   --  42.10* 14.36*   HEMOGLOBIN g/dL 8.5* 8.8* 9.2* 11.3*  --   --  12.1 11.0*   HEMOGLOBIN, POC g/dL  --   --   --   --  10.9* 11.9*  --   --    PLATELETS 10*3/mm3 182 189 184 236  --   --  250 191     Results from last 7 days   Lab Units 04/27/25  1530 04/27/25  0353 04/26/25  0536 04/25/25  0448 04/24/25  0458 04/23/25  2232   SODIUM mmol/L  --  136 141 137 137 135*   POTASSIUM mmol/L  --  4.4 4.5 4.3 3.3* 3.5   CHLORIDE mmol/L  --  109* 107 105 102 100   CO2 mmol/L  --  21.0* 22.5 21.0* 23.2 25.0   BUN mg/dL  --  10 16 21 22 21   CREATININE mg/dL  --  0.52* 0.69 1.05* 1.44* 1.43*   GLUCOSE mg/dL  --  87 92 101* 135* 120*   CALCIUM mg/dL  --  9.2 8.8 8.3* 8.8 9.2   PHOSPHORUS mg/dL 2.7 2.0*  --   --   --   --    Estimated Creatinine Clearance: 98.4 mL/min (A) (by C-G formula based on SCr of 0.52 mg/dL (L)).    Results from last 7 days   Lab Units 04/27/25  0353 04/26/25  1840 04/26/25  0536 04/24/25 0458 04/23/25  2232   AST (SGOT) U/L  --   --   --   --  29   ALT (SGPT) U/L  --   --   --   --  12   PROCALCITONIN ng/mL  --   --   --   --  45.70*   LACTATE mmol/L  --   --   --   --  1.9   PLATELETS 10*3/mm3 182 189 184   < > 191    < > = values in this interval not displayed.             Discharge Exam:  General: Alert, nontoxic, NAD  HEENT: NC/AT, EOMI, MMM  Neck: Supple, trachea midline  Cardiac: RRR, no murmur, gallops, rubs  Pulmonary: Clear to auscultation bilaterally, no adventitious breath sounds, normal respiratory effort  GI: Soft, non-tender, non-distended, normal bowel sounds  Extremities: Warm, well perfused, no LE edema  Skin: no visible rash  Neuro: CN II - XII grossly intact  Psychiatry: Normal mood and affect     Disposition:  Rehab    Patient Instructions:      Discharge Medications        New Medications        Instructions Start Date   apixaban 5 MG tablet tablet  Commonly known as: ELIQUIS   Take 2 tablets by mouth Every 12 (Twelve) Hours for 5 days, THEN 1 tablet Every 12 (Twelve)  Hours for 30 days. Indications: DVT/PE (active thrombosis)   Start Date: April 29, 2025     levoFLOXacin 750 MG tablet  Commonly known as: Levaquin   750 mg, Oral, Daily             Changes to Medications        Instructions Start Date   bisacodyl 5 MG EC tablet  Commonly known as: DULCOLAX  What changed: how much to take   5 mg, Oral, Daily PRN      busPIRone 5 MG tablet  Commonly known as: BUSPAR  What changed: how much to take   5 mg, Oral, 3 Times Daily      pravastatin 40 MG tablet  Commonly known as: PRAVACHOL  What changed: See the new instructions.   TAKE 1 TABLET BY MOUTH EVERY NIGHT FOR HIGH AMOUNT OF FATS IN THE BLOOD             Continue These Medications        Instructions Start Date   acetaminophen 500 MG tablet  Commonly known as: TYLENOL   500 mg, Every 4 Hours PRN      aspirin 81 MG EC tablet   81 mg, Oral, Daily      cholecalciferol 25 MCG (1000 UT) tablet  Commonly known as: VITAMIN D3   4,000 Units, Oral, Daily      famotidine 20 MG tablet  Commonly known as: PEPCID   20 mg, Oral, 2 Times Daily Before Meals      HYDROcodone-acetaminophen 7.5-325 MG per tablet  Commonly known as: NORCO   1 tablet, Every 4 Hours PRN      hydrOXYzine 25 MG tablet  Commonly known as: ATARAX   25 mg, 3 Times Daily PRN      ibuprofen 400 MG tablet  Commonly known as: ADVIL,MOTRIN   400 mg, 3 Times Daily      melatonin 5 MG tablet tablet   2.5 mg, Oral, Nightly PRN      PHENobarbital 64.8 MG tablet   129.6 mg, Oral, Nightly      polyethylene glycol 17 g packet  Commonly known as: MIRALAX   17 g, Oral, 2 Times Daily      pramipexole 0.5 MG tablet  Commonly known as: MIRAPEX   2.5 mg, Oral, Nightly      sennosides-docusate 8.6-50 MG per tablet  Commonly known as: PERICOLACE   2 tablets, Oral, 2 Times Daily      Synthroid 50 MCG tablet  Generic drug: levothyroxine   50 mcg, Oral, Every Morning      Vitamin B-12 1000 MCG sublingual tablet   1 tablet daily             Stop These Medications      enoxaparin sodium 40  MG/0.4ML solution prefilled syringe syringe  Commonly known as: LOVENOX             Contact information for follow-up providers       Edgar Husain MD .    Specialty: Family Medicine  Contact information:  [address] [facility] [place] [address] [phone]                       Contact information for after-discharge care       Destination       [facility] .    Service: Skilled Nursing  Contact information:  [address] [facility] [address] [phone]                                    Medication Reconciliation: Please see electronically completed Med Rec.    Total time spent discharging patient including evaluation, medication reconciliation, arranging follow up, and post hospitalization instructions and education total time exceeds 30 minutes.    Signed:  Dylan Piedra MD  4/29/2025  10:24 EDT

## 2025-04-29 NOTE — CASE MANAGEMENT/SOCIAL WORK
Case Management Discharge Note      [facility]. No additional CCP needs.         Selected Continued Care - Admitted Since 4/23/2025       Destination Coordination complete.      Service Provider Services Address Phone Fax Patient Preferred    [facility] [address] [facility] [address] [phone] --              Durable Medical Equipment    No services have been selected for the patient.                Dialysis/Infusion    No services have been selected for the patient.                Home Medical Care    No services have been selected for the patient.                Therapy    No services have been selected for the patient.                Community Resources    No services have been selected for the patient.                Community & DME    No services have been selected for the patient.                    Selected Continued Care - Prior Encounters Includes continued care and service providers with selected services from prior encounters from 1/23/2025 to 4/29/2025      Discharged on 3/13/2025 Admission date: 3/4/2025 - Discharge disposition: Skilled Nursing Facility (DC - External)      Destination       Service Provider Services Address Phone Fax Patient Preferred    [facility] [address] [facility] [address] [phone] --                          Transportation Services  W/C Van: Kashif Marinelli    Final Discharge Disposition Code: 03 - skilled nursing facility (SNF)

## 2025-04-29 NOTE — PLAN OF CARE
Problem: Adult Inpatient Plan of Care  Goal: Absence of Hospital-Acquired Illness or Injury  Intervention: Identify and Manage Fall Risk  Recent Flowsheet Documentation  Taken 4/29/2025 0836 by oRyce Nixon RN  Safety Promotion/Fall Prevention:   room organization consistent   safety round/check completed   activity supervised   assistive device/personal items within reach     Problem: Adult Inpatient Plan of Care  Goal: Absence of Hospital-Acquired Illness or Injury  Intervention: Prevent Skin Injury  Recent Flowsheet Documentation  Taken 4/29/2025 0836 by Royce Nixon RN  Body Position:   turned   right   lower extremity elevated   tilted     Problem: Adult Inpatient Plan of Care  Goal: Absence of Hospital-Acquired Illness or Injury  Intervention: Prevent Infection  Recent Flowsheet Documentation  Taken 4/29/2025 0836 by Royce Nixon RN  Infection Prevention: single patient room provided   Goal Outcome Evaluation:           Progress: improving  Outcome Evaluation: Patient AO x 4, VSS, use oxygen only at night on bed rest with purewick, will be discharge today at [facility] as soon patient get transportation.

## 2025-04-30 ENCOUNTER — TELEPHONE (OUTPATIENT)
Dept: NEUROLOGY | Facility: CLINIC | Age: 80
End: 2025-04-30
Payer: MEDICARE

## 2025-04-30 DIAGNOSIS — G40.909 SEIZURE DISORDER: Primary | ICD-10-CM

## 2025-04-30 RX ORDER — LEVETIRACETAM 500 MG/1
500 TABLET ORAL 2 TIMES DAILY
Qty: 60 TABLET | Refills: 4 | Status: SHIPPED | OUTPATIENT
Start: 2025-04-30 | End: 2025-05-30

## 2025-04-30 NOTE — TELEPHONE ENCOUNTER
Provider: FELTON    Caller: MANINDER    Relationship to Patient: [facility]    Phone Number: [phone]     Reason for Call: PATIENT WAS RECENTLY IN THE HOSPITAL AND IS NOW BEING TREATED FOR PE WITH ELIQUIS. MANINDER IS REQUESTING A CALL BACK DUE TO IT INTERACTING WITH THE PATIENTS PHENOBARBITAL. THEY ARE REQUESTING TO SWITCH HER TO [facility] AND WEAN HER FROM PHENOBARBITAL. MANINDER WOULD LIKE TO NOTE THAT THE PATIENT STATES SHE HAS NOT HAD A SEIZURE IN OVER 20 YEARS.    PLEASE REVIEW    THANK YOU

## 2025-05-01 LAB
BACTERIA SPEC AEROBE CULT: NORMAL
BACTERIA SPEC AEROBE CULT: NORMAL

## 2025-05-06 DIAGNOSIS — E78.5 HYPERLIPIDEMIA, UNSPECIFIED HYPERLIPIDEMIA TYPE: ICD-10-CM

## 2025-05-06 RX ORDER — PRAVASTATIN SODIUM 40 MG
TABLET ORAL
Qty: 90 TABLET | Refills: 2 | Status: SHIPPED | OUTPATIENT
Start: 2025-05-06

## 2025-05-06 NOTE — TELEPHONE ENCOUNTER
Rx Refill Note  Requested Prescriptions     Pending Prescriptions Disp Refills    pravastatin (PRAVACHOL) 40 MG tablet [Pharmacy Med Name: PRAVASTATIN 40MG TABLETS] 90 tablet 2     Sig: TAKE 1 TABLET BY MOUTH EVERY NIGHT FOR HIGH AMOUNT OF FATS IN THE BLOOD      Last office visit with prescribing clinician: 2/13/2025   Last telemedicine visit with prescribing clinician: Visit date not found   Next office visit with prescribing clinician: 8/14/2025                         Would you like a call back once the refill request has been completed: [] Yes [] No    If the office needs to give you a call back, can they leave a voicemail: [] Yes [] No    Suzan Cooper MA  05/06/25, 07:56 EDT

## 2025-06-05 ENCOUNTER — TELEPHONE (OUTPATIENT)
Dept: ENDOCRINOLOGY | Age: 80
End: 2025-06-05
Payer: MEDICARE

## 2025-06-05 NOTE — TELEPHONE ENCOUNTER
6/5 Marisol Dean called called from [facility] stating pt is being discharged form [facility] today and she told them that are still her PCP provider. So they are calling to make sure that you are going to sign her orders   [phone]   15-May-2018 16:36

## 2025-06-16 ENCOUNTER — TELEPHONE (OUTPATIENT)
Dept: ENDOCRINOLOGY | Age: 80
End: 2025-06-16
Payer: MEDICARE

## 2025-06-16 RX ORDER — LEVOFLOXACIN 500 MG/1
500 TABLET, FILM COATED ORAL DAILY
Qty: 7 TABLET | Refills: 0 | Status: SHIPPED | OUTPATIENT
Start: 2025-06-16

## 2025-06-16 NOTE — TELEPHONE ENCOUNTER
Caller: Mariela Ruiz    Relationship to patient: Self    Best call back number: [phone]     Patient is needing: PT CALLED IN STATING THAT SHE HAS BEEN IN REHAB DUE TO HER HAVING A FALL. PT IS NOT ABLE TO WALK AT THIS TAME. PT ALSO ASKED IF THE  CAN SEND IN AN ANTIBIOTIC FOR HER . PT STATED THAT SHE HAS A UTI AND HAS BEEN HOME FOR A WEEK. PLEASE ADVISE AND CALL BACK.        [facility] [address] [place] [address] - [address] [facility] - [phone]  - [phone] [address] [address]   Phone: [phone] Fax: [phone]   Hours: Not open 24 hours

## 2025-06-16 NOTE — TELEPHONE ENCOUNTER
Pt called in. Pt stated it is burning when she uses the bathroom and needs an Rx written now. Informed pt that Dr. Rock is in the middle of seeing patients so I have to wait til he gets with me regarding  is he can write Rx or not. Informed pt that she can contact PCP, because they may be able to get to her sooner than us. Pt stated that Dr. Rock is her PCP and she needs the Rx wrote today.     Suzan Cooper MA  6/16/2025  12:33 EDT

## 2025-06-16 NOTE — TELEPHONE ENCOUNTER
Prescription for Levaquin 500 mg daily for 7 days was sent to the pharmacy.  I spoke with patient.

## 2025-07-03 ENCOUNTER — TELEPHONE (OUTPATIENT)
Age: 80
End: 2025-07-03

## 2025-07-03 ENCOUNTER — TELEPHONE (OUTPATIENT)
Dept: ENDOCRINOLOGY | Age: 80
End: 2025-07-03

## 2025-07-03 NOTE — TELEPHONE ENCOUNTER
Caller: Mariela Ruiz JOSE DE JESUS    Relationship: Self    Best call back number: [phone]     Requested Prescriptions: ELIQUIS   Requested Prescriptions      No prescriptions requested or ordered in this encounter        Pharmacy where request should be sent:      Last office visit with prescribing clinician: 2/13/2025   Last telemedicine visit with prescribing clinician: Visit date not found   Next office visit with prescribing clinician: Visit date not found     Additional details provided by patient: PATIENT STATES SHE HAS A 3 DAY SUPPLY.     Does the patient have less than a 3 day supply:  [] Yes  [x] No    Would you like a call back once the refill request has been completed: [x] Yes [] No    If the office needs to give you a call back, can they leave a voicemail: [x] Yes [] No    Marquis Aaron Rep   07/03/25 11:46 EDT

## 2025-07-03 NOTE — TELEPHONE ENCOUNTER
Spoke with patient. Upon reviewing chart, patient was prescribed Eliquis while hospitalized back in April. She was in critical care for some time due to pulmonary embolism. Unbeknownst to her, she had a follow up appointment scheduled on 6/9 with cardiology APRN. I urged her to reach out to that office, and provided her the phone number. She understands and will reach out to them.

## 2025-07-03 NOTE — TELEPHONE ENCOUNTER
7/3 called and sw pt told her that we did not prescribe the medication that she needed to call the dr that prescribed it for her. She stated it she got it when she was in [facility]. Told her her needs to call the dr on the bottle

## 2025-07-03 NOTE — TELEPHONE ENCOUNTER
Caller: Mariela Ruiz JOSE DE JESUS    Relationship: Self    Best call back number: [phone]     Requested Prescriptions:  ELIQUIS  Requested Prescriptions      No prescriptions requested or ordered in this encounter        Pharmacy where request should be sent:  [facility] [id number] [facility] [address] [facility] - [phone]  - [phone] [address] [facility] [address]      Last office visit with prescribing clinician: 2/13/2025   Last telemedicine visit with prescribing clinician: Visit date not found   Next office visit with prescribing clinician: 8/14/2025     Additional details provided by patient: PT NEEDS A 90 DAY SUPPLY OF MEDICATION. PT IS OUT OF MEDICATION    Does the patient have less than a 3 day supply:  [x] Yes  [] No    Would you like a call back once the refill request has been completed: [x] Yes [] No    If the office needs to give you a call back, can they leave a voicemail: [x] Yes [] No    Marquis Greenberg   07/03/25 08:12 EDT

## 2025-07-15 ENCOUNTER — APPOINTMENT (OUTPATIENT)
Dept: GENERAL RADIOLOGY | Facility: HOSPITAL | Age: 80
End: 2025-07-15
Payer: MEDICARE

## 2025-07-15 ENCOUNTER — APPOINTMENT (OUTPATIENT)
Dept: CARDIOLOGY | Facility: HOSPITAL | Age: 80
End: 2025-07-15
Payer: MEDICARE

## 2025-07-15 ENCOUNTER — HOSPITAL ENCOUNTER (INPATIENT)
Facility: HOSPITAL | Age: 80
LOS: 6 days | Discharge: NURSING, REHAB OR LONG-TERM CARE | End: 2025-07-22
Attending: EMERGENCY MEDICINE | Admitting: HOSPITALIST
Payer: MEDICARE

## 2025-07-15 DIAGNOSIS — R60.0 BILATERAL LOWER EXTREMITY EDEMA: Primary | ICD-10-CM

## 2025-07-15 DIAGNOSIS — G40.909 SEIZURE DISORDER: ICD-10-CM

## 2025-07-15 DIAGNOSIS — R29.6 FALLS: ICD-10-CM

## 2025-07-15 DIAGNOSIS — M25.562 CHRONIC PAIN OF LEFT KNEE: ICD-10-CM

## 2025-07-15 DIAGNOSIS — G89.29 CHRONIC PAIN OF LEFT KNEE: ICD-10-CM

## 2025-07-15 LAB
ALBUMIN SERPL-MCNC: 3.2 G/DL (ref 3.5–5.2)
ALBUMIN/GLOB SERPL: 1 G/DL
ALP SERPL-CCNC: 131 U/L (ref 39–117)
ALT SERPL W P-5'-P-CCNC: 8 U/L (ref 1–33)
ANION GAP SERPL CALCULATED.3IONS-SCNC: 6.9 MMOL/L (ref 5–15)
AORTIC ARCH: 2.8 CM
AORTIC DIMENSIONLESS INDEX: 0.84 (DI)
ASCENDING AORTA: 2.5 CM
AST SERPL-CCNC: 15 U/L (ref 1–32)
AV MEAN PRESS GRAD SYS DOP V1V2: 6.2 MMHG
AV VMAX SYS DOP: 176.5 CM/SEC
BASOPHILS # BLD AUTO: 0.05 10*3/MM3 (ref 0–0.2)
BASOPHILS NFR BLD AUTO: 1 % (ref 0–1.5)
BH CV ECHO MEAS - ACS: 1.75 CM
BH CV ECHO MEAS - AO MAX PG: 12.5 MMHG
BH CV ECHO MEAS - AO ROOT AREA (BSA CORRECTED): 1.8 CM2
BH CV ECHO MEAS - AO ROOT DIAM: 3.4 CM
BH CV ECHO MEAS - AO V2 VTI: 30.6 CM
BH CV ECHO MEAS - AVA(I,D): 2.02 CM2
BH CV ECHO MEAS - EDV(CUBED): 73.5 ML
BH CV ECHO MEAS - EDV(MOD-SP2): 109 ML
BH CV ECHO MEAS - EDV(MOD-SP4): 110 ML
BH CV ECHO MEAS - EF(MOD-SP2): 46.8 %
BH CV ECHO MEAS - EF(MOD-SP4): 44.5 %
BH CV ECHO MEAS - ESV(CUBED): 32.1 ML
BH CV ECHO MEAS - ESV(MOD-SP2): 58 ML
BH CV ECHO MEAS - ESV(MOD-SP4): 61 ML
BH CV ECHO MEAS - FS: 24.2 %
BH CV ECHO MEAS - IVS/LVPW: 0.95 CM
BH CV ECHO MEAS - IVSD: 0.79 CM
BH CV ECHO MEAS - LAT PEAK E' VEL: 10.3 CM/SEC
BH CV ECHO MEAS - LV DIASTOLIC VOL/BSA (35-75): 57.2 CM2
BH CV ECHO MEAS - LV MASS(C)D: 101.8 GRAMS
BH CV ECHO MEAS - LV MAX PG: 6.2 MMHG
BH CV ECHO MEAS - LV MEAN PG: 3.5 MMHG
BH CV ECHO MEAS - LV SYSTOLIC VOL/BSA (12-30): 31.7 CM2
BH CV ECHO MEAS - LV V1 MAX: 124.6 CM/SEC
BH CV ECHO MEAS - LV V1 VTI: 25.6 CM
BH CV ECHO MEAS - LVIDD: 4.2 CM
BH CV ECHO MEAS - LVIDS: 3.2 CM
BH CV ECHO MEAS - LVOT AREA: 2.41 CM2
BH CV ECHO MEAS - LVOT DIAM: 1.75 CM
BH CV ECHO MEAS - LVPWD: 0.83 CM
BH CV ECHO MEAS - MED PEAK E' VEL: 6.2 CM/SEC
BH CV ECHO MEAS - MV A DUR: 0.11 SEC
BH CV ECHO MEAS - MV A MAX VEL: 91.5 CM/SEC
BH CV ECHO MEAS - MV DEC SLOPE: 268.3 CM/SEC2
BH CV ECHO MEAS - MV DEC TIME: 0.17 SEC
BH CV ECHO MEAS - MV E MAX VEL: 56.7 CM/SEC
BH CV ECHO MEAS - MV E/A: 0.62
BH CV ECHO MEAS - MV MAX PG: 2.7 MMHG
BH CV ECHO MEAS - MV MEAN PG: 1.46 MMHG
BH CV ECHO MEAS - MV P1/2T: 59.3 MSEC
BH CV ECHO MEAS - MV V2 VTI: 14.9 CM
BH CV ECHO MEAS - MVA(P1/2T): 3.7 CM2
BH CV ECHO MEAS - MVA(VTI): 4.2 CM2
BH CV ECHO MEAS - PA ACC TIME: 0.13 SEC
BH CV ECHO MEAS - PA V2 MAX: 111.3 CM/SEC
BH CV ECHO MEAS - PULM A REVS DUR: 0.12 SEC
BH CV ECHO MEAS - PULM A REVS VEL: 53.7 CM/SEC
BH CV ECHO MEAS - PULM DIAS VEL: 58.3 CM/SEC
BH CV ECHO MEAS - PULM S/D: 0.71
BH CV ECHO MEAS - PULM SYS VEL: 41.1 CM/SEC
BH CV ECHO MEAS - RAP SYSTOLE: 8 MMHG
BH CV ECHO MEAS - RV MAX PG: 2.6 MMHG
BH CV ECHO MEAS - RV V1 MAX: 80.8 CM/SEC
BH CV ECHO MEAS - RV V1 VTI: 15.6 CM
BH CV ECHO MEAS - RVSP: 37 MMHG
BH CV ECHO MEAS - SV(LVOT): 61.6 ML
BH CV ECHO MEAS - SV(MOD-SP2): 51 ML
BH CV ECHO MEAS - SV(MOD-SP4): 49 ML
BH CV ECHO MEAS - SVI(LVOT): 32.1 ML/M2
BH CV ECHO MEAS - SVI(MOD-SP2): 26.5 ML/M2
BH CV ECHO MEAS - SVI(MOD-SP4): 25.5 ML/M2
BH CV ECHO MEAS - TAPSE (>1.6): 2.29 CM
BH CV ECHO MEAS - TR MAX PG: 29.2 MMHG
BH CV ECHO MEAS - TR MAX VEL: 270.1 CM/SEC
BH CV ECHO MEASUREMENTS AVERAGE E/E' RATIO: 6.87
BH CV LOWER VASCULAR LEFT COMMON FEMORAL AUGMENT: NORMAL
BH CV LOWER VASCULAR LEFT COMMON FEMORAL COMPETENT: NORMAL
BH CV LOWER VASCULAR LEFT COMMON FEMORAL COMPRESS: NORMAL
BH CV LOWER VASCULAR LEFT COMMON FEMORAL PHASIC: NORMAL
BH CV LOWER VASCULAR LEFT COMMON FEMORAL SPONT: NORMAL
BH CV LOWER VASCULAR LEFT DISTAL FEMORAL COMPRESS: NORMAL
BH CV LOWER VASCULAR LEFT GREATER SAPH AK COMPRESS: NORMAL
BH CV LOWER VASCULAR LEFT GREATER SAPH BK COMPRESS: NORMAL
BH CV LOWER VASCULAR LEFT LESSER SAPH COMPRESS: NORMAL
BH CV LOWER VASCULAR LEFT MID FEMORAL AUGMENT: NORMAL
BH CV LOWER VASCULAR LEFT MID FEMORAL COMPETENT: NORMAL
BH CV LOWER VASCULAR LEFT MID FEMORAL COMPRESS: NORMAL
BH CV LOWER VASCULAR LEFT MID FEMORAL PHASIC: NORMAL
BH CV LOWER VASCULAR LEFT MID FEMORAL SPONT: NORMAL
BH CV LOWER VASCULAR LEFT PERONEAL COMPRESS: NORMAL
BH CV LOWER VASCULAR LEFT POPLITEAL AUGMENT: NORMAL
BH CV LOWER VASCULAR LEFT POPLITEAL COMPETENT: NORMAL
BH CV LOWER VASCULAR LEFT POPLITEAL COMPRESS: NORMAL
BH CV LOWER VASCULAR LEFT POPLITEAL PHASIC: NORMAL
BH CV LOWER VASCULAR LEFT POPLITEAL SPONT: NORMAL
BH CV LOWER VASCULAR LEFT POSTERIOR TIBIAL COMPRESS: NORMAL
BH CV LOWER VASCULAR LEFT PROFUNDA FEMORAL COMPRESS: NORMAL
BH CV LOWER VASCULAR LEFT PROXIMAL FEMORAL COMPRESS: NORMAL
BH CV LOWER VASCULAR LEFT SAPHENOFEMORAL JUNCTION COMPRESS: NORMAL
BH CV LOWER VASCULAR RIGHT COMMON FEMORAL AUGMENT: NORMAL
BH CV LOWER VASCULAR RIGHT COMMON FEMORAL COMPETENT: NORMAL
BH CV LOWER VASCULAR RIGHT COMMON FEMORAL COMPRESS: NORMAL
BH CV LOWER VASCULAR RIGHT COMMON FEMORAL PHASIC: NORMAL
BH CV LOWER VASCULAR RIGHT COMMON FEMORAL SPONT: NORMAL
BH CV LOWER VASCULAR RIGHT DISTAL FEMORAL COMPRESS: NORMAL
BH CV LOWER VASCULAR RIGHT GREATER SAPH AK COMPRESS: NORMAL
BH CV LOWER VASCULAR RIGHT GREATER SAPH BK COMPRESS: NORMAL
BH CV LOWER VASCULAR RIGHT MID FEMORAL AUGMENT: NORMAL
BH CV LOWER VASCULAR RIGHT MID FEMORAL COMPETENT: NORMAL
BH CV LOWER VASCULAR RIGHT MID FEMORAL COMPRESS: NORMAL
BH CV LOWER VASCULAR RIGHT MID FEMORAL PHASIC: NORMAL
BH CV LOWER VASCULAR RIGHT MID FEMORAL SPONT: NORMAL
BH CV LOWER VASCULAR RIGHT POPLITEAL AUGMENT: NORMAL
BH CV LOWER VASCULAR RIGHT POPLITEAL COMPETENT: NORMAL
BH CV LOWER VASCULAR RIGHT POPLITEAL COMPRESS: NORMAL
BH CV LOWER VASCULAR RIGHT POPLITEAL PHASIC: NORMAL
BH CV LOWER VASCULAR RIGHT POPLITEAL SPONT: NORMAL
BH CV LOWER VASCULAR RIGHT POSTERIOR TIBIAL COMPRESS: NORMAL
BH CV LOWER VASCULAR RIGHT PROFUNDA FEMORAL COMPRESS: NORMAL
BH CV LOWER VASCULAR RIGHT PROXIMAL FEMORAL COMPRESS: NORMAL
BH CV LOWER VASCULAR RIGHT SAPHENOFEMORAL JUNCTION COMPRESS: NORMAL
BH CV XLRA - RV BASE: 4 CM
BH CV XLRA - RV LENGTH: 5.7 CM
BH CV XLRA - RV MID: 2.32 CM
BH CV XLRA - TDI S': 12.9 CM/SEC
BILIRUB SERPL-MCNC: 0.6 MG/DL (ref 0–1.2)
BUN SERPL-MCNC: 10 MG/DL (ref 8–23)
BUN/CREAT SERPL: 10.5 (ref 7–25)
CALCIUM SPEC-SCNC: 9.5 MG/DL (ref 8.6–10.5)
CHLORIDE SERPL-SCNC: 107 MMOL/L (ref 98–107)
CO2 SERPL-SCNC: 26.1 MMOL/L (ref 22–29)
CREAT SERPL-MCNC: 0.95 MG/DL (ref 0.57–1)
DEPRECATED RDW RBC AUTO: 41.1 FL (ref 37–54)
DEPRECATED RDW RBC AUTO: 41.7 FL (ref 37–54)
EGFRCR SERPLBLD CKD-EPI 2021: 61.5 ML/MIN/1.73
EOSINOPHIL # BLD AUTO: 0 10*3/MM3 (ref 0–0.4)
EOSINOPHIL NFR BLD AUTO: 0 % (ref 0.3–6.2)
ERYTHROCYTE [DISTWIDTH] IN BLOOD BY AUTOMATED COUNT: 12.4 % (ref 12.3–15.4)
ERYTHROCYTE [DISTWIDTH] IN BLOOD BY AUTOMATED COUNT: 12.5 % (ref 12.3–15.4)
GEN 5 1HR TROPONIN T REFLEX: 38 NG/L
GLOBULIN UR ELPH-MCNC: 3.3 GM/DL
GLUCOSE SERPL-MCNC: 98 MG/DL (ref 65–99)
HCT VFR BLD AUTO: 31.2 % (ref 34–46.6)
HCT VFR BLD AUTO: 33.4 % (ref 34–46.6)
HGB BLD-MCNC: 10 G/DL (ref 12–15.9)
HGB BLD-MCNC: 10.6 G/DL (ref 12–15.9)
HOLD SPECIMEN: NORMAL
IMM GRANULOCYTES # BLD AUTO: 0.01 10*3/MM3 (ref 0–0.05)
IMM GRANULOCYTES NFR BLD AUTO: 0.2 % (ref 0–0.5)
LEFT ATRIUM VOLUME INDEX: 19.3 ML/M2
LV EF BIPLANE MOD: 45.1 %
LYMPHOCYTES # BLD AUTO: 1.16 10*3/MM3 (ref 0.7–3.1)
LYMPHOCYTES NFR BLD AUTO: 23.8 % (ref 19.6–45.3)
MCH RBC QN AUTO: 29 PG (ref 26.6–33)
MCH RBC QN AUTO: 29.1 PG (ref 26.6–33)
MCHC RBC AUTO-ENTMCNC: 31.7 G/DL (ref 31.5–35.7)
MCHC RBC AUTO-ENTMCNC: 32.1 G/DL (ref 31.5–35.7)
MCV RBC AUTO: 90.7 FL (ref 79–97)
MCV RBC AUTO: 91.5 FL (ref 79–97)
MONOCYTES # BLD AUTO: 0.69 10*3/MM3 (ref 0.1–0.9)
MONOCYTES NFR BLD AUTO: 14.2 % (ref 5–12)
NEUTROPHILS NFR BLD AUTO: 2.96 10*3/MM3 (ref 1.7–7)
NEUTROPHILS NFR BLD AUTO: 60.8 % (ref 42.7–76)
NRBC BLD AUTO-RTO: 0 /100 WBC (ref 0–0.2)
NT-PROBNP SERPL-MCNC: 1569 PG/ML (ref 0–1800)
PLATELET # BLD AUTO: 260 10*3/MM3 (ref 140–450)
PLATELET # BLD AUTO: 293 10*3/MM3 (ref 140–450)
PMV BLD AUTO: 9.6 FL (ref 6–12)
PMV BLD AUTO: 9.7 FL (ref 6–12)
POTASSIUM SERPL-SCNC: 3.5 MMOL/L (ref 3.5–5.2)
PROCALCITONIN SERPL-MCNC: 0.1 NG/ML (ref 0–0.25)
PROT SERPL-MCNC: 6.5 G/DL (ref 6–8.5)
QT INTERVAL: 387 MS
QTC INTERVAL: 457 MS
RBC # BLD AUTO: 3.44 10*6/MM3 (ref 3.77–5.28)
RBC # BLD AUTO: 3.65 10*6/MM3 (ref 3.77–5.28)
SINUS: 3 CM
SODIUM SERPL-SCNC: 140 MMOL/L (ref 136–145)
STJ: 2.7 CM
TROPONIN T % DELTA: -12
TROPONIN T NUMERIC DELTA: -5 NG/L
TROPONIN T SERPL HS-MCNC: 43 NG/L
WBC NRBC COR # BLD AUTO: 4.87 10*3/MM3 (ref 3.4–10.8)
WBC NRBC COR # BLD AUTO: 5.09 10*3/MM3 (ref 3.4–10.8)
WHOLE BLOOD HOLD COAG: NORMAL
WHOLE BLOOD HOLD SPECIMEN: NORMAL

## 2025-07-15 PROCEDURE — 83880 ASSAY OF NATRIURETIC PEPTIDE: CPT | Performed by: PHYSICIAN ASSISTANT

## 2025-07-15 PROCEDURE — 80053 COMPREHEN METABOLIC PANEL: CPT | Performed by: PHYSICIAN ASSISTANT

## 2025-07-15 PROCEDURE — 25510000001 PERFLUTREN 6.52 MG/ML SUSPENSION 2 ML VIAL: Performed by: HOSPITALIST

## 2025-07-15 PROCEDURE — G0378 HOSPITAL OBSERVATION PER HR: HCPCS

## 2025-07-15 PROCEDURE — 93010 ELECTROCARDIOGRAM REPORT: CPT | Performed by: INTERNAL MEDICINE

## 2025-07-15 PROCEDURE — 71045 X-RAY EXAM CHEST 1 VIEW: CPT

## 2025-07-15 PROCEDURE — 93306 TTE W/DOPPLER COMPLETE: CPT

## 2025-07-15 PROCEDURE — 99285 EMERGENCY DEPT VISIT HI MDM: CPT

## 2025-07-15 PROCEDURE — 93306 TTE W/DOPPLER COMPLETE: CPT | Performed by: INTERNAL MEDICINE

## 2025-07-15 PROCEDURE — 93970 EXTREMITY STUDY: CPT

## 2025-07-15 PROCEDURE — 84484 ASSAY OF TROPONIN QUANT: CPT | Performed by: PHYSICIAN ASSISTANT

## 2025-07-15 PROCEDURE — 85025 COMPLETE CBC W/AUTO DIFF WBC: CPT | Performed by: PHYSICIAN ASSISTANT

## 2025-07-15 PROCEDURE — 84145 PROCALCITONIN (PCT): CPT | Performed by: NURSE PRACTITIONER

## 2025-07-15 PROCEDURE — 93005 ELECTROCARDIOGRAM TRACING: CPT | Performed by: PHYSICIAN ASSISTANT

## 2025-07-15 PROCEDURE — 85027 COMPLETE CBC AUTOMATED: CPT | Performed by: NURSE PRACTITIONER

## 2025-07-15 PROCEDURE — 93970 EXTREMITY STUDY: CPT | Performed by: SURGERY

## 2025-07-15 PROCEDURE — 25010000002 FUROSEMIDE PER 20 MG: Performed by: PHYSICIAN ASSISTANT

## 2025-07-15 PROCEDURE — 25010000002 FUROSEMIDE PER 20 MG: Performed by: HOSPITALIST

## 2025-07-15 PROCEDURE — 36415 COLL VENOUS BLD VENIPUNCTURE: CPT

## 2025-07-15 RX ORDER — ACETAMINOPHEN 650 MG/1
650 SUPPOSITORY RECTAL EVERY 4 HOURS PRN
Status: DISCONTINUED | OUTPATIENT
Start: 2025-07-15 | End: 2025-07-22 | Stop reason: HOSPADM

## 2025-07-15 RX ORDER — SODIUM CHLORIDE 0.9 % (FLUSH) 0.9 %
10 SYRINGE (ML) INJECTION EVERY 12 HOURS SCHEDULED
Status: DISCONTINUED | OUTPATIENT
Start: 2025-07-15 | End: 2025-07-22 | Stop reason: HOSPADM

## 2025-07-15 RX ORDER — AMOXICILLIN 250 MG
2 CAPSULE ORAL 2 TIMES DAILY PRN
Status: DISCONTINUED | OUTPATIENT
Start: 2025-07-15 | End: 2025-07-22 | Stop reason: HOSPADM

## 2025-07-15 RX ORDER — HYDROCODONE BITARTRATE AND ACETAMINOPHEN 5; 325 MG/1; MG/1
1 TABLET ORAL EVERY 6 HOURS PRN
Refills: 0 | Status: DISCONTINUED | OUTPATIENT
Start: 2025-07-15 | End: 2025-07-16

## 2025-07-15 RX ORDER — FUROSEMIDE 10 MG/ML
40 INJECTION INTRAMUSCULAR; INTRAVENOUS ONCE
Status: COMPLETED | OUTPATIENT
Start: 2025-07-15 | End: 2025-07-15

## 2025-07-15 RX ORDER — POLYETHYLENE GLYCOL 3350 17 G/17G
17 POWDER, FOR SOLUTION ORAL DAILY PRN
Status: DISCONTINUED | OUTPATIENT
Start: 2025-07-15 | End: 2025-07-22 | Stop reason: HOSPADM

## 2025-07-15 RX ORDER — PRAVASTATIN SODIUM 40 MG
40 TABLET ORAL NIGHTLY
Status: DISCONTINUED | OUTPATIENT
Start: 2025-07-15 | End: 2025-07-22 | Stop reason: HOSPADM

## 2025-07-15 RX ORDER — ACETAMINOPHEN 160 MG/5ML
650 SOLUTION ORAL EVERY 4 HOURS PRN
Status: DISCONTINUED | OUTPATIENT
Start: 2025-07-15 | End: 2025-07-22 | Stop reason: HOSPADM

## 2025-07-15 RX ORDER — ACETAMINOPHEN 325 MG/1
650 TABLET ORAL EVERY 4 HOURS PRN
Status: DISCONTINUED | OUTPATIENT
Start: 2025-07-15 | End: 2025-07-22 | Stop reason: HOSPADM

## 2025-07-15 RX ORDER — POTASSIUM CHLORIDE 1.5 G/1.58G
40 POWDER, FOR SOLUTION ORAL ONCE
Status: COMPLETED | OUTPATIENT
Start: 2025-07-15 | End: 2025-07-15

## 2025-07-15 RX ORDER — FUROSEMIDE 10 MG/ML
40 INJECTION INTRAMUSCULAR; INTRAVENOUS 2 TIMES DAILY
Status: DISCONTINUED | OUTPATIENT
Start: 2025-07-15 | End: 2025-07-15

## 2025-07-15 RX ORDER — HYDROXYZINE HYDROCHLORIDE 25 MG/1
25 TABLET, FILM COATED ORAL 3 TIMES DAILY PRN
Status: DISCONTINUED | OUTPATIENT
Start: 2025-07-15 | End: 2025-07-22 | Stop reason: HOSPADM

## 2025-07-15 RX ORDER — PHENOBARBITAL 32.4 MG/1
129.6 TABLET ORAL NIGHTLY
Status: COMPLETED | OUTPATIENT
Start: 2025-07-15 | End: 2025-07-19

## 2025-07-15 RX ORDER — LEVETIRACETAM 500 MG/1
500 TABLET ORAL 2 TIMES DAILY
Status: DISCONTINUED | OUTPATIENT
Start: 2025-07-15 | End: 2025-07-15

## 2025-07-15 RX ORDER — NITROGLYCERIN 0.4 MG/1
0.4 TABLET SUBLINGUAL
Status: DISCONTINUED | OUTPATIENT
Start: 2025-07-15 | End: 2025-07-22 | Stop reason: HOSPADM

## 2025-07-15 RX ORDER — FAMOTIDINE 20 MG/1
20 TABLET, FILM COATED ORAL 2 TIMES DAILY PRN
Status: DISCONTINUED | OUTPATIENT
Start: 2025-07-15 | End: 2025-07-22 | Stop reason: HOSPADM

## 2025-07-15 RX ORDER — SODIUM CHLORIDE 9 MG/ML
40 INJECTION, SOLUTION INTRAVENOUS AS NEEDED
Status: DISCONTINUED | OUTPATIENT
Start: 2025-07-15 | End: 2025-07-22 | Stop reason: HOSPADM

## 2025-07-15 RX ORDER — FUROSEMIDE 10 MG/ML
40 INJECTION INTRAMUSCULAR; INTRAVENOUS DAILY
Status: DISCONTINUED | OUTPATIENT
Start: 2025-07-16 | End: 2025-07-16

## 2025-07-15 RX ORDER — SODIUM CHLORIDE 0.9 % (FLUSH) 0.9 %
10 SYRINGE (ML) INJECTION AS NEEDED
Status: DISCONTINUED | OUTPATIENT
Start: 2025-07-15 | End: 2025-07-22 | Stop reason: HOSPADM

## 2025-07-15 RX ORDER — BISACODYL 5 MG/1
5 TABLET, DELAYED RELEASE ORAL DAILY PRN
Status: DISCONTINUED | OUTPATIENT
Start: 2025-07-15 | End: 2025-07-22 | Stop reason: HOSPADM

## 2025-07-15 RX ORDER — BISACODYL 10 MG
10 SUPPOSITORY, RECTAL RECTAL DAILY PRN
Status: DISCONTINUED | OUTPATIENT
Start: 2025-07-15 | End: 2025-07-22 | Stop reason: HOSPADM

## 2025-07-15 RX ORDER — PHENOBARBITAL 64.8 MG/1
64.8 TABLET ORAL DAILY
Status: ON HOLD | COMMUNITY
End: 2025-07-22

## 2025-07-15 RX ORDER — LEVOTHYROXINE SODIUM 50 UG/1
50 TABLET ORAL
Status: DISCONTINUED | OUTPATIENT
Start: 2025-07-16 | End: 2025-07-22 | Stop reason: HOSPADM

## 2025-07-15 RX ORDER — ONDANSETRON 2 MG/ML
4 INJECTION INTRAMUSCULAR; INTRAVENOUS EVERY 6 HOURS PRN
Status: DISCONTINUED | OUTPATIENT
Start: 2025-07-15 | End: 2025-07-22 | Stop reason: HOSPADM

## 2025-07-15 RX ADMIN — HYDROCODONE BITARTRATE AND ACETAMINOPHEN 1 TABLET: 5; 325 TABLET ORAL at 16:37

## 2025-07-15 RX ADMIN — APIXABAN 5 MG: 5 TABLET, FILM COATED ORAL at 15:39

## 2025-07-15 RX ADMIN — PRAMIPEXOLE DIHYDROCHLORIDE 2.5 MG: 1 TABLET ORAL at 20:58

## 2025-07-15 RX ADMIN — Medication 10 ML: at 15:39

## 2025-07-15 RX ADMIN — FUROSEMIDE 40 MG: 10 INJECTION, SOLUTION INTRAMUSCULAR; INTRAVENOUS at 06:16

## 2025-07-15 RX ADMIN — PERFLUTREN 2 ML: 6.52 INJECTION, SUSPENSION INTRAVENOUS at 16:22

## 2025-07-15 RX ADMIN — Medication 10 ML: at 20:59

## 2025-07-15 RX ADMIN — POTASSIUM CHLORIDE 40 MEQ: 1.5 POWDER, FOR SOLUTION ORAL at 12:55

## 2025-07-15 RX ADMIN — PRAVASTATIN SODIUM 40 MG: 40 TABLET ORAL at 20:58

## 2025-07-15 RX ADMIN — PHENOBARBITAL 129.6 MG: 32.4 TABLET ORAL at 20:58

## 2025-07-15 RX ADMIN — NYSTATIN 1 APPLICATION: 100000 OINTMENT TOPICAL at 20:56

## 2025-07-15 RX ADMIN — FUROSEMIDE 40 MG: 10 INJECTION, SOLUTION INTRAMUSCULAR; INTRAVENOUS at 12:54

## 2025-07-15 NOTE — H&P
"    Patient Name:  Mariela Ruiz  YOB: 1945  MRN:  [id number]  Admit Date:  7/15/2025  Patient Care Team:  Duglas Rock MD as PCP - General (Endocrinology)  Anuj Brandt MD as Consulting Physician (General Surgery)  Ed Chun MD as Surgeon (Orthopedic Surgery)  Amador Richardson DO (Vascular Surgery)  Christiano Cesar II, MD as Consulting Physician (Orthopedic Surgery)  Steven Liu II, MD as Consulting Physician (Neurology)      Subjective   History Present Illness     Chief Complaint   Patient presents with    Fall       Ms. Ruiz is a 80 y.o. female with a history of venous insufficiency, sleep apnea, seizures, PE, RLS, lymphedema, hypothyroidism,  that presents to [facility] complaining of swollen legs, difficulty walking, fall.  She was attempting to get her transfer chair situated to sit in it when she lost balance and fell.  She did not hit her head or lose consciousness.  No other recent falls but has been getting weaker and having more trouble getting around.  Patient had a hip replacement and knee replacement in March of this year and was discharged to [facility] rehab.  She has been home for about 3 weeks and noticed her legs bilaterally getting weaker and, \" frozen and stiff\" making it hard to walk.  She wears support hose constantly.  She had some weeping in the right lower extremity.  She has been followed by home health nursing.  She states the nurse was watching some redness on her right lower extremity which has not gotten any worse.  She does have a long history of lymphedema previously followed by vascular surgery.  She has been on Eliquis chronically for history of DVT and PE.  She stopped taking her seizure medicines 2 weeks ago due to she did not want to take them anymore and did not think it was necessary after so many years.  No recent seizure activity.  She has been on seizure meds for about 30 years.  She has not " been able to make it to any appointments with her providers due to her worsening mobility and swelling.  Pain in her ankles and knees has been worse for 2 weeks.  She was treated a couple of weeks ago for a UTI.  Currently denies any dysuria or burning or frequency.  No fever, chills, sweats.  No nausea.  No constipation.  She has some intermittent diarrhea for a couple of weeks.  Consuming her regular diet.  No abdominal pain.  No SOA or cough.  No chest pain or palpitations.        Review of Systems   Constitutional:  Positive for activity change. Negative for appetite change, chills, diaphoresis, fatigue and fever.   HENT:  Negative for congestion.    Respiratory:  Negative for cough, chest tightness, shortness of breath and wheezing.    Cardiovascular:  Positive for leg swelling. Negative for chest pain and palpitations.   Gastrointestinal:  Positive for diarrhea (2 weeks). Negative for abdominal distention, abdominal pain, constipation, nausea and vomiting.   Genitourinary:  Negative for dysuria and frequency.   Musculoskeletal:  Positive for arthralgias. Negative for back pain.   Skin:  Negative for rash and wound.   Neurological:  Positive for weakness. Negative for dizziness, seizures, light-headedness, numbness and headaches.        Personal History     Past Medical History:   Diagnosis Date    Arthritis     Chronic venous insufficiency     Dupuytren's contracture     History of staph infection     S/P KNEE DEC 2016    History of transfusion     Hyperlipidemia     Lymphedema     LEFT LEG    Nontoxic multinodular goiter     Osteoporosis     Dr. Cabrera    Pelvic joint pain, left     Postsurgical hypothyroidism     Presence of left artificial hip joint     METAL ON METAL AS NOTED PER DR CLEMENT NOTE    Restless leg syndrome     Right bundle branch block     Seizure disorder     Dr. Whitman, HX 1980 LAST SEIZURE    Sleep apnea     DOES NOT HAVE MACHINE    Vitamin D insufficiency      Past Surgical History:    Procedure Laterality Date    APPENDECTOMY      CARDIAC CATHETERIZATION      NORMAL     CARDIAC CATHETERIZATION  4/24/2025    Procedure: Percutaneous Manual Thrombectomy;  Surgeon: Sourav Kennedy MD;  Location: [facility] CATH INVASIVE LOCATION;  Service: Cardiovascular;;    CHOLECYSTECTOMY N/A 11/2/2024    Procedure: CHOLECYSTECTOMY LAPAROSCOPIC WITH DAVINCI ROBOT with cholangiogram, possible open;  Surgeon: Bin Heaton MD;  [facility];  Service: Robotics - DaVinci;  Laterality: N/A;    COLONOSCOPY  08/17/2017    Normal except for anal fissure.  Dr. Brandt.  [facility].    KNEE MINI REVISION Left 11/15/2016    Procedure: LEFT KNEE POLY CHANGE WITH HI POST STABILIZER;  Surgeon: Pablito Claudio MD;  Location: [facility] MAIN OR;  Service:     KNEE MINI REVISION Left 12/13/2016    Procedure: LT KNEE REMOVAL/REPLACEMENT LOCKING PIN ;  Surgeon: Pablito Claudio MD;  Location: [facility] MAIN OR;  Service:     MAMMO FINE NEEDLE ASPIRATION UNILATERAL      RIGHT THYROID NODULE, 2009 BENIGN     LA ARTHRP KNE CONDYLE&PLATU MEDIAL&LAT COMPARTMENTS Left 12/24/2016    Procedure: LEFT KNEE WASHOUT WITH POLY CHANGE;  Surgeon: Christiano Cesar MD;  Location: [facility] MAIN OR;  Service: Orthopedics    LA REVJ TOTAL KNEE ARTHRP W/WO ALGRFT 1 COMPONENT Left 2/20/2017    Procedure: LT KNEE REMOVAL ANTIBIOTIC SPACER AND KNEE REVISION;  Surgeon: Christiano Cesar MD;  Location: [facility] MAIN OR;  Service: Orthopedics    REPLACEMENT TOTAL KNEE Left     THYROIDECTOMY, PARTIAL      1979 LEFT LOBECTOMY AND ISTHMECTOMY     TOTAL ABDOMINAL HYSTERECTOMY WITH SALPINGO OOPHORECTOMY      TOTAL HIP ARTHROPLASTY Left     TOTAL HIP ARTHROPLASTY Right 9/14/2019    Procedure: TOTAL HIP ARTHROPLASTY ANTERIOR WITH HANA TABLE;  Surgeon: Christiano Cesar II, MD;  Location: [facility] MAIN OR;  Service: Orthopedics    TOTAL HIP ARTHROPLASTY REVISION Left 3/9/2021    Procedure: LEFT TOTAL HIP ARTHROPLASTY REVISION POSTERIOR;  Surgeon:  Christiano Cesar II, MD;  Location: [facility] MAIN OR;  Service: Orthopedics;  Laterality: Left;    TOTAL HIP ARTHROPLASTY REVISION Left 3/8/2025    Procedure: TOTAL HIP ARTHROPLASTY REVISION;  Surgeon: Christiano Cesar II, MD;  Location: [facility] MAIN OR;  Service: Orthopedics;  Laterality: Left;    TOTAL KNEE  PROSTHESIS REMOVAL W/ SPACER INSERTION Left 12/29/2016    Procedure: LT KNEE IMPLANT REMOVAL WITH INSERTION OF SPACER ;  Surgeon: Christiano Cesar MD;  Location: [facility] OR;  Service:      Family History   Problem Relation Age of Onset    Heart disease Father     Diabetes Sister     Hypertension Sister     Hyperlipidemia Sister     Obesity Sister     Malig Hyperthermia Neg Hx      Social History     Tobacco Use    Smoking status: Never     Passive exposure: Never    Smokeless tobacco: Never   Vaping Use    Vaping status: Never Used   Substance Use Topics    Alcohol use: No    Drug use: No     No current facility-administered medications on file prior to encounter.     Current Outpatient Medications on File Prior to Encounter   Medication Sig Dispense Refill    acetaminophen (TYLENOL) 500 MG tablet Take 1 tablet by mouth Every 4 (Four) Hours As Needed for Mild Pain.      apixaban (ELIQUIS) 5 MG tablet tablet Take 1 tablet by mouth 2 (Two) Times a Day.      aspirin 81 MG EC tablet Take 1 tablet by mouth Daily.      cholecalciferol (VITAMIN D3) 25 MCG (1000 UT) tablet Take 4 tablets by mouth Daily.      Cyanocobalamin (Vitamin B-12) 1000 MCG sublingual tablet 1 tablet daily (Patient taking differently: Take 1 tablet by mouth Daily.) 90 tablet 2    PHENobarbital 64.8 MG tablet Take 2 tablets by mouth Every Night for 7 days. 14 tablet 0    polyethylene glycol 17 g packet Take 17 g by mouth 2 (Two) Times a Day.      pramipexole (MIRAPEX) 0.5 MG tablet Take 5 tablets by mouth Every Night.      pravastatin (PRAVACHOL) 40 MG tablet TAKE 1 TABLET BY MOUTH EVERY NIGHT FOR HIGH AMOUNT OF FATS IN THE BLOOD  (Patient taking differently: Take 1 tablet by mouth Every Night.) 90 tablet 2    Synthroid 50 MCG tablet Take 1 tablet by mouth Every Morning. Indications: Underactive Thyroid 90 tablet 2    busPIRone (BUSPAR) 5 MG tablet Take 1 tablet by mouth 3 (Three) Times a Day. (Patient not taking: Reported on 7/15/2025)      hydrOXYzine (ATARAX) 25 MG tablet Take 1 tablet by mouth 3 (Three) Times a Day As Needed for Itching. (Patient not taking: Reported on 7/15/2025)      levETIRAcetam (KEPPRA) 500 MG tablet Take 1 tablet by mouth 2 (Two) Times a Day for 30 days. (Patient not taking: Reported on 7/15/2025) 60 tablet 4    [DISCONTINUED] bisacodyl (DULCOLAX) 5 MG EC tablet Take 1 tablet by mouth Daily As Needed for Constipation (Use if polyethylene glycol is ineffective).      [DISCONTINUED] famotidine (PEPCID) 20 MG tablet Take 1 tablet by mouth 2 (Two) Times a Day Before Meals.      [DISCONTINUED] HYDROcodone-acetaminophen (NORCO) 7.5-325 MG per tablet Take 1 tablet by mouth Every 4 (Four) Hours As Needed for Moderate Pain.      [DISCONTINUED] ibuprofen (ADVIL,MOTRIN) 400 MG tablet Take 1 tablet by mouth 3 (Three) Times a Day.      [DISCONTINUED] levoFLOXacin (LEVAQUIN) 500 MG tablet Take 1 tablet by mouth Daily. 7 tablet 0    [DISCONTINUED] melatonin 5 MG tablet tablet Take 0.5 tablets by mouth At Night As Needed (sleep).      [DISCONTINUED] sennosides-docusate (PERICOLACE) 8.6-50 MG per tablet Take 2 tablets by mouth 2 (Two) Times a Day.       Allergies   Allergen Reactions    Clindamycin/Lincomycin Itching    Duricef [Cefadroxil] Hives and Rash    Sulfa Antibiotics Rash     RASH       Objective    Objective     Vital Signs  Temp:  [98.4 °F (36.9 °C)] 98.4 °F (36.9 °C)  Heart Rate:  [] 102  Resp:  [16] 16  BP: (100-126)/(45-68) 125/55  SpO2:  [92 %-100 %] 95 %  on   ;   Device (Oxygen Therapy): room air  Body mass index is 26.31 kg/m².    Physical Exam  Constitutional:       General: She is not in acute distress.      Appearance: She is not ill-appearing or toxic-appearing.      Comments: Chronically ill-appearing.   HENT:      Head: Normocephalic and atraumatic.      Nose: Nose normal.      Mouth/Throat:      Mouth: Mucous membranes are moist.   Eyes:      Extraocular Movements: Extraocular movements intact.      Pupils: Pupils are equal, round, and reactive to light.   Cardiovascular:      Rate and Rhythm: Normal rate and regular rhythm.      Pulses: Normal pulses.      Heart sounds: Normal heart sounds. No murmur heard.  Pulmonary:      Effort: Pulmonary effort is normal.      Comments: Breath sounds diminished at bases.  No Rales or wheezing or labored breathing.  Abdominal:      General: Bowel sounds are normal. There is no distension.      Palpations: Abdomen is soft.      Tenderness: There is no abdominal tenderness.   Genitourinary:     Comments: Dark urine  Musculoskeletal:         General: Swelling present.   Skin:     General: Skin is warm and dry.      Comments: Lower extremity skin is thick and discolored without signs of acute inflammation but consistent with chronic vascular insufficiency and lymphedema.  Her skin is soiled with black debris, lint, skin cells caked between her toes   Neurological:      General: No focal deficit present.      Mental Status: She is alert and oriented to person, place, and time.   Psychiatric:         Mood and Affect: Mood normal.         Results Review:  I reviewed the patient's new clinical results.      Lab Results (last 24 hours)       Procedure Component Value Units Date/Time    Comprehensive Metabolic Panel [[id number]]  (Abnormal) Collected: 07/15/25 0308    Specimen: Blood Updated: 07/15/25 0348     Glucose 98 mg/dL      BUN 10.0 mg/dL      Creatinine 0.95 mg/dL      Sodium 140 mmol/L      Potassium 3.5 mmol/L      Chloride 107 mmol/L      CO2 26.1 mmol/L      Calcium 9.5 mg/dL      Total Protein 6.5 g/dL      Albumin 3.2 g/dL      ALT (SGPT) 8 U/L      AST (SGOT) 15 U/L       Alkaline Phosphatase 131 U/L      Total Bilirubin 0.6 mg/dL      Globulin 3.3 gm/dL      A/G Ratio 1.0 g/dL      BUN/Creatinine Ratio 10.5     Anion Gap 6.9 mmol/L      eGFR 61.5 mL/min/1.73     Narrative:      GFR Categories in Chronic Kidney Disease (CKD)              GFR Category          GFR (mL/min/1.73)    Interpretation  G1                    90 or greater        Normal or high (1)  G2                    60-89                Mild decrease (1)  G3a                   45-59                Mild to moderate decrease  G3b                   30-44                Moderate to severe decrease  G4                    15-29                Severe decrease  G5                    14 or less           Kidney failure    (1)In the absence of evidence of kidney disease, neither GFR category G1 or G2 fulfill the criteria for CKD.    eGFR calculation 2021 CKD-EPI creatinine equation, which does not include race as a factor    CBC & Differential [[id number]]  (Abnormal) Collected: 07/15/25 0308    Specimen: Blood Updated: 07/15/25 0324    Narrative:      The following orders were created for panel order CBC & Differential.  Procedure                               Abnormality         Status                     ---------                               -----------         ------                     CBC Auto Differential[[id number]]        Abnormal            Final result                 Please view results for these tests on the individual orders.    BNP [[id number]]  (Normal) Collected: 07/15/25 0308    Specimen: Blood Updated: 07/15/25 0348     proBNP 1,569.0 pg/mL     Narrative:      This assay is used as an aid in the diagnosis of individuals suspected of having heart failure. It can be used as an aid in the diagnosis of acute decompensated heart failure (ADHF) in patients presenting with signs and symptoms of ADHF to the emergency department (ED). In addition, NT-proBNP of <300 pg/mL indicates ADHF is not likely.    Age Range Result  Interpretation  NT-proBNP Concentration (pg/mL:      <50             Positive            >450                   Gray                 300-450                    Negative             <300    50-75           Positive            >900                  Gray                300-900                  Negative            <300      >75             Positive            >1800                  Gray                300-1800                  Negative            <300    High Sensitivity Troponin T [[id number]]  (Abnormal) Collected: 07/15/25 0308    Specimen: Blood Updated: 07/15/25 0348     HS Troponin T 43 ng/L     Narrative:      High Sensitive Troponin T Reference Range:  <14.0 ng/L- Negative Female for AMI  <22.0 ng/L- Negative Male for AMI  >=14 - Abnormal Female indicating possible myocardial injury.  >=22 - Abnormal Male indicating possible myocardial injury.   Clinicians would have to utilize clinical acumen, EKG, Troponin, and serial changes to determine if it is an Acute Myocardial Infarction or myocardial injury due to an underlying chronic condition.         CBC Auto Differential [[id number]]  (Abnormal) Collected: 07/15/25 0308    Specimen: Blood Updated: 07/15/25 0324     WBC 4.87 10*3/mm3      RBC 3.65 10*6/mm3      Hemoglobin 10.6 g/dL      Hematocrit 33.4 %      MCV 91.5 fL      MCH 29.0 pg      MCHC 31.7 g/dL      RDW 12.4 %      RDW-SD 41.7 fl      MPV 9.7 fL      Platelets 293 10*3/mm3      Neutrophil % 60.8 %      Lymphocyte % 23.8 %      Monocyte % 14.2 %      Eosinophil % 0.0 %      Basophil % 1.0 %      Immature Grans % 0.2 %      Neutrophils, Absolute 2.96 10*3/mm3      Lymphocytes, Absolute 1.16 10*3/mm3      Monocytes, Absolute 0.69 10*3/mm3      Eosinophils, Absolute 0.00 10*3/mm3      Basophils, Absolute 0.05 10*3/mm3      Immature Grans, Absolute 0.01 10*3/mm3      nRBC 0.0 /100 WBC     High Sensitivity Troponin T 1Hr [[id number]]  (Abnormal) Collected: 07/15/25 0433    Specimen: Blood Updated: 07/15/25 0513  "    HS Troponin T 38 ng/L      Troponin T Numeric Delta -5 ng/L      Troponin T % Delta -12    Narrative:      High Sensitive Troponin T Reference Range:  <14.0 ng/L- Negative Female for AMI  <22.0 ng/L- Negative Male for AMI  >=14 - Abnormal Female indicating possible myocardial injury.  >=22 - Abnormal Male indicating possible myocardial injury.   Clinicians would have to utilize clinical acumen, EKG, Troponin, and serial changes to determine if it is an Acute Myocardial Infarction or myocardial injury due to an underlying chronic condition.         Procalcitonin [[id number]]  (Normal) Collected: 07/15/25 0433    Specimen: Blood Updated: 07/15/25 0950     Procalcitonin 0.10 ng/mL     Narrative:      As a Marker for Sepsis (Non-Neonates):    1. <0.5 ng/mL represents a low risk of severe sepsis and/or septic shock.  2. >2 ng/mL represents a high risk of severe sepsis and/or septic shock.    As a Marker for Lower Respiratory Tract Infections that require antibiotic therapy:    PCT on Admission    Antibiotic Therapy       6-12 Hrs later    >0.5                Strongly Recommended  >0.25 - <0.5        Recommended   0.1 - 0.25          Discouraged              Remeasure/reassess PCT  <0.1                Strongly Discouraged     Remeasure/reassess PCT    As 28 day mortality risk marker: \"Change in Procalcitonin Result\" (>80% or <=80%) if Day 0 (or Day 1) and Day 4 values are available. Refer to http://www.[facility]-pct-calculator.com    Change in PCT <=80%  A decrease of PCT levels below or equal to 80% defines a positive change in PCT test result representing a higher risk for 28-day all-cause mortality of patients diagnosed with severe sepsis for septic shock.    Change in PCT >80%  A decrease of PCT levels of more than 80% defines a negative change in PCT result representing a lower risk for 28-day all-cause mortality of patients diagnosed with severe sepsis or septic shock.       CBC (No Diff) [[id number]]  (Abnormal) " Collected: 07/15/25 0616    Specimen: Blood Updated: 07/15/25 0631     WBC 5.09 10*3/mm3      RBC 3.44 10*6/mm3      Hemoglobin 10.0 g/dL      Hematocrit 31.2 %      MCV 90.7 fL      MCH 29.1 pg      MCHC 32.1 g/dL      RDW 12.5 %      RDW-SD 41.1 fl      MPV 9.6 fL      Platelets 260 10*3/mm3             Imaging Results (Last 24 Hours)       Procedure Component Value Units Date/Time    XR Chest 1 View [[id number]] Collected: 07/15/25 0359     Updated: 07/15/25 0359    Narrative:        Patient: MARSHA MARES  Time Out: 03:58  Exam(s): XR CXR 1 VIEW     EXAM:    XR Chest, 1 View    CLINICAL HISTORY:     Reason for exam: leg swelling.    TECHNIQUE:    Frontal view of the chest.    COMPARISON:    No relevant prior studies available.    FINDINGS:    Lungs:  Hypoinflation, consider interstitial edema or atypical   infection.    Pleural space:  Unremarkable.    Heart:  Cardiomegaly or prominence due to technique.    Bones joints:  No acute displaced fracture.    IMPRESSION:       1.  Hypoinflation, consider interstitial edema or atypical infection.  2.  Cardiomegaly or prominence due to technique.  3.  Recommend repeat imaging in 3 months.      Impression:          Electronically signed by Martín Mcghee MD on 07-15-25 at 0358            Results for orders placed during the hospital encounter of 04/23/25    Adult Transthoracic Echo Complete w/ Color, Spectral and Contrast if Necessary Per Protocol 04/24/2025  5:39 AM    Interpretation Summary    Left ventricular systolic function is mildly decreased. Left ventricular ejection fraction appears to be 41 - 45% in the setting of tachycardia (appears worse on apical contrast images).    Left ventricular diastolic function was indeterminate.    RV borderline dilated with grossly normal function.    Mild-moderate tricuspid regurgitation.    Estimated right ventricular systolic pressure from tricuspid regurgitation is moderately elevated (45-55 mmHg).      ECG 12 Lead Dyspnea    Preliminary Result   HEART RATE=84  bpm   RR Lbklsuxt=224  ms   SD Xndzpkmb=797  ms   P Horizontal Axis=34  deg   P Front Axis=9  deg   QRSD Tixqltbv=009  ms   QT Yhverlnb=484  ms   ZIzW=933  ms   QRS Axis=-47  deg   T Wave Axis=-8  deg   - ABNORMAL ECG -   Sinus rhythm   Right bundle branch block   Inferior infarct, age indeterminate   When compared with ECG of 23-Apr-2025 22:27:16,   Significant rate decrease   New or worsened ischemia or infarction   Date and Time of Study:2025-07-15 04:26:09           Assessment/Plan     Active Hospital Problems    Diagnosis  POA    **Bilateral lower extremity edema [R60.0]  Yes    Saddle pulmonary embolus [I26.92]  Yes    Chronic venous hypertension with inflammation involving both sides [I87.323]  Yes    Lymphedema [I89.0]  Yes    Hypothyroidism, unspecified [E03.9]  Yes    Gastroesophageal reflux disease [K21.9]  Yes    Restless legs syndrome [G25.81]  Yes    Seizure disorder [G40.909]  Yes    Venous (peripheral) insufficiency [I87.2]  Yes    Sleep apnea [G47.30]  Yes    History of partial thyroidectomy [E89.0]  Yes      Resolved Hospital Problems   No resolved problems to display.     Ms. Ruiz is a chronically ill 80-year-old female presents with worsening lower extremity swelling and no respiratory complaints.  She has known history of DVTs, PEs, lymphedema and venous insufficiency.  She received diuretics and has had quite a bit of dark anna urine output.  Denies dysuria.  She has a normal white count.  Venous duplex is negative.  She has been compliant with anticoagulants.  Plan is for continued diuresis, check echocardiogram and consult nephrology.    Start skin care with gentle soap and water and moisturization of both lower extremities.  We may consult OT for edema wraps, depending on echo results.    Continue Eliquis.    Restart her seizure medications Keppra and phenobarbital.  She can follow-up with PCP and neurology for further decisions on wanting to come  off of AEDs.    Continue Synthroid.    Continue Mirapex.    Continue aspirin and statin.    Consult PT/OT/CCP.  May need rehab placement at discharge.      Reassured her it is not likely she has a UTI due to no symptoms and normal white count.  No indication for further antibiotics, she did complete a course more than 1 week ago.    Discussed CODE STATUS.  She is a full code with full interventions.    I discussed the patient's findings and my recommendations with patient and family.    VTE Prophylaxis - Eliquis (home med).  Code Status -full code.       REMY Griffith  [facility]  07/15/25  14:07 EDT

## 2025-07-15 NOTE — ED NOTES
Nursing report ED to floor  Mariela Clevelanders  80 y.o.  female    HPI :  HPI  Stated Reason for Visit: Pt presents to ED from home via [facility] with complaints of bilateral leg pain and swelling that has gotten worse after a fall today. Pt denies hitting her head, -LOC and recently started on Eliquis.  History Obtained From: patient, EMS    Chief Complaint  Chief Complaint   Patient presents with    Fall       Admitting doctor:   Anuj Stephens MD    Admitting diagnosis:   The primary encounter diagnosis was Bilateral lower extremity edema. A diagnosis of Falls was also pertinent to this visit.    Code status:   Current Code Status       Date Active Code Status Order ID Comments User Context       7/15/2025 0540 CPR (Attempt to Resuscitate) [id number]  Jenn Venegas APRN ED        Question Answer    Code Status (Patient has no pulse and is not breathing) CPR (Attempt to Resuscitate)    Medical Interventions (Patient has pulse or is breathing) Full Support                    Allergies:   Clindamycin/lincomycin, Duricef [cefadroxil], and Sulfa antibiotics    Isolation:   No active isolations    Intake and Output  No intake or output data in the 24 hours ending 07/15/25 0733    Weight:       07/15/25  0246   Weight: 78.5 kg (173 lb 1 oz)       Most recent vitals:   Vitals:    07/15/25 0542 07/15/25 0600 07/15/25 0613 07/15/25 0643   BP:  106/67     BP Location:       Patient Position:       Pulse: 83 89 97 96   Resp:       Temp:       TempSrc:       SpO2: 100% 95% 95% 96%   Weight:       Height:           Active LDAs/IV Access:   Lines, Drains & Airways       Active LDAs       Name Placement date Placement time Site Days    Peripheral IV 07/15/25 0307 20 G Left Antecubital 07/15/25  0307  Antecubital  less than 1    External Urinary Catheter 07/15/25  0311  --  less than 1                    Labs (abnormal labs have a star):   Labs Reviewed   COMPREHENSIVE METABOLIC PANEL - Abnormal; Notable for the  following components:       Result Value    Albumin 3.2 (*)     Alkaline Phosphatase 131 (*)     All other components within normal limits    Narrative:     GFR Categories in Chronic Kidney Disease (CKD)              GFR Category          GFR (mL/min/1.73)    Interpretation  G1                    90 or greater        Normal or high (1)  G2                    60-89                Mild decrease (1)  G3a                   45-59                Mild to moderate decrease  G3b                   30-44                Moderate to severe decrease  G4                    15-29                Severe decrease  G5                    14 or less           Kidney failure    (1)In the absence of evidence of kidney disease, neither GFR category G1 or G2 fulfill the criteria for CKD.    eGFR calculation 2021 CKD-EPI creatinine equation, which does not include race as a factor   TROPONIN - Abnormal; Notable for the following components:    HS Troponin T 43 (*)     All other components within normal limits    Narrative:     High Sensitive Troponin T Reference Range:  <14.0 ng/L- Negative Female for AMI  <22.0 ng/L- Negative Male for AMI  >=14 - Abnormal Female indicating possible myocardial injury.  >=22 - Abnormal Male indicating possible myocardial injury.   Clinicians would have to utilize clinical acumen, EKG, Troponin, and serial changes to determine if it is an Acute Myocardial Infarction or myocardial injury due to an underlying chronic condition.        CBC WITH AUTO DIFFERENTIAL - Abnormal; Notable for the following components:    RBC 3.65 (*)     Hemoglobin 10.6 (*)     Hematocrit 33.4 (*)     Monocyte % 14.2 (*)     Eosinophil % 0.0 (*)     All other components within normal limits   HIGH SENSITIVITIY TROPONIN T 1HR - Abnormal; Notable for the following components:    HS Troponin T 38 (*)     All other components within normal limits    Narrative:     High Sensitive Troponin T Reference Range:  <14.0 ng/L- Negative Female for  AMI  <22.0 ng/L- Negative Male for AMI  >=14 - Abnormal Female indicating possible myocardial injury.  >=22 - Abnormal Male indicating possible myocardial injury.   Clinicians would have to utilize clinical acumen, EKG, Troponin, and serial changes to determine if it is an Acute Myocardial Infarction or myocardial injury due to an underlying chronic condition.        CBC (NO DIFF) - Abnormal; Notable for the following components:    RBC 3.44 (*)     Hemoglobin 10.0 (*)     Hematocrit 31.2 (*)     All other components within normal limits   BNP (IN-HOUSE) - Normal    Narrative:     This assay is used as an aid in the diagnosis of individuals suspected of having heart failure. It can be used as an aid in the diagnosis of acute decompensated heart failure (ADHF) in patients presenting with signs and symptoms of ADHF to the emergency department (ED). In addition, NT-proBNP of <300 pg/mL indicates ADHF is not likely.    Age Range Result Interpretation  NT-proBNP Concentration (pg/mL:      <50             Positive            >450                   Gray                 300-450                    Negative             <300    50-75           Positive            >900                  Gray                300-900                  Negative            <300      >75             Positive            >1800                  Gray                300-1800                  Negative            <300   RAINBOW DRAW    Narrative:     The following orders were created for panel order [place] Draw.  Procedure                               Abnormality         Status                     ---------                               -----------         ------                     Green Top (Gel)[[id number]]                                  Final result               Lavender Top[[id number]]                                     Final result               Gold Top - SST[[id number]]                                   Final result               Darnell  Top[[id number]]                                         Final result               Light Blue Top[[id number]]                                   Final result                 Please view results for these tests on the individual orders.   GREEN TOP   LAVENDER TOP   GOLD TOP - SST   GRAY TOP   LIGHT BLUE TOP   CBC AND DIFFERENTIAL    Narrative:     The following orders were created for panel order CBC & Differential.  Procedure                               Abnormality         Status                     ---------                               -----------         ------                     CBC Auto Differential[[id number]]        Abnormal            Final result                 Please view results for these tests on the individual orders.       EKG:   ECG 12 Lead Dyspnea   Preliminary Result   HEART RATE=84  bpm   RR Myyfmdgl=098  ms   AR Bvldapvr=049  ms   P Horizontal Axis=34  deg   P Front Axis=9  deg   QRSD Stffzfky=027  ms   QT Ygcakssc=440  ms   TMwQ=701  ms   QRS Axis=-47  deg   T Wave Axis=-8  deg   - ABNORMAL ECG -   Sinus rhythm   Right bundle branch block   Inferior infarct, age indeterminate   When compared with ECG of 23-Apr-2025 22:27:16,   Significant rate decrease   New or worsened ischemia or infarction   Date and Time of Study:2025-07-15 04:26:09          Meds given in ED:   Medications   sodium chloride 0.9 % flush 10 mL (has no administration in time range)   nitroglycerin (NITROSTAT) SL tablet 0.4 mg (has no administration in time range)   sodium chloride 0.9 % flush 10 mL (has no administration in time range)   sodium chloride 0.9 % flush 10 mL (has no administration in time range)   sodium chloride 0.9 % infusion 40 mL (has no administration in time range)   acetaminophen (TYLENOL) tablet 650 mg (has no administration in time range)     Or   acetaminophen (TYLENOL) 160 MG/5ML oral solution 650 mg (has no administration in time range)     Or   acetaminophen (TYLENOL) suppository 650 mg (has no  administration in time range)   famotidine (PEPCID) tablet 20 mg (has no administration in time range)   sennosides-docusate (PERICOLACE) 8.6-50 MG per tablet 2 tablet (has no administration in time range)     And   polyethylene glycol (MIRALAX) packet 17 g (has no administration in time range)     And   bisacodyl (DULCOLAX) EC tablet 5 mg (has no administration in time range)     And   bisacodyl (DULCOLAX) suppository 10 mg (has no administration in time range)   ondansetron (ZOFRAN) injection 4 mg (has no administration in time range)   melatonin tablet 5 mg (5 mg Oral Not Given 7/15/25 0613)   furosemide (LASIX) injection 40 mg (40 mg Intravenous Given 7/15/25 0616)       Imaging results:  XR Chest 1 View  Result Date: 7/15/2025  Electronically signed by Martín Mcghee MD on 07-15-25 at 0358      Ambulatory status:       Social issues:   Social History     Socioeconomic History    Marital status:    Tobacco Use    Smoking status: Never     Passive exposure: Never    Smokeless tobacco: Never   Vaping Use    Vaping status: Never Used   Substance and Sexual Activity    Alcohol use: No    Drug use: No    Sexual activity: Defer       Peripheral Neurovascular  Peripheral Neurovascular (Adult)  Peripheral Neurovascular WDL: WDL, capillary refill, neurovascular assessment lower, pulse assessment  Capillary Refill, General: less than/equal to 3 secs  Pulse Assessment: dorsalis pedis, popliteal  Additional Documentation: Edema (Group)  Edema  Edema: leg, left, leg, right, ankle, left, ankle, right, foot, right, foot, left  Leg, Left Edema: 4+ (Severe)  Leg, Right Edema: 4+ (Severe)  Ankle, Left Edema: 3+ (Moderate)  Ankle, Right Edema: 3+ (Moderate)  Foot, Left Edema: 3+ (Moderate)  Foot, Right Edema: 3+ (Moderate)  LLE Neurovascular Assessment  Temperature LLE: warm  Color LLE: no discoloration  Sensation LLE: tenderness present, tingling present, no numbness  RLE Neurovascular Assessment  Temperature RLE:  warm  Color RLE: no discoloration  Sensation RLE: tenderness present, tingling present, no numbness    Neuro Cognitive  Neuro Cognitive (Adult)  Cognitive/Neuro/Behavioral WDL: WDL, level of consciousness, orientation  Level of Consciousness: Alert  Orientation: oriented x 4  Pupils  Pupil PERRLA: yes    Learning  Learning Assessment  Learning Readiness and Ability: no barriers identified  Education Provided  Person Taught: patient    Respiratory  Respiratory WDL  Respiratory WDL: WDL    Abdominal Pain  Abdominal Pain CPG Interventions  Coping Interventions: anticipatory guidance provided, care explained to patient/family prior to performing  Safety Interventions  Safety Precautions/Falls Reduction: assistive device/personal items within reach  All Alarms: alarm(s) activated and audible    Pain Assessments  Pain (Adult)  (0-10) Pain Rating: Rest: 5  (0-10) Pain Rating: Activity: 5  Pain Location: extremity  Pain Side/Orientation: bilateral, lower    NIH Stroke Scale       Renee Crowley RN  07/15/25 07:33 EDT     28F hx of lupus on steroids, occasional nsaids 28F hx of lupus on steroids, occasional nsaids presents to the ED for nausea, vomiting, diffuse abdominal discomfort x1 day. Patient has been having poor PO intake x few days. Abdominal pain is nonradiating, no modifying factors. Denies diarrhea, urinary sxs. Denies chest pain, shortness of breath, palpitations.

## 2025-07-15 NOTE — NURSING NOTE
CWON note: consult received for MASD in abdominal folds, Photos reviewed and Magic Barrier cream was ordered. Please re-consult for any additional needs.

## 2025-07-15 NOTE — ED PROVIDER NOTES
EMERGENCY DEPARTMENT ENCOUNTER  Room Number:  02/02  PCP: Duglas Rock MD  Independent Historians: Historian: Patient      HPI:  Chief Complaint: had concerns including Fall.     A complete HPI/ROS/PMH/PSH/SH/FH are unobtainable due to: EM_Unobtainable History : None    Chronic or social conditions impacting patient care (Social Determinants of Health): None      Context: Mariela Ruiz is a 80 y.o. female with a medical history of lymphedema, hyperlipidemia, hypothyroidism, chronic venous stasis presents emergency department with worsening leg swelling over the last several weeks.  Patient had a knee and hip replacement in March of this year and was discharged to rehab where she has been since June when she went home.  She says since going home the swelling in her legs has been worsening to the point where now it is difficult to walk.  She uses a walker to ambulate.  She does have a history of lymphedema and was previously following with vascular surgery but has not seen them in quite a while. she is anticoagulated on Eliquis because she had DVT and PEs while in the hospital in March after her surgery.  She reports compliance with medications.  She denies chest pain or shortness of breath at this time.  She does wear some socks with light compression but does not wrap the legs or see wound care.  She has been doing home health physical therapy and says that the swelling does tend to get a little bit better when she elevates the legs.  She presents tonight because she is unable to get to her appointments outpatient and her ability to ambulate at home has worsened significantly.      Review of prior external notes (non-ED) -and- Review of prior external test results outside of this encounter:   Patient was admitted to the hospital on 4/23/2025 and discharged on 4/29/2025 after being  admitted for saddle PE.  She was previously hospitalized from March 4 to March 13 after mechanical fall where she sustained MAXIMO  with periprosthetic fracture and left TKA loosening and underwent left MAXIMO revision and ORIF of the distal femoral condyle.  She was discharged to rehab at that time but then presented back to the emergency department on 4/23/2025 with diaphoresis and hypotension was found to have a saddle pulmonary embolism.  She ultimately underwent thrombectomy was treated with heparin.  This was complicated by an JACIEL which resolved.  She is not found to have a Klebsiella UTI which was treated with ceftriaxone and Levaquin upon discharge.  She was transition to [facility] and discharged to [place].        PAST MEDICAL HISTORY  Active Ambulatory Problems     Diagnosis Date Noted    Seizure disorder     Hyperlipidemia     Vitamin D insufficiency     Age-related osteoporosis without current pathological fracture     Sleep apnea     Venous (peripheral) insufficiency     Postsurgical hypothyroidism     Chronic fatigue syndrome 11/07/2016    Gastroesophageal reflux disease 11/07/2016    Dupuytren's contracture 11/07/2016    History of biliary T-tube placement 11/07/2016    History of partial thyroidectomy 10/04/2012    Multinodular goiter 11/07/2016    Restless legs syndrome 11/07/2016    History of cardiac catheterization 11/07/2016    Urge incontinence of urine 11/07/2016    Left knee pain 11/15/2016    Ligamentous laxity of knee 12/02/2016    DJD (degenerative joint disease) of knee 12/24/2016    Adverse drug effect, subsequent encounter 11/07/2017    Abnormal blood level of chromium 04/05/2018    Abnormal blood level of cobalt 04/05/2018    Family history of diabetes mellitus 09/13/2018    Thrombocytopenia 09/16/2019    . 09/16/2019    S/P revision of total hip 03/09/2021    Enlarged thyroid gland 06/29/2017    Palmar fascial fibromatosis (dupuytren) 01/10/2024    Hypothyroidism, unspecified 01/10/2024    Hyperlipidemia, unspecified 01/10/2024    Acute cholecystitis 10/31/2024    Elevated troponin 10/31/2024    Vitamin B12 deficiency  02/13/2025    Pernicious anemia 02/13/2025    Lymphedema 02/13/2025    Venous stasis 02/13/2025    Chronic venous hypertension with inflammation involving both sides 02/13/2025    Periprosthetic hip fracture 03/04/2025    Ureterolithiasis 03/04/2025    Hydronephrosis 03/04/2025    Multiple thyroid nodules 03/12/2025    Saddle pulmonary embolus 04/24/2025     Resolved Ambulatory Problems     Diagnosis Date Noted    Mitral valve prolapse 11/07/2016    Right fascicular block 11/07/2016    Pruritic rash 10/18/2017    Wasp sting 08/16/2019    Cellulitis of right upper extremity 08/17/2019    Closed fracture of neck of right femur 09/13/2019    Fever 09/16/2019    Urine retention 09/16/2019     Past Medical History:   Diagnosis Date    Arthritis     Chronic venous insufficiency     History of staph infection     History of transfusion     Nontoxic multinodular goiter     Osteoporosis     Pelvic joint pain, left     Presence of left artificial hip joint     Restless leg syndrome     Right bundle branch block          PAST SURGICAL HISTORY  Past Surgical History:   Procedure Laterality Date    APPENDECTOMY      CARDIAC CATHETERIZATION      NORMAL     CARDIAC CATHETERIZATION  4/24/2025    Procedure: Percutaneous Manual Thrombectomy;  Surgeon: Sourav Kennedy MD;  Location: [facility] CATH INVASIVE LOCATION;  Service: Cardiovascular;;    CHOLECYSTECTOMY N/A 11/2/2024    Procedure: CHOLECYSTECTOMY LAPAROSCOPIC WITH DAVINCI ROBOT with cholangiogram, possible open;  Surgeon: Bin Heaton MD;  Location: [facility] OR;  Service: Robotics - DaVinci;  Laterality: N/A;    COLONOSCOPY  08/17/2017    Normal except for anal fissure.  Dr. Brandt.  [facility].    KNEE MINI REVISION Left 11/15/2016    Procedure: LEFT KNEE POLY CHANGE WITH HI POST STABILIZER;  Surgeon: Pablito Claudio MD;  Location: [facility] OR;  Service:     KNEE MINI REVISION Left 12/13/2016    Procedure: LT KNEE REMOVAL/REPLACEMENT LOCKING PIN ;  Surgeon:  Pablito Claudio MD;  Location: [facility] MAIN OR;  Service:     MAMMO FINE NEEDLE ASPIRATION UNILATERAL      RIGHT THYROID NODULE, 2009 BENIGN     FL ARTHRP KNE CONDYLE&PLATU MEDIAL&LAT COMPARTMENTS Left 12/24/2016    Procedure: LEFT KNEE WASHOUT WITH POLY CHANGE;  Surgeon: Christiano Cesar MD;  Location: [facility] MAIN OR;  Service: Orthopedics    FL REVJ TOTAL KNEE ARTHRP W/WO ALGRFT 1 COMPONENT Left 2/20/2017    Procedure: LT KNEE REMOVAL ANTIBIOTIC SPACER AND KNEE REVISION;  Surgeon: Christiano Cesar MD;  Location: [facility] MAIN OR;  Service: Orthopedics    REPLACEMENT TOTAL KNEE Left     THYROIDECTOMY, PARTIAL      1979 LEFT LOBECTOMY AND ISTHMECTOMY     TOTAL ABDOMINAL HYSTERECTOMY WITH SALPINGO OOPHORECTOMY      TOTAL HIP ARTHROPLASTY Left     TOTAL HIP ARTHROPLASTY Right 9/14/2019    Procedure: TOTAL HIP ARTHROPLASTY ANTERIOR WITH HANA TABLE;  Surgeon: Christiano Cesar II, MD;  Location: [facility] MAIN OR;  Service: Orthopedics    TOTAL HIP ARTHROPLASTY REVISION Left 3/9/2021    Procedure: LEFT TOTAL HIP ARTHROPLASTY REVISION POSTERIOR;  Surgeon: Christiano Cesar II, MD;  Location: [facility] MAIN OR;  Service: Orthopedics;  Laterality: Left;    TOTAL HIP ARTHROPLASTY REVISION Left 3/8/2025    Procedure: TOTAL HIP ARTHROPLASTY REVISION;  Surgeon: Christiano Cesar II, MD;  Location: [facility] MAIN OR;  Service: Orthopedics;  Laterality: Left;    TOTAL KNEE  PROSTHESIS REMOVAL W/ SPACER INSERTION Left 12/29/2016    Procedure: LT KNEE IMPLANT REMOVAL WITH INSERTION OF SPACER ;  Surgeon: Christiano Cesar MD;  Location: [facility] MAIN OR;  Service:          FAMILY HISTORY  Family History   Problem Relation Age of Onset    Heart disease Father     Diabetes Sister     Hypertension Sister     Hyperlipidemia Sister     Obesity Sister     Malig Hyperthermia Neg Hx          SOCIAL HISTORY  Social History     Socioeconomic History    Marital status:    Tobacco Use    Smoking status: Never     Passive  exposure: Never    Smokeless tobacco: Never   Vaping Use    Vaping status: Never Used   Substance and Sexual Activity    Alcohol use: No    Drug use: No    Sexual activity: Defer         ALLERGIES  Clindamycin/lincomycin, Duricef [cefadroxil], and Sulfa antibiotics      REVIEW OF SYSTEMS  Included in HPI  All systems reviewed and negative except for those discussed in HPI.      PHYSICAL EXAM    I have reviewed the triage vital signs and nursing notes.    ED Triage Vitals [07/15/25 0246]   Temp Heart Rate Resp BP SpO2   98.4 °F (36.9 °C) 99 16 100/45 95 %      Temp src Heart Rate Source Patient Position BP Location FiO2 (%)   Oral Monitor Sitting Right arm --       Physical Exam  Constitutional:       General: She is not in acute distress.     Appearance: Normal appearance. She is obese.   HENT:      Head: Normocephalic and atraumatic.      Nose: Nose normal.      Mouth/Throat:      Mouth: Mucous membranes are moist.   Eyes:      Extraocular Movements: Extraocular movements intact.      Pupils: Pupils are equal, round, and reactive to light.   Cardiovascular:      Rate and Rhythm: Normal rate and regular rhythm.      Pulses: Normal pulses.      Heart sounds: Normal heart sounds.      Comments: 3+ pitting edema in the bilateral lower extremities with lymphedema.  2+ DP pulse.  Light touch grossly intact distally.  Pulmonary:      Effort: Pulmonary effort is normal. No respiratory distress.      Breath sounds: Normal breath sounds.   Abdominal:      General: Abdomen is flat. There is no distension.      Palpations: Abdomen is soft.      Tenderness: There is no abdominal tenderness.   Musculoskeletal:         General: Normal range of motion.      Cervical back: Normal range of motion and neck supple.   Skin:     General: Skin is warm and dry.      Capillary Refill: Capillary refill takes less than 2 seconds.   Neurological:      General: No focal deficit present.      Mental Status: She is alert and oriented to person,  place, and time.   Psychiatric:         Mood and Affect: Mood normal.         Behavior: Behavior normal.       LAB RESULTS  Recent Results (from the past 24 hours)   Green Top (Gel)    Collection Time: 07/15/25  3:08 AM   Result Value Ref Range    Extra Tube Hold for add-ons.    Lavender Top    Collection Time: 07/15/25  3:08 AM   Result Value Ref Range    Extra Tube hold for add-on    Gold Top - SST    Collection Time: 07/15/25  3:08 AM   Result Value Ref Range    Extra Tube Hold for add-ons.    Gray Top    Collection Time: 07/15/25  3:08 AM   Result Value Ref Range    Extra Tube Hold for add-ons.    Light Blue Top    Collection Time: 07/15/25  3:08 AM   Result Value Ref Range    Extra Tube Hold for add-ons.    Comprehensive Metabolic Panel    Collection Time: 07/15/25  3:08 AM    Specimen: Blood   Result Value Ref Range    Glucose 98 65 - 99 mg/dL    BUN 10.0 8.0 - 23.0 mg/dL    Creatinine 0.95 0.57 - 1.00 mg/dL    Sodium 140 136 - 145 mmol/L    Potassium 3.5 3.5 - 5.2 mmol/L    Chloride 107 98 - 107 mmol/L    CO2 26.1 22.0 - 29.0 mmol/L    Calcium 9.5 8.6 - 10.5 mg/dL    Total Protein 6.5 6.0 - 8.5 g/dL    Albumin 3.2 (L) 3.5 - 5.2 g/dL    ALT (SGPT) 8 1 - 33 U/L    AST (SGOT) 15 1 - 32 U/L    Alkaline Phosphatase 131 (H) 39 - 117 U/L    Total Bilirubin 0.6 0.0 - 1.2 mg/dL    Globulin 3.3 gm/dL    A/G Ratio 1.0 g/dL    BUN/Creatinine Ratio 10.5 7.0 - 25.0    Anion Gap 6.9 5.0 - 15.0 mmol/L    eGFR 61.5 >60.0 mL/min/1.73   BNP    Collection Time: 07/15/25  3:08 AM    Specimen: Blood   Result Value Ref Range    proBNP 1,569.0 0.0 - 1,800.0 pg/mL   High Sensitivity Troponin T    Collection Time: 07/15/25  3:08 AM    Specimen: Blood   Result Value Ref Range    HS Troponin T 43 (H) <14 ng/L   CBC Auto Differential    Collection Time: 07/15/25  3:08 AM    Specimen: Blood   Result Value Ref Range    WBC 4.87 3.40 - 10.80 10*3/mm3    RBC 3.65 (L) 3.77 - 5.28 10*6/mm3    Hemoglobin 10.6 (L) 12.0 - 15.9 g/dL    Hematocrit  33.4 (L) 34.0 - 46.6 %    MCV 91.5 79.0 - 97.0 fL    MCH 29.0 26.6 - 33.0 pg    MCHC 31.7 31.5 - 35.7 g/dL    RDW 12.4 12.3 - 15.4 %    RDW-SD 41.7 37.0 - 54.0 fl    MPV 9.7 6.0 - 12.0 fL    Platelets 293 140 - 450 10*3/mm3    Neutrophil % 60.8 42.7 - 76.0 %    Lymphocyte % 23.8 19.6 - 45.3 %    Monocyte % 14.2 (H) 5.0 - 12.0 %    Eosinophil % 0.0 (L) 0.3 - 6.2 %    Basophil % 1.0 0.0 - 1.5 %    Immature Grans % 0.2 0.0 - 0.5 %    Neutrophils, Absolute 2.96 1.70 - 7.00 10*3/mm3    Lymphocytes, Absolute 1.16 0.70 - 3.10 10*3/mm3    Monocytes, Absolute 0.69 0.10 - 0.90 10*3/mm3    Eosinophils, Absolute 0.00 0.00 - 0.40 10*3/mm3    Basophils, Absolute 0.05 0.00 - 0.20 10*3/mm3    Immature Grans, Absolute 0.01 0.00 - 0.05 10*3/mm3    nRBC 0.0 0.0 - 0.2 /100 WBC   ECG 12 Lead Dyspnea    Collection Time: 07/15/25  4:26 AM   Result Value Ref Range    QT Interval 387 ms    QTC Interval 457 ms   High Sensitivity Troponin T 1Hr    Collection Time: 07/15/25  4:33 AM    Specimen: Blood   Result Value Ref Range    HS Troponin T 38 (H) <14 ng/L    Troponin T Numeric Delta -5 ng/L    Troponin T % Delta -12 Abnormal if >/= 20%         RADIOLOGY  XR Chest 1 View  Result Date: 7/15/2025  Patient: MARSHA MARES  Time Out: 03:58 Exam(s): XR CXR 1 VIEW EXAM:   XR Chest, 1 View CLINICAL HISTORY:    Reason for exam: leg swelling. TECHNIQUE:   Frontal view of the chest. COMPARISON:   No relevant prior studies available. FINDINGS:   Lungs:  Hypoinflation, consider interstitial edema or atypical infection.   Pleural space:  Unremarkable.   Heart:  Cardiomegaly or prominence due to technique.   Bones joints:  No acute displaced fracture. IMPRESSION:     1.  Hypoinflation, consider interstitial edema or atypical infection. 2.  Cardiomegaly or prominence due to technique. 3.  Recommend repeat imaging in 3 months.     Electronically signed by Martín Mcghee MD on 07-15-25 at 0358        MEDICATIONS GIVEN IN ER  Medications   sodium chloride  0.9 % flush 10 mL (has no administration in time range)   nitroglycerin (NITROSTAT) SL tablet 0.4 mg (has no administration in time range)   sodium chloride 0.9 % flush 10 mL (has no administration in time range)   sodium chloride 0.9 % flush 10 mL (has no administration in time range)   sodium chloride 0.9 % infusion 40 mL (has no administration in time range)   acetaminophen (TYLENOL) tablet 650 mg (has no administration in time range)     Or   acetaminophen (TYLENOL) 160 MG/5ML oral solution 650 mg (has no administration in time range)     Or   acetaminophen (TYLENOL) suppository 650 mg (has no administration in time range)   famotidine (PEPCID) tablet 20 mg (has no administration in time range)   sennosides-docusate (PERICOLACE) 8.6-50 MG per tablet 2 tablet (has no administration in time range)     And   polyethylene glycol (MIRALAX) packet 17 g (has no administration in time range)     And   bisacodyl (DULCOLAX) EC tablet 5 mg (has no administration in time range)     And   bisacodyl (DULCOLAX) suppository 10 mg (has no administration in time range)   ondansetron (ZOFRAN) injection 4 mg (has no administration in time range)   melatonin tablet 5 mg (has no administration in time range)   furosemide (LASIX) injection 40 mg (has no administration in time range)           OUTPATIENT MEDICATION MANAGEMENT:  Current Facility-Administered Medications Ordered in Epic   Medication Dose Route Frequency Provider Last Rate Last Admin    acetaminophen (TYLENOL) tablet 650 mg  650 mg Oral Q4H PRN Jenn Venegas APRN        Or    acetaminophen (TYLENOL) 160 MG/5ML oral solution 650 mg  650 mg Oral Q4H PRN Jenn Venegas APRN        Or    acetaminophen (TYLENOL) suppository 650 mg  650 mg Rectal Q4H PRN Jenn Venegas APRN        sennosides-docusate (PERICOLACE) 8.6-50 MG per tablet 2 tablet  2 tablet Oral BID PRN Jenn Venegas APRN        And    polyethylene glycol (MIRALAX) packet 17 g  17 g Oral  Daily PRN Jenn Venegas, APRN        And    bisacodyl (DULCOLAX) EC tablet 5 mg  5 mg Oral Daily PRN Jenn Venegas, APRN        And    bisacodyl (DULCOLAX) suppository 10 mg  10 mg Rectal Daily PRN Jenn Venegas M, APRN        famotidine (PEPCID) tablet 20 mg  20 mg Oral BID PRN Jenn Venegas, APRN        furosemide (LASIX) injection 40 mg  40 mg Intravenous Once Volodymyr Jessika Hernandez PA-C        melatonin tablet 5 mg  5 mg Oral Nightly Jenn Venegas, APRN        nitroglycerin (NITROSTAT) SL tablet 0.4 mg  0.4 mg Sublingual Q5 Min PRN Jenn Venegas, APRAISHWARYA        ondansetron (ZOFRAN) injection 4 mg  4 mg Intravenous Q6H PRN Jenn Venegas, APRN        sodium chloride 0.9 % flush 10 mL  10 mL Intravenous PRN Garrison Woo MD        sodium chloride 0.9 % flush 10 mL  10 mL Intravenous Q12H Jenn Venegas, APRN        sodium chloride 0.9 % flush 10 mL  10 mL Intravenous PRN Jenn Venegas, APRN        sodium chloride 0.9 % infusion 40 mL  40 mL Intravenous PRN Jenn Venegas, APRN         Current Outpatient Medications Ordered in Epic   Medication Sig Dispense Refill    acetaminophen (TYLENOL) 500 MG tablet Take 1 tablet by mouth Every 4 (Four) Hours As Needed for Mild Pain.      aspirin 81 MG EC tablet Take 1 tablet by mouth Daily.      bisacodyl (DULCOLAX) 5 MG EC tablet Take 1 tablet by mouth Daily As Needed for Constipation (Use if polyethylene glycol is ineffective).      busPIRone (BUSPAR) 5 MG tablet Take 1 tablet by mouth 3 (Three) Times a Day.      cholecalciferol (VITAMIN D3) 25 MCG (1000 UT) tablet Take 4 tablets by mouth Daily.      Cyanocobalamin (Vitamin B-12) 1000 MCG sublingual tablet 1 tablet daily 90 tablet 2    famotidine (PEPCID) 20 MG tablet Take 1 tablet by mouth 2 (Two) Times a Day Before Meals.      HYDROcodone-acetaminophen (NORCO) 7.5-325 MG per tablet Take 1 tablet by mouth Every 4 (Four) Hours As Needed for Moderate Pain.       hydrOXYzine (ATARAX) 25 MG tablet Take 1 tablet by mouth 3 (Three) Times a Day As Needed for Itching.      ibuprofen (ADVIL,MOTRIN) 400 MG tablet Take 1 tablet by mouth 3 (Three) Times a Day.      levETIRAcetam (KEPPRA) 500 MG tablet Take 1 tablet by mouth 2 (Two) Times a Day for 30 days. 60 tablet 4    levoFLOXacin (LEVAQUIN) 500 MG tablet Take 1 tablet by mouth Daily. 7 tablet 0    melatonin 5 MG tablet tablet Take 0.5 tablets by mouth At Night As Needed (sleep).      PHENobarbital 64.8 MG tablet Take 2 tablets by mouth Every Night for 7 days. 14 tablet 0    polyethylene glycol 17 g packet Take 17 g by mouth 2 (Two) Times a Day.      pramipexole (MIRAPEX) 0.5 MG tablet Take 5 tablets by mouth Every Night.      pravastatin (PRAVACHOL) 40 MG tablet TAKE 1 TABLET BY MOUTH EVERY NIGHT FOR HIGH AMOUNT OF FATS IN THE BLOOD 90 tablet 2    sennosides-docusate (PERICOLACE) 8.6-50 MG per tablet Take 2 tablets by mouth 2 (Two) Times a Day.      Synthroid 50 MCG tablet Take 1 tablet by mouth Every Morning. Indications: Underactive Thyroid 90 tablet 2           PROGRESS, DATA ANALYSIS, CONSULTS, AND MEDICAL DECISION MAKING  ORDERS PLACED DURING THIS VISIT:  Orders Placed This Encounter   Procedures    XR Chest 1 View    [place] Draw    Comprehensive Metabolic Panel    BNP    High Sensitivity Troponin T    CBC Auto Differential    High Sensitivity Troponin T 1Hr    CBC (No Diff)    Diet: Cardiac; Healthy Heart (2-3 Na+); Fluid Consistency: Thin (IDDSI 0)    Vital Signs    Telemetry - Place Orders & Notify Provider of Results When Patient Experiences Acute Chest Pain, Dysrhythmia or Respiratory Distress    May Be Off Telemetry for Tests    Notify Provider (With Default Parameters)    Up With Assistance    Intake & Output    Weigh Patient    Oral Care    Place Sequential Compression Device    Maintain Sequential Compression Device    Saline Lock & Maintain IV Access    Code Status and Medical Interventions: CPR (Attempt to  Resuscitate); Full Support    [facility] (on-call MD unless specified) Details    Inpatient Case Management  Consult    Oxygen Therapy- Nasal Cannula; Titrate 1-6 LPM Per SpO2; 90 - 95%    ECG 12 Lead Dyspnea    Insert peripheral IV    Insert Peripheral IV    Initiate Observation Status    Fall Precautions    Green Top (Gel)    Lavender Top    Gold Top - SST    Gray Top    Light Blue Top    CBC & Differential       All labs have been independently interpreted by me.  All radiology studies have been reviewed by me. All EKG's have been independently viewed and interpreted by me.  Discussion below represents my analysis of pertinent findings related to patient's condition, differential diagnosis, treatment plan and final disposition.    Differential diagnosis includes but is not limited to:   Lymphedema, CHF, CKD, hepatic failure, cellulitis, DVT    ED Course:  ED Course as of 07/15/25 0547   Tue Jul 15, 2025   0310 I discussed the case with Dr. Woo and he agrees to evaluate the patient at the bedside.    [CC]   0333 XR Chest 1 View  My independent interpretation the chest x-ray is no acute infiltrate  [CC]   0413 proBNP: 1,569.0  Decreased from prior [CC]   0518 I rechecked the patient.  I discussed the patient's labs, radiology findings (including all incidental findings), diagnosis, and plan for admission. The patient understands and agrees with the plan. [CC]   0536 Spoke with NIRMALA Barajas with [facility].  Reviewed history, exam, results, treatments.  She agrees admit the patient to Dr. Gupta.  Duplex pending at the time of admission    [CC]      ED Course User Index  [CC] Jessika Ramos, MARYSE           AS OF 05:47 EDT VITALS:    BP - 116/55  HR - 83  TEMP - 98.4 °F (36.9 °C) (Oral)  O2 SATS - 100%        MDM:  Patient is a 80-year-old female who presents emergency department today with bilateral lower extremity edema and falls.  On arrival here in the emergency department vitals are reassuring, she  is afebrile.  On my exam the patient has diffuse bilateral lower extremity edema with 3+ pitting edema in the pretibial region.  There is no overlying erythema or warmth.  Compartments are soft nasal compressible.  She is neurovascularly intact distally.  No obvious infection.    Patient was evaluated with labs which are overall reassuring.  BNP is mildly elevated but actually decreased from prior.  Renal function is normal.  Chest x-ray is clear.  She does have a history of DVT and PE and is anticoagulated on Eliquis but unfortunately at the time of the patient's presentation ultrasound is not available to me and I am unable to evaluate with Dopplers but they were ordered for the morning.  Given the patient's falls and difficulty with mobility since going home from rehab and worsening swelling we will plan to admit for PT eval.  She may require further placement in subacute rehab as it appears that she has been struggling at home since she was discharged.  Patient is agreeable to plan and stable at the time of admission.      COMPLEXITY OF CARE  The patient requires admission.        DIAGNOSIS  Final diagnoses:   Bilateral lower extremity edema   Falls         DISPOSITION  ED Disposition       ED Disposition   Decision to Admit    Condition   --    Comment   Level of Care: Telemetry [5]   Diagnosis: Bilateral lower extremity edema [396401]   Admitting Physician: TORY TOWNSEND [[id number]]   Attending Physician: TORY TOWNSEND [[id number]]   Is patient appropriate for Inpatient Observation Unit?: No [0]                      Please note that portions of this document were completed with a voice recognition program.    Note Disclaimer: At [facility], we believe that sharing information builds trust and better relationships. You are receiving this note because you recently visited [facility]. It is possible you will see health information before a provider has talked with you about it. This kind of information can be  easy to misunderstand. To help you fully understand what it means for your health, we urge you to discuss this note with your provider.     Jessika Ramos PAKaileyC  07/15/25 0549

## 2025-07-15 NOTE — NURSING NOTE
Pt admitted to floor today. A&Ox4 with some episodes of short-term memory loss, repeating questions she asked earlier.    Bedrest and activity minimized. On purewick due to Lasix. Ax2.    Pt has bilateral +4 edema on legs and bilateral +3 edema on ankles and feet.    Pt is on Eliquis.    Rash was found underneath abd folds, consulted wound care.    Wound care was also consulted to determine skin care for legs.    PRN pain med given by JEFFERSON Carreno.

## 2025-07-15 NOTE — PROGRESS NOTES
Clinical Pharmacy Services: Medication History    Mariela Ruiz is a 80 y.o. female presenting to [facility] for   Chief Complaint   Patient presents with    Fall       She  has a past medical history of Arthritis, Chronic venous insufficiency, Dupuytren's contracture, History of staph infection, History of transfusion, Hyperlipidemia, Lymphedema, Nontoxic multinodular goiter, Osteoporosis, Pelvic joint pain, left, Postsurgical hypothyroidism, Presence of left artificial hip joint, Restless leg syndrome, Right bundle branch block, Seizure disorder, Sleep apnea, and Vitamin D insufficiency.    Allergies as of 07/15/2025 - Reviewed 07/15/2025   Allergen Reaction Noted    Clindamycin/lincomycin Itching 12/29/2016    Duricef [cefadroxil] Hives and Rash 03/18/2016    Sulfa antibiotics Rash 03/18/2016       Medication information was obtained from: Patient   Pharmacy and Phone Number:     Prior to Admission Medications       Prescriptions Last Dose Informant Patient Reported? Taking?    acetaminophen (TYLENOL) 500 MG tablet   Yes Yes    Take 1 tablet by mouth Every 4 (Four) Hours As Needed for Mild Pain.    apixaban (ELIQUIS) 5 MG tablet tablet  Pharmacy Yes Yes    Take 1 tablet by mouth 2 (Two) Times a Day.    aspirin 81 MG EC tablet  Self No Yes    Take 1 tablet by mouth Daily.    cholecalciferol (VITAMIN D3) 25 MCG (1000 UT) tablet  Self No Yes    Take 4 tablets by mouth Daily.    Cyanocobalamin (Vitamin B-12) 1000 MCG sublingual tablet  Pharmacy No Yes    1 tablet daily    Patient taking differently:  Take 1 tablet by mouth Daily.    PHENobarbital 64.8 MG tablet  Self No Yes    Take 2 tablets by mouth Every Night for 7 days.    polyethylene glycol 17 g packet  Self No Yes    Take 17 g by mouth 2 (Two) Times a Day.    pramipexole (MIRAPEX) 0.5 MG tablet  Pharmacy No Yes    Take 5 tablets by mouth Every Night.    pravastatin (PRAVACHOL) 40 MG tablet  Pharmacy No Yes    TAKE 1 TABLET BY MOUTH EVERY NIGHT  FOR HIGH AMOUNT OF FATS IN THE BLOOD    Patient taking differently:  Take 1 tablet by mouth Every Night.    Synthroid 50 MCG tablet  Pharmacy No Yes    Take 1 tablet by mouth Every Morning. Indications: Underactive Thyroid    busPIRone (BUSPAR) 5 MG tablet Not Taking  No No    Take 1 tablet by mouth 3 (Three) Times a Day.    Patient not taking:  Reported on 7/15/2025    hydrOXYzine (ATARAX) 25 MG tablet Not Taking  Yes No    Take 1 tablet by mouth 3 (Three) Times a Day As Needed for Itching.    Patient not taking:  Reported on 7/15/2025    levETIRAcetam (KEPPRA) 500 MG tablet Not Taking  No No    Take 1 tablet by mouth 2 (Two) Times a Day for 30 days.    Patient not taking:  Reported on 7/15/2025              Medication notes: Patient states she recently restarted her phenobarbital.     This medication list is complete to the best of my knowledge as of 7/15/2025    Please call if questions.    Vasquez Cutler  Medication History Technician  [phone]    7/15/2025 09:50 EDT

## 2025-07-16 ENCOUNTER — APPOINTMENT (OUTPATIENT)
Dept: GENERAL RADIOLOGY | Facility: HOSPITAL | Age: 80
End: 2025-07-16
Payer: MEDICARE

## 2025-07-16 PROBLEM — R60.0 BILATERAL EDEMA OF LOWER EXTREMITY: Status: ACTIVE | Noted: 2025-07-16

## 2025-07-16 LAB
ALBUMIN SERPL-MCNC: 2.5 G/DL (ref 3.5–5.2)
ALBUMIN/GLOB SERPL: 0.8 G/DL
ALP SERPL-CCNC: 113 U/L (ref 39–117)
ALT SERPL W P-5'-P-CCNC: 8 U/L (ref 1–33)
ANION GAP SERPL CALCULATED.3IONS-SCNC: 9.7 MMOL/L (ref 5–15)
ANION GAP SERPL CALCULATED.3IONS-SCNC: 9.7 MMOL/L (ref 5–15)
AST SERPL-CCNC: 16 U/L (ref 1–32)
BASOPHILS # BLD AUTO: 0.02 10*3/MM3 (ref 0–0.2)
BASOPHILS NFR BLD AUTO: 0.4 % (ref 0–1.5)
BILIRUB SERPL-MCNC: 0.4 MG/DL (ref 0–1.2)
BUN SERPL-MCNC: 11 MG/DL (ref 8–23)
BUN SERPL-MCNC: 11 MG/DL (ref 8–23)
BUN/CREAT SERPL: 11.3 (ref 7–25)
BUN/CREAT SERPL: 11.3 (ref 7–25)
CALCIUM SPEC-SCNC: 9.1 MG/DL (ref 8.6–10.5)
CALCIUM SPEC-SCNC: 9.1 MG/DL (ref 8.6–10.5)
CHLORIDE SERPL-SCNC: 104 MMOL/L (ref 98–107)
CHLORIDE SERPL-SCNC: 104 MMOL/L (ref 98–107)
CK SERPL-CCNC: 33 U/L (ref 20–180)
CO2 SERPL-SCNC: 25.3 MMOL/L (ref 22–29)
CO2 SERPL-SCNC: 25.3 MMOL/L (ref 22–29)
CREAT SERPL-MCNC: 0.97 MG/DL (ref 0.57–1)
CREAT SERPL-MCNC: 0.97 MG/DL (ref 0.57–1)
DEPRECATED RDW RBC AUTO: 40.3 FL (ref 37–54)
EGFRCR SERPLBLD CKD-EPI 2021: 59.9 ML/MIN/1.73
EGFRCR SERPLBLD CKD-EPI 2021: 59.9 ML/MIN/1.73
EOSINOPHIL # BLD AUTO: 0 10*3/MM3 (ref 0–0.4)
EOSINOPHIL NFR BLD AUTO: 0 % (ref 0.3–6.2)
ERYTHROCYTE [DISTWIDTH] IN BLOOD BY AUTOMATED COUNT: 12.4 % (ref 12.3–15.4)
GLOBULIN UR ELPH-MCNC: 3.3 GM/DL
GLUCOSE SERPL-MCNC: 115 MG/DL (ref 65–99)
GLUCOSE SERPL-MCNC: 115 MG/DL (ref 65–99)
HCT VFR BLD AUTO: 34.3 % (ref 34–46.6)
HGB BLD-MCNC: 10.8 G/DL (ref 12–15.9)
IMM GRANULOCYTES # BLD AUTO: 0.01 10*3/MM3 (ref 0–0.05)
IMM GRANULOCYTES NFR BLD AUTO: 0.2 % (ref 0–0.5)
LYMPHOCYTES # BLD AUTO: 1.39 10*3/MM3 (ref 0.7–3.1)
LYMPHOCYTES NFR BLD AUTO: 26.9 % (ref 19.6–45.3)
MCH RBC QN AUTO: 28.6 PG (ref 26.6–33)
MCHC RBC AUTO-ENTMCNC: 31.5 G/DL (ref 31.5–35.7)
MCV RBC AUTO: 90.7 FL (ref 79–97)
MONOCYTES # BLD AUTO: 0.55 10*3/MM3 (ref 0.1–0.9)
MONOCYTES NFR BLD AUTO: 10.7 % (ref 5–12)
NEUTROPHILS NFR BLD AUTO: 3.19 10*3/MM3 (ref 1.7–7)
NEUTROPHILS NFR BLD AUTO: 61.8 % (ref 42.7–76)
NRBC BLD AUTO-RTO: 0 /100 WBC (ref 0–0.2)
PHENOBARB SERPL-MCNC: 6.1 MCG/ML (ref 10–30)
PLATELET # BLD AUTO: 288 10*3/MM3 (ref 140–450)
PMV BLD AUTO: 9.9 FL (ref 6–12)
POTASSIUM SERPL-SCNC: 3.9 MMOL/L (ref 3.5–5.2)
POTASSIUM SERPL-SCNC: 3.9 MMOL/L (ref 3.5–5.2)
PROT SERPL-MCNC: 5.8 G/DL (ref 6–8.5)
RBC # BLD AUTO: 3.78 10*6/MM3 (ref 3.77–5.28)
SODIUM SERPL-SCNC: 139 MMOL/L (ref 136–145)
SODIUM SERPL-SCNC: 139 MMOL/L (ref 136–145)
WBC NRBC COR # BLD AUTO: 5.16 10*3/MM3 (ref 3.4–10.8)

## 2025-07-16 PROCEDURE — 73562 X-RAY EXAM OF KNEE 3: CPT

## 2025-07-16 PROCEDURE — 97162 PT EVAL MOD COMPLEX 30 MIN: CPT

## 2025-07-16 PROCEDURE — 80184 ASSAY OF PHENOBARBITAL: CPT | Performed by: HOSPITALIST

## 2025-07-16 PROCEDURE — 97166 OT EVAL MOD COMPLEX 45 MIN: CPT

## 2025-07-16 PROCEDURE — 82550 ASSAY OF CK (CPK): CPT | Performed by: HOSPITALIST

## 2025-07-16 PROCEDURE — 97110 THERAPEUTIC EXERCISES: CPT

## 2025-07-16 PROCEDURE — 80053 COMPREHEN METABOLIC PANEL: CPT | Performed by: HOSPITALIST

## 2025-07-16 PROCEDURE — 85025 COMPLETE CBC W/AUTO DIFF WBC: CPT | Performed by: HOSPITALIST

## 2025-07-16 PROCEDURE — 25810000003 SODIUM CHLORIDE 0.9 % SOLUTION: Performed by: HOSPITALIST

## 2025-07-16 PROCEDURE — 97535 SELF CARE MNGMENT TRAINING: CPT

## 2025-07-16 RX ORDER — SODIUM CHLORIDE 9 MG/ML
50 INJECTION, SOLUTION INTRAVENOUS CONTINUOUS
Status: DISCONTINUED | OUTPATIENT
Start: 2025-07-16 | End: 2025-07-16

## 2025-07-16 RX ORDER — AMMONIUM LACTATE 12 G/100G
1 CREAM TOPICAL 2 TIMES DAILY
Status: DISCONTINUED | OUTPATIENT
Start: 2025-07-16 | End: 2025-07-22 | Stop reason: HOSPADM

## 2025-07-16 RX ADMIN — Medication 5 MG: at 22:33

## 2025-07-16 RX ADMIN — Medication 1 APPLICATION: at 22:32

## 2025-07-16 RX ADMIN — NYSTATIN 1 APPLICATION: 100000 OINTMENT TOPICAL at 15:48

## 2025-07-16 RX ADMIN — PHENOBARBITAL 129.6 MG: 32.4 TABLET ORAL at 22:32

## 2025-07-16 RX ADMIN — LEVOTHYROXINE SODIUM 50 MCG: 50 TABLET ORAL at 06:27

## 2025-07-16 RX ADMIN — ACETAMINOPHEN 650 MG: 325 TABLET, FILM COATED ORAL at 23:30

## 2025-07-16 RX ADMIN — PRAMIPEXOLE DIHYDROCHLORIDE 2.5 MG: 1 TABLET ORAL at 22:32

## 2025-07-16 RX ADMIN — NYSTATIN 1 APPLICATION: 100000 OINTMENT TOPICAL at 22:33

## 2025-07-16 RX ADMIN — NYSTATIN 1 APPLICATION: 100000 OINTMENT TOPICAL at 09:11

## 2025-07-16 RX ADMIN — Medication 10 ML: at 09:11

## 2025-07-16 RX ADMIN — APIXABAN 5 MG: 5 TABLET, FILM COATED ORAL at 09:10

## 2025-07-16 RX ADMIN — Medication 1 APPLICATION: at 15:03

## 2025-07-16 RX ADMIN — SODIUM CHLORIDE 50 ML/HR: 9 INJECTION, SOLUTION INTRAVENOUS at 09:11

## 2025-07-16 RX ADMIN — APIXABAN 5 MG: 5 TABLET, FILM COATED ORAL at 22:33

## 2025-07-16 RX ADMIN — HYDROCODONE BITARTRATE AND ACETAMINOPHEN 1 TABLET: 5; 325 TABLET ORAL at 06:29

## 2025-07-16 RX ADMIN — PRAVASTATIN SODIUM 40 MG: 40 TABLET ORAL at 22:33

## 2025-07-16 RX ADMIN — HYDROXYZINE HYDROCHLORIDE 25 MG: 25 TABLET, FILM COATED ORAL at 15:03

## 2025-07-16 NOTE — THERAPY EVALUATION
Patient Name: Mariela Ruiz  : 1945    MRN: [id number]                              Today's Date: 2025       Admit Date: 7/15/2025    Visit Dx:     ICD-10-CM ICD-9-CM   1. Bilateral lower extremity edema  R60.0 782.3   2. Falls  R29.6 V15.88     Patient Active Problem List   Diagnosis    Seizure disorder    Hyperlipidemia    Vitamin D insufficiency    Age-related osteoporosis without current pathological fracture    Sleep apnea    Venous (peripheral) insufficiency    Postsurgical hypothyroidism    Chronic fatigue syndrome    Gastroesophageal reflux disease    Dupuytren's contracture    History of biliary T-tube placement    History of partial thyroidectomy    Multinodular goiter    Restless legs syndrome    History of cardiac catheterization    Urge incontinence of urine    Left knee pain    Ligamentous laxity of knee    DJD (degenerative joint disease) of knee    Adverse drug effect, subsequent encounter    Abnormal blood level of chromium    Abnormal blood level of cobalt    Family history of diabetes mellitus    Thrombocytopenia    .    S/P revision of total hip    Enlarged thyroid gland    Palmar fascial fibromatosis (dupuytren)    Hypothyroidism, unspecified    Hyperlipidemia, unspecified    Acute cholecystitis    Elevated troponin    Vitamin B12 deficiency    Pernicious anemia    Lymphedema    Venous stasis    Chronic venous hypertension with inflammation involving both sides    Periprosthetic hip fracture    Ureterolithiasis    Hydronephrosis    Multiple thyroid nodules    Saddle pulmonary embolus    Bilateral lower extremity edema     Past Medical History:   Diagnosis Date    Arthritis     Chronic venous insufficiency     Dupuytren's contracture     History of staph infection     S/P KNEE DEC 2016    History of transfusion     Hyperlipidemia     Lymphedema     LEFT LEG    Nontoxic multinodular goiter     Osteoporosis     Dr. Cabrera    Pelvic joint pain, left     Postsurgical hypothyroidism      Presence of left artificial hip joint     METAL ON METAL AS NOTED PER DR CLEMENT NOTE    Restless leg syndrome     Right bundle branch block     Seizure disorder     Dr. Whitman, HX 1980 LAST SEIZURE    Sleep apnea     DOES NOT HAVE MACHINE    Vitamin D insufficiency      Past Surgical History:   Procedure Laterality Date    APPENDECTOMY      CARDIAC CATHETERIZATION      NORMAL     CARDIAC CATHETERIZATION  4/24/2025    Procedure: Percutaneous Manual Thrombectomy;  Surgeon: Sourav Kennedy MD;  Location: [facility] CATH INVASIVE LOCATION;  Service: Cardiovascular;;    CHOLECYSTECTOMY N/A 11/2/2024    Procedure: CHOLECYSTECTOMY LAPAROSCOPIC WITH DAVINCI ROBOT with cholangiogram, possible open;  Surgeon: Bin Heaton MD;  Location: [facility] MAIN OR;  Service: Robotics - DaVinci;  Laterality: N/A;    COLONOSCOPY  08/17/2017    Normal except for anal fissure.  Dr. Brandt.  [facility].    KNEE MINI REVISION Left 11/15/2016    Procedure: LEFT KNEE POLY CHANGE WITH HI POST STABILIZER;  Surgeon: Pablito Claudio MD;  Location: [facility] OR;  Service:     KNEE MINI REVISION Left 12/13/2016    Procedure: LT KNEE REMOVAL/REPLACEMENT LOCKING PIN ;  Surgeon: Pablito Claudio MD;  Location: [facility] OR;  Service:     MAMMO FINE NEEDLE ASPIRATION UNILATERAL      RIGHT THYROID NODULE, 2009 BENIGN     NM ARTHRP KNE CONDYLE&PLATU MEDIAL&LAT COMPARTMENTS Left 12/24/2016    Procedure: LEFT KNEE WASHOUT WITH POLY CHANGE;  Surgeon: Christiano Clement MD;  Location: [facility] OR;  Service: Orthopedics    NM REVJ TOTAL KNEE ARTHRP W/WO ALGRFT 1 COMPONENT Left 2/20/2017    Procedure: LT KNEE REMOVAL ANTIBIOTIC SPACER AND KNEE REVISION;  Surgeon: Christiano Clement MD;  Location: [facility] OR;  Service: Orthopedics    REPLACEMENT TOTAL KNEE Left     THYROIDECTOMY, PARTIAL      1979 LEFT LOBECTOMY AND ISTHMECTOMY     TOTAL ABDOMINAL HYSTERECTOMY WITH SALPINGO OOPHORECTOMY      TOTAL HIP ARTHROPLASTY Left     TOTAL HIP ARTHROPLASTY  Right 9/14/2019    Procedure: TOTAL HIP ARTHROPLASTY ANTERIOR WITH HANA TABLE;  Surgeon: Christiano Cesar II, MD;  Location: [facility] MAIN OR;  Service: Orthopedics    TOTAL HIP ARTHROPLASTY REVISION Left 3/9/2021    Procedure: LEFT TOTAL HIP ARTHROPLASTY REVISION POSTERIOR;  Surgeon: Christiano Cesar II, MD;  Location: [facility] MAIN OR;  Service: Orthopedics;  Laterality: Left;    TOTAL HIP ARTHROPLASTY REVISION Left 3/8/2025    Procedure: TOTAL HIP ARTHROPLASTY REVISION;  Surgeon: Christiano Cesar II, MD;  Location: [facility] MAIN OR;  Service: Orthopedics;  Laterality: Left;    TOTAL KNEE  PROSTHESIS REMOVAL W/ SPACER INSERTION Left 12/29/2016    Procedure: LT KNEE IMPLANT REMOVAL WITH INSERTION OF SPACER ;  Surgeon: Christiano Cesar MD;  Location: [facility] MAIN OR;  Service:       General Information       Row Name 07/16/25 1150          Physical Therapy Time and Intention    Document Type evaluation  -PC (r) AG (t) PC (c)     Mode of Treatment physical therapy  -PC (r) AG (t) PC (c)       Row Name 07/16/25 1150          General Information    Patient Profile Reviewed yes  -PC (r) AG (t) PC (c)     Prior Level of Function min assist:  -PC (r) AG (t) PC (c)     Existing Precautions/Restrictions fall  -PC (r) AG (t) PC (c)     Barriers to Rehab none identified  -PC (r) AG (t) PC (c)       Row Name 07/16/25 1150          Living Environment    Current Living Arrangements home  -PC (r) AG (t) PC (c)     People in Home child(aneudy), adult  -PC (r) AG (t) PC (c)       Row Name 07/16/25 1150          Cognition    Orientation Status (Cognition) oriented x 3  -PC (r) AG (t) PC (c)       Row Name 07/16/25 1150          Safety Issues/Impairments Affecting Functional Mobility    Safety Issues Affecting Function (Mobility) judgment;insight into deficits/self-awareness;problem-solving;sequencing abilities  -PC (r) AG (t) PC (c)     Impairments Affecting Function (Mobility) balance;coordination;endurance/activity  tolerance;pain;strength  -PC (r) AG (t) PC (c)               User Key  (r) = Recorded By, (t) = Taken By, (c) = Cosigned By      Initials Name Provider Type    PC Jillian Mujica, PT Physical Therapist    Bonnie Grady, PT Student PT Student                   Mobility       Row Name 07/16/25 1152          Bed Mobility    Bed Mobility supine-sit;sit-supine  -PC (r) AG (t) PC (c)     Supine-Sit [place] (Bed Mobility) standby assist;contact guard  -PC (r) AG (t) PC (c)     Sit-Supine [place] (Bed Mobility) minimum assist (75% patient effort)  -PC (r) AG (t) PC (c)     Assistive Device (Bed Mobility) bed rails;head of bed elevated;repositioning sheet  -PC (r) AG (t) PC (c)       Row Name 07/16/25 1152          Sit-Stand Transfer    Sit-Stand [place] (Transfers) minimum assist (75% patient effort);2 person assist  -PC (r) AG (t) PC (c)     Assistive Device (Sit-Stand Transfers) walker, front-wheeled  -PC (r) AG (t) PC (c)     Comment, (Sit-Stand Transfer) repoted pain in L knee with STS  -PC (r) AG (t) PC (c)       Row Name 07/16/25 1152          Gait/Stairs (Locomotion)    Patient was able to Ambulate no, other medical factors prevent ambulation  -PC (r) AG (t) PC (c)     Comment, (Gait/Stairs) pt unable to ambulate away from bed due to anxiety and hyperfixated on hospital equipment.  -PC (r) AG (t) PC (c)               User Key  (r) = Recorded By, (t) = Taken By, (c) = Cosigned By      Initials Name Provider Type    PC Jillian Mujica PT Physical Therapist    Bonnie Grady, PT Student PT Student                   Obj/Interventions       Row Name 07/16/25 1153          Range of Motion Comprehensive    Comment, General Range of Motion BL LE deficits due to knee pain/ surgical sites  -PC (r) AG (t) PC (c)       Row Name 07/16/25 1153          Strength Comprehensive (MMT)    Comment, General Manual Muscle Testing (MMT) Assessment strength deficits due to pain  -PC (r) AG (t) PC (c)       Row Name  07/16/25 1153          Motor Skills    Motor Skills functional endurance  -PC (r) AG (t) PC (c)     Therapeutic Exercise other (see comments)  seated ankle pumps, knee kicks  -PC (r) AG (t) PC (c)       Row Name 07/16/25 1153          Balance    Balance Assessment sitting static balance;sitting dynamic balance;standing static balance  -PC (r) AG (t) PC (c)     Static Sitting Balance standby assist  -PC (r) AG (t) PC (c)     Dynamic Sitting Balance contact guard  -PC (r) AG (t) PC (c)     Position, Sitting Balance sitting edge of bed  -PC (r) AG (t) PC (c)     Static Standing Balance minimal assist;2-person assist  -PC (r) AG (t) PC (c)               User Key  (r) = Recorded By, (t) = Taken By, (c) = Cosigned By      Initials Name Provider Type    PC Jillian Mujica, PT Physical Therapist    AG Bonnie Amin, PT Student PT Student                   Goals/Plan       Row Name 07/16/25 1256          Bed Mobility Goal 1 (PT)    Activity/Assistive Device (Bed Mobility Goal 1, PT) bed mobility activities, all  -PC (r) AG (t) PC (c)     Bradley Level/Cues Needed (Bed Mobility Goal 1, PT) standby assist  -PC (r) AG (t) PC (c)     Time Frame (Bed Mobility Goal 1, PT) 2 weeks  -PC (r) AG (t) PC (c)       Row Name 07/16/25 1256          Transfer Goal 1 (PT)    Activity/Assistive Device (Transfer Goal 1, PT) transfers, all  -PC (r) AG (t) PC (c)     Bradley Level/Cues Needed (Transfer Goal 1, PT) contact guard required  -PC (r) AG (t) PC (c)     Time Frame (Transfer Goal 1, PT) 2 weeks  -PC (r) AG (t) PC (c)       Row Name 07/16/25 1256          Gait Training Goal 1 (PT)    Activity/Assistive Device (Gait Training Goal 1, PT) gait (walking locomotion)  -PC (r) AG (t) PC (c)     Bradley Level (Gait Training Goal 1, PT) minimum assist (75% or more patient effort)  -PC (r) AG (t) PC (c)     Distance (Gait Training Goal 1, PT) 15  -PC (r) AG (t) PC (c)     Time Frame (Gait Training Goal 1, PT) 2 weeks  -PC (r) AG  (t) PC (c)       Row Name 07/16/25 1256          Therapy Assessment/Plan (PT)    Planned Therapy Interventions (PT) balance training;bed mobility training;gait training;strengthening;transfer training  -PC (r) AG (t) PC (c)               User Key  (r) = Recorded By, (t) = Taken By, (c) = Cosigned By      Initials Name Provider Type    PC Jillian Mujica, PT Physical Therapist    Bonnie Grady, PT Student PT Student                   Clinical Impression       Row Name 07/16/25 1155          Pain    Pre/Posttreatment Pain Comment pt reported BL LE pain but did not rate.  -PC (r) AG (t) PC (c)       Row Name 07/16/25 1155          Plan of Care Review    Plan of Care Reviewed With patient  -PC (r) AG (t) PC (c)     Outcome Evaluation pt admitted to [facility] with BL LE edema. PMH of DVTs, PEs, lymphedema and venous insufficiency. surgery on L LE in March and went to rehab at [facility]. lives with daughter in home. required min A. with ADL. uses a RW and w/c at baseline. pt complained of BL LE knee pain. pt sat EOB CGA/SBA. performed 1 STS with min A. x2 and RW. pt was very anxious about moving with PT due to prior mobility. pt is currently below baseline with decreased endurance, mobility, and balance. Will benefit from skilled PT. PT will cont to follow and encourage rehab at discharge.  -PC (r) AG (t) PC (c)       Row Name 07/16/25 1155          Positioning and Restraints    Pre-Treatment Position in bed  -PC (r) AG (t) PC (c)     Post Treatment Position bed  -PC (r) AG (t) PC (c)     In Bed notified nsg;call light within reach;encouraged to call for assist;exit alarm on  -PC (r) AG (t) PC (c)               User Key  (r) = Recorded By, (t) = Taken By, (c) = Cosigned By      Initials Name Provider Type    Jillian Keller, PT Physical Therapist    Bonnie Grady, PT Student PT Student                   Outcome Measures       Row Name 07/16/25 1257 07/16/25 1200       How much help from another person do you  currently need...    Turning from your back to your side while in flat bed without using bedrails? 3  -PC (r) AG (t) PC (c) 3  -CR    Moving from lying on back to sitting on the side of a flat bed without bedrails? 2  -PC (r) AG (t) PC (c) 2  -CR    Moving to and from a bed to a chair (including a wheelchair)? 2  -PC (r) AG (t) PC (c) 2  -CR    Standing up from a chair using your arms (e.g., wheelchair, bedside chair)? 2  -PC (r) AG (t) PC (c) 2  -CR    Climbing 3-5 steps with a railing? 1  -PC (r) AG (t) PC (c) 1  -CR    To walk in hospital room? 2  -PC (r) AG (t) PC (c) 2  -CR    AM-PAC 6 Clicks Score (PT) 12  -PC (r) AG (t) 12  -CR              User Key  (r) = Recorded By, (t) = Taken By, (c) = Cosigned By      Initials Name Provider Type    PC Jillian Mujica, PT Physical Therapist    Clint Elliott, RN Registered Nurse    Bonnie Grady, PT Student PT Student                                 Physical Therapy Education       Title: PT OT SLP Therapies (Done)       Topic: Physical Therapy (Done)       Point: Mobility training (Done)       Learning Progress Summary            Patient Acceptance, E, VU,DU by  at 7/16/2025 1258                      Point: Body mechanics (Done)       Learning Progress Summary            Patient Acceptance, E, VU,DU by  at 7/16/2025 1258                      Point: Precautions (Done)       Learning Progress Summary            Patient Acceptance, E, VU,DU by  at 7/16/2025 1258                                      User Key       Initials Effective Dates Name Provider Type Discipline     06/03/25 -  Bonnie Amin, PT Student PT Student PT                  PT Recommendation and Plan  Planned Therapy Interventions (PT): balance training, bed mobility training, gait training, strengthening, transfer training  Outcome Evaluation: pt admitted to [facility] with BL LE edema. PMH of DVTs, PEs, lymphedema and venous insufficiency. surgery on L LE in March and went to rehab at [place]  home. lives with daughter in home. required min A. with ADL. uses a RW and w/c at baseline. pt complained of BL LE knee pain. pt sat EOB CGA/SBA. performed 1 STS with min A. x2 and RW. pt was very anxious about moving with PT due to prior mobility. pt is currently below baseline with decreased endurance, mobility, and balance. Will benefit from skilled PT. PT will cont to follow and encourage rehab at discharge.     Time Calculation:         PT Charges       Row Name 07/16/25 1258             Time Calculation    Start Time 0920  -PC (r) AG (t) PC (c)      Stop Time 0937  -PC (r) AG (t) PC (c)      Time Calculation (min) 17 min  -PC (r) AG (t)      PT Received On 07/16/25  -PC (r) AG (t) PC (c)      PT - Next Appointment 07/17/25  -PC (r) AG (t) PC (c)      PT Goal Re-Cert Due Date 07/30/25  -PC (r) AG (t) PC (c)         Time Calculation- PT    Total Timed Code Minutes- PT 12 minute(s)  -PC (r) AG (t) PC (c)         Timed Charges    11817 - PT Therapeutic Activity Minutes 12  -PC (r) AG (t) PC (c)         Untimed Charges    PT Eval/Re-eval Minutes 5  -PC (r) AG (t) PC (c)         Total Minutes    Timed Charges Total Minutes 12  -PC (r) AG (t)      Untimed Charges Total Minutes 5  -PC (r) AG (t)       Total Minutes 17  -PC (r) AG (t)                User Key  (r) = Recorded By, (t) = Taken By, (c) = Cosigned By      Initials Name Provider Type    PC Jillian Mujica, PT Physical Therapist    Bonnie Grady, PT Student PT Student                      PT G-Codes  AM-PAC 6 Clicks Score (PT): 12       Bonnie Amin PT Student  7/16/2025

## 2025-07-16 NOTE — CASE MANAGEMENT/SOCIAL WORK
Discharge Planning Assessment  [facility]     Patient Name: Mariela Ruiz  MRN: [id number]  Today's Date: 7/16/2025    Admit Date: 7/15/2025    Plan: Return home with daughter and [facility] (current)   Discharge Needs Assessment       Row Name 07/16/25 1122       Living Environment    People in Home child(aneudy), adult    Current Living Arrangements home    Potentially Unsafe Housing Conditions none    Primary Care Provided by self    Provides Primary Care For no one    Family Caregiver if Needed child(aneudy), adult    Quality of Family Relationships helpful;involved;supportive    Able to Return to Prior Arrangements yes       Resource/Environmental Concerns    Resource/Environmental Concerns none    Transportation Concerns none       Transition Planning    Patient/Family Anticipates Transition to home with family;home with help/services    Patient/Family Anticipated Services at Transition home health care    Transportation Anticipated health plan transportation;public transportation       Discharge Needs Assessment    Readmission Within the Last 30 Days no previous admission in last 30 days    Current Outpatient/Agency/Support Group homecare agency    Equipment Currently Used at Home walker, rolling;rollator    Concerns to be Addressed no discharge needs identified;denies needs/concerns at this time    Anticipated Changes Related to Illness none    Equipment Needed After Discharge none    Outpatient/Agency/Support Group Needs homecare agency    Discharge Facility/Level of Care Needs home with home health    Provided Post Acute Provider List? N/A    Provided Post Acute Provider Quality & Resource List? N/A    Offered/Gave Vendor List no    Patient's Choice of Community Agency(s) [facility] (current)                   Discharge Plan       Row Name 07/16/25 1122       Plan    Plan Return home with daughter and [facility] (current)    Patient/Family in Agreement with Plan yes    Plan Comments KAREN noted. CCP met with the patient at  bedside, CCP introduced self, explained the role of CCP, and confirmed that the information on her face sheet is accurate. She said she lives in a ranch-style home with her daughter, Ronda. She said she normally enters the home through the garage and there are 3 steps with handrails on both sides, and she can reach both at the same time. She denied having to go to the basement for anything. She said she is current with [facility]. Referral made in [facility] to [facility] so they may follow along. She said she has been to [facility] in the past. She denied any Acute Rehab history. She said she has a rolling walker and a rollator. She is enrolled in the [facility] M2B program. She said she will need transportation arranged at discharge. CCP to follow. KAYLIN, MASOUD.                  Continued Care and Services - Admitted Since 7/15/2025    No active coordination exists.       Selected Continued Care - Prior Encounters Includes continued care and service providers with selected services from prior encounters from 4/16/2025 to 7/16/2025      Discharged on 4/29/2025 Admission date: 4/23/2025 - Discharge disposition: Skilled Nursing Facility (DC - External)      Destination       Service Provider Services Address Phone Fax Patient Preferred    [facility] [address] [place] [id number] [phone] --                             Demographic Summary       Row Name 07/16/25 1116       General Information    Admission Type observation    Arrived From emergency department    Required Notices Provided Observation Status Notice    Referral Source admission list    Reason for Consult discharge planning    Preferred Language English                   Functional Status       Row Name 07/16/25 1121       Functional Status    Usual Activity Tolerance moderate    Current Activity Tolerance moderate       Functional Status, IADL    Medications assistive equipment and person    Meal Preparation assistive  equipment and person    Housekeeping assistive equipment and person    Laundry assistive equipment and person    Shopping assistive equipment and person       Mental Status    General Appearance WDL WDL       Mental Status Summary    Recent Changes in Mental Status/Cognitive Functioning no changes       Employment/    Employment Status retired                   Psychosocial    No documentation.                  Abuse/Neglect    No documentation.                  Legal    No documentation.                  Substance Abuse    No documentation.                  Patient Forms    No documentation.

## 2025-07-16 NOTE — NURSING NOTE
CWON note: new consult received for pt's legs.  APRN has already placed orders for wound care; staff report we do not carry Cetaphil, so order was modified with in-stock products. Will follow up early next week to assess effectiveness of treatment orders.

## 2025-07-16 NOTE — PLAN OF CARE
"Goal Outcome Evaluation:  Plan of Care Reviewed With: patient           Outcome Evaluation: Pt seen for OT eval after admission from home with BLE edema and increased difficulty with mobility since return home from SNF. Pt presents with significant anxiety, impacting ability to focus on tasks or directions and limiting overall mobility. Pt requires constant redirection with impaired insight stating, \"I don't know why you're here when I'm in this state. I can't even walk right now.\" Pt educated on role OT and reinforced she is not going to pull anything out or off re: lines/tubing. Pt able to sit at EOB with Jose David and stand with Jose David but get easily overwhelmed when standing and unable to take side or forward steps at this time. RN made aware of pt concerns and anxiety potentially limiting progress with therapy. OT will con't to follow for stated goals and recommend d/c to SNF.    Anticipated Discharge Disposition (OT): skilled nursing facility                        "

## 2025-07-16 NOTE — PLAN OF CARE
Goal Outcome Evaluation:  Plan of Care Reviewed With: (P) patient           Outcome Evaluation: (P) pt admitted to [facility] with BL LE edema. PMH of DVTs, PEs, lymphedema and venous insufficiency. surgery on L LE in March and went to rehab at [facility]. lives with daughter in home. required min A. with ADL. uses a RW and w/c at baseline. pt complained of BL LE knee pain. pt sat EOB CGA/SBA. performed 1 STS with min A. x2 and RW. pt was very anxious about moving with PT due to prior mobility. pt is currently below baseline with decreased endurance, mobility, and balance. Will benefit from skilled PT. PT will cont to follow and encourage rehab at discharge.

## 2025-07-16 NOTE — PROGRESS NOTES
[facility]     LOS: 0 days     Subjective:    CC:Fall    DIET:  Diet Order   Procedures    Diet: Cardiac; Healthy Heart (2-3 Na+); Fluid Consistency: Thin (IDDSI 0)       Objective:    Vital Signs:  Temp:  [97.5 °F (36.4 °C)-98.2 °F (36.8 °C)] 97.7 °F (36.5 °C)  Heart Rate:  [67-84] 84  Resp:  [16-18] 16  BP: ()/(40-55) 96/40    SpO2:  [95 %-100 %] 98 %  on   ;   Device (Oxygen Therapy): room air  Body mass index is 26.3 kg/m².    Physical Exam  Constitutional:       General: She is not in acute distress.     Appearance: Normal appearance.   Pulmonary:      Effort: Pulmonary effort is normal.      Breath sounds: Normal breath sounds.   Abdominal:      General: There is no distension.      Palpations: Abdomen is soft.      Tenderness: There is no abdominal tenderness.   Skin:     General: Skin is warm and dry.   Neurological:      Mental Status: She is alert.      Comments: Good strength in legs but reports unable to stand   Psychiatric:         Mood and Affect: Mood normal.         Behavior: Behavior normal.         Results Review:    Glucose   Date Value Ref Range Status   07/16/2025 115 (H) 65 - 99 mg/dL Final   07/16/2025 115 (H) 65 - 99 mg/dL Final   07/15/2025 98 65 - 99 mg/dL Final     Results from last 7 days   Lab Units 07/16/25  0952   WBC 10*3/mm3 5.16   HEMOGLOBIN g/dL 10.8*   HEMATOCRIT % 34.3   PLATELETS 10*3/mm3 288     Results from last 7 days   Lab Units 07/16/25  0952   SODIUM mmol/L 139  139   POTASSIUM mmol/L 3.9  3.9   CHLORIDE mmol/L 104  104   CO2 mmol/L 25.3  25.3   BUN mg/dL 11.0  11.0   CREATININE mg/dL 0.97  0.97   CALCIUM mg/dL 9.1  9.1   BILIRUBIN mg/dL 0.4   ALK PHOS U/L 113   ALT (SGPT) U/L 8   AST (SGOT) U/L 16   GLUCOSE mg/dL 115*  115*             Results from last 7 days   Lab Units 07/15/25  0433 07/15/25  0308   HSTROP T ng/L 38* 43*     Cultures:  No results found for: \"BLOODCX\", \"URINECX\", \"WOUNDCX\", \"MRSACX\", \"RESPCX\", \"STOOLCX\"    I " have reviewed daily medications and changes in CPOE    Scheduled meds  ammonium lactate, 1 Application, Topical, BID  apixaban, 5 mg, Oral, Q12H  hydrocortisone-bacitracin-zinc oxide-nystatin, 1 Application, Topical, TID  levothyroxine, 50 mcg, Oral, Q AM  melatonin, 5 mg, Oral, Nightly  PHENobarbital, 129.6 mg, Oral, Nightly  pramipexole, 2.5 mg, Oral, Nightly  pravastatin, 40 mg, Oral, Nightly  sodium chloride, 10 mL, Intravenous, Q12H           PRN meds    acetaminophen **OR** acetaminophen **OR** acetaminophen    senna-docusate sodium **AND** polyethylene glycol **AND** bisacodyl **AND** bisacodyl    famotidine    hydrOXYzine    nitroglycerin    ondansetron    sodium chloride    sodium chloride    sodium chloride        Bilateral lower extremity edema    Seizure disorder    Sleep apnea    Venous (peripheral) insufficiency    Gastroesophageal reflux disease    History of partial thyroidectomy    Restless legs syndrome    Hypothyroidism, unspecified    Lymphedema    Chronic venous hypertension with inflammation involving both sides    Saddle pulmonary embolus        Assessment/Plan:    Mri lumbar spine  Neurology consult- patient follows with Dr Cooper  Hypotension with diuresis, small amount of fluid given back, also likely related to pain medication  Right knee pain- check an xray  Check phenobarb level  Echo ef nl, mild tricuspid regurg    Anuj Stephens MD  07/16/25  15:04 EDT

## 2025-07-16 NOTE — PLAN OF CARE
Goal Outcome Evaluation:         Pt is Aox4, forgetful and anxious, RA, PRN atarax given for itching. Call light within reach, bed alarm on.  MRI consent signed and faxed, wound care completed

## 2025-07-16 NOTE — PLAN OF CARE
Goal Outcome Evaluation:  Plan of Care Reviewed With: patient        Progress: no change   Assisted to turn&reposition. Voids per pure wick. Bilateral lower extremities remain edematous, elevated on pillows. Bed alarm in place, safety maintained.

## 2025-07-16 NOTE — THERAPY EVALUATION
Patient Name: Mariela Ruiz  : 1945    MRN: [id number]                              Today's Date: 2025       Admit Date: 7/15/2025    Visit Dx:     ICD-10-CM ICD-9-CM   1. Bilateral lower extremity edema  R60.0 782.3   2. Falls  R29.6 V15.88     Patient Active Problem List   Diagnosis    Seizure disorder    Hyperlipidemia    Vitamin D insufficiency    Age-related osteoporosis without current pathological fracture    Sleep apnea    Venous (peripheral) insufficiency    Postsurgical hypothyroidism    Chronic fatigue syndrome    Gastroesophageal reflux disease    Dupuytren's contracture    History of biliary T-tube placement    History of partial thyroidectomy    Multinodular goiter    Restless legs syndrome    History of cardiac catheterization    Urge incontinence of urine    Left knee pain    Ligamentous laxity of knee    DJD (degenerative joint disease) of knee    Adverse drug effect, subsequent encounter    Abnormal blood level of chromium    Abnormal blood level of cobalt    Family history of diabetes mellitus    Thrombocytopenia    .    S/P revision of total hip    Enlarged thyroid gland    Palmar fascial fibromatosis (dupuytren)    Hypothyroidism, unspecified    Hyperlipidemia, unspecified    Acute cholecystitis    Elevated troponin    Vitamin B12 deficiency    Pernicious anemia    Lymphedema    Venous stasis    Chronic venous hypertension with inflammation involving both sides    Periprosthetic hip fracture    Ureterolithiasis    Hydronephrosis    Multiple thyroid nodules    Saddle pulmonary embolus    Bilateral lower extremity edema     Past Medical History:   Diagnosis Date    Arthritis     Chronic venous insufficiency     Dupuytren's contracture     History of staph infection     S/P KNEE DEC 2016    History of transfusion     Hyperlipidemia     Lymphedema     LEFT LEG    Nontoxic multinodular goiter     Osteoporosis     Dr. Cabrera    Pelvic joint pain, left     Postsurgical hypothyroidism      Presence of left artificial hip joint     METAL ON METAL AS NOTED PER DR CLEMENT NOTE    Restless leg syndrome     Right bundle branch block     Seizure disorder     Dr. Whitman, HX 1980 LAST SEIZURE    Sleep apnea     DOES NOT HAVE MACHINE    Vitamin D insufficiency      Past Surgical History:   Procedure Laterality Date    APPENDECTOMY      CARDIAC CATHETERIZATION      NORMAL     CARDIAC CATHETERIZATION  4/24/2025    Procedure: Percutaneous Manual Thrombectomy;  Surgeon: Sourav Kennedy MD;  Location: [facility] CATH INVASIVE LOCATION;  Service: Cardiovascular;;    CHOLECYSTECTOMY N/A 11/2/2024    Procedure: CHOLECYSTECTOMY LAPAROSCOPIC WITH DAVINCI ROBOT with cholangiogram, possible open;  Surgeon: Bin Heaton MD;  Location: [facility] MAIN OR;  Service: Robotics - DaVinci;  Laterality: N/A;    COLONOSCOPY  08/17/2017    Normal except for anal fissure.  Dr. Brandt.  [facility].    KNEE MINI REVISION Left 11/15/2016    Procedure: LEFT KNEE POLY CHANGE WITH HI POST STABILIZER;  Surgeon: Pablito Claudio MD;  Location: [facility] OR;  Service:     KNEE MINI REVISION Left 12/13/2016    Procedure: LT KNEE REMOVAL/REPLACEMENT LOCKING PIN ;  Surgeon: Pablito Claudio MD;  Location: [facility] OR;  Service:     MAMMO FINE NEEDLE ASPIRATION UNILATERAL      RIGHT THYROID NODULE, 2009 BENIGN     LA ARTHRP KNE CONDYLE&PLATU MEDIAL&LAT COMPARTMENTS Left 12/24/2016    Procedure: LEFT KNEE WASHOUT WITH POLY CHANGE;  Surgeon: Christiano Clement MD;  Location: [facility] OR;  Service: Orthopedics    LA REVJ TOTAL KNEE ARTHRP W/WO ALGRFT 1 COMPONENT Left 2/20/2017    Procedure: LT KNEE REMOVAL ANTIBIOTIC SPACER AND KNEE REVISION;  Surgeon: Christiano Clement MD;  Location: [facility] OR;  Service: Orthopedics    REPLACEMENT TOTAL KNEE Left     THYROIDECTOMY, PARTIAL      1979 LEFT LOBECTOMY AND ISTHMECTOMY     TOTAL ABDOMINAL HYSTERECTOMY WITH SALPINGO OOPHORECTOMY      TOTAL HIP ARTHROPLASTY Left     TOTAL HIP ARTHROPLASTY  Right 9/14/2019    Procedure: TOTAL HIP ARTHROPLASTY ANTERIOR WITH HANA TABLE;  Surgeon: Christiano Cesar II, MD;  Location: [facility] MAIN OR;  Service: Orthopedics    TOTAL HIP ARTHROPLASTY REVISION Left 3/9/2021    Procedure: LEFT TOTAL HIP ARTHROPLASTY REVISION POSTERIOR;  Surgeon: Christiano Cesar II, MD;  Location: [facility] MAIN OR;  Service: Orthopedics;  Laterality: Left;    TOTAL HIP ARTHROPLASTY REVISION Left 3/8/2025    Procedure: TOTAL HIP ARTHROPLASTY REVISION;  Surgeon: Christiano Cesar II, MD;  Location: [facility] MAIN OR;  Service: Orthopedics;  Laterality: Left;    TOTAL KNEE  PROSTHESIS REMOVAL W/ SPACER INSERTION Left 12/29/2016    Procedure: LT KNEE IMPLANT REMOVAL WITH INSERTION OF SPACER ;  Surgeon: Christiano Cesar MD;  Location: [facility] MAIN OR;  Service:       General Information       Row Name 07/16/25 1501          OT Time and Intention    Subjective Information no complaints  -CE     Document Type evaluation  -CE     Mode of Treatment occupational therapy  -CE     Patient Effort fair  -CE     Comment Limited activity and ability to focus 2/2 anxiety. Pt worrying about multiple lines, bedding, etc. and impairing ability to focus on tasks and follow instructions.  -CE       Row Name 07/16/25 1501          General Information    Patient Profile Reviewed yes  -CE     Prior Level of Function min assist:;ADL's;all household mobility  recently (from dtr); working with HHPT  -CE     Existing Precautions/Restrictions fall  -CE     Barriers to Rehab none identified  -CE       Row Name 07/16/25 1501          Living Environment    Current Living Arrangements home  -CE     People in Home child(aneudy), adult  -CE       Row Name 07/16/25 1501          Cognition    Orientation Status (Cognition) oriented x 3  -CE       Row Name 07/16/25 1501          Safety Issues/Impairments Affecting Functional Mobility    Safety Issues Affecting Function (Mobility) sequencing  abilities;problem-solving;safety precautions follow-through/compliance;insight into deficits/self-awareness;ability to follow commands;other (see comments)  very distracted and anxious with mutliple complaints; difficulty attending  -CE     Impairments Affecting Function (Mobility) balance;coordination;endurance/activity tolerance;pain;strength;cognition  -CE     Cognitive Impairments, Mobility Safety/Performance attention;insight into deficits/self-awareness;problem-solving/reasoning;safety precaution follow-through;sequencing abilities  -CE               User Key  (r) = Recorded By, (t) = Taken By, (c) = Cosigned By      Initials Name Provider Type    CE Nicole Stockton OT Occupational Therapist                     Mobility/ADL's       Row Name 07/16/25 1504          Bed Mobility    Supine-Sit [place] (Bed Mobility) standby assist;contact guard;1 person assist  -CE     Sit-Supine [place] (Bed Mobility) minimum assist (75% patient effort);1 person assist  -CE     Assistive Device (Bed Mobility) bed rails;head of bed elevated  -CE     Comment, (Bed Mobility) increased time  -CE       Row Name 07/16/25 1504          Transfers    Transfers sit-stand transfer;stand-sit transfer  -CE       Row Name 07/16/25 1504          Sit-Stand Transfer    Sit-Stand [place] (Transfers) minimum assist (75% patient effort);1 person assist;verbal cues  -CE     Assistive Device (Sit-Stand Transfers) walker, front-wheeled  -CE     Comment, (Sit-Stand Transfer) cues for positioning of AD and hands  -CE       Row Name 07/16/25 1504          Stand-Sit Transfer    Stand-Sit [place] (Transfers) contact guard;1 person assist  -CE     Assistive Device (Stand-Sit Transfers) walker, front-wheeled  -CE       Row Name 07/16/25 1504          Functional Mobility    Functional Mobility- Comment Pt unable to attend to directions as she was overwhelmed with multiple other complaints, thus deferred further mobility.  -CE       Row  Name 07/16/25 1504          Activities of Daily Living    BADL Assessment/Intervention lower body dressing  -CE       Row Name 07/16/25 1504          Lower Body Dressing Assessment/Training    [place] Level (Lower Body Dressing) don;socks;maximum assist (25% patient effort)  -CE     Position (Lower Body Dressing) edge of bed sitting  -CE               User Key  (r) = Recorded By, (t) = Taken By, (c) = Cosigned By      Initials Name Provider Type    CE Nicole Stockton OT Occupational Therapist                   Obj/Interventions       Row Name 07/16/25 1506          Sensory Assessment (Somatosensory)    Sensory Assessment (Somatosensory) not tested  -CE       Row Name 07/16/25 1506          Vision Assessment/Intervention    Visual Impairment/Limitations unable/difficult to assess  -CE       Row Name 07/16/25 1506          Range of Motion Comprehensive    Comment, General Range of Motion BLE deficits due to pain ; BUE WFL  -CE       Row Name 07/16/25 1506          Strength Comprehensive (MMT)    Comment, General Manual Muscle Testing (MMT) Assessment generalized weakness but difficult to formally assess 2/2 poor attention  -CE       Row Name 07/16/25 1506          Motor Skills    Motor Skills functional endurance  -CE     Functional Endurance poor  -CE       Row Name 07/16/25 1506          Balance    Balance Assessment sit to stand dynamic balance  -CE     Sit to Stand Dynamic Balance minimal assist;1-person assist;verbal cues;non-verbal cues (demo/gesture)  -CE               User Key  (r) = Recorded By, (t) = Taken By, (c) = Cosigned By      Initials Name Provider Type    CE Nicole Stockton OT Occupational Therapist                   Goals/Plan       Row Name 07/16/25 1513          Bed Mobility Goal 1 (OT)    Activity/Assistive Device (Bed Mobility Goal 1, OT) bed mobility activities, all  -CE     [place] Level/Cues Needed (Bed Mobility Goal 1, OT) standby assist  -CE     Time Frame (Bed Mobility Goal  1, OT) short term goal (STG);2 weeks  -CE       Row Name 07/16/25 1513          Transfer Goal 1 (OT)    Activity/Assistive Device (Transfer Goal 1, OT) transfers, all  -CE     [place] Level/Cues Needed (Transfer Goal 1, OT) standby assist  -CE     Time Frame (Transfer Goal 1, OT) short term goal (STG);2 weeks  -CE       Row Name 07/16/25 1513          Dressing Goal 1 (OT)    Activity/Device (Dressing Goal 1, OT) dressing skills, all  -CE     [place]/Cues Needed (Dressing Goal 1, OT) standby assist  -CE     Time Frame (Dressing Goal 1, OT) short term goal (STG);2 weeks  -CE       Row Name 07/16/25 1513          Toileting Goal 1 (OT)    Activity/Device (Toileting Goal 1, OT) toileting skills, all  -CE     [place] Level/Cues Needed (Toileting Goal 1, OT) standby assist  -CE     Time Frame (Toileting Goal 1, OT) short term goal (STG);2 weeks  -CE       Row Name 07/16/25 1513          Grooming Goal 1 (OT)    Activity/Device (Grooming Goal 1, OT) grooming skills, all  -CE     [place] (Grooming Goal 1, OT) standby assist  -CE     Time Frame (Grooming Goal 1, OT) short term goal (STG);2 weeks  -CE       Row Name 07/16/25 1513          Therapy Assessment/Plan (OT)    Planned Therapy Interventions (OT) activity tolerance training;adaptive equipment training;BADL retraining;cognitive/visual perception retraining;functional balance retraining;transfer/mobility retraining;strengthening exercise;ROM/therapeutic exercise  -CE               User Key  (r) = Recorded By, (t) = Taken By, (c) = Cosigned By      Initials Name Provider Type    CE Nicole Stockton, OT Occupational Therapist                   Clinical Impression       Row Name 07/16/25 9632          Pain Assessment    Pain Side/Orientation generalized  -CE     Pre/Posttreatment Pain Comment RN notified  -CE       [facility] Name 07/16/25 0832          Plan of Care Review    Plan of Care Reviewed With patient  -CE     Outcome Evaluation Pt seen for OT catrachita  "after admission from home with BLE edema and increased difficulty with mobility since return home from SNF. Pt presents with significant anxiety, impacting ability to focus on tasks or directions and limiting overall mobility. Pt requires constant redirection with impaired insight stating, \"I don't know why you're here when I'm in this state. I can't even walk right now.\" Pt educated on role OT and reinforced she is not going to pull anything out or off re: lines/tubing. Pt able to sit at EOB with Jose David and stand with Jose David but get easily overwhelmed when standing and unable to take side or forward steps at this time. RN made aware of pt concerns and anxiety potentially limiting progress with therapy. OT will con't to follow for stated goals and recommend d/c to SNF.  -CE       Row Name 07/16/25 1507          Therapy Assessment/Plan (OT)    Rehab Potential (OT) fair  -CE     Criteria for Skilled Therapeutic Interventions Met (OT) yes  -CE     Therapy Frequency (OT) 3 times/wk  -CE       Row Name 07/16/25 1507          Therapy Plan Review/Discharge Plan (OT)    Anticipated Discharge Disposition (OT) skilled nursing facility  -CE       Row Name 07/16/25 1507          Vital Signs    O2 Delivery Pre Treatment room air  -CE     O2 Delivery Intra Treatment room air  -CE     O2 Delivery Post Treatment room air  -CE     Pre Patient Position Supine  -CE     Intra Patient Position Standing  -CE     Post Patient Position Supine  -CE       Row Name 07/16/25 1507          Positioning and Restraints    Pre-Treatment Position in bed  -CE     Post Treatment Position bed  -CE     In Bed notified nsg;supine;fowlers;call light within reach;encouraged to call for assist;exit alarm on  -CE               User Key  (r) = Recorded By, (t) = Taken By, (c) = Cosigned By      Initials Name Provider Type    CE Nicole Stockton, OT Occupational Therapist                   Outcome Measures       Row Name 07/16/25 4615          How much help from " another is currently needed...    Putting on and taking off regular lower body clothing? 2  -CE     Bathing (including washing, rinsing, and drying) 2  -CE     Toileting (which includes using toilet bed pan or urinal) 1  -CE     Putting on and taking off regular upper body clothing 3  -CE     Taking care of personal grooming (such as brushing teeth) 3  -CE     Eating meals 4  -CE     AM-PAC 6 Clicks Score (OT) 15  -CE       Row Name 07/16/25 1257 07/16/25 1200       How much help from another person do you currently need...    Turning from your back to your side while in flat bed without using bedrails? 3  -PC (r) AG (t) PC (c) 3  -CR    Moving from lying on back to sitting on the side of a flat bed without bedrails? 2  -PC (r) AG (t) PC (c) 2  -CR    Moving to and from a bed to a chair (including a wheelchair)? 2  -PC (r) AG (t) PC (c) 2  -CR    Standing up from a chair using your arms (e.g., wheelchair, bedside chair)? 2  -PC (r) AG (t) PC (c) 2  -CR    Climbing 3-5 steps with a railing? 1  -PC (r) AG (t) PC (c) 1  -CR    To walk in hospital room? 2  -PC (r) AG (t) PC (c) 2  -CR    AM-PAC 6 Clicks Score (PT) 12  -PC (r) AG (t) 12  -CR      Row Name 07/16/25 1515          Functional Assessment    Outcome Measure Options AM-PAC 6 Clicks Daily Activity (OT)  -CE               User Key  (r) = Recorded By, (t) = Taken By, (c) = Cosigned By      Initials Name Provider Type    PC Jillian Mujica, PT Physical Therapist    CE Nicole Stockton, OT Occupational Therapist    Clint Elliott, RN Registered Nurse    Bonnie Grady, PT Student PT Student                    Occupational Therapy Education       Title: PT OT SLP Therapies (Done)       Topic: Occupational Therapy (Done)       Point: ADL training (Done)       Learning Progress Summary            Patient Acceptance, E, VU,NR by CE at 7/16/2025 1515                      Point: Precautions (Done)       Learning Progress Summary            Patient Acceptance, E,  "DEANNR by CE at 7/16/2025 1515                      Point: Body mechanics (Done)       Learning Progress Summary            Patient Acceptance, WILFRIDO, DEANNR by CE at 7/16/2025 1515                                      User Key       Initials Effective Dates Name Provider Type Discipline    CE 10/17/22 -  Nicole Stockton OT Occupational Therapist OT                  OT Recommendation and Plan  Planned Therapy Interventions (OT): activity tolerance training, adaptive equipment training, BADL retraining, cognitive/visual perception retraining, functional balance retraining, transfer/mobility retraining, strengthening exercise, ROM/therapeutic exercise  Therapy Frequency (OT): 3 times/wk  Plan of Care Review  Plan of Care Reviewed With: patient  Outcome Evaluation: Pt seen for OT eval after admission from home with BLE edema and increased difficulty with mobility since return home from SNF. Pt presents with significant anxiety, impacting ability to focus on tasks or directions and limiting overall mobility. Pt requires constant redirection with impaired insight stating, \"I don't know why you're here when I'm in this state. I can't even walk right now.\" Pt educated on role OT and reinforced she is not going to pull anything out or off re: lines/tubing. Pt able to sit at EOB with Jose David and stand with Jose David but get easily overwhelmed when standing and unable to take side or forward steps at this time. RN made aware of pt concerns and anxiety potentially limiting progress with therapy. OT will con't to follow for stated goals and recommend d/c to SNF.     Time Calculation:   Evaluation Complexity (OT)  Review Occupational Profile/Medical/Therapy History Complexity: expanded/moderate complexity  Assessment, Occupational Performance/Identification of Deficit Complexity: 3-5 performance deficits  Clinical Decision Making Complexity (OT): detailed assessment/moderate complexity  Overall Complexity of Evaluation (OT): moderate " complexity     Time Calculation- OT       Row Name 07/16/25 1516             Time Calculation- OT    OT Start Time 1301  -CE      OT Stop Time 1320  -CE      OT Time Calculation (min) 19 min  -CE      Total Timed Code Minutes- OT 8 minute(s)  -CE      OT Received On 07/16/25  -CE      OT - Next Appointment 07/18/25  -CE      OT Goal Re-Cert Due Date 07/30/25  -CE         Timed Charges    35091 - OT Self Care/Mgmt Minutes 8  -CE         Total Minutes    Timed Charges Total Minutes 8  -CE       Total Minutes 8  -CE                User Key  (r) = Recorded By, (t) = Taken By, (c) = Cosigned By      Initials Name Provider Type    CE Nicole Stockton OT Occupational Therapist                  Therapy Charges for Today       Code Description Service Date Service Provider Modifiers Qty    83184739537 HC OT SELF CARE/MGMT/TRAIN EA 15 MIN 7/16/2025 Nicole Stockton OT GO 1    49420236730 HC OT EVAL MOD COMPLEXITY 2 7/16/2025 Nicole Stockton OT GO 1                 Nicole Stockton OT  7/16/2025

## 2025-07-16 NOTE — DISCHARGE PLACEMENT REQUEST
"Marsha Mares (80 y.o. Female)       Date of Birth   1945    Social Security Number       Address   [address] [place] [address]    Home Phone   [phone]    MRN   [id number]       [facility]    Marital Status                               Admission Date   7/15/2025    Admission Type   Emergency    Admitting Provider   Anuj Stephens MD    Attending Provider   Anuj Stephens MD    Department, Room/Bed   [address], [address]       Discharge Date       Discharge Disposition       Discharge Destination                                 Attending Provider: Anuj Stephens MD    Allergies: Clindamycin/lincomycin, Duricef [Cefadroxil], Sulfa Antibiotics    Isolation: None   Infection: None   Code Status: CPR    Ht: 172.7 cm (68\")   Wt: 78.5 kg (173 lb)    Admission Cmt: None   Principal Problem: Bilateral lower extremity edema [R60.0]                   Active Insurance as of 7/15/2025       Primary Coverage       Payor Plan Insurance Group Employer/Plan Group    MEDICARE RAILROAD MEDICARE        Payor Plan Address Payor Plan Phone Number Payor Plan Fax Number Effective Dates    [address] [phone]  2/1/2012 - None Entered    [facility] [address]         Subscriber Name Subscriber Birth Date Member ID       MARSHA MARES 1945 [id number]               Secondary Coverage       Payor Plan Insurance Group Employer/Plan Group    [employer] [place] [employer] [place]       Payor Plan Address Payor Plan Phone Number Payor Plan Fax Number Effective Dates    [address] [place] [address] [phone]  2/1/2012 - None Entered    [place] [address]         Subscriber Name Subscriber Birth Date Member ID       MARSHA MARES 1945 [id number]                     Emergency Contacts        (Rel.) Home Phone Work Phone Mobile Phone    Ronda Keita (Daughter) [phone] -- [phone]    Justo Collier (Son) [phone] -- --      "

## 2025-07-17 ENCOUNTER — APPOINTMENT (OUTPATIENT)
Dept: MRI IMAGING | Facility: HOSPITAL | Age: 80
End: 2025-07-17
Payer: MEDICARE

## 2025-07-17 LAB
ANION GAP SERPL CALCULATED.3IONS-SCNC: 9 MMOL/L (ref 5–15)
BUN SERPL-MCNC: 11 MG/DL (ref 8–23)
BUN/CREAT SERPL: 16.4 (ref 7–25)
CALCIUM SPEC-SCNC: 9.3 MG/DL (ref 8.6–10.5)
CHLORIDE SERPL-SCNC: 102 MMOL/L (ref 98–107)
CO2 SERPL-SCNC: 27 MMOL/L (ref 22–29)
CREAT SERPL-MCNC: 0.67 MG/DL (ref 0.57–1)
EGFRCR SERPLBLD CKD-EPI 2021: 89.6 ML/MIN/1.73
GLUCOSE SERPL-MCNC: 92 MG/DL (ref 65–99)
POTASSIUM SERPL-SCNC: 4.2 MMOL/L (ref 3.5–5.2)
SODIUM SERPL-SCNC: 138 MMOL/L (ref 136–145)
TSH SERPL DL<=0.05 MIU/L-ACNC: 0.88 UIU/ML (ref 0.27–4.2)
VIT B12 BLD-MCNC: 798 PG/ML (ref 211–946)

## 2025-07-17 PROCEDURE — 84443 ASSAY THYROID STIM HORMONE: CPT | Performed by: HOSPITALIST

## 2025-07-17 PROCEDURE — 80048 BASIC METABOLIC PNL TOTAL CA: CPT | Performed by: HOSPITALIST

## 2025-07-17 PROCEDURE — 99221 1ST HOSP IP/OBS SF/LOW 40: CPT | Performed by: STUDENT IN AN ORGANIZED HEALTH CARE EDUCATION/TRAINING PROGRAM

## 2025-07-17 PROCEDURE — 82607 VITAMIN B-12: CPT | Performed by: STUDENT IN AN ORGANIZED HEALTH CARE EDUCATION/TRAINING PROGRAM

## 2025-07-17 PROCEDURE — 97530 THERAPEUTIC ACTIVITIES: CPT | Performed by: PHYSICAL THERAPIST

## 2025-07-17 PROCEDURE — 83921 ORGANIC ACID SINGLE QUANT: CPT | Performed by: STUDENT IN AN ORGANIZED HEALTH CARE EDUCATION/TRAINING PROGRAM

## 2025-07-17 RX ADMIN — NYSTATIN 1 APPLICATION: 100000 OINTMENT TOPICAL at 22:19

## 2025-07-17 RX ADMIN — APIXABAN 5 MG: 5 TABLET, FILM COATED ORAL at 22:16

## 2025-07-17 RX ADMIN — NYSTATIN 1 APPLICATION: 100000 OINTMENT TOPICAL at 16:11

## 2025-07-17 RX ADMIN — PHENOBARBITAL 32.4 MG: 32.4 TABLET ORAL at 22:16

## 2025-07-17 RX ADMIN — NYSTATIN 1 APPLICATION: 100000 OINTMENT TOPICAL at 08:04

## 2025-07-17 RX ADMIN — LEVOTHYROXINE SODIUM 50 MCG: 50 TABLET ORAL at 06:34

## 2025-07-17 RX ADMIN — HYDROXYZINE HYDROCHLORIDE 25 MG: 25 TABLET, FILM COATED ORAL at 08:11

## 2025-07-17 RX ADMIN — APIXABAN 5 MG: 5 TABLET, FILM COATED ORAL at 08:04

## 2025-07-17 RX ADMIN — Medication 1 APPLICATION: at 08:04

## 2025-07-17 NOTE — PROGRESS NOTES
Continued Stay Note  [facility]     Patient Name: Mariela Ruiz  MRN: [id number]  Today's Date: 7/17/2025    Admit Date: 7/15/2025    Plan: [facility] referrals pending vs returning back home with HH   Discharge Plan       Row Name 07/17/25 1602       Plan    Plan [facility] referrals pending vs returning back home with HH    Plan Comments Per [facility] at this time [facility] have any beds available.  [facility] printed SNF pt choice from [employer] and provided to pt for additonal SNF options.  pt did state that she is unsure she wants any other SNF beside [facility].  Informed pt to review list and that [facility] would be following up with pt on DCP/SNF options.      Row Name 07/17/25 1512       Plan    Plan [facility] vs return home with [facility] following for PT, OT, and skilled nursing services    Plan Comments [facility] vs return home with [facility] following for PT, OT, and skilled nursing services;[facility] following for home health/DME set up. [facility] and referral accepted.  Patient denied any DME needs at this time. [facility] updated. -Zonia Carty [facility] RN                   Discharge Codes    No documentation.                 Expected Discharge Date and Time       Expected Discharge Date Expected Discharge Time    Jul 18, 2025               NINFA Pacheco

## 2025-07-17 NOTE — DISCHARGE PLACEMENT REQUEST
"Marsha Mares (80 y.o. Female)       Date of Birth   1945    Social Security Number       Address   [address] [place] [address]    Home Phone   [phone]    MRN   [id number]       [facility]    Marital Status                               Admission Date   7/15/2025    Admission Type   Emergency    Admitting Provider   Anuj Stephens MD    Attending Provider   Anuj Stephens MD    Department, Room/Bed   [address], [address]624/1       Discharge Date       Discharge Disposition       Discharge Destination                                 Attending Provider: Anuj Stephens MD    Allergies: Clindamycin/lincomycin, Duricef [Cefadroxil], Sulfa Antibiotics    Isolation: None   Infection: None   Code Status: CPR    Ht: 172.7 cm (68\")   Wt: 78.5 kg (173 lb)    Admission Cmt: None   Principal Problem: Bilateral lower extremity edema [R60.0]                   Active Insurance as of 7/15/2025       Primary Coverage       Payor Plan Insurance Group Employer/Plan Group    MEDICARE RAILROAD MEDICARE        Payor Plan Address Payor Plan Phone Number Payor Plan Fax Number Effective Dates    [address] [phone]  2/1/2012 - None Entered    [facility] [id number]         Subscriber Name Subscriber Birth Date Member ID       MARSHA MARES 1945 [id number]               Secondary Coverage       Payor Plan Insurance Group Employer/Plan Group    [employer] [place] [employer] [place]       Payor Plan Address Payor Plan Phone Number Payor Plan Fax Number Effective Dates    [address] [place] [address] [phone]  2/1/2012 - None Entered    [place] [address]         Subscriber Name Subscriber Birth Date Member ID       MARSHA MARES 1945 [id number]                     Emergency Contacts        (Rel.) Home Phone Work Phone Mobile Phone    Ronda Keita (Daughter) [phone] -- [phone]    Justo Collier (Son) [phone] -- --      "

## 2025-07-17 NOTE — CONSULTS
I was requested to provide spiritual support to patient.  She has a strong bobby, but at this time does not attend Jainism.  It has been difficult since her  passed away.  She stated that she knows that God can heal her.  We concluded our time with prayer.

## 2025-07-17 NOTE — CASE MANAGEMENT/SOCIAL WORK
Continued Stay Note  [facility]     Patient Name: Mariela Ruiz  MRN: [id number]  Today's Date: 7/17/2025    Admit Date: 7/15/2025    Plan: [facility] vs return home with [facility] following for PT, OT, and skilled nursing services   Discharge Plan       Row Name 07/17/25 1512       Plan    Plan [facility] vs return home with [facility] following for PT, OT, and skilled nursing services    Plan Comments [facility] vs return home with [facility] following for PT, OT, and skilled nursing services;[facility] following for home health/DME set up. Patient chose [facility] and referral accepted.  Patient denied any DME needs at this time. Shawna CCP updated. -Zonia Carty [facility] RN                   Discharge Codes    No documentation.                 Expected Discharge Date and Time       Expected Discharge Date Expected Discharge Time    Jul 18, 2025             Signed,  Zonia Carty RN, BSN, BA  Post Acute Care Coordinator  [facility]  (C)[phone]  (O)[phone]

## 2025-07-17 NOTE — PLAN OF CARE
Goal Outcome Evaluation:  Plan of Care Reviewed With: patient        Progress: improving  Outcome Evaluation: Pt seen for PT session. Upon arrival pt was extremely anxious about moving. sat EOB with CGA. STS min A. x2 and RW. was able to ambulate 3 ft to chair with min A. x2 and RW. pt required alot of encouragement. Education was provided about the benefits about moving. PT will cont to follow and encourage SNF.

## 2025-07-17 NOTE — PLAN OF CARE
Goal Outcome Evaluation:      Aox4 forgetful at times, makes odd statements. RA, ordered cream applied. Stand and pivot assistx2. Call light within reach. Bed alarm on.

## 2025-07-17 NOTE — CONSULTS
Neurology Consult Note    Consult Date: 7/17/2025    Referring MD: Anuj Stephens MD    Reason for Consult I have been asked to see the patient in neurological consultation to render advice and opinion regarding bilateral lower extremity weakness.    Mariela Ruiz is a 80 y.o. white female with known diagnosis of obstructive sleep apnea, seizure disorder, restless leg syndrome, mixed hyperlipidemia, Dupuytren contracture, osteoarthritis, prior history of DVT and pulmonary emboli in April of this year on Eliquis, chronic mobility issues from prior knee and hip surgeries, osteoarthritis and lower extremity edema uses a rollator at baseline and wheelchair for long distance presented to the hospital for worsening lower extremity swelling.  During my evaluation she states that she had a fall and hit her right knee when she was transitioning from her rollator to the wheelchair, she denied losing consciousness or seizure-like activity, she stated that her seizures controlled with no generalized tonic-clonic for several years.  She states that her weakness at baseline and she denied any worsening.  She denied lower back pain or shooting pain down to her legs, denied neck pain or shooting pain down to her legs with neck flexion.  No weakness on the upper extremities.  Lower extremities 3 -/5 proximally and 4/5 distally.  Sensory exam decreased with sharp pain and temperature up to the knees, decreased vibration on the left lower extremity, normal position.    Past Medical History:   Diagnosis Date    Arthritis     Chronic venous insufficiency     Dupuytren's contracture     History of staph infection     S/P KNEE DEC 2016    History of transfusion     Hyperlipidemia     Lymphedema     LEFT LEG    Nontoxic multinodular goiter     Osteoporosis     Dr. Cabrera    Pelvic joint pain, left     Postsurgical hypothyroidism     Presence of left artificial hip joint     METAL ON METAL AS NOTED PER DR CLEMENT NOTE    Restless leg  "syndrome     Right bundle branch block     Seizure disorder     Dr. Whitman, HX 1980 LAST SEIZURE    Sleep apnea     DOES NOT HAVE MACHINE    Vitamin D insufficiency        Exam  /54 (BP Location: Right arm, Patient Position: Lying)   Pulse 70   Temp 97.5 °F (36.4 °C) (Axillary)   Resp 16   Ht 172.7 cm (68\")   Wt 78.5 kg (173 lb)   SpO2 98%   BMI 26.30 kg/m²   Gen: NAD, vitals reviewed  MS: oriented x3, recent/remote memory intact, normal attention/concentration, language intact, no neglect.  CN: visual acuity grossly normal, PERRL, EOMI, no facial droop, no dysarthria  Motor: 5/5 on the upper extremities, 3/5 on the lower extremities proximally and 4/5 distally, normal tone  Sensory exam: Decreased with sharp pain and temperature up to the knees, decreased vibration on the left lower extremity, normal position.  Coordination: Normal finger-nose-finger bilaterally  Gait: Not assessed due to risk of falls    DATA:    Lab Results   Component Value Date    GLUCOSE 92 07/17/2025    CALCIUM 9.3 07/17/2025     07/17/2025    K 4.2 07/17/2025    CO2 27.0 07/17/2025     07/17/2025    BUN 11.0 07/17/2025    CREATININE 0.67 07/17/2025    EGFRIFAFRI 70 11/22/2024    EGFRIFNONA 58 (L) 11/22/2024    BCR 16.4 07/17/2025    ANIONGAP 9.0 07/17/2025     Lab Results   Component Value Date    WBC 5.16 07/16/2025    HGB 10.8 (L) 07/16/2025    HCT 34.3 07/16/2025    MCV 90.7 07/16/2025     07/16/2025       Lab review: Normal CK, normal TSH, B12 from March 2025 at 293, phenobarbital level low, procalcitonin negative    Imaging review: No new brain or spine image to review.    Lower extremity Doppler ultrasound negative for acute deep venous thrombosis.    Diagnoses:  -Bilateral lower extremity weakness chronic issues, she denies acute worsening.  Etiology likely multifactorial from prior hip and knee surgeries, lower extremity edema, prior DVT, possible peripheral neuropathy.      PLAN:   - No need for MRI " lumbar spine felt low yield giving the absent of shooting pain down to her legs  - Her reflexes intact on the lower extremities plantars downgoing making spinal cord injury less likely, no need for MRI thoracic spine  - Her upper extremities not affected with negative Lhermitte sign no need for MRI cervical spine.  - No focal finding on exam making stroke less likely.  No need for brain MRI  - Check vitamin B12 and replace if less than 400.  I will check methylmalonic acid as well which is more specific for B12 deficiency.  - PT/OT evaluation  - Discussed with Dr. Stephens via epic chat. Discussed with the patient and answered all her questions.  - Follow-up with general neurology as an outpatient    I will follow peripherally on the B12 level otherwise no further recommendations needed at this time.  Will sign off and see again as needed.    Medical Decision Making for this neurology consultation consists of the following:  Review of previous chart, including H/P, provider and nursing notes as applicable.  Review of medications and vital signs.  Review of previous labs, neuroimaging, and additional relevant diagnostics.   Interpretation of laboratory, imaging, and other diagnostic results  Discussion with other providers and family   Total face-to-face/floor time: 30-61 minutes.

## 2025-07-17 NOTE — THERAPY TREATMENT NOTE
Patient Name: Mariela Ruiz  : 1945    MRN: [id number]                              Today's Date: 2025       Admit Date: 7/15/2025    Visit Dx:     ICD-10-CM ICD-9-CM   1. Bilateral lower extremity edema  R60.0 782.3   2. Falls  R29.6 V15.88     Patient Active Problem List   Diagnosis    Seizure disorder    Hyperlipidemia    Vitamin D insufficiency    Age-related osteoporosis without current pathological fracture    Sleep apnea    Venous (peripheral) insufficiency    Postsurgical hypothyroidism    Chronic fatigue syndrome    Gastroesophageal reflux disease    Dupuytren's contracture    History of biliary T-tube placement    History of partial thyroidectomy    Multinodular goiter    Restless legs syndrome    History of cardiac catheterization    Urge incontinence of urine    Left knee pain    Ligamentous laxity of knee    DJD (degenerative joint disease) of knee    Adverse drug effect, subsequent encounter    Abnormal blood level of chromium    Abnormal blood level of cobalt    Family history of diabetes mellitus    Thrombocytopenia    .    S/P revision of total hip    Enlarged thyroid gland    Palmar fascial fibromatosis (dupuytren)    Hypothyroidism, unspecified    Hyperlipidemia, unspecified    Acute cholecystitis    Elevated troponin    Vitamin B12 deficiency    Pernicious anemia    Lymphedema    Venous stasis    Chronic venous hypertension with inflammation involving both sides    Periprosthetic hip fracture    Ureterolithiasis    Hydronephrosis    Multiple thyroid nodules    Saddle pulmonary embolus    Bilateral lower extremity edema    Bilateral edema of lower extremity     Past Medical History:   Diagnosis Date    Arthritis     Chronic venous insufficiency     Dupuytren's contracture     History of staph infection     S/P KNEE DEC 2016    History of transfusion     Hyperlipidemia     Lymphedema     LEFT LEG    Nontoxic multinodular goiter     Osteoporosis     Dr. Cabrera    Pelvic joint pain,  left     Postsurgical hypothyroidism     Presence of left artificial hip joint     METAL ON METAL AS NOTED PER DR CLEMENT NOTE    Restless leg syndrome     Right bundle branch block     Seizure disorder     Dr. Whitman, HX 1980 LAST SEIZURE    Sleep apnea     DOES NOT HAVE MACHINE    Vitamin D insufficiency      Past Surgical History:   Procedure Laterality Date    APPENDECTOMY      CARDIAC CATHETERIZATION      NORMAL     CARDIAC CATHETERIZATION  4/24/2025    Procedure: Percutaneous Manual Thrombectomy;  Surgeon: Sourav Kennedy MD;  Location: [facility] CATH INVASIVE LOCATION;  Service: Cardiovascular;;    CHOLECYSTECTOMY N/A 11/2/2024    Procedure: CHOLECYSTECTOMY LAPAROSCOPIC WITH DAVINCI ROBOT with cholangiogram, possible open;  Surgeon: Bin Heaton MD;  Location: [facility] MAIN OR;  Service: Robotics - DaVinci;  Laterality: N/A;    COLONOSCOPY  08/17/2017    Normal except for anal fissure.  Dr. Brandt.  [facility].    KNEE MINI REVISION Left 11/15/2016    Procedure: LEFT KNEE POLY CHANGE WITH HI POST STABILIZER;  Surgeon: Pablito Claudio MD;  Location: [facility] OR;  Service:     KNEE MINI REVISION Left 12/13/2016    Procedure: LT KNEE REMOVAL/REPLACEMENT LOCKING PIN ;  Surgeon: Pablito Claudio MD;  Location: [facility] OR;  Service:     MAMMO FINE NEEDLE ASPIRATION UNILATERAL      RIGHT THYROID NODULE, 2009 BENIGN     RI ARTHRP KNE CONDYLE&PLATU MEDIAL&LAT COMPARTMENTS Left 12/24/2016    Procedure: LEFT KNEE WASHOUT WITH POLY CHANGE;  Surgeon: Christiano Clement MD;  Location: [facility] OR;  Service: Orthopedics    RI REVJ TOTAL KNEE ARTHRP W/WO ALGRFT 1 COMPONENT Left 2/20/2017    Procedure: LT KNEE REMOVAL ANTIBIOTIC SPACER AND KNEE REVISION;  Surgeon: Christiano Clement MD;  Location: [facility] OR;  Service: Orthopedics    REPLACEMENT TOTAL KNEE Left     THYROIDECTOMY, PARTIAL      1979 LEFT LOBECTOMY AND ISTHMECTOMY     TOTAL ABDOMINAL HYSTERECTOMY WITH SALPINGO OOPHORECTOMY      TOTAL HIP  ARTHROPLASTY Left     TOTAL HIP ARTHROPLASTY Right 9/14/2019    Procedure: TOTAL HIP ARTHROPLASTY ANTERIOR WITH HANA TABLE;  Surgeon: Christiano Cesar II, MD;  Location: [facility] MAIN OR;  Service: Orthopedics    TOTAL HIP ARTHROPLASTY REVISION Left 3/9/2021    Procedure: LEFT TOTAL HIP ARTHROPLASTY REVISION POSTERIOR;  Surgeon: Christiano Cesar II, MD;  Location: [facility] MAIN OR;  Service: Orthopedics;  Laterality: Left;    TOTAL HIP ARTHROPLASTY REVISION Left 3/8/2025    Procedure: TOTAL HIP ARTHROPLASTY REVISION;  Surgeon: Christiano Cesar II, MD;  Location: [facility] MAIN OR;  Service: Orthopedics;  Laterality: Left;    TOTAL KNEE  PROSTHESIS REMOVAL W/ SPACER INSERTION Left 12/29/2016    Procedure: LT KNEE IMPLANT REMOVAL WITH INSERTION OF SPACER ;  Surgeon: Christiano Cesar MD;  Location: [facility] OR;  Service:       General Information       Row Name 07/17/25 1041          Physical Therapy Time and Intention    Document Type therapy note (daily note)  -KH (r) AG (t) KH (c)     Mode of Treatment physical therapy  -KH (r) AG (t) KH (c)       Row Name 07/17/25 1041          General Information    Existing Precautions/Restrictions fall  -KH (r) AG (t) KH (c)       Row Name 07/17/25 1041          Cognition    Orientation Status (Cognition) oriented x 3  -KH (r) AG (t) KH (c)       Row Name 07/17/25 1041          Safety Issues/Impairments Affecting Functional Mobility    Safety Issues Affecting Function (Mobility) problem-solving;sequencing abilities;safety precautions follow-through/compliance;insight into deficits/self-awareness;ability to follow commands;other (see comments)  increased anxiety prevents pt from being able to focus  -KH (r) AG (t) KH (c)     Impairments Affecting Function (Mobility) balance;coordination;endurance/activity tolerance;pain;strength;cognition  -KH (r) AG (t) KH (c)     Cognitive Impairments, Mobility Safety/Performance attention;insight into  deficits/self-awareness;problem-solving/reasoning;sequencing abilities  -KH (r) AG (t) KH (c)               User Key  (r) = Recorded By, (t) = Taken By, (c) = Cosigned By      Initials Name Provider Type    Maxine Sabillon, PT Physical Therapist    Bonnie Grady, PT Student PT Student                   Mobility       Row Name 07/17/25 1043          Bed Mobility    Bed Mobility supine-sit;sit-supine  -KH (r) AG (t) KH (c)     Supine-Sit [place] (Bed Mobility) contact guard  -KH (r) AG (t) KH (c)     Assistive Device (Bed Mobility) bed rails;head of bed elevated  -KH (r) AG (t) KH (c)       Row Name 07/17/25 1043          Sit-Stand Transfer    Sit-Stand [place] (Transfers) minimum assist (75% patient effort)  -KH (r) AG (t) KH (c)     Assistive Device (Sit-Stand Transfers) walker, front-wheeled  -KH (r) AG (t) KH (c)       Row Name 07/17/25 1043          Gait/Stairs (Locomotion)    [place] Level (Gait) minimum assist (75% patient effort)  -KH (r) AG (t) KH (c)     Assistive Device (Gait) walker, front-wheeled  -KH (r) AG (t) KH (c)     Patient was able to Ambulate yes  -KH (r) AG (t) KH (c)     Distance in Feet (Gait) 3  -KH (r) AG (t) KH (c)     Deviations/Abnormal Patterns (Gait) gunnar decreased;gait speed decreased;stride length decreased  -KH (r) AG (t) KH (c)     Bilateral Gait Deviations forward flexed posture  -KH (r) AG (t) KH (c)     Comment, (Gait/Stairs) ambulated to chair min A. x2 and RW  -KH (r) AG (t) KH (c)               User Key  (r) = Recorded By, (t) = Taken By, (c) = Cosigned By      Initials Name Provider Type    Maxine Sabillon, PT Physical Therapist    Bonnie Grady, PT Student PT Student                   Obj/Interventions       Row Name 07/17/25 1044          Motor Skills    Motor Skills functional endurance  -KH (r) AG (t) KH (c)     Therapeutic Exercise other (see comments)  seated ankle pumps, knee kicks, isaias  -VIKI (r) DEWEY (t) VIKI (c)        Row Name 07/17/25 1044          Balance    Balance Assessment sitting static balance;sitting dynamic balance;standing static balance;standing dynamic balance  -KH (r) AG (t) KH (c)     Static Sitting Balance standby assist  -KH (r) AG (t) KH (c)     Dynamic Sitting Balance contact guard  -KH (r) AG (t) KH (c)     Position, Sitting Balance sitting edge of bed  -KH (r) AG (t) KH (c)     Static Standing Balance minimal assist  -KH (r) AG (t) KH (c)     Dynamic Standing Balance minimal assist;2-person assist  -KH (r) AG (t) KH (c)     Position/Device Used, Standing Balance walker, front-wheeled  -KH (r) AG (t) KH (c)               User Key  (r) = Recorded By, (t) = Taken By, (c) = Cosigned By      Initials Name Provider Type    Maxine Sabillon, PT Physical Therapist    Bonnie Grady, PT Student PT Student                   Goals/Plan    No documentation.                  Clinical Impression       Row Name 07/17/25 1045          Pain    Pre/Posttreatment Pain Comment pt reported pain in BL knees but didnt numerically rate  -KH (r) AG (t) KH (c)       Row Name 07/17/25 1045          Plan of Care Review    Plan of Care Reviewed With patient  -KH (r) AG (t) KH (c)     Progress improving  -KH (r) AG (t) KH (c)     Outcome Evaluation Pt seen for PT session. Upon arrival pt was extremely anxious about moving. sat EOB with CGA. STS min A. x2 and RW. was able to ambulate 3 ft to chair with min A. x2 and RW. pt required alot of encouragement. Education was provided about the benefits about moving. PT will cont to follow and encourage SNF.  -KH (r) AG (t) KH (c)       Row Name 07/17/25 1045          Positioning and Restraints    Pre-Treatment Position in bed  -KH (r) AG (t) KH (c)     Post Treatment Position chair  -KH (r) AG (t) KH (c)     In Chair notified nsg;reclined;call light within reach;encouraged to call for assist;exit alarm on  -KH (r) AG (t) KH (c)               User Key  (r) = Recorded By, (t) = Taken  By, (c) = Cosigned By      Initials Name Provider Type    Maxine Sabillon, PT Physical Therapist    Bonnie Grady, PT Student PT Student                   Outcome Measures       Row Name 07/17/25 1048          How much help from another person do you currently need...    Turning from your back to your side while in flat bed without using bedrails? 3  -KH (r) AG (t) KH (c)     Moving from lying on back to sitting on the side of a flat bed without bedrails? 2  -KH (r) AG (t) KH (c)     Moving to and from a bed to a chair (including a wheelchair)? 2  -KH (r) AG (t) KH (c)     Standing up from a chair using your arms (e.g., wheelchair, bedside chair)? 2  -KH (r) AG (t) KH (c)     Climbing 3-5 steps with a railing? 1  -KH (r) AG (t) KH (c)     To walk in hospital room? 2  -KH (r) AG (t) KH (c)     AM-PAC 6 Clicks Score (PT) 12  -KH (r) AG (t)               User Key  (r) = Recorded By, (t) = Taken By, (c) = Cosigned By      Initials Name Provider Type    Maxine Sabillon, PT Physical Therapist    Bonnie Grady, PT Student PT Student                                 Physical Therapy Education       Title: PT OT SLP Therapies (Done)       Topic: Physical Therapy (Done)       Point: Mobility training (Done)       Learning Progress Summary            Patient Acceptance, E, VU,DU by  at 7/17/2025 1048    Acceptance, E, VU,DU by  at 7/16/2025 1258                      Point: Body mechanics (Done)       Learning Progress Summary            Patient Acceptance, E, VU,DU by  at 7/17/2025 1048    Acceptance, E, VU,DU by  at 7/16/2025 1258                      Point: Precautions (Done)       Learning Progress Summary            Patient Acceptance, E, VU,DU by  at 7/17/2025 1048    Acceptance, E, VU,DU by  at 7/16/2025 1258                                      User Key       Initials Effective Dates Name Provider Type [facility] 06/03/25 -  Bonnie Amin, PT Student PT Student PT                   PT Recommendation and Plan  Planned Therapy Interventions (PT): balance training, bed mobility training, gait training, strengthening, transfer training  Progress: improving  Outcome Evaluation: Pt seen for PT session. Upon arrival pt was extremely anxious about moving. sat EOB with CGA. STS min A. x2 and RW. was able to ambulate 3 ft to chair with min A. x2 and RW. pt required alot of encouragement. Education was provided about the benefits about moving. PT will cont to follow and encourage SNF.     Time Calculation:         PT Charges       Row Name 07/17/25 1049             Time Calculation    Start Time 0950  -KH (r) AG (t) KH (c)      Stop Time 1011  -KH (r) AG (t) KH (c)      Time Calculation (min) 21 min  -KH (r) AG (t)      PT Received On 07/17/25  -KH (r) AG (t) KH (c)      PT - Next Appointment 07/18/25  -KH (r) AG (t) KH (c)         Timed Charges    44650 - PT Therapeutic Activity Minutes 21  -KH (r) AG (t) KH (c)         Total Minutes    Timed Charges Total Minutes 21  -KH (r) AG (t)       Total Minutes 21  -KH (r) AG (t)                User Key  (r) = Recorded By, (t) = Taken By, (c) = Cosigned By      Initials Name Provider Type    Maxine Sabillon, PT Physical Therapist    Bonnie Grady, PT Student PT Student                      PT G-Codes  Outcome Measure Options: AM-PAC 6 Clicks Daily Activity (OT)  AM-PAC 6 Clicks Score (PT): 12  AM-PAC 6 Clicks Score (OT): 15       Bonnie Amin PT Student  7/17/2025

## 2025-07-17 NOTE — PROGRESS NOTES
Continued Stay Note  [facility]     Patient Name: Mariela Ruiz  MRN: [id number]  Today's Date: 7/17/2025    Admit Date: 7/15/2025    Plan: SNF referral pending to [facility]   Discharge Plan       Row Name 07/17/25 1106       Plan    Plan SNF referral pending to [facility]    Plan Comments Spoke with pt at bedside to follow up on rec. for SNF for rehab to home at [place].  Pt stated that she is agreeable to rehab at [place].  Pt reqeusted [facility] at [facility] choice since she has been there recently.  Referral sent in [facility] and called to [facility].  [facility] also provided pt with a list of SNF printed from pt's choice in [facility] to get additoanl options if [facility] can not accpet pt.                   Discharge Codes    No documentation.                 Expected Discharge Date and Time       Expected Discharge Date Expected Discharge Time    Jul 18, 2025               NINFA Pacheco

## 2025-07-17 NOTE — PLAN OF CARE
Goal Outcome Evaluation:  Plan of Care Reviewed With: patient        Progress: no change   Assisted to reposition. Tylenol given for bilateral knee pain with good relief. Edema to bilat lower extremities decreasing. Anxious at times. Voids per pure wick. Bed alarm in place. Safety maintained.

## 2025-07-17 NOTE — PROGRESS NOTES
[facility]     LOS: 1 day     Subjective:    CC:Fall    DIET:  Diet Order   Procedures    Diet: Cardiac; Healthy Heart (2-3 Na+); Fluid Consistency: Thin (IDDSI 0)       Objective:    Vital Signs:  Temp:  [97.5 °F (36.4 °C)-98.4 °F (36.9 °C)] 97.5 °F (36.4 °C)  Heart Rate:  [69-93] 70  Resp:  [16] 16  BP: ()/(40-62) 107/54    SpO2:  [94 %-98 %] 98 %  on   ;   Device (Oxygen Therapy): room air  Body mass index is 26.3 kg/m².    Physical Exam  Constitutional:       General: She is not in acute distress.     Appearance: Normal appearance.   Pulmonary:      Effort: Pulmonary effort is normal.      Breath sounds: Normal breath sounds.   Abdominal:      General: There is no distension.      Palpations: Abdomen is soft.      Tenderness: There is no abdominal tenderness.   Skin:     General: Skin is warm and dry.   Neurological:      Mental Status: She is alert.      Comments: Good strength in legs but reports unable to stand   Psychiatric:         Mood and Affect: Mood normal.         Behavior: Behavior normal.         Results Review:    Glucose   Date Value Ref Range Status   07/17/2025 92 65 - 99 mg/dL Final   07/16/2025 115 (H) 65 - 99 mg/dL Final   07/16/2025 115 (H) 65 - 99 mg/dL Final   07/15/2025 98 65 - 99 mg/dL Final     Results from last 7 days   Lab Units 07/16/25  0952   WBC 10*3/mm3 5.16   HEMOGLOBIN g/dL 10.8*   HEMATOCRIT % 34.3   PLATELETS 10*3/mm3 288     Results from last 7 days   Lab Units 07/17/25  0631 07/16/25  0952   SODIUM mmol/L 138 139  139   POTASSIUM mmol/L 4.2 3.9  3.9   CHLORIDE mmol/L 102 104  104   CO2 mmol/L 27.0 25.3  25.3   BUN mg/dL 11.0 11.0  11.0   CREATININE mg/dL 0.67 0.97  0.97   CALCIUM mg/dL 9.3 9.1  9.1   BILIRUBIN mg/dL  --  0.4   ALK PHOS U/L  --  113   ALT (SGPT) U/L  --  8   AST (SGOT) U/L  --  16   GLUCOSE mg/dL 92 115*  115*             Results from last 7 days   Lab Units 07/16/25  0952 07/15/25  0433 07/15/25  0308   CK TOTAL  "U/L 33  --   --    HSTROP T ng/L  --  38* 43*     Cultures:  No results found for: \"BLOODCX\", \"URINECX\", \"WOUNDCX\", \"MRSACX\", \"RESPCX\", \"STOOLCX\"    I have reviewed daily medications and changes in CPOE    Scheduled meds  ammonium lactate, 1 Application, Topical, BID  apixaban, 5 mg, Oral, Q12H  hydrocortisone-bacitracin-zinc oxide-nystatin, 1 Application, Topical, TID  levothyroxine, 50 mcg, Oral, Q AM  melatonin, 5 mg, Oral, Nightly  PHENobarbital, 129.6 mg, Oral, Nightly  pramipexole, 2.5 mg, Oral, Nightly  pravastatin, 40 mg, Oral, Nightly  sodium chloride, 10 mL, Intravenous, Q12H           PRN meds    acetaminophen **OR** acetaminophen **OR** acetaminophen    senna-docusate sodium **AND** polyethylene glycol **AND** bisacodyl **AND** bisacodyl    famotidine    hydrOXYzine    nitroglycerin    ondansetron    sodium chloride    sodium chloride    sodium chloride        Bilateral lower extremity edema    Seizure disorder    Sleep apnea    Venous (peripheral) insufficiency    Gastroesophageal reflux disease    History of partial thyroidectomy    Restless legs syndrome    Hypothyroidism, unspecified    Lymphedema    Chronic venous hypertension with inflammation involving both sides    Saddle pulmonary embolus    Bilateral edema of lower extremity        Assessment/Plan:  Leg weakness-  Mri lumbar spine- unable to get, but no radicular pain so hold for now  Neurology consult- patient follows with Dr Liu  Hypotension with diuresis, small amount of fluid given back, also likely related to pain medication  Right knee pain- check an xray-shows osteoarthritis- will need follow up with orthopedics  Check phenobarb level which is low, so not causing her weakness  Echo ef nl, mild tricuspid regurg  Leg edema is better with a couple doses of lasix and she is followed by an edema clinic    Sz-phenobarb    Hypothyroidism-tsh 2.5     Hx PE- on eliquis  -dopplers negative    Anuj Stephens MD  07/17/25  11:36 EDT     "

## 2025-07-18 ENCOUNTER — APPOINTMENT (OUTPATIENT)
Dept: CT IMAGING | Facility: HOSPITAL | Age: 80
End: 2025-07-18
Payer: MEDICARE

## 2025-07-18 LAB
ANION GAP SERPL CALCULATED.3IONS-SCNC: 7.7 MMOL/L (ref 5–15)
BUN SERPL-MCNC: 11 MG/DL (ref 8–23)
BUN/CREAT SERPL: 16.2 (ref 7–25)
CALCIUM SPEC-SCNC: 9.5 MG/DL (ref 8.6–10.5)
CHLORIDE SERPL-SCNC: 103 MMOL/L (ref 98–107)
CO2 SERPL-SCNC: 28.3 MMOL/L (ref 22–29)
CREAT SERPL-MCNC: 0.68 MG/DL (ref 0.57–1)
EGFRCR SERPLBLD CKD-EPI 2021: 89.3 ML/MIN/1.73
GLUCOSE SERPL-MCNC: 83 MG/DL (ref 65–99)
POTASSIUM SERPL-SCNC: 4.4 MMOL/L (ref 3.5–5.2)
SODIUM SERPL-SCNC: 139 MMOL/L (ref 136–145)

## 2025-07-18 PROCEDURE — 29581 APPL MULTLAYER CMPRN SYS LEG: CPT

## 2025-07-18 PROCEDURE — 97535 SELF CARE MNGMENT TRAINING: CPT

## 2025-07-18 PROCEDURE — 73700 CT LOWER EXTREMITY W/O DYE: CPT

## 2025-07-18 PROCEDURE — 80048 BASIC METABOLIC PNL TOTAL CA: CPT | Performed by: HOSPITALIST

## 2025-07-18 PROCEDURE — 97168 OT RE-EVAL EST PLAN CARE: CPT

## 2025-07-18 PROCEDURE — 97530 THERAPEUTIC ACTIVITIES: CPT

## 2025-07-18 RX ORDER — FUROSEMIDE 20 MG/1
20 TABLET ORAL 3 TIMES WEEKLY
Status: DISCONTINUED | OUTPATIENT
Start: 2025-07-18 | End: 2025-07-22

## 2025-07-18 RX ADMIN — PHENOBARBITAL 129.6 MG: 32.4 TABLET ORAL at 20:26

## 2025-07-18 RX ADMIN — LEVOTHYROXINE SODIUM 50 MCG: 50 TABLET ORAL at 05:38

## 2025-07-18 RX ADMIN — PRAMIPEXOLE DIHYDROCHLORIDE 2.5 MG: 1 TABLET ORAL at 20:25

## 2025-07-18 RX ADMIN — Medication 1 APPLICATION: at 20:31

## 2025-07-18 RX ADMIN — APIXABAN 5 MG: 5 TABLET, FILM COATED ORAL at 08:15

## 2025-07-18 RX ADMIN — PRAVASTATIN SODIUM 40 MG: 40 TABLET ORAL at 20:25

## 2025-07-18 RX ADMIN — NYSTATIN 1 APPLICATION: 100000 OINTMENT TOPICAL at 15:04

## 2025-07-18 RX ADMIN — NYSTATIN 1 APPLICATION: 100000 OINTMENT TOPICAL at 20:27

## 2025-07-18 RX ADMIN — FUROSEMIDE 20 MG: 20 TABLET ORAL at 09:49

## 2025-07-18 RX ADMIN — NYSTATIN 1 APPLICATION: 100000 OINTMENT TOPICAL at 08:15

## 2025-07-18 RX ADMIN — Medication 1 APPLICATION: at 08:15

## 2025-07-18 RX ADMIN — ACETAMINOPHEN 650 MG: 325 TABLET, FILM COATED ORAL at 12:54

## 2025-07-18 RX ADMIN — APIXABAN 5 MG: 5 TABLET, FILM COATED ORAL at 20:25

## 2025-07-18 NOTE — THERAPY TREATMENT NOTE
Patient Name: Mariela Ruiz  : 1945    MRN: [id number]                              Today's Date: 2025       Admit Date: 7/15/2025    Visit Dx:     ICD-10-CM ICD-9-CM   1. Bilateral lower extremity edema  R60.0 782.3   2. Falls  R29.6 V15.88     Patient Active Problem List   Diagnosis    Seizure disorder    Hyperlipidemia    Vitamin D insufficiency    Age-related osteoporosis without current pathological fracture    Sleep apnea    Venous (peripheral) insufficiency    Postsurgical hypothyroidism    Chronic fatigue syndrome    Gastroesophageal reflux disease    Dupuytren's contracture    History of biliary T-tube placement    History of partial thyroidectomy    Multinodular goiter    Restless legs syndrome    History of cardiac catheterization    Urge incontinence of urine    Left knee pain    Ligamentous laxity of knee    DJD (degenerative joint disease) of knee    Adverse drug effect, subsequent encounter    Abnormal blood level of chromium    Abnormal blood level of cobalt    Family history of diabetes mellitus    Thrombocytopenia    .    S/P revision of total hip    Enlarged thyroid gland    Palmar fascial fibromatosis (dupuytren)    Hypothyroidism, unspecified    Hyperlipidemia, unspecified    Acute cholecystitis    Elevated troponin    Vitamin B12 deficiency    Pernicious anemia    Lymphedema    Venous stasis    Chronic venous hypertension with inflammation involving both sides    Periprosthetic hip fracture    Ureterolithiasis    Hydronephrosis    Multiple thyroid nodules    Saddle pulmonary embolus    Bilateral lower extremity edema    Bilateral edema of lower extremity     Past Medical History:   Diagnosis Date    Arthritis     Chronic venous insufficiency     Dupuytren's contracture     History of staph infection     S/P KNEE DEC 2016    History of transfusion     Hyperlipidemia     Lymphedema     LEFT LEG    Nontoxic multinodular goiter     Osteoporosis     Dr. Cabrera    Pelvic joint pain,  left     Postsurgical hypothyroidism     Presence of left artificial hip joint     METAL ON METAL AS NOTED PER DR CLEMENT NOTE    Restless leg syndrome     Right bundle branch block     Seizure disorder     Dr. Whitman, HX 1980 LAST SEIZURE    Sleep apnea     DOES NOT HAVE MACHINE    Vitamin D insufficiency      Past Surgical History:   Procedure Laterality Date    APPENDECTOMY      CARDIAC CATHETERIZATION      NORMAL     CARDIAC CATHETERIZATION  4/24/2025    Procedure: Percutaneous Manual Thrombectomy;  Surgeon: Sourav Kennedy MD;  Location: [facility] CATH INVASIVE LOCATION;  Service: Cardiovascular;;    CHOLECYSTECTOMY N/A 11/2/2024    Procedure: CHOLECYSTECTOMY LAPAROSCOPIC WITH DAVINCI ROBOT with cholangiogram, possible open;  Surgeon: Bin Heaton MD;  Location: [facility] MAIN OR;  Service: Robotics - DaVinci;  Laterality: N/A;    COLONOSCOPY  08/17/2017    Normal except for anal fissure.  Dr. Brandt.  [facility].    KNEE MINI REVISION Left 11/15/2016    Procedure: LEFT KNEE POLY CHANGE WITH HI POST STABILIZER;  Surgeon: Pablito Claudio MD;  Location: [facility] OR;  Service:     KNEE MINI REVISION Left 12/13/2016    Procedure: LT KNEE REMOVAL/REPLACEMENT LOCKING PIN ;  Surgeon: Pablito Claudio MD;  Location: [facility] OR;  Service:     MAMMO FINE NEEDLE ASPIRATION UNILATERAL      RIGHT THYROID NODULE, 2009 BENIGN     FL ARTHRP KNE CONDYLE&PLATU MEDIAL&LAT COMPARTMENTS Left 12/24/2016    Procedure: LEFT KNEE WASHOUT WITH POLY CHANGE;  Surgeon: Christiano Clement MD;  Location: [facility] OR;  Service: Orthopedics    FL REVJ TOTAL KNEE ARTHRP W/WO ALGRFT 1 COMPONENT Left 2/20/2017    Procedure: LT KNEE REMOVAL ANTIBIOTIC SPACER AND KNEE REVISION;  Surgeon: Christiano Clement MD;  Location: [facility] OR;  Service: Orthopedics    REPLACEMENT TOTAL KNEE Left     THYROIDECTOMY, PARTIAL      1979 LEFT LOBECTOMY AND ISTHMECTOMY     TOTAL ABDOMINAL HYSTERECTOMY WITH SALPINGO OOPHORECTOMY      TOTAL HIP  ARTHROPLASTY Left     TOTAL HIP ARTHROPLASTY Right 9/14/2019    Procedure: TOTAL HIP ARTHROPLASTY ANTERIOR WITH HANA TABLE;  Surgeon: Christiano Cesar II, MD;  Location: [facility] OR;  Service: Orthopedics    TOTAL HIP ARTHROPLASTY REVISION Left 3/9/2021    Procedure: LEFT TOTAL HIP ARTHROPLASTY REVISION POSTERIOR;  Surgeon: Christiano Cesar II, MD;  Location: [facility] OR;  Service: Orthopedics;  Laterality: Left;    TOTAL HIP ARTHROPLASTY REVISION Left 3/8/2025    Procedure: TOTAL HIP ARTHROPLASTY REVISION;  Surgeon: Christiano Cesar II, MD;  Location: [facility] OR;  Service: Orthopedics;  Laterality: Left;    TOTAL KNEE  PROSTHESIS REMOVAL W/ SPACER INSERTION Left 12/29/2016    Procedure: LT KNEE IMPLANT REMOVAL WITH INSERTION OF SPACER ;  Surgeon: Christiano Cesar MD;  Location: [facility];  Service:       General Information       Row Name 07/18/25 1208          OT Time and Intention    Subjective Information complains of;fatigue;pain;weakness  very verbose regarding issues including right knee pain, line/wire management, reports of a 'knot' in lymph node area to which RN came in to assess.  -BC     Document Type therapy note (daily note)  -BC     Mode of Treatment occupational therapy  -BC     Patient Effort fair  -BC     Comment Pt continues to have anxiety, is hyperfocused on all the 'problems' she is dealing with, concerns with the line mgmt (HR monitor and purewick only two lines on her today) to which she needed re assurance multiple times today during tx.  -BC       Row Name 07/18/25 1208          General Information    Patient Profile Reviewed yes  -BC     Existing Precautions/Restrictions fall  -BC       Row Name 07/18/25 1208          Cognition    Orientation Status (Cognition) oriented x 3  -BC       Row Name 07/18/25 1208          Safety Issues/Impairments Affecting Functional Mobility    Impairments Affecting Function (Mobility)  balance;coordination;endurance/activity tolerance;pain;strength;cognition  -BC               User Key  (r) = Recorded By, (t) = Taken By, (c) = Cosigned By      Initials Name Provider Type    BC Sherman Castillo OT Occupational Therapist                     Mobility/ADL's       Row Name 07/18/25 1211          Bed Mobility    Bed Mobility supine-sit  -BC     Supine-Sit [place] (Bed Mobility) contact guard;verbal cues;nonverbal cues (demo/gesture)  -BC     Bed Mobility, Safety Issues cognitive deficits limit understanding  -BC     Comment, (Bed Mobility) anxiety limiting movements, increased anxiousness with all tasks today  -BC       Row Name 07/18/25 1211          Transfers    Transfers sit-stand transfer;stand-sit transfer;bed-chair transfer  -BC     Comment, (Transfers) high anxiety and fearfullness of movements, pain expectance and fear of falling. Max encouragement, cueing and education on participation  -BC       Row Name 07/18/25 1211          Bed-Chair Transfer    Bed-Chair [place] (Transfers) contact guard;verbal cues;nonverbal cues (demo/gesture)  -BC     Assistive Device (Bed-Chair Transfers) walker, front-wheeled  -BC       Row Name 07/18/25 1211          Sit-Stand Transfer    Sit-Stand [place] (Transfers) contact guard  -BC     Assistive Device (Sit-Stand Transfers) walker, front-wheeled  -BC       Row Name 07/18/25 1211          Stand-Sit Transfer    Stand-Sit [place] (Transfers) contact guard;1 person assist  -BC     Assistive Device (Stand-Sit Transfers) walker, front-wheeled  -BC       Row Name 07/18/25 1211          Functional Mobility    Functional Mobility- Comment Pt overwhelmed w/ mvmts again but was able to complete transfer up to chair. Her baseline is ambulatory but unsure why she feels she cannot perform these tasks today.  -BC       Row Name 07/18/25 1211          Activities of Daily Living    BADL Assessment/Intervention lower body dressing;feeding  -BC       Row Name  07/18/25 1211          Lower Body Dressing Assessment/Training    [place] Level (Lower Body Dressing) don;socks;maximum assist (25% patient effort);doff  -BC     Position (Lower Body Dressing) edge of bed sitting;supported sitting  -BC     Comment, (Lower Body Dressing) EOB and again in chair  -BC       Row Name 07/18/25 1211          Self-Feeding Assessment/Training    [place] Level (Feeding) feeding skills;scoop food and bring to mouth;prepare tray/open items;set up  -BC     Position (Feeding) supported sitting  -BC     Comment, (Feeding) Pt asking for assistance before attempting to open tray items herself. Encouraged to trial, assist for juice packaging  -BC               User Key  (r) = Recorded By, (t) = Taken By, (c) = Cosigned By      Initials Name Provider Type    Sherman Barnes OT Occupational Therapist                   Obj/Interventions       Row Name 07/18/25 1215          Balance    Static Sitting Balance standby assist  -BC     Dynamic Sitting Balance standby assist  -BC     Position, Sitting Balance sitting edge of bed  -BC     Static Standing Balance verbal cues;contact guard  -BC     Dynamic Standing Balance contact guard;verbal cues  -BC     Position/Device Used, Standing Balance walker, front-wheeled  -BC     Balance Interventions standing  -BC               User Key  (r) = Recorded By, (t) = Taken By, (c) = Cosigned By      Initials Name Provider Type    Sherman Barnes OT Occupational Therapist                   Goals/Plan       Row Name 07/18/25 1224          Bed Mobility Goal 1 (OT)    Activity/Assistive Device (Bed Mobility Goal 1, OT) bed mobility activities, all  -BC     [place] Level/Cues Needed (Bed Mobility Goal 1, OT) standby assist  -BC     Time Frame (Bed Mobility Goal 1, OT) short term goal (STG);2 weeks  -BC       Row Name 07/18/25 1224          Transfer Goal 1 (OT)    Activity/Assistive Device (Transfer Goal 1, OT) transfers, all  -BC     [place]  Level/Cues Needed (Transfer Goal 1, OT) standby assist  -BC     Time Frame (Transfer Goal 1, OT) short term goal (STG);2 weeks  -BC       Row Name 07/18/25 1224          Dressing Goal 1 (OT)    Activity/Device (Dressing Goal 1, OT) dressing skills, all  -BC     [place]/Cues Needed (Dressing Goal 1, OT) standby assist  -BC     Time Frame (Dressing Goal 1, OT) short term goal (STG);2 weeks  -BC       Row Name 07/18/25 1224          Toileting Goal 1 (OT)    Activity/Device (Toileting Goal 1, OT) toileting skills, all  -BC     [place] Level/Cues Needed (Toileting Goal 1, OT) standby assist  -BC     Time Frame (Toileting Goal 1, OT) short term goal (STG);2 weeks  -BC       Row Name 07/18/25 1224          Grooming Goal 1 (OT)    Activity/Device (Grooming Goal 1, OT) grooming skills, all  -BC     [place] (Grooming Goal 1, OT) standby assist  -BC     Time Frame (Grooming Goal 1, OT) short term goal (STG);2 weeks  -BC       Row Name 07/18/25 1224          Problem Specific Goal 1 (OT)    Problem Specific Goal 1 (OT) Pt will tolerate daily lymphedema wrapping BLE w/ goal to reduce edema for improved quality of life and functional use  -BC       Row Name 07/18/25 1224          Therapy Assessment/Plan (OT)    Planned Therapy Interventions (OT) activity tolerance training;adaptive equipment training;BADL retraining;edema control/reduction;functional balance retraining;neuromuscular control/coordination retraining;patient/caregiver education/training;passive ROM/stretching;ROM/therapeutic exercise;occupation/activity based interventions;strengthening exercise;transfer/mobility retraining  -BC               User Key  (r) = Recorded By, (t) = Taken By, (c) = Cosigned By      Initials Name Provider Type    Sherman Barnes, OT Occupational Therapist                   Clinical Impression       Row Name 07/18/25 1215          Pain Assessment    Pre/Posttreatment Pain Comment reports her R knee is painful; already  recieved pain meds this AM, did not rate  -BC       Row Name 07/18/25 1215          Plan of Care Review    Plan of Care Reviewed With patient  -BC     Progress no change  -BC     Outcome Evaluation Pt tolerated OT ADL session and lymphedema wrapping this AM. Pt was receptive to OT education on importance of being OOB but her high anxiety, and pain avoidance limited her performance and participation today. Pt immediately hesitant to movements but encouraged up to chair for breakfast and for Lymphedema wrapping. Pt tolerated ADLs, transfers up to chair w RW and CGA, and wrapping to BLEs with increased time. Pt required consistent cueing for breathing techniques with her anxiety with mvmts, but otherwise tolerated her wrappings well. Notified Pt/nursing of wear time, contraindications, etc, and plans to remove/rewrap on Monday. Pt has a history of lymphedema and reports wrapping at home, no contraindications appear for wrapping and RN okayd. Pt reports her wraps look different but tolerated well today. Continue with lymphedema mgmt and ADLs in future POC. DC recs LEILANI.  -BC       Row Name 07/18/25 1215          Therapy Plan Review/Discharge Plan (OT)    Anticipated Discharge Disposition (OT) skilled nursing facility  -BC       Row Name 07/18/25 1215          Vital Signs    O2 Delivery Pre Treatment room air  -BC     O2 Delivery Intra Treatment room air  -BC     O2 Delivery Post Treatment room air  -BC     Pre Patient Position Supine  -BC     Intra Patient Position Standing  -BC     Post Patient Position Sitting  -BC       Row Name 07/18/25 1215          Positioning and Restraints    Pre-Treatment Position in bed  -BC     Post Treatment Position chair  -BC     In Chair notified nsg;reclined;sitting;call light within reach;encouraged to call for assist;exit alarm on  -BC               User Key  (r) = Recorded By, (t) = Taken By, (c) = Cosigned By      Initials Name Provider Type    Sherman Barnes OT Occupational  Therapist                   Outcome Measures       Row Name 07/18/25 1226          How much help from another is currently needed...    Putting on and taking off regular lower body clothing? 2  -BC     Bathing (including washing, rinsing, and drying) 2  -BC     Toileting (which includes using toilet bed pan or urinal) 1  -BC     Putting on and taking off regular upper body clothing 3  -BC     Taking care of personal grooming (such as brushing teeth) 3  -BC     Eating meals 4  -BC     AM-PAC 6 Clicks Score (OT) 15  -BC       Row Name 07/18/25 1149 07/18/25 0810       How much help from another person do you currently need...    Turning from your back to your side while in flat bed without using bedrails? 3 (P)   -AG 3  -CC (r) AE (t) CC (c)    Moving from lying on back to sitting on the side of a flat bed without bedrails? 2 (P)   -AG 2  -CC (r) AE (t) CC (c)    Moving to and from a bed to a chair (including a wheelchair)? 2 (P)   -AG 2  -CC (r) AE (t) CC (c)    Standing up from a chair using your arms (e.g., wheelchair, bedside chair)? 2 (P)   -AG 2  -CC (r) AE (t) CC (c)    Climbing 3-5 steps with a railing? 1 (P)   -AG 1  -CC (r) AE (t) CC (c)    To walk in hospital room? 2 (P)   -AG 2  -CC (r) AE (t) CC (c)    AM-PAC 6 Clicks Score (PT) 12 (P)   -AG 12  -CC (r) AE (t)      Row Name 07/18/25 1226          Functional Assessment    Outcome Measure Options AM-PAC 6 Clicks Daily Activity (OT)  -BC               User Key  (r) = Recorded By, (t) = Taken By, (c) = Cosigned By      Initials Name Provider Type    Camilo Pendleton, RN Registered Nurse    Sherman Barnes, OT Occupational Therapist    Maggy Cook RN Extern Registered Nurse    Bonnie Grady, PT Student PT Student                    Occupational Therapy Education       Title: PT OT SLP Therapies (Done)       Topic: Occupational Therapy (Done)       Point: ADL training (Done)       Learning Progress Summary            Patient Acceptance, E,  VU,NR by CE at 7/16/2025 1515                      Point: Precautions (Done)       Learning Progress Summary            Patient Acceptance, E, VU,NR by CE at 7/16/2025 1515                      Point: Body mechanics (Done)       Learning Progress Summary            Patient Acceptance, E, VU,NR by CE at 7/16/2025 1515                                      User Key       Initials Effective Dates Name Provider Type Discipline    CE 10/17/22 -  Nicole Stockton OT Occupational Therapist OT                  OT Recommendation and Plan  Planned Therapy Interventions (OT): activity tolerance training, adaptive equipment training, BADL retraining, edema control/reduction, functional balance retraining, neuromuscular control/coordination retraining, patient/caregiver education/training, passive ROM/stretching, ROM/therapeutic exercise, occupation/activity based interventions, strengthening exercise, transfer/mobility retraining  Plan of Care Review  Plan of Care Reviewed With: patient  Progress: no change  Outcome Evaluation: Pt tolerated OT ADL session and lymphedema wrapping this AM. Pt was receptive to OT education on importance of being OOB but her high anxiety, and pain avoidance limited her performance and participation today. Pt immediately hesitant to movements but encouraged up to chair for breakfast and for Lymphedema wrapping. Pt tolerated ADLs, transfers up to chair w RW and CGA, and wrapping to BLEs with increased time. Pt required consistent cueing for breathing techniques with her anxiety with mvmts, but otherwise tolerated her wrappings well. Notified Pt/nursing of wear time, contraindications, etc, and plans to remove/rewrap on Monday. Pt has a history of lymphedema and reports wrapping at home, no contraindications appear for wrapping and RN okayd. Pt reports her wraps look different but tolerated well today. Continue with lymphedema mgmt and ADLs in future POC. DC recs LEILANI.     Time Calculation:          Time Calculation- OT       Row Name 07/18/25 1227             Time Calculation- OT    OT Start Time 0755  -BC      OT Stop Time 0810  -BC      OT Time Calculation (min) 15 min  -BC      Total Timed Code Minutes- OT 15 minute(s)  -BC      OT Received On 07/18/25  -BC      OT - Next Appointment 07/21/25  -BC         Timed Charges    67753 - OT Self Care/Mgmt Minutes 15  -BC         Total Minutes    Timed Charges Total Minutes 15  -BC       Total Minutes 15  -BC                User Key  (r) = Recorded By, (t) = Taken By, (c) = Cosigned By      Initials Name Provider Type    BC Sherman Castillo OT Occupational Therapist                  Therapy Charges for Today       Code Description Service Date Service Provider Modifiers Qty    [id number] HC OT SELF CARE/MGMT/TRAIN EA 15 MIN 7/18/2025 Sherman Castillo OT GO 1                 Sherman Castillo OT  7/18/2025

## 2025-07-18 NOTE — PROGRESS NOTES
[facility]     LOS: 2 days     Subjective:    CC:Fall    DIET:  Diet Order   Procedures    Diet: Cardiac; Healthy Heart (2-3 Na+); Fluid Consistency: Thin (IDDSI 0)       Objective:    Vital Signs:  Temp:  [97.7 °F (36.5 °C)-98.4 °F (36.9 °C)] 97.7 °F (36.5 °C)  Heart Rate:  [76-84] 79  Resp:  [16-18] 18  BP: ()/(45-60) 109/45    SpO2:  [93 %-100 %] 100 %  on   ;   Device (Oxygen Therapy): (P) room air  Body mass index is 26.3 kg/m².    Physical Exam  Constitutional:       General: She is not in acute distress.     Appearance: Normal appearance.   Pulmonary:      Effort: Pulmonary effort is normal.      Breath sounds: Normal breath sounds.   Abdominal:      General: There is no distension.      Palpations: Abdomen is soft.      Tenderness: There is no abdominal tenderness.   Skin:     General: Skin is warm and dry.   Neurological:      Mental Status: She is alert.      Comments: Good strength in legs, but unable to bend right leg secondary to pain   Psychiatric:         Mood and Affect: Mood normal.         Behavior: Behavior normal.         Results Review:    Glucose   Date Value Ref Range Status   07/18/2025 83 65 - 99 mg/dL Final   07/17/2025 92 65 - 99 mg/dL Final   07/16/2025 115 (H) 65 - 99 mg/dL Final   07/16/2025 115 (H) 65 - 99 mg/dL Final     Results from last 7 days   Lab Units 07/16/25  0952   WBC 10*3/mm3 5.16   HEMOGLOBIN g/dL 10.8*   HEMATOCRIT % 34.3   PLATELETS 10*3/mm3 288     Results from last 7 days   Lab Units 07/18/25  0634 07/17/25  0631 07/16/25  0952   SODIUM mmol/L 139   < > 139  139   POTASSIUM mmol/L 4.4   < > 3.9  3.9   CHLORIDE mmol/L 103   < > 104  104   CO2 mmol/L 28.3   < > 25.3  25.3   BUN mg/dL 11.0   < > 11.0  11.0   CREATININE mg/dL 0.68   < > 0.97  0.97   CALCIUM mg/dL 9.5   < > 9.1  9.1   BILIRUBIN mg/dL  --   --  0.4   ALK PHOS U/L  --   --  113   ALT (SGPT) U/L  --   --  8   AST (SGOT) U/L  --   --  16   GLUCOSE mg/dL 83   < > 115*   "115*    < > = values in this interval not displayed.             Results from last 7 days   Lab Units 07/16/25  0952 07/15/25  0433 07/15/25  0308   CK TOTAL U/L 33  --   --    HSTROP T ng/L  --  38* 43*     Cultures:  No results found for: \"BLOODCX\", \"URINECX\", \"WOUNDCX\", \"MRSACX\", \"RESPCX\", \"STOOLCX\"    I have reviewed daily medications and changes in CPOE    Scheduled meds  ammonium lactate, 1 Application, Topical, BID  apixaban, 5 mg, Oral, Q12H  furosemide, 20 mg, Oral, Once per day on Monday Wednesday Friday  hydrocortisone-bacitracin-zinc oxide-nystatin, 1 Application, Topical, TID  levothyroxine, 50 mcg, Oral, Q AM  melatonin, 5 mg, Oral, Nightly  PHENobarbital, 129.6 mg, Oral, Nightly  pramipexole, 2.5 mg, Oral, Nightly  pravastatin, 40 mg, Oral, Nightly  sodium chloride, 10 mL, Intravenous, Q12H           PRN meds    acetaminophen **OR** acetaminophen **OR** acetaminophen    senna-docusate sodium **AND** polyethylene glycol **AND** bisacodyl **AND** bisacodyl    famotidine    hydrOXYzine    nitroglycerin    ondansetron    sodium chloride    sodium chloride    sodium chloride        Bilateral lower extremity edema    Seizure disorder    Sleep apnea    Venous (peripheral) insufficiency    Gastroesophageal reflux disease    History of partial thyroidectomy    Restless legs syndrome    Hypothyroidism, unspecified    Lymphedema    Chronic venous hypertension with inflammation involving both sides    Saddle pulmonary embolus    Bilateral edema of lower extremity        Assessment/Plan:  Leg weakness-  Mri lumbar spine- unable to get, but no radicular pain so hold for now  Neurology consult and reviewed with them, patient follows with Dr Liu  phenobarb level which is low, so not causing her weakness  Echo ef nl, mild tricuspid regurg  Leg edema is better with a couple doses of lasix and she is followed by an edema clinic    Mild acute on chronic diastolic heart failure  -small amount of lasix    Hypotension " with diuresis, small amount of fluid given back, also likely related to pain medication    Right knee pain- check an xray-shows osteoarthritis-   -given worsening knee pain will ask ortho to see the patient  -she follows with Dr Cesar  phenobarb level which is low, so not causing her weakness    Leg edema is better with a couple doses of lasix and she is followed by an edema clinic    Hx Sz-phenobarb    Hypothyroidism-tsh 2.5     Hx PE- on eliquis  -dopplers negative    Possible d/c 1-2 days    Anuj Stephens MD  07/18/25  11:28 EDT

## 2025-07-18 NOTE — PLAN OF CARE
Goal Outcome Evaluation:  Plan of Care Reviewed With: patient        Progress: no change  Outcome Evaluation: Pt tolerated OT ADL session and lymphedema wrapping this AM. Pt was receptive to OT education on importance of being OOB but her high anxiety, and pain avoidance limited her performance and participation today. Pt immediately hesitant to movements but encouraged up to chair for breakfast and for Lymphedema wrapping. Pt tolerated ADLs, transfers up to chair w RW and CGA, and wrapping to BLEs with increased time. Pt required consistent cueing for breathing techniques with her anxiety with mvmts, but otherwise tolerated her wrappings well. Notified Pt/nursing of wear time, contraindications, etc, and plans to remove/rewrap on Monday. Pt has a history of lymphedema and reports wrapping at home, no contraindications appear for wrapping and RN okayd. Pt reports her wraps look different but tolerated well today. Continue with lymphedema mgmt and ADLs in future POC. DC recs LEIL[facility].    Anticipated Discharge Disposition (OT): skilled nursing facility

## 2025-07-18 NOTE — CASE MANAGEMENT/SOCIAL WORK
Continued Stay Note  [facility]     Patient Name: Mariela Ruiz  MRN: [id number]  Today's Date: 7/18/2025    Admit Date: 7/15/2025    Plan: Referral to [facility] pending review   Discharge Plan       Row Name 07/18/25 1108       Plan    Plan Referral to [facility] pending review    Patient/Family in Agreement with Plan yes    Plan Comments CCP met with the patient at bedside and updated her that [facility] still [facility]'t [facility] a [facility] available and CCP needs additional SNF referrals if her plan is short-term rehab before she returns home. She discussed some other facilities that she has been to in the past but requested a referral to [facility] because she said it is the closest to where she lives on [address]. CCP made referral in [facility] and spoke with Bri who said she will review. CCP to follow. CD, CSW.                   Discharge Codes    No documentation.                 Expected Discharge Date and Time       Expected Discharge Date Expected Discharge Time    Jul 18, 2025

## 2025-07-18 NOTE — NURSING NOTE
While attempting to change the patient's brief, she began to scream loudly stating that I hit her head on the siderail and she did not hit her head  when we turned her, the CNA was at the bedside at the time, my charge nurse came to the room I informed her what happened,when she came to the room, because the patient was screaming so loudly.

## 2025-07-18 NOTE — CONSULTS
[facility] Consult Note      Patient Name: Mariela Ruiz  : 1945  MRN: [id number]  Primary Care Physician:  Duglas Rock MD  Referring Physician: Anuj Stephens MD  Date of admission: 7/15/2025    Subjective     Reason for Consult:  edema     HPI:   Mariela Ruiz is a 80 y.o. female with chronic lymphedema, DVT/PE on anticoagulation, seizure disorder admitted 7/15 after a fall.  Underwent hip and knee replacement March of this year.  Reported BLE weakness.  Initial CXR raised question of pulm edema and she was given some lasix but became hypotensive.  Now SBP better low 100s.  Denies dyspnea.  Legs wrapped (and goes to lymphedema clinic).  Cr is normal.  I saw her while hosp 2024 for JACIEL in setting of acute cholecystitis.  Seen by neurology and deferred MRI witih absent radiculopathy sx.      Review of Systems:   14 point review of systems is otherwise negative except for mentioned above on HPI    Personal History     Past Medical History:   Diagnosis Date    Arthritis     Chronic venous insufficiency     Dupuytren's contracture     History of staph infection     S/P KNEE DEC 2016    History of transfusion     Hyperlipidemia     Lymphedema     LEFT LEG    Nontoxic multinodular goiter     Osteoporosis     Dr. Cabrera    Pelvic joint pain, left     Postsurgical hypothyroidism     Presence of left artificial hip joint     METAL ON METAL AS NOTED PER DR CLEMENT NOTE    Restless leg syndrome     Right bundle branch block     Seizure disorder     Dr. Whitman, HX  LAST SEIZURE    Sleep apnea     DOES NOT HAVE MACHINE    Vitamin D insufficiency        Past Surgical History:   Procedure Laterality Date    APPENDECTOMY      CARDIAC CATHETERIZATION      NORMAL     CARDIAC CATHETERIZATION  2025    Procedure: Percutaneous Manual Thrombectomy;  Surgeon: Sourav Kennedy MD;  Location: [facility] CATH INVASIVE LOCATION;  Service: Cardiovascular;;    CHOLECYSTECTOMY N/A 2024     Procedure: CHOLECYSTECTOMY LAPAROSCOPIC WITH DAVINCI ROBOT with cholangiogram, possible open;  Surgeon: Bin Heaton MD;  [facility];  Service: Robotics - DaVinci;  Laterality: N/A;    COLONOSCOPY  08/17/2017    Normal except for anal fissure.  Dr. Brandt.  [facility].    KNEE MINI REVISION Left 11/15/2016    Procedure: LEFT KNEE POLY CHANGE WITH HI POST STABILIZER;  Surgeon: Pablito Claudio MD;  [facility];  Service:     KNEE MINI REVISION Left 12/13/2016    Procedure: LT KNEE REMOVAL/REPLACEMENT LOCKING PIN ;  Surgeon: Pablito Claudio MD;  Location: [facility] MAIN OR;  Service:     MAMMO FINE NEEDLE ASPIRATION UNILATERAL      RIGHT THYROID NODULE, 2009 BENIGN     NH ARTHRP KNE CONDYLE&PLATU MEDIAL&LAT COMPARTMENTS Left 12/24/2016    Procedure: LEFT KNEE WASHOUT WITH POLY CHANGE;  Surgeon: Christiano Cesar MD;  Location: [facility] MAIN OR;  Service: Orthopedics    NH REVJ TOTAL KNEE ARTHRP W/WO ALGRFT 1 COMPONENT Left 2/20/2017    Procedure: LT KNEE REMOVAL ANTIBIOTIC SPACER AND KNEE REVISION;  Surgeon: Christiano Cesar MD;  Location: [facility] MAIN OR;  Service: Orthopedics    REPLACEMENT TOTAL KNEE Left     THYROIDECTOMY, PARTIAL      1979 LEFT LOBECTOMY AND ISTHMECTOMY     TOTAL ABDOMINAL HYSTERECTOMY WITH SALPINGO OOPHORECTOMY      TOTAL HIP ARTHROPLASTY Left     TOTAL HIP ARTHROPLASTY Right 9/14/2019    Procedure: TOTAL HIP ARTHROPLASTY ANTERIOR WITH HANA TABLE;  Surgeon: Christiano Cesar II, MD;  Location: [facility] MAIN OR;  Service: Orthopedics    TOTAL HIP ARTHROPLASTY REVISION Left 3/9/2021    Procedure: LEFT TOTAL HIP ARTHROPLASTY REVISION POSTERIOR;  Surgeon: Christiano Cesar II, MD;  Location: [facility] MAIN OR;  Service: Orthopedics;  Laterality: Left;    TOTAL HIP ARTHROPLASTY REVISION Left 3/8/2025    Procedure: TOTAL HIP ARTHROPLASTY REVISION;  Surgeon: Christiano Cesar II, MD;  Location: [facility] MAIN OR;  Service: Orthopedics;  Laterality: Left;     TOTAL KNEE  PROSTHESIS REMOVAL W/ SPACER INSERTION Left 12/29/2016    Procedure: LT KNEE IMPLANT REMOVAL WITH INSERTION OF SPACER ;  Surgeon: Christiano Cesar MD;  Location: [facility];  Service:        Family History: family history includes Diabetes in her sister; Heart disease in her father; Hyperlipidemia in her sister; Hypertension in her sister; Obesity in her sister.    Social History:  reports that she has never smoked. She has never been exposed to tobacco smoke. She has never used smokeless tobacco. She reports that she does not drink alcohol and does not use drugs.    Home Medications:  Prior to Admission medications    Medication Sig Start Date End Date Taking? Authorizing Provider   apixaban (ELIQUIS) 5 MG tablet tablet Take 1 tablet by mouth 2 (Two) Times a Day.   Yes Aleksey Blood MD   cholecalciferol (VITAMIN D3) 25 MCG (1000 UT) tablet Take 4 tablets by mouth Daily. 3/12/25  Yes Martín Heredia MD   Cyanocobalamin (Vitamin B-12) 1000 MCG sublingual tablet 1 tablet daily  Patient taking differently: Take 1 tablet by mouth Daily. 2/13/25  Yes Duglas Rock MD   PHENobarbital 64.8 MG tablet Take 2 tablets by mouth Every Night for 7 days. 11/7/24 7/15/25 Yes Jenn Venegas APRN   PHENobarbital 64.8 MG tablet Take 1 tablet by mouth Daily.   Yes Aleksey Blood MD   pramipexole (MIRAPEX) 0.5 MG tablet Take 5 tablets by mouth Every Night.  Patient taking differently: Take 4 tablets by mouth Every Night. 3/12/25  Yes Martín Heredia MD   pravastatin (PRAVACHOL) 40 MG tablet TAKE 1 TABLET BY MOUTH EVERY NIGHT FOR HIGH AMOUNT OF FATS IN THE BLOOD  Patient taking differently: Take 1 tablet by mouth Every Night. 5/6/25  Yes Duglas Rock MD   Synthroid 50 MCG tablet Take 1 tablet by mouth Every Morning. Indications: Underactive Thyroid 8/16/24  Yes Duglas Rock MD       Allergies:  Allergies   Allergen Reactions    Clindamycin/Lincomycin Itching    Duricef [Cefadroxil]  Hives and Rash    Sulfa Antibiotics Rash     RASH       Objective     Vitals:   Temp:  [97.7 °F (36.5 °C)-98.4 °F (36.9 °C)] 97.7 °F (36.5 °C)  Heart Rate:  [76-84] 79  Resp:  [16-18] 18  BP: ()/(45-60) 109/45    Intake/Output Summary (Last 24 hours) at 7/18/2025 0828  Last data filed at 7/18/2025 0556  Gross per 24 hour   Intake 360 ml   Output 700 ml   Net -340 ml       Physical Exam:    General Appearance: pleasant frail WF no distress on RA  Skin: warm and dry  HEENT: oral mucosa normal, nonicteric sclera  Neck: supple, no JVD  Lungs: CTA bilat no rales  Heart: RRR, normal S1 and S2  Abdomen: soft, nontender, nondistended  : no palpable bladder  Extremities + BLE edema, extent obscured by leg wraps   Neuro: normal speech and mental status     Scheduled Meds:     ammonium lactate, 1 Application, Topical, BID  apixaban, 5 mg, Oral, Q12H  hydrocortisone-bacitracin-zinc oxide-nystatin, 1 Application, Topical, TID  levothyroxine, 50 mcg, Oral, Q AM  melatonin, 5 mg, Oral, Nightly  PHENobarbital, 129.6 mg, Oral, Nightly  pramipexole, 2.5 mg, Oral, Nightly  pravastatin, 40 mg, Oral, Nightly  sodium chloride, 10 mL, Intravenous, Q12H      IV Meds:        Results Reviewed:   I have personally reviewed the results from the time of this admission to 7/18/2025 08:28 EDT     Lab Results   Component Value Date    GLUCOSE 83 07/18/2025    CALCIUM 9.5 07/18/2025     07/18/2025    K 4.4 07/18/2025    CO2 28.3 07/18/2025     07/18/2025    BUN 11.0 07/18/2025    CREATININE 0.68 07/18/2025    EGFRIFAFRI 70 11/22/2024    EGFRIFNONA 58 (L) 11/22/2024    BCR 16.2 07/18/2025    ANIONGAP 7.7 07/18/2025      Lab Results   Component Value Date    MG 1.7 03/04/2025    PHOS 2.7 04/27/2025    ALBUMIN 2.5 (L) 07/16/2025           Assessment / Plan     ASSESSMENT:  Chronic BLE lymphedema.  Vague initial CXR raised question of pulm edema but BNP normal and no dyspnea.  Alb also low 3.2.  Became hypotensive with diuresis (and  pain meds).  Now legs wrapped and goes to lymphedema clinic.  Will resume lasix at lower dose.  Renal fcn normal  BLE weakness - neurology eval noted.  No e/o radiculopathy  Seizure disorder   Hx PE on AC  Hypothyroidism, treated  Dyslipidemia, on statin     PLAN:  Resume lasix at lower dose 20mg MWF and watch BP   Continue leg wraps and outpatient lymphedema clinic   D/W Dr Stephens     Thank you for involving us in the care of Mariela Ruiz.  Please feel free to call with any questions.    Carlo Hampton MD  07/18/25  08:28 EDT    [facility]  [phone]

## 2025-07-18 NOTE — PLAN OF CARE
Goal Outcome Evaluation:  Plan of Care Reviewed With: patient        Progress: improving  Outcome Evaluation: Pt was willing to work with PT. Very anxious throughout session and required increased encouragement. sat EOB CGA. STS CGA and RW. was able to ambulate 10 ft with CGA and RW. pt complained of knee pain but was steady and did not show LOB. requested to go back to bed. PT will cont to follow and encourage SNF.

## 2025-07-18 NOTE — THERAPY RE-EVALUATION
Acute Care - Occupational Therapy Lymphedema Initial Evaluation  [facility]     Patient Name: Mariela Ruiz  : 1945  MRN: [id number]  Today's Date: 2025             Admit Date: 7/15/2025       ICD-10-CM ICD-9-CM   1. Bilateral lower extremity edema  R60.0 782.3   2. Falls  R29.6 V15.88     Patient Active Problem List   Diagnosis    Seizure disorder    Hyperlipidemia    Vitamin D insufficiency    Age-related osteoporosis without current pathological fracture    Sleep apnea    Venous (peripheral) insufficiency    Postsurgical hypothyroidism    Chronic fatigue syndrome    Gastroesophageal reflux disease    Dupuytren's contracture    History of biliary T-tube placement    History of partial thyroidectomy    Multinodular goiter    Restless legs syndrome    History of cardiac catheterization    Urge incontinence of urine    Left knee pain    Ligamentous laxity of knee    DJD (degenerative joint disease) of knee    Adverse drug effect, subsequent encounter    Abnormal blood level of chromium    Abnormal blood level of cobalt    Family history of diabetes mellitus    Thrombocytopenia    .    S/P revision of total hip    Enlarged thyroid gland    Palmar fascial fibromatosis (dupuytren)    Hypothyroidism, unspecified    Hyperlipidemia, unspecified    Acute cholecystitis    Elevated troponin    Vitamin B12 deficiency    Pernicious anemia    Lymphedema    Venous stasis    Chronic venous hypertension with inflammation involving both sides    Periprosthetic hip fracture    Ureterolithiasis    Hydronephrosis    Multiple thyroid nodules    Saddle pulmonary embolus    Bilateral lower extremity edema    Bilateral edema of lower extremity     Past Medical History:   Diagnosis Date    Arthritis     Chronic venous insufficiency     Dupuytren's contracture     History of staph infection     S/P KNEE DEC 2016    History of transfusion     Hyperlipidemia     Lymphedema     LEFT LEG    Nontoxic multinodular goiter      Osteoporosis     Dr. Cabrera    Pelvic joint pain, left     Postsurgical hypothyroidism     Presence of left artificial hip joint     METAL ON METAL AS NOTED PER DR CLEMENT NOTE    Restless leg syndrome     Right bundle branch block     Seizure disorder     Dr. Whitman, HX 1980 LAST SEIZURE    Sleep apnea     DOES NOT HAVE MACHINE    Vitamin D insufficiency      Past Surgical History:   Procedure Laterality Date    APPENDECTOMY      CARDIAC CATHETERIZATION      NORMAL     CARDIAC CATHETERIZATION  4/24/2025    Procedure: Percutaneous Manual Thrombectomy;  Surgeon: Sourav Kennedy MD;  Location: [facility] CATH INVASIVE LOCATION;  Service: Cardiovascular;;    CHOLECYSTECTOMY N/A 11/2/2024    Procedure: CHOLECYSTECTOMY LAPAROSCOPIC WITH DAVINCI ROBOT with cholangiogram, possible open;  Surgeon: Bin Heaton MD;  Location: [facility] MAIN OR;  Service: Robotics - DaVinci;  Laterality: N/A;    COLONOSCOPY  08/17/2017    Normal except for anal fissure.  Dr. Brandt.  [facility].    KNEE MINI REVISION Left 11/15/2016    Procedure: LEFT KNEE POLY CHANGE WITH HI POST STABILIZER;  Surgeon: Pablito Claudio MD;  Location: [facility] OR;  Service:     KNEE MINI REVISION Left 12/13/2016    Procedure: LT KNEE REMOVAL/REPLACEMENT LOCKING PIN ;  Surgeon: Pablito Claudio MD;  Location: [facility] OR;  Service:     MAMMO FINE NEEDLE ASPIRATION UNILATERAL      RIGHT THYROID NODULE, 2009 BENIGN     CO ARTHRP KNE CONDYLE&PLATU MEDIAL&LAT COMPARTMENTS Left 12/24/2016    Procedure: LEFT KNEE WASHOUT WITH POLY CHANGE;  Surgeon: Christiano Clement MD;  Location: [facility] OR;  Service: Orthopedics    CO REVJ TOTAL KNEE ARTHRP W/WO ALGRFT 1 COMPONENT Left 2/20/2017    Procedure: LT KNEE REMOVAL ANTIBIOTIC SPACER AND KNEE REVISION;  Surgeon: Christiano Clement MD;  Location: [facility] OR;  Service: Orthopedics    REPLACEMENT TOTAL KNEE Left     THYROIDECTOMY, PARTIAL      1979 LEFT LOBECTOMY AND ISTHMECTOMY     TOTAL ABDOMINAL HYSTERECTOMY  "WITH SALPINGO OOPHORECTOMY      TOTAL HIP ARTHROPLASTY Left     TOTAL HIP ARTHROPLASTY Right 9/14/2019    Procedure: TOTAL HIP ARTHROPLASTY ANTERIOR WITH HANA TABLE;  Surgeon: Christiano Cesar II, MD;  Location: [facility] MAIN OR;  Service: Orthopedics    TOTAL HIP ARTHROPLASTY REVISION Left 3/9/2021    Procedure: LEFT TOTAL HIP ARTHROPLASTY REVISION POSTERIOR;  Surgeon: Christiano Cesar II, MD;  Location: [facility] MAIN OR;  Service: Orthopedics;  Laterality: Left;    TOTAL HIP ARTHROPLASTY REVISION Left 3/8/2025    Procedure: TOTAL HIP ARTHROPLASTY REVISION;  Surgeon: Christiano Cesar II, MD;  Location: [facility] MAIN OR;  Service: Orthopedics;  Laterality: Left;    TOTAL KNEE  PROSTHESIS REMOVAL W/ SPACER INSERTION Left 12/29/2016    Procedure: LT KNEE IMPLANT REMOVAL WITH INSERTION OF SPACER ;  Surgeon: Christiano Cesar MD;  Location: [facility] OR;  Service:         Lymphedema       Row Name 07/18/25 1230             Subjective Pain    Able to rate subjective pain? yes  -BC      Subjective Pain Comment see OT quick doc; c/o R knee pain but did not rate on scale  -BC      Recorded by [BC] Sherman Castillo OT              Subjective    Subjective Comments \"These wraps are different than I use at home\" Pt denies any issues with wrapping.  -BC      Recorded by [BC] Sherman Castillo OT              Vital Signs    O2 Delivery Pre Treatment room air  -BC      Recorded by [BC] Sherman Castillo OT              Lymphedema Assessment    Lymphedema Classification RLE:;LLE:  -BC      Recorded by [BC] Sherman Castillo OT              Posture/Observations    Observations Edema  LLE slightly greater than R  -BC      Recorded by [BC] Sherman Castillo OT              MMT (Manual Muscle Testing)    General MMT Comments WFLs  -BC      Recorded by [BC] Sherman Castillo OT              Skin Changes/Observations    Location/Assessment Lower Extremity  -BC      Lower Extremity Conditions bilateral:;normal;intact;dry  " some redness anterior shins but overall skin in good condition and intact. no pitting edema noted  -BC      Lower Extremity Color/Pigment normal  -BC      Recorded by [BC] Sherman Castillo OT              Compression/Skin Care    Compression/Skin Care skin care;wrapping location;bandaging;compression garment  -BC      Skin Care washed/dried;lotion applied  -BC      Wrapping Location lower extremity  -BC      Wrapping Location LE bilateral:;foot to knee  -BC      Bandage Layers cotton liner;padding/fluff layer;short-stretch bandages (comment size/quantity)  T9 cotton liner danish, artiflex (2) 10 and (2) 15 cm for danish LEs, Short stretch bandage rosidal (2) 8 cm and (2) 10 cm.  -BC      Bandaging Technique light compression;moderate compression  -BC      Compression/Skin Care Comments no issues noted; pt tolerated well  -BC      Recorded by [BC] Sherman Castillo OT                User Key  (r) = Recorded By, (t) = Taken By, (c) = Cosigned By      Initials Name Effective Dates    BC Sherman Castillo OT 07/29/24 -                     OT ASSESSMENT FLOWSHEET (Last 12 Hours)       OT Evaluation and Treatment    No documentation.                    Occupational Therapy Education       Title: PT OT SLP Therapies (Done)       Topic: Occupational Therapy (Done)       Point: ADL training (Done)       Learning Progress Summary            Patient Acceptance, E, VU,NR by CE at 7/16/2025 1515                      Point: Precautions (Done)       Learning Progress Summary            Patient Acceptance, E, VU,NR by CE at 7/16/2025 1515                      Point: Body mechanics (Done)       Learning Progress Summary            Patient Acceptance, E, VU,NR by CE at 7/16/2025 1515                                      User Key       Initials Effective Dates Name Provider Type Discipline    CE 10/17/22 -  Nicole Stockton OT Occupational Therapist OT                      OT Recommendation and Plan  Planned Therapy Interventions (OT):  activity tolerance training, adaptive equipment training, BADL retraining, edema control/reduction, functional balance retraining, neuromuscular control/coordination retraining, patient/caregiver education/training, passive ROM/stretching, ROM/therapeutic exercise, occupation/activity based interventions, strengthening exercise, transfer/mobility retraining  Plan of Care Review  Plan of Care Reviewed With: patient  Progress: no change  Outcome Evaluation: Pt tolerated OT ADL session and lymphedema wrapping this AM. Pt was receptive to OT education on importance of being OOB but her high anxiety, and pain avoidance limited her performance and participation today. Pt immediately hesitant to movements but encouraged up to chair for breakfast and for Lymphedema wrapping. Pt tolerated ADLs, transfers up to chair w RW and CGA, and wrapping to BLEs with increased time. Pt required consistent cueing for breathing techniques with her anxiety with mvmts, but otherwise tolerated her wrappings well. Notified Pt/nursing of wear time, contraindications, etc, and plans to remove/rewrap on Monday. Pt has a history of lymphedema and reports wrapping at home, no contraindications appear for wrapping and RN okayd. Pt reports her wraps look different but tolerated well today. Continue with lymphedema mgmt and ADLs in future POC. ITZ recs LEILANI.  Plan of Care Reviewed With: patient  Outcome Evaluation: Pt tolerated OT ADL session and lymphedema wrapping this AM. Pt was receptive to OT education on importance of being OOB but her high anxiety, and pain avoidance limited her performance and participation today. Pt immediately hesitant to movements but encouraged up to chair for breakfast and for Lymphedema wrapping. Pt tolerated ADLs, transfers up to chair w RW and CGA, and wrapping to BLEs with increased time. Pt required consistent cueing for breathing techniques with her anxiety with mvmts, but otherwise tolerated her wrappings well.  Notified Pt/nursing of wear time, contraindications, etc, and plans to remove/rewrap on Monday. Pt has a history of lymphedema and reports wrapping at home, no contraindications appear for wrapping and RN okayd. Pt reports her wraps look different but tolerated well today. Continue with lymphedema mgmt and ADLs in future POC. DC recs LEILANI.            Time Calculation:     Time Calculation- OT       Row Name 07/18/25 1236 07/18/25 1227          Time Calculation- OT    OT Start Time 0811  -BC 0755  -BC     OT Stop Time 0839  -BC 0810  -BC     OT Time Calculation (min) 28 min  -BC 15 min  -BC     Total Timed Code Minutes- OT 28 minute(s)  -BC 15 minute(s)  -BC     OT Received On 07/18/25  -BC 07/18/25  -BC     OT - Next Appointment 07/21/25  -BC 07/21/25  -BC        Timed Charges    09843 - OT Self Care/Mgmt Minutes 10  -BC 15  -BC        Total Minutes    Timed Charges Total Minutes 10  -BC 15  -BC      Total Minutes 10  -BC 15  -BC               User Key  (r) = Recorded By, (t) = Taken By, (c) = Cosigned By      Initials Name Provider Type    BC Sherman Castillo OT Occupational Therapist                    Therapy Charges for Today       Code Description Service Date Service Provider Modifiers Qty    47887199700 HC OT SELF CARE/MGMT/TRAIN EA 15 MIN 7/18/2025 Sherman Castillo OT GO 1    45518165930 HC OT MULTI LAYER COMP SYS BELOW KNEE BILATERAL 7/18/2025 Sherman Castillo OT  1    [id number] HC OT RE-EVAL 2 7/18/2025 Sherman Castillo OT GO 1                        Sherman Castillo OT  7/18/2025

## 2025-07-18 NOTE — NURSING NOTE
Patient refused to take prescribed dosage of Phenobarbitol and refused to take any of her night meds, wrote a note to pharmacy about the dose.

## 2025-07-18 NOTE — PLAN OF CARE
Goal Outcome Evaluation:      Pt is A&Ox4. On RA. Pt c/o R knee pain, given PRN tylenol. Pt is resting in bed with alarm on and call light within reach.

## 2025-07-18 NOTE — THERAPY TREATMENT NOTE
Patient Name: Mariela Ruiz  : 1945    MRN: [id number]                              Today's Date: 2025       Admit Date: 7/15/2025    Visit Dx:     ICD-10-CM ICD-9-CM   1. Bilateral lower extremity edema  R60.0 782.3   2. Falls  R29.6 V15.88     Patient Active Problem List   Diagnosis    Seizure disorder    Hyperlipidemia    Vitamin D insufficiency    Age-related osteoporosis without current pathological fracture    Sleep apnea    Venous (peripheral) insufficiency    Postsurgical hypothyroidism    Chronic fatigue syndrome    Gastroesophageal reflux disease    Dupuytren's contracture    History of biliary T-tube placement    History of partial thyroidectomy    Multinodular goiter    Restless legs syndrome    History of cardiac catheterization    Urge incontinence of urine    Left knee pain    Ligamentous laxity of knee    DJD (degenerative joint disease) of knee    Adverse drug effect, subsequent encounter    Abnormal blood level of chromium    Abnormal blood level of cobalt    Family history of diabetes mellitus    Thrombocytopenia    .    S/P revision of total hip    Enlarged thyroid gland    Palmar fascial fibromatosis (dupuytren)    Hypothyroidism, unspecified    Hyperlipidemia, unspecified    Acute cholecystitis    Elevated troponin    Vitamin B12 deficiency    Pernicious anemia    Lymphedema    Venous stasis    Chronic venous hypertension with inflammation involving both sides    Periprosthetic hip fracture    Ureterolithiasis    Hydronephrosis    Multiple thyroid nodules    Saddle pulmonary embolus    Bilateral lower extremity edema    Bilateral edema of lower extremity     Past Medical History:   Diagnosis Date    Arthritis     Chronic venous insufficiency     Dupuytren's contracture     History of staph infection     S/P KNEE DEC 2016    History of transfusion     Hyperlipidemia     Lymphedema     LEFT LEG    Nontoxic multinodular goiter     Osteoporosis     Dr. Cabrera    Pelvic joint pain,  left     Postsurgical hypothyroidism     Presence of left artificial hip joint     METAL ON METAL AS NOTED PER DR CLEMENT NOTE    Restless leg syndrome     Right bundle branch block     Seizure disorder     Dr. Whitman, HX 1980 LAST SEIZURE    Sleep apnea     DOES NOT HAVE MACHINE    Vitamin D insufficiency      Past Surgical History:   Procedure Laterality Date    APPENDECTOMY      CARDIAC CATHETERIZATION      NORMAL     CARDIAC CATHETERIZATION  4/24/2025    Procedure: Percutaneous Manual Thrombectomy;  Surgeon: Sourav Kennedy MD;  Location: [facility] CATH INVASIVE LOCATION;  Service: Cardiovascular;;    CHOLECYSTECTOMY N/A 11/2/2024    Procedure: CHOLECYSTECTOMY LAPAROSCOPIC WITH DAVINCI ROBOT with cholangiogram, possible open;  Surgeon: Bin Heaton MD;  Location: [facility] MAIN OR;  Service: Robotics - DaVinci;  Laterality: N/A;    COLONOSCOPY  08/17/2017    Normal except for anal fissure.  Dr. Brandt.  [facility].    KNEE MINI REVISION Left 11/15/2016    Procedure: LEFT KNEE POLY CHANGE WITH HI POST STABILIZER;  Surgeon: Pablito Claudio MD;  Location: [facility] OR;  Service:     KNEE MINI REVISION Left 12/13/2016    Procedure: LT KNEE REMOVAL/REPLACEMENT LOCKING PIN ;  Surgeon: Pablito Claudio MD;  Location: [facility] OR;  Service:     MAMMO FINE NEEDLE ASPIRATION UNILATERAL      RIGHT THYROID NODULE, 2009 BENIGN     AL ARTHRP KNE CONDYLE&PLATU MEDIAL&LAT COMPARTMENTS Left 12/24/2016    Procedure: LEFT KNEE WASHOUT WITH POLY CHANGE;  Surgeon: Christiano Clement MD;  Location: [facility] OR;  Service: Orthopedics    AL REVJ TOTAL KNEE ARTHRP W/WO ALGRFT 1 COMPONENT Left 2/20/2017    Procedure: LT KNEE REMOVAL ANTIBIOTIC SPACER AND KNEE REVISION;  Surgeon: Christiano Clement MD;  Location: [facility] OR;  Service: Orthopedics    REPLACEMENT TOTAL KNEE Left     THYROIDECTOMY, PARTIAL      1979 LEFT LOBECTOMY AND ISTHMECTOMY     TOTAL ABDOMINAL HYSTERECTOMY WITH SALPINGO OOPHORECTOMY      TOTAL HIP  ARTHROPLASTY Left     TOTAL HIP ARTHROPLASTY Right 9/14/2019    Procedure: TOTAL HIP ARTHROPLASTY ANTERIOR WITH HANA TABLE;  Surgeon: Christiano Cesar II, MD;  Location: [facility] MAIN OR;  Service: Orthopedics    TOTAL HIP ARTHROPLASTY REVISION Left 3/9/2021    Procedure: LEFT TOTAL HIP ARTHROPLASTY REVISION POSTERIOR;  Surgeon: Christiano Cesar II, MD;  Location: [facility] MAIN OR;  Service: Orthopedics;  Laterality: Left;    TOTAL HIP ARTHROPLASTY REVISION Left 3/8/2025    Procedure: TOTAL HIP ARTHROPLASTY REVISION;  Surgeon: Christiano Cesar II, MD;  Location: [facility] MAIN OR;  Service: Orthopedics;  Laterality: Left;    TOTAL KNEE  PROSTHESIS REMOVAL W/ SPACER INSERTION Left 12/29/2016    Procedure: LT KNEE IMPLANT REMOVAL WITH INSERTION OF SPACER ;  Surgeon: Christiano Cesar MD;  Location: [facility] OR;  Service:       General Information       Row Name 07/18/25 1145          Physical Therapy Time and Intention    Document Type therapy note (daily note)  -CW (r) AG (t) CW (c)     Mode of Treatment physical therapy  -CW (r) AG (t) CW (c)       Row Name 07/18/25 1145          General Information    Existing Precautions/Restrictions fall  -CW (r) AG (t) CW (c)       Row Name 07/18/25 1145          Cognition    Orientation Status (Cognition) oriented x 3  -CW (r) AG (t) CW (c)       Row Name 07/18/25 1145          Safety Issues/Impairments Affecting Functional Mobility    Safety Issues Affecting Function (Mobility) problem-solving;sequencing abilities;safety precautions follow-through/compliance;insight into deficits/self-awareness;other (see comments)  increased anxiety prevents pt from being able to focus  -CW (r) AG (t) CW (c)     Impairments Affecting Function (Mobility) balance;coordination;endurance/activity tolerance;pain;strength;cognition  -CW (r) AG (t) CW (c)     Cognitive Impairments, Mobility Safety/Performance attention;insight into  deficits/self-awareness;problem-solving/reasoning;sequencing abilities  -CW (r) AG (t) CW (c)               User Key  (r) = Recorded By, (t) = Taken By, (c) = Cosigned By      Initials Name Provider Type    CW Magalys Sherwood, PT Physical Therapist    Bonnie Grady, PT Student PT Student                   Mobility       Row Name 07/18/25 1146          Bed Mobility    Bed Mobility supine-sit;sit-supine  -CW (r) AG (t) CW (c)     Supine-Sit [place] (Bed Mobility) contact guard  -CW (r) AG (t) CW (c)     Sit-Supine [place] (Bed Mobility) contact guard  -CW (r) AG (t) CW (c)     Assistive Device (Bed Mobility) bed rails;head of bed elevated  -CW (r) AG (t) CW (c)       Row Name 07/18/25 1146          Sit-Stand Transfer    Sit-Stand [place] (Transfers) contact guard  -CW (r) AG (t) CW (c)     Assistive Device (Sit-Stand Transfers) walker, front-wheeled  -CW (r) AG (t) CW (c)       Row Name 07/18/25 1146          Gait/Stairs (Locomotion)    [place] Level (Gait) contact guard  -CW (r) AG (t) CW (c)     Assistive Device (Gait) walker, front-wheeled  -CW (r) AG (t) CW (c)     Patient was able to Ambulate yes  -CW (r) AG (t) CW (c)     Distance in Feet (Gait) 10  -CW (r) AG (t) CW (c)     Deviations/Abnormal Patterns (Gait) gunnar decreased;gait speed decreased;stride length decreased  -CW (r) AG (t) CW (c)     Bilateral Gait Deviations forward flexed posture  -CW (r) AG (t) CW (c)     Comment, (Gait/Stairs) pt was able to ambulate 10 ft to sink and back with CGA and RW.  -CW (r) AG (t) CW (c)               User Key  (r) = Recorded By, (t) = Taken By, (c) = Cosigned By      Initials Name Provider Type    Magalys Ring PT Physical Therapist    Bonnie Grady, PT Student PT Student                   Obj/Interventions       Row Name 07/18/25 1146          Motor Skills    Therapeutic Exercise other (see comments)  seated ankle pumps, knee kicks  -CW (r) AG (t) CW (c)       Row Name 07/18/25  1146          Balance    Balance Assessment sitting static balance;sitting dynamic balance;standing static balance;standing dynamic balance  -CW (r) AG (t) CW (c)     Static Sitting Balance standby assist  -CW (r) AG (t) CW (c)     Dynamic Sitting Balance standby assist  -CW (r) AG (t) CW (c)     Position, Sitting Balance sitting edge of bed  -CW (r) AG (t) CW (c)     Static Standing Balance contact guard  -CW (r) AG (t) CW (c)     Dynamic Standing Balance contact guard  -CW (r) AG (t) CW (c)     Position/Device Used, Standing Balance walker, front-wheeled  -CW (r) AG (t) CW (c)     Balance Interventions sitting;standing;sit to stand;supported;static;dynamic  -CW (r) AG (t) CW (c)               User Key  (r) = Recorded By, (t) = Taken By, (c) = Cosigned By      Initials Name Provider Type    CW Magalys Sherwood, PT Physical Therapist    Bonnie Grady, PT Student PT Student                   Goals/Plan    No documentation.                  Clinical Impression       Row Name 07/18/25 1147          Pain    Pre/Posttreatment Pain Comment pt reported knee pain but did not rate  -CW (r) AG (t) CW (c)       Row Name 07/18/25 1147          Plan of Care Review    Plan of Care Reviewed With patient  -CW (r) AG (t) CW (c)     Progress improving  -CW (r) AG (t) CW (c)     Outcome Evaluation Pt was willing to work with PT. Very anxious throughout session and required increased encouragement. sat EOB CGA. STS CGA and RW. was able to ambulate 10 ft with CGA and RW. pt complained of knee pain but was steady and did not show LOB. requested to go back to bed. PT will cont to follow and encourage SNF.  -CW (r) AG (t) CW (c)       Row Name 07/18/25 1147          Therapy Assessment/Plan (PT)    Therapy Frequency (PT) 5 times/wk  -CW (r) AG (t) CW (c)       Row Name 07/18/25 1144          Positioning and Restraints    Pre-Treatment Position in bed  -CW (r) AG (t) CW (c)     Post Treatment Position bed  -CW (r) AG (t) CW (c)     In  Bed call light within reach;encouraged to call for assist;exit alarm on  -CW (r) AG (t) CW (c)               User Key  (r) = Recorded By, (t) = Taken By, (c) = Cosigned By      Initials Name Provider Type    Magalys Ring, PT Physical Therapist    Bonnie Grady, PT Student PT Student                   Outcome Measures       Row Name 07/18/25 1149 07/18/25 0810       How much help from another person do you currently need...    Turning from your back to your side while in flat bed without using bedrails? 3  -CW (r) AG (t) CW (c) 3  -CC (r) AE (t) CC (c)    Moving from lying on back to sitting on the side of a flat bed without bedrails? 2  -CW (r) AG (t) CW (c) 2  -CC (r) AE (t) CC (c)    Moving to and from a bed to a chair (including a wheelchair)? 3  -CW (r) AG (t) CW (c) 2  -CC (r) AE (t) CC (c)    Standing up from a chair using your arms (e.g., wheelchair, bedside chair)? 3  -CW (r) AG (t) CW (c) 2  -CC (r) AE (t) CC (c)    Climbing 3-5 steps with a railing? 2  -CW (r) AG (t) CW (c) 1  -CC (r) AE (t) CC (c)    To walk in hospital room? 3  -CW (r) AG (t) CW (c) 2  -CC (r) AE (t) CC (c)    AM-PAC 6 Clicks Score (PT) 16  -CW (r) AG (t) 12  -CC (r) AE (t)      Row Name 07/18/25 1226          Functional Assessment    Outcome Measure Options AM-PAC 6 Clicks Daily Activity (OT)  -BC               User Key  (r) = Recorded By, (t) = Taken By, (c) = Cosigned By      Initials Name Provider Type    Magalys Ring, PT Physical Therapist    Camilo Pendleton, RN Registered Nurse    Sherman Barnes, OT Occupational Therapist    Maggy Cook, RN Extern Registered Nurse    AG Groenewold, Bonnie, PT Student PT Student                                 Physical Therapy Education       Title: PT OT SLP Therapies (Done)       Topic: Physical Therapy (Done)       Point: Mobility training (Done)       Learning Progress Summary            Patient Acceptance, DEAN ANNA DU by  at 7/18/2025 1150    Acceptance, DEAN ANNA DU  by AG at 7/17/2025 1048    Acceptance, E, VU,DU by AG at 7/16/2025 1258                      Point: Body mechanics (Done)       Learning Progress Summary            Patient Acceptance, E, VU,DU by AG at 7/18/2025 1150    Acceptance, E, VU,DU by AG at 7/17/2025 1048    Acceptance, E, VU,DU by AG at 7/16/2025 1258                      Point: Precautions (Done)       Learning Progress Summary            Patient Acceptance, E, VU,DU by AG at 7/18/2025 1150    Acceptance, E, VU,DU by AG at 7/17/2025 1048    Acceptance, E, VU,DU by AG at 7/16/2025 1258                                      User Key       Initials Effective Dates Name Provider Type Discipline     06/03/25 -  Bonnie Amin, PT Student PT Student PT                  PT Recommendation and Plan  Planned Therapy Interventions (PT): balance training, bed mobility training, gait training, strengthening, transfer training  Progress: improving  Outcome Evaluation: Pt was willing to work with PT. Very anxious throughout session and required increased encouragement. sat EOB CGA. STS CGA and RW. was able to ambulate 10 ft with CGA and RW. pt complained of knee pain but was steady and did not show LOB. requested to go back to bed. PT will cont to follow and encourage SNF.     Time Calculation:         PT Charges       Row Name 07/18/25 1150             Time Calculation    Start Time 1032  -CW (r) AG (t) CW (c)      Stop Time 1047  -CW (r) AG (t) CW (c)      Time Calculation (min) 15 min  -CW (r) AG (t)      PT Received On 07/18/25  -CW (r) AG (t) CW (c)      PT - Next Appointment 07/21/25  -CW (r) AG (t) CW (c)         Timed Charges    50716 - PT Therapeutic Activity Minutes 15  -CW (r) AG (t) CW (c)         Total Minutes    Timed Charges Total Minutes 15  -CW (r) AG (t)       Total Minutes 15  -CW (r) AG (t)                User Key  (r) = Recorded By, (t) = Taken By, (c) = Cosigned By      Initials Name Provider Type    CW Magalys Sherwood, PT Physical Therapist     Bonnie Grady, PT Student PT Student                      PT G-Codes  Outcome Measure Options: AM-PAC 6 Clicks Daily Activity (OT)  AM-PAC 6 Clicks Score (PT): 16  AM-PAC 6 Clicks Score (OT): 15       Bonnie Amin PT Student  7/18/2025

## 2025-07-19 LAB
ANION GAP SERPL CALCULATED.3IONS-SCNC: 6 MMOL/L (ref 5–15)
BUN SERPL-MCNC: 10 MG/DL (ref 8–23)
BUN/CREAT SERPL: 13 (ref 7–25)
CALCIUM SPEC-SCNC: 9.2 MG/DL (ref 8.6–10.5)
CHLORIDE SERPL-SCNC: 101 MMOL/L (ref 98–107)
CO2 SERPL-SCNC: 29 MMOL/L (ref 22–29)
CREAT SERPL-MCNC: 0.77 MG/DL (ref 0.57–1)
EGFRCR SERPLBLD CKD-EPI 2021: 79.1 ML/MIN/1.73
GLUCOSE SERPL-MCNC: 89 MG/DL (ref 65–99)
POTASSIUM SERPL-SCNC: 4.2 MMOL/L (ref 3.5–5.2)
SODIUM SERPL-SCNC: 136 MMOL/L (ref 136–145)

## 2025-07-19 PROCEDURE — 80048 BASIC METABOLIC PNL TOTAL CA: CPT | Performed by: HOSPITALIST

## 2025-07-19 RX ORDER — DICLOFENAC SODIUM 30 MG/G
1 GEL TOPICAL 3 TIMES DAILY
Status: DISCONTINUED | OUTPATIENT
Start: 2025-07-19 | End: 2025-07-22 | Stop reason: HOSPADM

## 2025-07-19 RX ADMIN — Medication 1 APPLICATION: at 08:49

## 2025-07-19 RX ADMIN — HYDROXYZINE HYDROCHLORIDE 25 MG: 25 TABLET, FILM COATED ORAL at 20:13

## 2025-07-19 RX ADMIN — APIXABAN 5 MG: 5 TABLET, FILM COATED ORAL at 08:47

## 2025-07-19 RX ADMIN — APIXABAN 5 MG: 5 TABLET, FILM COATED ORAL at 20:12

## 2025-07-19 RX ADMIN — Medication 1 APPLICATION: at 20:04

## 2025-07-19 RX ADMIN — NYSTATIN 1 APPLICATION: 100000 OINTMENT TOPICAL at 08:49

## 2025-07-19 RX ADMIN — PHENOBARBITAL 129.6 MG: 32.4 TABLET ORAL at 20:13

## 2025-07-19 RX ADMIN — DICLOFENAC SODIUM 1 APPLICATION: 30 GEL TOPICAL at 17:14

## 2025-07-19 RX ADMIN — LEVOTHYROXINE SODIUM 50 MCG: 50 TABLET ORAL at 06:21

## 2025-07-19 RX ADMIN — PRAVASTATIN SODIUM 40 MG: 40 TABLET ORAL at 20:11

## 2025-07-19 RX ADMIN — PRAMIPEXOLE DIHYDROCHLORIDE 2.5 MG: 1 TABLET ORAL at 20:18

## 2025-07-19 RX ADMIN — Medication 10 ML: at 20:14

## 2025-07-19 RX ADMIN — NYSTATIN 1 APPLICATION: 100000 OINTMENT TOPICAL at 20:04

## 2025-07-19 RX ADMIN — Medication 10 ML: at 08:49

## 2025-07-19 RX ADMIN — DICLOFENAC SODIUM 1 APPLICATION: 30 GEL TOPICAL at 20:05

## 2025-07-19 NOTE — PROGRESS NOTES
[facility] Progress Note      Patient Name: Mariela Ruiz  : 1945  MRN: [id number]  Primary Care Physician:  Duglas Rock MD  Date of admission: 7/15/2025    Subjective     Interval History:   Weak, no dizziness    Review of Systems:   14 point review of systems is otherwise negative except for mentioned above on HPI    Objective     Vitals:   Temp:  [97.5 °F (36.4 °C)-97.9 °F (36.6 °C)] 97.7 °F (36.5 °C)  Heart Rate:  [75-83] 82  Resp:  [18] 18  BP: (100-123)/(46-61) 101/47    Intake/Output Summary (Last 24 hours) at 2025 0843  Last data filed at 2025 0320  Gross per 24 hour   Intake --   Output 2175 ml   Net -2175 ml       Physical Exam:    General Appearance: alert, oriented x 3, no acute distress   Skin: warm and dry  HEENT: oral mucosa normal, nonicteric sclera  Neck: supple, no JVD  Lungs: CTA  Heart: RRR, normal S1 and S2  Abdomen: soft, nontender, nondistended  : no palpable bladder  Extremities: 3+ edema, no cyanosis or clubbing  Neuro: normal speech and mental status     Scheduled Meds:     ammonium lactate, 1 Application, Topical, BID  apixaban, 5 mg, Oral, Q12H  furosemide, 20 mg, Oral, Once per day on   hydrocortisone-bacitracin-zinc oxide-nystatin, 1 Application, Topical, TID  levothyroxine, 50 mcg, Oral, Q AM  melatonin, 5 mg, Oral, Nightly  PHENobarbital, 129.6 mg, Oral, Nightly  pramipexole, 2.5 mg, Oral, Nightly  pravastatin, 40 mg, Oral, Nightly  sodium chloride, 10 mL, Intravenous, Q12H      IV Meds:        Results Reviewed:   I have personally reviewed the results from the time of this admission to 2025 08:43 EDT     Results from last 7 days   Lab Units 25  0627 25  0634 25  0631 25  0952 07/15/25  0308   SODIUM mmol/L 136 139 138 139  139 140   POTASSIUM mmol/L 4.2 4.4 4.2 3.9  3.9 3.5   CHLORIDE mmol/L 101 103 102 104  104 107   CO2 mmol/L 29.0 28.3 27.0 25.3  25.3 26.1   BUN mg/dL  10.0 11.0 11.0 11.0  11.0 10.0   CREATININE mg/dL 0.77 0.68 0.67 0.97  0.97 0.95   CALCIUM mg/dL 9.2 9.5 9.3 9.1  9.1 9.5   BILIRUBIN mg/dL  --   --   --  0.4 0.6   ALK PHOS U/L  --   --   --  113 131*   ALT (SGPT) U/L  --   --   --  8 8   AST (SGOT) U/L  --   --   --  16 15   GLUCOSE mg/dL 89 83 92 115*  115* 98     Estimated Creatinine Clearance: 66.3 mL/min (by C-G formula based on SCr of 0.77 mg/dL).          Results from last 7 days   Lab Units 07/16/25  0952 07/15/25  0616 07/15/25  0308   WBC 10*3/mm3 5.16 5.09 4.87   HEMOGLOBIN g/dL 10.8* 10.0* 10.6*   PLATELETS 10*3/mm3 288 260 293           Assessment / Plan     ASSESSMENT:  Chronic BLE lymphedema.  Vague initial CXR raised question of pulm edema but BNP normal and no dyspnea.  Alb also low 3.2.  Responding very well to current lasix regimen  BLE weakness - neurology eval noted.  No e/o radiculopathy  Seizure disorder   Hx PE on AC  Hypothyroidism, treated  Dyslipidemia, on statin      PLAN:  Dc is ok with me on present diuretic regimen  Continue leg wraps and outpatient lymphedema clinic   D/W Dr Stephens      Thank you for involving us in the care of Mariela Ruiz.  Please feel free to call with any questions.     Carlo Hampton MD  07/18/25  08:28 EDT     [facility]  [phone]             Thank you for involving us in the care of Mariela Ruiz.  Please feel free to call with any questions.    Christiano Hunt MD  07/19/25  08:43 EDT    [facility]  [phone]

## 2025-07-19 NOTE — CONSULTS
Orthopaedic & Sports Medicine Surgery  Dr. Nabil Gonzalez MD  [phone]    Orthopaedic Surgery  Consult Note      HPI:  Patient is a 80 y.o. female who presents with right knee pain after same level fall approximately 4 days ago.  She also complained of bilateral lower extremity swelling.  She is on Eliquis.  She has a history of a left knee replacement and subsequent revision in the past.  She had DVT ultrasound which was negative.  She continued with pain in her right knee and difficulty ambulating.  Orthopedic surgery was consulted.    MEDICAL HISTORY  Past Medical History:   Diagnosis Date    Arthritis     Chronic venous insufficiency     Dupuytren's contracture     History of staph infection     S/P KNEE DEC 2016    History of transfusion     Hyperlipidemia     Lymphedema     LEFT LEG    Nontoxic multinodular goiter     Osteoporosis     Dr. Cabrera    Pelvic joint pain, left     Postsurgical hypothyroidism     Presence of left artificial hip joint     METAL ON METAL AS NOTED PER DR CLEMENT NOTE    Restless leg syndrome     Right bundle branch block     Seizure disorder     Dr. Whitman, HX 1980 LAST SEIZURE    Sleep apnea     DOES NOT HAVE MACHINE    Vitamin D insufficiency      Past Surgical History:   Procedure Laterality Date    APPENDECTOMY      CARDIAC CATHETERIZATION      NORMAL     CARDIAC CATHETERIZATION  4/24/2025    Procedure: Percutaneous Manual Thrombectomy;  Surgeon: Sourav Kennedy MD;  Location: [facility] INVASIVE LOCATION;  Service: Cardiovascular;;    CHOLECYSTECTOMY N/A 11/2/2024    Procedure: CHOLECYSTECTOMY LAPAROSCOPIC WITH DAVINCI ROBOT with cholangiogram, possible open;  Surgeon: Bin Heaton MD;  Location: [facility] OR;  Service: Robotics - DaVinci;  Laterality: N/A;    COLONOSCOPY  08/17/2017    Normal except for anal fissure.  Dr. Brandt.  [facility].    KNEE MINI REVISION Left 11/15/2016    Procedure: LEFT KNEE POLY CHANGE WITH HI POST STABILIZER;  Surgeon: Pablito DELA CRUZ  MD Shanon;  Location: [facility] MAIN OR;  Service:     KNEE MINI REVISION Left 12/13/2016    Procedure: LT KNEE REMOVAL/REPLACEMENT LOCKING PIN ;  Surgeon: Pablito Claudio MD;  Location: [facility] MAIN OR;  Service:     MAMMO FINE NEEDLE ASPIRATION UNILATERAL      RIGHT THYROID NODULE, 2009 BENIGN     ID ARTHRP KNE CONDYLE&PLATU MEDIAL&LAT COMPARTMENTS Left 12/24/2016    Procedure: LEFT KNEE WASHOUT WITH POLY CHANGE;  Surgeon: Christiano Cesar MD;  Location: [facility] MAIN OR;  Service: Orthopedics    ID REVJ TOTAL KNEE ARTHRP W/WO ALGRFT 1 COMPONENT Left 2/20/2017    Procedure: LT KNEE REMOVAL ANTIBIOTIC SPACER AND KNEE REVISION;  Surgeon: Christiano Cesar MD;  Location: [facility] MAIN OR;  Service: Orthopedics    REPLACEMENT TOTAL KNEE Left     THYROIDECTOMY, PARTIAL      1979 LEFT LOBECTOMY AND ISTHMECTOMY     TOTAL ABDOMINAL HYSTERECTOMY WITH SALPINGO OOPHORECTOMY      TOTAL HIP ARTHROPLASTY Left     TOTAL HIP ARTHROPLASTY Right 9/14/2019    Procedure: TOTAL HIP ARTHROPLASTY ANTERIOR WITH HANA TABLE;  Surgeon: Christiano Cesar II, MD;  Location: [facility] MAIN OR;  Service: Orthopedics    TOTAL HIP ARTHROPLASTY REVISION Left 3/9/2021    Procedure: LEFT TOTAL HIP ARTHROPLASTY REVISION POSTERIOR;  Surgeon: Christiano Cesar II, MD;  Location: [facility] MAIN OR;  Service: Orthopedics;  Laterality: Left;    TOTAL HIP ARTHROPLASTY REVISION Left 3/8/2025    Procedure: TOTAL HIP ARTHROPLASTY REVISION;  Surgeon: Christiano Cesar II, MD;  Location: [facility] MAIN OR;  Service: Orthopedics;  Laterality: Left;    TOTAL KNEE  PROSTHESIS REMOVAL W/ SPACER INSERTION Left 12/29/2016    Procedure: LT KNEE IMPLANT REMOVAL WITH INSERTION OF SPACER ;  Surgeon: Christiano Cesar MD;  Location: [facility] MAIN OR;  Service:      Prior to Admission medications    Medication Sig Start Date End Date Taking? Authorizing Provider   apixaban (ELIQUIS) 5 MG tablet tablet Take 1 tablet by mouth 2 (Two) Times a Day.   Yes Provider,  "MD Aleksey   cholecalciferol (VITAMIN D3) 25 MCG (1000 UT) tablet Take 4 tablets by mouth Daily. 3/12/25  Yes Martín Heredia MD   Cyanocobalamin (Vitamin B-12) 1000 MCG sublingual tablet 1 tablet daily  Patient taking differently: Take 1 tablet by mouth Daily. 2/13/25  Yes Duglas Rock MD   PHENobarbital 64.8 MG tablet Take 2 tablets by mouth Every Night for 7 days. 11/7/24 7/15/25 Yes Jenn Venegas APRN   PHENobarbital 64.8 MG tablet Take 1 tablet by mouth Daily.   Yes Provider, MD Aleksey   pramipexole (MIRAPEX) 0.5 MG tablet Take 5 tablets by mouth Every Night.  Patient taking differently: Take 4 tablets by mouth Every Night. 3/12/25  Yes Martín Heredia MD   pravastatin (PRAVACHOL) 40 MG tablet TAKE 1 TABLET BY MOUTH EVERY NIGHT FOR HIGH AMOUNT OF FATS IN THE BLOOD  Patient taking differently: Take 1 tablet by mouth Every Night. 5/6/25  Yes Duglas Rock MD   Synthroid 50 MCG tablet Take 1 tablet by mouth Every Morning. Indications: Underactive Thyroid 8/16/24  Yes Duglas Rock MD     Allergies   Allergen Reactions    Clindamycin/Lincomycin Itching    Duricef [Cefadroxil] Hives and Rash    Sulfa Antibiotics Rash     RASH     Most Recent Immunizations   Administered Date(s) Administered    COVID-19 (PFIZER) Purple Cap Monovalent 04/21/2021    Tdap 12/06/2017     Social History     Tobacco Use    Smoking status: Never     Passive exposure: Never    Smokeless tobacco: Never   Substance Use Topics    Alcohol use: No      Social History     Substance and Sexual Activity   Drug Use Never       VITALS: /47   Pulse 82   Temp 97.7 °F (36.5 °C) (Oral)   Resp 18   Ht 172.7 cm (68\")   Wt 78.5 kg (173 lb)   SpO2 98%   BMI 26.30 kg/m²  Body mass index is 26.3 kg/m².    PHYSICAL EXAM:   CONSTITUTIONAL: No acute distress  LUNGS: Equal chest rise, no shortness of air  CARDIOVASCULAR: palpable peripheral pulses  EXTREMITY:   Right lower extremity  Tenderness to Palpation: Mildly over " medial and lateral joint line  Gross Deformity: Trace effusion.  No warmth or erythema.  No signs of infection.  Pulses:  Brisk Capillary Refill  Sensation: Intact to Saphenous, Sural, Deep Peroneal, Superficial Peroneal, and Tibial Nerves and grossly throughout extremity  Motor: 5/5 EHL/FHL/TA/GS motor complexes   Really no pain with short arc range of motion.  Can range to about 0 to 80 degrees and has more pain.        RADIOLOGY REVIEW:   CT Lower Extremity Right Without Contrast  Result Date: 7/18/2025   1. Diffuse osteopenia, without CT evidence of acute fracture. 2. Tricompartmental DJD of the knee, most severe in the medial and patellofemoral compartments, with chronic appearing nondisplaced subchondral insufficiency type fracture deformity of the weightbearing portions of the medial femoral condyle and medial tibial plateau. 3. Small knee joint effusion with diffuse chondrocalcinosis at the knee and an osteochondral body in the posterior medial knee joint space. 4. Patchy soft tissue edema and skin thickening, greatest laterally and posteriorly, with a thin multilobulated popliteal cyst.  This report was finalized on 7/18/2025 2:47 PM by Thor Werner MD on Workstation: [id number]      XR Knee 3 View Right  Result Date: 7/16/2025   As described.    This report was finalized on 7/16/2025 4:08 PM by Dr. Steven Zelaya M.D on Workstation: [id number]      XR Chest 1 View  Result Date: 7/15/2025  Electronically signed by Martín Mcghee MD on 07-15-25 at 0358      LABS:   Results for the past 24 hours:   Recent Results (from the past 24 hours)   Basic Metabolic Panel    Collection Time: 07/19/25  6:27 AM    Specimen: Blood   Result Value Ref Range    Glucose 89 65 - 99 mg/dL    BUN 10.0 8.0 - 23.0 mg/dL    Creatinine 0.77 0.57 - 1.00 mg/dL    Sodium 136 136 - 145 mmol/L    Potassium 4.2 3.5 - 5.2 mmol/L    Chloride 101 98 - 107 mmol/L    CO2 29.0 22.0 - 29.0 mmol/L    Calcium 9.2 8.6 - 10.5 mg/dL     BUN/Creatinine Ratio 13.0 7.0 - 25.0    Anion Gap 6.0 5.0 - 15.0 mmol/L    eGFR 79.1 >60.0 mL/min/1.73       IMPRESSION:  Patient is a 80 y.o. female with right knee primary osteoarthritis, severe, with subsequent fall and contusion and exacerbation of her underlying arthritis    PLAN:   Admited to: Anuj Stephens MD  CT scan was performed yesterday and shows no occult fracture.  There is likely exacerbation of her underlying severe arthritis.  She can weight-bear as tolerated.  Okay to work with PT/OT.  She is on Eliquis, but would normally recommend Tylenol and NSAIDs if can medically tolerate.  If cannot take NSAIDs would recommend Voltaren topical gel.  Follow-up in the office outpatient if this does not improve.  Patient does say that with her complications of her left knee replacement she would really like to try to avoid a knee replacement if possible, I completely understand.  Please call [phone] to make an appointment.    Nabil Gonzalez MD  [facility]  Orthopaedic Surgery, Sports Medicine  [phone]

## 2025-07-19 NOTE — PLAN OF CARE
Goal Outcome Evaluation:  Plan of Care Reviewed With: patient           Outcome Evaluation: pt alert and orient X4, RA, SR on the monitor, vss, out put recorded, no complain of pain, seizure precaution is in place, possible dc today

## 2025-07-19 NOTE — PLAN OF CARE
Goal Outcome Evaluation:         Aox4, 1L when sleeping, assistx2 with walker to chair. Diclofenac gel applied to knee. Call light within reach, bed alarm on.

## 2025-07-19 NOTE — PROGRESS NOTES
Name: Mariela Ruiz ADMIT: 7/15/2025   : 1945  PCP: Duglas Rock MD    MRN: [id number] LOS: 3 days   AGE/SEX: 80 y.o. female  ROOM: [facility]     Subjective   Subjective   No acute events. Patient denies new complaints. Overall feels about the same. No family at bedside.     Objective   Objective   Vital Signs  Temp:  [97.5 °F (36.4 °C)-98.4 °F (36.9 °C)] 98.4 °F (36.9 °C)  Heart Rate:  [75-85] 85  Resp:  [18] 18  BP: (100-111)/(46-61) 100/51  SpO2:  [92 %-98 %] 96 %  on  Flow (L/min) (Oxygen Therapy):  [2] 2;   Device (Oxygen Therapy): nasal cannula  Body mass index is 26.3 kg/m².  Physical Exam  Vitals and nursing note reviewed.   Constitutional:       General: She is not in acute distress.     Appearance: She is not toxic-appearing or diaphoretic.   HENT:      Head: Normocephalic and atraumatic.      Nose: Nose normal.      Mouth/Throat:      Mouth: Mucous membranes are moist.      Pharynx: Oropharynx is clear.   Eyes:      Conjunctiva/sclera: Conjunctivae normal.      Pupils: Pupils are equal, round, and reactive to light.   Cardiovascular:      Rate and Rhythm: Normal rate.      Pulses: Normal pulses.   Pulmonary:      Effort: Pulmonary effort is normal.      Breath sounds: Normal breath sounds.   Abdominal:      General: Bowel sounds are normal. There is no distension.      Palpations: Abdomen is soft.      Tenderness: There is no abdominal tenderness.   Musculoskeletal:         General: Swelling (1-2+ BLE) and tenderness (bilateral knees) present.      Cervical back: Neck supple.      Comments: BLE wrapped   Skin:     Capillary Refill: Capillary refill takes less than 2 seconds.      Findings: No erythema.   Neurological:      General: No focal deficit present.      Mental Status: She is alert.   Psychiatric:         Mood and Affect: Mood normal.         Behavior: Behavior normal.       Results Review     I reviewed the patient's new clinical results.  Results from last 7 days   Lab Units  "07/16/25  0952 07/15/25  0616 07/15/25  0308   WBC 10*3/mm3 5.16 5.09 4.87   HEMOGLOBIN g/dL 10.8* 10.0* 10.6*   PLATELETS 10*3/mm3 288 260 293     Results from last 7 days   Lab Units 07/19/25  0627 07/18/25  0634 07/17/25  0631 07/16/25  0952   SODIUM mmol/L 136 139 138 139  139   POTASSIUM mmol/L 4.2 4.4 4.2 3.9  3.9   CHLORIDE mmol/L 101 103 102 104  104   CO2 mmol/L 29.0 28.3 27.0 25.3  25.3   BUN mg/dL 10.0 11.0 11.0 11.0  11.0   CREATININE mg/dL 0.77 0.68 0.67 0.97  0.97   GLUCOSE mg/dL 89 83 92 115*  115*   EGFR mL/min/1.73 79.1 89.3 89.6 59.9*  59.9*     Results from last 7 days   Lab Units 07/16/25  0952 07/15/25  0308   ALBUMIN g/dL 2.5* 3.2*   BILIRUBIN mg/dL 0.4 0.6   ALK PHOS U/L 113 131*   AST (SGOT) U/L 16 15   ALT (SGPT) U/L 8 8     Results from last 7 days   Lab Units 07/19/25  0627 07/18/25  0634 07/17/25  0631 07/16/25  0952 07/15/25  0308   CALCIUM mg/dL 9.2 9.5 9.3 9.1  9.1 9.5   ALBUMIN g/dL  --   --   --  2.5* 3.2*     Results from last 7 days   Lab Units 07/15/25  0433   PROCALCITONIN ng/mL 0.10     No results found for: \"HGBA1C\", \"POCGLU\"    CT Lower Extremity Right Without Contrast  Result Date: 7/18/2025   1. Diffuse osteopenia, without CT evidence of acute fracture. 2. Tricompartmental DJD of the knee, most severe in the medial and patellofemoral compartments, with chronic appearing nondisplaced subchondral insufficiency type fracture deformity of the weightbearing portions of the medial femoral condyle and medial tibial plateau. 3. Small knee joint effusion with diffuse chondrocalcinosis at the knee and an osteochondral body in the posterior medial knee joint space. 4. Patchy soft tissue edema and skin thickening, greatest laterally and posteriorly, with a thin multilobulated popliteal cyst.  This report was finalized on 7/18/2025 2:47 PM by Thor Werner MD on Workstation: [id number]        I have personally reviewed all medications:  Scheduled Medications  ammonium lactate, " 1 Application, Topical, BID  apixaban, 5 mg, Oral, Q12H  furosemide, 20 mg, Oral, Once per day on Monday Wednesday Friday  hydrocortisone-bacitracin-zinc oxide-nystatin, 1 Application, Topical, TID  levothyroxine, 50 mcg, Oral, Q AM  melatonin, 5 mg, Oral, Nightly  PHENobarbital, 129.6 mg, Oral, Nightly  pramipexole, 2.5 mg, Oral, Nightly  pravastatin, 40 mg, Oral, Nightly  sodium chloride, 10 mL, Intravenous, Q12H    Infusions   Diet  Diet: Cardiac; Healthy Heart (2-3 Na+); Fluid Consistency: Thin (IDDSI 0)    I have personally reviewed:  [x]  Laboratory   []  Microbiology   [x]  Radiology   [x]  EKG/Telemetry  [x]  Cardiology/Vascular   []  Pathology    [x]  Records    Assessment/Plan     Active Hospital Problems    Diagnosis  POA    **Bilateral lower extremity edema [R60.0]  Yes    Bilateral edema of lower extremity [R60.0]  Yes    Saddle pulmonary embolus [I26.92]  Yes    Chronic venous hypertension with inflammation involving both sides [I87.323]  Yes    Lymphedema [I89.0]  Yes    Hypothyroidism, unspecified [E03.9]  Yes    Gastroesophageal reflux disease [K21.9]  Yes    Restless legs syndrome [G25.81]  Yes    Seizure disorder [G40.909]  Yes    Venous (peripheral) insufficiency [I87.2]  Yes    Sleep apnea [G47.30]  Yes    History of partial thyroidectomy [E89.0]  Yes      Resolved Hospital Problems   No resolved problems to display.   BLE Weakness  - likely multifactorial, mainly from deconditioning, BLE edema, and arthritis  - appreciate neurology evaluation, no further workup needed at this time  - continue to address BLE edema, start voltaren gel for bilateral knee arthritis-appreciate orthopedic surgery evaluation  - PT/OT, will likely need rehab    Chronic Diastolic CHF  - appears close to euvolemic  - continue lasix 20mg thrice weekly  - monitor volume status    Seizure disorder  - controlled  - continue phenobarbital    History of PE  - on eliquis    Hypothyroidism  - continue levothyroxine      Eliquis  (home med) for DVT prophylaxis.  Full code.  Discussed with patient and nursing staff.  Anticipate discharge to SNU facility once arrangements have been made.  Expected Discharge Date: 7/19/2025; Expected Discharge Time:       Obey Christopher MD  [facility]  07/19/25  15:54 EDT

## 2025-07-20 LAB
ANION GAP SERPL CALCULATED.3IONS-SCNC: 8.3 MMOL/L (ref 5–15)
BUN SERPL-MCNC: 10 MG/DL (ref 8–23)
BUN/CREAT SERPL: 14.3 (ref 7–25)
CALCIUM SPEC-SCNC: 9.6 MG/DL (ref 8.6–10.5)
CHLORIDE SERPL-SCNC: 101 MMOL/L (ref 98–107)
CO2 SERPL-SCNC: 28.7 MMOL/L (ref 22–29)
CREAT SERPL-MCNC: 0.7 MG/DL (ref 0.57–1)
EGFRCR SERPLBLD CKD-EPI 2021: 88.7 ML/MIN/1.73
GLUCOSE SERPL-MCNC: 85 MG/DL (ref 65–99)
POTASSIUM SERPL-SCNC: 4.6 MMOL/L (ref 3.5–5.2)
SODIUM SERPL-SCNC: 138 MMOL/L (ref 136–145)

## 2025-07-20 PROCEDURE — 80048 BASIC METABOLIC PNL TOTAL CA: CPT | Performed by: HOSPITALIST

## 2025-07-20 RX ADMIN — Medication 1 APPLICATION: at 21:14

## 2025-07-20 RX ADMIN — NYSTATIN 1 APPLICATION: 100000 OINTMENT TOPICAL at 21:12

## 2025-07-20 RX ADMIN — DICLOFENAC SODIUM 1 APPLICATION: 30 GEL TOPICAL at 17:42

## 2025-07-20 RX ADMIN — NYSTATIN 1 APPLICATION: 100000 OINTMENT TOPICAL at 09:15

## 2025-07-20 RX ADMIN — DICLOFENAC SODIUM 1 APPLICATION: 30 GEL TOPICAL at 21:14

## 2025-07-20 RX ADMIN — Medication 10 ML: at 09:15

## 2025-07-20 RX ADMIN — HYDROXYZINE HYDROCHLORIDE 25 MG: 25 TABLET, FILM COATED ORAL at 21:11

## 2025-07-20 RX ADMIN — APIXABAN 5 MG: 5 TABLET, FILM COATED ORAL at 21:12

## 2025-07-20 RX ADMIN — DICLOFENAC SODIUM 1 APPLICATION: 30 GEL TOPICAL at 09:14

## 2025-07-20 RX ADMIN — ACETAMINOPHEN 650 MG: 325 TABLET, FILM COATED ORAL at 05:05

## 2025-07-20 RX ADMIN — PRAMIPEXOLE DIHYDROCHLORIDE 2.5 MG: 1 TABLET ORAL at 21:12

## 2025-07-20 RX ADMIN — Medication 10 ML: at 21:15

## 2025-07-20 RX ADMIN — Medication 5 MG: at 21:11

## 2025-07-20 RX ADMIN — APIXABAN 5 MG: 5 TABLET, FILM COATED ORAL at 09:14

## 2025-07-20 RX ADMIN — LEVOTHYROXINE SODIUM 50 MCG: 50 TABLET ORAL at 04:58

## 2025-07-20 RX ADMIN — Medication 1 APPLICATION: at 09:14

## 2025-07-20 RX ADMIN — PRAVASTATIN SODIUM 40 MG: 40 TABLET ORAL at 21:11

## 2025-07-20 NOTE — PLAN OF CARE
"Goal Outcome Evaluation:  Plan of Care Reviewed With: patient        Progress: no change  Outcome Evaluation:     VSS. SR on telemetry. Room air when awake. 2L via n/c while sleeping due to sats dropping into the 80's.     A&O x4.   Very anxious, frequently seeking out staff with multiple complaints relating to her comfort.     She obsessivly calls out stating that she is \"wet\"  when checked, she has been dry every time.     She c/o itching to her buttocks with no obvious skin irritation to places she is pointing to or rubbing at.   Washed / dried and applied magic barrier for comfort.   Atarax given for itching.   pt denies any improvement.       Complains \"something is scratching and poking my buttcrack\".   When assessed there is nothing there.   She does have a purewick on, which could be what she is feeling; however, it is no where near where she is describing.   Offered to removed the purewick.   She refused.       Complains of itching to Right AC.   ointment applied.         Will continue to monitor.                             "

## 2025-07-20 NOTE — PROGRESS NOTES
Name: Mariela Ruiz ADMIT: 7/15/2025   : 1945  PCP: Duglas Rock MD    MRN: [id number] LOS: 4 days   AGE/SEX: 80 y.o. female  ROOM: [facility]     Subjective   Subjective   No acute events. Patient denies new complaints. Overall feels better. No family at bedside.     Objective   Objective   Vital Signs  Temp:  [97.2 °F (36.2 °C)-98.2 °F (36.8 °C)] 97.2 °F (36.2 °C)  Heart Rate:  [76-91] 83  Resp:  [16-18] 16  BP: (108-114)/(50-61) 108/50  SpO2:  [82 %-100 %] 100 %  on  Flow (L/min) (Oxygen Therapy):  [2] 2;   Device (Oxygen Therapy): nasal cannula  Body mass index is 26.3 kg/m².  Physical Exam  Vitals and nursing note reviewed.   Constitutional:       General: She is not in acute distress.     Appearance: She is not toxic-appearing or diaphoretic.   HENT:      Head: Normocephalic and atraumatic.      Nose: Nose normal.      Mouth/Throat:      Mouth: Mucous membranes are moist.      Pharynx: Oropharynx is clear.   Eyes:      Conjunctiva/sclera: Conjunctivae normal.      Pupils: Pupils are equal, round, and reactive to light.   Cardiovascular:      Rate and Rhythm: Normal rate.      Pulses: Normal pulses.   Pulmonary:      Effort: Pulmonary effort is normal.      Breath sounds: Normal breath sounds.   Abdominal:      General: Bowel sounds are normal. There is no distension.      Palpations: Abdomen is soft.      Tenderness: There is no abdominal tenderness.   Musculoskeletal:         General: Swelling (trace BLE) and tenderness (bilateral knees) present.      Cervical back: Neck supple.      Comments: BLE wrapped   Skin:     Capillary Refill: Capillary refill takes less than 2 seconds.      Findings: No erythema.   Neurological:      General: No focal deficit present.      Mental Status: She is alert.   Psychiatric:         Mood and Affect: Mood normal.         Behavior: Behavior normal.       Results Review     I reviewed the patient's new clinical results.  Results from last 7 days   Lab Units  "07/16/25  0952 07/15/25  0616 07/15/25  0308   WBC 10*3/mm3 5.16 5.09 4.87   HEMOGLOBIN g/dL 10.8* 10.0* 10.6*   PLATELETS 10*3/mm3 288 260 293     Results from last 7 days   Lab Units 07/20/25  0537 07/19/25  0627 07/18/25  0634 07/17/25  0631   SODIUM mmol/L 138 136 139 138   POTASSIUM mmol/L 4.6 4.2 4.4 4.2   CHLORIDE mmol/L 101 101 103 102   CO2 mmol/L 28.7 29.0 28.3 27.0   BUN mg/dL 10.0 10.0 11.0 11.0   CREATININE mg/dL 0.70 0.77 0.68 0.67   GLUCOSE mg/dL 85 89 83 92   EGFR mL/min/1.73 88.7 79.1 89.3 89.6     Results from last 7 days   Lab Units 07/16/25  0952 07/15/25  0308   ALBUMIN g/dL 2.5* 3.2*   BILIRUBIN mg/dL 0.4 0.6   ALK PHOS U/L 113 131*   AST (SGOT) U/L 16 15   ALT (SGPT) U/L 8 8     Results from last 7 days   Lab Units 07/20/25  0537 07/19/25  0627 07/18/25  0634 07/17/25  0631 07/16/25  0952 07/15/25  0308   CALCIUM mg/dL 9.6 9.2 9.5 9.3 9.1  9.1 9.5   ALBUMIN g/dL  --   --   --   --  2.5* 3.2*     Results from last 7 days   Lab Units 07/15/25  0433   PROCALCITONIN ng/mL 0.10     No results found for: \"HGBA1C\", \"POCGLU\"    No radiology results for the last day      I have personally reviewed all medications:  Scheduled Medications  ammonium lactate, 1 Application, Topical, BID  apixaban, 5 mg, Oral, Q12H  Diclofenac Sodium, 1 Application, Topical, TID  furosemide, 20 mg, Oral, Once per day on Monday Wednesday Friday  hydrocortisone-bacitracin-zinc oxide-nystatin, 1 Application, Topical, TID  levothyroxine, 50 mcg, Oral, Q AM  melatonin, 5 mg, Oral, Nightly  pramipexole, 2.5 mg, Oral, Nightly  pravastatin, 40 mg, Oral, Nightly  sodium chloride, 10 mL, Intravenous, Q12H    Infusions   Diet  Diet: Cardiac; Healthy Heart (2-3 Na+); Fluid Consistency: Thin (IDDSI 0)    I have personally reviewed:  [x]  Laboratory   []  Microbiology   []  Radiology   [x]  EKG/Telemetry  []  Cardiology/Vascular   []  Pathology    []  Records    Assessment/Plan     Active Hospital Problems    Diagnosis  POA    " **Bilateral lower extremity edema [R60.0]  Yes    Bilateral edema of lower extremity [R60.0]  Yes    Saddle pulmonary embolus [I26.92]  Yes    Chronic venous hypertension with inflammation involving both sides [I87.323]  Yes    Lymphedema [I89.0]  Yes    Hypothyroidism, unspecified [E03.9]  Yes    Gastroesophageal reflux disease [K21.9]  Yes    Restless legs syndrome [G25.81]  Yes    Seizure disorder [G40.909]  Yes    Venous (peripheral) insufficiency [I87.2]  Yes    Sleep apnea [G47.30]  Yes    History of partial thyroidectomy [E89.0]  Yes      Resolved Hospital Problems   No resolved problems to display.   BLE Weakness  - likely multifactorial, mainly from deconditioning, BLE edema, and arthritis  - appreciate neurology evaluation, no further workup needed at this time  - continue to address BLE edema, continue voltaren gel for bilateral knee arthritis-appreciate orthopedic surgery evaluation  - PT/OT, will likely need rehab    Chronic Diastolic CHF  - appears close to euvolemic  - continue lasix 20mg thrice weekly  - monitor volume status    Seizure disorder  - controlled  - continue phenobarbital    History of PE  - on eliquis    Hypothyroidism  - continue levothyroxine      Eliquis (home med) for DVT prophylaxis.  Full code.  Discussed with patient and nursing staff.  Anticipate discharge to SNU facility once arrangements have been made.  Expected Discharge Date: 7/19/2025; Expected Discharge Time:       Obey Christopher MD  [facility]  07/20/25  15:36 EDT    Portions of this text have been copied and I have reviewed them. They are accurate as of 7/20/2025

## 2025-07-20 NOTE — PLAN OF CARE
Goal Outcome Evaluation:      Aox4, RA, 2L NC to sleep. Assistx2 to stand and pivot. Call light within reach, bed alarm on.

## 2025-07-20 NOTE — PROGRESS NOTES
[facility] Progress Note      Patient Name: Mariela Ruiz  : 1945  MRN: [id number]  Primary Care Physician:  Duglas Rock MD  Date of admission: 7/15/2025    Subjective     Interval History:   Weak, no dizziness    Review of Systems:   14 point review of systems is otherwise negative except for mentioned above on HPI    Objective     Vitals:   Temp:  [97.7 °F (36.5 °C)-98.4 °F (36.9 °C)] 97.7 °F (36.5 °C)  Heart Rate:  [76-91] 76  Resp:  [16-18] 16  BP: (100-114)/(51-61) 114/61  Flow (L/min) (Oxygen Therapy):  [2] 2    Intake/Output Summary (Last 24 hours) at 2025 09  Last data filed at 2025 0500  Gross per 24 hour   Intake 60 ml   Output 1900 ml   Net -1840 ml       Physical Exam:    General Appearance: alert, oriented x 3, no acute distress   Skin: warm and dry  HEENT: oral mucosa normal, nonicteric sclera  Neck: supple, no JVD  Lungs: CTA  Heart: RRR, normal S1 and S2  Abdomen: soft, nontender, nondistended  : no palpable bladder  Extremities: 3+ edema, no cyanosis or clubbing  Neuro: normal speech and mental status     Scheduled Meds:     ammonium lactate, 1 Application, Topical, BID  apixaban, 5 mg, Oral, Q12H  Diclofenac Sodium, 1 Application, Topical, TID  furosemide, 20 mg, Oral, Once per day on   hydrocortisone-bacitracin-zinc oxide-nystatin, 1 Application, Topical, TID  levothyroxine, 50 mcg, Oral, Q AM  melatonin, 5 mg, Oral, Nightly  pramipexole, 2.5 mg, Oral, Nightly  pravastatin, 40 mg, Oral, Nightly  sodium chloride, 10 mL, Intravenous, Q12H      IV Meds:        Results Reviewed:   I have personally reviewed the results from the time of this admission to 2025 09:26 EDT     Results from last 7 days   Lab Units 25  0537 25  0627 25  0634 25  0631 25  0952 07/15/25  0308   SODIUM mmol/L 138 136 139   < > 139  139 140   POTASSIUM mmol/L 4.6 4.2 4.4   < > 3.9  3.9 3.5   CHLORIDE mmol/L  101 101 103   < > 104  104 107   CO2 mmol/L 28.7 29.0 28.3   < > 25.3  25.3 26.1   BUN mg/dL 10.0 10.0 11.0   < > 11.0  11.0 10.0   CREATININE mg/dL 0.70 0.77 0.68   < > 0.97  0.97 0.95   CALCIUM mg/dL 9.6 9.2 9.5   < > 9.1  9.1 9.5   BILIRUBIN mg/dL  --   --   --   --  0.4 0.6   ALK PHOS U/L  --   --   --   --  113 131*   ALT (SGPT) U/L  --   --   --   --  8 8   AST (SGOT) U/L  --   --   --   --  16 15   GLUCOSE mg/dL 85 89 83   < > 115*  115* 98    < > = values in this interval not displayed.     Estimated Creatinine Clearance: 72.9 mL/min (by C-G formula based on SCr of 0.7 mg/dL).          Results from last 7 days   Lab Units 07/16/25  0952 07/15/25  0616 07/15/25  0308   WBC 10*3/mm3 5.16 5.09 4.87   HEMOGLOBIN g/dL 10.8* 10.0* 10.6*   PLATELETS 10*3/mm3 288 260 293           Assessment / Plan     ASSESSMENT:  Chronic BLE lymphedema.  Vague initial CXR raised question of pulm edema but BNP normal and no dyspnea.  Alb also low 3.2.  Responding very well to current lasix regimen  BLE weakness - neurology eval noted.  No e/o radiculopathy  Seizure disorder   Hx PE on AC  Hypothyroidism, treated  Dyslipidemia, on statin      PLAN:  Dc is ok with me on present diuretic regimen  Continue leg wraps and outpatient lymphedema clinic        Thank you for involving us in the care of Mariela Ruiz.  Please feel free to call with any questions.     Carlo Hampton MD  07/18/25  08:28 EDT     [facility]  [phone]             Thank you for involving us in the care of Mariela Ruiz.  Please feel free to call with any questions.    Christiano Hunt MD  07/20/25  09:26 EDT    [facility]  [phone]

## 2025-07-21 LAB
ANION GAP SERPL CALCULATED.3IONS-SCNC: 6.4 MMOL/L (ref 5–15)
BUN SERPL-MCNC: 15 MG/DL (ref 8–23)
BUN/CREAT SERPL: 17.6 (ref 7–25)
CALCIUM SPEC-SCNC: 9.9 MG/DL (ref 8.6–10.5)
CHLORIDE SERPL-SCNC: 100 MMOL/L (ref 98–107)
CO2 SERPL-SCNC: 29.6 MMOL/L (ref 22–29)
CREAT SERPL-MCNC: 0.85 MG/DL (ref 0.57–1)
EGFRCR SERPLBLD CKD-EPI 2021: 70.2 ML/MIN/1.73
GLUCOSE SERPL-MCNC: 93 MG/DL (ref 65–99)
METHYLMALONATE SERPL-SCNC: 215 NMOL/L (ref 0–378)
POTASSIUM SERPL-SCNC: 5 MMOL/L (ref 3.5–5.2)
SODIUM SERPL-SCNC: 136 MMOL/L (ref 136–145)

## 2025-07-21 PROCEDURE — 97530 THERAPEUTIC ACTIVITIES: CPT

## 2025-07-21 PROCEDURE — 29581 APPL MULTLAYER CMPRN SYS LEG: CPT

## 2025-07-21 PROCEDURE — 97535 SELF CARE MNGMENT TRAINING: CPT

## 2025-07-21 PROCEDURE — 80048 BASIC METABOLIC PNL TOTAL CA: CPT | Performed by: HOSPITALIST

## 2025-07-21 RX ADMIN — HYDROXYZINE HYDROCHLORIDE 25 MG: 25 TABLET, FILM COATED ORAL at 21:17

## 2025-07-21 RX ADMIN — Medication 10 ML: at 20:24

## 2025-07-21 RX ADMIN — DICLOFENAC SODIUM 1 APPLICATION: 30 GEL TOPICAL at 20:24

## 2025-07-21 RX ADMIN — HYDROXYZINE HYDROCHLORIDE 25 MG: 25 TABLET, FILM COATED ORAL at 14:06

## 2025-07-21 RX ADMIN — APIXABAN 5 MG: 5 TABLET, FILM COATED ORAL at 20:23

## 2025-07-21 RX ADMIN — PRAMIPEXOLE DIHYDROCHLORIDE 2.5 MG: 1 TABLET ORAL at 20:25

## 2025-07-21 RX ADMIN — ACETAMINOPHEN 650 MG: 325 TABLET, FILM COATED ORAL at 21:17

## 2025-07-21 RX ADMIN — Medication 10 ML: at 10:06

## 2025-07-21 RX ADMIN — Medication 1 APPLICATION: at 10:06

## 2025-07-21 RX ADMIN — APIXABAN 5 MG: 5 TABLET, FILM COATED ORAL at 10:04

## 2025-07-21 RX ADMIN — DICLOFENAC SODIUM 1 APPLICATION: 30 GEL TOPICAL at 18:22

## 2025-07-21 RX ADMIN — NYSTATIN 1 APPLICATION: 100000 OINTMENT TOPICAL at 10:06

## 2025-07-21 RX ADMIN — PRAVASTATIN SODIUM 40 MG: 40 TABLET ORAL at 20:23

## 2025-07-21 RX ADMIN — NYSTATIN 1 APPLICATION: 100000 OINTMENT TOPICAL at 20:25

## 2025-07-21 RX ADMIN — DICLOFENAC SODIUM 1 APPLICATION: 30 GEL TOPICAL at 10:06

## 2025-07-21 RX ADMIN — LEVOTHYROXINE SODIUM 50 MCG: 50 TABLET ORAL at 06:55

## 2025-07-21 RX ADMIN — NYSTATIN 1 APPLICATION: 100000 OINTMENT TOPICAL at 14:06

## 2025-07-21 RX ADMIN — Medication 1 APPLICATION: at 20:25

## 2025-07-21 NOTE — PROGRESS NOTES
[facility] Progress Note      Patient Name: Mariela Ruiz  : 1945  MRN: [id number]  Primary Care Physician:  Duglas Rock MD  Date of admission: 7/15/2025    Subjective     Interval History:   F/u vol excess    Review of Systems:   No c/o   Asks when going to rehab     Objective     Vitals:   Temp:  [97.7 °F (36.5 °C)-97.8 °F (36.6 °C)] 97.8 °F (36.6 °C)  Heart Rate:  [80-92] 92  Resp:  [16] 16  BP: ()/(42-57) 109/42    Intake/Output Summary (Last 24 hours) at 2025 1532  Last data filed at 2025 0350  Gross per 24 hour   Intake --   Output 600 ml   Net -600 ml       Physical Exam:    General Appearance: Pleasant WF, NAD  Skin: warm and dry  HEENT: oral mucosa normal, nonicteric sclera  Neck: supple, no JVD  Lungs: CTA, no rales  Heart: RRR, normal S1 and S2  Abdomen: soft, nontender, nondistended  : no palpable bladder  Extremities: 2+ edema, legs wrapped, no cyanosis or clubbing  Neuro: normal speech and mental status     Scheduled Meds:     ammonium lactate, 1 Application, Topical, BID  apixaban, 5 mg, Oral, Q12H  Diclofenac Sodium, 1 Application, Topical, TID  furosemide, 20 mg, Oral, Once per day on   hydrocortisone-bacitracin-zinc oxide-nystatin, 1 Application, Topical, TID  levothyroxine, 50 mcg, Oral, Q AM  melatonin, 5 mg, Oral, Nightly  pramipexole, 2.5 mg, Oral, Nightly  pravastatin, 40 mg, Oral, Nightly  sodium chloride, 10 mL, Intravenous, Q12H      IV Meds:        Results Reviewed:   I have personally reviewed the results from the time of this admission to 2025 15:32 EDT     Results from last 7 days   Lab Units 25  0528 25  0537 25  0627 25  0631 25  0952 07/15/25  0308   SODIUM mmol/L 136 138 136   < > 139  139 140   POTASSIUM mmol/L 5.0 4.6 4.2   < > 3.9  3.9 3.5   CHLORIDE mmol/L 100 101 101   < > 104  104 107   CO2 mmol/L 29.6* 28.7 29.0   < > 25.3  25.3 26.1   BUN mg/dL 15.0  10.0 10.0   < > 11.0  11.0 10.0   CREATININE mg/dL 0.85 0.70 0.77   < > 0.97  0.97 0.95   CALCIUM mg/dL 9.9 9.6 9.2   < > 9.1  9.1 9.5   BILIRUBIN mg/dL  --   --   --   --  0.4 0.6   ALK PHOS U/L  --   --   --   --  113 131*   ALT (SGPT) U/L  --   --   --   --  8 8   AST (SGOT) U/L  --   --   --   --  16 15   GLUCOSE mg/dL 93 85 89   < > 115*  115* 98    < > = values in this interval not displayed.     Estimated Creatinine Clearance: 60 mL/min (by C-G formula based on SCr of 0.85 mg/dL).          Results from last 7 days   Lab Units 07/16/25  0952 07/15/25  0616 07/15/25  0308   WBC 10*3/mm3 5.16 5.09 4.87   HEMOGLOBIN g/dL 10.8* 10.0* 10.6*   PLATELETS 10*3/mm3 288 260 293           Assessment / Plan     ASSESSMENT:  Chronic BLE lymphedema.  Vague initial CXR raised question of pulm edema but BNP normal and no dyspnea.  Alb also low 3.2.  On low dose lasix 20mg MWF, hypotension limiting factor  Hypotension - SBP 90s to low 100s  BLE weakness - neurology eval noted.  No e/o radiculopathy  Seizure disorder   Hx PE on AC  Hypothyroidism, treated  Dyslipidemia, on statin      PLAN:  Continue lasix 20 mg MWF  Awaits rehab placement       Thank you for involving us in the care of Mariela Ruiz.  Please feel free to call with any questions.     Carlo Hampton MD  07/18/25  08:28 EDT     [facility]  [phone]             Thank you for involving us in the care of Mairela Ruiz.  Please feel free to call with any questions.    Carlo Hampton MD  07/21/25  15:32 EDT    [facility]  [phone]

## 2025-07-21 NOTE — PLAN OF CARE
Goal Outcome Evaluation:  Plan of Care Reviewed With: patient        Progress: no change  Outcome Evaluation: pt seen this date to address both orders for OT including lymph wrappings. RN ok'd wrapping, pt agreeable as well. she reports no concerns or skin breakdown concerns with wrapping, no discomfort once donned again this date. Skin assessed and intact. She reports no concerns once donned wrapped. She reports noted decrease in swelling. In regards to OT, she requires total A for LBD task this date, increassed time to come to EOB due to anxiety with overall transfer. she stood with min A, initial posterior leaning demo'd, able to complete short distance to chair with mostly CGA using rwx and consistent cues for safety required. she will continue to benefit from skilled OT to address deficits and progress toward goals, rec SNF    Anticipated Discharge Disposition (OT): skilled nursing facility

## 2025-07-21 NOTE — PROGRESS NOTES
Continued Stay Note  [facility]     Patient Name: Mariela Ruiz  MRN: [id number]  Today's Date: 7/21/2025    Admit Date: 7/15/2025    Plan: [facility] - referral pending   Discharge Plan       Row Name 07/21/25 1433       Plan    Plan [facility] - referral pending    Plan Comments Message to [facility] -waiting to see if bed is available.  CCP following.  Cindy PAULINO                 Expected Discharge Date and Time       Expected Discharge Date Expected Discharge Time    Jul 22, 2025               Becky S. [place], RN

## 2025-07-21 NOTE — THERAPY TREATMENT NOTE
Acute Care - Occupational Therapy Lymphedema Treatment Note  [facility]     Patient Name: Mariela Ruiz  : 1945  MRN: [id number]  Today's Date: 2025             Admit Date: 7/15/2025       ICD-10-CM ICD-9-CM   1. Bilateral lower extremity edema  R60.0 782.3   2. Falls  R29.6 V15.88     Patient Active Problem List   Diagnosis    Seizure disorder    Hyperlipidemia    Vitamin D insufficiency    Age-related osteoporosis without current pathological fracture    Sleep apnea    Venous (peripheral) insufficiency    Postsurgical hypothyroidism    Chronic fatigue syndrome    Gastroesophageal reflux disease    Dupuytren's contracture    History of biliary T-tube placement    History of partial thyroidectomy    Multinodular goiter    Restless legs syndrome    History of cardiac catheterization    Urge incontinence of urine    Left knee pain    Ligamentous laxity of knee    DJD (degenerative joint disease) of knee    Adverse drug effect, subsequent encounter    Abnormal blood level of chromium    Abnormal blood level of cobalt    Family history of diabetes mellitus    Thrombocytopenia    .    S/P revision of total hip    Enlarged thyroid gland    Palmar fascial fibromatosis (dupuytren)    Hypothyroidism, unspecified    Hyperlipidemia, unspecified    Acute cholecystitis    Elevated troponin    Vitamin B12 deficiency    Pernicious anemia    Lymphedema    Venous stasis    Chronic venous hypertension with inflammation involving both sides    Periprosthetic hip fracture    Ureterolithiasis    Hydronephrosis    Multiple thyroid nodules    Saddle pulmonary embolus    Bilateral lower extremity edema    Bilateral edema of lower extremity     Past Medical History:   Diagnosis Date    Arthritis     Chronic venous insufficiency     Dupuytren's contracture     History of staph infection     S/P KNEE DEC 2016    History of transfusion     Hyperlipidemia     Lymphedema     LEFT LEG    Nontoxic multinodular goiter      Osteoporosis     Dr. Cabrera    Pelvic joint pain, left     Postsurgical hypothyroidism     Presence of left artificial hip joint     METAL ON METAL AS NOTED PER DR CLEMENT NOTE    Restless leg syndrome     Right bundle branch block     Seizure disorder     Dr. Whitman, HX 1980 LAST SEIZURE    Sleep apnea     DOES NOT HAVE MACHINE    Vitamin D insufficiency      Past Surgical History:   Procedure Laterality Date    APPENDECTOMY      CARDIAC CATHETERIZATION      NORMAL     CARDIAC CATHETERIZATION  4/24/2025    Procedure: Percutaneous Manual Thrombectomy;  Surgeon: Sourav Kennedy MD;  Location: [facility] CATH INVASIVE LOCATION;  Service: Cardiovascular;;    CHOLECYSTECTOMY N/A 11/2/2024    Procedure: CHOLECYSTECTOMY LAPAROSCOPIC WITH DAVINCI ROBOT with cholangiogram, possible open;  Surgeon: Bin Heaton MD;  Location: [facility] MAIN OR;  Service: Robotics - DaVinci;  Laterality: N/A;    COLONOSCOPY  08/17/2017    Normal except for anal fissure.  Dr. Brandt.  [facility].    KNEE MINI REVISION Left 11/15/2016    Procedure: LEFT KNEE POLY CHANGE WITH HI POST STABILIZER;  Surgeon: Pablito Claudio MD;  Location: [facility] OR;  Service:     KNEE MINI REVISION Left 12/13/2016    Procedure: LT KNEE REMOVAL/REPLACEMENT LOCKING PIN ;  Surgeon: Pablito Claudio MD;  Location: [facility] OR;  Service:     MAMMO FINE NEEDLE ASPIRATION UNILATERAL      RIGHT THYROID NODULE, 2009 BENIGN     OK ARTHRP KNE CONDYLE&PLATU MEDIAL&LAT COMPARTMENTS Left 12/24/2016    Procedure: LEFT KNEE WASHOUT WITH POLY CHANGE;  Surgeon: Christiano Clement MD;  Location: [facility] OR;  Service: Orthopedics    OK REVJ TOTAL KNEE ARTHRP W/WO ALGRFT 1 COMPONENT Left 2/20/2017    Procedure: LT KNEE REMOVAL ANTIBIOTIC SPACER AND KNEE REVISION;  Surgeon: Christiano Clement MD;  Location: [facility] OR;  Service: Orthopedics    REPLACEMENT TOTAL KNEE Left     THYROIDECTOMY, PARTIAL      1979 LEFT LOBECTOMY AND ISTHMECTOMY     TOTAL ABDOMINAL HYSTERECTOMY  "WITH SALPINGO OOPHORECTOMY      TOTAL HIP ARTHROPLASTY Left     TOTAL HIP ARTHROPLASTY Right 9/14/2019    Procedure: TOTAL HIP ARTHROPLASTY ANTERIOR WITH HANA TABLE;  Surgeon: Christiano Cesar II, MD;  Location: [facility] MAIN OR;  Service: Orthopedics    TOTAL HIP ARTHROPLASTY REVISION Left 3/9/2021    Procedure: LEFT TOTAL HIP ARTHROPLASTY REVISION POSTERIOR;  Surgeon: Christiano Cesar II, MD;  Location: [facility] MAIN OR;  Service: Orthopedics;  Laterality: Left;    TOTAL HIP ARTHROPLASTY REVISION Left 3/8/2025    Procedure: TOTAL HIP ARTHROPLASTY REVISION;  Surgeon: Christiano Cesar II, MD;  Location: [facility] MAIN OR;  Service: Orthopedics;  Laterality: Left;    TOTAL KNEE  PROSTHESIS REMOVAL W/ SPACER INSERTION Left 12/29/2016    Procedure: LT KNEE IMPLANT REMOVAL WITH INSERTION OF SPACER ;  Surgeon: Christiano Cesar MD;  Location: [facility] MAIN OR;  Service:         Lymphedema       Row Name 07/21/25 1517             Subjective Pain    Able to rate subjective pain? yes  -MM      Pre-Treatment Pain Level 0  -MM      Recorded by [MM] Radha Cohen OT              Subjective    Subjective Comments pt denies concerns or issues with wrapping  -MM      Recorded by [MM] Radha Cohen OT              Posture/Observations    Observations --  decreased swelling noted in BLE  -MM      Recorded by [MM] Radha Cohen OT              Skin Changes/Observations    Location/Assessment Lower Extremity  -MM      Lower Extremity Conditions bilateral:;normal;intact;dry  no redness noted, pt reports \"itchy\" however no skin breakdown noted  -MM      Lower Extremity Color/Pigment normal  -MM      Recorded by [MM] Radha Cohen OT              Compression/Skin Care    Compression/Skin Care skin care;wrapping location;bandaging;compression garment  -MM      Skin Care washed/dried;lotion applied  -MM      Wrapping Location lower extremity  -MM      Wrapping Location LE bilateral:;foot to knee  -MM      Bandage Layers " cotton liner;padding/fluff layer;short-stretch bandages (comment size/quantity)  T9 cotton liner danish, artiflex (2) 10 and (2) 15 cm for danish LEs, Short stretch bandage rosidal (2) 8 cm and (2) 10 cm.  -MM      Bandaging Technique light compression;moderate compression;circumferential/spiral  -MM      Compression/Skin Care Comments pt tolerate well, no skin issues noted  -MM      Recorded by [MM] Radha Cohen OT                User Key  (r) = Recorded By, (t) = Taken By, (c) = Cosigned By      Initials Name Effective Dates    Radha Mendez OT 05/31/24 -                     OT ASSESSMENT FLOWSHEET (Last 12 Hours)       OT Evaluation and Treatment    No documentation.                          OT Recommendation and Plan                  Time Calculation:     Time Calculation- OT       Row Name 07/21/25 1519             Time Calculation- OT    OT Start Time 1323  -MM      OT Stop Time 1346  -MM      OT Time Calculation (min) 23 min  -MM      Total Timed Code Minutes- OT 23 minute(s)  -MM      OT Received On 07/21/25  -MM      OT - Next Appointment 07/22/25  -MM         Timed Charges    14233 - OT Self Care/Mgmt Minutes 8  -MM         Total Minutes    Timed Charges Total Minutes 8  -MM       Total Minutes 8  -MM                User Key  (r) = Recorded By, (t) = Taken By, (c) = Cosigned By      Initials Name Provider Type    Radha Mendez OT Occupational Therapist                    Therapy Charges for Today       Code Description Service Date Service Provider Modifiers Qty    93333335957 HC OT SELF CARE/MGMT/TRAIN EA 15 MIN 7/21/2025 Radha Cohen OT GO 1    [id number]  OT MULTI LAYER COMP SYS BELOW KNEE BILATERAL 7/21/2025 Radha Cohen OT  1                        Radha Cohen OT  7/21/2025

## 2025-07-21 NOTE — PLAN OF CARE
Goal Outcome Evaluation:  Plan of Care Reviewed With: patient           Outcome Evaluation: Pt participated with PT this PM. Pt agreeable to mobilize out of bed and reports she has not been given many opportunities to mobilize. However, once up, pt reporting R knee pain, complains of her back itching, and demo fear of falling. Pt generally anxious during session, requires reassurance and max cues to stay on task. She was able to stand wtih min A and ambulate 5' to the chair cga/min A using RW. Encouraged further distance however limited by anxiety. PT recommending SNF.    Anticipated Discharge Disposition (PT): skilled nursing facility

## 2025-07-21 NOTE — PROGRESS NOTES
Name: Mariela Ruiz ADMIT: 7/15/2025   : 1945  PCP: Duglas Rock MD    MRN: [id number] LOS: 5 days   AGE/SEX: 80 y.o. female  ROOM: [facility]     Subjective   Subjective   No acute events. Patient denies new complaints. No family at bedside.     Objective   Objective   Vital Signs  Temp:  [97.7 °F (36.5 °C)-97.8 °F (36.6 °C)] 97.8 °F (36.6 °C)  Heart Rate:  [80-92] 92  Resp:  [16] 16  BP: ()/(42-57) 109/42  SpO2:  [91 %-94 %] 94 %  on   ;   Device (Oxygen Therapy): room air  Body mass index is 26.3 kg/m².  Physical Exam  Vitals and nursing note reviewed.   Constitutional:       General: She is not in acute distress.     Appearance: She is not toxic-appearing or diaphoretic.   HENT:      Head: Normocephalic and atraumatic.      Nose: Nose normal.      Mouth/Throat:      Mouth: Mucous membranes are moist.      Pharynx: Oropharynx is clear.   Eyes:      Conjunctiva/sclera: Conjunctivae normal.      Pupils: Pupils are equal, round, and reactive to light.   Cardiovascular:      Rate and Rhythm: Normal rate.      Pulses: Normal pulses.   Pulmonary:      Effort: Pulmonary effort is normal.      Breath sounds: Normal breath sounds.   Abdominal:      General: Bowel sounds are normal. There is no distension.      Palpations: Abdomen is soft.      Tenderness: There is no abdominal tenderness.   Musculoskeletal:         General: Swelling (trace BLE) and tenderness (bilateral knees) present.      Cervical back: Neck supple.      Comments: BLE wrapped   Skin:     Capillary Refill: Capillary refill takes less than 2 seconds.      Findings: No erythema.   Neurological:      General: No focal deficit present.      Mental Status: She is alert.   Psychiatric:         Mood and Affect: Mood normal.         Behavior: Behavior normal.       Results Review     I reviewed the patient's new clinical results.  Results from last 7 days   Lab Units 25  0952 07/15/25  0616 07/15/25  0308   WBC 10*3/mm3 5.16 5.09 4.87  "  HEMOGLOBIN g/dL 10.8* 10.0* 10.6*   PLATELETS 10*3/mm3 288 260 293     Results from last 7 days   Lab Units 07/21/25  0528 07/20/25  0537 07/19/25  0627 07/18/25  0634   SODIUM mmol/L 136 138 136 139   POTASSIUM mmol/L 5.0 4.6 4.2 4.4   CHLORIDE mmol/L 100 101 101 103   CO2 mmol/L 29.6* 28.7 29.0 28.3   BUN mg/dL 15.0 10.0 10.0 11.0   CREATININE mg/dL 0.85 0.70 0.77 0.68   GLUCOSE mg/dL 93 85 89 83   EGFR mL/min/1.73 70.2 88.7 79.1 89.3     Results from last 7 days   Lab Units 07/16/25  0952 07/15/25  0308   ALBUMIN g/dL 2.5* 3.2*   BILIRUBIN mg/dL 0.4 0.6   ALK PHOS U/L 113 131*   AST (SGOT) U/L 16 15   ALT (SGPT) U/L 8 8     Results from last 7 days   Lab Units 07/21/25  0528 07/20/25  0537 07/19/25  0627 07/18/25  0634 07/17/25  0631 07/16/25  0952 07/15/25  0308   CALCIUM mg/dL 9.9 9.6 9.2 9.5   < > 9.1  9.1 9.5   ALBUMIN g/dL  --   --   --   --   --  2.5* 3.2*    < > = values in this interval not displayed.     Results from last 7 days   Lab Units 07/15/25  0433   PROCALCITONIN ng/mL 0.10     No results found for: \"HGBA1C\", \"POCGLU\"    No radiology results for the last day      I have personally reviewed all medications:  Scheduled Medications  ammonium lactate, 1 Application, Topical, BID  apixaban, 5 mg, Oral, Q12H  Diclofenac Sodium, 1 Application, Topical, TID  furosemide, 20 mg, Oral, Once per day on Monday Wednesday Friday  hydrocortisone-bacitracin-zinc oxide-nystatin, 1 Application, Topical, TID  levothyroxine, 50 mcg, Oral, Q AM  melatonin, 5 mg, Oral, Nightly  pramipexole, 2.5 mg, Oral, Nightly  pravastatin, 40 mg, Oral, Nightly  sodium chloride, 10 mL, Intravenous, Q12H    Infusions   Diet  Diet: Cardiac; Healthy Heart (2-3 Na+); Fluid Consistency: Thin (IDDSI 0)    I have personally reviewed:  [x]  Laboratory   []  Microbiology   []  Radiology   [x]  EKG/Telemetry  []  Cardiology/Vascular   []  Pathology    []  Records    Assessment/Plan     Active Hospital Problems    Diagnosis  POA    " **Bilateral lower extremity edema [R60.0]  Yes    Bilateral edema of lower extremity [R60.0]  Yes    Saddle pulmonary embolus [I26.92]  Yes    Chronic venous hypertension with inflammation involving both sides [I87.323]  Yes    Lymphedema [I89.0]  Yes    Hypothyroidism, unspecified [E03.9]  Yes    Gastroesophageal reflux disease [K21.9]  Yes    Restless legs syndrome [G25.81]  Yes    Seizure disorder [G40.909]  Yes    Venous (peripheral) insufficiency [I87.2]  Yes    Sleep apnea [G47.30]  Yes    History of partial thyroidectomy [E89.0]  Yes      Resolved Hospital Problems   No resolved problems to display.   BLE Weakness  - likely multifactorial, mainly from deconditioning, BLE edema, and arthritis  - appreciate neurology evaluation, no further workup needed at this time  - continue to address BLE edema, continue voltaren gel for bilateral knee arthritis-appreciate orthopedic surgery evaluation  - PT/OT, will likely need rehab    Chronic Diastolic CHF  - appears close to euvolemic  - continue lasix 20mg thrice weekly  - monitor volume status    Seizure disorder  - controlled  - continue phenobarbital    History of PE  - on eliquis    Hypothyroidism  - continue levothyroxine      Eliquis (home med) for DVT prophylaxis.  Full code.  Discussed with patient and nursing staff.  Anticipate discharge to SNU facility once arrangements have been made.  Expected Discharge Date: 7/22/2025; Expected Discharge Time:       Obey Christopher MD  [facility]  07/21/25  15:23 EDT    Portions of this text have been copied and I have reviewed them. They are accurate as of 7/21/2025

## 2025-07-21 NOTE — THERAPY TREATMENT NOTE
Patient Name: Mariela Ruiz  : 1945    MRN: [id number]                              Today's Date: 2025       Admit Date: 7/15/2025    Visit Dx:     ICD-10-CM ICD-9-CM   1. Bilateral lower extremity edema  R60.0 782.3   2. Falls  R29.6 V15.88     Patient Active Problem List   Diagnosis    Seizure disorder    Hyperlipidemia    Vitamin D insufficiency    Age-related osteoporosis without current pathological fracture    Sleep apnea    Venous (peripheral) insufficiency    Postsurgical hypothyroidism    Chronic fatigue syndrome    Gastroesophageal reflux disease    Dupuytren's contracture    History of biliary T-tube placement    History of partial thyroidectomy    Multinodular goiter    Restless legs syndrome    History of cardiac catheterization    Urge incontinence of urine    Left knee pain    Ligamentous laxity of knee    DJD (degenerative joint disease) of knee    Adverse drug effect, subsequent encounter    Abnormal blood level of chromium    Abnormal blood level of cobalt    Family history of diabetes mellitus    Thrombocytopenia    .    S/P revision of total hip    Enlarged thyroid gland    Palmar fascial fibromatosis (dupuytren)    Hypothyroidism, unspecified    Hyperlipidemia, unspecified    Acute cholecystitis    Elevated troponin    Vitamin B12 deficiency    Pernicious anemia    Lymphedema    Venous stasis    Chronic venous hypertension with inflammation involving both sides    Periprosthetic hip fracture    Ureterolithiasis    Hydronephrosis    Multiple thyroid nodules    Saddle pulmonary embolus    Bilateral lower extremity edema    Bilateral edema of lower extremity     Past Medical History:   Diagnosis Date    Arthritis     Chronic venous insufficiency     Dupuytren's contracture     History of staph infection     S/P KNEE DEC 2016    History of transfusion     Hyperlipidemia     Lymphedema     LEFT LEG    Nontoxic multinodular goiter     Osteoporosis     Dr. Cabrera    Pelvic joint pain,  left     Postsurgical hypothyroidism     Presence of left artificial hip joint     METAL ON METAL AS NOTED PER DR CLEMENT NOTE    Restless leg syndrome     Right bundle branch block     Seizure disorder     Dr. Whitman, HX 1980 LAST SEIZURE    Sleep apnea     DOES NOT HAVE MACHINE    Vitamin D insufficiency      Past Surgical History:   Procedure Laterality Date    APPENDECTOMY      CARDIAC CATHETERIZATION      NORMAL     CARDIAC CATHETERIZATION  4/24/2025    Procedure: Percutaneous Manual Thrombectomy;  Surgeon: Sourav Kennedy MD;  Location: [facility] CATH INVASIVE LOCATION;  Service: Cardiovascular;;    CHOLECYSTECTOMY N/A 11/2/2024    Procedure: CHOLECYSTECTOMY LAPAROSCOPIC WITH DAVINCI ROBOT with cholangiogram, possible open;  Surgeon: Bin Heaton MD;  Location: [facility] MAIN OR;  Service: Robotics - DaVinci;  Laterality: N/A;    COLONOSCOPY  08/17/2017    Normal except for anal fissure.  Dr. Brandt.  [facility].    KNEE MINI REVISION Left 11/15/2016    Procedure: LEFT KNEE POLY CHANGE WITH HI POST STABILIZER;  Surgeon: Pablito Claudio MD;  Location: [facility] OR;  Service:     KNEE MINI REVISION Left 12/13/2016    Procedure: LT KNEE REMOVAL/REPLACEMENT LOCKING PIN ;  Surgeon: Pablito Claudio MD;  Location: [facility] OR;  Service:     MAMMO FINE NEEDLE ASPIRATION UNILATERAL      RIGHT THYROID NODULE, 2009 BENIGN     IA ARTHRP KNE CONDYLE&PLATU MEDIAL&LAT COMPARTMENTS Left 12/24/2016    Procedure: LEFT KNEE WASHOUT WITH POLY CHANGE;  Surgeon: Christiano Clement MD;  Location: [facility] OR;  Service: Orthopedics    IA REVJ TOTAL KNEE ARTHRP W/WO ALGRFT 1 COMPONENT Left 2/20/2017    Procedure: LT KNEE REMOVAL ANTIBIOTIC SPACER AND KNEE REVISION;  Surgeon: Christiano Clement MD;  Location: [facility] OR;  Service: Orthopedics    REPLACEMENT TOTAL KNEE Left     THYROIDECTOMY, PARTIAL      1979 LEFT LOBECTOMY AND ISTHMECTOMY     TOTAL ABDOMINAL HYSTERECTOMY WITH SALPINGO OOPHORECTOMY      TOTAL HIP  ARTHROPLASTY Left     TOTAL HIP ARTHROPLASTY Right 9/14/2019    Procedure: TOTAL HIP ARTHROPLASTY ANTERIOR WITH HANA TABLE;  Surgeon: Christiano Cesar II, MD;  Location: [facility] MAIN OR;  Service: Orthopedics    TOTAL HIP ARTHROPLASTY REVISION Left 3/9/2021    Procedure: LEFT TOTAL HIP ARTHROPLASTY REVISION POSTERIOR;  Surgeon: Christiano Cesar II, MD;  Location: [facility] MAIN OR;  Service: Orthopedics;  Laterality: Left;    TOTAL HIP ARTHROPLASTY REVISION Left 3/8/2025    Procedure: TOTAL HIP ARTHROPLASTY REVISION;  Surgeon: Christiano Cesar II, MD;  Location: [facility] MAIN OR;  Service: Orthopedics;  Laterality: Left;    TOTAL KNEE  PROSTHESIS REMOVAL W/ SPACER INSERTION Left 12/29/2016    Procedure: LT KNEE IMPLANT REMOVAL WITH INSERTION OF SPACER ;  Surgeon: Christiano Cesar MD;  Location: [facility] OR;  Service:       General Information       Row Name 07/21/25 1520          OT Time and Intention    Document Type therapy note (daily note)  -MM     Mode of Treatment occupational therapy  -MM     Patient Effort fair  -MM     Comment very anxious  -MM       Row Name 07/21/25 1520          General Information    Patient Profile Reviewed yes  -MM     Existing Precautions/Restrictions fall  -MM       Row Name 07/21/25 1520          Cognition    Orientation Status (Cognition) oriented x 3  -MM       Row Name 07/21/25 1520          Safety Issues/Impairments Affecting Functional Mobility    Safety Issues Affecting Function (Mobility) insight into deficits/self-awareness;safety precautions follow-through/compliance;safety precaution awareness;problem-solving;sequencing abilities;judgment;impulsivity;at risk behavior observed  -MM     Impairments Affecting Function (Mobility) balance;coordination;endurance/activity tolerance;pain;strength;cognition  -MM     Cognitive Impairments, Mobility Safety/Performance attention;insight into deficits/self-awareness;judgment;problem-solving/reasoning;safety  "precaution awareness;safety precaution follow-through;sequencing abilities  -MM               User Key  (r) = Recorded By, (t) = Taken By, (c) = Cosigned By      Initials Name Provider Type    Radha Mendez OT Occupational Therapist                   Lymphedema       Row Name 07/21/25 1517             Subjective Pain    Able to rate subjective pain? yes  -MM      Pre-Treatment Pain Level 0  -MM      Recorded by [MM] Radha Cohen OT              Subjective    Subjective Comments pt denies concerns or issues with wrapping  -MM      Recorded by [MM] Radha Cohen OT              Posture/Observations    Observations --  decreased swelling noted in BLE  -MM      Recorded by [MM] Radha Cohen OT              Skin Changes/Observations    Location/Assessment Lower Extremity  -MM      Lower Extremity Conditions bilateral:;normal;intact;dry  no redness noted, pt reports \"itchy\" however no skin breakdown noted  -MM      Lower Extremity Color/Pigment normal  -MM      Recorded by [MM] Radha Cohen OT              Compression/Skin Care    Compression/Skin Care skin care;wrapping location;bandaging;compression garment  -MM      Skin Care washed/dried;lotion applied  -MM      Wrapping Location lower extremity  -MM      Wrapping Location LE bilateral:;foot to knee  -MM      Bandage Layers cotton liner;padding/fluff layer;short-stretch bandages (comment size/quantity)  T9 cotton liner danish, artiflex (2) 10 and (2) 15 cm for danish LEs, Short stretch bandage rosidal (2) 8 cm and (2) 10 cm.  -MM      Bandaging Technique light compression;moderate compression;circumferential/spiral  -MM      Compression/Skin Care Comments pt tolerate well, no skin issues noted  -MM      Recorded by [MM] Radha Cohen OT                User Key  (r) = Recorded By, (t) = Taken By, (c) = Cosigned By      Initials Name Effective Dates    Radha Mendez OT 05/31/24 -                    Mobility/ADL's       Row Name 07/21/25 1521          Bed " Mobility    Bed Mobility supine-sit  -MM     Supine-Sit Donley (Bed Mobility) contact guard;verbal cues;nonverbal cues (demo/gesture)  -MM     Comment, (Bed Mobility) C end of session, increased time due to anxiety  -MM       Row Name 07/21/25 1521          Transfers    Transfers sit-stand transfer  -MM       Row Name 07/21/25 1521          Sit-Stand Transfer    Sit-Stand Donley (Transfers) minimum assist (75% patient effort)  -MM     Assistive Device (Sit-Stand Transfers) walker, front-wheeled  -MM     Comment, (Sit-Stand Transfer) initial posterior leaning noted, increased time in static standing to correct  -MM       Row Name 07/21/25 1521          Stand-Sit Transfer    Stand-Sit Donley (Transfers) contact guard;verbal cues  -MM     Assistive Device (Stand-Sit Transfers) walker, front-wheeled  -MM       Row Name 07/21/25 1521          Functional Mobility    Functional Mobility- Ind. Level contact guard assist  -MM     Functional Mobility- Device walker, front-wheeled  -MM     Functional Mobility- Comment to chair from bed  -MM       Row Name 07/21/25 1521          Activities of Daily Living    BADL Assessment/Intervention lower body dressing;toileting  -MM       Row Name 07/21/25 1521          Lower Body Dressing Assessment/Training    Donley Level (Lower Body Dressing) don;socks;maximum assist (25% patient effort);doff  -MM       Row Name 07/21/25 1521          Toileting Assessment/Training    Comment, (Toileting) purewick, pt very anxious about having purewick donned, brief donned  -MM               User Key  (r) = Recorded By, (t) = Taken By, (c) = Cosigned By      Initials Name Provider Type    Radha Mendez OT Occupational Therapist                   Obj/Interventions       Row Name 07/21/25 1523          Motor Skills    Motor Skills functional endurance  -MM     Functional Endurance fair -  -MM       Row Name 07/21/25 1523          Balance    Balance Assessment sitting static  balance;sit to stand dynamic balance;standing static balance;standing dynamic balance;sitting dynamic balance  -MM     Static Sitting Balance standby assist  -MM     Dynamic Sitting Balance standby assist  -MM     Position, Sitting Balance sitting edge of bed  -MM     Sit to Stand Dynamic Balance minimal assist  -MM     Static Standing Balance contact guard  -MM     Dynamic Standing Balance contact guard;minimal assist  -MM     Position/Device Used, Standing Balance supported;walker, front-wheeled  -MM     Balance Interventions sitting;standing;sit to stand;supported;static;dynamic;moderate challenge  -MM     Comment, Balance remains falls risk, cues required for posture  -MM               User Key  (r) = Recorded By, (t) = Taken By, (c) = Cosigned By      Initials Name Provider Type    Radha Mendez OT Occupational Therapist                   Goals/Plan    No documentation.                  Clinical Impression       Row Name 07/21/25 1524          Pain Assessment    Pre/Posttreatment Pain Comment R knee, did not rate  -MM       Row Name 07/21/25 1524          Plan of Care Review    Plan of Care Reviewed With patient  -MM     Progress no change  -MM     Outcome Evaluation pt seen this date to address both orders for OT including lymph wrappings. RN ok'd wrapping, pt agreeable as well. she reports no concerns or skin breakdown concerns with wrapping, no discomfort once donned again this date. Skin assessed and intact. She reports no concerns once donned wrapped. She reports noted decrease in swelling. In regards to OT, she requires total A for LBD task this date, increassed time to come to EOB due to anxiety with overall transfer. she stood with min A, initial posterior leaning demo'd, able to complete short distance to chair with mostly CGA using rwx and consistent cues for safety required. she will continue to benefit from skilled OT to address deficits and progress toward goals, rec SNF  -MM       Row Name  07/21/25 1524          Therapy Plan Review/Discharge Plan (OT)    Anticipated Discharge Disposition (OT) skilled nursing facility  -       Row Name 07/21/25 1524          Vital Signs    O2 Delivery Pre Treatment room air  -       Row Name 07/21/25 1524          Positioning and Restraints    Pre-Treatment Position in bed  -MM     Post Treatment Position chair  -MM     In Chair notified nsg;reclined;call light within reach;encouraged to call for assist;exit alarm on  -MM               User Key  (r) = Recorded By, (t) = Taken By, (c) = Cosigned By      Initials Name Provider Type    Radha Mendez OT Occupational Therapist                   Outcome Measures       Row Name 07/21/25 1527          How much help from another is currently needed...    Putting on and taking off regular lower body clothing? 2  -MM     Bathing (including washing, rinsing, and drying) 2  -MM     Toileting (which includes using toilet bed pan or urinal) 1  -MM     Putting on and taking off regular upper body clothing 3  -MM     Taking care of personal grooming (such as brushing teeth) 3  -MM     Eating meals 4  -MM     AM-PAC 6 Clicks Score (OT) 15  -MM       Row Name 07/21/25 1524          How much help from another person do you currently need...    Turning from your back to your side while in flat bed without using bedrails? 3  -CW     Moving from lying on back to sitting on the side of a flat bed without bedrails? 3  -CW     Moving to and from a bed to a chair (including a wheelchair)? 3  -CW     Standing up from a chair using your arms (e.g., wheelchair, bedside chair)? 3  -CW     Climbing 3-5 steps with a railing? 2  -CW     To walk in hospital room? 3  -CW     AM-PAC 6 Clicks Score (PT) 17  -CW     Highest Level of Mobility Goal Stand (1 or More Minutes)-5  -CW       Row Name 07/21/25 1527 07/21/25 1524       Functional Assessment    Outcome Measure Options AM-PAC 6 Clicks Daily Activity (OT)  -MM AM-PAC 6 Clicks Basic Mobility  (PT)  -CW              User Key  (r) = Recorded By, (t) = Taken By, (c) = Cosigned By      Initials Name Provider Type    Magalys Ring, PT Physical Therapist    Radha Mendez OT Occupational Therapist                      OT Recommendation and Plan     Plan of Care Review  Plan of Care Reviewed With: patient  Progress: no change  Outcome Evaluation: pt seen this date to address both orders for OT including lymph wrappings. RN ok'd wrapping, pt agreeable as well. she reports no concerns or skin breakdown concerns with wrapping, no discomfort once donned again this date. Skin assessed and intact. She reports no concerns once donned wrapped. She reports noted decrease in swelling. In regards to OT, she requires total A for LBD task this date, increassed time to come to EOB due to anxiety with overall transfer. she stood with min A, initial posterior leaning demo'd, able to complete short distance to chair with mostly CGA using rwx and consistent cues for safety required. she will continue to benefit from skilled OT to address deficits and progress toward goals, rec SNF     Time Calculation:         Time Calculation- OT       Row Name 07/21/25 1527 07/21/25 1519          Time Calculation- OT    OT Start Time 1348  -MM 1323  -MM     OT Stop Time 1400  -MM 1346  -MM     OT Time Calculation (min) 12 min  -MM 23 min  -MM     Total Timed Code Minutes- OT 12 minute(s)  -MM 23 minute(s)  -MM     OT Received On 07/21/25  -MM 07/21/25  -MM     OT - Next Appointment 07/22/25  -MM 07/22/25  -MM        Timed Charges    19900 - OT Therapeutic Activity Minutes 12  -MM --     30867 - OT Self Care/Mgmt Minutes -- 8  -MM        Total Minutes    Timed Charges Total Minutes 12  -MM 8  -MM      Total Minutes 12  -MM 8  -MM               User Key  (r) = Recorded By, (t) = Taken By, (c) = Cosigned By      Initials Name Provider Type    Radha Mendez OT Occupational Therapist                  Therapy Charges for Today        Code Description Service Date Service Provider Modifiers Qty    19748457693 HC OT SELF CARE/MGMT/TRAIN EA 15 MIN 7/21/2025 Radha Cohen OT GO 1    24403958645 HC OT MULTI LAYER COMP SYS BELOW KNEE BILATERAL 7/21/2025 Radha Cohen OT  1    06551362431 HC OT THERAPEUTIC ACT EA 15 MIN 7/21/2025 Radha Cohen OT GO 1                 Radha Cohen OT  7/21/2025

## 2025-07-21 NOTE — THERAPY TREATMENT NOTE
----- Message from Norma Willett CNM sent at 1/13/2025  1:33 PM CST -----  Please call patient, PLT  were 141 @28w, now at 133, place referral to hematology.    Patient Name: Mariela Ruiz  : 1945    MRN: [id number]                              Today's Date: 2025       Admit Date: 7/15/2025    Visit Dx:     ICD-10-CM ICD-9-CM   1. Bilateral lower extremity edema  R60.0 782.3   2. Falls  R29.6 V15.88     Patient Active Problem List   Diagnosis    Seizure disorder    Hyperlipidemia    Vitamin D insufficiency    Age-related osteoporosis without current pathological fracture    Sleep apnea    Venous (peripheral) insufficiency    Postsurgical hypothyroidism    Chronic fatigue syndrome    Gastroesophageal reflux disease    Dupuytren's contracture    History of biliary T-tube placement    History of partial thyroidectomy    Multinodular goiter    Restless legs syndrome    History of cardiac catheterization    Urge incontinence of urine    Left knee pain    Ligamentous laxity of knee    DJD (degenerative joint disease) of knee    Adverse drug effect, subsequent encounter    Abnormal blood level of chromium    Abnormal blood level of cobalt    Family history of diabetes mellitus    Thrombocytopenia    .    S/P revision of total hip    Enlarged thyroid gland    Palmar fascial fibromatosis (dupuytren)    Hypothyroidism, unspecified    Hyperlipidemia, unspecified    Acute cholecystitis    Elevated troponin    Vitamin B12 deficiency    Pernicious anemia    Lymphedema    Venous stasis    Chronic venous hypertension with inflammation involving both sides    Periprosthetic hip fracture    Ureterolithiasis    Hydronephrosis    Multiple thyroid nodules    Saddle pulmonary embolus    Bilateral lower extremity edema    Bilateral edema of lower extremity     Past Medical History:   Diagnosis Date    Arthritis     Chronic venous insufficiency     Dupuytren's contracture     History of staph infection     S/P KNEE DEC 2016    History of transfusion     Hyperlipidemia     Lymphedema     LEFT LEG    Nontoxic multinodular goiter     Osteoporosis     Dr. Cabrera    Pelvic joint pain,  left     Postsurgical hypothyroidism     Presence of left artificial hip joint     METAL ON METAL AS NOTED PER DR CLEMENT NOTE    Restless leg syndrome     Right bundle branch block     Seizure disorder     Dr. Whitman, HX 1980 LAST SEIZURE    Sleep apnea     DOES NOT HAVE MACHINE    Vitamin D insufficiency      Past Surgical History:   Procedure Laterality Date    APPENDECTOMY      CARDIAC CATHETERIZATION      NORMAL     CARDIAC CATHETERIZATION  4/24/2025    Procedure: Percutaneous Manual Thrombectomy;  Surgeon: Sourav Kennedy MD;  Location: [facility] CATH INVASIVE LOCATION;  Service: Cardiovascular;;    CHOLECYSTECTOMY N/A 11/2/2024    Procedure: CHOLECYSTECTOMY LAPAROSCOPIC WITH DAVINCI ROBOT with cholangiogram, possible open;  Surgeon: Bin Heaton MD;  Location: [facility] MAIN OR;  Service: Robotics - DaVinci;  Laterality: N/A;    COLONOSCOPY  08/17/2017    Normal except for anal fissure.  Dr. Brandt.  [facility].    KNEE MINI REVISION Left 11/15/2016    Procedure: LEFT KNEE POLY CHANGE WITH HI POST STABILIZER;  Surgeon: Pablito Claudio MD;  Location: [facility] OR;  Service:     KNEE MINI REVISION Left 12/13/2016    Procedure: LT KNEE REMOVAL/REPLACEMENT LOCKING PIN ;  Surgeon: Pablito Claudio MD;  Location: [facility] OR;  Service:     MAMMO FINE NEEDLE ASPIRATION UNILATERAL      RIGHT THYROID NODULE, 2009 BENIGN     OH ARTHRP KNE CONDYLE&PLATU MEDIAL&LAT COMPARTMENTS Left 12/24/2016    Procedure: LEFT KNEE WASHOUT WITH POLY CHANGE;  Surgeon: Christiano Clement MD;  Location: [facility] OR;  Service: Orthopedics    OH REVJ TOTAL KNEE ARTHRP W/WO ALGRFT 1 COMPONENT Left 2/20/2017    Procedure: LT KNEE REMOVAL ANTIBIOTIC SPACER AND KNEE REVISION;  Surgeon: Christiano Clement MD;  Location: [facility] OR;  Service: Orthopedics    REPLACEMENT TOTAL KNEE Left     THYROIDECTOMY, PARTIAL      1979 LEFT LOBECTOMY AND ISTHMECTOMY     TOTAL ABDOMINAL HYSTERECTOMY WITH SALPINGO OOPHORECTOMY      TOTAL HIP  ARTHROPLASTY Left     TOTAL HIP ARTHROPLASTY Right 9/14/2019    Procedure: TOTAL HIP ARTHROPLASTY ANTERIOR WITH HANA TABLE;  Surgeon: Christiano Cesar II, MD;  Location: [facility] MAIN OR;  Service: Orthopedics    TOTAL HIP ARTHROPLASTY REVISION Left 3/9/2021    Procedure: LEFT TOTAL HIP ARTHROPLASTY REVISION POSTERIOR;  Surgeon: Christiano Cesar II, MD;  Location: [facility] MAIN OR;  Service: Orthopedics;  Laterality: Left;    TOTAL HIP ARTHROPLASTY REVISION Left 3/8/2025    Procedure: TOTAL HIP ARTHROPLASTY REVISION;  Surgeon: Christiano Cesar II, MD;  Location: [facility] OR;  Service: Orthopedics;  Laterality: Left;    TOTAL KNEE  PROSTHESIS REMOVAL W/ SPACER INSERTION Left 12/29/2016    Procedure: LT KNEE IMPLANT REMOVAL WITH INSERTION OF SPACER ;  Surgeon: Christiano Cesar MD;  Location: [facility] OR;  Service:       General Information       Row Name 07/21/25 1516          Physical Therapy Time and Intention    Document Type therapy note (daily note)  -CW     Mode of Treatment physical therapy;individual therapy  -CW       Row Name 07/21/25 1516          General Information    Patient Profile Reviewed yes  -CW     Existing Precautions/Restrictions fall  -CW       Row Name 07/21/25 1516          Cognition    Orientation Status (Cognition) oriented x 3  -CW       Row Name 07/21/25 1516          Safety Issues/Impairments Affecting Functional Mobility    Safety Issues Affecting Function (Mobility) awareness of need for assistance;problem-solving;safety precautions follow-through/compliance;insight into deficits/self-awareness;judgment  -CW     Impairments Affecting Function (Mobility) balance;endurance/activity tolerance;strength  -CW               User Key  (r) = Recorded By, (t) = Taken By, (c) = Cosigned By      Initials Name Provider Type    CW Magalys Sherwood PT Physical Therapist                   Mobility       Row Name 07/21/25 1520          Bed Mobility    Bed Mobility supine-sit   -CW     Supine-Sit [place] (Bed Mobility) contact guard  -CW     Assistive Device (Bed Mobility) head of bed elevated;bed rails  -CW       Row Name 07/21/25 1520          Sit-Stand Transfer    Sit-Stand [place] (Transfers) minimum assist (75% patient effort)  -CW     Assistive Device (Sit-Stand Transfers) walker, front-wheeled  -CW       Row Name 07/21/25 1520          Gait/Stairs (Locomotion)    [place] Level (Gait) minimum assist (75% patient effort);contact guard;verbal cues  -CW     Assistive Device (Gait) walker, front-wheeled  -CW     Distance in Feet (Gait) 5  -CW     Deviations/Abnormal Patterns (Gait) g[place] decreased;gait speed decreased;stride length decreased  -CW     Bilateral Gait Deviations forward flexed posture  -CW     Comment, (Gait/Stairs) max cues to stay on task, pt complaining she hasnt ambulated enough but once up reports knee pain and is fearful of falling.  -CW               User Key  (r) = Recorded By, (t) = Taken By, (c) = Cosigned By      Initials Name Provider Type    Magalys Ring PT Physical Therapist                   Obj/Interventions       Row Name 07/21/25 1522          Balance    Balance Assessment standing static balance;standing dynamic balance  -CW     Static Standing Balance contact guard  -CW     Dynamic Standing Balance minimal assist;contact guard  -CW     Position/Device Used, Standing Balance walker, front-wheeled;supported  -CW               User Key  (r) = Recorded By, (t) = Taken By, (c) = Cosigned By      Initials Name Provider Type    Magalys Ring PT Physical Therapist                   Goals/Plan    No documentation.                  Clinical Impression       Row Name 07/21/25 1522          Pain    Pretreatment Pain Rating 8/10  -CW     Posttreatment Pain Rating 8/10  -CW     Pain Location knee  -CW     Pain Side/Orientation right  -CW       Row Name 07/21/25 1522          Plan of Care Review    Plan of Care Reviewed With patient   -CW     Outcome Evaluation Pt participated with PT this PM. Pt agreeable to mobilize out of bed and reports she has not been given many opportunities to mobilize. However, once up, pt reporting R knee pain, complains of her back itching, and demo fear of falling. Pt generally anxious during session, requires reassurance and max cues to stay on task. She was able to stand wtih min A and ambulate 5' to the chair cga/min A using RW. Encouraged further distance however limited by anxiety. PT recommending SNF.  -CW       Row Name 07/21/25 1522          Vital Signs    O2 Delivery Pre Treatment room air  -CW       Row Name 07/21/25 1522          Positioning and Restraints    Pre-Treatment Position in bed  -CW     Post Treatment Position chair  -CW     In Chair reclined;call light within reach;encouraged to call for assist;exit alarm on;legs elevated;notified nsg  -CW               User Key  (r) = Recorded By, (t) = Taken By, (c) = Cosigned By      Initials Name Provider Type    Magalys Ring, PT Physical Therapist                   Outcome Measures       Row Name 07/21/25 1524          How much help from another person do you currently need...    Turning from your back to your side while in flat bed without using bedrails? 3  -CW     Moving from lying on back to sitting on the side of a flat bed without bedrails? 3  -CW     Moving to and from a bed to a chair (including a wheelchair)? 3  -CW     Standing up from a chair using your arms (e.g., wheelchair, bedside chair)? 3  -CW     Climbing 3-5 steps with a railing? 2  -CW     To walk in hospital room? 3  -CW     AM-PAC 6 Clicks Score (PT) 17  -CW     Highest Level of Mobility Goal Stand (1 or More Minutes)-5  -CW       Row Name 07/21/25 1524          Functional Assessment    Outcome Measure Options AM-PAC 6 Clicks Basic Mobility (PT)  -CW               User Key  (r) = Recorded By, (t) = Taken By, (c) = Cosigned By      Initials Name Provider Type    DOM Sherwood  Magalys, PT Physical Therapist                                 Physical Therapy Education       Title: PT OT SLP Therapies (Done)       Topic: Physical Therapy (Done)       Point: Mobility training (Done)       Learning Progress Summary            Patient Acceptance, E, VU,DU by  at 7/18/2025 1150    Acceptance, E, VU,DU by  at 7/17/2025 1048    Acceptance, E, VU,DU by AG at 7/16/2025 1258                      Point: Body mechanics (Done)       Learning Progress Summary            Patient Acceptance, E, VU,DU by  at 7/18/2025 1150    Acceptance, E, VU,DU by  at 7/17/2025 1048    Acceptance, E, VU,DU by  at 7/16/2025 1258                      Point: Precautions (Done)       Learning Progress Summary            Patient Acceptance, E, VU,DU by  at 7/18/2025 1150    Acceptance, E, VU,DU by  at 7/17/2025 1048    Acceptance, E, VU,DU by  at 7/16/2025 1258                                      User Key       Initials Effective Dates Name Provider Type Discipline     06/03/25 -  Bonnie Amin, PT Student PT Student PT                  PT Recommendation and Plan     Outcome Evaluation: Pt participated with PT this PM. Pt agreeable to mobilize out of bed and reports she has not been given many opportunities to mobilize. However, once up, pt reporting R knee pain, complains of her back itching, and demo fear of falling. Pt generally anxious during session, requires reassurance and max cues to stay on task. She was able to stand wtih min A and ambulate 5' to the chair cga/min A using RW. Encouraged further distance however limited by anxiety. PT recommending SNF.     Time Calculation:         PT Charges       Row Name 07/21/25 3653             Time Calculation    Start Time 1347  -CW      Stop Time 1400  -CW      Time Calculation (min) 13 min  -CW      PT Received On 07/21/25  -CW      PT - Next Appointment 07/22/25  -CW                User Key  (r) = Recorded By, (t) = Taken By, (c) = Cosigned By       Initials Name Provider Type    CW Magalys Sherwood, PT Physical Therapist                  Therapy Charges for Today       Code Description Service Date Service Provider Modifiers Qty    35715292343 HC PT THERAPEUTIC ACT EA 15 MIN 7/21/2025 Magalys Sherwood, PT GP 1            PT G-Codes  Outcome Measure Options: AM-PAC 6 Clicks Basic Mobility (PT)  AM-PAC 6 Clicks Score (PT): 17  AM-PAC 6 Clicks Score (OT): 15  PT Discharge Summary  Anticipated Discharge Disposition (PT): skilled nursing facility    Magalys Sherwood PT  7/21/2025

## 2025-07-22 VITALS
WEIGHT: 173 LBS | TEMPERATURE: 97.5 F | RESPIRATION RATE: 16 BRPM | SYSTOLIC BLOOD PRESSURE: 108 MMHG | HEART RATE: 86 BPM | BODY MASS INDEX: 26.22 KG/M2 | OXYGEN SATURATION: 98 % | DIASTOLIC BLOOD PRESSURE: 56 MMHG | HEIGHT: 68 IN

## 2025-07-22 LAB
ANION GAP SERPL CALCULATED.3IONS-SCNC: 6 MMOL/L (ref 5–15)
BUN SERPL-MCNC: 16 MG/DL (ref 8–23)
BUN/CREAT SERPL: 21.6 (ref 7–25)
CALCIUM SPEC-SCNC: 9.6 MG/DL (ref 8.6–10.5)
CHLORIDE SERPL-SCNC: 99 MMOL/L (ref 98–107)
CO2 SERPL-SCNC: 30 MMOL/L (ref 22–29)
CREAT SERPL-MCNC: 0.74 MG/DL (ref 0.57–1)
EGFRCR SERPLBLD CKD-EPI 2021: 82.9 ML/MIN/1.73
GLUCOSE SERPL-MCNC: 98 MG/DL (ref 65–99)
POTASSIUM SERPL-SCNC: 4.4 MMOL/L (ref 3.5–5.2)
SODIUM SERPL-SCNC: 135 MMOL/L (ref 136–145)

## 2025-07-22 PROCEDURE — 80048 BASIC METABOLIC PNL TOTAL CA: CPT | Performed by: HOSPITALIST

## 2025-07-22 RX ORDER — DICLOFENAC SODIUM 30 MG/G
GEL TOPICAL 3 TIMES DAILY
Qty: 100 G | Refills: 0 | Status: SHIPPED | OUTPATIENT
Start: 2025-07-22

## 2025-07-22 RX ORDER — AMOXICILLIN 250 MG
2 CAPSULE ORAL 2 TIMES DAILY PRN
Qty: 120 TABLET | Refills: 0 | Status: SHIPPED | OUTPATIENT
Start: 2025-07-22

## 2025-07-22 RX ORDER — AMMONIUM LACTATE 12 G/100G
1 CREAM TOPICAL 2 TIMES DAILY
Start: 2025-07-22

## 2025-07-22 RX ORDER — HYDROXYZINE HYDROCHLORIDE 25 MG/1
25 TABLET, FILM COATED ORAL 3 TIMES DAILY PRN
Qty: 90 TABLET | Refills: 0 | Status: SHIPPED | OUTPATIENT
Start: 2025-07-22

## 2025-07-22 RX ORDER — HYDROCODONE BITARTRATE AND ACETAMINOPHEN 5; 325 MG/1; MG/1
1 TABLET ORAL EVERY 4 HOURS PRN
Qty: 18 TABLET | Refills: 0 | Status: SHIPPED | OUTPATIENT
Start: 2025-07-22

## 2025-07-22 RX ORDER — HYDROCODONE BITARTRATE AND ACETAMINOPHEN 5; 325 MG/1; MG/1
1 TABLET ORAL EVERY 4 HOURS PRN
Refills: 0 | Status: DISCONTINUED | OUTPATIENT
Start: 2025-07-22 | End: 2025-07-22 | Stop reason: HOSPADM

## 2025-07-22 RX ORDER — PHENOBARBITAL 64.8 MG/1
TABLET ORAL
Qty: 90 TABLET | Refills: 0 | Status: SHIPPED | OUTPATIENT
Start: 2025-07-22

## 2025-07-22 RX ADMIN — Medication 1 APPLICATION: at 09:26

## 2025-07-22 RX ADMIN — NYSTATIN 1 APPLICATION: 100000 OINTMENT TOPICAL at 15:40

## 2025-07-22 RX ADMIN — Medication 10 ML: at 09:26

## 2025-07-22 RX ADMIN — DICLOFENAC SODIUM 1 APPLICATION: 30 GEL TOPICAL at 09:26

## 2025-07-22 RX ADMIN — APIXABAN 5 MG: 5 TABLET, FILM COATED ORAL at 09:26

## 2025-07-22 RX ADMIN — ACETAMINOPHEN 650 MG: 325 TABLET, FILM COATED ORAL at 04:15

## 2025-07-22 RX ADMIN — NYSTATIN 1 APPLICATION: 100000 OINTMENT TOPICAL at 09:26

## 2025-07-22 RX ADMIN — HYDROCODONE BITARTRATE AND ACETAMINOPHEN 1 TABLET: 5; 325 TABLET ORAL at 15:39

## 2025-07-22 RX ADMIN — DICLOFENAC SODIUM 1 APPLICATION: 30 GEL TOPICAL at 15:40

## 2025-07-22 RX ADMIN — LEVOTHYROXINE SODIUM 50 MCG: 50 TABLET ORAL at 06:06

## 2025-07-22 NOTE — PLAN OF CARE
Problem: Adult Inpatient Plan of Care  Goal: Plan of Care Review  Outcome: Progressing  Goal: Patient-Specific Goal (Individualized)  Outcome: Progressing  Goal: Absence of Hospital-Acquired Illness or Injury  Outcome: Progressing  Intervention: Identify and Manage Fall Risk  Description: Perform standard risk assessment on admission using a validated tool or comprehensive approach appropriate to the patient; reassess fall risk frequently, with change in status or transfer to another level of care.Communicate risk to interprofessional healthcare team; ensure fall risk visible cue.Determine need for increased observation, equipment and environmental modification, as well as use of supportive, nonskid footwear.Adjust safety measures to individual needs and identified risk factors.Reinforce the importance of active participation with fall risk prevention, safety, and physical activity with the patient and family.Perform regular intentional rounding to assess need for position change, pain assessment and personal needs, including assistance with toileting.  Recent Flowsheet Documentation  Taken 7/22/2025 0602 by Jasmin Blackwell, RN  Safety Promotion/Fall Prevention:   activity supervised   assistive device/personal items within reach   clutter free environment maintained   fall prevention program maintained   nonskid shoes/slippers when out of bed   room organization consistent   safety round/check completed  Taken 7/22/2025 0415 by Jasmin Blackwell, RN  Safety Promotion/Fall Prevention:   activity supervised   assistive device/personal items within reach   clutter free environment maintained   fall prevention program maintained   nonskid shoes/slippers when out of bed   room organization consistent   safety round/check completed  Taken 7/22/2025 0159 by Jasmin Blackwell, RN  Safety Promotion/Fall Prevention:   activity supervised   assistive device/personal items within reach   clutter free  environment maintained   fall prevention program maintained   nonskid shoes/slippers when out of bed   room organization consistent   safety round/check completed  Taken 7/22/2025 0012 by Jasmin Blackwell, RN  Safety Promotion/Fall Prevention:   activity supervised   assistive device/personal items within reach   clutter free environment maintained   fall prevention program maintained   nonskid shoes/slippers when out of bed   room organization consistent   safety round/check completed  Taken 7/21/2025 2147 by Jasmin Blackwell, RN  Safety Promotion/Fall Prevention:   activity supervised   assistive device/personal items within reach   clutter free environment maintained   fall prevention program maintained   nonskid shoes/slippers when out of bed   room organization consistent   safety round/check completed  Taken 7/21/2025 2117 by Jasmin Blackwell, RN  Safety Promotion/Fall Prevention:   activity supervised   assistive device/personal items within reach   clutter free environment maintained   fall prevention program maintained   nonskid shoes/slippers when out of bed   room organization consistent   safety round/check completed  Taken 7/21/2025 1949 by Jasmin Blackwell, RN  Safety Promotion/Fall Prevention:   activity supervised   assistive device/personal items within reach   clutter free environment maintained   fall prevention program maintained   nonskid shoes/slippers when out of bed   room organization consistent   safety round/check completed  Intervention: Prevent Skin Injury  Description: Perform a screening for skin injury risk, such as pressure or moisture-associated skin damage on admission and at regular intervals throughout hospital stay.Keep all areas of skin (especially folds) clean and dry.Maintain adequate skin hydration.Relieve and redistribute pressure and protect bony prominences and skin at risk for injury; implement measures based on patient-specific risk factors.Match  turning and repositioning schedule to clinical condition.Encourage weight shift frequently; assist with reposition if unable to complete independently.Float heels off bed; avoid pressure on the Achilles tendon.Keep skin free from extended contact with medical devices.Optimize nutrition and hydration.Encourage functional activity and mobility, as early as tolerated.Use aids (e.g., slide boards, mechanical lift) during transfer.  Recent Flowsheet Documentation  Taken 7/22/2025 0602 by Jasmin Blackwell RN  Body Position: position changed independently  Taken 7/22/2025 0415 by Jasmin Blackwell RN  Body Position: weight shifting  Taken 7/22/2025 0159 by Jasmin Blackwell RN  Body Position:   weight shifting   sitting up in bed  Skin Protection:   incontinence pads utilized   skin sealant/moisture barrier applied  Taken 7/22/2025 0012 by Jasmin Blackwell RN  Body Position:   patient/family refused   position maintained  Taken 7/21/2025 2147 by Jasmin Blackwell RN  Body Position: position maintained  Taken 7/21/2025 2117 by Jasmin Blackwell RN  Body Position:   weight shifting   supine  Skin Protection:   incontinence pads utilized   skin sealant/moisture barrier applied  Taken 7/21/2025 1949 by Jasmin Blackwell RN  Body Position: sitting up in bed  Goal: Optimal Comfort and Wellbeing  Outcome: Progressing  Intervention: Monitor Pain and Promote Comfort  Description: Assess pain level, treatment efficacy and patient response at regular intervals using a consistent pain scale.Consider the presence and impact of preexisting chronic pain.Encourage patient and caregiver involvement in pain assessment, interventions and safety measures.Promote activity; balance with sleep and rest to enhance healing.  Recent Flowsheet Documentation  Taken 7/22/2025 0415 by Jasmin Blackwell RN  Pain Management Interventions:   pain medication given   position adjusted   pillow support  provided  Taken 7/21/2025 2147 by Jasmin Blackwell, RN  Pain Management Interventions:   position adjusted   pillow support provided  Taken 7/21/2025 2117 by Jasmin Blackwell RN  Pain Management Interventions:   pain medication given   pillow support provided   position adjusted  Intervention: Provide Person-Centered Care  Description: Use a family-focused approach to care; encourage support system presence and participation.Develop trust and rapport by proactively providing information, encouraging questions, addressing concerns and offering reassurance.Acknowledge emotional response to hospitalization.Recognize and utilize personal coping strategies and strengths; develop goals via shared decision-making.Honor spiritual and cultural preferences.  Recent Flowsheet Documentation  Taken 7/21/2025 2117 by Jasmin Blackwell, RN  Trust Relationship/Rapport: care explained  Goal: Readiness for Transition of Care  Outcome: Progressing   Goal Outcome Evaluation:

## 2025-07-22 NOTE — CASE MANAGEMENT/SOCIAL WORK
Case Management Discharge Note      Final Note: Pt discharged to [facility] for skilled care .   ARSENIO Boyd RN    Provided Post Acute Provider List?: Yes  Post Acute Provider List: Nursing Home  Provided Post Acute Provider Quality & Resource List?: Yes  Post Acute Provider Quality and Resource List: Nursing Home  Delivered To: Patient  Method of Delivery: In person    Selected Continued Care - Admitted Since 7/15/2025       Destination Coordination complete.      Service Provider Services Address Phone Fax Patient Preferred    [facility] [address] [address] [phone] --              Durable Medical Equipment    No services have been selected for the patient.                Dialysis/Infusion    No services have been selected for the patient.                Home Medical Care Coordination complete.      Service Provider Services Address Phone Fax Patient Preferred    [facility] [address], [address] [id number] [phone] --              Therapy    No services have been selected for the patient.                Community Resources    No services have been selected for the patient.                Community & DME    No services have been selected for the patient.                    Selected Continued Care - Prior Encounters Includes continued care and service providers with selected services from prior encounters from 4/16/2025 to 7/22/2025      Discharged on 4/29/2025 Admission date: 4/23/2025 - Discharge disposition: Skilled Nursing Facility (DC - External)      Destination       Service Provider Services Address Phone Fax Patient Preferred    [facility] [address] [id number] [phone] --                          Transportation Services  Transportation: Ambulance  Ambulance: [facility]  Service    Final Discharge Disposition Code: 03 - skilled nursing facility (SNF)

## 2025-07-22 NOTE — PLAN OF CARE
Problem: Adult Inpatient Plan of Care  Goal: Plan of Care Review  Outcome: Adequate for Care Transition  Goal: Patient-Specific Goal (Individualized)  Outcome: Adequate for Care Transition  Goal: Absence of Hospital-Acquired Illness or Injury  Outcome: Adequate for Care Transition  Intervention: Identify and Manage Fall Risk  Recent Flowsheet Documentation  Taken 7/22/2025 1050 by Loreta Young RN  Safety Promotion/Fall Prevention:   activity supervised   clutter free environment maintained   assistive device/personal items within reach   fall prevention program maintained   nonskid shoes/slippers when out of bed   safety round/check completed   room organization consistent  Taken 7/22/2025 0927 by Loreta Young RN  Safety Promotion/Fall Prevention:   activity supervised   clutter free environment maintained   assistive device/personal items within reach   fall prevention program maintained   nonskid shoes/slippers when out of bed   safety round/check completed   room organization consistent  Taken 7/22/2025 0815 by Loreta Young RN  Safety Promotion/Fall Prevention:   activity supervised   clutter free environment maintained   assistive device/personal items within reach   fall prevention program maintained   nonskid shoes/slippers when out of bed   safety round/check completed   room organization consistent  Intervention: Prevent Skin Injury  Recent Flowsheet Documentation  Taken 7/22/2025 1050 by Loreta Young RN  Body Position: supine  Taken 7/22/2025 0927 by Loreta Young RN  Body Position: sitting up in bed  Skin Protection: incontinence pads utilized  Taken 7/22/2025 0815 by Loreta Young RN  Body Position:   turned   right  Intervention: Prevent Infection  Recent Flowsheet Documentation  Taken 7/22/2025 1050 by Loerta Young RN  Infection Prevention:   single patient room provided   rest/sleep promoted   hand hygiene promoted  Taken 7/22/2025 0927 by Loreta Young RN  Infection  Prevention:   single patient room provided   rest/sleep promoted   hand hygiene promoted  Taken 7/22/2025 0815 by Loreta Young RN  Infection Prevention:   single patient room provided   rest/sleep promoted   hand hygiene promoted  Goal: Optimal Comfort and Wellbeing  Outcome: Adequate for Care Transition  Intervention: Monitor Pain and Promote Comfort  Recent Flowsheet Documentation  Taken 7/22/2025 0927 by Loreta Young RN  Pain Management Interventions:   care clustered   quiet environment facilitated   relaxation techniques promoted  Intervention: Provide Person-Centered Care  Recent Flowsheet Documentation  Taken 7/22/2025 0927 by Loreta Young RN  Trust Relationship/Rapport:   care explained   questions answered   reassurance provided   thoughts/feelings acknowledged   questions encouraged   choices provided   emotional support provided   empathic listening provided  Goal: Readiness for Transition of Care  Outcome: Adequate for Care Transition     Problem: Comorbidity Management  Goal: Blood Pressure in Desired Range  Outcome: Adequate for Care Transition  Intervention: Maintain Blood Pressure Management  Recent Flowsheet Documentation  Taken 7/22/2025 1050 by Loreta Young RN  Medication Review/Management: medications reviewed  Taken 7/22/2025 0927 by Loreta Young RN  Medication Review/Management: medications reviewed  Taken 7/22/2025 0815 by Loreta Young RN  Medication Review/Management: medications reviewed  Goal: Maintenance of Osteoarthritis Symptom Control  Outcome: Adequate for Care Transition  Intervention: Maintain Osteoarthritis Symptom Control  Recent Flowsheet Documentation  Taken 7/22/2025 1050 by Loreta Young RN  Activity Management: activity encouraged  Medication Review/Management: medications reviewed  Taken 7/22/2025 0927 by Loreta Young RN  Activity Management: activity encouraged  Medication Review/Management: medications reviewed  Taken 7/22/2025 0815 by Loreta Young  RN  Activity Management: activity encouraged  Medication Review/Management: medications reviewed  Goal: Maintenance of Seizure Control  Outcome: Adequate for Care Transition  Intervention: Maintain Seizure Symptom Control  Recent Flowsheet Documentation  Taken 7/22/2025 1050 by Loreta Young RN  Medication Review/Management: medications reviewed  Taken 7/22/2025 0927 by Loreta Young RN  Medication Review/Management: medications reviewed  Taken 7/22/2025 0815 by Loreta Young RN  Medication Review/Management: medications reviewed     Problem: Skin Injury Risk Increased  Goal: Skin Health and Integrity  Outcome: Adequate for Care Transition  Intervention: Optimize Skin Protection  Recent Flowsheet Documentation  Taken 7/22/2025 1050 by Loreta Young RN  Activity Management: activity encouraged  Head of Bed (HOB) Positioning: HOB at 20-30 degrees  Taken 7/22/2025 0927 by Loreta Young RN  Activity Management: activity encouraged  Pressure Reduction Techniques: frequent weight shift encouraged  Head of Bed (HOB) Positioning: HOB at 20-30 degrees  Pressure Reduction Devices: pressure-redistributing mattress utilized  Skin Protection: incontinence pads utilized  Taken 7/22/2025 0815 by Loreta Young RN  Activity Management: activity encouraged  Head of Bed (HOB) Positioning: HOB at 20-30 degrees     Problem: Fall Injury Risk  Goal: Absence of Fall and Fall-Related Injury  Outcome: Adequate for Care Transition  Intervention: Identify and Manage Contributors  Recent Flowsheet Documentation  Taken 7/22/2025 1050 by Loreta Young RN  Medication Review/Management: medications reviewed  Taken 7/22/2025 0927 by Loreta Young RN  Medication Review/Management: medications reviewed  Taken 7/22/2025 0815 by Loreta Young RN  Medication Review/Management: medications reviewed  Intervention: Promote Injury-Free Environment  Recent Flowsheet Documentation  Taken 7/22/2025 1050 by Loreta Young, RN  Safety  Promotion/Fall Prevention:   activity supervised   clutter free environment maintained   assistive device/personal items within reach   fall prevention program maintained   nonskid shoes/slippers when out of bed   safety round/check completed   room organization consistent  Taken 7/22/2025 0927 by Loreta Young RN  Safety Promotion/Fall Prevention:   activity supervised   clutter free environment maintained   assistive device/personal items within [facility]   fall prevention program maintained   nonskid shoes/slippers when out of bed   safety round/check completed   room organization consistent  Taken 7/22/2025 0815 by Loreta Young RN  Safety Promotion/Fall Prevention:   activity supervised   clutter free environment maintained   assistive device/personal items within [facility]   fall prevention program maintained   nonskid shoes/slippers when out of bed   safety round/check completed   room organization consistent     Problem: Fluid Volume Excess  Goal: Fluid Balance  Outcome: Adequate for Care Transition  Intervention: Monitor and Manage Hypervolemia  Recent Flowsheet Documentation  Taken 7/22/2025 0927 by Loreta Young, RN  Skin Protection: incontinence pads utilized   Goal Outcome Evaluation:

## 2025-07-22 NOTE — PROGRESS NOTES
Continued Stay Note  [facility]     Patient Name: Mariela Ruiz  MRN: [id number]  Today's Date: 7/22/2025    Admit Date: 7/15/2025    Plan: [facility] via [facility]   Discharge Plan       Row Name 07/22/25 1244       Plan    Plan [facility] via [facility]    Patient/Family in Agreement with Plan yes    Plan Comments Spoke with patient at bedside.  She is agreeable to [facility].  Spoke with [facility] has a a skilled bed and can accept her.  Spoke with daughter Ronda while in patient's room but now unable to reach her to let her know DC is confirmed.  [facility] dispatch initiated.  Packet to WISAM White RN                 Expected Discharge Date and Time       Expected Discharge Date Expected Discharge Time    Jul 22, 2025               Becky S. [place], RN

## 2025-07-22 NOTE — NURSING NOTE
WOCN: follow up regarding leg wraps. Patient is receiving lymphedema wraps, per OT. Wraps in place.

## 2025-07-22 NOTE — DISCHARGE SUMMARY
"Date of Admission: 7/15/2025  Date of Discharge:  7/22/2025  Primary Care Physician: Duglas Rock MD     Discharge Diagnosis:  Active Hospital Problems    Diagnosis  POA    **Bilateral lower extremity edema [R60.0]  Yes    Bilateral edema of lower extremity [R60.0]  Yes    Saddle pulmonary embolus [I26.92]  Yes    Chronic venous hypertension with inflammation involving both sides [I87.323]  Yes    Lymphedema [I89.0]  Yes    Hypothyroidism, unspecified [E03.9]  Yes    Gastroesophageal reflux disease [K21.9]  Yes    Restless legs syndrome [G25.81]  Yes    Seizure disorder [G40.909]  Yes    Venous (peripheral) insufficiency [I87.2]  Yes    Sleep apnea [G47.30]  Yes    History of partial thyroidectomy [E89.0]  Yes      Resolved Hospital Problems   No resolved problems to display.       Presenting Problem/History of Present Illness:  Falls [R29.6]  Bilateral lower extremity edema [R60.0]  Bilateral edema of lower extremity [R60.0]     Hospital Course:  The patient is a 80 y.o. female with the above past medical history who presented with weakness and a fall which did not result in significant injury. She was found to have worsening BLE edema. She has known lymphedema. She was started on diuretics and compression wraps and did well with this. She did complain of some left knee pain and showed nothing acute. She was evaluated by orthopedic surgery and they CT scan recommended symptomatic management. Her symptoms did improve with this but she remains weak. She is medically stable and will be discharged to rehab.     Exam Today:  Blood pressure 102/52, pulse 69, temperature 97.5 °F (36.4 °C), temperature source Oral, resp. rate 16, height 172.7 cm (68\"), weight 78.5 kg (173 lb), SpO2 97%, not currently breastfeeding.  Vitals and nursing note reviewed.   Constitutional:       General: She is not in acute distress.     Appearance: She is not toxic-appearing or diaphoretic.   HENT:      Head: Normocephalic and atraumatic. "      Nose: Nose normal.      Mouth/Throat:      Mouth: Mucous membranes are moist.      Pharynx: Oropharynx is clear.   Eyes:      Conjunctiva/sclera: Conjunctivae normal.      Pupils: Pupils are equal, round, and reactive to light.   Cardiovascular:      Rate and Rhythm: Normal rate.      Pulses: Normal pulses.   Pulmonary:      Effort: Pulmonary effort is normal.      Breath sounds: Normal breath sounds.   Abdominal:      General: Bowel sounds are normal. There is no distension.      Palpations: Abdomen is soft.      Tenderness: There is no abdominal tenderness.   Musculoskeletal:         General: trace swelling of bilateral knees with tenderness which is stable, no erythema     Cervical back: Neck supple.      Comments: BLE wrapped   Skin:     Capillary Refill: Capillary refill takes less than 2 seconds.      Findings: No erythema.   Neurological:      General: No focal deficit present.      Mental Status: She is alert.   Psychiatric:         Mood and Affect: Mood normal.         Behavior: Behavior normal.     Procedures Performed:  CT LOWER EXTREMITY RIGHT WO CONTRAST-7/18/25   DATE OF EXAM: 7/18/2025 12:26 PM     INDICATION: Rule out occult fracture. Right knee pain status post fall.     COMPARISON: Radiographs 7/16/2025 and 9/8/2022.     TECHNIQUE: Multiple contiguous axial images were acquired through the  knee from the mid femoral diaphysis to the mid tibial diaphysis without  the intravenous administration of contrast. Reformatted coronal and  sagittal sequences were also reviewed. Radiation dose reduction  techniques were utilized, including automated exposure control and  exposure modulation based on body size.     FINDINGS:  The study is limited by artifact related to extensive fixation hardware  in the contralateral (left) lower extremity.     Severe diffuse osteopenia. Severe DJD in the medial compartment of the  knee with chronic appearing nondisplaced subchondral insufficiency type  fracture  deformity of the weightbearing portions of the medial femoral  condyle and medial tibial plateau. No evidence of acute fracture.  Sclerotic focus in the medial distal femoral metaphysis, probable benign  bone island. No concerning osseous lesion.     Small to moderate knee joint effusion. Severe DJD in the patellofemoral  compartment. Moderate to severe DJD in the lateral compartment. Diffuse  chondrocalcinosis at the knee. Ovoid 1.1 cm osteochondral body in the  posterior medial knee joint space, posterior to the medial femoral  condyle. Thin multilobulated popliteal cyst, extending 6.5 cm  craniocaudal. No solid soft tissue mass. Chronic soft tissue  ossification in the subcutaneous fat at the level of the anteromedial  knee joint line. Patchy soft tissue edema and skin thickening, greatest  laterally and posteriorly.     The quadriceps and patellar tendon are intact. Mild diffuse muscular  fatty atrophy.     IMPRESSION:     1. Diffuse osteopenia, without CT evidence of acute fracture.  2. Tricompartmental DJD of the knee, most severe in the medial and  patellofemoral compartments, with chronic appearing nondisplaced  subchondral insufficiency type fracture deformity of the weightbearing  portions of the medial femoral condyle and medial tibial plateau.  3. Small knee joint effusion with diffuse chondrocalcinosis at the knee  and an osteochondral body in the posterior medial knee joint space.  4. Patchy soft tissue edema and skin thickening, greatest laterally and  posteriorly, with a thin multilobulated popliteal cyst.    Consults:   Consults       Date and Time Order Name Status Description    7/18/2025 11:23 AM Inpatient Orthopedic Surgery Consult Completed     7/16/2025  3:04 PM Inpatient Neurology Consult General Completed     7/15/2025  9:01 AM Inpatient Nephrology Consult Completed     7/15/2025  5:18 AM [facility] (on-call MD unless specified) Details               Discharge Disposition:  Skilled Nursing Facility  (DC - External)    Discharge Medications:     Discharge Medications        New Medications        Instructions Start Date   ammonium lactate 12 % cream  Commonly known as: AMLACTIN   1 Application, Topical, 2 Times Daily      Diclofenac Sodium 3 % gel gel   Topical, 3 Times Daily      HYDROcodone-acetaminophen 5-325 MG per tablet  Commonly known as: NORCO   1 tablet, Oral, Every 4 Hours PRN      hydrocortisone-bacitracin-zinc oxide-nystatin  Commonly known as: MAGIC BARRIER   1 Application, Topical, 3 times daily      melatonin 5 MG tablet tablet   5 mg, Oral, Nightly      sennosides-docusate 8.6-50 MG per tablet  Commonly known as: PERICOLACE   2 tablets, Oral, 2 Times Daily PRN             Changes to Medications        Instructions Start Date   hydrOXYzine 25 MG tablet  Commonly known as: ATARAX  What changed: reasons to take this   25 mg, Oral, 3 Times Daily PRN      PHENobarbital 64.8 MG tablet  What changed:   how much to take  how to take this  when to take this  additional instructions  Another medication with the same name was removed. Continue taking this medication, and follow the directions you see here.   Take 1 tablet (64.8 mg) by mouth in the morning and 2 tablets (129.6 mg) at night      pramipexole 0.5 MG tablet  Commonly known as: MIRAPEX  What changed: how much to take   2.5 mg, Oral, Nightly      pravastatin 40 MG tablet  Commonly known as: PRAVACHOL  What changed: See the new instructions.   TAKE 1 TABLET BY MOUTH EVERY NIGHT FOR HIGH AMOUNT OF FATS IN THE BLOOD      Vitamin B-12 1000 MCG sublingual tablet  What changed:   how much to take  how to take this  when to take this  additional instructions   1 tablet daily             Continue These Medications        Instructions Start Date   apixaban 5 MG tablet tablet  Commonly known as: ELIQUIS   5 mg, 2 Times Daily      cholecalciferol 25 MCG (1000 UT) tablet  Commonly known as: VITAMIN D3   4,000 Units, Oral, Daily      Synthroid 50 MCG  tablet  Generic drug: levothyroxine   50 mcg, Oral, Every Morning               Discharge Diet:   Diet Instructions       Diet: Cardiac Diets; Healthy Heart (2-3 Na+); Regular (IDDSI 7); Thin (IDDSI 0)      Discharge Diet: Cardiac Diets    Cardiac Diet: Healthy Heart (2-3 Na+)    Texture: Regular (IDDSI 7)    Fluid Consistency: Thin (IDDSI 0)            Activity at Discharge:   Activity Instructions       Activity as Tolerated              Follow-up Appointments:  Future Appointments   Date Time Provider Department Center   8/5/2025 11:20 AM Steven Liu II, MD [facility]   8/14/2025  8:30 AM Duglas Rock MD [facility]     Additional Instructions for the Follow-ups that You Need to Schedule       Discharge Follow-up with PCP   As directed       Currently Documented PCP:    Duglas Rock MD    PCP Phone Number:    [phone]     Follow Up Details: 1 week        Discharge Follow-up with Specified Provider: Dr. Gonzalez (Orthopedic Surgery); 3 Weeks   As directed      To: Dr. Gonzalez (Orthopedic Surgery)   Follow Up: 3 Weeks   Follow Up Details: call [phone] for appointment                 Obey Christopher MD  07/22/25  12:53 EDT    Time Spent on Discharge Activities: Greater than 30 minutes.

## 2025-07-22 NOTE — PROGRESS NOTES
[facility] Progress Note      Patient Name: Mariela Ruiz  : 1945  MRN: [id number]  Primary Care Physician:  Duglas Rock MD  Date of admission: 7/15/2025    Subjective     Interval History:   F/U edema  SBP 90s w/o sx  No dyspnea     Review of Systems:   As noted above    Objective     Vitals:   Temp:  [97.5 °F (36.4 °C)-97.8 °F (36.6 °C)] 97.5 °F (36.4 °C)  Heart Rate:  [69-93] 69  Resp:  [16] 16  BP: ()/(42-70) 102/52  Flow (L/min) (Oxygen Therapy):  [2] 2    Intake/Output Summary (Last 24 hours) at 2025 0850  Last data filed at 2025 0823  Gross per 24 hour   Intake 360 ml   Output 800 ml   Net -440 ml       Physical Exam:    General Appearance: frail WF no distress on NC O2  Neck: supple, no JVD  Lungs: CTA bilat no rales  Heart: RRR, normal S1 and S2  Abdomen: soft, nontender, nondistended  Extremities: BLE edema extent obscured by leg wraps    Scheduled Meds:     ammonium lactate, 1 Application, Topical, BID  apixaban, 5 mg, Oral, Q12H  Diclofenac Sodium, 1 Application, Topical, TID  hydrocortisone-bacitracin-zinc oxide-nystatin, 1 Application, Topical, TID  levothyroxine, 50 mcg, Oral, Q AM  melatonin, 5 mg, Oral, Nightly  pramipexole, 2.5 mg, Oral, Nightly  pravastatin, 40 mg, Oral, Nightly  sodium chloride, 10 mL, Intravenous, Q12H      IV Meds:        Results Reviewed:   I have personally reviewed the results from the time of this admission to 2025 08:50 EDT     Results from last 7 days   Lab Units 25  0612 25  0528 25  0537 25  0631 25  0952   SODIUM mmol/L 135* 136 138   < > 139  139   POTASSIUM mmol/L 4.4 5.0 4.6   < > 3.9  3.9   CHLORIDE mmol/L 99 100 101   < > 104  104   CO2 mmol/L 30.0* 29.6* 28.7   < > 25.3  25.3   BUN mg/dL 16.0 15.0 10.0   < > 11.0  11.0   CREATININE mg/dL 0.74 0.85 0.70   < > 0.97  0.97   CALCIUM mg/dL 9.6 9.9 9.6   < > 9.1  9.1   BILIRUBIN mg/dL  --   --   --   --  0.4   ALK  PHOS U/L  --   --   --   --  113   ALT (SGPT) U/L  --   --   --   --  8   AST (SGOT) U/L  --   --   --   --  16   GLUCOSE mg/dL 98 93 85   < > 115*  115*    < > = values in this interval not displayed.     Estimated Creatinine Clearance: 68.9 mL/min (by C-G formula based on SCr of 0.74 mg/dL).          Results from last 7 days   Lab Units 07/16/25  0952   WBC 10*3/mm3 5.16   HEMOGLOBIN g/dL 10.8*   PLATELETS 10*3/mm3 288           Assessment / Plan     ASSESSMENT:  Chronic BLE lymphedema.  Vague initial CXR raised question of pulm edema but BNP normal and had no dyspnea.  Alb also low 3.2.  Been on low dose lasix 20mg MWF but I don't think hypotension will permit cont'd use, and leg wraps more beneficial anyway  Hypotension - SBP 90s to low 100s, not symptomatic but still barrier to diuretic administration.  Further I don't think picture warrants addition of midodrine to allow diuretic use  BLE weakness - neurology eval noted.  No e/o radiculopathy  Seizure disorder   Hx PE on AC  Hypothyroidism, treated  Dyslipidemia, on statin     PLAN:  DC lasix   Continue leg wraps  Rehab placement in progress     Nephrology will sign off.  Please call back w questions.  D/W RN      Carlo Hampton MD  07/22/25  08:50 EDT    [facility]  [phone]

## 2025-08-11 ENCOUNTER — TELEPHONE (OUTPATIENT)
Dept: ENDOCRINOLOGY | Age: 80
End: 2025-08-11
Payer: MEDICARE

## 2025-08-18 DIAGNOSIS — G40.909 SEIZURE DISORDER: ICD-10-CM

## 2025-08-18 RX ORDER — PHENOBARBITAL 64.8 MG/1
TABLET ORAL
Qty: 90 TABLET | Refills: 0 | Status: SHIPPED | OUTPATIENT
Start: 2025-08-18

## (undated) DEVICE — REFLECTION FLEXIBLE DRILL 35MM: Brand: REFLECTION

## (undated) DEVICE — BALN PRESS WEDGE 7F 110CM

## (undated) DEVICE — UNDERCAST PADDING: Brand: DEROYAL

## (undated) DEVICE — ARM DRAPE

## (undated) DEVICE — COVER,C-ARM,41X74: Brand: MEDLINE

## (undated) DEVICE — DIL VESL STD .038 10F 20CM

## (undated) DEVICE — SYR LUERLOK 20CC BX/50

## (undated) DEVICE — BLADELESS OBTURATOR: Brand: WECK VISTA

## (undated) DEVICE — DGW .035 FC J3MM 260CM TEF: Brand: EMERALD

## (undated) DEVICE — GW AMPLTZ SUPERSTIFF 3MMJTIP .035IN 260CM

## (undated) DEVICE — DRSNG WND GZ CURAD OIL EMULSION 3X8IN LF STRL 1PK

## (undated) DEVICE — SYR LUERLOK 20CC

## (undated) DEVICE — PK KN TOTL 40

## (undated) DEVICE — DRAPE,REIN 53X77,STERILE: Brand: MEDLINE

## (undated) DEVICE — SYR LL TP 10ML STRL

## (undated) DEVICE — PK HIP TOTL 40

## (undated) DEVICE — ADHS SKIN DERMABOND TOP ADVANCED

## (undated) DEVICE — GW AMPLTZ SUPERSTIFF SHT/TPR STR .035IN 260CM

## (undated) DEVICE — GLV SURG PREMIERPRO ORTHO LTX PF SZ8.5 BRN

## (undated) DEVICE — SUT VIC 0 CT1 36IN J946H

## (undated) DEVICE — SUCTION IRRIGATOR: Brand: ENDOWRIST

## (undated) DEVICE — TISSUE RETRIEVAL SYSTEM: Brand: INZII RETRIEVAL SYSTEM

## (undated) DEVICE — DECANT BG O JET

## (undated) DEVICE — THE STERILE LIGHT HANDLE COVER IS USED WITH STERIS SURGICAL LIGHTING AND VISUALIZATION SYSTEMS.

## (undated) DEVICE — ANTIBACTERIAL UNDYED BRAIDED (POLYGLACTIN 910), SYNTHETIC ABSORBABLE SUTURE: Brand: COATED VICRYL

## (undated) DEVICE — SYR LUERLOK 30CC

## (undated) DEVICE — KT DRN EVAC WND PVC PCH WTROC RND 10F400

## (undated) DEVICE — APPL CHLORAPREP HI/LITE 26ML ORNG

## (undated) DEVICE — INTRI24™ INTRODUCER SHEATH (SHEATH): Brand: INTRI24 INTRODUCER SHEATH

## (undated) DEVICE — LARGE BORE 60 CC SYRINGE: Brand: LARGE BORE 60 CC SYRINGE

## (undated) DEVICE — GLV SURG SIGNATURE ESSENTIAL PF LTX SZ8.5

## (undated) DEVICE — LAPAROVUE VISIBILITY SYSTEM LAPAROSCOPIC SOLUTIONS: Brand: LAPAROVUE

## (undated) DEVICE — PICO 7 10CM X 30CM: Brand: PICO™ 7

## (undated) DEVICE — STPCK 3WY D201 DISCOFIX

## (undated) DEVICE — SUT ETHIB 2 CV V37 MS/4 30IN MX69G

## (undated) DEVICE — DIL COONS/TPR .038IN 22F 20CM

## (undated) DEVICE — SOL ISO/ALC RUB 70PCT 4OZ

## (undated) DEVICE — APPL DURAPREP IODOPHOR APL 26ML

## (undated) DEVICE — ANTIBACTERIAL UNDYED BRAIDED (POLYGLACTIN 910), SYNTHETIC ABSORBABLE SURGICAL SUTURE: Brand: COATED VICRYL

## (undated) DEVICE — DRSNG BURN ACTICOAT FLEX 7 1X24IN

## (undated) DEVICE — PERCLOSE™ PROSTYLE™ SUTURE-MEDIATED CLOSURE AND REPAIR SYSTEM: Brand: PERCLOSE™ PROSTYLE™

## (undated) DEVICE — DUAL CUT SAGITTAL BLADE

## (undated) DEVICE — EXTENSION SET, MALE LUER LOCK ADAPTER WITH RETRACTABLE COLLAR

## (undated) DEVICE — GAUZE,SPONGE,FLUFF,6"X6.75",STRL,10/TRAY: Brand: MEDLINE

## (undated) DEVICE — PINNACLE INTRODUCER SHEATH: Brand: PINNACLE

## (undated) DEVICE — NDL SPINE 20G 3 1/2 YEL STRL 1P/U

## (undated) DEVICE — HI-TORQUE SUPRA CORE .035 PERIPHERAL GUIDE WIRE .035 X 190 CM: Brand: HI-TORQUE SUPRA CORE

## (undated) DEVICE — SEAL

## (undated) DEVICE — REFLECTION FLEXIBLE DRILL 25MM: Brand: REFLECTION

## (undated) DEVICE — SUT VIC 1 CT1 36IN J947H

## (undated) DEVICE — Device: Brand: TRIEVER24

## (undated) DEVICE — MAT FLR ABSORBENT LG 4FT 10 2.5FT

## (undated) DEVICE — LOU LAP CHOLE: Brand: MEDLINE INDUSTRIES, INC.

## (undated) DEVICE — STPLR SKIN VISISTAT WD 35CT

## (undated) DEVICE — CATH IV INSYTE AUTOGARD 14G 1 1/2IN ORNG

## (undated) DEVICE — DRSNG WND BORDR/ADHS NONADHR/GZ LF 2X2IN STRL

## (undated) DEVICE — 2, DISPOSABLE SUCTION/IRRIGATOR WITH DISPOSABLE TIP: Brand: STRYKEFLOW

## (undated) DEVICE — ENCORE® LATEX ORTHO SIZE 8.5, STERILE LATEX POWDER-FREE SURGICAL GLOVE: Brand: ENCORE

## (undated) DEVICE — PENCL E/S ULTRAVAC TELESCP NOSE HOLSTR 10FT

## (undated) DEVICE — GLV SURG SENSICARE PI PF LF 8.5 GRN STRL

## (undated) DEVICE — 3M™ IOBAN™ 2 ANTIMICROBIAL INCISE DRAPE 6650EZ: Brand: IOBAN™ 2

## (undated) DEVICE — GLV SURG TRIUMPH CLASSIC PF LTX 8 STRL

## (undated) DEVICE — COLUMN DRAPE

## (undated) DEVICE — ENDOPATH XCEL BLADELESS TROCARS WITH STABILITY SLEEVES: Brand: ENDOPATH XCEL

## (undated) DEVICE — SOL NACL 0.9PCT 100ML SGL

## (undated) DEVICE — ADHS LIQ MASTISOL 2/3ML

## (undated) DEVICE — BANDAGE,GAUZE,BULKEE II,2.25"X3YD,STRL: Brand: MEDLINE

## (undated) DEVICE — ST TBG AIRSEAL BIF FLTR W/ACT/CHARCOAL/FLTR

## (undated) DEVICE — CATH URETRL SOF/FLEX OPN/END 4F 70CM

## (undated) DEVICE — GLV SURG BIOGEL LTX PF 8 1/2

## (undated) DEVICE — PREMIUM WET SKIN PREP TRAY: Brand: MEDLINE INDUSTRIES, INC.

## (undated) DEVICE — PIN TROC SIGNATURE PK/2

## (undated) DEVICE — TRAP FLD MINIVAC MEGADYNE 100ML

## (undated) DEVICE — SUT MNCRYL PLS ANTIB UD 4/0 PS2 18IN

## (undated) DEVICE — SOL NACL 0.9PCT 1000ML

## (undated) DEVICE — 3M™ IOBAN™ 2 ANTIMICROBIAL INCISE DRAPE 6640EZ: Brand: IOBAN™ 2

## (undated) DEVICE — BANDAGE,GAUZE,BULKEE II,4.5"X4.1YD,STRL: Brand: MEDLINE

## (undated) DEVICE — FLOWSAVER: Brand: FLOWSAVER

## (undated) DEVICE — S/M FLEXIBLE ALEXIS ORTHOPAEDIC PROTECTOR: Brand: ALEXIS® ORTHOPAEDIC PROTECTOR

## (undated) DEVICE — TRIEVER 20 CATHETER, 20 FR, 105 CM: Brand: TRIEVER 20 CURVE

## (undated) DEVICE — Device

## (undated) DEVICE — BNDG ELAS ELITE V/CLOSE 6IN 5YD LF STRL

## (undated) DEVICE — GOWN,NON-REINFORCED,SIRUS,SET IN SLV,XL: Brand: MEDLINE

## (undated) DEVICE — TROC BLADLES AIRSEAL/OPTI THRD 8X120MM 1P/U

## (undated) DEVICE — DRAPE,HIP,W/POUCHES,STERILE: Brand: MEDLINE

## (undated) DEVICE — ZIP 16 SURGICAL SKIN CLOSURE DEVICE, PSA: Brand: ZIP 16 SURGICAL SKIN CLOSURE DEVICE

## (undated) DEVICE — GLV SURG BIOGEL LTX PF 7

## (undated) DEVICE — DRSNG ADAPTIC 3X8

## (undated) DEVICE — CP INARI VTE

## (undated) DEVICE — GLV SURG SENSICARE PI MIC PF SZ7 LF STRL

## (undated) DEVICE — APPL CHLORAPREP W/TINT 26ML ORNG

## (undated) DEVICE — RECIPROCATING BLADE HEAVY DUTY LONG, OFFSET  (77.6 X 0.77 X 11.2MM)

## (undated) DEVICE — PK ANT HIP 40

## (undated) DEVICE — CEMENT MIXING SYSTEM WITH FEMORAL BREAKWAY NOZZLE: Brand: REVOLUTION